# Patient Record
Sex: MALE | Race: WHITE | Employment: UNEMPLOYED | URBAN - METROPOLITAN AREA
[De-identification: names, ages, dates, MRNs, and addresses within clinical notes are randomized per-mention and may not be internally consistent; named-entity substitution may affect disease eponyms.]

---

## 2017-04-18 ENCOUNTER — HOSPITAL ENCOUNTER (OUTPATIENT)
Dept: LAB | Age: 62
Discharge: HOME | End: 2017-04-18
Payer: SELF-PAY

## 2017-04-18 ENCOUNTER — OFFICE VISIT (OUTPATIENT)
Dept: FAMILY MEDICINE CLINIC | Age: 62
End: 2017-04-18

## 2017-04-18 VITALS
TEMPERATURE: 96.7 F | RESPIRATION RATE: 16 BRPM | SYSTOLIC BLOOD PRESSURE: 163 MMHG | HEART RATE: 58 BPM | OXYGEN SATURATION: 97 % | HEIGHT: 70 IN | DIASTOLIC BLOOD PRESSURE: 90 MMHG | WEIGHT: 163.8 LBS | BODY MASS INDEX: 23.45 KG/M2

## 2017-04-18 DIAGNOSIS — M67.40 GANGLION CYST: ICD-10-CM

## 2017-04-18 DIAGNOSIS — Z00.00 ROUTINE GENERAL MEDICAL EXAMINATION AT A HEALTH CARE FACILITY: ICD-10-CM

## 2017-04-18 DIAGNOSIS — I10 ESSENTIAL HYPERTENSION, MALIGNANT: ICD-10-CM

## 2017-04-18 DIAGNOSIS — Z87.891 FORMER SMOKER: ICD-10-CM

## 2017-04-18 DIAGNOSIS — M19.90 ARTHRITIS: ICD-10-CM

## 2017-04-18 DIAGNOSIS — H90.2 CONDUCTIVE HEARING LOSS, UNSPECIFIED LATERALITY: ICD-10-CM

## 2017-04-18 DIAGNOSIS — R07.9 CHEST PAIN, UNSPECIFIED TYPE: ICD-10-CM

## 2017-04-18 DIAGNOSIS — E78.00 ELEVATED CHOLESTEROL: ICD-10-CM

## 2017-04-18 DIAGNOSIS — E07.9 THYROID MASS: ICD-10-CM

## 2017-04-18 DIAGNOSIS — Z23 ENCOUNTER FOR IMMUNIZATION: ICD-10-CM

## 2017-04-18 DIAGNOSIS — E78.00 ELEVATED CHOLESTEROL: Primary | ICD-10-CM

## 2017-04-18 LAB
ALBUMIN SERPL BCP-MCNC: 4.2 G/DL (ref 3.4–5)
ALBUMIN/GLOB SERPL: 1.6 {RATIO} (ref 0.8–1.7)
ALP SERPL-CCNC: 96 U/L (ref 45–117)
ALT SERPL-CCNC: 38 U/L (ref 16–61)
ANION GAP BLD CALC-SCNC: 10 MMOL/L (ref 3–18)
APPEARANCE UR: CLEAR
AST SERPL W P-5'-P-CCNC: 21 U/L (ref 15–37)
BASOPHILS # BLD AUTO: 0 K/UL (ref 0–0.06)
BASOPHILS # BLD: 1 % (ref 0–2)
BILIRUB SERPL-MCNC: 0.5 MG/DL (ref 0.2–1)
BILIRUB UR QL: NEGATIVE
BUN SERPL-MCNC: 16 MG/DL (ref 7–18)
BUN/CREAT SERPL: 15 (ref 12–20)
CALCIUM SERPL-MCNC: 9.2 MG/DL (ref 8.5–10.1)
CHLORIDE SERPL-SCNC: 101 MMOL/L (ref 100–108)
CHOLEST SERPL-MCNC: 273 MG/DL
CO2 SERPL-SCNC: 28 MMOL/L (ref 21–32)
COLOR UR: YELLOW
CREAT SERPL-MCNC: 1.1 MG/DL (ref 0.6–1.3)
DIFFERENTIAL METHOD BLD: ABNORMAL
EOSINOPHIL # BLD: 0.2 K/UL (ref 0–0.4)
EOSINOPHIL NFR BLD: 3 % (ref 0–5)
ERYTHROCYTE [DISTWIDTH] IN BLOOD BY AUTOMATED COUNT: 13.5 % (ref 11.6–14.5)
GLOBULIN SER CALC-MCNC: 2.6 G/DL (ref 2–4)
GLUCOSE SERPL-MCNC: 82 MG/DL (ref 74–99)
GLUCOSE UR STRIP.AUTO-MCNC: NEGATIVE MG/DL
HCT VFR BLD AUTO: 44.6 % (ref 36–48)
HDLC SERPL-MCNC: 60 MG/DL (ref 40–60)
HDLC SERPL: 4.6 {RATIO} (ref 0–5)
HGB BLD-MCNC: 14.7 G/DL (ref 13–16)
HGB UR QL STRIP: NEGATIVE
KETONES UR QL STRIP.AUTO: NEGATIVE MG/DL
LDLC SERPL CALC-MCNC: 178 MG/DL (ref 0–100)
LEUKOCYTE ESTERASE UR QL STRIP.AUTO: NEGATIVE
LIPID PROFILE,FLP: ABNORMAL
LYMPHOCYTES # BLD AUTO: 29 % (ref 21–52)
LYMPHOCYTES # BLD: 1.7 K/UL (ref 0.9–3.6)
MCH RBC QN AUTO: 32 PG (ref 24–34)
MCHC RBC AUTO-ENTMCNC: 33 G/DL (ref 31–37)
MCV RBC AUTO: 97 FL (ref 74–97)
MONOCYTES # BLD: 0.6 K/UL (ref 0.05–1.2)
MONOCYTES NFR BLD AUTO: 10 % (ref 3–10)
NEUTS SEG # BLD: 3.4 K/UL (ref 1.8–8)
NEUTS SEG NFR BLD AUTO: 57 % (ref 40–73)
NITRITE UR QL STRIP.AUTO: NEGATIVE
PH UR STRIP: 6.5 [PH] (ref 5–8)
PLATELET # BLD AUTO: 208 K/UL (ref 135–420)
PMV BLD AUTO: 10.7 FL (ref 9.2–11.8)
POTASSIUM SERPL-SCNC: 4 MMOL/L (ref 3.5–5.5)
PROT SERPL-MCNC: 6.8 G/DL (ref 6.4–8.2)
PROT UR STRIP-MCNC: NEGATIVE MG/DL
PSA SERPL-MCNC: 0.6 NG/ML (ref 0–4)
RBC # BLD AUTO: 4.6 M/UL (ref 4.7–5.5)
SODIUM SERPL-SCNC: 139 MMOL/L (ref 136–145)
SP GR UR REFRACTOMETRY: 1.01 (ref 1–1.03)
T4 FREE SERPL-MCNC: 1.1 NG/DL (ref 0.7–1.5)
TRIGL SERPL-MCNC: 175 MG/DL (ref ?–150)
TSH SERPL DL<=0.05 MIU/L-ACNC: 0.69 UIU/ML (ref 0.36–3.74)
UROBILINOGEN UR QL STRIP.AUTO: 0.2 EU/DL (ref 0.2–1)
VLDLC SERPL CALC-MCNC: 35 MG/DL
WBC # BLD AUTO: 5.9 K/UL (ref 4.6–13.2)

## 2017-04-18 PROCEDURE — 84153 ASSAY OF PSA TOTAL: CPT | Performed by: FAMILY MEDICINE

## 2017-04-18 PROCEDURE — 84443 ASSAY THYROID STIM HORMONE: CPT | Performed by: FAMILY MEDICINE

## 2017-04-18 PROCEDURE — 80061 LIPID PANEL: CPT | Performed by: FAMILY MEDICINE

## 2017-04-18 PROCEDURE — 80053 COMPREHEN METABOLIC PANEL: CPT | Performed by: FAMILY MEDICINE

## 2017-04-18 PROCEDURE — 85025 COMPLETE CBC W/AUTO DIFF WBC: CPT | Performed by: FAMILY MEDICINE

## 2017-04-18 PROCEDURE — 84439 ASSAY OF FREE THYROXINE: CPT | Performed by: FAMILY MEDICINE

## 2017-04-18 PROCEDURE — 81003 URINALYSIS AUTO W/O SCOPE: CPT | Performed by: FAMILY MEDICINE

## 2017-04-18 PROCEDURE — 36415 COLL VENOUS BLD VENIPUNCTURE: CPT | Performed by: FAMILY MEDICINE

## 2017-04-18 RX ORDER — LISINOPRIL 10 MG/1
10 TABLET ORAL DAILY
Qty: 90 TAB | Refills: 0 | Status: SHIPPED | OUTPATIENT
Start: 2017-04-18 | End: 2017-08-01 | Stop reason: SDUPTHER

## 2017-04-18 RX ORDER — ATORVASTATIN CALCIUM 10 MG/1
10 TABLET, FILM COATED ORAL DAILY
Qty: 90 TAB | Refills: 0 | Status: SHIPPED | OUTPATIENT
Start: 2017-04-18 | End: 2017-08-01 | Stop reason: SDUPTHER

## 2017-04-18 RX ORDER — ATENOLOL 50 MG/1
50 TABLET ORAL DAILY
Qty: 90 TAB | Refills: 0 | Status: SHIPPED | OUTPATIENT
Start: 2017-04-18 | End: 2017-08-01 | Stop reason: SDUPTHER

## 2017-04-18 NOTE — PROGRESS NOTES
Nadeen Sheehan is a 58 y.o.  male and presents with    Chief Complaint   Patient presents with    Thyroid Problem    Arthritis           Subjective:    Cardiovascular Review:  The patient has hypertension and hyperlipidemia. Diet and Lifestyle: not attempting to follow a low fat, low cholesterol diet, not attempting to follow a low sodium diet  Home BP Monitoring: is not measured at home. Pertinent ROS: taking medications as instructed, no medication side effects noted, no TIA's, no chest pain on exertion, no dyspnea on exertion, no swelling of ankles. Thyroid Review:  Patient is seen for followup of hypothyroidism. Thyroid ROS: denies fatigue, weight changes, heat/cold intolerance, bowel/skin changes or CVS symptoms. Additional Concerns:          Patient Active Problem List    Diagnosis Date Noted    Elevated cholesterol 04/12/2016    Ganglion cyst 04/12/2016    Thyroid mass 04/12/2016    Essential hypertension, malignant 11/13/2014    Hearing loss 11/13/2014    Arthritis 11/13/2014     Current Outpatient Prescriptions   Medication Sig Dispense Refill    lisinopril (PRINIVIL) 10 mg tablet Take 1 Tab by mouth daily. 90 Tab 0    atenolol (TENORMIN) 50 mg tablet Take 1 Tab by mouth daily. 90 Tab 0    atorvastatin (LIPITOR) 10 mg tablet Take 1 Tab by mouth daily. 90 Tab 0     No Known Allergies  Past Medical History:   Diagnosis Date    Arthritis 11/13/2014    Elevated cholesterol 4/12/2016    Essential hypertension, malignant 11/13/2014    Hearing loss 11/13/2014     Past Surgical History:   Procedure Laterality Date    HX HERNIA REPAIR       Family History   Problem Relation Age of Onset    Diabetes Father      Social History   Substance Use Topics    Smoking status: Former Smoker    Smokeless tobacco: Never Used    Alcohol use 12.5 oz/week     25 Shots of liquor per week       ROS       All other systems reviewed and are negative.       Objective:  Vitals:    04/18/17 1500 BP: 163/90   Pulse: (!) 58   Resp: 16   Temp: 96.7 °F (35.9 °C)   TempSrc: Oral   SpO2: 97%   Weight: 163 lb 12.8 oz (74.3 kg)   Height: 5' 10\" (1.778 m)   PainSc:   0 - No pain                 alert, well appearing, and in no distress and oriented to person, place, and time  Chest - clear to auscultation, no wheezes, rales or rhonchi, symmetric air entry  Heart - normal rate, regular rhythm, normal S1, S2, no murmurs, rubs, clicks or gallops  Abdomen - soft, nontender, nondistended, no masses or organomegaly        LABS     TESTS      Assessment/Plan:    Hypertension - stable  Hyperlipidemia - stable  Thyroid stable    Lab review: orders written for new lab studies as appropriate; see orders, no lab studies available for review at time of visit      I have discussed the diagnosis with the patient and the intended plan as seen in the above orders. The patient has received an after-visit summary and questions were answered concerning future plans. I have discussed medication side effects and warnings with the patient as well. I have reviewed the plan of care with the patient, accepted their input and they are in agreement with the treatment goals. Follow-up Disposition:  Return in about 2 months (around 6/18/2017) for physical, labs today, EKG next visit.

## 2017-04-18 NOTE — PROGRESS NOTES
1. Have you been to the ER, urgent care clinic since your last visit? Hospitalized since your last visit? No    2. Have you seen or consulted any other health care providers outside of the [address] [place] [address] Drive since your last visit? Include any pap smears or colon screening.  No

## 2017-04-18 NOTE — MR AVS SNAPSHOT
Visit Information Date & Time Provider Department Dept. Phone Encounter #  
 4/18/2017  3:00 PM Joseino Daniel, [address] [facility] [phone] [id number] Upcoming Health Maintenance Date Due INFLUENZA AGE 9 TO ADULT 8/1/2016 DTaP/Tdap/Td series (2 - Td) 12/4/2024 COLONOSCOPY 5/23/2026 Allergies as of 4/18/2017  Review Complete On: 4/18/2017 By: Estiven Sanchez, DO No Known Allergies Current Immunizations  Reviewed on 11/13/2014 Name Date Influenza Vaccine 12/4/2014, 11/13/2011 Influenza Vaccine (Quad) PF 3/15/2016 Pneumococcal Vaccine (Unspecified Type) 11/13/2011 Td 11/13/2001 Tdap 12/4/2014 Zoster Vaccine, Live 3/15/2016 Not reviewed this visit You Were Diagnosed With   
  
 Codes Comments Elevated cholesterol    -  Primary ICD-10-CM: E78.00 ICD-9-CM: 272.0 Thyroid mass     ICD-10-CM: E07.9 ICD-9-CM: 246.9 Essential hypertension, malignant     ICD-10-CM: I10 
ICD-9-CM: 401.0 Conductive hearing loss, unspecified laterality     ICD-10-CM: H90.2 ICD-9-CM: 389.00 Arthritis     ICD-10-CM: M19.90 ICD-9-CM: 716.90 Former smoker     ICD-10-CM: B07.057 ICD-9-CM: V15.82 Encounter for immunization     ICD-10-CM: X36 ICD-9-CM: V03.89 Routine general medical examination at a health care facility     ICD-10-CM: Z00.00 ICD-9-CM: V70.0 Ganglion cyst     ICD-10-CM: M67.40 ICD-9-CM: 727.43 Chest pain, unspecified type     ICD-10-CM: R07.9 ICD-9-CM: 786.50 Vitals BP Pulse Temp Resp Height(growth percentile) Weight(growth percentile) 163/90 (BP 1 Location: Right arm, BP Patient Position: Sitting) (!) 58 96.7 °F (35.9 °C) (Oral) 16 5' 10\" (1.778 m) 163 lb 12.8 oz (74.3 kg) SpO2 BMI Smoking Status 97% 23.5 kg/m2 Former Smoker BMI and BSA Data Body Mass Index Body Surface Area  
 23.5 kg/m 2 1.92 m 2 Preferred Pharmacy Pharmacy Name Phone [facility] [address], [address] [facility] [phone] Your Updated Medication List  
  
   
This list is accurate as of: 4/18/17  3:16 PM.  Always use your most recent med list.  
  
  
  
  
 atenolol 50 mg tablet Commonly known as:  TENORMIN Take 1 Tab by mouth daily. atorvastatin 10 mg tablet Commonly known as:  LIPITOR Take 1 Tab by mouth daily. lisinopril 10 mg tablet Commonly known as:  PRINIVIL Take 1 Tab by mouth daily. Prescriptions Sent to Pharmacy Refills  
 lisinopril (PRINIVIL) 10 mg tablet 0 Sig: Take 1 Tab by mouth daily. Class: Normal  
 Pharmacy: [id number], [address] Ph #: [phone] Route: Oral  
 atenolol (TENORMIN) 50 mg tablet 0 Sig: Take 1 Tab by mouth daily. Class: Normal  
 Pharmacy: [id number], [address] Ph #: [phone] Route: Oral  
 atorvastatin (LIPITOR) 10 mg tablet 0 Sig: Take 1 Tab by mouth daily. Class: Normal  
 Pharmacy: [id number], [address] Ph #: [phone] Route: Oral  
  
To-Do List   
 04/18/2017 Lab:  CBC WITH AUTOMATED DIFF   
  
 04/18/2017 Lab:  LIPID PANEL   
  
 04/18/2017 Lab:  METABOLIC PANEL, COMPREHENSIVE   
  
 04/18/2017 Lab:  PROSTATE SPECIFIC AG (PSA)   
  
 04/18/2017 Lab:  T4, FREE   
  
 04/18/2017 Lab:  TSH 3RD GENERATION   
  
 04/18/2017 Lab:  URINALYSIS W/ RFLX MICROSCOPIC Introducing [facility]! Dear Sheila Castro: 
Thank you for requesting a Poken account. Our records indicate that you already have an active Poken account. You can access your account anytime at [email or web address] Did you know that you can access your hospital and ER discharge instructions at any time in Poken? You can also review all of your test results from your hospital stay or ER visit. Additional Information If you have questions, please visit the Frequently Asked Questions section of the [employer] website at [email or web address] Remember, Pax8 is NOT to be used for urgent needs. For medical emergencies, dial 911. Now available from your iPhone and Android! Please provide this summary of care documentation to your next provider. Your primary care clinician is listed as [id number] [facility]. If you have any questions after today's visit, please call [phone].

## 2017-07-27 DIAGNOSIS — I10 ESSENTIAL HYPERTENSION, MALIGNANT: ICD-10-CM

## 2017-07-27 DIAGNOSIS — Z87.891 FORMER SMOKER: ICD-10-CM

## 2017-07-27 DIAGNOSIS — M19.90 ARTHRITIS: ICD-10-CM

## 2017-07-28 RX ORDER — LISINOPRIL 10 MG/1
TABLET ORAL
Qty: 90 TAB | Refills: 0 | OUTPATIENT
Start: 2017-07-28

## 2017-07-28 RX ORDER — ATENOLOL 50 MG/1
TABLET ORAL
Qty: 90 TAB | Refills: 0 | OUTPATIENT
Start: 2017-07-28

## 2017-07-31 NOTE — TELEPHONE ENCOUNTER
Pt called stating that he did not make his appt last month because he is out of work and does not have money to come in for an appt. I told him that Dr. Atiya Hopper would still see him but he still refused to make an appt. He would like to know if Dr. Atiya Hopper would at least fill his blood pressure medication because he has enough money to afford that.  Please assist.

## 2017-08-01 ENCOUNTER — TELEPHONE (OUTPATIENT)
Dept: FAMILY MEDICINE CLINIC | Age: 62
End: 2017-08-01

## 2017-08-01 DIAGNOSIS — Z23 ENCOUNTER FOR IMMUNIZATION: ICD-10-CM

## 2017-08-01 DIAGNOSIS — Z00.00 ROUTINE GENERAL MEDICAL EXAMINATION AT A HEALTH CARE FACILITY: ICD-10-CM

## 2017-08-01 DIAGNOSIS — M19.90 ARTHRITIS: ICD-10-CM

## 2017-08-01 DIAGNOSIS — Z87.891 FORMER SMOKER: ICD-10-CM

## 2017-08-01 DIAGNOSIS — M67.40 GANGLION CYST: ICD-10-CM

## 2017-08-01 DIAGNOSIS — I10 ESSENTIAL HYPERTENSION, MALIGNANT: ICD-10-CM

## 2017-08-01 DIAGNOSIS — R07.9 CHEST PAIN, UNSPECIFIED TYPE: ICD-10-CM

## 2017-08-01 RX ORDER — LISINOPRIL 10 MG/1
10 TABLET ORAL DAILY
Qty: 90 TAB | Refills: 0 | Status: SHIPPED | OUTPATIENT
Start: 2017-08-01 | End: 2017-10-30

## 2017-08-01 RX ORDER — ATORVASTATIN CALCIUM 10 MG/1
10 TABLET, FILM COATED ORAL DAILY
Qty: 90 TAB | Refills: 0 | Status: SHIPPED | OUTPATIENT
Start: 2017-08-01 | End: 2017-10-30 | Stop reason: SDUPTHER

## 2017-08-01 RX ORDER — ATENOLOL 50 MG/1
50 TABLET ORAL DAILY
Qty: 90 TAB | Refills: 0 | Status: SHIPPED | OUTPATIENT
Start: 2017-08-01 | End: 2017-08-02 | Stop reason: SDUPTHER

## 2017-08-01 NOTE — TELEPHONE ENCOUNTER
Patient is calling stating that the atenolol 50 mg is being discontinued and that another type of medication is needed.     Please assist.

## 2017-08-01 NOTE — TELEPHONE ENCOUNTER
Request sent for medication refill on patients atenolol 50mg tab and lisinopril 10mg tab. Patient unable to afford office visit but can afford his medication.  Will send to MD.

## 2017-08-02 RX ORDER — ATENOLOL 50 MG/1
50 TABLET ORAL DAILY
Qty: 90 TAB | Refills: 0 | Status: SHIPPED | OUTPATIENT
Start: 2017-08-02 | End: 2017-10-30 | Stop reason: SDUPTHER

## 2017-08-02 NOTE — TELEPHONE ENCOUNTER
Apparently [facility] is telling patients that this drug is discontinued. My sources tell me it is on temporary backorder. Available from a variety of other stores including [facility] here. I have printed rx. Suggest pt call around. Suggest [facility] here.    Dr Babita Bermudez

## 2017-08-02 NOTE — TELEPHONE ENCOUNTER
[facility]. Prescription is at the  and ready for . Called patient on the listed number. Left message stating that prescription is ready for .

## 2017-10-30 ENCOUNTER — OFFICE VISIT (OUTPATIENT)
Dept: FAMILY MEDICINE CLINIC | Age: 62
End: 2017-10-30

## 2017-10-30 VITALS
DIASTOLIC BLOOD PRESSURE: 100 MMHG | TEMPERATURE: 97.3 F | OXYGEN SATURATION: 96 % | BODY MASS INDEX: 24.54 KG/M2 | HEIGHT: 70 IN | HEART RATE: 67 BPM | RESPIRATION RATE: 17 BRPM | SYSTOLIC BLOOD PRESSURE: 190 MMHG | WEIGHT: 171.4 LBS

## 2017-10-30 DIAGNOSIS — I10 ESSENTIAL HYPERTENSION, MALIGNANT: ICD-10-CM

## 2017-10-30 DIAGNOSIS — M19.90 ARTHRITIS: ICD-10-CM

## 2017-10-30 DIAGNOSIS — Z23 ENCOUNTER FOR IMMUNIZATION: ICD-10-CM

## 2017-10-30 DIAGNOSIS — Z87.891 FORMER SMOKER: ICD-10-CM

## 2017-10-30 DIAGNOSIS — R07.9 CHEST PAIN, UNSPECIFIED TYPE: ICD-10-CM

## 2017-10-30 DIAGNOSIS — Z00.00 ROUTINE GENERAL MEDICAL EXAMINATION AT A HEALTH CARE FACILITY: ICD-10-CM

## 2017-10-30 DIAGNOSIS — M67.40 GANGLION CYST: ICD-10-CM

## 2017-10-30 RX ORDER — AMLODIPINE AND BENAZEPRIL HYDROCHLORIDE 10; 20 MG/1; MG/1
1 CAPSULE ORAL DAILY
Qty: 90 CAP | Refills: 0 | Status: SHIPPED | OUTPATIENT
Start: 2017-10-30 | End: 2017-11-28

## 2017-10-30 RX ORDER — ATENOLOL 50 MG/1
50 TABLET ORAL DAILY
Qty: 90 TAB | Refills: 0 | Status: SHIPPED | OUTPATIENT
Start: 2017-10-30 | End: 2017-11-28 | Stop reason: SDUPTHER

## 2017-10-30 RX ORDER — ATORVASTATIN CALCIUM 10 MG/1
10 TABLET, FILM COATED ORAL DAILY
Qty: 90 TAB | Refills: 0 | Status: SHIPPED | OUTPATIENT
Start: 2017-10-30 | End: 2017-11-28 | Stop reason: SDUPTHER

## 2017-10-30 NOTE — PROGRESS NOTES
Stacie Villegas is a 58 y.o.  male and presents with    Chief Complaint   Patient presents with    Hypertension    Cholesterol Problem           Subjective:    Cardiovascular Review:  The patient has hypertension and hyperlipidemia. Diet and Lifestyle: not attempting to follow a low fat, low cholesterol diet, not attempting to follow a low sodium diet  Home BP Monitoring: is not measured at home. Pertinent ROS: taking medications as instructed, no medication side effects noted, no TIA's, no chest pain on exertion, no dyspnea on exertion, no swelling of ankles. Additional Concerns:          Patient Active Problem List    Diagnosis Date Noted    Elevated cholesterol 04/12/2016    Ganglion cyst 04/12/2016    Thyroid mass 04/12/2016    Essential hypertension, malignant 11/13/2014    Hearing loss 11/13/2014    Arthritis 11/13/2014     Current Outpatient Prescriptions   Medication Sig Dispense Refill    atenolol (TENORMIN) 50 mg tablet Take 1 Tab by mouth daily. 90 Tab 0    atorvastatin (LIPITOR) 10 mg tablet Take 1 Tab by mouth daily. 90 Tab 0    amLODIPine-benazepril (LOTREL) 10-20 mg per capsule Take 1 Cap by mouth daily. 90 Cap 0     No Known Allergies  Past Medical History:   Diagnosis Date    Arthritis 11/13/2014    Elevated cholesterol 4/12/2016    Essential hypertension, malignant 11/13/2014    Hearing loss 11/13/2014     Past Surgical History:   Procedure Laterality Date    HX HERNIA REPAIR       Family History   Problem Relation Age of Onset    Diabetes Father      Social History   Substance Use Topics    Smoking status: Former Smoker    Smokeless tobacco: Never Used    Alcohol use 12.5 oz/week     25 Shots of liquor per week       ROS       All other systems reviewed and are negative.       Objective:  Vitals:    10/30/17 1500   BP: (!) 190/100   Pulse: 67   Resp: 17   Temp: 97.3 °F (36.3 °C)   TempSrc: Oral   SpO2: 96%   Weight: 171 lb 6.4 oz (77.7 kg)   Height: 5' 10\" (1.778 m)   PainSc:   0 - No pain                 alert, well appearing, and in no distress and oriented to person, place, and time  Chest - clear to auscultation, no wheezes, rales or rhonchi, symmetric air entry  Heart - normal rate, regular rhythm, normal S1, S2, no murmurs, rubs, clicks or gallops        LABS     TESTS      Assessment/Plan:    Hypertension - stable  Hyperlipidemia - stable    Lab review: labs are reviewed, up to date and normal    Diagnoses and all orders for this visit:    1. Essential hypertension, malignant  -     atenolol (TENORMIN) 50 mg tablet; Take 1 Tab by mouth daily. -     atorvastatin (LIPITOR) 10 mg tablet; Take 1 Tab by mouth daily. -     amLODIPine-benazepril (LOTREL) 10-20 mg per capsule; Take 1 Cap by mouth daily. 2. Encounter for immunization  -     atenolol (TENORMIN) 50 mg tablet; Take 1 Tab by mouth daily. -     atorvastatin (LIPITOR) 10 mg tablet; Take 1 Tab by mouth daily. -     amLODIPine-benazepril (LOTREL) 10-20 mg per capsule; Take 1 Cap by mouth daily. 3. Arthritis  -     atenolol (TENORMIN) 50 mg tablet; Take 1 Tab by mouth daily. -     atorvastatin (LIPITOR) 10 mg tablet; Take 1 Tab by mouth daily. -     amLODIPine-benazepril (LOTREL) 10-20 mg per capsule; Take 1 Cap by mouth daily. 4. Routine general medical examination at a health care facility  -     atenolol (TENORMIN) 50 mg tablet; Take 1 Tab by mouth daily. -     atorvastatin (LIPITOR) 10 mg tablet; Take 1 Tab by mouth daily. -     amLODIPine-benazepril (LOTREL) 10-20 mg per capsule; Take 1 Cap by mouth daily. 5. Ganglion cyst  -     atenolol (TENORMIN) 50 mg tablet; Take 1 Tab by mouth daily. -     atorvastatin (LIPITOR) 10 mg tablet; Take 1 Tab by mouth daily. -     amLODIPine-benazepril (LOTREL) 10-20 mg per capsule; Take 1 Cap by mouth daily. 6. Chest pain, unspecified type  -     atenolol (TENORMIN) 50 mg tablet; Take 1 Tab by mouth daily.   -     atorvastatin (LIPITOR) 10 mg tablet; Take 1 Tab by mouth daily. -     amLODIPine-benazepril (LOTREL) 10-20 mg per capsule; Take 1 Cap by mouth daily. 7. Former smoker  -     atenolol (TENORMIN) 50 mg tablet; Take 1 Tab by mouth daily. -     atorvastatin (LIPITOR) 10 mg tablet; Take 1 Tab by mouth daily. -     amLODIPine-benazepril (LOTREL) 10-20 mg per capsule; Take 1 Cap by mouth daily. I have discussed the diagnosis with the patient and the intended plan as seen in the above orders. The patient has received an after-visit summary and questions were answered concerning future plans. I have discussed medication side effects and warnings with the patient as well. I have reviewed the plan of care with the patient, accepted their input and they are in agreement with the treatment goals. Follow-up Disposition:  Return in about 4 weeks (around 11/27/2017) for physical, labs at next visit, EKG next visit.

## 2017-10-30 NOTE — PROGRESS NOTES
Leatha Gibbs. is a 58 y.o. male presented to clinic for medication refill. Pt denies any pain or concerns at this time. BP is elevated. Notified MD.    1. Have you been to the ER, urgent care clinic since your last visit? Hospitalized since your last visit? No    2. Have you seen or consulted any other health care providers outside of the [address] [place] [address] since your last visit? Include any pap smears or colon screening.  No     Learning Assessment 1/6/2015   PRIMARY LEARNER Patient   HIGHEST LEVEL OF EDUCATION - PRIMARY LEARNER  2 YEARS OF COLLEGE   BARRIERS PRIMARY LEARNER NONE   CO-LEARNER CAREGIVER No   PRIMARY LANGUAGE ENGLISH   LEARNER PREFERENCE PRIMARY READING     DEMONSTRATION   ANSWERED BY patient   RELATIONSHIP SELF

## 2017-10-30 NOTE — MR AVS SNAPSHOT
Visit Information Date & Time Provider Department Dept. Phone Encounter #  
 10/30/2017  3:00 PM Inna Brooks [phone] Follow-up Instructions Return in about 4 weeks (around 11/27/2017) for physical, labs at next visit, EKG next visit. Follow-up and Disposition History Upcoming Health Maintenance Date Due INFLUENZA AGE 9 TO ADULT 8/1/2017 DTaP/Tdap/Td series (2 - Td) 12/4/2024 COLONOSCOPY 5/23/2026 Allergies as of 10/30/2017  Review Complete On: 10/30/2017 By: Angela Sibley, DO No Known Allergies Current Immunizations  Reviewed on 11/13/2014 Name Date Influenza Vaccine 12/4/2014, 11/13/2011 Influenza Vaccine (Quad) PF 3/15/2016 Pneumococcal Vaccine (Unspecified Type) 11/13/2011 Td 11/13/2001 Tdap 12/4/2014 Zoster Vaccine, Live 3/15/2016 Not reviewed this visit You Were Diagnosed With   
  
 Codes Comments Essential hypertension, malignant     ICD-10-CM: I10 
ICD-9-CM: 401.0 Encounter for immunization     ICD-10-CM: L07 ICD-9-CM: V03.89 Arthritis     ICD-10-CM: M19.90 ICD-9-CM: 716.90 Routine general medical examination at a health care facility     ICD-10-CM: Z00.00 ICD-9-CM: V70.0 Ganglion cyst     ICD-10-CM: M67.40 ICD-9-CM: 727.43 Chest pain, unspecified type     ICD-10-CM: R07.9 ICD-9-CM: 786.50 Former smoker     ICD-10-CM: B61.349 ICD-9-CM: V15.82 Vitals BP Pulse Temp Resp Height(growth percentile) Weight(growth percentile) (!) 190/100 (BP 1 Location: Left arm, BP Patient Position: Sitting) 67 97.3 °F (36.3 °C) (Oral) 17 5' 10\" (1.778 m) 171 lb 6.4 oz (77.7 kg) SpO2 BMI Smoking Status 96% 24.59 kg/m2 Former Smoker BMI and BSA Data Body Mass Index Body Surface Area 24.59 kg/m 2 1.96 m 2 Preferred Pharmacy Pharmacy Name Phone [facility] [address], [address] [phone] Your Updated Medication List  
  
   
This list is accurate as of: 10/30/17  3:04 PM.  Always use your most recent med list. amLODIPine-benazepril 10-20 mg per capsule Commonly known as:  Summer Ends Take 1 Cap by mouth daily. atenolol 50 mg tablet Commonly known as:  TENORMIN Take 1 Tab by mouth daily. atorvastatin 10 mg tablet Commonly known as:  LIPITOR Take 1 Tab by mouth daily. Prescriptions Sent to Pharmacy Refills  
 atenolol (TENORMIN) 50 mg tablet 0 Sig: Take 1 Tab by mouth daily. Class: Normal  
 Pharmacy: [address], [address] Ph #: [phone] Route: Oral  
 atorvastatin (LIPITOR) 10 mg tablet 0 Sig: Take 1 Tab by mouth daily. Class: Normal  
 Pharmacy: [address], [address] Ph #: [phone] Route: Oral  
 amLODIPine-benazepril (LOTREL) 10-20 mg per capsule 0 Sig: Take 1 Cap by mouth daily. Class: Normal  
 Pharmacy: [address], [address] Ph #: [phone] Route: Oral  
  
Follow-up Instructions Return in about 4 weeks (around 11/27/2017) for physical, labs at next visit, EKG next visit. Introducing [facility]! Dear Des Silveira: 
Thank you for requesting a [employer] account. Our records indicate that you already have an active [employer] account. You can access your account anytime at [email or web address] Did you know that you can access your hospital and ER discharge instructions at any time in [employer]? You can also review all of your test results from your hospital stay or ER visit. Additional Information If you have questions, please visit the Frequently Asked Questions section of the [employer] website at [email or web address] Remember, [employer] is NOT to be used for urgent needs. For medical emergencies, dial 911. Now available from your iPhone and Android! Please provide this summary of care documentation to your next provider. Your primary care clinician is listed as [id number] [place] [address]. If you have any questions after today's visit, please call [phone].

## 2017-11-28 ENCOUNTER — HOSPITAL ENCOUNTER (OUTPATIENT)
Dept: LAB | Age: 62
Discharge: HOME | End: 2017-11-28
Payer: SELF-PAY

## 2017-11-28 ENCOUNTER — OFFICE VISIT (OUTPATIENT)
Dept: FAMILY MEDICINE CLINIC | Age: 62
End: 2017-11-28

## 2017-11-28 VITALS
BODY MASS INDEX: 23.99 KG/M2 | HEIGHT: 70 IN | DIASTOLIC BLOOD PRESSURE: 104 MMHG | OXYGEN SATURATION: 97 % | TEMPERATURE: 97.6 F | HEART RATE: 74 BPM | WEIGHT: 167.6 LBS | SYSTOLIC BLOOD PRESSURE: 127 MMHG | RESPIRATION RATE: 16 BRPM

## 2017-11-28 DIAGNOSIS — M19.90 ARTHRITIS: ICD-10-CM

## 2017-11-28 DIAGNOSIS — Z00.00 ROUTINE GENERAL MEDICAL EXAMINATION AT A HEALTH CARE FACILITY: ICD-10-CM

## 2017-11-28 DIAGNOSIS — E78.00 ELEVATED CHOLESTEROL: ICD-10-CM

## 2017-11-28 DIAGNOSIS — E07.9 THYROID MASS: ICD-10-CM

## 2017-11-28 DIAGNOSIS — I48.91 ATRIAL FIBRILLATION, UNSPECIFIED TYPE (HCC): ICD-10-CM

## 2017-11-28 DIAGNOSIS — I10 ESSENTIAL HYPERTENSION, MALIGNANT: ICD-10-CM

## 2017-11-28 DIAGNOSIS — Z00.00 ROUTINE GENERAL MEDICAL EXAMINATION AT A HEALTH CARE FACILITY: Primary | ICD-10-CM

## 2017-11-28 LAB
ALBUMIN SERPL-MCNC: 4.6 G/DL (ref 3.4–5)
ALBUMIN/GLOB SERPL: 1.6 {RATIO} (ref 0.8–1.7)
ALP SERPL-CCNC: 105 U/L (ref 45–117)
ALT SERPL-CCNC: 52 U/L (ref 16–61)
ANION GAP SERPL CALC-SCNC: 10 MMOL/L (ref 3–18)
APPEARANCE UR: CLEAR
AST SERPL-CCNC: 31 U/L (ref 15–37)
BASOPHILS # BLD: 0 K/UL (ref 0–0.06)
BASOPHILS NFR BLD: 1 % (ref 0–2)
BILIRUB SERPL-MCNC: 0.9 MG/DL (ref 0.2–1)
BILIRUB UR QL: NEGATIVE
BUN SERPL-MCNC: 15 MG/DL (ref 7–18)
BUN/CREAT SERPL: 14 (ref 12–20)
CALCIUM SERPL-MCNC: 9.8 MG/DL (ref 8.5–10.1)
CHLORIDE SERPL-SCNC: 102 MMOL/L (ref 100–108)
CHOLEST SERPL-MCNC: 194 MG/DL
CO2 SERPL-SCNC: 25 MMOL/L (ref 21–32)
COLOR UR: YELLOW
CREAT SERPL-MCNC: 1.09 MG/DL (ref 0.6–1.3)
DIFFERENTIAL METHOD BLD: ABNORMAL
EOSINOPHIL # BLD: 0.1 K/UL (ref 0–0.4)
EOSINOPHIL NFR BLD: 1 % (ref 0–5)
ERYTHROCYTE [DISTWIDTH] IN BLOOD BY AUTOMATED COUNT: 13.6 % (ref 11.6–14.5)
GLOBULIN SER CALC-MCNC: 2.8 G/DL (ref 2–4)
GLUCOSE SERPL-MCNC: 82 MG/DL (ref 74–99)
GLUCOSE UR STRIP.AUTO-MCNC: NEGATIVE MG/DL
HCT VFR BLD AUTO: 46 % (ref 36–48)
HDLC SERPL-MCNC: 62 MG/DL (ref 40–60)
HDLC SERPL: 3.1 {RATIO} (ref 0–5)
HGB BLD-MCNC: 15.7 G/DL (ref 13–16)
HGB UR QL STRIP: NEGATIVE
KETONES UR QL STRIP.AUTO: NEGATIVE MG/DL
LDLC SERPL CALC-MCNC: 97 MG/DL (ref 0–100)
LEUKOCYTE ESTERASE UR QL STRIP.AUTO: NEGATIVE
LIPID PROFILE,FLP: ABNORMAL
LYMPHOCYTES # BLD: 1.7 K/UL (ref 0.9–3.6)
LYMPHOCYTES NFR BLD: 24 % (ref 21–52)
MCH RBC QN AUTO: 32.1 PG (ref 24–34)
MCHC RBC AUTO-ENTMCNC: 34.1 G/DL (ref 31–37)
MCV RBC AUTO: 94.1 FL (ref 74–97)
MONOCYTES # BLD: 0.8 K/UL (ref 0.05–1.2)
MONOCYTES NFR BLD: 12 % (ref 3–10)
NEUTS SEG # BLD: 4.3 K/UL (ref 1.8–8)
NEUTS SEG NFR BLD: 62 % (ref 40–73)
NITRITE UR QL STRIP.AUTO: NEGATIVE
PH UR STRIP: 6 [PH] (ref 5–8)
PLATELET # BLD AUTO: 254 K/UL (ref 135–420)
PMV BLD AUTO: 10.7 FL (ref 9.2–11.8)
POTASSIUM SERPL-SCNC: 4.1 MMOL/L (ref 3.5–5.5)
PROT SERPL-MCNC: 7.4 G/DL (ref 6.4–8.2)
PROT UR STRIP-MCNC: NEGATIVE MG/DL
PSA SERPL-MCNC: 2.3 NG/ML (ref 0–4)
RBC # BLD AUTO: 4.89 M/UL (ref 4.7–5.5)
SODIUM SERPL-SCNC: 137 MMOL/L (ref 136–145)
SP GR UR REFRACTOMETRY: 1.01 (ref 1–1.03)
T4 FREE SERPL-MCNC: 1.6 NG/DL (ref 0.7–1.5)
TRIGL SERPL-MCNC: 175 MG/DL (ref ?–150)
TSH SERPL DL<=0.05 MIU/L-ACNC: 0.98 UIU/ML (ref 0.36–3.74)
UROBILINOGEN UR QL STRIP.AUTO: 1 EU/DL (ref 0.2–1)
VLDLC SERPL CALC-MCNC: 35 MG/DL
WBC # BLD AUTO: 7 K/UL (ref 4.6–13.2)

## 2017-11-28 PROCEDURE — 80061 LIPID PANEL: CPT | Performed by: FAMILY MEDICINE

## 2017-11-28 PROCEDURE — 84443 ASSAY THYROID STIM HORMONE: CPT | Performed by: FAMILY MEDICINE

## 2017-11-28 PROCEDURE — 85025 COMPLETE CBC W/AUTO DIFF WBC: CPT | Performed by: FAMILY MEDICINE

## 2017-11-28 PROCEDURE — 36415 COLL VENOUS BLD VENIPUNCTURE: CPT | Performed by: FAMILY MEDICINE

## 2017-11-28 PROCEDURE — 80053 COMPREHEN METABOLIC PANEL: CPT | Performed by: FAMILY MEDICINE

## 2017-11-28 PROCEDURE — 84439 ASSAY OF FREE THYROXINE: CPT | Performed by: FAMILY MEDICINE

## 2017-11-28 PROCEDURE — 81003 URINALYSIS AUTO W/O SCOPE: CPT | Performed by: FAMILY MEDICINE

## 2017-11-28 PROCEDURE — 84153 ASSAY OF PSA TOTAL: CPT | Performed by: FAMILY MEDICINE

## 2017-11-28 RX ORDER — AMLODIPINE BESYLATE 10 MG/1
10 TABLET ORAL DAILY
Qty: 90 TAB | Refills: 0 | Status: SHIPPED | OUTPATIENT
Start: 2017-11-28 | End: 2017-12-01 | Stop reason: ALTCHOICE

## 2017-11-28 RX ORDER — LISINOPRIL 20 MG/1
20 TABLET ORAL DAILY
Qty: 90 TAB | Refills: 0 | Status: SHIPPED | OUTPATIENT
Start: 2017-11-28 | End: 2017-12-01 | Stop reason: ALTCHOICE

## 2017-11-28 RX ORDER — ATENOLOL 50 MG/1
50 TABLET ORAL DAILY
Qty: 90 TAB | Refills: 0 | Status: SHIPPED | OUTPATIENT
Start: 2017-11-28 | End: 2018-01-03 | Stop reason: SDUPTHER

## 2017-11-28 RX ORDER — ATORVASTATIN CALCIUM 10 MG/1
10 TABLET, FILM COATED ORAL DAILY
Qty: 90 TAB | Refills: 0 | Status: SHIPPED | OUTPATIENT
Start: 2017-11-28 | End: 2018-01-10 | Stop reason: SDUPTHER

## 2017-11-28 RX ORDER — WARFARIN SODIUM 5 MG/1
5 TABLET ORAL DAILY
Qty: 30 TAB | Refills: 0 | Status: SHIPPED | OUTPATIENT
Start: 2017-11-28 | End: 2017-12-12

## 2017-11-28 NOTE — PROGRESS NOTES
Sarabjit Merida. is a 58 y.o. male presented to clinic for a complete physical. Pt denies any pain or concerns at this time. 1. Have you been to the ER, urgent care clinic since your last visit? Hospitalized since your last visit? No    2. Have you seen or consulted any other health care providers outside of the [facility] since your last visit? Include any pap smears or colon screening.  No     Learning Assessment 1/6/2015   PRIMARY LEARNER Patient   HIGHEST LEVEL OF EDUCATION - PRIMARY LEARNER  2 YEARS OF COLLEGE   BARRIERS PRIMARY LEARNER NONE   CO-LEARNER CAREGIVER No   PRIMARY LANGUAGE ENGLISH   LEARNER PREFERENCE PRIMARY READING     DEMONSTRATION   ANSWERED BY patient   RELATIONSHIP SELF

## 2017-11-28 NOTE — MR AVS SNAPSHOT
Visit Information Date & Time Provider Department Dept. Phone Encounter #  
 11/28/2017  2:00 PM Sheryl Hoover, [place] 6 [phone] [id number] Follow-up Instructions Return in about 1 week (around 12/5/2017) for EOV, inr next visit, labs today. Upcoming Health Maintenance Date Due Influenza Age 5 to Adult 8/1/2017 DTaP/Tdap/Td series (2 - Td) 12/4/2024 COLONOSCOPY 5/23/2026 Allergies as of 11/28/2017  Review Complete On: 11/28/2017 By: Sheryl Hoover, DO No Known Allergies Current Immunizations  Reviewed on 11/13/2014 Name Date Influenza Vaccine 12/4/2014, 11/13/2011 Influenza Vaccine (Quad) PF 3/15/2016 Pneumococcal Vaccine (Unspecified Type) 11/13/2011 Td 11/13/2001 Tdap 12/4/2014 Zoster Vaccine, Live 3/15/2016 Not reviewed this visit You Were Diagnosed With   
  
 Codes Comments Routine general medical examination at a health care facility    -  Primary ICD-10-CM: Z00.00 ICD-9-CM: V70.0 Essential hypertension, malignant     ICD-10-CM: I10 
ICD-9-CM: 401.0 Arthritis     ICD-10-CM: M19.90 ICD-9-CM: 716.90 Elevated cholesterol     ICD-10-CM: E78.00 ICD-9-CM: 272.0 Thyroid mass     ICD-10-CM: E07.9 ICD-9-CM: 246. 9 Atrial fibrillation, unspecified type ([facility]     ICD-10-CM: I48.91 
ICD-9-CM: 427.31 Vitals BP Pulse Temp Resp Height(growth percentile) Weight(growth percentile) (!) 127/104 (BP 1 Location: Right arm, BP Patient Position: Sitting) 74 97.6 °F (36.4 °C) (Oral) 16 5' 10\" (1.778 m) 167 lb 9.6 oz (76 kg) SpO2 BMI Smoking Status 97% 24.05 kg/m2 Former Smoker BMI and BSA Data Body Mass Index Body Surface Area 24.05 kg/m 2 1.94 m 2 Preferred Pharmacy Pharmacy Name Phone [facility] [address], [address] [place] [address] [phone] Your Updated Medication List  
  
   
 This list is accurate as of: 11/28/17  2:31 PM.  Always use your most recent med list. amLODIPine 10 mg tablet Commonly known as:  Elspeth Bakes Take 1 Tab by mouth daily. atenolol 50 mg tablet Commonly known as:  TENORMIN Take 1 Tab by mouth daily. atorvastatin 10 mg tablet Commonly known as:  LIPITOR Take 1 Tab by mouth daily. lisinopril 20 mg tablet Commonly known as:  Deb Primmer Take 1 Tab by mouth daily. warfarin 5 mg tablet Commonly known as:  COUMADIN Take 1 Tab by mouth daily. Prescriptions Printed Refills  
 atenolol (TENORMIN) 50 mg tablet 0 Sig: Take 1 Tab by mouth daily. Class: Print Route: Oral  
 atorvastatin (LIPITOR) 10 mg tablet 0 Sig: Take 1 Tab by mouth daily. Class: Print Route: Oral  
 amLODIPine (NORVASC) 10 mg tablet 0 Sig: Take 1 Tab by mouth daily. Class: Print Route: Oral  
 lisinopril (PRINIVIL, ZESTRIL) 20 mg tablet 0 Sig: Take 1 Tab by mouth daily. Class: Print Route: Oral  
 warfarin (COUMADIN) 5 mg tablet 0 Sig: Take 1 Tab by mouth daily. Class: Print Route: Oral  
  
We Performed the Following AMB POC EKG ROUTINE W/ 12 LEADS, INTER & REP [21373 CPT(R)] REFERRAL TO CARDIOLOGY [LQT02 Custom] Follow-up Instructions Return in about 1 week (around 12/5/2017) for EOV, inr next visit, labs today. To-Do List   
 11/28/2017 Lab:  CBC WITH AUTOMATED DIFF   
  
 11/28/2017 Lab:  LIPID PANEL   
  
 11/28/2017 Lab:  METABOLIC PANEL, COMPREHENSIVE   
  
 11/28/2017 Lab:  PSA, DIAGNOSTIC (PROSTATE SPECIFIC AG)   
  
 11/28/2017 Lab:  T4, FREE   
  
 11/28/2017 Lab:  TSH 3RD GENERATION   
  
 11/28/2017 Lab:  URINALYSIS W/ RFLX MICROSCOPIC Referral Information Referral ID Referred By Referred To  
  
 [id number] Kaiser Mantilla MD   
   [facility] [address] [facility] [address] Phone: [phone] Fax: [phone] Visits Status Start Date End Date 1 New Request 11/28/17 11/28/18 If your referral has a status of pending review or denied, additional information will be sent to support the outcome of this decision. Patient Instructions Consistent Vitamin K Diet: Care Instructions Your Care Instructions Your body needs vitamin K to clot blood and keep your bones strong. It's found in leafy green vegetables such as kale and spinach. If you take the blood thinner warfarin (Coumadin), you need to be careful about how much vitamin K you get. Vitamin K can keep your warfarin from working as it should. Most people who take warfarin can eat normally. The important thing is to get about the same amount of vitamin K each day. Don't suddenly start eating foods with a lot more or a lot less vitamin K. You can choose how much vitamin K you eat. For example, if you already eat a lot of leafy green vegetables, that's fine. Just keep it about the same amount each day. Follow-up care is a key part of your treatment and safety. Be sure to make and go to all appointments, and call your doctor if you are having problems. It's also a good idea to know your test results and keep a list of the medicines you take. How can you care for yourself at home? You don't need to stop eating food high in vitamin K. But you do need to know what foods contain vitamin K. Then you can try to eat about the same amount of vitamin K each day. · You might limit foods that are high in vitamin K to about 1 serving a day. These foods have about 250 to 500 micrograms (mcg) of vitamin K in each serving. They include: ¨ Cooked leafy green vegetables. Examples are kale, spinach, turnip greens, katelyn greens, Swiss chard, and mustard greens. One serving is ½ cup.  
· You might limit foods that are medium-high in vitamin K to about 3 servings a day. These foods have about 50 to 150 mcg of vitamin K in each serving. These include: ¨ Cooked brussels sprouts, broccoli, cabbage, and asparagus. One serving is ½ cup. ¨ Raw leafy green vegetables. Examples are spinach, green leaf lettuce, rafael lettuce, and endive. One serving is 1 cup. · Vitamin K also is found in many multivitamins. You don't need to stop taking your multivitamin if it has vitamin K. But you do need to take it every day. · Check with your doctor before you start or stop taking any supplements or herbal products. Some of these may contain vitamin K. Where can you learn more? Go to http://jg-dave.info/. Enter C567 in the search box to learn more about \"Consistent Vitamin K Diet: Care Instructions. \" Current as of: September 21, 2016 Content Version: 11.4 © 2265-1126 [employer] ([place]). Care instructions adapted under license by [employer] (which disclaims liability or warranty for this information). If you have questions about a medical condition or this instruction, always ask your healthcare professional. [place] [address] any warranty or liability for your use of this information. Ã¯Â»Â Anticoagulants: After Your Visit Your Care Instructions Your doctor prescribed an anticoagulant medicine. Anticoagulants, often called blood thinners, prevent new blood clots from forming and keep existing clots from getting larger. They do not actually thin the blood, but they make the blood take longer to clot. This lowers the risk of a blood clot moving to the lungs (pulmonary embolism) or moving to the brain and causing a stroke. Blood thinners come in two forms. Heparin is given by shot, either under your skin or through a needle in your vein, and starts working right away. Warfarin (Coumadin) comes in pill form and takes longer to work. Your doctor may have you begin taking both forms at the same time.  As soon as the pills start to work, you will stop the shots but continue to take the pills. If you have a blood clot in your leg, you may need to take warfarin for several months. People who have heart conditions such as atrial fibrillation often need to take it for the rest of their lives. The right dose of this medicine is different for each person. You will need regular blood tests to see if your dose is correct. Follow-up care is a key part of your treatment and safety. Be sure to make and go to all appointments, and call your doctor if you are having problems. Itâs also a good idea to know your test results and keep a list of the medicines you take. How do you take blood thinners? · Take your medicines exactly as prescribed. Call your doctor if you think you are having a problem with your medicine. · Call your doctor if you are not sure what to do if you missed a dose of blood thinner. ¨ Your doctor can tell you exactly what to do so you do not take too much or too little blood thinner. Then you will be as safe as possible. · Some general rules for what to do if you miss a dose: ¨ If you remember it in the same day, take the missed dose. Then go back to your regular schedule. ¨ If it is the next day, or almost time to take the next dose, do not take the missed dose. Do not double the dose to make up for the missed one. At your next regularly scheduled time, take your normal dose. ¨ If you miss your dose for 2 or more days, call your doctor. · To help you stay on schedule, use a calendar to remind you when to take your blood thinner. When you take the medicine, note it on the calendar. · If you are going to give yourself shots, your doctor will give you instructions for how to safely inject the medicine. Follow the directions carefully.  
· Do not take any vitamins, over-the-counter medicines, or herbal products without talking to your doctor first. 
 · Avoid contact sports and other activities that could lead to injury. Make your home safe and take other measures to reduce your risk of falling. Always wear a seat belt while in a car. · Do not suddenly change your intake of vitamin Kârich foods, such as broccoli, cabbage, asparagus, lettuce, and spinach. This will help blood thinners work evenly from day to day. · Limit alcohol to 2 drinks a day for men and 1 drink a day for women. Alcohol may interfere with blood thinners. It also increases your risk of falls, which can cause bruising and bleeding. · Tell your dentist, pharmacist, and other health professionals that you take blood thinners. Wear medical alert jewelry that says you take blood thinners. You can buy this at most drugsContently. When should you call for help? Call 911 anytime you think you may need emergency care. For example, call if: 
· You cough up blood. · You vomit blood or what looks like coffee grounds. · You pass maroon or very bloody stools. Call your doctor now or seek immediate medical care if: 
· You have new bruises or blood spots under your skin. · You have a nosebleed. · Your gums bleed when you brush your teeth. · You have blood in your urine. · Your stools are black and tarlike or have streaks of blood. · You have vaginal bleeding when you are not having your period, or heavy period bleeding. Watch closely for changes in your health, and be sure to contact your doctor if: 
· You have questions about your medicine. Where can you learn more? Go to [employer] Enter R539 in the search box to learn more about \"Anticoagulants: After Your Visit. \" Â© 2922-5915 [employer]wise[employer]. Care instructions adapted under license by [employer] (which disclaims liability or warranty for this information).  This care instruction is for use with your licensed healthcare professional. If you have questions about a medical condition or this instruction, always ask your healthcare professional. [address] any warranty or liability for your use of this information. Content Version: 54.09003; Last Revised: July 1, 2009 Atrial Fibrillation: Care Instructions Your Care Instructions Atrial fibrillation is an irregular and often fast heartbeat. Treating this condition is important for several reasons. It can cause blood clots, which can travel from your heart to your brain and cause a stroke. If you have a fast heartbeat, you may feel lightheaded, dizzy, and weak. An irregular heartbeat can also increase your risk for heart failure. Atrial fibrillation is often the result of another heart condition, such as high blood pressure or coronary artery disease. Making changes to improve your heart condition will help you stay healthy and active. Follow-up care is a key part of your treatment and safety. Be sure to make and go to all appointments, and call your doctor if you are having problems. It's also a good idea to know your test results and keep a list of the medicines you take. How can you care for yourself at home? Medicines ? · Take your medicines exactly as prescribed. Call your doctor if you think you are having a problem with your medicine. You will get more details on the specific medicines your doctor prescribes. ? · If your doctor has given you a blood thinner to prevent a stroke, be sure you get instructions about how to take your medicine safely. Blood thinners can cause serious bleeding problems. ? · Do not take any vitamins, over-the-counter drugs, or herbal products without talking to your doctor first. ? Lifestyle changes ? · Do not smoke. Smoking can increase your chance of a stroke and heart attack. If you need help quitting, talk to your doctor about stop-smoking programs and medicines. These can increase your chances of quitting for good. ? · Eat a heart-healthy diet. ? · Stay at a healthy weight. Lose weight if you need to.  
? · Limit alcohol to 2 drinks a day for men and 1 drink a day for women. Too much alcohol can cause health problems. ? · Avoid colds and flu. Get a pneumococcal vaccine shot. If you have had one before, ask your doctor whether you need another dose. Get a flu shot every year. If you must be around people with colds or flu, wash your hands often. Activity ? · If your doctor recommends it, get more exercise. Walking is a good choice. Bit by bit, increase the amount you walk every day. Try for at least 30 minutes on most days of the week. You also may want to swim, bike, or do other activities. Your doctor may suggest that you join a cardiac rehabilitation program so that you can have help increasing your physical activity safely. ? · Start light exercise if your doctor says it is okay. Even a small amount will help you get stronger, have more energy, and manage stress. Walking is an easy way to get exercise. Start out by walking a little more than you did in the hospital. Gradually increase the amount you walk. ? · When you exercise, watch for signs that your heart is working too hard. You are pushing too hard if you cannot talk while you are exercising. If you become short of breath or dizzy or have chest pain, sit down and rest immediately. ? · Check your pulse regularly. Place two fingers on the artery at the palm side of your wrist, in line with your thumb. If your heartbeat seems uneven or fast, talk to your doctor. When should you call for help? Call 911 anytime you think you may need emergency care. For example, call if: 
? · You have symptoms of a heart attack. These may include: ¨ Chest pain or pressure, or a strange feeling in the chest. 
¨ Sweating. ¨ Shortness of breath. ¨ Nausea or vomiting. ¨ Pain, pressure, or a strange feeling in the back, neck, jaw, or upper belly or in one or both shoulders or arms. ¨ Lightheadedness or sudden weakness. ¨ A fast or irregular heartbeat. After you call 911, the  may tell you to chew 1 adult-strength or 2 to 4 low-dose aspirin. Wait for an ambulance. Do not try to drive yourself. ? · You have symptoms of a stroke. These may include: 
¨ Sudden numbness, tingling, weakness, or loss of movement in your face, arm, or leg, especially on only one side of your body. ¨ Sudden vision changes. ¨ Sudden trouble speaking. ¨ Sudden confusion or trouble understanding simple statements. ¨ Sudden problems with walking or balance. ¨ A sudden, severe headache that is different from past headaches. ? · You passed out (lost consciousness). ?Call your doctor now or seek immediate medical care if: 
? · You have new or increased shortness of breath. ? · You feel dizzy or lightheaded, or you feel like you may faint. ? · Your heart rate becomes irregular. ? · You can feel your heart flutter in your chest or skip heartbeats. Tell your doctor if these symptoms are new or worse. ? Watch closely for changes in your health, and be sure to contact your doctor if you have any problems. Where can you learn more? Go to http://jg-dave.info/. Enter U020 in the search box to learn more about \"Atrial Fibrillation: Care Instructions. \" Current as of: September 21, 2016 Content Version: 11.4 © 7758-2963 [employer]. Care instructions adapted under license by [employer] (which disclaims liability or warranty for this information). If you have questions about a medical condition or this instruction, always ask your healthcare professional. [place] [address] any warranty or liability for your use of this information. Learning About Atrial Fibrillation What is atrial fibrillation?  
 
Atrial fibrillation (say \"AY-tree-regis yri-ztur-LDE-shun\") is the most common type of irregular heartbeat (arrhythmia). Normally, the heart beats in a strong, steady rhythm. In atrial fibrillation, a problem with the heart's electrical system causes the two upper parts of the heart (the atria) to quiver, or fibrillate. Your heart rate also may be faster than normal. 
Atrial fibrillation can be dangerous because if the heartbeat isn't strong and steady, blood can collect, or pool, in the atria. And pooled blood is more likely to form clots. Clots can travel to the brain, block blood flow, and cause a stroke. Atrial fibrillation can also lead to heart failure. Treatment for atrial fibrillation helps prevent stroke and heart failure. It also helps relieve symptoms. Atrial fibrillation is often caused by another heart problem. It may happen after heart surgery. It may also be caused by other problems, such as an overactive thyroid gland or lung disease. Many people with atrial fibrillation are able to live full and active lives. What are the symptoms? Some people feel symptoms when they have episodes of atrial fibrillation. But other people don't notice any symptoms. If you have symptoms, you may feel: · A fluttering, racing, or pounding feeling in your chest called palpitations. · Weak or tired. · Dizzy or lightheaded. · Short of breath. · Chest pain. · Confused. You may notice signs of atrial fibrillation when you check your pulse. Your pulse may seem uneven or fast. 
What can you expect when you have atrial fibrillation? At first, spells of atrial fibrillation may come on suddenly and last a short time. It may go away on its own or it goes away after treatment. This is called paroxysmal atrial fibrillation. Over time, the spells may last longer and occur more often. They often don't go away on their own. How is it treated? Treatments can help you feel better and prevent future problems, especially stroke and heart failure. The main types of treatment slow the heart rate, control the heart rhythm, and help prevent stroke. Your treatment will depend on the cause of your atrial fibrillation, your symptoms, and your risk for stroke. · Heart rate treatment. Medicine may be used to slow your heart rate. Your heartbeat may still be irregular. But these medicines keep your heart from beating too fast. They may also help relieve your symptoms. · Heart rhythm treatment. Different treatments may be used to try to stop atrial fibrillation and keep it from returning. They can also relieve symptoms. These treatments include medicine, electrical cardioversion to shock the heart back to a normal rhythm, a procedure called catheter ablation, and heart surgery. · Stroke prevention. You and your doctor can decide how to lower your risk. You may decide to take a blood-thinning medicine, such as aspirin or an anticoagulant. How can you live well with it? You can live well and help manage atrial fibrillation by having a heart-healthy lifestyle. To have a heart-healthy lifestyle: · Don't smoke. · Eat heart-healthy foods. · Be active. Talk to your doctor about what type and level of exercise is safe for you. · Stay at a healthy weight. Lose weight if you need to. · Manage stress. · Avoid alcohol if it triggers symptoms. · Manage other health problems such as high blood pressure, high cholesterol, and diabetes. · Avoid getting sick from the flu. Get a flu shot every year. Where can you learn more? Go to http://jg-dave.info/. Enter [phone] in the search box to learn more about \"Learning About Atrial Fibrillation. \" Current as of: September 21, 2016 Content Version: 11.4 © 5009-1889 [employer]. Care instructions adapted under license by [employer] (which disclaims liability or warranty for this information).  If you have questions about a medical condition or this instruction, always ask your healthcare professional. [address] any warranty or liability for your use of this information. Introducing [facility]! Dear Promise Walker: 
Thank you for requesting a [employer] account. Our records indicate that you already have an active [employer] account. You can access your account anytime at [email or web address] Did you know that you can access your hospital and ER discharge instructions at any time in [employer]? You can also review all of your test results from your hospital stay or ER visit. Additional Information If you have questions, please visit the Frequently Asked Questions section of the [employer] website at [email or web address] Remember, [employer] is NOT to be used for urgent needs. For medical emergencies, dial 911. Now available from your iPhone and Android! Please provide this summary of care documentation to your next provider. Your primary care clinician is listed as [id number] [facility]. If you have any questions after today's visit, please call [phone].

## 2017-11-28 NOTE — PATIENT INSTRUCTIONS
Consistent Vitamin K Diet: Care Instructions  Your Care Instructions    Your body needs vitamin K to clot blood and keep your bones strong. It's found in leafy green vegetables such as kale and spinach. If you take the blood thinner warfarin (Coumadin), you need to be careful about how much vitamin K you get. Vitamin K can keep your warfarin from working as it should. Most people who take warfarin can eat normally. The important thing is to get about the same amount of vitamin K each day. Don't suddenly start eating foods with a lot more or a lot less vitamin K. You can choose how much vitamin K you eat. For example, if you already eat a lot of leafy green vegetables, that's fine. Just keep it about the same amount each day. Follow-up care is a key part of your treatment and safety. Be sure to make and go to all appointments, and call your doctor if you are having problems. It's also a good idea to know your test results and keep a list of the medicines you take. How can you care for yourself at home? You don't need to stop eating food high in vitamin K. But you do need to know what foods contain vitamin K. Then you can try to eat about the same amount of vitamin K each day. · You might limit foods that are high in vitamin K to about 1 serving a day. These foods have about 250 to 500 micrograms (mcg) of vitamin K in each serving. They include:  ¨ Cooked leafy green vegetables. Examples are kale, spinach, turnip greens, katelyn greens, Swiss chard, and mustard greens. One serving is ½ cup. · You might limit foods that are medium-high in vitamin K to about 3 servings a day. These foods have about 50 to 150 mcg of vitamin K in each serving. These include:  ¨ Cooked brussels sprouts, broccoli, cabbage, and asparagus. One serving is ½ cup. ¨ Raw leafy green vegetables. Examples are spinach, green leaf lettuce, rafael lettuce, and endive. One serving is 1 cup.   · Vitamin K also is found in many multivitamins. You don't need to stop taking your multivitamin if it has vitamin K. But you do need to take it every day. · Check with your doctor before you start or stop taking any supplements or herbal products. Some of these may contain vitamin K. Where can you learn more? Go to http://jg-dave.info/. Enter S067 in the search box to learn more about \"Consistent Vitamin K Diet: Care Instructions. \"  Current as of: September 21, 2016  Content Version: 11.4  © 7844-0699 [employer]. Care instructions adapted under license by [employer] (which disclaims liability or warranty for this information). If you have questions about a medical condition or this instruction, always ask your healthcare professional. [address] any warranty or liability for your use of this information. Ã¯Â»Â Anticoagulants: After Your Visit  Your Care Instructions  Your doctor prescribed an anticoagulant medicine. Anticoagulants, often called blood thinners, prevent new blood clots from forming and keep existing clots from getting larger. They do not actually thin the blood, but they make the blood take longer to clot. This lowers the risk of a blood clot moving to the lungs (pulmonary embolism) or moving to the brain and causing a stroke. Blood thinners come in two forms. Heparin is given by shot, either under your skin or through a needle in your vein, and starts working right away. Warfarin (Coumadin) comes in pill form and takes longer to work. Your doctor may have you begin taking both forms at the same time. As soon as the pills start to work, you will stop the shots but continue to take the pills. If you have a blood clot in your leg, you may need to take warfarin for several months. People who have heart conditions such as atrial fibrillation often need to take it for the rest of their lives. The right dose of this medicine is different for each person. You will need regular blood tests to see if your dose is correct. Follow-up care is a key part of your treatment and safety. Be sure to make and go to all appointments, and call your doctor if you are having problems. Itâs also a good idea to know your test results and keep a list of the medicines you take. How do you take blood thinners? · Take your medicines exactly as prescribed. Call your doctor if you think you are having a problem with your medicine. · Call your doctor if you are not sure what to do if you missed a dose of blood thinner. ¨ Your doctor can tell you exactly what to do so you do not take too much or too little blood thinner. Then you will be as safe as possible. · Some general rules for what to do if you miss a dose:  ¨ If you remember it in the same day, take the missed dose. Then go back to your regular schedule. ¨ If it is the next day, or almost time to take the next dose, do not take the missed dose. Do not double the dose to make up for the missed one. At your next regularly scheduled time, take your normal dose. ¨ If you miss your dose for 2 or more days, call your doctor. · To help you stay on schedule, use a calendar to remind you when to take your blood thinner. When you take the medicine, note it on the calendar. · If you are going to give yourself shots, your doctor will give you instructions for how to safely inject the medicine. Follow the directions carefully. · Do not take any vitamins, over-the-counter medicines, or herbal products without talking to your doctor first.  · Avoid contact sports and other activities that could lead to injury. Make your home safe and take other measures to reduce your risk of falling. Always wear a seat belt while in a car. · Do not suddenly change your intake of vitamin Kârich foods, such as broccoli, cabbage, asparagus, lettuce, and spinach. This will help blood thinners work evenly from day to day.   · Limit alcohol to 2 drinks a day for men and 1 drink a day for women. Alcohol may interfere with blood thinners. It also increases your risk of falls, which can cause bruising and bleeding. · Tell your dentist, pharmacist, and other health professionals that you take blood thinners. Wear medical alert jewelry that says you take blood thinners. You can buy this at most drugstores. When should you call for help? Call 911 anytime you think you may need emergency care. For example, call if:  · You cough up blood. · You vomit blood or what looks like coffee grounds. · You pass maroon or very bloody stools. Call your doctor now or seek immediate medical care if:  · You have new bruises or blood spots under your skin. · You have a nosebleed. · Your gums bleed when you brush your teeth. · You have blood in your urine. · Your stools are black and tarlike or have streaks of blood. · You have vaginal bleeding when you are not having your period, or heavy period bleeding. Watch closely for changes in your health, and be sure to contact your doctor if:  · You have questions about your medicine. Where can you learn more? Go to Qwilr.be  Enter T595 in the search box to learn more about \"Anticoagulants: After Your Visit. \"   Â© 1949-1535 [employer]. Care instructions adapted under license by [employer] (which disclaims liability or warranty for this information). This care instruction is for use with your licensed healthcare professional. If you have questions about a medical condition or this instruction, always ask your healthcare professional. [place] [address] any warranty or liability for your use of this information. Content Version: 0.1.91544; Last Revised: July 1, 2009     Atrial Fibrillation: Care Instructions  Your Care Instructions    Atrial fibrillation is an irregular and often fast heartbeat. Treating this condition is important for several reasons.  It can cause blood clots, which can travel from your heart to your brain and cause a stroke. If you have a fast heartbeat, you may feel lightheaded, dizzy, and weak. An irregular heartbeat can also increase your risk for heart failure. Atrial fibrillation is often the result of another heart condition, such as high blood pressure or coronary artery disease. Making changes to improve your heart condition will help you stay healthy and active. Follow-up care is a key part of your treatment and safety. Be sure to make and go to all appointments, and call your doctor if you are having problems. It's also a good idea to know your test results and keep a list of the medicines you take. How can you care for yourself at home? Medicines  ? · Take your medicines exactly as prescribed. Call your doctor if you think you are having a problem with your medicine. You will get more details on the specific medicines your doctor prescribes. ? · If your doctor has given you a blood thinner to prevent a stroke, be sure you get instructions about how to take your medicine safely. Blood thinners can cause serious bleeding problems. ? · Do not take any vitamins, over-the-counter drugs, or herbal products without talking to your doctor first.   ? Lifestyle changes  ? · Do not smoke. Smoking can increase your chance of a stroke and heart attack. If you need help quitting, talk to your doctor about stop-smoking programs and medicines. These can increase your chances of quitting for good. ? · Eat a heart-healthy diet. ? · Stay at a healthy weight. Lose weight if you need to.   ? · Limit alcohol to 2 drinks a day for men and 1 drink a day for women. Too much alcohol can cause health problems. ? · Avoid colds and flu. Get a pneumococcal vaccine shot. If you have had one before, ask your doctor whether you need another dose. Get a flu shot every year. If you must be around people with colds or flu, wash your hands often. Activity  ?  · If your doctor recommends it, get more exercise. Walking is a good choice. Bit by bit, increase the amount you walk every day. Try for at least 30 minutes on most days of the week. You also may want to swim, bike, or do other activities. Your doctor may suggest that you join a cardiac rehabilitation program so that you can have help increasing your physical activity safely. ? · Start light exercise if your doctor says it is okay. Even a small amount will help you get stronger, have more energy, and manage stress. Walking is an easy way to get exercise. Start out by walking a little more than you did in the hospital. Gradually increase the amount you walk. ? · When you exercise, watch for signs that your heart is working too hard. You are pushing too hard if you cannot talk while you are exercising. If you become short of breath or dizzy or have chest pain, sit down and rest immediately. ? · Check your pulse regularly. Place two fingers on the artery at the palm side of your wrist, in line with your thumb. If your heartbeat seems uneven or fast, talk to your doctor. When should you call for help? Call 911 anytime you think you may need emergency care. For example, call if:  ? · You have symptoms of a heart attack. These may include:  ¨ Chest pain or pressure, or a strange feeling in the chest.  ¨ Sweating. ¨ Shortness of breath. ¨ Nausea or vomiting. ¨ Pain, pressure, or a strange feeling in the back, neck, jaw, or upper belly or in one or both shoulders or arms. ¨ Lightheadedness or sudden weakness. ¨ A fast or irregular heartbeat. After you call 911, the  may tell you to chew 1 adult-strength or 2 to 4 low-dose aspirin. Wait for an ambulance. Do not try to drive yourself. ? · You have symptoms of a stroke. These may include:  ¨ Sudden numbness, tingling, weakness, or loss of movement in your face, arm, or leg, especially on only one side of your body. ¨ Sudden vision changes.   ¨ Sudden trouble speaking. ¨ Sudden confusion or trouble understanding simple statements. ¨ Sudden problems with walking or balance. ¨ A sudden, severe headache that is different from past headaches. ? · You passed out (lost consciousness). ?Call your doctor now or seek immediate medical care if:  ? · You have new or increased shortness of breath. ? · You feel dizzy or lightheaded, or you feel like you may faint. ? · Your heart rate becomes irregular. ? · You can feel your heart flutter in your chest or skip heartbeats. Tell your doctor if these symptoms are new or worse. ? Watch closely for changes in your health, and be sure to contact your doctor if you have any problems. Where can you learn more? Go to http://jg-dave.info/. Enter U020 in the search box to learn more about \"Atrial Fibrillation: Care Instructions. \"  Current as of: September 21, 2016  Content Version: 11.4  © 5082-4868 [employer]. Care instructions adapted under license by [employer] (which disclaims liability or warranty for this information). If you have questions about a medical condition or this instruction, always ask your healthcare professional. [place] [address] any warranty or liability for your use of this information. Learning About Atrial Fibrillation  What is atrial fibrillation? Atrial fibrillation (say \"AY-tree-regis sid-soti-BQD-shun\") is the most common type of irregular heartbeat (arrhythmia). Normally, the heart beats in a strong, steady rhythm. In atrial fibrillation, a problem with the heart's electrical system causes the two upper parts of the heart (the atria) to quiver, or fibrillate. Your heart rate also may be faster than normal.  Atrial fibrillation can be dangerous because if the heartbeat isn't strong and steady, blood can collect, or pool, in the atria. And pooled blood is more likely to form clots.  Clots can travel to the brain, block blood flow, and cause a stroke. Atrial fibrillation can also lead to heart failure. Treatment for atrial fibrillation helps prevent stroke and heart failure. It also helps relieve symptoms. Atrial fibrillation is often caused by another heart problem. It may happen after heart surgery. It may also be caused by other problems, such as an overactive thyroid gland or lung disease. Many people with atrial fibrillation are able to live full and active lives. What are the symptoms? Some people feel symptoms when they have episodes of atrial fibrillation. But other people don't notice any symptoms. If you have symptoms, you may feel:  · A fluttering, racing, or pounding feeling in your chest called palpitations. · Weak or tired. · Dizzy or lightheaded. · Short of breath. · Chest pain. · Confused. You may notice signs of atrial fibrillation when you check your pulse. Your pulse may seem uneven or fast.  What can you expect when you have atrial fibrillation? At first, spells of atrial fibrillation may come on suddenly and last a short time. It may go away on its own or it goes away after treatment. This is called paroxysmal atrial fibrillation. Over time, the spells may last longer and occur more often. They often don't go away on their own. How is it treated? Treatments can help you feel better and prevent future problems, especially stroke and heart failure. The main types of treatment slow the heart rate, control the heart rhythm, and help prevent stroke. Your treatment will depend on the cause of your atrial fibrillation, your symptoms, and your risk for stroke. · Heart rate treatment. Medicine may be used to slow your heart rate. Your heartbeat may still be irregular. But these medicines keep your heart from beating too fast. They may also help relieve your symptoms. · Heart rhythm treatment. Different treatments may be used to try to stop atrial fibrillation and keep it from returning.  They can also relieve symptoms. These treatments include medicine, electrical cardioversion to shock the heart back to a normal rhythm, a procedure called catheter ablation, and heart surgery. · Stroke prevention. You and your doctor can decide how to lower your risk. You may decide to take a blood-thinning medicine, such as aspirin or an anticoagulant. How can you live well with it? You can live well and help manage atrial fibrillation by having a heart-healthy lifestyle. To have a heart-healthy lifestyle:  · Don't smoke. · Eat heart-healthy foods. · Be active. Talk to your doctor about what type and level of exercise is safe for you. · Stay at a healthy weight. Lose weight if you need to. · Manage stress. · Avoid alcohol if it triggers symptoms. · Manage other health problems such as high blood pressure, high cholesterol, and diabetes. · Avoid getting sick from the flu. Get a flu shot every year. Where can you learn more? Go to http://jg-dave.info/. Enter [phone] in the search box to learn more about \"Learning About Atrial Fibrillation. \"  Current as of: September 21, 2016  Content Version: 11.4  © 9951-8110 [employer]. Care instructions adapted under license by [employer] (which disclaims liability or warranty for this information). If you have questions about a medical condition or this instruction, always ask your healthcare professional. [address] any warranty or liability for your use of this information.

## 2017-11-28 NOTE — PROGRESS NOTES
Sloan Abrams. is a 58 y.o.  male and presents for a preventive health care visit        Subjective:  Health Maintenance History  Immunizations reviewed, none indicated. colonoscopy: utd , Eye exam: na , Chest CT: utd ,      Patient Active Problem List    Diagnosis Date Noted    Atrial fibrillation ([facility] 11/28/2017    Elevated cholesterol 04/12/2016    Thyroid mass 04/12/2016    Essential hypertension, malignant 11/13/2014    Arthritis 11/13/2014     Current Outpatient Prescriptions   Medication Sig Dispense Refill    atenolol (TENORMIN) 50 mg tablet Take 1 Tab by mouth daily. 90 Tab 0    atorvastatin (LIPITOR) 10 mg tablet Take 1 Tab by mouth daily. 90 Tab 0    amLODIPine (NORVASC) 10 mg tablet Take 1 Tab by mouth daily. 90 Tab 0    lisinopril (PRINIVIL, ZESTRIL) 20 mg tablet Take 1 Tab by mouth daily. 90 Tab 0    warfarin (COUMADIN) 5 mg tablet Take 1 Tab by mouth daily.  30 Tab 0     No Known Allergies  Past Medical History:   Diagnosis Date    Arthritis 11/13/2014    Atrial fibrillation ([facility] 11/28/2017    Elevated cholesterol 4/12/2016    Essential hypertension, malignant 11/13/2014    Hearing loss 11/13/2014     Past Surgical History:   Procedure Laterality Date    HX HERNIA REPAIR       Family History   Problem Relation Age of Onset    Diabetes Father      Social History   Substance Use Topics    Smoking status: Former Smoker    Smokeless tobacco: Never Used    Alcohol use 12.5 oz/week     25 Shots of liquor per week           ROS       General: negative for - chills, fatigue, fever, weight change  Psych: negative for - anxiety, depression, irritability or mood swings  ENT: negative for - headaches, hearing change, nasal congestion, oral lesions, sneezing or sore throat  Heme/ Lymph: negative for - bleeding problems, bruising, pallor or swollen lymph nodes  Endo: negative for - hot flashes, polydipsia/polyuria or temperature intolerance  Resp: negative for - cough, shortness of breath or wheezing  CV: negative for - chest pain, edema or palpitations  GI: negative for - abdominal pain, change in bowel habits, constipation, diarrhea or nausea/vomiting  : negative for - dysuria, hematuria, incontinence, pelvic pain or vulvar/vaginal symptoms  MSK: negative for - joint pain, joint swelling or muscle pain  Neuro: negative for - confusion, headaches, seizures or weakness  Derm: negative for - dry skin, hair changes, rash or skin lesion changes        Objective:  Vitals:    11/28/17 1404   BP: (!) 127/104   Pulse: 74   Resp: 16   Temp: 97.6 °F (36.4 °C)   TempSrc: Oral   SpO2: 97%   Weight: 167 lb 9.6 oz (76 kg)   Height: 5' 10\" (1.778 m)   PainSc:   0 - No pain     alert, well appearing, and in no distress, oriented to person, place, and time and normal appearing weight  General appearance - alert, well appearing, and in no distress, oriented to person, place, and time and normal appearing weight   Visit Vitals    BP (!) 127/104 (BP 1 Location: Right arm, BP Patient Position: Sitting)    Pulse 74    Temp 97.6 °F (36.4 °C) (Oral)    Resp 16    Ht 5' 10\" (1.778 m)    Wt 167 lb 9.6 oz (76 kg)    SpO2 97%    BMI 24.05 kg/m2       General appearance  alert, cooperative, no distress, appears stated age   Head  Normocephalic, without obvious abnormality, atraumatic   Eyes  conjunctivae/corneas clear. PERRL, EOM's intact. Fundi benign   Ears  normal TM's and external ear canals AU   Nose Nares normal. Septum midline. Mucosa normal. No drainage or sinus tenderness. Throat Lips, mucosa, and tongue normal. Teeth and gums normal   Neck supple, symmetrical, trachea midline, no adenopathy, thyroid: not enlarged, symmetric, no tenderness/mass/nodules, no carotid bruit and no JVD   Back   symmetric, no curvature.  ROM normal. No CVA tenderness   Lungs   clear to auscultation bilaterally   Chest wall  no tenderness   Heart  regular rate and rhythm, S1, S2 normal, no murmur, click, rub or gallop   Abdomen   soft, non-tender. Bowel sounds normal. No masses,  No organomegaly   Genitalia  Normal male   Rectal  Normal tone, normal prostate, no masses or tenderness  Guaiac negative stool   Extremities extremities normal, atraumatic, no cyanosis or edema   Pulses 2+ and symmetric   Skin Skin color, texture, turgor normal. No rashes or lesions   Lymph nodes Cervical, supraclavicular, and axillary nodes normal.   Neurologic Normal         LABS    TESTS    ekg  Atrial fib    Assessment/Plan:  Healthy patient except as noted below:    Health Maintenance up to date. Recommend f/u physical 1 year. Routine screening labs/tests recommended prior to next physical.      Lab review: orders written for new lab studies as appropriate; see orders        I have discussed the diagnosis with the patient and the intended plan as seen in the above orders. The patient has received an after-visit summary and questions were answered concerning future plans. I have discussed medication side effects and warnings with the patient as well. I have reviewed the plan of care with the patient, accepted their input and they are in agreement with the treatment goals. Follow-up Disposition:  Return in about 1 week (around 12/5/2017) for EOV, inr next visit, labs today.    -------------------------------------------------------------------------------------------------------------------    Problem Assessment  and also with   Chief Complaint   Patient presents with    Thyroid Problem    Cholesterol Problem    Hypertension    Arthritis         HPI ;  Cardiovascular Review:  The patient has hypertension and hyperlipidemia. Diet and Lifestyle: not attempting to follow a low fat, low cholesterol diet  Home BP Monitoring: is not measured at home. Pertinent ROS: taking medications as instructed, no medication side effects noted, no TIA's, no chest pain on exertion, no dyspnea on exertion, no swelling of ankles.    Thyroid Review:  Patient is seen for followup of hypothyroidism. Thyroid ROS: denies fatigue, weight changes, heat/cold intolerance, bowel/skin changes or CVS symptoms. Osteoarthritis and Chronic Pain:  Patient has osteoarthritis, primarily affecting the diffuse. Symptoms onset: problem is longstanding. Rheumatological ROS: no current joint or muscle symptoms, essentially pain-free. Response to treatment plan: stable. Additional Concerns:          Assessment/Plan:      Hypertension - stable  Hyperlipidemia - stable  Atrial fib  New onset -- asymptomatic at present will start coumadin and refer to cardiology  Thyroid check labs today  Arthritis ongoing     Diagnoses and all orders for this visit:    1. Routine general medical examination at a health care facility  -     AMB POC EKG ROUTINE W/ 12 LEADS, INTER & REP  -     LIPID PANEL; Future  -     CBC WITH AUTOMATED DIFF; Future  -     METABOLIC PANEL, COMPREHENSIVE; Future  -     PROSTATE SPECIFIC AG; Future  -     URINALYSIS W/ RFLX MICROSCOPIC; Future  -     TSH 3RD GENERATION; Future  -     T4, FREE; Future  -     [facility]  -     atenolol (TENORMIN) 50 mg tablet; Take 1 Tab by mouth daily. -     atorvastatin (LIPITOR) 10 mg tablet; Take 1 Tab by mouth daily. 2. Essential hypertension, malignant  -     AMB POC EKG ROUTINE W/ 12 LEADS, INTER & REP  -     LIPID PANEL; Future  -     CBC WITH AUTOMATED DIFF; Future  -     METABOLIC PANEL, COMPREHENSIVE; Future  -     PROSTATE SPECIFIC AG; Future  -     URINALYSIS W/ RFLX MICROSCOPIC; Future  -     TSH 3RD GENERATION; Future  -     T4, FREE; Future  -     [facility]  -     atenolol (TENORMIN) 50 mg tablet; Take 1 Tab by mouth daily. -     atorvastatin (LIPITOR) 10 mg tablet; Take 1 Tab by mouth daily. 3. Arthritis  -     AMB POC EKG ROUTINE W/ 12 LEADS, INTER & REP  -     LIPID PANEL; Future  -     CBC WITH AUTOMATED DIFF;  Future  -     METABOLIC PANEL, COMPREHENSIVE; Future  -     PROSTATE SPECIFIC AG; Future  -     URINALYSIS W/ RFLX MICROSCOPIC; Future  -     TSH 3RD GENERATION; Future  -     T4, FREE; Future       [facility]  -     atenolol (TENORMIN) 50 mg tablet; Take 1 Tab by mouth daily. -     atorvastatin (LIPITOR) 10 mg tablet; Take 1 Tab by mouth daily. 4. Elevated cholesterol  -     AMB POC EKG ROUTINE W/ 12 LEADS, INTER & REP  -     LIPID PANEL; Future  -     CBC WITH AUTOMATED DIFF; Future  -     METABOLIC PANEL, COMPREHENSIVE; Future  -     PROSTATE SPECIFIC AG; Future  -     URINALYSIS W/ RFLX MICROSCOPIC; Future  -     TSH 3RD GENERATION; Future  -     T4, FREE; Future  -     [facility]    5. Thyroid mass  -     AMB POC EKG ROUTINE W/ 12 LEADS, INTER & REP  -     LIPID PANEL; Future  -     CBC WITH AUTOMATED DIFF; Future  -     METABOLIC PANEL, COMPREHENSIVE; Future  -     PROSTATE SPECIFIC AG; Future  -     URINALYSIS W/ RFLX MICROSCOPIC; Future  -     TSH 3RD GENERATION; Future  -     T4, FREE; Future  -     [facility]    6. Atrial fibrillation, unspecified type (HCC)  -     AMB POC EKG ROUTINE W/ 12 LEADS, INTER & REP  -     LIPID PANEL; Future  -     CBC WITH AUTOMATED DIFF; Future  -     METABOLIC PANEL, COMPREHENSIVE; Future  -     PROSTATE SPECIFIC AG; Future  -     URINALYSIS W/ RFLX MICROSCOPIC; Future  -     TSH 3RD GENERATION; Future  -     T4, FREE; Future  -     [facility]    Other orders  -     amLODIPine (NORVASC) 10 mg tablet; Take 1 Tab by mouth daily. -     lisinopril (PRINIVIL, ZESTRIL) 20 mg tablet; Take 1 Tab by mouth daily. -     warfarin (COUMADIN) 5 mg tablet; Take 1 Tab by mouth daily.           Lab review: orders written for new lab studies as appropriate; see orders

## 2017-12-01 ENCOUNTER — OFFICE VISIT (OUTPATIENT)
Dept: CARDIOLOGY CLINIC | Age: 62
End: 2017-12-01

## 2017-12-01 VITALS
WEIGHT: 168 LBS | SYSTOLIC BLOOD PRESSURE: 139 MMHG | OXYGEN SATURATION: 98 % | DIASTOLIC BLOOD PRESSURE: 87 MMHG | BODY MASS INDEX: 24.05 KG/M2 | HEART RATE: 98 BPM | HEIGHT: 70 IN

## 2017-12-01 DIAGNOSIS — E78.00 ELEVATED CHOLESTEROL: ICD-10-CM

## 2017-12-01 DIAGNOSIS — I10 ESSENTIAL HYPERTENSION, MALIGNANT: ICD-10-CM

## 2017-12-01 DIAGNOSIS — I48.91 ATRIAL FIBRILLATION, UNSPECIFIED TYPE (HCC): Primary | ICD-10-CM

## 2017-12-01 DIAGNOSIS — E07.9 THYROID MASS: ICD-10-CM

## 2017-12-01 RX ORDER — METOPROLOL TARTRATE 25 MG/1
TABLET, FILM COATED ORAL 2 TIMES DAILY
COMMUNITY
End: 2017-12-01 | Stop reason: SDUPTHER

## 2017-12-01 RX ORDER — METOPROLOL TARTRATE 25 MG/1
25 TABLET, FILM COATED ORAL 2 TIMES DAILY
Qty: 60 TAB | Refills: 6 | Status: SHIPPED | OUTPATIENT
Start: 2017-12-01 | End: 2017-12-05

## 2017-12-01 RX ORDER — AMLODIPINE BESYLATE 10 MG/1
5 TABLET ORAL DAILY
COMMUNITY
End: 2017-12-05

## 2017-12-01 NOTE — MR AVS SNAPSHOT
Visit Information Date & Time Provider Department Dept. Phone Encounter #  
 12/1/2017  9:00 AM Sudarshan Cespedes  [facility] [phone] [id number] Your Appointments 12/5/2017  2:00 PM  
ROUTINE CARE with Bernie Bautista.DO [address]) Appt Note: Return in 1 week (12/5/17) EOV, INR next visit, labs today. (OK PER EMETERIO) [address] [facility] [address]  
  
   
 [address] [facility] [address] Upcoming Health Maintenance Date Due Influenza Age 5 to Adult 8/1/2017 DTaP/Tdap/Td series (2 - Td) 12/4/2024 COLONOSCOPY 5/23/2026 Allergies as of 12/1/2017  Review Complete On: 11/28/2017 By: Bernie Bautista.DO No Known Allergies Current Immunizations  Reviewed on 11/13/2014 Name Date Influenza Vaccine 12/4/2014, 11/13/2011 Influenza Vaccine (Quad) PF 3/15/2016 Pneumococcal Vaccine (Unspecified Type) 11/13/2011 Td 11/13/2001 Tdap 12/4/2014 Zoster Vaccine, Live 3/15/2016 Not reviewed this visit You Were Diagnosed With   
  
 Codes Comments Atrial fibrillation, unspecified type ([facility]    -  Primary ICD-10-CM: I48.91 
ICD-9-CM: 427.31 Vitals BP Pulse Height(growth percentile) Weight(growth percentile) SpO2 BMI  
 139/87 98 5' 10\" (1.778 m) 168 lb (76.2 kg) 98% 24.11 kg/m2 Smoking Status Former Smoker Vitals History BMI and BSA Data Body Mass Index Body Surface Area  
 24.11 kg/m 2 1.94 m 2 Preferred Pharmacy Pharmacy Name Phone [facility] [address], [place] [place] [phone] Your Updated Medication List  
  
   
This list is accurate as of: 12/1/17  9:29 AM.  Always use your most recent med list. amLODIPine 10 mg tablet Commonly known as:  Eliseo Feldman Take 5 mg by mouth daily. atenolol 50 mg tablet Commonly known as:  TENORMIN Take 1 Tab by mouth daily. atorvastatin 10 mg tablet Commonly known as:  LIPITOR Take 1 Tab by mouth daily. lisinopril 20 mg tablet Commonly known as:  Marquita Hernesto Take 1 Tab by mouth daily. metoprolol tartrate 25 mg tablet Commonly known as:  LOPRESSOR Take  by mouth two (2) times a day. warfarin 5 mg tablet Commonly known as:  COUMADIN Take 1 Tab by mouth daily. To-Do List   
 12/01/2017 ECHO:  2D ECHO COMPLETE ADULT (TTE) W OR WO CONTR Patient Instructions Start metoprolol 25mg twice daily Decrease amlodipine to 5mg daily 4 weeks f/u Reduce Scotch intake Introducing [facility] [place] [facility] SERVICES! Dear Des Silveira: 
Thank you for requesting a [facility] account. Our records indicate that you already have an active [facility] account. You can access your account anytime at [email or web address] Did you know that you can access your hospital and ER discharge instructions at any time in [facility]? You can also review all of your test results from your hospital stay or ER visit. Additional Information If you have questions, please visit the Frequently Asked Questions section of the [employer] [facility] website at [email or web address] Remember, [facility] is NOT to be used for urgent needs. For medical emergencies, dial 911. Now available from your iPhone and Android! Please provide this summary of care documentation to your next provider. Your primary care clinician is listed as [id number] [facility]. If you have any questions after today's visit, please call [phone].

## 2017-12-01 NOTE — PROGRESS NOTES
1. Have you been to the ER, urgent care clinic since your last visit? Hospitalized since your last visit? No    2. Have you seen or consulted any other health care providers outside of the [address] [place] [address] since your last visit? Include any pap smears or colon screening.  No

## 2017-12-01 NOTE — PATIENT INSTRUCTIONS
Start metoprolol 25mg twice daily    Decrease amlodipine to 5mg daily    4 weeks f/u    Reduce Scotch intake

## 2017-12-05 ENCOUNTER — OFFICE VISIT (OUTPATIENT)
Dept: FAMILY MEDICINE CLINIC | Age: 62
End: 2017-12-05

## 2017-12-05 VITALS
OXYGEN SATURATION: 97 % | HEIGHT: 70 IN | DIASTOLIC BLOOD PRESSURE: 86 MMHG | HEART RATE: 89 BPM | SYSTOLIC BLOOD PRESSURE: 132 MMHG | RESPIRATION RATE: 16 BRPM | TEMPERATURE: 97.3 F | WEIGHT: 169.2 LBS | BODY MASS INDEX: 24.22 KG/M2

## 2017-12-05 DIAGNOSIS — I48.91 ATRIAL FIBRILLATION, UNSPECIFIED TYPE (HCC): Primary | ICD-10-CM

## 2017-12-05 DIAGNOSIS — Z23 ENCOUNTER FOR IMMUNIZATION: ICD-10-CM

## 2017-12-05 RX ORDER — AMLODIPINE BESYLATE 10 MG/1
10 TABLET ORAL DAILY
Qty: 90 TAB | Refills: 0 | Status: SHIPPED | OUTPATIENT
Start: 2017-12-05 | End: 2018-01-10 | Stop reason: SDUPTHER

## 2017-12-05 NOTE — PATIENT INSTRUCTIONS
1. Stop metoprolol  2. Continue atenolol 50mg/d  3.  Continue amlodipine 10mg/d  F/u with Dr Eileen Ahmadi 1 wk

## 2017-12-05 NOTE — PROGRESS NOTES
Cassie Andres. is a 58 y.o.  male and presents with    Chief Complaint   Patient presents with    Irregular Heart Beat           Subjective:  Pt seen for routine PE  1 wk ago. Routine ekg found to be in a fib. Already on tenormin and norvasc. Started on coumadin and referred to cardiology. Here fo rf/u today  No notes from cardiology in chart. Apparently an echo was ordered, pt was given lRx for metoprolol and told to reduce norvasc dose. Has f/u with cardiology in 1 mo        Additional Concerns:          Patient Active Problem List    Diagnosis Date Noted    Atrial fibrillation ([facility] 11/28/2017    Elevated cholesterol 04/12/2016    Thyroid mass 04/12/2016    Essential hypertension, malignant 11/13/2014    Arthritis 11/13/2014     Current Outpatient Prescriptions   Medication Sig Dispense Refill    amLODIPine (NORVASC) 10 mg tablet Take 1 Tab by mouth daily. 90 Tab 0    atenolol (TENORMIN) 50 mg tablet Take 1 Tab by mouth daily. 90 Tab 0    atorvastatin (LIPITOR) 10 mg tablet Take 1 Tab by mouth daily. 90 Tab 0    warfarin (COUMADIN) 5 mg tablet Take 1 Tab by mouth daily. 30 Tab 0     No Known Allergies  Past Medical History:   Diagnosis Date    Arthritis 11/13/2014    Atrial fibrillation ([facility] 11/28/2017    Elevated cholesterol 4/12/2016    Essential hypertension, malignant 11/13/2014    Hearing loss 11/13/2014     Past Surgical History:   Procedure Laterality Date    HX HERNIA REPAIR       Family History   Problem Relation Age of Onset    Diabetes Father      Social History   Substance Use Topics    Smoking status: Former Smoker    Smokeless tobacco: Never Used    Alcohol use 12.5 oz/week     25 Shots of liquor per week       ROS       All other systems reviewed and are negative.       Objective:  Vitals:    12/05/17 1354   BP: 132/86   Pulse: 89   Resp: 16   Temp: 97.3 °F (36.3 °C)   TempSrc: Oral   SpO2: 97%   Weight: 169 lb 3.2 oz (76.7 kg)   Height: 5' 10\" (1.778 m) PainSc:   0 - No pain                 alert, well appearing, and in no distress, oriented to person, place, and time and normal appearing weight  Chest - clear to auscultation, no wheezes, rales or rhonchi, symmetric air entry  Heart - normal rate, regular rhythm, normal S1, S2, no murmurs, rubs, clicks or gallops  Abdomen - soft, nontender, nondistended, no masses or organomegaly        LABS   inr today 2.1  TESTS      Assessment/Plan:    New onset atrial fib  Had a long d/w dr Vito Maher  Pt doesn't need to be on metoprolol and atenolol. I am putting him back on norvasc 10/d and atenolol 50/d and d/c metoprolol. Lab review: labs are reviewed, up to date and normal    Diagnoses and all orders for this visit:    1. Atrial fibrillation, unspecified type (HCC)  -     AMB POC PT/INR    2. Encounter for immunization  -     Influenza virus vaccine (QUADRIVALENT PRES FREE SYRINGE) IM ([id number])    Other orders  -     amLODIPine (NORVASC) 10 mg tablet; Take 1 Tab by mouth daily. I have discussed the diagnosis with the patient and the intended plan as seen in the above orders. The patient has received an after-visit summary and questions were answered concerning future plans. I have discussed medication side effects and warnings with the patient as well. I have reviewed the plan of care with the patient, accepted their input and they are in agreement with the treatment goals.      Follow-up Disposition: Not on File

## 2017-12-05 NOTE — Clinical Note
Melody Pollack Yes he really is taking atenolol and you added metoprolol I have restored his meds to what I had before - namely atenolol 50/d and norvasc 10/d. Will f/u 1 wk.  [address]

## 2017-12-05 NOTE — PROGRESS NOTES
Subjective:      Tony Kraus is in the office today for cardiac evaluation. He is a 71-year-old man that was recently diagnosed with atrial fibrillation. He saw Dr. Reyes Pritchard approximately one month ago and was found to be in atrial fibrillation. He was, for the most part, asymptomatic. He had no palpitations. He had no chest pain. He had no limiting shortness of breath. 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 He has noticed no peripheral swelling. He has had no near syncope or syncope. The patient does drink three to four scotch drinks every day. Patient's cardiac risk factors are remote smoking/ tobacco exposure (none times 2 1/2 yrs), dyslipidemia, hypertension. Patient Active Problem List    Diagnosis Date Noted    Normal cardiac stress test 12/05/2017    Atrial fibrillation ([facility] 11/28/2017    Elevated cholesterol 04/12/2016    Thyroid mass 04/12/2016    Essential hypertension, malignant 11/13/2014    Arthritis 11/13/2014     Current Outpatient Prescriptions   Medication Sig Dispense Refill    amLODIPine (NORVASC) 10 mg tablet Take 1 Tab by mouth daily. 90 Tab 0    atenolol (TENORMIN) 50 mg tablet Take 1 Tab by mouth daily. 90 Tab 0    atorvastatin (LIPITOR) 10 mg tablet Take 1 Tab by mouth daily. 90 Tab 0    warfarin (COUMADIN) 5 mg tablet Take 1 Tab by mouth daily.  30 Tab 0     No Known Allergies  Past Medical History:   Diagnosis Date    Arthritis 11/13/2014    Atrial fibrillation ([address] 11/28/2017    Elevated cholesterol 4/12/2016    Essential hypertension, malignant 11/13/2014    Hearing loss 11/13/2014     Past Surgical History:   Procedure Laterality Date    HX HERNIA REPAIR       Family History   Problem Relation Age of Onset    Diabetes Father      History   Smoking Status    Former Smoker   Smokeless Tobacco    Never Used          Review of Systems, additional:  Constitutional: negative  Eyes: negative  Respiratory: negative  Cardiovascular: negative  Gastrointestinal: negative  Musculoskeletal:negative  Neurological: negative  Behvioral/Psych: negative  Endocrine: negative  ENT: negative    Objective:     Visit Vitals    /87    Pulse 98    Ht 5' 10\" (1.778 m)    Wt 168 lb (76.2 kg)    SpO2 98%    BMI 24.11 kg/m2     General:  alert, cooperative, no distress   Chest Wall: inspection normal - no chest wall deformities or tenderness, respiratory effort normal   Lung: clear to auscultation bilaterally   Heart:  irregularly irregular rhythm with rate 98   Abdomen: soft, non-tender. Bowel sounds normal. No masses,  no organomegaly   Extremities: extremities normal, atraumatic, no cyanosis or edema Skin: no rashes   Neuro: alert, oriented, normal speech, no focal findings or movement disorder noted     [id number]; Atrial fibrillation with a rapid ventricular response. Assessment/Plan:       ICD-10-CM ICD-9-CM    1. Atrial fibrillation, unspecified type ([facility] patient reported not taking BB. Will start metoprolol 25 mg BID and reduce amlodipine to 5 mg daily. Will order Echocardiogram. Reduce alcohol intake. RT 3 - 4 weeks. I48.91 427.31 2D ECHO COMPLETE ADULT (TTE) W OR WO CONTR   2. Essential hypertension, malignant I10 401.0    3. Thyroid mass E07.9 246.9    4. Elevated cholesterol E78.00 272.0    5.  Normal cardiac stress test Z13.6 V81.2

## 2017-12-05 NOTE — PROGRESS NOTES
Delia Johnson is a 58 y.o. male presents today for follow up on his A. fib. Pt is in Room # 4        1. Have you been to the ER, urgent care clinic since your last visit? Hospitalized since your last visit? No    2. Have you seen or consulted any other health care providers outside of the [address] [place] [address] since your last visit? Include any pap smears or colon screening. Yes When: last friday Where: Dr. Yesenia Dalton- cardiology Reason for visit: a. fib follow up         Delia Johnson is a 58 y.o. male who presents for routine immunizations. He denies any symptoms , reactions or allergies that would exclude them from being immunized today. Risks and adverse reactions were discussed and the VIS was given to them. All questions were addressed. He was observed for 10 min post injection. There were no reactions observed.     Taco Baird LPN

## 2017-12-05 NOTE — MR AVS SNAPSHOT
Visit Information Date & Time Provider Department Dept. Phone Encounter #  
 12/5/2017  2:00 PM Doreen Wynn., [address] [facility] [phone] [id number] Follow-up Instructions Return in about 1 week (around 12/12/2017) for EOV, inr next visit. Your Appointments 1/3/2018  9:15 AM  
Follow Up with Olivia Vargas MD  
Cardio Specialist at [facility] Appt Note: 4 weeks f/u  
 [address] [facility] [address]  
  
   
 [id number] [place] [address] [facility] Upcoming Health Maintenance Date Due Influenza Age 5 to Adult 8/1/2017 DTaP/Tdap/Td series (2 - Td) 12/4/2024 COLONOSCOPY 5/23/2026 Allergies as of 12/5/2017  Review Complete On: 12/5/2017 By: Doreen Wynn., DO No Known Allergies Current Immunizations  Reviewed on 11/13/2014 Name Date Influenza Vaccine 12/4/2014, 11/13/2011 Influenza Vaccine (Quad) PF  Incomplete, 3/15/2016 Pneumococcal Vaccine (Unspecified Type) 11/13/2011 Td 11/13/2001 Tdap 12/4/2014 Zoster Vaccine, Live 3/15/2016 Not reviewed this visit You Were Diagnosed With   
  
 Codes Comments Atrial fibrillation, unspecified type ([facility]    -  Primary ICD-10-CM: I48.91 
ICD-9-CM: 427.31 Encounter for immunization     ICD-10-CM: R26 ICD-9-CM: V03.89 Vitals BP Pulse Temp Resp Height(growth percentile) Weight(growth percentile) 132/86 (BP 1 Location: Left arm, BP Patient Position: Sitting) 89 97.3 °F (36.3 °C) (Oral) 16 5' 10\" (1.778 m) 169 lb 3.2 oz (76.7 kg) SpO2 BMI Smoking Status 97% 24.28 kg/m2 Former Smoker BMI and BSA Data Body Mass Index Body Surface Area  
 24.28 kg/m 2 1.95 m 2 Preferred Pharmacy Pharmacy Name Phone [facility] [address], [address] [place] [address] [phone] Your Updated Medication List  
  
   
 This list is accurate as of: 12/5/17  2:27 PM.  Always use your most recent med list. amLODIPine 10 mg tablet Commonly known as:  Hansa Pace Take 1 Tab by mouth daily. atenolol 50 mg tablet Commonly known as:  TENORMIN Take 1 Tab by mouth daily. atorvastatin 10 mg tablet Commonly known as:  LIPITOR Take 1 Tab by mouth daily. warfarin 5 mg tablet Commonly known as:  COUMADIN Take 1 Tab by mouth daily. Prescriptions Sent to Pharmacy Refills  
 amLODIPine (NORVASC) 10 mg tablet 0 Sig: Take 1 Tab by mouth daily. Class: Normal  
 Pharmacy: [address], [address] Ph #: [phone] Route: Oral  
  
We Performed the Following AMB POC PT/INR [35406 CPT(R)] INFLUENZA VIRUS VAC QUAD,SPLIT,PRESV FREE SYRINGE IM [id number] CPT(R)] Follow-up Instructions Return in about 1 week (around 12/12/2017) for EOV, inr next visit. To-Do List   
 12/08/2017 9:30 AM  
  Appointment with [facility] [address] [facility] CV SERV at [facility] NON-INVASIVE CARD ([phone]) Patient needs to bring a current list of all medications  No preparation is required for this study  This study requires patient to bring a written physicians order or the MD office may fax the order to Central Scheduling at [phone]. This exam is performed in the [address] [place] [address] at [facility] in the echo department. Please have the patient arrive 15 minutes prior to their schedule appointment time and report to Patient Registration at the main entrance of the hospital.     AGE LIMIT for this procedure at [facility] is 25years old. All patients under 25years of age should be referred to [facility]. Patient Instructions 1. Stop metoprolol 2. Continue atenolol 50mg/d 3. Continue amlodipine 10mg/d F/u with Dr Gilberto Segura 1 wk Introducing [facility] & HEALTH SERVICES! Dear Ramu Calero: Thank you for requesting a Tilkee account. Our records indicate that you already have an active Tilkee account. You can access your account anytime at [email or web address] Did you know that you can access your hospital and ER discharge instructions at any time in Tilkee? You can also review all of your test results from your hospital stay or ER visit. Additional Information If you have questions, please visit the Frequently Asked Questions section of the Tilkee website at [email or web address] Remember, Tilkee is NOT to be used for urgent needs. For medical emergencies, dial 911. Now available from your iPhone and Android! Please provide this summary of care documentation to your next provider. Your primary care clinician is listed as [address]. If you have any questions after today's visit, please call [phone].

## 2017-12-06 LAB
INR BLD: 2.1 (ref 1–1.5)
PT POC: ABNORMAL SECONDS (ref 11.5–15.2)
VALID INTERNAL CONTROL?: YES

## 2017-12-08 ENCOUNTER — HOSPITAL ENCOUNTER (OUTPATIENT)
Dept: NON INVASIVE DIAGNOSTICS | Age: 62
Discharge: HOME | End: 2017-12-08
Attending: INTERNAL MEDICINE
Payer: SELF-PAY

## 2017-12-08 DIAGNOSIS — I48.91 ATRIAL FIBRILLATION, UNSPECIFIED TYPE (HCC): ICD-10-CM

## 2017-12-08 PROCEDURE — 93306 TTE W/DOPPLER COMPLETE: CPT

## 2017-12-12 ENCOUNTER — OFFICE VISIT (OUTPATIENT)
Dept: FAMILY MEDICINE CLINIC | Age: 62
End: 2017-12-12

## 2017-12-12 VITALS
TEMPERATURE: 97.2 F | WEIGHT: 171.6 LBS | BODY MASS INDEX: 24.57 KG/M2 | SYSTOLIC BLOOD PRESSURE: 129 MMHG | HEART RATE: 97 BPM | DIASTOLIC BLOOD PRESSURE: 90 MMHG | RESPIRATION RATE: 16 BRPM | HEIGHT: 70 IN | OXYGEN SATURATION: 96 %

## 2017-12-12 DIAGNOSIS — I48.91 ATRIAL FIBRILLATION, UNSPECIFIED TYPE (HCC): Primary | ICD-10-CM

## 2017-12-12 LAB
INR BLD: 4.1
PT POC: 49.6 SECONDS
VALID INTERNAL CONTROL?: YES

## 2017-12-12 RX ORDER — WARFARIN 4 MG/1
4 TABLET ORAL DAILY
Qty: 30 TAB | Refills: 0 | Status: SHIPPED | OUTPATIENT
Start: 2017-12-12 | End: 2017-12-27 | Stop reason: DRUGHIGH

## 2017-12-12 NOTE — PROGRESS NOTES
Solitario Marcelo. is a 58 y.o.  male and presents with    Chief Complaint   Patient presents with    Irregular Heart Beat    Anticoagulation       Here for f/u  Asymptomatic  Atrial fib controlled  No bleeding or other problems    Subjective:    Cardiovascular Review:  The patient has hypertension and Atrial Fibrillation. Diet and Lifestyle: not attempting to follow a low fat, low cholesterol diet, not attempting to follow a low sodium diet  Home BP Monitoring: is not measured at home. Pertinent ROS: taking medications as instructed, no medication side effects noted, no TIA's, no chest pain on exertion, no dyspnea on exertion, no swelling of ankles. Anticoagulation  Patient here for followup of chronic anticoagulation. Indication: Atrial Fibrillation. Bleeding Signs/Symptoms:  None. Thromboembolic Signs/Symptoms:  None. INR's are drawn regularly and have been therapeutic. Lab Results   Component Value Date/Time    INR POC 4.1 12/12/2017 01:30 PM           Additional Concerns:          Patient Active Problem List    Diagnosis Date Noted    Normal cardiac stress test 12/05/2017    [facility] 11/28/2017    Elevated cholesterol 04/12/2016    Thyroid mass 04/12/2016    Essential hypertension, malignant 11/13/2014    Arthritis 11/13/2014     Current Outpatient Prescriptions   Medication Sig Dispense Refill    warfarin (COUMADIN) 4 mg tablet Take 1 Tab by mouth daily. 30 Tab 0    amLODIPine (NORVASC) 10 mg tablet Take 1 Tab by mouth daily. 90 Tab 0    atenolol (TENORMIN) 50 mg tablet Take 1 Tab by mouth daily. 90 Tab 0    atorvastatin (LIPITOR) 10 mg tablet Take 1 Tab by mouth daily.  90 Tab 0     No Known Allergies  Past Medical History:   Diagnosis Date    Arthritis 11/13/2014    [facility] 11/28/2017    Elevated cholesterol 4/12/2016    Essential hypertension, malignant 11/13/2014    Hearing loss 11/13/2014     Past Surgical History:   Procedure Laterality Date    HX HERNIA REPAIR       Family History   Problem Relation Age of Onset    Diabetes Father      Social History   Substance Use Topics    Smoking status: Former Smoker    Smokeless tobacco: Never Used    Alcohol use 12.5 oz/week     25 Shots of liquor per week       ROS       All other systems reviewed and are negative. Objective:Vitals:    12/12/17 1327   BP: 129/90   Pulse: 97   Resp: 16   Temp: 97.2 °F (36.2 °C)   TempSrc: Oral   SpO2: 96%   Weight: 171 lb 9.6 oz (77.8 kg)   Height: 5' 10\" (1.778 m)   PainSc:   0 - No pain                 alert, well appearing, and in no distress and oriented to person, place, and time  Chest - clear to auscultation, no wheezes, rales or rhonchi, symmetric air entry  Heart - normal rate, regular rhythm, normal S1, S2, no murmurs, rubs, clicks or gallops        LABS     TESTS  Echo reviewed    Assessment/Plan:    Hypertension - stable  Coumadin a bit high will reduce from 35mg/wk to 28 mg/wk and f/u   2 wk      Lab review: labs are reviewed, up to date and normal    Diagnoses and all orders for this visit:    1. Atrial fibrillation, unspecified type (HCC)  -     AMB POC PT/INR  -     warfarin (COUMADIN) 4 mg tablet; Take 1 Tab by mouth daily. I have discussed the diagnosis with the patient and the intended plan as seen in the above orders. The patient has received an after-visit summary and questions were answered concerning future plans. I have discussed medication side effects and warnings with the patient as well. I have reviewed the plan of care with the patient, accepted their input and they are in agreement with the treatment goals.      Follow-up Disposition: Not on File

## 2017-12-12 NOTE — PATIENT INSTRUCTIONS
1. Stop coumadin 5mg   2. Skip 1 day   3. On Thursday start coumadin 4mg/d  4.  F/u with Mona Rivers in 2 wk

## 2017-12-12 NOTE — MR AVS SNAPSHOT
Visit Information Date & Time Provider Department Dept. Phone Encounter #  
 12/12/2017  1:00 PM Prai Henry, [address] [phone] [id number] Follow-up Instructions Return in about 2 weeks (around 12/26/2017) for rov, inr next visit. Your Appointments 1/3/2018  9:15 AM  
Follow Up with Renee Hua MD  
Cardio Specialist at [facility]) Appt Note: 4 weeks f/u  
 [facility] [address] [place] [address] Upcoming Health Maintenance Date Due DTaP/Tdap/Td series (2 - Td) 12/4/2024 COLONOSCOPY 5/23/2026 Allergies as of 12/12/2017  Review Complete On: 12/5/2017 By: Renee Hua MD  
 No Known Allergies Current Immunizations  Reviewed on 11/13/2014 Name Date Influenza Vaccine 12/4/2014, 11/13/2011 Influenza Vaccine (Quad) PF 12/5/2017  2:34 PM, 3/15/2016 Pneumococcal Vaccine (Unspecified Type) 11/13/2011 Td 11/13/2001 Tdap 12/4/2014 Zoster Vaccine, Live 3/15/2016 Not reviewed this visit You Were Diagnosed With   
  
 Codes Comments Atrial fibrillation, unspecified type ([facility]    -  Primary ICD-10-CM: I48.91 
ICD-9-CM: 427.31 Vitals BP Pulse Temp Resp Height(growth percentile) Weight(growth percentile) 129/90 (BP 1 Location: Right arm, BP Patient Position: Sitting) 97 97.2 °F (36.2 °C) (Oral) 16 5' 10\" (1.778 m) 171 lb 9.6 oz (77.8 kg) SpO2 BMI Smoking Status 96% 24.62 kg/m2 Former Smoker BMI and BSA Data Body Mass Index Body Surface Area  
 24.62 kg/m 2 1.96 m 2 Preferred Pharmacy Pharmacy Name Phone [facility] [address], [address] [phone] Your Updated Medication List  
  
   
This list is accurate as of: 12/12/17  2:03 PM.  Always use your most recent med list.  
  
  
  
  
 amLODIPine 10 mg tablet Commonly known as:  Lennis Eagles Take 1 Tab by mouth daily. atenolol 50 mg tablet Commonly known as:  TENORMIN Take 1 Tab by mouth daily. atorvastatin 10 mg tablet Commonly known as:  LIPITOR Take 1 Tab by mouth daily. warfarin 4 mg tablet Commonly known as:  COUMADIN Take 1 Tab by mouth daily. Prescriptions Sent to Pharmacy Refills  
 warfarin (COUMADIN) 4 mg tablet 0 Sig: Take 1 Tab by mouth daily. Class: Normal  
 Pharmacy: [address], [address]  #: [phone] Route: Oral  
  
We Performed the Following AMB POC PT/INR [32433 CPT(R)] Follow-up Instructions Return in about 2 weeks (around 12/26/2017) for rov, inr next visit. Patient Instructions 1. Stop coumadin 5mg 2. Skip 1 day 3. On Thursday start coumadin 4mg/d 
4. F/u with Ramakrishna Specter in 2 wk Introducing [facility] & HEALTH SERVICES! Dear Isabella Castro: 
Thank you for requesting a iPosition account. Our records indicate that you already have an active iPosition account. You can access your account anytime at [email or web address] Did you know that you can access your hospital and ER discharge instructions at any time in iPosition? You can also review all of your test results from your hospital stay or ER visit. Additional Information If you have questions, please visit the Frequently Asked Questions section of the iPosition website at [email or web address] Remember, iPosition is NOT to be used for urgent needs. For medical emergencies, dial 911. Now available from your iPhone and Android! Please provide this summary of care documentation to your next provider. Your primary care clinician is listed as [address]. If you have any questions after today's visit, please call [phone].

## 2017-12-12 NOTE — PROGRESS NOTES
Cassie Andres. is a 58 y.o. male presents today for follow up on his A. fib. Pt is in Room # 5      1. Have you been to the ER, urgent care clinic since your last visit? Hospitalized since your last visit? No    2. Have you seen or consulted any other health care providers outside of the [address] [place] [address] since your last visit? Include any pap smears or colon screening.  No

## 2017-12-27 ENCOUNTER — OFFICE VISIT (OUTPATIENT)
Dept: FAMILY MEDICINE CLINIC | Age: 62
End: 2017-12-27

## 2017-12-27 VITALS
OXYGEN SATURATION: 97 % | RESPIRATION RATE: 16 BRPM | WEIGHT: 176.4 LBS | HEART RATE: 75 BPM | HEIGHT: 70 IN | BODY MASS INDEX: 25.25 KG/M2 | SYSTOLIC BLOOD PRESSURE: 136 MMHG | DIASTOLIC BLOOD PRESSURE: 112 MMHG | TEMPERATURE: 97.5 F

## 2017-12-27 DIAGNOSIS — I48.91 ATRIAL FIBRILLATION, UNSPECIFIED TYPE (HCC): Primary | ICD-10-CM

## 2017-12-27 LAB
INR BLD: 3.4
PT POC: 40.8 SECONDS
VALID INTERNAL CONTROL?: YES

## 2017-12-27 RX ORDER — WARFARIN 3 MG/1
3 TABLET ORAL DAILY
Qty: 30 TAB | Refills: 0 | Status: SHIPPED | OUTPATIENT
Start: 2017-12-27 | End: 2018-01-10 | Stop reason: SDUPTHER

## 2017-12-27 NOTE — MR AVS SNAPSHOT
Visit Information Date & Time Provider Department Dept. Phone Encounter #  
 12/27/2017  2:30 PM Inna Brooks [phone] [id number] Follow-up Instructions Return in about 2 weeks (around 1/10/2018) for rov, inr next visit. Your Appointments 1/3/2018  9:15 AM  
Follow Up with Emperatriz John MD  
Cardio Specialist at [facility] Appt Note: 4 weeks f/u  
 [facility] [address] [place] [address] Upcoming Health Maintenance Date Due DTaP/Tdap/Td series (2 - Td) 12/4/2024 COLONOSCOPY 5/23/2026 Allergies as of 12/27/2017  Review Complete On: 12/5/2017 By: Emperatriz John MD  
 No Known Allergies Current Immunizations  Reviewed on 11/13/2014 Name Date Influenza Vaccine 12/4/2014, 11/13/2011 Influenza Vaccine (Quad) PF 12/5/2017  2:34 PM, 3/15/2016 Pneumococcal Vaccine (Unspecified Type) 11/13/2011 Td 11/13/2001 Tdap 12/4/2014 Zoster Vaccine, Live 3/15/2016 Not reviewed this visit You Were Diagnosed With   
  
 Codes Comments Atrial fibrillation, unspecified type [facility]    -  Primary ICD-10-CM: I48.91 
ICD-9-CM: 427.31 Vitals BP Pulse Temp Resp Height(growth percentile) Weight(growth percentile) (!) 136/112 (BP 1 Location: Right arm, BP Patient Position: Sitting) 75 97.5 °F (36.4 °C) (Oral) 16 5' 10\" (1.778 m) 176 lb 6.4 oz (80 kg) SpO2 BMI Smoking Status 97% 25.31 kg/m2 Former Smoker BMI and BSA Data Body Mass Index Body Surface Area  
 25.31 kg/m 2 1.99 m 2 Preferred Pharmacy Pharmacy Name Phone [facility] [address], [address] [place] [address] [place] [phone] Your Updated Medication List  
  
   
This list is accurate as of: 12/27/17  2:38 PM.  Always use your most recent med list.  
  
  
  
  
 amLODIPine 10 mg tablet Commonly known as:  Elyn Coffer Take 1 Tab by mouth daily. atenolol 50 mg tablet Commonly known as:  TENORMIN Take 1 Tab by mouth daily. atorvastatin 10 mg tablet Commonly known as:  LIPITOR Take 1 Tab by mouth daily. warfarin 3 mg tablet Commonly known as:  COUMADIN Take 1 Tab by mouth daily. Prescriptions Sent to Pharmacy Refills  
 warfarin (COUMADIN) 3 mg tablet 0 Sig: Take 1 Tab by mouth daily. Class: Normal  
 Pharmacy: [address], [address]  #: [phone] Route: Oral  
  
Follow-up Instructions Return in about 2 weeks (around 1/10/2018) for rov, inr next visit. Introducing [facility] & HEALTH SERVICES! Dear Jose Miguel Castillo: 
Thank you for requesting a SMITH (formerly Ascentium) account. Our records indicate that you already have an active SMITH (formerly Ascentium) account. You can access your account anytime at [email or web address] USA Did you know that you can access your hospital and ER discharge instructions at any time in SMITH (formerly Ascentium)? You can also review all of your test results from your hospital stay or ER visit. Additional Information If you have questions, please visit the Frequently Asked Questions section of the SMITH (formerly Ascentium) website at [email or web address] Remember, SMITH (formerly Ascentium) is NOT to be used for urgent needs. For medical emergencies, dial 911. Now available from your iPhone and Android! Please provide this summary of care documentation to your next provider. Your primary care clinician is listed as [id number] [facility]. If you have any questions after today's visit, please call [phone].

## 2017-12-27 NOTE — PROGRESS NOTES
Zak Iqbal. is a 58 y.o. male presents today for follow up on his A fib and hypertension/pulse. He would also like to discuss bilateral leg redness and warmth for 3-4 days. Pt is in Room # 4        1. Have you been to the ER, urgent care clinic since your last visit? Hospitalized since your last visit? No    2. Have you seen or consulted any other health care providers outside of the [address] [place] [address] since your last visit? Include any pap smears or colon screening.  No

## 2017-12-27 NOTE — PROGRESS NOTES
Sarabjit Merida. is a 58 y.o.  male and presents with    Chief Complaint   Patient presents with    Irregular Heart Beat           Subjective:    Anticoagulation  Patient here for followup of chronic anticoagulation. Indication: Atrial Fibrillation. Bleeding Signs/Symptoms:  None. Thromboembolic Signs/Symptoms:  None. INR's are drawn regularly and have been therapeutic. Lab Results   Component Value Date/Time    INR POC 4.1 12/12/2017 01:30 PM       Additional Concerns:          Patient Active Problem List    Diagnosis Date Noted    Normal cardiac stress test 12/05/2017    [facility] 11/28/2017    Elevated cholesterol 04/12/2016    Thyroid mass 04/12/2016    Essential hypertension, malignant 11/13/2014    Arthritis 11/13/2014     Current Outpatient Prescriptions   Medication Sig Dispense Refill    warfarin (COUMADIN) 3 mg tablet Take 1 Tab by mouth daily. 30 Tab 0    amLODIPine (NORVASC) 10 mg tablet Take 1 Tab by mouth daily. 90 Tab 0    atenolol (TENORMIN) 50 mg tablet Take 1 Tab by mouth daily. 90 Tab 0    atorvastatin (LIPITOR) 10 mg tablet Take 1 Tab by mouth daily. 90 Tab 0     No Known Allergies  Past Medical History:   Diagnosis Date    Arthritis 11/13/2014    [facility]rial fibrillation ([facility] 11/28/2017    Elevated cholesterol 4/12/2016    Essential hypertension, malignant 11/13/2014    Hearing loss 11/13/2014     Past Surgical History:   Procedure Laterality Date    HX HERNIA REPAIR       Family History   Problem Relation Age of Onset    Diabetes Father      Social History   Substance Use Topics    Smoking status: Former Smoker    Smokeless tobacco: Never Used    Alcohol use 12.5 oz/week     25 Shots of liquor per week       ROS       All other systems reviewed and are negative.       Objective:  Vitals:    12/27/17 1431   BP: (!) 136/112   Pulse: 75   Resp: 16   Temp: 97.5 °F (36.4 °C)   TempSrc: Oral   SpO2: 97%   Weight: 176 lb 6.4 oz (80 kg)   Height: 5' 10\" (1.778 m)   PainSc:   0 - No pain                 alert, well appearing, and in no distress, oriented to person, place, and time and normal appearing weight  Chest - clear to auscultation, no wheezes, rales or rhonchi, symmetric air entry  Heart - normal rate, regular rhythm, normal S1, S2, no murmurs, rubs, clicks or gallops  Abdomen - soft, nontender, nondistended, no masses or organomegaly        LABS   inr  3.4  TESTS      Assessment/Plan:    atiral fib stable  Coumadin a bit highwill reduce to 3mg/d and recheck 2 wk      Lab review:     Diagnoses and all orders for this visit:    1. Atrial fibrillation, unspecified type (HCC)  -     warfarin (COUMADIN) 3 mg tablet; Take 1 Tab by mouth daily. I have discussed the diagnosis with the patient and the intended plan as seen in the above orders. The patient has received an after-visit summary and questions were answered concerning future plans. I have discussed medication side effects and warnings with the patient as well. I have reviewed the plan of care with the patient, accepted their input and they are in agreement with the treatment goals.      Follow-up Disposition: Not on File

## 2018-01-03 ENCOUNTER — OFFICE VISIT (OUTPATIENT)
Dept: CARDIOLOGY CLINIC | Age: 63
End: 2018-01-03

## 2018-01-03 VITALS
OXYGEN SATURATION: 98 % | SYSTOLIC BLOOD PRESSURE: 132 MMHG | WEIGHT: 172 LBS | DIASTOLIC BLOOD PRESSURE: 98 MMHG | HEIGHT: 70 IN | HEART RATE: 105 BPM | BODY MASS INDEX: 24.62 KG/M2

## 2018-01-03 DIAGNOSIS — I48.91 ATRIAL FIBRILLATION, UNSPECIFIED TYPE (HCC): Primary | ICD-10-CM

## 2018-01-03 DIAGNOSIS — Z00.00 ROUTINE GENERAL MEDICAL EXAMINATION AT A HEALTH CARE FACILITY: ICD-10-CM

## 2018-01-03 DIAGNOSIS — M19.90 ARTHRITIS: ICD-10-CM

## 2018-01-03 DIAGNOSIS — E78.00 ELEVATED CHOLESTEROL: ICD-10-CM

## 2018-01-03 DIAGNOSIS — I10 ESSENTIAL HYPERTENSION, MALIGNANT: ICD-10-CM

## 2018-01-03 DIAGNOSIS — E07.9 THYROID MASS: ICD-10-CM

## 2018-01-03 RX ORDER — ATENOLOL 50 MG/1
TABLET ORAL
Qty: 135 TAB | Refills: 3 | Status: SHIPPED | OUTPATIENT
Start: 2018-01-03 | End: 2018-01-10 | Stop reason: SDUPTHER

## 2018-01-03 NOTE — PROGRESS NOTES
1. Have you been to the ER, urgent care clinic since your last visit? Hospitalized since your last visit? No    2. Have you seen or consulted any other health care providers outside of the [address] [place] [address] since your last visit? Include any pap smears or colon screening.  No

## 2018-01-03 NOTE — MR AVS SNAPSHOT
Visit Information Date & Time Provider Department Dept. Phone Encounter #  
 1/3/2018  9:15 AM Teena Singh  [facility] at [facility] [phone] [id number] Follow-up Instructions Return in about 6 weeks (around 2/14/2018). Your Appointments 1/10/2018  2:00 PM  
[place] [address] [address] [facility] Appt Note: Return in about 2 weeks (around 1/10/2018) for rov, inr next visit. [address] [facility] [address]  
  
   
 [address] [place] [address] [address] Upcoming Health Maintenance Date Due DTaP/Tdap/Td series (2 - Td) 12/4/2024 COLONOSCOPY 5/23/2026 Allergies as of 1/3/2018  Review Complete On: 1/3/2018 By: Maureen De Oliveira LPN No Known Allergies Current Immunizations  Reviewed on 11/13/2014 Name Date Influenza Vaccine 12/4/2014, 11/13/2011 Influenza Vaccine (Quad) PF 12/5/2017  2:34 PM, 3/15/2016 Pneumococcal Vaccine (Unspecified Type) 11/13/2011 Td 11/13/2001 Tdap 12/4/2014 Zoster Vaccine, Live 3/15/2016 Not reviewed this visit You Were Diagnosed With   
  
 Codes Comments Essential hypertension, malignant     ICD-10-CM: I10 
ICD-9-CM: 401.0 Arthritis     ICD-10-CM: M19.90 ICD-9-CM: 716.90 Routine general medical examination at a health care facility     ICD-10-CM: Z00.00 ICD-9-CM: V70.0 Vitals BP Pulse Height(growth percentile) Weight(growth percentile) SpO2 BMI  
 (!) 132/98 (!) 105 5' 10\" (1.778 m) 172 lb (78 kg) 98% 24.68 kg/m2 Smoking Status Former Smoker BMI and BSA Data Body Mass Index Body Surface Area  
 24.68 kg/m 2 1.96 m 2 Preferred Pharmacy Pharmacy Name Phone [facility] [address], [address] [place], [place] [address] [phone] Your Updated Medication List  
  
   
 This list is accurate as of: 1/3/18  9:53 AM.  Always use your most recent med list. amLODIPine 10 mg tablet Commonly known as:  Manuel Nearing Take 1 Tab by mouth daily. atenolol 50 mg tablet Commonly known as:  TENORMIN Take 1 Tab by mouth daily. atorvastatin 10 mg tablet Commonly known as:  LIPITOR Take 1 Tab by mouth daily. warfarin 3 mg tablet Commonly known as:  COUMADIN Take 1 Tab by mouth daily. Follow-up Instructions Return in about 6 weeks (around 2/14/2018). Patient Instructions Increase Atenolol 50mg in the morning and 25mg at night Introducing [facility] & HEALTH SERVICES! Dear Evelio Awad: 
Thank you for requesting a YouAppi account. Our records indicate that you already have an active YouAppi account. You can access your account anytime at [email or web address] Did you know that you can access your hospital and ER discharge instructions at any time in YouAppi? You can also review all of your test results from your hospital stay or ER visit. Additional Information If you have questions, please visit the Frequently Asked Questions section of the YouAppi website at [email or web address] Remember, YouAppi is NOT to be used for urgent needs. For medical emergencies, dial 911. Now available from your iPhone and Android! Please provide this summary of care documentation to your next provider. Your primary care clinician is listed as [id number] [facility]. If you have any questions after today's visit, please call [phone].

## 2018-01-08 NOTE — PROGRESS NOTES
Subjective:      Vicki Schmidt is in the office today for cardiac reevaluation. He is a 17-year-old man that was recently diagnosed with atrial fibrillation. He saw Dr. Ronald Chapa approximately in late November 2017 and was found to be in atrial fibrillation. He continues to be asymptomatic. He had no palpitations. He had no chest pain. He had no limiting shortness of breath. 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 He has noticed no peripheral swelling. He has had no near syncope or syncope. An Echocardiogram was ordered and done on  12/8/2017. His EF was 50%. His atria were enlarged. There was mild to moderate MR. His pulse rate remains quite elevated. Patient's cardiac risk factors are remote smoking/ tobacco exposure (none times 2 1/2 yrs), dyslipidemia, hypertension. Patient Active Problem List    Diagnosis Date Noted    Normal cardiac stress test 12/05/2017    Atrial fibrillation ([address] 11/28/2017    Elevated cholesterol 04/12/2016    Thyroid mass 04/12/2016    Essential hypertension, malignant 11/13/2014    Arthritis 11/13/2014     Current Outpatient Prescriptions   Medication Sig Dispense Refill    atenolol (TENORMIN) 50 mg tablet 50mg in the morning and 25mg at night 135 Tab 3    warfarin (COUMADIN) 3 mg tablet Take 1 Tab by mouth daily. 30 Tab 0    amLODIPine (NORVASC) 10 mg tablet Take 1 Tab by mouth daily. 90 Tab 0    atorvastatin (LIPITOR) 10 mg tablet Take 1 Tab by mouth daily.  90 Tab 0     No Known Allergies  Past Medical History:   Diagnosis Date    Arthritis 11/13/2014    Atrial fibrillation (HCC) 11/28/2017    Elevated cholesterol 4/12/2016    Essential hypertension, malignant 11/13/2014    Hearing loss 11/13/2014     Past Surgical History:   Procedure Laterality Date    HX HERNIA REPAIR       Family History   Problem Relation Age of Onset    Diabetes Father      History   Smoking Status    Former Smoker   Smokeless Tobacco    Never Used          Review of Systems, additional:  Constitutional: negative  Eyes: negative  Respiratory: negative  Cardiovascular: negative  Gastrointestinal: negative  Musculoskeletal:negative  Neurological: negative  Behvioral/Psych: negative  Endocrine: negative  ENT: negative    Objective:     Visit Vitals    BP (!) 132/98    Pulse (!) 105    Ht 5' 10\" (1.778 m)    Wt 172 lb (78 kg)    SpO2 98%    BMI 24.68 kg/m2     General:  alert, cooperative, no distress   Chest Wall: inspection normal - no chest wall deformities or tenderness, respiratory effort normal   Lung: clear to auscultation bilaterally   Heart:  irregularly irregular rhythm with rate 105   Abdomen: soft, non-tender. Bowel sounds normal. No masses,  no organomegaly   Extremities: extremities normal, atraumatic, no cyanosis or edema Skin: no rashes   Neuro: alert, oriented, normal speech, no focal findings or movement disorder noted         Assessment/Plan:       ICD-10-CM ICD-9-CM    1. Atrial fibrillation, unspecified type ([address] will increase Atenolol to 75 mg daily. Echo demonstrated enlarged L and R atria and mild to moderate MR with an EF of 50%. RT 6 weeks I48.91 427.31 2D ECHO COMPLETE ADULT (TTE) W OR WO CONTR   2. Essential hypertension, malignant, diastolic BP still quite elevated I10 401.0    3. Thyroid mass E07.9 246.9    4. Elevated cholesterol E78.00 272.0    5.  Normal cardiac stress test Z13.6 V81.2

## 2018-01-10 ENCOUNTER — OFFICE VISIT (OUTPATIENT)
Dept: FAMILY MEDICINE CLINIC | Age: 63
End: 2018-01-10

## 2018-01-10 VITALS
HEART RATE: 87 BPM | DIASTOLIC BLOOD PRESSURE: 96 MMHG | HEIGHT: 70 IN | RESPIRATION RATE: 18 BRPM | BODY MASS INDEX: 25.03 KG/M2 | TEMPERATURE: 97.1 F | WEIGHT: 174.8 LBS | OXYGEN SATURATION: 97 % | SYSTOLIC BLOOD PRESSURE: 132 MMHG

## 2018-01-10 DIAGNOSIS — I48.91 ATRIAL FIBRILLATION, UNSPECIFIED TYPE (HCC): Primary | ICD-10-CM

## 2018-01-10 DIAGNOSIS — Z00.00 ROUTINE GENERAL MEDICAL EXAMINATION AT A HEALTH CARE FACILITY: ICD-10-CM

## 2018-01-10 DIAGNOSIS — I10 ESSENTIAL HYPERTENSION, MALIGNANT: ICD-10-CM

## 2018-01-10 DIAGNOSIS — M19.90 ARTHRITIS: ICD-10-CM

## 2018-01-10 LAB
INR BLD: 1.9
PT POC: 22.9 SECONDS
VALID INTERNAL CONTROL?: YES

## 2018-01-10 RX ORDER — WARFARIN 3 MG/1
3 TABLET ORAL DAILY
Qty: 90 TAB | Refills: 4 | Status: SHIPPED | OUTPATIENT
Start: 2018-01-10 | End: 2018-02-07

## 2018-01-10 RX ORDER — ATORVASTATIN CALCIUM 10 MG/1
10 TABLET, FILM COATED ORAL DAILY
Qty: 90 TAB | Refills: 4 | Status: SHIPPED | OUTPATIENT
Start: 2018-01-10 | End: 2018-03-07

## 2018-01-10 RX ORDER — ATENOLOL 50 MG/1
50 TABLET ORAL 2 TIMES DAILY
Qty: 180 TAB | Refills: 4 | Status: SHIPPED | OUTPATIENT
Start: 2018-01-10 | End: 2018-02-07 | Stop reason: DRUGHIGH

## 2018-01-10 RX ORDER — AMLODIPINE BESYLATE 10 MG/1
10 TABLET ORAL DAILY
Qty: 90 TAB | Refills: 4 | Status: SHIPPED | OUTPATIENT
Start: 2018-01-10 | End: 2018-03-07

## 2018-01-10 RX ORDER — ATENOLOL 50 MG/1
TABLET ORAL
Qty: 180 TAB | Refills: 4 | Status: SHIPPED | OUTPATIENT
Start: 2018-01-10 | End: 2018-01-10 | Stop reason: DRUGHIGH

## 2018-01-10 RX ORDER — ATENOLOL 50 MG/1
50 TABLET ORAL 2 TIMES DAILY
Qty: 180 TAB | Refills: 4 | Status: SHIPPED | OUTPATIENT
Start: 2018-01-10 | End: 2018-01-10

## 2018-01-10 NOTE — MR AVS SNAPSHOT
Visit Information Date & Time Provider Department Dept. Phone Encounter #  
 1/10/2018  2:00 PM Jonny Desai., [address] [facility] [phone] [id number] Follow-up Instructions Return in about 2 weeks (around 1/24/2018) for rov, inr next visit. Your Appointments 2/14/2018  9:15 AM  
Follow Up with Layo Nava MD  
Cardio Specialist at [facility]/[address]) Appt Note: 6 weeks [facility] [address] Upcoming Health Maintenance Date Due DTaP/Tdap/Td series (2 - Td) 12/4/2024 COLONOSCOPY 5/23/2026 Allergies as of 1/10/2018  Review Complete On: 1/3/2018 By: Aloha Route, LPN No Known Allergies Current Immunizations  Reviewed on 11/13/2014 Name Date Influenza Vaccine 12/4/2014, 11/13/2011 Influenza Vaccine (Quad) PF 12/5/2017  2:34 PM, 3/15/2016 Pneumococcal Vaccine (Unspecified Type) 11/13/2011 Td 11/13/2001 Tdap 12/4/2014 Zoster Vaccine, Live 3/15/2016 Not reviewed this visit You Were Diagnosed With   
  
 Codes Comments Atrial fibrillation, unspecified type ([facility]    -  Primary ICD-10-CM: I48.91 
ICD-9-CM: 427.31 Essential hypertension, malignant     ICD-10-CM: I10 
ICD-9-CM: 401.0 Arthritis     ICD-10-CM: M19.90 ICD-9-CM: 716.90 Routine general medical examination at a health care facility     ICD-10-CM: Z00.00 ICD-9-CM: V70.0 Vitals BP Pulse Temp Resp Height(growth percentile) Weight(growth percentile) (!) 132/96 (BP 1 Location: Right arm, BP Patient Position: Sitting) 87 97.1 °F (36.2 °C) (Oral) 18 5' 10\" (1.778 m) 174 lb 12.8 oz (79.3 kg) SpO2 BMI Smoking Status 97% 25.08 kg/m2 Former Smoker BMI and BSA Data Body Mass Index Body Surface Area 25.08 kg/m 2 1.98 m 2 Preferred Pharmacy Pharmacy Name Phone Katelyn Barney [address], [address] [facility] [phone] Your Updated Medication List  
  
   
This list is accurate as of: 1/10/18  2:01 PM.  Always use your most recent med list. amLODIPine 10 mg tablet Commonly known as:  Allena Marge Take 1 Tab by mouth daily. atenolol 50 mg tablet Commonly known as:  TENORMIN Take 1 Tab by mouth two (2) times a day. atorvastatin 10 mg tablet Commonly known as:  LIPITOR Take 1 Tab by mouth daily. warfarin 3 mg tablet Commonly known as:  COUMADIN Take 1 Tab by mouth daily. Prescriptions Sent to Pharmacy Refills  
 atenolol (TENORMIN) 50 mg tablet 4 Sig: Take 1 Tab by mouth two (2) times a day. Class: Normal  
 Pharmacy: [address], [address] Ph #: [phone] Route: Oral  
 atorvastatin (LIPITOR) 10 mg tablet 4 Sig: Take 1 Tab by mouth daily. Class: Normal  
 Pharmacy: [address], [address] Ph #: [phone] Route: Oral  
 amLODIPine (NORVASC) 10 mg tablet 4 Sig: Take 1 Tab by mouth daily. Class: Normal  
 Pharmacy: [address], [address] Ph #: [phone] Route: Oral  
 warfarin (COUMADIN) 3 mg tablet 4 Sig: Take 1 Tab by mouth daily. Class: Normal  
 Pharmacy: [address] [facility] [address], [address] Ph #: [phone] Route: Oral  
  
We Performed the Following AMB POC PT/INR [78693 CPT(R)] Follow-up Instructions Return in about 2 weeks (around 1/24/2018) for rov, inr next visit. Introducing [facility] & HEALTH SERVICES! Dear Blayne Melo: 
Thank you for requesting a Evri account. Our records indicate that you already have an active Evri account. You can access your account anytime at [email or web address] Did you know that you can access your hospital and ER discharge instructions at any time in [email or web address]? You can also review all of your test results from your hospital stay or ER visit. Additional Information If you have questions, please visit the Frequently Asked Questions section of the [email or web address] website at [email or web address] Remember, [email or web address] is NOT to be used for urgent needs. For medical emergencies, dial 911. Now available from your iPhone and Android! Please provide this summary of care documentation to your next provider. Your primary care clinician is listed as [id number] [facility]. If you have any questions after today's visit, please call [phone].

## 2018-01-10 NOTE — PROGRESS NOTES
Juve Olivares is a 58 y.o.  male and presents with    Chief Complaint   Patient presents with    Irregular Heart Beat    Anticoagulation           Subjective:    Cardiovascular Review:  The patient has Atrial Fibrillation. Diet and Lifestyle: not attempting to follow a low fat, low cholesterol diet, not attempting to follow a low sodium diet  Home BP Monitoring: is not measured at home. Pertinent ROS: taking medications as instructed, no medication side effects noted, no TIA's, no chest pain on exertion, no dyspnea on exertion, no swelling of ankles. Anticoagulation  Patient here for followup of chronic anticoagulation. Indication: Atrial Fibrillation. Bleeding Signs/Symptoms:  None. Thromboembolic Signs/Symptoms:  None. INR's are drawn regularly and have been therapeutic. Lab Results   Component Value Date/Time    INR POC 1.9 01/10/2018 01:52 PM       Additional Concerns:          Patient Active Problem List    Diagnosis Date Noted    Normal cardiac stress test 12/05/2017    Atrial fibrillation ([address] 11/28/2017    Elevated cholesterol 04/12/2016    Thyroid mass 04/12/2016    Essential hypertension, malignant 11/13/2014    Arthritis 11/13/2014     Current Outpatient Prescriptions   Medication Sig Dispense Refill    atenolol (TENORMIN) 50 mg tablet Take 1 Tab by mouth two (2) times a day. 180 Tab 4    atorvastatin (LIPITOR) 10 mg tablet Take 1 Tab by mouth daily. 90 Tab 4    amLODIPine (NORVASC) 10 mg tablet Take 1 Tab by mouth daily. 90 Tab 4    warfarin (COUMADIN) 3 mg tablet Take 1 Tab by mouth daily.  90 Tab 4     No Known Allergies  Past Medical History:   Diagnosis Date    Arthritis 11/13/2014    Atrial fibrillation (HCC) 11/28/2017    Elevated cholesterol 4/12/2016    Essential hypertension, malignant 11/13/2014    Hearing loss 11/13/2014     Past Surgical History:   Procedure Laterality Date    HX HERNIA REPAIR       Family History   Problem Relation Age of Onset    Diabetes Father      Social History   Substance Use Topics    Smoking status: Former Smoker    Smokeless tobacco: Never Used    Alcohol use 12.5 oz/week     25 Shots of liquor per week       ROS       All other systems reviewed and are negative. Objective:  Vitals:    01/10/18 1351   BP: (!) 132/96   Pulse: 87   Resp: 18   Temp: 97.1 °F (36.2 °C)   TempSrc: Oral   SpO2: 97%   Weight: 174 lb 12.8 oz (79.3 kg)   Height: 5' 10\" (1.778 m)   PainSc:   0 - No pain                 alert, well appearing, and in no distress and oriented to person, place, and time  Chest - clear to auscultation, no wheezes, rales or rhonchi, symmetric air entry  Heart - normal rate, regular rhythm, normal S1, S2, no murmurs, rubs, clicks or gallops        LABS   inr  1.9  TESTS      Assessment/Plan:    Atrial fib -- rate controlled   Coumadin optimal  I am going to incrase him up to 100 of atenolol per day as BP is marginal and pulse is not too slow  F/u 2 wk      Lab review: labs are reviewed, up to date and normal    Diagnoses and all orders for this visit:    1. Atrial fibrillation, unspecified type (HCC)  -     AMB POC PT/INR  -     warfarin (COUMADIN) 3 mg tablet; Take 1 Tab by mouth daily. 2. Essential hypertension, malignant  -     atenolol (TENORMIN) 50 mg tablet; Take 1 Tab by mouth two (2) times a day. -     atorvastatin (LIPITOR) 10 mg tablet; Take 1 Tab by mouth daily. 3. Arthritis  -     atorvastatin (LIPITOR) 10 mg tablet; Take 1 Tab by mouth daily. 4. Routine general medical examination at a health care facility  -     atorvastatin (LIPITOR) 10 mg tablet; Take 1 Tab by mouth daily. Other orders  -     amLODIPine (NORVASC) 10 mg tablet; Take 1 Tab by mouth daily. I have discussed the diagnosis with the patient and the intended plan as seen in the above orders. The patient has received an after-visit summary and questions were answered concerning future plans.   I have discussed medication side effects and warnings with the patient as well. I have reviewed the plan of care with the patient, accepted their input and they are in agreement with the treatment goals. Follow-up Disposition:  Return in about 2 weeks (around 1/24/2018) for rov, inr next visit.

## 2018-02-07 ENCOUNTER — OFFICE VISIT (OUTPATIENT)
Dept: FAMILY MEDICINE CLINIC | Age: 63
End: 2018-02-07

## 2018-02-07 VITALS
TEMPERATURE: 98.5 F | HEART RATE: 130 BPM | DIASTOLIC BLOOD PRESSURE: 101 MMHG | RESPIRATION RATE: 16 BRPM | SYSTOLIC BLOOD PRESSURE: 143 MMHG | OXYGEN SATURATION: 95 % | HEIGHT: 70 IN | WEIGHT: 174 LBS | BODY MASS INDEX: 24.91 KG/M2

## 2018-02-07 DIAGNOSIS — E78.00 ELEVATED CHOLESTEROL: ICD-10-CM

## 2018-02-07 DIAGNOSIS — E04.2 NONTOXIC MULTINODULAR GOITER: ICD-10-CM

## 2018-02-07 DIAGNOSIS — I10 ESSENTIAL HYPERTENSION, MALIGNANT: ICD-10-CM

## 2018-02-07 DIAGNOSIS — I48.91 ATRIAL FIBRILLATION, UNSPECIFIED TYPE (HCC): Primary | ICD-10-CM

## 2018-02-07 LAB
INR BLD: 1.3
PT POC: 16.1 SECONDS
VALID INTERNAL CONTROL?: YES

## 2018-02-07 RX ORDER — ATENOLOL 50 MG/1
TABLET ORAL
Qty: 90 TAB | Refills: 4 | Status: SHIPPED | OUTPATIENT
Start: 2018-02-07 | End: 2018-02-19 | Stop reason: SDUPTHER

## 2018-02-07 RX ORDER — WARFARIN 3 MG/1
TABLET ORAL
Qty: 30 TAB | Refills: 1 | Status: SHIPPED | OUTPATIENT
Start: 2018-02-07 | End: 2018-02-21

## 2018-02-07 RX ORDER — WARFARIN 4 MG/1
TABLET ORAL
Qty: 30 TAB | Refills: 1 | Status: SHIPPED | OUTPATIENT
Start: 2018-02-07 | End: 2018-02-21 | Stop reason: SDUPTHER

## 2018-02-07 NOTE — PROGRESS NOTES
Yuly Butler. is a 58 y.o.  male and presents with    Chief Complaint   Patient presents with    Irregular Heart Beat    Arthritis    Cholesterol Problem    Hypertension    Thyroid Problem           Subjective:    Cardiovascular Review:  The patient has hypertension, hyperlipidemia and Atrial Fibrillation. Diet and Lifestyle: not attempting to follow a low fat, low cholesterol diet, not attempting to follow a low sodium diet  Home BP Monitoring: is not measured at home. Pertinent ROS: taking medications as instructed, no medication side effects noted, no TIA's, no chest pain on exertion, no dyspnea on exertion, no swelling of ankles. Anticoagulation  Patient here for followup of chronic anticoagulation. Indication: Atrial Fibrillation. Bleeding Signs/Symptoms:  None. Thromboembolic Signs/Symptoms:  None. INR's are drawn regularly and have been therapeutic. Lab Results   Component Value Date/Time    INR POC 1.3 02/07/2018 02:50 PM     Thyroid Review:  Patient is seen for followup of goiter. Thyroid ROS: denies fatigue, weight changes, heat/cold intolerance, bowel/skin changes or CVS symptoms. Osteoarthritis and Chronic Pain:  Patient has osteoarthritis, primarily affecting the diffuse. Symptoms onset: problem is longstanding. Rheumatological ROS: no current joint or muscle symptoms, essentially pain-free. Response to treatment plan: stable. Additional Concerns:          Patient Active Problem List    Diagnosis Date Noted    Nontoxic multinodular goiter 02/07/2018    Atrial fibrillation ([facility] 11/28/2017    Elevated cholesterol 04/12/2016    Essential hypertension, malignant 11/13/2014    Arthritis 11/13/2014     Current Outpatient Prescriptions   Medication Sig Dispense Refill    warfarin (COUMADIN) 3 mg tablet 1 daily 4 days per week, tues, thurs, sat, sun.  30 Tab 1    warfarin (COUMADIN) 4 mg tablet ONE DAILY 3 DAYS PER WEEK -- mON, wed, friday 30 Tab 1    atenolol (TENORMIN) 50 mg tablet 2 tablets each morning and 1 tablet every evening. 90 Tab 4    atorvastatin (LIPITOR) 10 mg tablet Take 1 Tab by mouth daily. 90 Tab 4    amLODIPine (NORVASC) 10 mg tablet Take 1 Tab by mouth daily. 90 Tab 4     No Known Allergies  Past Medical History:   Diagnosis Date    Arthritis 11/13/2014    Atrial fibrillation (HCC) 11/28/2017    Elevated cholesterol 4/12/2016    Essential hypertension, malignant 11/13/2014    Hearing loss 11/13/2014    Nontoxic multinodular goiter 2/7/2018     Past Surgical History:   Procedure Laterality Date    HX HERNIA REPAIR       Family History   Problem Relation Age of Onset    Diabetes Father      Social History   Substance Use Topics    Smoking status: Former Smoker    Smokeless tobacco: Never Used    Alcohol use 12.5 oz/week     25 Shots of liquor per week       ROS       All other systems reviewed and are negative. Objective:  Vitals:    02/07/18 1451   BP: (!) 143/101   Pulse: (!) 130   Resp: 16   Temp: 98.5 °F (36.9 °C)   TempSrc: Oral   SpO2: 95%   Weight: 174 lb (78.9 kg)   Height: 5' 10\" (1.778 m)   PainSc:   0 - No pain                 alert, well appearing, and in no distress and oriented to person, place, and time  Chest - clear to auscultation, no wheezes, rales or rhonchi, symmetric air entry  Heart - normal rate, regular rhythm, normal S1, S2, no murmurs, rubs, clicks or gallops  Abdomen - soft, nontender, nondistended, no masses or organomegaly        LABS     TESTS      Assessment/Plan:    Hypertension - borderline controlled  Hyperlipidemia - well controlled  Atrial fib -- poorly controlled rate  Currently on atenolol 50 bid will incraese to 100 am and 50 hs  Coumadin subtherapeutic on 3. Toxic on 4. Will try alternating 3 and 4 and f/u 2 wk  Thyroid stable  arthrirtsi stable      Lab review: labs are reviewed, up to date and normal    Diagnoses and all orders for this visit:    1.  Atrial fibrillation, unspecified type ([facility]  -     AMB POC PT/INR    2. Essential hypertension, malignant    3. Elevated cholesterol    4. Nontoxic multinodular goiter    Other orders  -     warfarin (COUMADIN) 3 mg tablet; 1 daily 4 days per week, tues, thurs, sat, sun.  -     warfarin (COUMADIN) 4 mg tablet; ONE DAILY 3 DAYS PER WEEK -- mON, wed, friday  -     atenolol (TENORMIN) 50 mg tablet; 2 tablets each morning and 1 tablet every evening. I have discussed the diagnosis with the patient and the intended plan as seen in the above orders. The patient has received an after-visit summary and questions were answered concerning future plans. I have discussed medication side effects and warnings with the patient as well. I have reviewed the plan of care with the patient, accepted their input and they are in agreement with the treatment goals.      Follow-up Disposition: Not on File

## 2018-02-07 NOTE — Clinical Note
Dr Concepción Rodriges are seeing him next wk. I have increased his atenolol a bit today . Let me know what you think.   Still working on stabilizing his coumadin Thanks Robbie López

## 2018-02-07 NOTE — MR AVS SNAPSHOT
[address] [place] [address] [address]  [phone] [id number] 
[phone] Patient: Tierney Stinson. MRN: [id number] Visit Information Date & Time Provider Department Dept. Phone Encounter #  
 2/7/2018  3:00 PM Magda Cronin, [facility] [phone] [id number] Follow-up Instructions Return in about 2 weeks (around 2/21/2018) for rov. Routing History Your Appointments 2/14/2018  9:15 AM  
Follow Up with Matt Kirk MD  
Cardio Specialist at [facility]) Appt Note: 6 weeks [facility] [address] Upcoming Health Maintenance Date Due DTaP/Tdap/Td series (2 - Td) 12/4/2024 COLONOSCOPY 5/23/2026 Allergies as of 2/7/2018  Review Complete On: 2/7/2018 By: Magda Cronin, DO No Known Allergies Current Immunizations  Reviewed on 11/13/2014 Name Date Influenza Vaccine 12/4/2014, 11/13/2011 Influenza Vaccine (Quad) PF 12/5/2017  2:34 PM, 3/15/2016 Pneumococcal Vaccine (Unspecified Type) 11/13/2011 Td 11/13/2001 Tdap 12/4/2014 Zoster Vaccine, Live 3/15/2016 Not reviewed this visit You Were Diagnosed With   
  
 Codes Comments Atrial fibrillation, unspecified type ([facility]    -  Primary ICD-10-CM: I48.91 
ICD-9-CM: 427.31 Essential hypertension, malignant     ICD-10-CM: I10 
ICD-9-CM: 401.0 Elevated cholesterol     ICD-10-CM: E78.00 ICD-9-CM: 272.0 Nontoxic multinodular goiter     ICD-10-CM: E04.2 ICD-9-CM: 105. 1 Vitals BP Pulse Temp Resp Height(growth percentile) Weight(growth percentile) (!) 143/101 (BP 1 Location: Right arm, BP Patient Position: Sitting) (!) 130 98.5 °F (36.9 °C) (Oral) 16 5' 10\" (1.778 m) 174 lb (78.9 kg) SpO2 BMI Smoking Status 95% 24.97 kg/m2 Former Smoker BMI and BSA Data Body Mass Index Body Surface Area 24.97 kg/m 2 1.97 m 2 Preferred Pharmacy Pharmacy Name Phone [address], [address] [phone] Your Updated Medication List  
  
   
This list is accurate as of: 18  3:12 PM.  Always use your most recent med list. amLODIPine 10 mg tablet Commonly known as:  Juanjo Goel Take 1 Tab by mouth daily. atenolol 50 mg tablet Commonly known as:  TENORMIN  
2 tablets each morning and 1 tablet every evening. atorvastatin 10 mg tablet Commonly known as:  LIPITOR Take 1 Tab by mouth daily. * warfarin 3 mg tablet Commonly known as:  COUMADIN  
1 daily 4 days per week, tues, thurs, sat, sun. * warfarin 4 mg tablet Commonly known as:  COUMADIN  
ONE DAILY 3 DAYS PER WEEK -- mON, wed, friday * Notice: This list has 2 medication(s) that are the same as other medications prescribed for you. Read the directions carefully, and ask your doctor or other care provider to review them with you. Prescriptions Printed Refills  
 warfarin (COUMADIN) 3 mg tablet 1 Si daily 4 days per week, tues, th, sat, sun. Class: Print Prescriptions Sent to Pharmacy Refills  
 warfarin (COUMADIN) 4 mg tablet 1 Sig: ONE DAILY 3 DAYS PER WEEK -- mON, wed, friday Class: Normal  
 Pharmacy: [address] Ph #: [phone]  
 atenolol (TENORMIN) 50 mg tablet 4 Si tablets each morning and 1 tablet every evening. Class: Normal  
 Pharmacy: [address] , [address] Ph #: [phone] We Performed the Following AMB POC PT/INR [79314 CPT(R)] Follow-up Instructions Return in about 2 weeks (around 2018) for rov. Introducing [facility] & HEALTH SERVICES! Dear Louis Schirmer: Thank you for requesting a Solaicx account. Our records indicate that you already have an active Solaicx account. You can access your account anytime at [email or web address] Did you know that you can access your hospital and ER discharge instructions at any time in Solaicx? You can also review all of your test results from your hospital stay or ER visit. Additional Information If you have questions, please visit the Frequently Asked Questions section of the Solaicx website at [email or web address] Remember, Solaicx is NOT to be used for urgent needs. For medical emergencies, dial 911. Now available from your iPhone and Android! Please provide this summary of care documentation to your next provider. Your primary care clinician is [facility] as [address] If you have any questions after today's visit, please call [phone].

## 2018-02-19 ENCOUNTER — OFFICE VISIT (OUTPATIENT)
Dept: CARDIOLOGY CLINIC | Age: 63
End: 2018-02-19

## 2018-02-19 VITALS
BODY MASS INDEX: 24.62 KG/M2 | OXYGEN SATURATION: 98 % | HEIGHT: 70 IN | SYSTOLIC BLOOD PRESSURE: 141 MMHG | WEIGHT: 172 LBS | DIASTOLIC BLOOD PRESSURE: 100 MMHG | HEART RATE: 105 BPM

## 2018-02-19 DIAGNOSIS — I48.20 CHRONIC ATRIAL FIBRILLATION (HCC): Primary | ICD-10-CM

## 2018-02-19 DIAGNOSIS — E04.2 NONTOXIC MULTINODULAR GOITER: ICD-10-CM

## 2018-02-19 DIAGNOSIS — E78.00 ELEVATED CHOLESTEROL: ICD-10-CM

## 2018-02-19 DIAGNOSIS — I10 ESSENTIAL HYPERTENSION, MALIGNANT: ICD-10-CM

## 2018-02-19 RX ORDER — ATENOLOL 100 MG/1
100 TABLET ORAL 2 TIMES DAILY
Qty: 60 TAB | Refills: 6 | Status: SHIPPED | OUTPATIENT
Start: 2018-02-19 | End: 2018-03-07

## 2018-02-19 NOTE — MR AVS SNAPSHOT
[address] [facility] [address] [address] 
[phone] Patient: Ger Tobin. MRN: [id number] Visit Information Date & Time Provider Department Dept. Phone Encounter #  
 2/19/2018  9:15 AM Shayna Baires. Duke Arredondo Specialist at [facility] [phone] Follow-up Instructions Return in about 6 weeks (around 4/2/2018). Your Appointments 2/21/2018  3:00 PM  
ROUTINE CARE with Rock Kaiser,  [address] [address]) Appt Note: Return in about 2 weeks (around 2/21/2018) for rov. [address] [place]  
  
   
 [address] [place] [address] [address] 4/16/2018  9:30 AM  
Follow Up with Ankita Koroma MD  
Cardio Specialist at [address]) Appt Note: 6 weeks [facility] [address] Upcoming Health Maintenance Date Due DTaP/Tdap/Td series (2 - Td) 12/4/2024 COLONOSCOPY 5/23/2026 Allergies as of 2/19/2018  Review Complete On: 2/19/2018 By: Shayna Baires. Irwin Kebede, PAColtonC No Known Allergies Current Immunizations  Reviewed on 11/13/2014 Name Date Influenza Vaccine 12/4/2014, 11/13/2011 Influenza Vaccine (Quad) PF 12/5/2017  2:34 PM, 3/15/2016 Pneumococcal Vaccine (Unspecified Type) 11/13/2011 Td 11/13/2001 Tdap 12/4/2014 Zoster Vaccine, Live 3/15/2016 Not reviewed this visit Vitals BP Pulse Height(growth percentile) Weight(growth percentile) SpO2 BMI  
 (!) 141/100 (!) 105 5' 10\" (1.778 m) 172 lb (78 kg) 98% 24.68 kg/m2 Smoking Status Former Smoker BMI and BSA Data Body Mass Index Body Surface Area  
 24.68 kg/m 2 1.96 m 2 Preferred Pharmacy Pharmacy Name Phone [address], [address] [phone] Your Updated Medication List  
  
   
This list is accurate as of: 2/19/18  9:50 AM.  Always use your most recent med list. amLODIPine 10 mg tablet Commonly known as:  Cecilia Pereira Take 1 Tab by mouth daily. atenolol 50 mg tablet Commonly known as:  TENORMIN  
2 tablets each morning and 1 tablet every evening. atorvastatin 10 mg tablet Commonly known as:  LIPITOR Take 1 Tab by mouth daily. * warfarin 3 mg tablet Commonly known as:  COUMADIN  
1 daily 4 days per week, tues, thurs, sat, sun. * warfarin 4 mg tablet Commonly known as:  COUMADIN  
ONE DAILY 3 DAYS PER WEEK -- mON, wed, friday * Notice: This list has 2 medication(s) that are the same as other medications prescribed for you. Read the directions carefully, and ask your doctor or other care provider to review them with you. Follow-up Instructions Return in about 6 weeks (around 4/2/2018). Patient Instructions Atenolol 100mg in the morning and 100mg in the evening Introducing [facility] SERVICES! Dear Britt Reeves: 
Thank you for requesting a Lovethelook account. Our records indicate that you already have an active Lovethelook account. You can access your account anytime at [email or web address] Did you know that you can access your hospital and ER discharge instructions at any time in Lovethelook? You can also review all of your test results from your hospital stay or ER visit. Additional Information If you have questions, please visit the Frequently Asked Questions section of the Lovethelook website at [email or web address] Remember, Lovethelook is NOT to be used for urgent needs. For medical emergencies, dial 911. Now available from your iPhone and Android! Please provide this summary of care documentation to your next provider. Your primary care clinician is listed as [id number] [facility]. If you have any questions after today's visit, please call [phone].

## 2018-02-19 NOTE — PROGRESS NOTES
1. Have you been to the ER, urgent care clinic since your last visit? Hospitalized since your last visit? No    2. Have you seen or consulted any other health care providers outside of the [address] [place] [address] since your last visit? Include any pap smears or colon screening.  No

## 2018-02-19 NOTE — PROGRESS NOTES
Subjective:   Mr. Fabian Shell is here today for cardiac re-evaluation. He has a history of afib diagnosed in November 2017, hypertension, dyslipidemia and toxic multinodular goiter. He is a former smoker and quit 4 years ago. His rates in regards to his afib have continued to be elevated in the 110's-120's, he has occasional palpitations but is otherwise asymptomatic. He denies chest pain, dyspnea on exertion, lightheadedness, dizziness or syncope. He has a log of his blood pressure medications and pulse readings, in which his diastolic BP's have been in the 90's-100's, and afib rates as mentioned above. He denies any dark, tarry stool or nosebleeds but has occasional BRPR after bowel movements. Patient's cardiac risk factors are dyslipidemia, male gender, hypertension. Patient Active Problem List    Diagnosis Date Noted    Nontoxic multinodular goiter 02/07/2018    Atrial fibrillation ([address] 11/28/2017    Elevated cholesterol 04/12/2016    Essential hypertension, malignant 11/13/2014    Arthritis 11/13/2014     Current Outpatient Prescriptions   Medication Sig Dispense Refill    warfarin (COUMADIN) 3 mg tablet 1 daily 4 days per week, tues, thurs, sat, sun. 30 Tab 1    warfarin (COUMADIN) 4 mg tablet ONE DAILY 3 DAYS PER WEEK -- mON, wed, friday 30 Tab 1    atenolol (TENORMIN) 50 mg tablet 2 tablets each morning and 1 tablet every evening. 90 Tab 4    atorvastatin (LIPITOR) 10 mg tablet Take 1 Tab by mouth daily. 90 Tab 4    amLODIPine (NORVASC) 10 mg tablet Take 1 Tab by mouth daily.  90 Tab 4     No Known Allergies  Past Medical History:   Diagnosis Date    Arthritis 11/13/2014    Atrial fibrillation (HCC) 11/28/2017    Elevated cholesterol 4/12/2016    Essential hypertension, malignant 11/13/2014    Hearing loss 11/13/2014    Nontoxic multinodular goiter 2/7/2018     Past Surgical History:   Procedure Laterality Date    HX HERNIA REPAIR       Family History   Problem Relation Age of Onset    Diabetes Father      History   Smoking Status    Former Smoker   Smokeless Tobacco    Never Used          Review of Systems, additional:  Constitutional: negative  Eyes: negative  Respiratory: negative for dyspnea on exertion  Cardiovascular: per HPI  Gastrointestinal: negative for nausea and vomiting  Musculoskeletal:negative for myalgias  Neurological: negative  Behvioral/Psych: negative  Endocrine: negative  ENT: negative    Objective:     Visit Vitals    BP (!) 141/100    Pulse (!) 105    Ht 5' 10\" (1.778 m)    Wt 172 lb (78 kg)    SpO2 98%    BMI 24.68 kg/m2     General:  alert, cooperative, no distress, appears stated age   Chest Wall: inspection normal - no chest wall deformities or tenderness, respiratory effort normal   Lung: clear to auscultation bilaterally   Heart:  normal rate, regular rhythm, normal S1, S2, no murmurs, rubs, clicks or gallops   Abdomen: soft, non-tender. Bowel sounds normal. No masses,  no organomegaly   Extremities: extremities normal, atraumatic, no cyanosis or edema Skin: no rashes   Neuro: alert, oriented, normal speech, no focal findings or movement disorder noted         Assessment/Plan:         ICD-10-CM ICD-9-CM    1. Chronic atrial fibrillation ([facility]- - patient was taking Atenolol 100 mg daily in the AM and 50 mg QHS. His BP and HR are still elevated in the office today and based on home readings. Keep current dose of Atenolol in the AM and increase PM dose to 100 mg. Advised to continue monitoring BP and call if signs/symptoms of hypotension. Continue with Warfarin, advised of signs of GI bleed. INR monitoring per PCP. I48.2 427.31    2. Essential hypertension, malignant- Atenolol increased to 100 mg BID I10 401.0    3. Nontoxic multinodular goiter- per Dr. Deb Quezada. E04.2 241.1    4.  Elevated cholesterol-- continue Atorvastatin E78.00 272.0

## 2018-02-21 ENCOUNTER — OFFICE VISIT (OUTPATIENT)
Dept: FAMILY MEDICINE CLINIC | Age: 63
End: 2018-02-21

## 2018-02-21 VITALS
HEIGHT: 70 IN | HEART RATE: 104 BPM | SYSTOLIC BLOOD PRESSURE: 131 MMHG | DIASTOLIC BLOOD PRESSURE: 93 MMHG | OXYGEN SATURATION: 96 % | RESPIRATION RATE: 16 BRPM | TEMPERATURE: 97.8 F | WEIGHT: 174.8 LBS | BODY MASS INDEX: 25.03 KG/M2

## 2018-02-21 DIAGNOSIS — E04.2 NONTOXIC MULTINODULAR GOITER: ICD-10-CM

## 2018-02-21 DIAGNOSIS — I10 ESSENTIAL HYPERTENSION, MALIGNANT: ICD-10-CM

## 2018-02-21 DIAGNOSIS — E78.00 ELEVATED CHOLESTEROL: ICD-10-CM

## 2018-02-21 DIAGNOSIS — I48.91 ATRIAL FIBRILLATION, UNSPECIFIED TYPE (HCC): Primary | ICD-10-CM

## 2018-02-21 LAB
INR BLD: 1.3
PT POC: 15.7 SECONDS
VALID INTERNAL CONTROL?: YES

## 2018-02-21 RX ORDER — VITS A,C,E/LUTEIN/MINERALS 300MCG-200
200 TABLET ORAL DAILY
Qty: 100 TAB | Refills: 12 | Status: SHIPPED | OUTPATIENT
Start: 2018-02-21 | End: 2018-05-29 | Stop reason: SDUPTHER

## 2018-02-21 RX ORDER — WARFARIN 4 MG/1
TABLET ORAL
Qty: 30 TAB | Refills: 1 | Status: SHIPPED | OUTPATIENT
Start: 2018-02-21 | End: 2018-03-07

## 2018-02-21 RX ORDER — ZINC GLUCONATE 100 MG
100 TABLET ORAL DAILY
Qty: 100 TAB | Refills: 12 | Status: SHIPPED | OUTPATIENT
Start: 2018-02-21 | End: 2018-04-19

## 2018-02-21 NOTE — MR AVS SNAPSHOT
Melba Ashraf 
 
 
 [address] [facility] [address] [address] 
[phone] Patient: Ofelia Hendersno. MRN: [id number] AM Visit Information Date & Time Provider Department Dept. Phone Encounter #  
 2018  3:00 PM Zain Barnes [phone] [id number] Follow-up Instructions Return in about 2 weeks (around 3/7/2018) for EOV, inr next visit. Your Appointments 2018  9:30 AM  
Follow Up with Bette Bone MD  
Cardio Specialist at [facility]) Appt Note: 6 weeks [address] [address] [address]  
  
   
 [address] [facility] [address] Upcoming Health Maintenance Date Due DTaP/Tdap/Td series (2 - Td) 2024 COLONOSCOPY 2026 Allergies as of 2018  Review Complete On: 2018 By: Joaquin Howard. Louann Hilario PA-C No Known Allergies Current Immunizations  Reviewed on 2014 Name Date Influenza Vaccine 2014, 2011 Influenza Vaccine (Quad) PF 2017  2:34 PM, 3/15/2016 Pneumococcal Vaccine (Unspecified Type) 2011 Td 2001 Tdap 2014 Zoster Vaccine, Live 3/15/2016 Not reviewed this visit You Were Diagnosed With   
  
 Codes Comments Atrial fibrillation, unspecified type ([facility]    -  Primary ICD-10-CM: I48.91 
ICD-9-CM: 427.31 Nontoxic multinodular goiter     ICD-10-CM: E04.2 ICD-9-CM: 241.1 Elevated cholesterol     ICD-10-CM: E78.00 ICD-9-CM: 272.0 Essential hypertension, malignant     ICD-10-CM: I10 
ICD-9-CM: 401.0 Vitals BP Pulse Temp Resp Height(growth percentile) Weight(growth percentile) (!) 131/93 (BP 1 Location: Right arm, BP Patient Position: Sitting) (!) 104 97.8 °F (36.6 °C) (Oral) 16 5' 10\" (1.778 m) 174 lb 12.8 oz (79.3 kg) SpO2 BMI Smoking Status 96% 25.08 kg/m2 Former Smoker BMI and BSA Data Body Mass Index Body Surface Area 25.08 kg/m 2 1.98 m 2 Preferred Pharmacy Pharmacy Name Phone [address], [address] [phone] Your Updated Medication List  
  
   
This list is accurate as of 2/21/18  3:35 PM.  Always use your most recent med list. amLODIPine 10 mg tablet Commonly known as:  Everton Lords Take 1 Tab by mouth daily. atenolol 100 mg tablet Commonly known as:  TENORMIN Take 1 Tab by mouth two (2) times a day. atorvastatin 10 mg tablet Commonly known as:  LIPITOR Take 1 Tab by mouth daily. magnesium oxide 200 mg magnesium Tab Take 200 mg by mouth daily. multivit-min-FA-lycopen-lutein 300-600-300 mcg Tab Commonly known as:  CENTRUM SILVER MEN Take 1 Tab by mouth daily. vitamin k 100 mcg tablet Take 1 Tab by mouth daily. warfarin 4 mg tablet Commonly known as:  COUMADIN One daily Prescriptions Sent to Pharmacy Refills  
 magnesium oxide 200 mg magnesium tab 12 Sig: Take 200 mg by mouth daily. Class: Normal  
 Pharmacy: [address], [address] Ph #: [phone] Route: Oral  
 vitamin k 100 mcg tablet 12 Sig: Take 1 Tab by mouth daily. Class: Normal  
 Pharmacy: [address], [address] Ph #: [phone] Route: Oral  
 multivit-min-FA-lycopen-lutein (CENTRUM SILVER MEN) 300-600-300 mcg tab 12 Sig: Take 1 Tab by mouth daily. Class: Normal  
 Pharmacy: [address], [address] Ph #: [phone] Route: Oral  
 warfarin (COUMADIN) 4 mg tablet 1 Sig: One daily Class: Normal  
 Pharmacy: [address], [address] Ph #: [phone] We Performed the Following AMB POC PT/INR [99899 CPT(R)] Follow-up Instructions Return in about 2 weeks (around 3/7/2018) for EOV, inr next visit. Patient Instructions 1. Start taking centrium silver for men one daily 2. Start taking vit K 100 mg/ daily 3. Start taking mg ox 200mg daily 
 
 
incraese coumadin to 4mg/ d Taking Warfarin Safely: Care Instructions Your Care Instructions Warfarin is a medicine that you take to prevent blood clots. It is often called a blood thinner. Doctors give warfarin (such as Coumadin) to reduce the risk of blood clots. You may be at risk for blood clots if you have atrial fibrillation or deep vein thrombosis. Some other health problems may also put you at risk. Warfarin slows the amount of time it takes for your blood to clot. It can cause bleeding problems. Even if you've been taking warfarin for a while, it's important to know how to take it safely. Foods and other medicines can affect the way warfarin works. Some can make warfarin work too well. This can cause bleeding problems. And some can make it work poorly, so that it does not prevent blood clots very well. You will need regular blood tests to check how long it takes for your blood to form a clot. This test is called a PT or prothrombin time test. The result of the test is called an INR level. Depending on the test results, your doctor or anticoagulation clinic may adjust your dose of warfarin. Follow-up care is a key part of your treatment and safety. Be sure to make and go to all appointments, and call your doctor if you are having problems. It's also a good idea to know your test results and keep a list of the medicines you take. How can you care for yourself at home? Take warfarin safely · Take your warfarin at the same time each day. · If you miss a dose of warfarin, don't take an extra dose to make up for it. Your doctor can tell you exactly what to do so you don't take too much or too little. · Wear medical alert jewelry that lets others know that you take warfarin. You can buy this at most drugstores. · Don't take warfarin if you are pregnant or planning to get pregnant. Talk to your doctor about how you can prevent getting pregnant while you are taking it. · Don't change your dose or stop taking warfarin unless your doctor tells you to. Effects of medicines and food on warfarin · Don't start or stop taking any medicines, vitamins, or natural remedies unless you first talk to your doctor. Many medicines can affect how warfarin works. These include aspirin and other pain relievers, over-the-counter medicines, multivitamins, dietary supplements, and herbal products. · Tell all of your doctors and pharmacists that you take warfarin. Some prescription medicines can affect how warfarin works. · Keep the amount of vitamin K in your diet about the same from day to day. Do not suddenly eat a lot more or a lot less food that is rich in vitamin K than you usually do. Vitamin K affects how warfarin works and how your blood clots. Talk with your doctor before making big changes in your diet. Foods that have a lot of vitamin K include cooked green vegetables, such as: ¨ Kale, spinach, turnip greens, kaetlyn greens, Swiss chard, and mustard greens. ¨ [place] sprouts, broccoli, and asparagus. · Limit your use of alcohol. Avoid bleeding by preventing falls and injuries · Wear slippers or shoes with nonskid soles. · Remove throw rugs and clutter. · Rearrange furniture and electrical cords to keep them out of walking paths. · Keep stairways, porches, and outside walkways well lit. Use night-lights in hallways and bathrooms. · Be extra careful when you work with sharp tools or knives. When should you call for help? Call 911 anytime you think you may need emergency care. For example, call if: 
? · You have a sudden, severe headache that is different from past headaches. ?Call your doctor now or seek immediate medical care if: 
? · You have any abnormal bleeding, such as: 
¨ Nosebleeds. ¨ Vaginal bleeding that is different (heavier, more frequent, at a different time of the month) than what you are used to. ¨ Bloody or black stools, or rectal bleeding. ¨ Bloody or pink urine. ? Watch closely for changes in your health, and be sure to contact your doctor if you have any problems. Where can you learn more? Go to http://jg-dave.info/. Enter H761 in the search box to learn more about \"Taking Warfarin Safely: Care Instructions. \" Current as of: September 21, 2016 Content Version: 11.4 © 6714-3804 Reclamador. Care instructions adapted under license by [employer] (which disclaims liability or warranty for this information). If you have questions about a medical condition or this instruction, always ask your healthcare professional. [place] [address] any warranty or liability for your use of this information. Introducing [facility] & HEALTH SERVICES! Dear Bandar Hernandez: 
Thank you for requesting a Tamecco account. Our records indicate that you already have an active Tamecco account. You can access your account anytime at [email or web address] Did you know that you can access your hospital and ER discharge instructions at any time in Tamecco? You can also review all of your test results from your hospital stay or ER visit. Additional Information If you have questions, please visit the Frequently Asked Questions section of the Tamecco website at [email or web address] Remember, Tamecco is NOT to be used for urgent needs. For medical emergencies, dial 911. Now available from your iPhone and Android! Please provide this summary of care documentation to your next provider. Your primary care clinician is listed as [address].  If you have any questions after today's visit, please call [phone].

## 2018-02-21 NOTE — PATIENT INSTRUCTIONS
1. Start taking centrium silver for men one daily  2. Start taking vit K 100 mg/ daily  3. Start taking mg ox 200mg daily      incraese coumadin to 4mg/ d      Taking Warfarin Safely: Care Instructions  Your Care Instructions    Warfarin is a medicine that you take to prevent blood clots. It is often called a blood thinner. Doctors give warfarin (such as Coumadin) to reduce the risk of blood clots. You may be at risk for blood clots if you have atrial fibrillation or deep vein thrombosis. Some other health problems may also put you at risk. Warfarin slows the amount of time it takes for your blood to clot. It can cause bleeding problems. Even if you've been taking warfarin for a while, it's important to know how to take it safely. Foods and other medicines can affect the way warfarin works. Some can make warfarin work too well. This can cause bleeding problems. And some can make it work poorly, so that it does not prevent blood clots very well. You will need regular blood tests to check how long it takes for your blood to form a clot. This test is called a PT or prothrombin time test. The result of the test is called an INR level. Depending on the test results, your doctor or anticoagulation clinic may adjust your dose of warfarin. Follow-up care is a key part of your treatment and safety. Be sure to make and go to all appointments, and call your doctor if you are having problems. It's also a good idea to know your test results and keep a list of the medicines you take. How can you care for yourself at home? Take warfarin safely  · Take your warfarin at the same time each day. · If you miss a dose of warfarin, don't take an extra dose to make up for it. Your doctor can tell you exactly what to do so you don't take too much or too little. · Wear medical alert jewelry that lets others know that you take warfarin. You can buy this at most drugstores.   · Don't take warfarin if you are pregnant or planning to get pregnant. Talk to your doctor about how you can prevent getting pregnant while you are taking it. · Don't change your dose or stop taking warfarin unless your doctor tells you to. Effects of medicines and food on warfarin  · Don't start or stop taking any medicines, vitamins, or natural remedies unless you first talk to your doctor. Many medicines can affect how warfarin works. These include aspirin and other pain relievers, over-the-counter medicines, multivitamins, dietary supplements, and herbal products. · Tell all of your doctors and pharmacists that you take warfarin. Some prescription medicines can affect how warfarin works. · Keep the amount of vitamin K in your diet about the same from day to day. Do not suddenly eat a lot more or a lot less food that is rich in vitamin K than you usually do. Vitamin K affects how warfarin works and how your blood clots. Talk with your doctor before making big changes in your diet. Foods that have a lot of vitamin K include cooked green vegetables, such as:  ¨ Kale, spinach, turnip greens, katelyn greens, Swiss chard, and mustard greens. ¨ [place] sprouts, broccoli, and asparagus. · Limit your use of alcohol. Avoid bleeding by preventing falls and injuries  · Wear slippers or shoes with nonskid soles. · Remove throw rugs and clutter. · Rearrange furniture and electrical cords to keep them out of walking paths. · Keep stairways, porches, and outside walkways well lit. Use night-lights in hallways and bathrooms. · Be extra careful when you work with sharp tools or knives. When should you call for help? Call 911 anytime you think you may need emergency care. For example, call if:  ? · You have a sudden, severe headache that is different from past headaches. ?Call your doctor now or seek immediate medical care if:  ? · You have any abnormal bleeding, such as:  ¨ Nosebleeds.   ¨ Vaginal bleeding that is different (heavier, more frequent, at a different time of the month) than what you are used to. ¨ Bloody or black stools, or rectal bleeding. ¨ Bloody or pink urine. ? Watch closely for changes in your health, and be sure to contact your doctor if you have any problems. Where can you learn more? Go to http://jg-dave.info/. Enter G202 in the search box to learn more about \"Taking Warfarin Safely: Care Instructions. \"  Current as of: September 21, 2016  Content Version: 11.4  © 0760-4755 Healthwise, [employer]. Care instructions adapted under license by [employer] (which disclaims liability or warranty for this information). If you have questions about a medical condition or this instruction, always ask your healthcare professional. [address] any warranty or liability for your use of this information.

## 2018-02-21 NOTE — PROGRESS NOTES
Nelson Vazquez. is a 58 y.o. male presented to clinic for a routine f/u for afib. Pt denies any pain or concerns at this time. 1. Have you been to the ER, urgent care clinic since your last visit? Hospitalized since your last visit? No    2. Have you seen or consulted any other health care providers outside of the [facility] since your last visit? Include any pap smears or colon screening. No     Learning Assessment 1/6/2015   PRIMARY LEARNER Patient   HIGHEST LEVEL OF EDUCATION - PRIMARY LEARNER  2 YEARS OF COLLEGE   BARRIERS PRIMARY LEARNER NONE   CO-LEARNER CAREGIVER No   PRIMARY LANGUAGE ENGLISH   LEARNER PREFERENCE PRIMARY READING     DEMONSTRATION   ANSWERED BY patient   RELATIONSHIP SELF     There are no preventive care reminders to display for this patient.

## 2018-02-21 NOTE — PROGRESS NOTES
Juve Olivares is a 58 y.o.  male and presents with    Chief Complaint   Patient presents with    Hypertension    Irregular Heart Beat    Anticoagulation           Subjective:    Cardiovascular Review:  The patient has hypertension, hyperlipidemia and Atrial Fibrillation. Diet and Lifestyle: not attempting to follow a low fat, low cholesterol diet  Home BP Monitoring: is not measured at home. Pertinent ROS: taking medications as instructed, no medication side effects noted, no TIA's, no chest pain on exertion, no dyspnea on exertion, no swelling of ankles. Anticoagulation  Patient here for followup of chronic anticoagulation. Indication: Atrial Fibrillation. Bleeding Signs/Symptoms:  None. Thromboembolic Signs/Symptoms:  None. INR's are drawn regularly and have been therapeutic. Lab Results   Component Value Date/Time    INR POC 1.3 02/21/2018 03:11 PM       Additional Concerns:          Patient Active Problem List    Diagnosis Date Noted    Nontoxic multinodular goiter 02/07/2018    Atrial fibrillation ([address] 11/28/2017    Elevated cholesterol 04/12/2016    Essential hypertension, malignant 11/13/2014    Arthritis 11/13/2014     Current Outpatient Prescriptions   Medication Sig Dispense Refill    magnesium oxide 200 mg magnesium tab Take 200 mg by mouth daily. 100 Tab 12    vitamin k 100 mcg tablet Take 1 Tab by mouth daily. 100 Tab 12    multivit-min-FA-lycopen-lutein (CENTRUM SILVER MEN) 300-600-300 mcg tab Take 1 Tab by mouth daily. 100 Caplet 12    warfarin (COUMADIN) 4 mg tablet One daily 30 Tab 1    atenolol (TENORMIN) 100 mg tablet Take 1 Tab by mouth two (2) times a day. 60 Tab 6    atorvastatin (LIPITOR) 10 mg tablet Take 1 Tab by mouth daily. 90 Tab 4    amLODIPine (NORVASC) 10 mg tablet Take 1 Tab by mouth daily.  90 Tab 4     No Known Allergies  Past Medical History:   Diagnosis Date    Arthritis 11/13/2014    Atrial fibrillation (HCC) 11/28/2017    Elevated cholesterol 4/12/2016    Essential hypertension, malignant 11/13/2014    Hearing loss 11/13/2014    Nontoxic multinodular goiter 2/7/2018     Past Surgical History:   Procedure Laterality Date    HX HERNIA REPAIR       Family History   Problem Relation Age of Onset    Diabetes Father      Social History   Substance Use Topics    Smoking status: Former Smoker    Smokeless tobacco: Never Used    Alcohol use 12.5 oz/week     25 Shots of liquor per week       ROS       All other systems reviewed and are negative. Objective:  Vitals:    02/21/18 1510   BP: (!) 131/93   Pulse: (!) 104   Resp: 16   Temp: 97.8 °F (36.6 °C)   TempSrc: Oral   SpO2: 96%   Weight: 174 lb 12.8 oz (79.3 kg)   Height: 5' 10\" (1.778 m)   PainSc:   0 - No pain                 alert, well appearing, and in no distress and oriented to person, place, and time  Chest - clear to auscultation, no wheezes, rales or rhonchi, symmetric air entry  Heart - normal rate, regular rhythm, normal S1, S2, no murmurs, rubs, clicks or gallops  Abdomen - soft, nontender, nondistended, no masses or organomegaly        LABS     TESTS      Assessment/Plan:    Hypertension - stable  Atrial fib ongoing  Coumadin subatherapeutic on 24 mg/wk  Was too high on 28mg/wk  Was subtherapeutic on 21mg/wk  Will go back to 4mg/d or 28mg/wk  Work on compliance and diet and f/u 2 wk  He drinking over 6 ox scotch daily -- this is probably part of the problem  Will staart supplementing with mg, vit K, and mvi       Lab review: labs are reviewed, up to date and normal    Diagnoses and all orders for this visit:    1. Atrial fibrillation, unspecified type (HCC)  -     AMB POC PT/INR  -     magnesium oxide 200 mg magnesium tab; Take 200 mg by mouth daily. -     vitamin k 100 mcg tablet; Take 1 Tab by mouth daily. -     multivit-min-FA-lycopen-lutein (CENTRUM SILVER MEN) 300-600-300 mcg tab; Take 1 Tab by mouth daily.     2. Nontoxic multinodular goiter  -     AMB POC PT/INR  - magnesium oxide 200 mg magnesium tab; Take 200 mg by mouth daily. -     vitamin k 100 mcg tablet; Take 1 Tab by mouth daily. -     multivit-min-FA-lycopen-lutein (CENTRUM SILVER MEN) 300-600-300 mcg tab; Take 1 Tab by mouth daily. 3. Elevated cholesterol  -     AMB POC PT/INR  -     magnesium oxide 200 mg magnesium tab; Take 200 mg by mouth daily. -     vitamin k 100 mcg tablet; Take 1 Tab by mouth daily. -     multivit-min-FA-lycopen-lutein (CENTRUM SILVER MEN) 300-600-300 mcg tab; Take 1 Tab by mouth daily. 4. Essential hypertension, malignant  -     AMB POC PT/INR  -     magnesium oxide 200 mg magnesium tab; Take 200 mg by mouth daily. -     vitamin k 100 mcg tablet; Take 1 Tab by mouth daily. -     multivit-min-FA-lycopen-lutein (CENTRUM SILVER MEN) 300-600-300 mcg tab; Take 1 Tab by mouth daily. Other orders  -     warfarin (COUMADIN) 4 mg tablet; One daily          I have discussed the diagnosis with the patient and the intended plan as seen in the above orders. The patient has received an after-visit summary and questions were answered concerning future plans. I have discussed medication side effects and warnings with the patient as well. I have reviewed the plan of care with the patient, accepted their input and they are in agreement with the treatment goals. Follow-up Disposition:  Return in about 2 weeks (around 3/7/2018) for EOV, inr next visit.

## 2018-03-07 ENCOUNTER — TELEPHONE (OUTPATIENT)
Dept: CARDIOLOGY CLINIC | Age: 63
End: 2018-03-07

## 2018-03-07 ENCOUNTER — OFFICE VISIT (OUTPATIENT)
Dept: FAMILY MEDICINE CLINIC | Age: 63
End: 2018-03-07

## 2018-03-07 VITALS
RESPIRATION RATE: 16 BRPM | HEART RATE: 94 BPM | DIASTOLIC BLOOD PRESSURE: 86 MMHG | WEIGHT: 173.8 LBS | TEMPERATURE: 97.1 F | HEIGHT: 70 IN | OXYGEN SATURATION: 96 % | BODY MASS INDEX: 24.88 KG/M2 | SYSTOLIC BLOOD PRESSURE: 126 MMHG

## 2018-03-07 DIAGNOSIS — R21 RASH: ICD-10-CM

## 2018-03-07 DIAGNOSIS — I48.91 ATRIAL FIBRILLATION, UNSPECIFIED TYPE (HCC): Primary | ICD-10-CM

## 2018-03-07 LAB
INR BLD: 1.5
PT POC: 17.5 SECONDS
VALID INTERNAL CONTROL?: YES

## 2018-03-07 RX ORDER — DILTIAZEM HYDROCHLORIDE 60 MG/1
60 TABLET, FILM COATED ORAL 2 TIMES DAILY
Qty: 60 TAB | Refills: 0 | Status: SHIPPED | OUTPATIENT
Start: 2018-03-07 | End: 2018-03-14 | Stop reason: DRUGHIGH

## 2018-03-07 NOTE — PROGRESS NOTES
Kameron Alas. is a 61 y.o. male presents today for follow up on his A fib and hypertension. Patient also reports of bilateral leg swelling. Patient also reports of bilateral skin alteration to his armpits. Patient reports of the bilateral leg discoloration and swelling since increase of his blood pressure medication. Pt is in Room # 4        1. Have you been to the ER, urgent care clinic since your last visit? Hospitalized since your last visit? No    2. Have you seen or consulted any other health care providers outside of the [address] [place] [address] since your last visit? Include any pap smears or colon screening.  No

## 2018-03-07 NOTE — PROGRESS NOTES
Freya Reyes. is a 61 y.o.  male and presents with    Chief Complaint   Patient presents with    Rash    Irregular Heart Beat     In for f/u of atrial fib and coumadin  Pt has developed a rash in axilla and on legs  Legs are also swollena nd red  No itching. Subjective:    Cardiovascular Review:  The patient has hypertension and Atrial Fibrillation. Diet and Lifestyle: not attempting to follow a low fat, low cholesterol diet, not attempting to follow a low sodium diet  Home BP Monitoring: is not measured at home. Pertinent ROS: taking medications as instructed, no medication side effects noted, no TIA's, no chest pain on exertion, no dyspnea on exertion, no swelling of ankles. Anticoagulation  Patient here for followup of chronic anticoagulation. Indication: Atrial Fibrillation. Bleeding Signs/Symptoms:  None. Thromboembolic Signs/Symptoms:  None. INR's are drawn regularly and have been therapeutic. Lab Results   Component Value Date/Time    INR POC 1.5 03/07/2018 02:43 PM       Additional Concerns:    Rash on axilla and legs       Patient Active Problem List    Diagnosis Date Noted    Nontoxic multinodular goiter 02/07/2018    Atrial fibrillation ([address] 11/28/2017    Elevated cholesterol 04/12/2016    Essential hypertension, malignant 11/13/2014    Arthritis 11/13/2014     Current Outpatient Prescriptions   Medication Sig Dispense Refill    dilTIAZem (CARDIZEM) 60 mg tablet Take 1 Tab by mouth two (2) times a day. 60 Tab 0    magnesium oxide 200 mg magnesium tab Take 200 mg by mouth daily. 100 Tab 12    vitamin k 100 mcg tablet Take 1 Tab by mouth daily. 100 Tab 12    multivit-min-FA-lycopen-lutein (CENTRUM SILVER MEN) 300-600-300 mcg tab Take 1 Tab by mouth daily.  100 Caplet 12     No Known Allergies  Past Medical History:   Diagnosis Date    Arthritis 11/13/2014    Atrial fibrillation ([address] 11/28/2017    Elevated cholesterol 4/12/2016    Essential hypertension, malignant 11/13/2014    Hearing loss 11/13/2014    Nontoxic multinodular goiter 2/7/2018     Past Surgical History:   Procedure Laterality Date    HX HERNIA REPAIR       Family History   Problem Relation Age of Onset    Diabetes Father      Social History   Substance Use Topics    Smoking status: Former Smoker    Smokeless tobacco: Never Used    Alcohol use 12.5 oz/week     25 Shots of liquor per week       ROS       All other systems reviewed and are negative. Objective:  Vitals:    03/07/18 1441   BP: 126/86   Pulse: 94   Resp: 16   Temp: 97.1 °F (36.2 °C)   TempSrc: Oral   SpO2: 96%   Weight: 173 lb 12.8 oz (78.8 kg)   Height: 5' 10\" (1.778 m)   PainSc:   0 - No pain                 alert, well appearing, and in no distress and oriented to person, place, and time  Chest - clear to auscultation, no wheezes, rales or rhonchi, symmetric air entry  Heart - normal rate, regular rhythm, normal S1, S2, no murmurs, rubs, clicks or gallops  Abdomen - soft, nontender, nondistended, no masses or organomegaly  Skin with red rash in axilla and legs bilat purpuric            LABS   inr  1.5  TESTS      Assessment/Plan:    Rash  ?? etio ? Probably drug reactin    Atrial fib rate controlled on b blocker    Coumadin subtherpaeutic    Have d/w PA in cardiology office. Will d/c all meds and start cardiazem 60 bid  F/u  5 or 6 days            Lab review: labs are reviewed, up to date and normal    Diagnoses and all orders for this visit:    1. Atrial fibrillation, unspecified type (HCC)  -     AMB POC PT/INR  -     dilTIAZem (CARDIZEM) 60 mg tablet; Take 1 Tab by mouth two (2) times a day. 2. Rash  -     AMB POC PT/INR  -     dilTIAZem (CARDIZEM) 60 mg tablet; Take 1 Tab by mouth two (2) times a day. I have discussed the diagnosis with the patient and the intended plan as seen in the above orders. The patient has received an after-visit summary and questions were answered concerning future plans.   I have discussed medication side effects and warnings with the patient as well. I have reviewed the plan of care with the patient, accepted their input and they are in agreement with the treatment goals.      Follow-up Disposition: Not on File

## 2018-03-07 NOTE — PATIENT INSTRUCTIONS
1. Stop coumadin, norvasc, tenormin, lipitor  2.  Start cardiazem 60mg  Twice a day    Come back in 5 to 7 days

## 2018-03-07 NOTE — MR AVS SNAPSHOT
[address]  [address] [address] 
[phone] Patient: Ching Farmer. MRN: [id number] Visit Information Date & Time Provider Department Dept. Phone Encounter #  
 3/7/2018  3:00 PM Renny Montaguekattys., [address] [phone] [id number] Follow-up Instructions Return in about 6 days (around 3/13/2018) for EOV. Your Appointments 4/16/2018  9:30 AM  
Follow Up with Teena Singh MD  
Cardio Specialist at [address]) Appt Note: 6 weeks [facility] [address] [facility] Upcoming Health Maintenance Date Due DTaP/Tdap/Td series (2 - Td) 12/4/2024 COLONOSCOPY 5/23/2026 Allergies as of 3/7/2018  Review Complete On: 3/7/2018 By: Renny Medina, DO No Known Allergies Current Immunizations  Reviewed on 11/13/2014 Name Date Influenza Vaccine 12/4/2014, 11/13/2011 Influenza Vaccine (Quad) PF 12/5/2017  2:34 PM, 3/15/2016 Pneumococcal Vaccine (Unspecified Type) 11/13/2011 Td 11/13/2001 Tdap 12/4/2014 Zoster Vaccine, Live 3/15/2016 Not reviewed this visit You Were Diagnosed With   
  
 Codes Comments At[facility] unspecified type [facility]    -  Primary ICD-10-CM: I48.91 
ICD-9-CM: 427.31 Rash     ICD-10-CM: R21 
ICD-9-CM: 782.1 Vitals BP Pulse Temp Resp Height(growth percentile) Weight(growth percentile) 126/86 (BP 1 Location: Left arm, BP Patient Position: Sitting) 94 97.1 °F (36.2 °C) (Oral) 16 5' 10\" (1.778 m) 173 lb 12.8 oz (78.8 kg) SpO2 BMI Smoking Status 96% 24.94 kg/m2 Former Smoker BMI and BSA Data Body Mass Index Body Surface Area 24.94 kg/m 2 1.97 m 2 Preferred Pharmacy Pharmacy Name Phone [address], [address] [phone] Your Updated Medication List  
  
   
This list is accurate as of 3/7/18  3:19 PM.  Always use your most recent med list.  
  
  
  
  
 dilTIAZem 60 mg tablet Commonly known as:  CARDIZEM Take 1 Tab by mouth two (2) times a day. magnesium oxide 200 mg magnesium Tab Take 200 mg by mouth daily. multivit-min-FA-lycopen-lutein 300-600-300 mcg Tab Commonly known as:  CENTRUM SILVER MEN Take 1 Tab by mouth daily. vitamin k 100 mcg tablet Take 1 Tab by mouth daily. Prescriptions Sent to Pharmacy Refills  
 dilTIAZem (CARDIZEM) 60 mg tablet 0 Sig: Take 1 Tab by mouth two (2) times a day. Class: Normal  
 Pharmacy: [facility] DR JENNA LOVE [address], [address] Ph #: [phone] Route: Oral  
  
We Performed the Following AMB POC PT/INR [01706 CPT(R)] Follow-up Instructions Return in about 6 days (around 3/13/2018) for EOV. Patient Instructions 1. Stop coumadin, norvasc, tenormin, lipitor 2. Start cardiazem 60mg  Twice a day Come back in 5 to 7 days Introducing [facility]! Dear Britt Reeves: 
Thank you for requesting a [employer] account. Our records indicate that you already have an active [email or web address] account. You can access your account anytime at [email or web address] Did you know that you can access your hospital and ER discharge instructions at any time in [email or web address]? You can also review all of your test results from your hospital stay or ER visit. Additional Information If you have questions, please visit the Frequently Asked Questions section of the [email or web address] website at [email or web address] Remember, [email or web address] is NOT to be used for urgent needs. For medical emergencies, dial 911. Now available from your iPhone and Android! Please provide this summary of care documentation to your next provider. Your primary care clinician is listed as [id number] [facility]. If you have any questions after today's visit, please call [phone].

## 2018-03-14 ENCOUNTER — OFFICE VISIT (OUTPATIENT)
Dept: CARDIOLOGY CLINIC | Age: 63
End: 2018-03-14

## 2018-03-14 ENCOUNTER — OFFICE VISIT (OUTPATIENT)
Dept: FAMILY MEDICINE CLINIC | Age: 63
End: 2018-03-14

## 2018-03-14 VITALS
WEIGHT: 171 LBS | HEART RATE: 109 BPM | RESPIRATION RATE: 16 BRPM | SYSTOLIC BLOOD PRESSURE: 159 MMHG | BODY MASS INDEX: 24.48 KG/M2 | HEIGHT: 70 IN | OXYGEN SATURATION: 97 % | DIASTOLIC BLOOD PRESSURE: 103 MMHG | TEMPERATURE: 98.2 F

## 2018-03-14 VITALS
HEART RATE: 92 BPM | HEIGHT: 70 IN | WEIGHT: 172 LBS | OXYGEN SATURATION: 98 % | SYSTOLIC BLOOD PRESSURE: 147 MMHG | DIASTOLIC BLOOD PRESSURE: 100 MMHG | BODY MASS INDEX: 24.62 KG/M2

## 2018-03-14 DIAGNOSIS — D69.2 PURPURA (HCC): ICD-10-CM

## 2018-03-14 DIAGNOSIS — E78.00 ELEVATED CHOLESTEROL: ICD-10-CM

## 2018-03-14 DIAGNOSIS — R00.2 PALPITATIONS: Primary | ICD-10-CM

## 2018-03-14 DIAGNOSIS — I48.91 ATRIAL FIBRILLATION, UNSPECIFIED TYPE (HCC): Primary | ICD-10-CM

## 2018-03-14 DIAGNOSIS — I10 ESSENTIAL HYPERTENSION, MALIGNANT: ICD-10-CM

## 2018-03-14 DIAGNOSIS — E04.2 NONTOXIC MULTINODULAR GOITER: ICD-10-CM

## 2018-03-14 DIAGNOSIS — I48.91 ATRIAL FIBRILLATION WITH RAPID VENTRICULAR RESPONSE (HCC): ICD-10-CM

## 2018-03-14 LAB
BILIRUB UR QL: NEGATIVE
GLUCOSE UR QL: NEGATIVE MG/DL
HGB UR QL STRIP: NEGATIVE
KETONES UR QL STRIP.AUTO: NEGATIVE MG/DL
LEUKOCYTE ESTERASE: NEGATIVE
NITRITE UR QL STRIP.AUTO: NEGATIVE
PH UR STRIP: 7 PH (ref 5–8)
PROT UR QL STRIP: NEGATIVE MG/DL
SP GR UR: 1.01 (ref 1–1.03)
UROBILINOGEN UR STRIP-MCNC: <2 MG/DL

## 2018-03-14 RX ORDER — DILTIAZEM HYDROCHLORIDE 240 MG/1
240 CAPSULE, COATED, EXTENDED RELEASE ORAL DAILY
Qty: 30 CAP | Refills: 6 | Status: SHIPPED | OUTPATIENT
Start: 2018-03-14 | End: 2018-04-19

## 2018-03-14 NOTE — PROGRESS NOTES
Bryson Loera. is a 61 y.o. male presents today for follow up on his atrial fibrillation and rash. Patient continues with a rash all over, hot flashes, and lightheadedness. Patient was started on Cardiazem and stopped his coumadin, norvasc, tenormin, and lipitor. Patient saw Cardiology today. Patient has brought his weekly blood pressure readings with pulse. Pt is in Room # 4        1. Have you been to the ER, urgent care clinic since your last visit? Hospitalized since your last visit? No    2. Have you seen or consulted any other health care providers outside of the [address] [place] [address] since your last visit? Include any pap smears or colon screening.  No

## 2018-03-14 NOTE — PROGRESS NOTES
1. Have you been to the ER, urgent care clinic since your last visit? Hospitalized since your last visit? No    2. Have you seen or consulted any other health care providers outside of the [address] [place] [address] since your last visit? Include any pap smears or colon screening.  No

## 2018-03-14 NOTE — MR AVS SNAPSHOT
[address] [place] [address] [address] [address] 
[phone] Patient: Delma Harris. MRN: [id number] Visit Information Date & Time Provider Department Dept. Phone Encounter #  
 3/14/2018  1:00 PM Nick Hager PA-C Cardio Specialist at [facility] [id number] [phone] Follow-up Instructions Return in about 1 week (around 3/21/2018). Your Appointments 3/14/2018  2:00 PM  
ROUTINE CARE with Gloria Puente.,  [address] [address]) Appt Note: Return in about 6 days (around 3/13/2018) for EOV. [address] [place]  
  
   
 [address] [place] [address] [address]  
  
    
 3/20/2018  2:30 PM  
Follow Up with Nick Hager PA-C Cardio Specialist at [address]) Appt Note: BP and EKG since increasing Diltiazem [facility] [address] [place] [address] 4/16/2018  9:30 AM  
Follow Up with Rebekah Sanders MD  
Cardio Specialist at [address]) Appt Note: 6 weeks [place] [address] [address] [facility] [address] Upcoming Health Maintenance Date Due DTaP/Tdap/Td series (2 - Td) 12/4/2024 COLONOSCOPY 5/23/2026 Allergies as of 3/14/2018  Review Complete On: 3/14/2018 By: Prosper Santos RN No Known Allergies Current Immunizations  Reviewed on 11/13/2014 Name Date Influenza Vaccine 12/4/2014, 11/13/2011 Influenza Vaccine (Quad) PF 12/5/2017  2:34 PM, 3/15/2016 Pneumococcal Vaccine (Unspecified Type) 11/13/2011 Td 11/13/2001 Tdap 12/4/2014 Zoster Vaccine, Live 3/15/2016 Not reviewed this visit You Were Diagnosed With   
  
 Codes Comments Palpitations    -  Primary ICD-10-CM: R00.2 ICD-9-CM: 785.1 Vitals BP Pulse Height(growth percentile) Weight(growth percentile) SpO2 BMI  
 (!) 147/100 92 5' 10\" (1.778 m) 172 lb (78 kg) 98% 24.68 kg/m2 Smoking Status Former Smoker Vitals History BMI and BSA Data Body Mass Index Body Surface Area  
 24.68 kg/m 2 1.96 m 2 Preferred Pharmacy Pharmacy Name Phone [address], [address] [phone] Your Updated Medication List  
  
   
This list is accurate as of 3/14/18  1:49 PM.  Always use your most recent med list.  
  
  
  
  
 magnesium oxide 200 mg magnesium Tab Take 200 mg by mouth daily. multivit-min-FA-lycopen-lutein 300-600-300 mcg Tab Commonly known as:  CENTRUM SILVER MEN Take 1 Tab by mouth daily. vitamin k 100 mcg tablet Take 1 Tab by mouth daily. We Performed the Following AMB POC EKG ROUTINE W/ 12 LEADS, INTER & REP [81532 CPT(R)] Follow-up Instructions Return in about 1 week (around 3/21/2018). Patient Instructions Take extra dose of cardizem today Tomorrow start Cardizem CD 240mg Daily Introducing [facility]! Dear James Factor: 
Thank you for requesting a [employer] account. Our records indicate that you already have an active [employer] account. You can access your account anytime at [email or web address] Did you know that you can access your hospital and ER discharge instructions at any time in [employer]? You can also review all of your test results from your hospital stay or ER visit. Additional Information If you have questions, please visit the Frequently Asked Questions section of the [employer] website at [email or web address] Remember, [employer] is NOT to be used for urgent needs. For medical emergencies, dial 911. Now available from your iPhone and Android! Please provide this summary of care documentation to your next provider. Your primary care clinician is listed as [id number] [facility]. If you have any questions after today's visit, please call [phone].

## 2018-03-14 NOTE — PROGRESS NOTES
Subjective:   Mr. Sarah Leyva is here today for cardiac re-evaluation. He has a history of afib diagnosed in November 2017, hypertension, dyslipidemia and toxic multinodular goiter. He is a former smoker and quit 4 years ago. Pt was seen by his PCP, Dr. Aysha Harper, last week, at which time he was found to have a rash on his legs and axillae. There was concern for possible medication reaction, so his Atenolol and Amlodipine were discontinued and he was switched to Cardizem 60 mg BID. He was taken off Coumadin as well, and his last INR last week prior to discontinuation was 1.5. He reports that since his visit, his rash has not worsened, and he denies associated pain or itching. He has, however, noted intermittent lightheadedness over the past week when bending over or \"if I do something too fast.\"  His lightheadedness has been worse today, occurring approximately 4-5 times today, lasting for 5 minutes, and associated with shortness of breath that lasts for 15-20 seconds and resolves after taking a couple deep breaths. He denies chest pain/discomfort, palpitations, or syncope. He has a log of his blood pressure medications and pulse readings, in which his BP has been up to ~539'G systolic and 210'N diastolic, and HR up to 308 on 3/12/18. Patient's cardiac risk factors are dyslipidemia, male gender, hypertension. Patient Active Problem List    Diagnosis Date Noted    Nontoxic multinodular goiter 02/07/2018    Atrial fibrillation ([facility] 11/28/2017    Elevated cholesterol 04/12/2016    Essential hypertension, malignant 11/13/2014    Arthritis 11/13/2014     Current Outpatient Prescriptions   Medication Sig Dispense Refill    magnesium oxide 200 mg magnesium tab Take 200 mg by mouth daily. 100 Tab 12    vitamin k 100 mcg tablet Take 1 Tab by mouth daily. 100 Tab 12    multivit-min-FA-lycopen-lutein (CENTRUM SILVER MEN) 300-600-300 mcg tab Take 1 Tab by mouth daily.  100 [address] 12     No Known Allergies  Past Medical History:   Diagnosis Date    Arthritis 11/13/2014    Atrial fibrillation ([address] 11/28/2017    Elevated cholesterol 4/12/2016    Essential hypertension, malignant 11/13/2014    Hearing loss 11/13/2014    Nontoxic multinodular goiter 2/7/2018     Past Surgical History:   Procedure Laterality Date    HX HERNIA REPAIR       Family History   Problem Relation Age of Onset    Diabetes Father      History   Smoking Status    Former Smoker   Smokeless Tobacco    Never Used          Review of Systems, additional:  Constitutional: + lightheadedness  Eyes: negative  Respiratory: chronic dyspnea on exertion  Cardiovascular: per HPI  Gastrointestinal: negative for nausea and vomiting  Musculoskeletal:negative for myalgias  Neurological: negative  Behvioral/Psych: negative  Skin/Integumentary: As per HPI. Endocrine: negative  ENT: negative    Objective:     Visit Vitals    BP (!) 147/100    Pulse 92    Ht 5' 10\" (1.778 m)    Wt 172 lb (78 kg)    SpO2 98%    BMI 24.68 kg/m2     General:  alert, cooperative, no distress, appears stated age   Chest Wall: inspection normal - no chest wall deformities or tenderness, respiratory effort normal   Lung: clear to auscultation bilaterally   Heart:  normal rate, regular rhythm, normal S1, S2, no murmurs, rubs, clicks or gallops   Abdomen: soft, non-tender. Bowel sounds normal. No masses,  no organomegaly   Extremities: no cyanosis or edema Skin: Large erythematous plaque to left axilla with smaller plaque to right axilla. Bilateral shins with petechial rash and slight erythema. Neuro: alert, oriented, normal speech, no focal findings or movement disorder noted     EKG 3/14/18: Atrial fibrillation at rate 172    Assessment/Plan:         ICD-10-CM ICD-9-CM    1. Chronic atrial fibrillation (HCC)-CHADS-VASC score of 1 (+HTN) - Pt was switched from Atenolol and Amlodipine last week due to rash.   He has been monitoring BP/HR at home with mostly elevated readings, HR at home up to 172 on 3/12/18. Will have pt take extra dose of Cardizem 60 mg when he gets home from PCP's office, in addition to his regular nighttime dose. Prescribed 240 mg Cardizem CD to start tomorrow AM.  Will have pt follow-up next week for re-evaluation. Discussed care with Dr. Bernard Cranker, who will re-evaluate rash and if improved, will resume Coumadin. Will otherwise consider resuming anticoagulation on follow-up visit next week. Will obtain blood work at [facility] office as well. I48.2 427.31    2. Essential hypertension, malignant- Cardizem 60 mg BID switched to Cardizem  mg daily as noted above I10 401.0    3. Nontoxic multinodular goiter- per Dr. Bernard Cranker. E04.2 241.1    4.  Elevated cholesterol-- continue Atorvastatin E78.00 272.0

## 2018-03-14 NOTE — PROGRESS NOTES
Chelo Polk is a 61 y.o.  male and presents with    Chief Complaint   Patient presents with    Rash    Irregular Heart Beat    Thyroid Problem       Pt was in 1 wk ago with purpuric rash on axilla and legs. I stopped all meds  Started cardiazem at that time  Seen by cardiology today  They increased the dose of cardiazem to 240/d        Subjective:    Cardiovascular Review:  The patient has Atrial Fibrillation. Diet and Lifestyle: not attempting to follow a low fat, low cholesterol diet, not attempting to follow a low sodium diet  Home BP Monitoring: is not measured at home. Pertinent ROS: taking medications as instructed, no medication side effects noted, no TIA's, no chest pain on exertion, no dyspnea on exertion, no swelling of ankles. Additional Concerns:          Patient Active Problem List    Diagnosis Date Noted    Nontoxic multinodular goiter 02/07/2018    Atrial fibrillation ([facility] 11/28/2017    Elevated cholesterol 04/12/2016    Essential hypertension, malignant 11/13/2014    Arthritis 11/13/2014     Current Outpatient Prescriptions   Medication Sig Dispense Refill    dilTIAZem CD (CARDIZEM CD) 240 mg ER capsule Take 1 Cap by mouth daily. 30 Cap 6    magnesium oxide 200 mg magnesium tab Take 200 mg by mouth daily. 100 Tab 12    vitamin k 100 mcg tablet Take 1 Tab by mouth daily. 100 Tab 12    multivit-min-FA-lycopen-lutein (CENTRUM SILVER MEN) 300-600-300 mcg tab Take 1 Tab by mouth daily.  100 Caplet 12     No Known Allergies  Past Medical History:   Diagnosis Date    Arthritis 11/13/2014    Atrial fibrillation ([facility] [address] 11/28/2017    Elevated cholesterol 4/12/2016    Essential hypertension, malignant 11/13/2014    Hearing loss 11/13/2014    Nontoxic multinodular goiter 2/7/2018     Past Surgical History:   Procedure Laterality Date    HX HERNIA REPAIR       Family History   Problem Relation Age of Onset    Diabetes Father      Social History   Substance Use Topics    Smoking status: Former Smoker    Smokeless tobacco: Never Used    Alcohol use 12.5 oz/week     25 Shots of liquor per week       ROS       All other systems reviewed and are negative. Objective:  Vitals:    03/14/18 1416   BP: (!) 159/103   Pulse: (!) 109   Resp: 16   Temp: 98.2 °F (36.8 °C)   TempSrc: Oral   SpO2: 97%   Weight: 171 lb (77.6 kg)   Height: 5' 10\" (1.778 m)   PainSc:   0 - No pain                 alert, well appearing, and in no distress and oriented to person, place, and time  Chest - clear to auscultation, no wheezes, rales or rhonchi, symmetric air entry  Heart - irregularly irregular rhythm with rate 100  Skin with initial clearing of purpura -- certainly not worse      LABS     TESTS      Assessment/Plan:    Purpuric rash suspect vasculitis seconary to meds -- will leave off for now., check labs f/u   1 wk  Atrial fib on cardiazem 240/d needs anticoag asap when rash clears      Lab review: labs are reviewed, up to date and normal    Diagnoses and all orders for this visit:    1. Atrial fibrillation, unspecified type ([address]  -     LIPID PANEL; Future  -     CBC WITH AUTOMATED DIFF; Future  -     METABOLIC PANEL, COMPREHENSIVE; Future  -     URINALYSIS W/ RFLX MICROSCOPIC; Future  -     TSH 3RD GENERATION; Future  -     SED RATE (ESR); Future  -     MAGNESIUM; Future  -     GGT; Future    2. Nontoxic multinodular goiter  -     LIPID PANEL; Future  -     CBC WITH AUTOMATED DIFF; Future  -     METABOLIC PANEL, COMPREHENSIVE; Future  -     URINALYSIS W/ RFLX MICROSCOPIC; Future  -     TSH 3RD GENERATION; Future  -     SED RATE (ESR); Future  -     MAGNESIUM; Future  -     GGT; Future    3. Elevated cholesterol  -     LIPID PANEL; Future  -     CBC WITH AUTOMATED DIFF; Future  -     METABOLIC PANEL, COMPREHENSIVE; Future  -     URINALYSIS W/ RFLX MICROSCOPIC; Future  -     TSH 3RD GENERATION; Future  -     SED RATE (ESR); Future  -     MAGNESIUM; Future  -     GGT; Future    4. Essential hypertension, malignant  -     LIPID PANEL; Future  -     CBC WITH AUTOMATED DIFF; Future  -     METABOLIC PANEL, COMPREHENSIVE; Future  -     URINALYSIS W/ RFLX MICROSCOPIC; Future  -     TSH 3RD GENERATION; Future  -     SED RATE (ESR); Future  -     MAGNESIUM; Future  -     GGT; Future    5. Purpura ([address]  -     LIPID PANEL; Future  -     CBC WITH AUTOMATED DIFF; Future  -     METABOLIC PANEL, COMPREHENSIVE; Future  -     URINALYSIS W/ RFLX MICROSCOPIC; Future  -     TSH 3RD GENERATION; Future  -     SED RATE (ESR); Future  -     MAGNESIUM; Future  -     GGT; Future          I have discussed the diagnosis with the patient and the intended plan as seen in the above orders. The patient has received an after-visit summary and questions were answered concerning future plans. I have discussed medication side effects and warnings with the patient as well. I have reviewed the plan of care with the patient, accepted their input and they are in agreement with the treatment goals.      Follow-up Disposition: Not on File

## 2018-03-14 NOTE — MR AVS SNAPSHOT
[place] [id number] [facility] [address] [address] [phone] Patient: Ok Chinchilla. MRN: [id number] Visit Information Date & Time Provider Department Dept. Phone Encounter #  
 3/14/2018  2:00 PM Christiano Curtis, [address] [place] [address] [phone] [id number] Follow-up Instructions Return in about 1 week (around 3/21/2018) for EOV. Follow-up and Disposition History Your Appointments 3/20/2018  2:30 PM  
Follow Up with Ranae Landau, PA-C Cardio Specialist at [address]) Appt Note: BP and EKG since increasing Diltiazem [address] [place] [address]  
  
   
 [address] 4/16/2018  9:30 AM  
Follow Up with Poli Esquivel MD  
Cardio Specialist at [address]) Appt Note: 6 weeks [address] [place]  
  
   
 [address] Upcoming Health Maintenance Date Due DTaP/Tdap/Td series (2 - Td) 12/4/2024 COLONOSCOPY 5/23/2026 Allergies as of 3/14/2018  Review Complete On: 3/14/2018 By: Jai Thayer RN No Known Allergies Current Immunizations  Reviewed on 11/13/2014 Name Date Influenza Vaccine 12/4/2014, 11/13/2011 Influenza Vaccine (Quad) PF 12/5/2017  2:34 PM, 3/15/2016 Pneumococcal Vaccine (Unspecified Type) 11/13/2011 Td 11/13/2001 Tdap 12/4/2014 Zoster Vaccine, Live 3/15/2016 Not reviewed this visit You Were Diagnosed With   
  
 Codes Comments Atrial fibrillation, unspecified type ([address]    -  Primary ICD-10-CM: I48.91 
ICD-9-CM: 427.31 Nontoxic multinodular goiter     ICD-10-CM: E04.2 ICD-9-CM: 241.1 Elevated cholesterol     ICD-10-CM: E78.00 ICD-9-CM: 272.0 Essential hypertension, malignant     ICD-10-CM: I10 
ICD-9-CM: 401.0 Purp[place] ([address]     ICD-10-CM: Y01.1 ICD-9-CM: 287.2 Vitals BP Pulse Temp Resp Height(growth percentile) Weight(growth percentile) (!) 159/103 (BP 1 Location: Left arm, BP Patient Position: Sitting) (!) 109 98.2 °F (36.8 °C) (Oral) 16 5' 10\" (1.778 m) 171 lb (77.6 kg) SpO2 BMI Smoking Status 97% 24.54 kg/m2 Former Smoker BMI and BSA Data Body Mass Index Body Surface Area 24.54 kg/m 2 1.96 m 2 Preferred Pharmacy Pharmacy Name Phone [address], [address] [phone] Your Updated Medication List  
  
   
This list is accurate as of 3/14/18  2:30 PM.  Always use your most recent med list.  
  
  
  
  
 dilTIAZem  mg ER capsule Commonly known as:  CARDIZEM CD Take 1 Cap by mouth daily. magnesium oxide 200 mg magnesium Tab Take 200 mg by mouth daily. multivit-min-FA-lycopen-lutein 300-600-300 mcg Tab Commonly known as:  CENTRUM SILVER MEN Take 1 Tab by mouth daily. vitamin k 100 mcg tablet Take 1 Tab by mouth daily. Follow-up Instructions Return in about 1 week (around 3/21/2018) for EOV. To-Do List   
 03/14/2018 Lab:  CBC WITH AUTOMATED DIFF   
  
 03/14/2018 Lab:  GGT   
  
 03/14/2018 Lab:  LIPID PANEL   
  
 03/14/2018 Lab:  MAGNESIUM   
  
 03/14/2018 Lab:  METABOLIC PANEL, COMPREHENSIVE   
  
 03/14/2018 Lab:  SED RATE (ESR)   
  
 03/14/2018 Lab:  TSH 3RD GENERATION   
  
 03/14/2018 Lab:  URINALYSIS W/ RFLX MICROSCOPIC Introducing [facility]! Dear Rob Callejas: 
Thank you for requesting a Zuuj[employer] account. Our records indicate that you already have an active Zuujit account. You can access your account anytime at [email or web address] Did you know that you can access your hospital and ER discharge instructions at any time in Genomed? You can also review all of your test results from your hospital stay or ER visit. Additional Information If you have questions, please visit the Frequently Asked Questions section of the Gen[employer] website at [email or web address] Remember, Gen[employer] is NOT to be used for urgent needs. For medical emergencies, dial 911. Now available from your iPhone and Android! Please provide this summary of care documentation to your next provider. Your primary care clinician is listed as [id number] [facility]. If you have any questions after today's visit, please call [phone].

## 2018-03-15 LAB
A-G RATIO,AGRAT: 2.7 RATIO (ref 1.1–2.6)
ABSOLUTE LYMPHOCYTE COUNT, 10803: 2.2 K/UL (ref 1–4.8)
ALBUMIN SERPL-MCNC: 5.3 G/DL (ref 3.5–5)
ALP SERPL-CCNC: 114 U/L (ref 40–125)
ALT SERPL-CCNC: 41 U/L (ref 5–40)
ANION GAP SERPL CALC-SCNC: 21 MMOL/L
AST SERPL W P-5'-P-CCNC: 37 U/L (ref 10–37)
BASOPHILS # BLD: 0.1 K/UL (ref 0–0.2)
BASOPHILS NFR BLD: 1 % (ref 0–2)
BILIRUB SERPL-MCNC: 0.7 MG/DL (ref 0.2–1.2)
BUN SERPL-MCNC: 11 MG/DL (ref 6–22)
CALCIUM SERPL-MCNC: 9.7 MG/DL (ref 8.4–10.4)
CHLORIDE SERPL-SCNC: 96 MMOL/L (ref 98–110)
CHOLEST SERPL-MCNC: 234 MG/DL (ref 110–200)
CO2 SERPL-SCNC: 23 MMOL/L (ref 20–32)
CREAT SERPL-MCNC: 0.9 MG/DL (ref 0.8–1.6)
EOSINOPHIL # BLD: 0.1 K/UL (ref 0–0.5)
EOSINOPHIL NFR BLD: 1 % (ref 0–6)
ERYTHROCYTE [DISTWIDTH] IN BLOOD BY AUTOMATED COUNT: 13.5 % (ref 10–15.5)
GFRAA, 66117: >60
GFRNA, 66118: >60
GGT SERPL-CCNC: 51 U/L (ref 5–60)
GLOBULIN,GLOB: 2 G/DL (ref 2–4)
GLUCOSE SERPL-MCNC: 91 MG/DL (ref 70–99)
GRANULOCYTES,GRANS: 63 % (ref 40–75)
HCT VFR BLD AUTO: 48.3 % (ref 39.3–51.6)
HDLC SERPL-MCNC: 3.5 MG/DL (ref 0–5)
HDLC SERPL-MCNC: 66 MG/DL (ref 40–59)
HGB BLD-MCNC: 16.7 G/DL (ref 13.1–17.2)
LDLC SERPL CALC-MCNC: 137 MG/DL (ref 50–99)
LYMPHOCYTES, LYMLT: 25 % (ref 20–45)
MAGNESIUM SERPL-MCNC: 2 MG/DL (ref 1.6–2.5)
MCH RBC QN AUTO: 32 PG (ref 26–34)
MCHC RBC AUTO-ENTMCNC: 35 G/DL (ref 31–36)
MCV RBC AUTO: 93 FL (ref 80–95)
MONOCYTES # BLD: 0.9 K/UL (ref 0.1–1)
MONOCYTES NFR BLD: 11 % (ref 3–12)
NEUTROPHILS # BLD AUTO: 5.6 K/UL (ref 1.8–7.7)
PLATELET # BLD AUTO: 252 K/UL (ref 140–440)
PMV BLD AUTO: 11.3 FL (ref 9–13)
POTASSIUM SERPL-SCNC: 4.1 MMOL/L (ref 3.5–5.5)
PROT SERPL-MCNC: 7.3 G/DL (ref 6.2–8.1)
RBC # BLD AUTO: 5.18 M/UL (ref 3.8–5.8)
SED RATE (ESR): 12 MM/HR (ref 0–20)
SODIUM SERPL-SCNC: 140 MMOL/L (ref 133–145)
TRIGL SERPL-MCNC: 154 MG/DL (ref 40–149)
TSH SERPL DL<=0.005 MIU/L-ACNC: 0.61 MCU/ML (ref 0.27–4.2)
VLDLC SERPL CALC-MCNC: 31 MG/DL (ref 8–30)
WBC # BLD AUTO: 8.9 K/UL (ref 4–11)

## 2018-03-20 ENCOUNTER — OFFICE VISIT (OUTPATIENT)
Dept: CARDIOLOGY CLINIC | Age: 63
End: 2018-03-20

## 2018-03-20 ENCOUNTER — OFFICE VISIT (OUTPATIENT)
Dept: FAMILY MEDICINE CLINIC | Age: 63
End: 2018-03-20

## 2018-03-20 VITALS
HEART RATE: 106 BPM | BODY MASS INDEX: 24.37 KG/M2 | TEMPERATURE: 97.2 F | HEIGHT: 70 IN | WEIGHT: 170.2 LBS | DIASTOLIC BLOOD PRESSURE: 90 MMHG | SYSTOLIC BLOOD PRESSURE: 142 MMHG | RESPIRATION RATE: 18 BRPM | OXYGEN SATURATION: 97 %

## 2018-03-20 VITALS
DIASTOLIC BLOOD PRESSURE: 100 MMHG | WEIGHT: 173 LBS | BODY MASS INDEX: 24.77 KG/M2 | OXYGEN SATURATION: 98 % | HEIGHT: 70 IN | HEART RATE: 111 BPM | SYSTOLIC BLOOD PRESSURE: 149 MMHG

## 2018-03-20 DIAGNOSIS — I10 HYPERTENSION, UNSPECIFIED TYPE: ICD-10-CM

## 2018-03-20 DIAGNOSIS — R21 RASH: ICD-10-CM

## 2018-03-20 DIAGNOSIS — I48.91 ATRIAL FIBRILLATION, UNSPECIFIED TYPE (HCC): Primary | ICD-10-CM

## 2018-03-20 RX ORDER — DILTIAZEM HYDROCHLORIDE 120 MG/1
CAPSULE, COATED, EXTENDED RELEASE ORAL DAILY
COMMUNITY
End: 2018-03-20 | Stop reason: SDUPTHER

## 2018-03-20 RX ORDER — DILTIAZEM HYDROCHLORIDE 120 MG/1
120 CAPSULE, COATED, EXTENDED RELEASE ORAL DAILY
Qty: 30 CAP | Refills: 6 | Status: SHIPPED | OUTPATIENT
Start: 2018-03-20 | End: 2018-04-19

## 2018-03-20 RX ORDER — WARFARIN 4 MG/1
TABLET ORAL
Qty: 30 TAB | Refills: 1 | Status: SHIPPED | OUTPATIENT
Start: 2018-03-20 | End: 2018-03-28

## 2018-03-20 NOTE — MR AVS SNAPSHOT
[facility] 
 
 
 [address] [facility] [address]  [place] [address] [address] 
[phone] Patient: Cedrick Kelley MRN: [id number] Visit Information Date & Time Provider Department Dept. Phone Encounter #  
 3/20/2018  8:30 AM Paty Perez., [facility] 6 [phone] [id number] Follow-up Instructions Return in about 1 week (around 3/27/2018) for EOV, inr next visit. Your Appointments 3/20/2018  2:30 PM  
Follow Up with Saima Gallegos PA-C Cardio Specialist at [facility]) Appt Note: BP and EKG since increasing Diltiazem [place] [address] [facility] [address] 4/16/2018  9:30 AM  
Follow Up with Gerald Palafox MD  
Cardio Specialist at [facility]) Appt Note: 6 weeks [place] [address] [facility] [address] Upcoming Health Maintenance Date Due DTaP/Tdap/Td series (2 - Td) 12/4/2024 COLONOSCOPY 5/23/2026 Allergies as of 3/20/2018  Review Complete On: 3/20/2018 By: Paty Perez., DO No Known Allergies Current Immunizations  Reviewed on 11/13/2014 Name Date Influenza Vaccine 12/4/2014, 11/13/2011 Influenza Vaccine (Quad) PF 12/5/2017  2:34 PM, 3/15/2016 Pneumococcal Vaccine (Unspecified Type) 11/13/2011 Td 11/13/2001 Tdap 12/4/2014 Zoster Vaccine, Live 3/15/2016 Not reviewed this visit You Were Diagnosed With   
  
 Codes Comments Atrial fibrillation, unspecified type ([facility]    -  Primary ICD-10-CM: I48.91 
ICD-9-CM: 427.31 Rash     ICD-10-CM: R21 
ICD-9-CM: 782.1 Vitals BP Pulse Temp Resp Height(growth percentile) Weight(growth percentile)  142/90 (BP 1 Location: Right arm, BP Patient Position: Sitting) (!) 106 97.2 °F (36.2 °C) (Oral) 18 5' 10\" (1.778 m) 170 lb 3.2 oz (77.2 kg) SpO2 BMI Smoking Status 97% 24.42 kg/m2 Former Smoker BMI and BSA Data Body Mass Index Body Surface Area  
 24.42 kg/m 2 1.95 m 2 Preferred Pharmacy Pharmacy Name Phone [address], [address] [phone] Your Updated Medication List  
  
   
This list is accurate as of 3/20/18  9:01 AM.  Always use your most recent med list.  
  
  
  
  
 dilTIAZem  mg ER capsule Commonly known as:  CARDIZEM CD Take 1 Cap by mouth daily. magnesium oxide 200 mg magnesium Tab Take 200 mg by mouth daily. multivit-min-FA-lycopen-lutein 300-600-300 mcg Tab Commonly known as:  CENTRUM SILVER MEN Take 1 Tab by mouth daily. vitamin k 100 mcg tablet Take 1 Tab by mouth daily. warfarin 4 mg tablet Commonly known as:  COUMADIN One daily Prescriptions Sent to Pharmacy Refills  
 warfarin (COUMADIN) 4 mg tablet 1 Sig: One daily Class: Normal  
 Pharmacy: [address], [address] Ph #: [phone] Follow-up Instructions Return in about 1 week (around 3/27/2018) for EOV, inr next visit. Introducing [facility] & [facility] SERVICES! Dear James Factor: 
Thank you for requesting a [employer] account. Our records indicate that you already have an active [employer] account. You can access your account anytime at [email or web address] Did you know that you can access your hospital and ER discharge instructions at any time in Algebraix Data? You can also review all of your test results from your hospital stay or ER visit. Additional Information If you have questions, please visit the Frequently Asked Questions section of the [employer] website at [email or web address] Remember, [employer] is NOT to be used for urgent needs. For medical emergencies, dial 911. Now available from your iPhone and Android! Please provide this summary of care documentation to your next provider. Your primary care clinician is listed as [phone] [facility]. If you have any questions after today's visit, please call [phone].

## 2018-03-20 NOTE — PROGRESS NOTES
Delma Harris. is a 61 y.o.  male and presents with    Chief Complaint   Patient presents with    Rash    Irregular Heart Beat           Subjective:  Rash is resolving   Cardiovascular Review:  The patient has Atrial Fibrillation. Diet and Lifestyle: not attempting to follow a low fat, low cholesterol diet  Home BP Monitoring: is not measured at home. Pertinent ROS: taking medications as instructed, no medication side effects noted, no TIA's, no chest pain on exertion, no dyspnea on exertion, no swelling of ankles. Additional Concerns:          Patient Active Problem List    Diagnosis Date Noted    Nontoxic multinodular goiter 02/07/2018    Atrial fibrillation ([facility] [address] 11/28/2017    Elevated cholesterol 04/12/2016    Essential hypertension, malignant 11/13/2014    Arthritis 11/13/2014     Current Outpatient Prescriptions   Medication Sig Dispense Refill    warfarin (COUMADIN) 4 mg tablet One daily 30 Tab 1    dilTIAZem CD (CARDIZEM CD) 240 mg ER capsule Take 1 Cap by mouth daily. 30 Cap 6    magnesium oxide 200 mg magnesium tab Take 200 mg by mouth daily. 100 Tab 12    vitamin k 100 mcg tablet Take 1 Tab by mouth daily. 100 Tab 12    multivit-min-FA-lycopen-lutein (CENTRUM SILVER MEN) 300-600-300 mcg tab Take 1 Tab by mouth daily.  100 Cap[address] 12     No Known Allergies  Past Medical History:   Diagnosis Date    Arthritis 11/13/2014    [facility]    Elevated cholesterol 4/12/2016    Essential hypertension, malignant 11/13/2014    Hearing loss 11/13/2014    Nontoxic multinodular goiter 2/7/2018     Past Surgical History:   Procedure Laterality Date    HX HERNIA REPAIR       Family History   Problem Relation Age of Onset    Diabetes Father      Social History   Substance Use Topics    Smoking status: Former Smoker    Smokeless tobacco: Never Used    Alcohol use 12.5 oz/week     25 Shots of liquor per week       ROS       All other systems reviewed and are negative. Objective:  Vitals:    03/20/18 0841   BP: 142/90   Pulse: (!) 106   Resp: 18   Temp: 97.2 °F (36.2 °C)   TempSrc: Oral   SpO2: 97%   Weight: 170 lb 3.2 oz (77.2 kg)   Height: 5' 10\" (1.778 m)   PainSc:   0 - No pain                 alert, well appearing, and in no distress and oriented to person, place, and time  Chest - clear to auscultation, no wheezes, rales or rhonchi, symmetric air entry  Heart - normal rate, regular rhythm, normal S1, S2, no murmurs, rubs, clicks or gallops, irregularly irregular rhythm with rate 94        LABS   Component      Latest Ref Rng & Units 3/14/2018 3/14/2018 3/14/2018 3/14/2018           2:32 PM  2:32 PM  2:32 PM  2:32 PM   WBC      4.0 - 11.0 K/uL       RBC      3.80 - 5.80 M/uL       HGB      13.1 - 17.2 g/dL       HCT      39.3 - 51.6 %       MCV      80 - 95 fL       MCH      26 - 34 pg       MCHC      31 - 36 g/dL       RDW      10.0 - 15.5 %       PLATELET      542 - 087 K/uL       MPV      9.0 - 13.0 fL       NEUTROPHILS      40 - 75 %       Lymphocytes      20 - 45 %       MONOCYTES      3 - 12 %       EOSINOPHILS      0 - 6 %       BASOPHILS      0 - 2 %       ABS. NEUTROPHILS      1.8 - 7.7 K/uL       ABSOLUTE LYMPHOCYTE COUNT      1.0 - 4.8 K/uL       ABS. MONOCYTES      0.1 - 1.0 K/uL       ABS. EOSINOPHILS      0.0 - 0.5 K/uL       ABS. BASOPHILS      0.0 - 0.2 K/uL       Glucose      70 - 99 mg/dL       BUN      6 - 22 mg/dL       Creatinine      0.8 - 1.6 mg/dL       Sodium      133 - 145 mmol/L       Potassium      3.5 - 5.5 mmol/L       Chloride      98 - 110 mmol/L       CO2      20 - 32 mmol/L       AST      10 - 37 U/L       ALT (SGPT)      5 - 40 U/L       Alk.  phosphatase      40 - 125 U/L       Bilirubin, total      0.2 - 1.2 mg/dL       Calcium      8.4 - 10.4 mg/dL       Protein, total      6.2 - 8.1 g/dL       Albumin      3.5 - 5.0 g/dL       A-G Ratio      1.1 - 2.6 ratio       Globulin      2.0 - 4.0 g/dL       Anion gap      mmol/L GFRAA      >60.0       GFRNA      >60.0       Specific Gravity      1.005 - 1.03       pH (UA)      5.0 - 8.0 pH       Protein      Negative, mg/dL       Glucose      Negative mg/dL       Ketone      Negative mg/dL       Bilirubin      Negative       Blood      Negative       Nitrites      Negative       Leukocyte Esterase      Negative       Urobilinogen      <2.0 mg/dL       Triglyceride      40 - 149 mg/dL       HDL Cholesterol      40 - 59 mg/dL       Cholesterol, total      110 - 200 mg/dL       CHOLESTEROL/HDL      0.0 - 5.0       LDL, calculated      50 - 99 mg/dL       VLDL, calculated      8 - 30 mg/dL       VALID INTERNAL CONTROL POC             Prothrombin time (POC)      seconds       INR             TSH      0.27 - 4.20 mcU/mL    0.61   Sed rate (ESR)      0 - 20 mm/Hr   12    Magnesium      1.6 - 2.5 mg/dL  2.0     GGT      5 - 60 U/L 51        Component      Latest Ref Rng & Units 3/14/2018 3/14/2018 3/14/2018 3/13/2018           2:32 PM  2:32 PM  2:32 PM  2:33 PM   WBC      4.0 - 11.0 K/uL  8.9     RBC      3.80 - 5.80 M/uL  5.18     HGB      13.1 - 17.2 g/dL  16.7     HCT      39.3 - 51.6 %  48.3     MCV      80 - 95 fL  93     MCH      26 - 34 pg  32     MCHC      31 - 36 g/dL  35     RDW      10.0 - 15.5 %  13.5     PLATELET      216 - 343 K/uL  252     MPV      9.0 - 13.0 fL  11.3     NEUTROPHILS      40 - 75 %  63     Lymphocytes      20 - 45 %  25     MONOCYTES      3 - 12 %  11     EOSINOPHILS      0 - 6 %  1     BASOPHILS      0 - 2 %  1     ABS. NEUTROPHILS      1.8 - 7.7 K/uL  5.6     ABSOLUTE LYMPHOCYTE COUNT      1.0 - 4.8 K/uL  2.2     ABS. MONOCYTES      0.1 - 1.0 K/uL  0.9     ABS. EOSINOPHILS      0.0 - 0.5 K/uL  0.1     ABS.  BASOPHILS      0.0 - 0.2 K/uL  0.1     Glucose      70 - 99 mg/dL 91      BUN      6 - 22 mg/dL 11      Creatinine      0.8 - 1.6 mg/dL 0.9      Sodium      133 - 145 mmol/L 140      Potassium      3.5 - 5.5 mmol/L 4.1      Chloride      98 - 110 mmol/L 96 (L)      CO2      20 - 32 mmol/L 23      AST      10 - 37 U/L 37      ALT (SGPT)      5 - 40 U/L 41 (H)      Alk. phosphatase      40 - 125 U/L 114      Bilirubin, total      0.2 - 1.2 mg/dL 0.7      Calcium      8.4 - 10.4 mg/dL 9.7      Protein, total      6.2 - 8.1 g/dL 7.3      Albumin      3.5 - 5.0 g/dL 5.3 (H)      A-G Ratio      1.1 - 2.6 ratio 2.7 (H)      Globulin      2.0 - 4.0 g/dL 2.0      Anion gap      mmol/L 21.0      GFRAA      >60.0 >60.0      GFRNA      >60.0 >60.0      Specific Gravity      1.005 - 1.03    1.015   pH (UA)      5.0 - 8.0 pH    7.0   Protein      Negative, mg/dL    Negative   Glucose      Negative mg/dL    Negative   Ketone      Negative mg/dL    Negative   Bilirubin      Negative    Negative   Blood      Negative    Negative   Nitrites      Negative    Negative   Leukocyte Esterase      Negative    Negative   Urobilinogen      <2.0 mg/dL    <2.0   Triglyceride      40 - 149 mg/dL   154 (H)    HDL Cholesterol      40 - 59 mg/dL   66 (H)    Cholesterol, total      110 - 200 mg/dL   234 (H)    CHOLESTEROL/HDL      0.0 - 5.0   3.5    LDL, calculated      50 - 99 mg/dL   137 (H)    VLDL, calculated      8 - 30 mg/dL   31 (H)    VALID INTERNAL CONTROL POC             Prothrombin time (POC)      seconds       INR             TSH      0.27 - 4.20 mcU/mL       Sed rate (ESR)      0 - 20 mm/Hr       Magnesium      1.6 - 2.5 mg/dL       GGT      5 - 60 U/L         Component      Latest Ref Rng & Units 3/7/2018           2:43 PM   WBC      4.0 - 11.0 K/uL    RBC      3.80 - 5.80 M/uL    HGB      13.1 - 17.2 g/dL    HCT      39.3 - 51.6 %    MCV      80 - 95 fL    MCH      26 - 34 pg    MCHC      31 - 36 g/dL    RDW      10.0 - 15.5 %    PLATELET      085 - 387 K/uL    MPV      9.0 - 13.0 fL    NEUTROPHILS      40 - 75 %    Lymphocytes      20 - 45 %    MONOCYTES      3 - 12 %    EOSINOPHILS      0 - 6 %    BASOPHILS      0 - 2 %    ABS.  NEUTROPHILS      1.8 - 7.7 K/uL    ABSOLUTE LYMPHOCYTE COUNT      1.0 - 4.8 K/uL    ABS. MONOCYTES      0.1 - 1.0 K/uL    ABS. EOSINOPHILS      0.0 - 0.5 K/uL    ABS. BASOPHILS      0.0 - 0.2 K/uL    Glucose      70 - 99 mg/dL    BUN      6 - 22 mg/dL    Creatinine      0.8 - 1.6 mg/dL    Sodium      133 - 145 mmol/L    Potassium      3.5 - 5.5 mmol/L    Chloride      98 - 110 mmol/L    CO2      20 - 32 mmol/L    AST      10 - 37 U/L    ALT (SGPT)      5 - 40 U/L    Alk. phosphatase      40 - 125 U/L    Bilirubin, total      0.2 - 1.2 mg/dL    Calcium      8.4 - 10.4 mg/dL    Protein, total      6.2 - 8.1 g/dL    Albumin      3.5 - 5.0 g/dL    A-G Ratio      1.1 - 2.6 ratio    Globulin      2.0 - 4.0 g/dL    Anion gap      mmol/L    GFRAA      >60.0    GFRNA      >60.0    Specific Gravity      1.005 - 1.03    pH (UA)      5.0 - 8.0 pH    Protein      Negative, mg/dL    Glucose      Negative mg/dL    Ketone      Negative mg/dL    Bilirubin      Negative    Blood      Negative    Nitrites      Negative    Leukocyte Esterase      Negative    Urobilinogen      <2.0 mg/dL    Triglyceride      40 - 149 mg/dL    HDL Cholesterol      40 - 59 mg/dL    Cholesterol, total      110 - 200 mg/dL    CHOLESTEROL/HDL      0.0 - 5.0    LDL, calculated      50 - 99 mg/dL    VLDL, calculated      8 - 30 mg/dL    VALID INTERNAL CONTROL POC       Yes   Prothrombin time (POC)      seconds 17.5   INR       1.5   TSH      0.27 - 4.20 mcU/mL    Sed rate (ESR)      0 - 20 mm/Hr    Magnesium      1.6 - 2.5 mg/dL    GGT      5 - 60 U/L      TESTS      Assessment/Plan:    Rash 90% better suspect drug induced ? ? Atrial fib rate somewhat controlled with diltiazem  Needs anticoag -- will try restart coumadin for now. Recheck INR in 1 wk. If rash doesn't recurr then consider other meds      Lab review: labs are reviewed, up to date and normal    Diagnoses and all orders for this visit:    1. Atrial fibrillation, unspecified type (HCC)  -     warfarin (COUMADIN) 4 mg tablet; One daily    2. Rash          I have discussed the diagnosis with the patient and the intended plan as seen in the above orders. The patient has received an after-visit summary and questions were answered concerning future plans. I have discussed medication side effects and warnings with the patient as well. I have reviewed the plan of care with the patient, accepted their input and they are in agreement with the treatment goals. Follow-up Disposition:  Return in about 1 week (around 3/27/2018) for EOV, inr next visit.

## 2018-03-20 NOTE — PROGRESS NOTES
Kameron Alas. is a 61 y.o. male presented to clinic for a 1 week f/u for a rash on leg and under arm area. Pt denies any pain or concerns at this time. 1. Have you been to the ER, urgent care clinic since your last visit? Hospitalized since your last visit? No    2. Have you seen or consulted any other health care providers outside of the [address] [place] [address] since your last visit? Include any pap smears or colon screening. No     Learning Assessment 1/6/2015   PRIMARY LEARNER Patient   HIGHEST LEVEL OF EDUCATION - PRIMARY LEARNER  2 YEARS OF COLLEGE   BARRIERS PRIMARY LEARNER NONE   CO-LEARNER CAREGIVER No   PRIMARY LANGUAGE ENGLISH   LEARNER PREFERENCE PRIMARY READING     DEMONSTRATION   ANSWERED BY patient   RELATIONSHIP SELF      Patient verbally agrees to permit the Students working in [address] [place] [address] office to observe and participate in medical care during the appointment today, including, where appropriate, providing direct medical care to patient under the physicians direct supervision. Patient agrees that he/she have been given the opportunity to refuse to give such consent and may withdraw consent at any time during appointment. There are no preventive care reminders to display for this patient.

## 2018-03-20 NOTE — PROGRESS NOTES
1. Have you been to the ER, urgent care clinic since your last visit? Hospitalized since your last visit? No    2. Have you seen or consulted any other health care providers outside of the [address] [place] [address] since your last visit? Include any pap smears or colon screening.  No

## 2018-03-20 NOTE — PROGRESS NOTES
Subjective:   Mr. Sarah Leyva is here today for cardiac re-evaluation. He has a history of afib diagnosed in November 2017, hypertension, dyslipidemia and toxic multinodular goiter. He is a former smoker and quit 4 years ago. Pt was seen by his PCP, Dr. Aysha Harper, on 3/7/18, at which time he was found to have a rash on his legs and axillae. There was concern for possible medication reaction, so his Atenolol and Amlodipine were discontinued and he was switched to Cardizem 60 mg BID. He was taken off Coumadin as well, and his last INR last week prior to discontinuation was 1.5. He reports that since his visit, his rash has improved, and he denies associated pain or itching. At the time of his last visit, he noted intermittent lightheadedness over the past week when bending over or \"if I do something too fast\" that became more frequent the day of his visit, when his HR on EKG was 172. His Diltiazem was switched to 240 mg daily, and he reports that since Saturday 3/17/18, his symptoms have resolved. He states he is not having any more lightheadedness, hot flashes, or shortness of breath with exertion or bending over. He does note some occasional palpitations. He keeps BP/HR log, and he has had some HR up to 110's but last reading of 's was on Friday 3/16/18. He denies any syncope, chest pain, or any other new complaints. Patient's cardiac risk factors are dyslipidemia, male gender, hypertension. Patient Active Problem List    Diagnosis Date Noted    Nontoxic multinodular goiter 02/07/2018    Atrial fibrillation ([facility] 11/28/2017    Elevated cholesterol 04/12/2016    Essential hypertension, malignant 11/13/2014    Arthritis 11/13/2014     Current Outpatient Prescriptions   Medication Sig Dispense Refill    warfarin (COUMADIN) 4 mg tablet One daily 30 Tab 1    dilTIAZem CD (CARDIZEM CD) 240 mg ER capsule Take 1 Cap by mouth daily.  30 Cap 6    magnesium oxide 200 mg magnesium tab Take 200 mg by mouth daily. 100 Tab 12    vitamin k 100 mcg tablet Take 1 Tab by mouth daily. 100 Tab 12    multivit-min-FA-lycopen-lutein (CENTRUM SILVER MEN) 300-600-300 mcg tab Take 1 Tab by mouth daily. 100 Caplet 12     No Known Allergies  Past Medical History:   Diagnosis Date    Arthritis 11/13/2014    Atrial fibrillation ([address] 11/28/2017    Elevated cholesterol 4/12/2016    Essential hypertension, malignant 11/13/2014    Hearing loss 11/13/2014    Nontoxic multinodular goiter 2/7/2018     Past Surgical History:   Procedure Laterality Date    HX HERNIA REPAIR       Family History   Problem Relation Age of Onset    Diabetes Father      History   Smoking Status    Former Smoker   Smokeless Tobacco    Never Used          Review of Systems, additional:  Constitutional: Negative  Eyes: negative  Respiratory: negative  Cardiovascular: per HPI  Gastrointestinal: negative for nausea and vomiting  Musculoskeletal:negative for myalgias  Neurological: negative  Behvioral/Psych: negative  Skin/Integumentary: As per HPI. Endocrine: negative  ENT: negative    Objective:     Visit Vitals    BP (!) 149/100    Pulse (!) 111    Ht 5' 10\" (1.778 m)    Wt 173 lb (78.5 kg)    SpO2 98%    BMI 24.82 kg/m2     General:  alert, cooperative, no distress, appears stated age   Chest Wall: inspection normal - no chest wall deformities or tenderness, respiratory effort normal   Lung: clear to auscultation bilaterally   Heart:  Irregularly irregular rhythm, normal S1, S2, no murmurs, rubs, clicks or gallops   Abdomen: soft, non-tender. Bowel sounds normal. No masses,  no organomegaly   Extremities: no cyanosis or edema Skin: Improving erythematous plaque to left axilla with smaller plaque to right axilla. Bilateral shins with petechial rash and slight erythema.     Neuro: alert, oriented, normal speech, no focal findings or movement disorder noted     EKG 3/14/18: Atrial fibrillation at rate 172    Assessment/Plan: ICD-10-CM ICD-9-CM    1. Chronic atrial fibrillation (HCC)-CHADS-VASC score of 1 (+HTN) - Pt was prescribed 240 mg Cardizem CD at visit last week, reports that his symptoms of lightheadedness and hot flashes have subsided as of 3/17. Reports his rash is improving. Has occasional palpitations. HR was 111 on VS.  Will increase Diltiazem CD to 240 mg QAM and 120 mg QPM.  Follow-up in 1 week. Discussed care with Dr. Amandeep Mckinnon, advised of medication change. I48.2 427.31    2. Essential hypertension, malignant- changed Cardizem CD dose as noted above I10 401.0    3. Nontoxic multinodular goiter- per Dr. Amandeep Mckinnon. E04.2 241.1    4.  Elevated cholesterol-- continue Atorvastatin E78.00 272.0

## 2018-03-20 NOTE — MR AVS SNAPSHOT
[facility] 
 
 
 [address] [address] 
[phone] Patient: Shea Lopez. MRN: [id number] UAO:4/80/2896 Visit Information Date & Time Provider Department Dept. Phone Encounter #  
 3/20/2018  2:30 PM Tennille Cisse PA-C Cardio Specialist at [facility] [phone] Your Appointments 3/28/2018  1:30 PM  
[place] [address] [address]) Appt Note: Return in about 1 week (around 3/27/2018) for EOV, inr next visit. [address] [facility]  
  
   
 [address] [facility] [address]  
  
    
 3/28/2018  2:30 PM  
Follow Up with Carine Nava PA-C Cardio Specialist at [facility]) Appt Note: after med change per Eve [address] [address]  
  
   
 [address] 4/16/2018  9:30 AM  
Follow Up with Carlos A Lynch MD  
Cardio Specialist at [facility]) Appt Note: 6 weeks [address]  
  
   
 [address] Upcoming Health Maintenance Date Due DTaP/Tdap/Td series (2 - Td) 12/4/2024 COLONOSCOPY 5/23/2026 Allergies as of 3/20/2018  Review Complete On: 3/20/2018 By: Perez Chan RN No Known Allergies Current Immunizations  Reviewed on 11/13/2014 Name Date Influenza Vaccine 12/4/2014, 11/13/2011 Influenza Vaccine (Quad) PF 12/5/2017  2:34 PM, 3/15/2016 Pneumococcal Vaccine (Unspecified Type) 11/13/2011 Td 11/13/2001 Tdap 12/4/2014 Zoster Vaccine, Live 3/15/2016 Not reviewed this visit Vitals BP Pulse Height(growth percentile) Weight(growth percentile) SpO2 BMI Jus Mcneal ) 149/100 (!) 111 5' 10\" (1.778 m) 173 lb (78.5 kg) 98% 24.82 kg/m2 Smoking Status Former Smoker Vitals History BMI and BSA Data Body Mass Index Body Surface Area  
 24.82 kg/m 2 1.97 m 2 Preferred Pharmacy Pharmacy Name Phone [address], [address] [phone] Your Updated Medication List  
  
   
This list is accurate as of 3/20/18  2:52 PM.  Always use your most recent med list.  
  
  
  
  
 * CARDIZEM  mg ER capsule Generic drug:  dilTIAZem CD Take  by mouth daily. * dilTIAZem  mg ER capsule Commonly known as:  CARDIZEM CD Take 1 Cap by mouth daily. magnesium oxide 200 mg magnesium Tab Take 200 mg by mouth daily. multivit-min-FA-lycopen-lutein 300-600-300 mcg Tab Commonly known as:  CENTRUM SILVER MEN Take 1 Tab by mouth daily. vitamin k 100 mcg tablet Take 1 Tab by mouth daily. warfarin 4 mg tablet Commonly known as:  COUMADIN One daily * Notice: This list has 2 medication(s) that are the same as other medications prescribed for you. Read the directions carefully, and ask your doctor or other care provider to review them with you. Introducing [facility] & HEALTH SERVICES! Dear Anjel Chilel: 
Thank you for requesting a NPT[employer] account. Our records indicate that you already have an active NPTV account. You can access your account anytime at [email or web address] Did you know that you can access your hospital and ER discharge instructions at any time in NPTV? You can also review all of your test results from your hospital stay or ER visit. Additional Information If you have questions, please visit the Frequently Asked Questions section of the NPTV website at [email or web address] Remember, NPTV is NOT to be used for urgent needs. For medical emergencies, dial 911. Now available from your iPhone and Android! Please provide this summary of care documentation to your next provider. Your primary care clinician is listed as [id number] [facility]. If you have any questions after today's visit, please call [phone].

## 2018-03-28 ENCOUNTER — OFFICE VISIT (OUTPATIENT)
Dept: FAMILY MEDICINE CLINIC | Age: 63
End: 2018-03-28

## 2018-03-28 ENCOUNTER — OFFICE VISIT (OUTPATIENT)
Dept: CARDIOLOGY CLINIC | Age: 63
End: 2018-03-28

## 2018-03-28 VITALS
WEIGHT: 170 LBS | TEMPERATURE: 97.9 F | DIASTOLIC BLOOD PRESSURE: 96 MMHG | BODY MASS INDEX: 24.34 KG/M2 | SYSTOLIC BLOOD PRESSURE: 150 MMHG | HEART RATE: 67 BPM | HEIGHT: 70 IN | OXYGEN SATURATION: 97 % | RESPIRATION RATE: 14 BRPM

## 2018-03-28 VITALS
BODY MASS INDEX: 24.2 KG/M2 | HEIGHT: 70 IN | SYSTOLIC BLOOD PRESSURE: 156 MMHG | WEIGHT: 169 LBS | DIASTOLIC BLOOD PRESSURE: 95 MMHG | HEART RATE: 93 BPM | OXYGEN SATURATION: 98 %

## 2018-03-28 DIAGNOSIS — I48.20 CHRONIC ATRIAL FIBRILLATION (HCC): Primary | ICD-10-CM

## 2018-03-28 DIAGNOSIS — I48.91 ATRIAL FIBRILLATION, UNSPECIFIED TYPE (HCC): Primary | ICD-10-CM

## 2018-03-28 LAB
INR BLD: 1.2
PT POC: 14.5 SECONDS
VALID INTERNAL CONTROL?: YES

## 2018-03-28 RX ORDER — WARFARIN SODIUM 5 MG/1
5 TABLET ORAL DAILY
Qty: 30 TAB | Refills: 1 | Status: SHIPPED | OUTPATIENT
Start: 2018-03-28 | End: 2018-04-19

## 2018-03-28 NOTE — PROGRESS NOTES
Nelson Vazquez. is a 61 y.o. male presents today for follow up on his atrial fibrillation and hypertension. Pt is in Room # 5      1. Have you been to the ER, urgent care clinic since your last visit? Hospitalized since your last visit? No    2. Have you seen or consulted any other health care providers outside of the [address] [place] [address] since your last visit? Include any pap smears or colon screening. No     Health Maintenance reviewed - current.

## 2018-03-28 NOTE — PROGRESS NOTES
Subjective:   Mr. Roz Gaytan is here today for cardiac re-evaluation. He has a history of afib diagnosed in November 2017, hypertension, dyslipidemia and toxic multinodular goiter. He is a former smoker and quit 4 years ago. Pt was seen by his PCP, Dr. Perri Goodman, on 3/7/18, at which time he was found to have a rash on his legs and axillae. There was concern for possible medication reaction, so his Atenolol and Amlodipine were discontinued and he was switched to Cardizem 60 mg BID. He was taken off Coumadin as well. At his last cardiology visit, his regimen was changed to Cardizem 240 mg in the qAM and Cardizem 120 mg qPM. He brings a log of BP and HR readings, and after about a day of taking this increased Cardizem dose, his BP and HR have shown improvement. SBP's for the most part were around 120-130, and most HR readings were <100. He also has restarted his Coumadin per Dr. Carlee Aj. His INR was checked today and it was 1.2. His axillary rash is improving, his ABD rash is resolved, he still has a rash on bilateral calves. Denies any symptoms of chest pain, orthopnea, PND, LE edema or palpitations. Patient's cardiac risk factors are dyslipidemia, male gender, hypertension. Patient Active Problem List    Diagnosis Date Noted    Nontoxic multinodular goiter 02/07/2018    Atrial fibrillation ([facility] [address] 11/28/2017    Elevated cholesterol 04/12/2016    Essential hypertension, malignant 11/13/2014    Arthritis 11/13/2014     Current Outpatient Prescriptions   Medication Sig Dispense Refill    warfarin (COUMADIN) 5 mg tablet Take 1 Tab by mouth daily. 30 Tab 1    dilTIAZem CD (CARDIZEM CD) 120 mg ER capsule Take 1 Cap by mouth daily. 30 Cap 6    dilTIAZem CD (CARDIZEM CD) 240 mg ER capsule Take 1 Cap by mouth daily. 30 Cap 6    magnesium oxide 200 mg magnesium tab Take 200 mg by mouth daily. 100 Tab 12    vitamin k 100 mcg tablet Take 1 Tab by mouth daily.  100 Tab 12    multivit-min-FA-lycopen-lutein (CENTRUM SILVER MEN) 300-600-300 mcg tab Take 1 Tab by mouth daily. 100 Caplet 12     No Known Allergies  Past Medical History:   Diagnosis Date    Arthritis 11/13/2014    Atrial fibrillation ([address] 11/28/2017    Elevated cholesterol 4/12/2016    Essential hypertension, malignant 11/13/2014    Hearing loss 11/13/2014    Nontoxic multinodular goiter 2/7/2018     Past Surgical History:   Procedure Laterality Date    HX HERNIA REPAIR       Family History   Problem Relation Age of Onset    Diabetes Father      History   Smoking Status    Former Smoker   Smokeless Tobacco    Never Used          Review of Systems, additional:  Constitutional: Negative  Eyes: negative  Respiratory: negative  Cardiovascular: per HPI  Gastrointestinal: negative for nausea and vomiting  Musculoskeletal:negative for myalgias  Neurological: negative  Behvioral/Psych: negative  Skin/Integumentary: As per HPI. Endocrine: negative  ENT: negative    Objective:     Visit Vitals    BP (!) 156/95    Pulse 93    Ht 5' 10\" (1.778 m)    Wt 169 lb (76.7 kg)    SpO2 98%    BMI 24.25 kg/m2     General:  alert, cooperative, no distress, appears stated age   Chest Wall: inspection normal - no chest wall deformities or tenderness, respiratory effort normal   Lung: clear to auscultation bilaterally   Heart:  Irregularly irregular rhythm, normal S1, S2, no murmurs, rubs, clicks or gallops   Abdomen: soft, non-tender. Bowel sounds normal. No masses,  no organomegaly   Extremities: no cyanosis or edema Skin: small erythematous plaques of bilateral axillae, left > right Bilateral calves with a fine, sandpaper-like papular rash   Neuro: alert, oriented, normal speech, no focal findings or movement disorder noted     EKG 3/14/18: Atrial fibrillation at rate 172    Assessment/Plan:         ICD-10-CM ICD-9-CM    1.  Chronic atrial fibrillation (HCC)-CHADS-VASC score of 1 (+HTN) - Pt is on a regimen of Cardizem 240 mg in the AM and Cardizem 240 mg in the PM. This was after rash possibly caused by Atenolol and/or Amlodipine, which were both discontinued. HR and BP controlled on this regimen. Warfarin resumed per PCP. He still has rash on bilateral calves, will defer this to PCP. I48.2 427.31    2. Essential hypertension, malignant- Cardizem CD dose as noted above. BP elevated today, though most readings on home diary are well-controlled. I10 401.0    3. Nontoxic multinodular goiter- per Dr. Amandeep Mckinnon. E04.2 241.1    4.  Elevated cholesterol-- continue Atorvastatin E78.00 272.0

## 2018-03-28 NOTE — MR AVS SNAPSHOT
[facility] 
 
 
 [address] [facility] [address]  [address] [address] 
[phone] Patient: Armando Chatterjee. MRN: [id number] PPT:6/60/9956 Visit Information Date & Time Provider Department Dept. Phone Encounter #  
 3/28/2018  1:30 PM Inna Brooks [phone] [id number] Follow-up Instructions Return in about 3 weeks (around 4/18/2018) for rov, inr next visit. Your Appointments 3/28/2018  2:30 PM  
Follow Up with María Elena Siddiqi. Juan David Hernandez PA-C Cardio Specialist at [facility] [place] [address]) Appt Note: after med change per Francine Velásquez [facility] [address] 4/16/2018  9:30 AM  
Follow Up with Idania Turcios MD  
Cardio Specialist at [facility] [place] [address]) Appt Note: 6 weeks [facility] [address] Upcoming Health Maintenance Date Due DTaP/Tdap/Td series (2 - Td) 12/4/2024 COLONOSCOPY 5/23/2026 Allergies as of 3/28/2018  Review Complete On: 3/28/2018 By: Max Bailey., DO No Known Allergies Current Immunizations  Reviewed on 11/13/2014 Name Date Influenza Vaccine 12/4/2014, 11/13/2011 Influenza Vaccine (Quad) PF 12/5/2017  2:34 PM, 3/15/2016 Pneumococcal Vaccine (Unspecified Type) 11/13/2011 Td 11/13/2001 Tdap 12/4/2014 Zoster Vaccine, Live 3/15/2016 Not reviewed this visit You Were Diagnosed With   
  
 Codes Comments At[facility], unspecified type [facility]    -  Primary ICD-10-CM: I48.91 
ICD-9-CM: 427.31 Vitals BP Pulse Temp Resp Height(growth percentile) Weight(growth percentile)  (!) 150/96 (BP 1 Location: Right arm, BP Patient Position: Sitting) 67 97.9 °F (36.6 °C) (Oral) 14 5' 10\" (1.778 m) 170 lb (77.1 kg) SpO2 BMI Smoking Status 97% 24.39 kg/m2 Former Smoker BMI and BSA Data Body Mass Index Body Surface Area  
 24.39 kg/m 2 1.95 m 2 Preferred Pharmacy Pharmacy Name Phone [address], [address] [phone] Your Updated Medication List  
  
   
This list is accurate as of 3/28/18  1:54 PM.  Always use your most recent med list.  
  
  
  
  
 * dilTIAZem  mg ER capsule Commonly known as:  CARDIZEM CD Take 1 Cap by mouth daily. * dilTIAZem  mg ER capsule Commonly known as:  CARDIZEM CD Take 1 Cap by mouth daily. magnesium oxide 200 mg magnesium Tab Take 200 mg by mouth daily. multivit-min-FA-lycopen-lutein 300-600-300 mcg Tab Commonly known as:  CENTRUM SILVER MEN Take 1 Tab by mouth daily. vitamin k 100 mcg tablet Take 1 Tab by mouth daily. warfarin 5 mg tablet Commonly known as:  COUMADIN Take 1 Tab by mouth daily. * Notice: This list has 2 medication(s) that are the same as other medications prescribed for you. Read the directions carefully, and ask your doctor or other care provider to review them with you. Prescriptions Sent to Pharmacy Refills  
 warfarin (COUMADIN) 5 mg tablet 1 Sig: Take 1 Tab by mouth daily. Class: Normal  
 Pharmacy: [address], [address] Ph #: [phone] Route: Oral  
  
We Performed the Following AMB POC PT/INR [36300 CPT(R)] Follow-up Instructions Return in about 3 weeks (around 4/18/2018) for rov, inr next visit. Introducing [facility] & HEALTH SERVICES! Dear Hope Artis: 
Thank you for requesting a [employer] account. Our records indicate that you already have an active [employer] account. You can access your account anytime at [email or web address] Did you know that you can access your hospital and ER discharge instructions at any time in [employer] ([employer])? You can also review all of your test results from your hospital stay or ER visit. Additional Information If you have questions, please visit the Frequently Asked Questions section of the [employer]) website at [email or web address] Remember, [employer] ([employer]) is NOT to be used for urgent needs. For medical emergencies, dial 911. Now available from your iPhone and Android! Please provide this summary of care documentation to your next provider. Your primary care clinician is listed as [id number] [facility]. If you have any questions after today's visit, please call [phone].

## 2018-03-28 NOTE — PROGRESS NOTES
Ching Farmer. is a 61 y.o.  male and presents with    Chief Complaint   Patient presents with    Rash    Irregular Heart Beat    Anticoagulation           Subjective:  1. Broke out with rash last month. I was concerned with drug toxicty and stoped everything. Switched b blocker to diltiazem     Rash cleared most of the way stillo has a little in axilla and ankles      Cardiovascular Review:  The patient has Atrial Fibrillation. Diet and Lifestyle: not attempting to follow a low fat, low cholesterol diet  Home BP Monitoring: is not measured at home. Pertinent ROS: taking medications as instructed, no medication side effects noted, no TIA's, no chest pain on exertion, no dyspnea on exertion, no swelling of ankles. Anticoagulation  Patient here for followup of chronic anticoagulation. Indication: Atrial Fibrillation. Bleeding Signs/Symptoms:  None. Thromboembolic Signs/Symptoms:  None. INR's are drawn regularly and have been therapeutic. Lab Results   Component Value Date/Time    INR POC 1.2 03/28/2018 01:35 PM       Additional Concerns:          Patient Active Problem List    Diagnosis Date Noted    Nontoxic multinodular goiter 02/07/2018    Atrial fibrillation ([facility] [facility] 11/28/2017    Elevated cholesterol 04/12/2016    Essential hypertension, malignant 11/13/2014    Arthritis 11/13/2014     Current Outpatient Prescriptions   Medication Sig Dispense Refill    warfarin (COUMADIN) 5 mg tablet Take 1 Tab by mouth daily. 30 Tab 1    dilTIAZem CD (CARDIZEM CD) 120 mg ER capsule Take 1 Cap by mouth daily. 30 Cap 6    dilTIAZem CD (CARDIZEM CD) 240 mg ER capsule Take 1 Cap by mouth daily. 30 Cap 6    magnesium oxide 200 mg magnesium tab Take 200 mg by mouth daily. 100 Tab 12    vitamin k 100 mcg tablet Take 1 Tab by mouth daily. 100 Tab 12    multivit-min-FA-lycopen-lutein (CENTRUM SILVER MEN) 300-600-300 mcg tab Take 1 Tab by mouth daily.  100 Caplet 12     No Known Allergies  Past Medical History:   Diagnosis Date    Arthritis 11/13/2014    Atrial fibrillation ([facility] 11/28/2017    Elevated cholesterol 4/12/2016    Essential hypertension, malignant 11/13/2014    Hearing loss 11/13/2014    Nontoxic multinodular goiter 2/7/2018     Past Surgical History:   Procedure Laterality Date    HX HERNIA REPAIR       Family History   Problem Relation Age of Onset    Diabetes Father      Social History   Substance Use Topics    Smoking status: Former Smoker    Smokeless tobacco: Never Used    Alcohol use 12.5 oz/week     25 Shots of liquor per week       ROS       All other systems reviewed and are negative. Objective:  Vitals:    03/28/18 1336   BP: (!) 150/96   Pulse: 67   Resp: 14   Temp: 97.9 °F (36.6 °C)   TempSrc: Oral   SpO2: 97%   Weight: 170 lb (77.1 kg)   Height: 5' 10\" (1.778 m)   PainSc:   0 - No pain                 alert, well appearing, and in no distress and oriented to person, place, and time  Chest - clear to auscultation, no wheezes, rales or rhonchi, symmetric air entry  Heart - normal rate, regular rhythm, normal S1, S2, no murmurs, rubs, clicks or gallops  Abdomen - soft, nontender, nondistended, no masses or organomegaly        LABS     TESTS      Assessment/Plan:    Atrial fib improved  Coumadin subtherapeutic will incraese to 5/d and recheck 2 or 3 wk  Rash improved will observe      Lab review: labs are reviewed, up to date and normal    Diagnoses and all orders for this visit:    1. Atrial fibrillation, unspecified type (HCC)  -     AMB POC PT/INR  -     warfarin (COUMADIN) 5 mg tablet; Take 1 Tab by mouth daily. I have discussed the diagnosis with the patient and the intended plan as seen in the above orders. The patient has received an after-visit summary and questions were answered concerning future plans. I have discussed medication side effects and warnings with the patient as well.  I have reviewed the plan of care with the patient, accepted their input and they are in agreement with the treatment goals. Follow-up Disposition:  Return in about 3 weeks (around 4/18/2018) for rov, inr next visit.

## 2018-03-28 NOTE — MR AVS SNAPSHOT
[address] [facility] [address] [id number] 
[phone] Patient: Reuben Barakat. MRN: [id number] Visit Information Date & Time Provider Department Dept. Phone Encounter #  
 3/28/2018  2:30 PM Duke Liu Specialist at General acute hospital [phone] [id number] Your Appointments 4/16/2018  9:30 AM  
Follow Up with Luke Heath MD  
Cardio Specialist at [address]) Appt Note: 6 weeks [facility] [address] [address] [address] [place] [address] 4/18/2018  2:00 PM  
ROUTINE CARE with Angel Staton.,  [address] [address]) Appt Note: Return in about 3 weeks (around 4/18/2018) for rov, inr next visit. [address] [facility] [address] [facility] [address] 222 [facility]  
  
   
 [address] [facility] [address] [facility] Upcoming Health Maintenance Date Due DTaP/Tdap/Td series (2 - Td) 12/4/2024 COLONOSCOPY 5/23/2026 Allergies as of 3/28/2018  Review Complete On: 3/28/2018 By: Brenna Arias RN No Known Allergies Current Immunizations  Reviewed on 11/13/2014 Name Date Influenza Vaccine 12/4/2014, 11/13/2011 Influenza Vaccine (Quad) PF 12/5/2017  2:34 PM, 3/15/2016 Pneumococcal Vaccine (Unspecified Type) 11/13/2011 Td 11/13/2001 Tdap 12/4/2014 Zoster Vaccine, Live 3/15/2016 Not reviewed this visit Vitals BP Pulse Height(growth percentile) Weight(growth percentile) SpO2 BMI  
 (!) 156/95 93 5' 10\" (1.778 m) 169 lb (76.7 kg) 98% 24.25 kg/m2 Smoking Status Former Smoker Vitals History BMI and BSA Data Body Mass Index Body Surface Area  
 24.25 kg/m 2 1.95 m 2 Preferred Pharmacy Pharmacy Name Phone [address], [address] [phone] Your Updated Medication List  
  
   
This list is accurate as of 3/28/18  2:44 PM.  Always use your most recent med list.  
  
  
  
  
 * dilTIAZem  mg ER capsule Commonly known as:  CARDIZEM CD Take 1 Cap by mouth daily. * dilTIAZem  mg ER capsule Commonly known as:  CARDIZEM CD Take 1 Cap by mouth daily. magnesium oxide 200 mg magnesium Tab Take 200 mg by mouth daily. multivit-min-FA-lycopen-lutein 300-600-300 mcg Tab Commonly known as:  CENTRUM SILVER MEN Take 1 Tab by mouth daily. vitamin k 100 mcg tablet Take 1 Tab by mouth daily. warfarin 5 mg tablet Commonly known as:  COUMADIN Take 1 Tab by mouth daily. * Notice: This list has 2 medication(s) that are the same as other medications prescribed for you. Read the directions carefully, and ask your doctor or other care provider to review them with you. Introducing [facility]! Dear Carissa Guevara: 
Thank you for requesting a [employer] account. Our records indicate that you already have an active [employer] account. You can access your account anytime at [email or web address] Did you know that you can access your hospital and ER discharge instructions at any time in [employer]? You can also review all of your test results from your hospital stay or ER visit. Additional Information If you have questions, please visit the Frequently Asked Questions section of the [employer] website at [email or web address] Remember, [employer] is NOT to be used for urgent needs. For medical emergencies, dial 911. Now available from your iPhone and Android! Please provide this summary of care documentation to your next provider. Your primary care clinician is listed as [address]. If you have any questions after today's visit, please call [phone].

## 2018-04-16 ENCOUNTER — APPOINTMENT (OUTPATIENT)
Dept: GENERAL RADIOLOGY | Age: 63
DRG: 310 | End: 2018-04-16
Attending: PHYSICIAN ASSISTANT
Payer: COMMERCIAL

## 2018-04-16 ENCOUNTER — HOSPITAL ENCOUNTER (INPATIENT)
Age: 63
LOS: 3 days | Discharge: HOME | DRG: 310 | End: 2018-04-19
Attending: EMERGENCY MEDICINE | Admitting: HOSPITALIST
Payer: COMMERCIAL

## 2018-04-16 DIAGNOSIS — I48.91 ATRIAL FIBRILLATION WITH RAPID VENTRICULAR RESPONSE (HCC): Primary | ICD-10-CM

## 2018-04-16 LAB
ALBUMIN SERPL-MCNC: 4.6 G/DL (ref 3.4–5)
ALBUMIN/GLOB SERPL: 1.2 {RATIO} (ref 0.8–1.7)
ALP SERPL-CCNC: 119 U/L (ref 45–117)
ALT SERPL-CCNC: 51 U/L (ref 16–61)
ANION GAP SERPL CALC-SCNC: 12 MMOL/L (ref 3–18)
AST SERPL-CCNC: 33 U/L (ref 15–37)
BASOPHILS # BLD: 0.1 K/UL (ref 0–0.06)
BASOPHILS NFR BLD: 1 % (ref 0–2)
BILIRUB SERPL-MCNC: 0.5 MG/DL (ref 0.2–1)
BUN SERPL-MCNC: 12 MG/DL (ref 7–18)
BUN/CREAT SERPL: 10 (ref 12–20)
CALCIUM SERPL-MCNC: 9.1 MG/DL (ref 8.5–10.1)
CHLORIDE SERPL-SCNC: 100 MMOL/L (ref 100–108)
CK MB CFR SERPL CALC: 1.1 % (ref 0–4)
CK MB SERPL-MCNC: 1.9 NG/ML (ref 5–25)
CK SERPL-CCNC: 170 U/L (ref 39–308)
CO2 SERPL-SCNC: 26 MMOL/L (ref 21–32)
CREAT SERPL-MCNC: 1.19 MG/DL (ref 0.6–1.3)
DIFFERENTIAL METHOD BLD: ABNORMAL
EOSINOPHIL # BLD: 0.1 K/UL (ref 0–0.4)
EOSINOPHIL NFR BLD: 1 % (ref 0–5)
ERYTHROCYTE [DISTWIDTH] IN BLOOD BY AUTOMATED COUNT: 13.8 % (ref 11.6–14.5)
GLOBULIN SER CALC-MCNC: 3.7 G/DL (ref 2–4)
GLUCOSE SERPL-MCNC: 157 MG/DL (ref 74–99)
HCT VFR BLD AUTO: 48.9 % (ref 36–48)
HGB BLD-MCNC: 17.3 G/DL (ref 13–16)
INR PPP: 1.9 (ref 0.8–1.2)
LYMPHOCYTES # BLD: 2 K/UL (ref 0.9–3.6)
LYMPHOCYTES NFR BLD: 28 % (ref 21–52)
MCH RBC QN AUTO: 32.8 PG (ref 24–34)
MCHC RBC AUTO-ENTMCNC: 35.4 G/DL (ref 31–37)
MCV RBC AUTO: 92.8 FL (ref 74–97)
MONOCYTES # BLD: 0.7 K/UL (ref 0.05–1.2)
MONOCYTES NFR BLD: 9 % (ref 3–10)
NEUTS SEG # BLD: 4.4 K/UL (ref 1.8–8)
NEUTS SEG NFR BLD: 61 % (ref 40–73)
PLATELET # BLD AUTO: 227 K/UL (ref 135–420)
PMV BLD AUTO: 10.5 FL (ref 9.2–11.8)
POTASSIUM SERPL-SCNC: 3.7 MMOL/L (ref 3.5–5.5)
PROT SERPL-MCNC: 8.3 G/DL (ref 6.4–8.2)
PROTHROMBIN TIME: 20.9 SEC (ref 11.5–15.2)
RBC # BLD AUTO: 5.27 M/UL (ref 4.7–5.5)
SODIUM SERPL-SCNC: 138 MMOL/L (ref 136–145)
TROPONIN I SERPL-MCNC: <0.02 NG/ML (ref 0–0.04)
TROPONIN I SERPL-MCNC: <0.02 NG/ML (ref 0–0.04)
WBC # BLD AUTO: 7.2 K/UL (ref 4.6–13.2)

## 2018-04-16 PROCEDURE — 36415 COLL VENOUS BLD VENIPUNCTURE: CPT | Performed by: NURSE PRACTITIONER

## 2018-04-16 PROCEDURE — 65660000000 HC RM CCU STEPDOWN

## 2018-04-16 PROCEDURE — 85610 PROTHROMBIN TIME: CPT | Performed by: PHYSICIAN ASSISTANT

## 2018-04-16 PROCEDURE — 96375 TX/PRO/DX INJ NEW DRUG ADDON: CPT

## 2018-04-16 PROCEDURE — 96374 THER/PROPH/DIAG INJ IV PUSH: CPT

## 2018-04-16 PROCEDURE — 74011000250 HC RX REV CODE- 250: Performed by: EMERGENCY MEDICINE

## 2018-04-16 PROCEDURE — 93308 TTE F-UP OR LMTD: CPT

## 2018-04-16 PROCEDURE — 82550 ASSAY OF CK (CPK): CPT | Performed by: PHYSICIAN ASSISTANT

## 2018-04-16 PROCEDURE — 71045 X-RAY EXAM CHEST 1 VIEW: CPT

## 2018-04-16 PROCEDURE — 93005 ELECTROCARDIOGRAM TRACING: CPT

## 2018-04-16 PROCEDURE — 80053 COMPREHEN METABOLIC PANEL: CPT | Performed by: PHYSICIAN ASSISTANT

## 2018-04-16 PROCEDURE — 74011000258 HC RX REV CODE- 258: Performed by: EMERGENCY MEDICINE

## 2018-04-16 PROCEDURE — 85025 COMPLETE CBC W/AUTO DIFF WBC: CPT | Performed by: PHYSICIAN ASSISTANT

## 2018-04-16 PROCEDURE — 74011250636 HC RX REV CODE- 250/636: Performed by: EMERGENCY MEDICINE

## 2018-04-16 PROCEDURE — 99285 EMERGENCY DEPT VISIT HI MDM: CPT

## 2018-04-16 PROCEDURE — 96376 TX/PRO/DX INJ SAME DRUG ADON: CPT

## 2018-04-16 PROCEDURE — 74011250637 HC RX REV CODE- 250/637: Performed by: EMERGENCY MEDICINE

## 2018-04-16 RX ORDER — DILTIAZEM HYDROCHLORIDE 5 MG/ML
10 INJECTION INTRAVENOUS
Status: COMPLETED | OUTPATIENT
Start: 2018-04-16 | End: 2018-04-16

## 2018-04-16 RX ORDER — WARFARIN SODIUM 5 MG/1
5 TABLET ORAL DAILY
Status: DISCONTINUED | OUTPATIENT
Start: 2018-04-17 | End: 2018-04-18

## 2018-04-16 RX ORDER — ACETAMINOPHEN 325 MG/1
650 TABLET ORAL
Status: DISCONTINUED | OUTPATIENT
Start: 2018-04-16 | End: 2018-04-19 | Stop reason: HOSPADM

## 2018-04-16 RX ORDER — MORPHINE SULFATE 10 MG/ML
4 INJECTION, SOLUTION INTRAMUSCULAR; INTRAVENOUS
Status: COMPLETED | OUTPATIENT
Start: 2018-04-16 | End: 2018-04-16

## 2018-04-16 RX ORDER — NITROGLYCERIN 0.4 MG/1
0.4 TABLET SUBLINGUAL AS NEEDED
Status: DISCONTINUED | OUTPATIENT
Start: 2018-04-16 | End: 2018-04-19 | Stop reason: HOSPADM

## 2018-04-16 RX ORDER — ONDANSETRON 2 MG/ML
4 INJECTION INTRAMUSCULAR; INTRAVENOUS
Status: DISCONTINUED | OUTPATIENT
Start: 2018-04-16 | End: 2018-04-19 | Stop reason: HOSPADM

## 2018-04-16 RX ORDER — ASPIRIN 325 MG
325 TABLET ORAL
Status: COMPLETED | OUTPATIENT
Start: 2018-04-16 | End: 2018-04-16

## 2018-04-16 RX ADMIN — DILTIAZEM HYDROCHLORIDE 10 MG: 5 INJECTION INTRAVENOUS at 13:30

## 2018-04-16 RX ADMIN — MORPHINE SULFATE 4 MG: 10 INJECTION INTRAMUSCULAR; INTRAVENOUS; SUBCUTANEOUS at 14:19

## 2018-04-16 RX ADMIN — NITROGLYCERIN 0.4 MG: 0.4 TABLET SUBLINGUAL at 13:39

## 2018-04-16 RX ADMIN — DILTIAZEM HYDROCHLORIDE 10 MG: 5 INJECTION INTRAVENOUS at 13:57

## 2018-04-16 RX ADMIN — SODIUM CHLORIDE 10 MG/HR: 900 INJECTION, SOLUTION INTRAVENOUS at 22:14

## 2018-04-16 RX ADMIN — SODIUM CHLORIDE 10 MG/HR: 900 INJECTION, SOLUTION INTRAVENOUS at 14:19

## 2018-04-16 RX ADMIN — ASPIRIN 325 MG ORAL TABLET 325 MG: 325 PILL ORAL at 13:39

## 2018-04-16 NOTE — H&P
GENERAL GENERIC H&P/CONSULT    Yudy Hansen. is a 61 y.o. male with a past medical history of arthritis, elevated cholesterol, atrial fibrillation on coumadin dx Nov 2017 during routine physical, and nontoxic multinodular goiter who presents with complaints of left shoulder and left arm pain about 2 hrs ago PTA. Patient also notes some radiation to left side of neck and left arm numbness noting severe 4/10. Patient notes now some bilateral upper ext numbness but deneis any weakenss. He states pain occurred while watching television and notes no relief after nitro. This is the first time this has happened. Upon arrival patient was found to be in a-fib with RVR with 's. Patient was refractory to cardizem bolus thus was placed on Cardizem gtt. Patient denies any cp, sob, cough, fever, chills, or any other acute complaints at this time. Patient has been compliant with his meds. Cardiology has been consulted. Past Medical History:   Diagnosis Date    Arthritis 11/13/2014    Atrial fibrillation ([address] 11/28/2017    Elevated cholesterol 4/12/2016    Essential hypertension, malignant 11/13/2014    Hearing loss 11/13/2014    Nontoxic multinodular goiter 2/7/2018      Past Surgical History:   Procedure Laterality Date    HX HERNIA REPAIR        Prior to Admission medications    Medication Sig Start Date End Date Taking? Authorizing Provider   warfarin (COUMADIN) 5 mg tablet Take 1 Tab by mouth daily. 3/28/18   Camilla Lundborg., DO   dilTIAZem CD (CARDIZEM CD) 120 mg ER capsule Take 1 Cap by mouth daily. 3/20/18   Lei Solano PA-C   dilTIAZem CD (CARDIZEM CD) 240 mg ER capsule Take 1 Cap by mouth daily. 3/14/18   Lei Solano PA-C   magnesium oxide 200 mg magnesium tab Take 200 mg by mouth daily. 2/21/18   Camilla Lundborg., DO   vitamin k 100 mcg tablet Take 1 Tab by mouth daily.  2/21/18   Camilla Lundborg., DO   multivit-min-FA-lycopen-lutein (CENTRUM SILVER MEN) 300-600-300 mcg tab Take 1 Tab by mouth daily. 2/21/18   Epi Louie., DO     No Known Allergies   Social History   Substance Use Topics    Smoking status: Former Smoker    Smokeless tobacco: Never Used    Alcohol use 12.5 oz/week     25 Shots of liquor per week      Family History   Problem Relation Age of Onset    Diabetes Father       Review of Systems   Constitutional: Negative. HENT: Negative. Eyes: Negative. Respiratory: Positive for shortness of breath. Negative for apnea, cough, choking and chest tightness. Cardiovascular: Positive for palpitations. Gastrointestinal: Negative. Genitourinary: Negative. Musculoskeletal:        Left arm/left shoulder pain   Neurological: Positive for numbness (to bilateral upper ext). Negative for tremors, facial asymmetry and weakness. Objective:          Patient Vitals for the past 8 hrs:   BP Temp Pulse Resp SpO2 Height Weight   04/16/18 1430 146/88 - (!) 121 26 94 % - -   04/16/18 1415 (!) 172/91 - (!) 113 19 96 % - -   04/16/18 1400 (!) 126/100 - (!) 123 17 95 % - -   04/16/18 1345 166/90 - (!) 128 19 95 % - -   04/16/18 1340 - - - - - 5' 10\" (1.778 m) 77.1 kg (170 lb)   04/16/18 1339 (!) 179/118 - - - - - -   04/16/18 1329 - - (!) 138 20 - - -   04/16/18 1313 (!) 191/122 97.9 °F (36.6 °C) 80 16 100 % - -     Physical Exam   HENT:   Head: Normocephalic. Eyes: Pupils are equal, round, and reactive to light. Neck: Normal range of motion. Cardiovascular: An irregularly irregular rhythm present. Tachycardia present. Pulmonary/Chest: No respiratory distress. He has no wheezes. He has no rales. He exhibits no tenderness. Abdominal: Soft. Bowel sounds are normal. He exhibits no distension. There is no tenderness. There is no guarding. Musculoskeletal: Normal range of motion. He exhibits no tenderness. Neurological: He is alert. Skin: Skin is warm and dry. Rash (to bilateral lower ext. Improving) noted. He is not diaphoretic.         Labs:    Recent Results (from the past 24 hour(s))   EKG, 12 LEAD, INITIAL    Collection Time: 04/16/18  1:16 PM   Result Value Ref Range    Ventricular Rate 141 BPM    Atrial Rate 394 BPM    QRS Duration 88 ms    Q-T Interval 292 ms    QTC Calculation (Bezet) 447 ms    Calculated R Axis 22 degrees    Calculated T Axis 37 degrees    Diagnosis       Atrial fibrillation with rapid ventricular response  Abnormal ECG  When compared with ECG of 28-MAR-2016 08:51,  Atrial fibrillation has replaced Sinus rhythm  Vent. rate has increased BY  89 BPM  Nonspecific T wave abnormality now evident in Inferior leads     CARDIAC PANEL,(CK, CKMB & TROPONIN)    Collection Time: 04/16/18  1:27 PM   Result Value Ref Range     39 - 308 U/L    CK - MB 1.9 <3.6 ng/ml    CK-MB Index 1.1 0.0 - 4.0 %    Troponin-I, Qt. <0.02 0.0 - 0.045 NG/ML   CBC WITH AUTOMATED DIFF    Collection Time: 04/16/18  1:27 PM   Result Value Ref Range    WBC 7.2 4.6 - 13.2 K/uL    RBC 5.27 4.70 - 5.50 M/uL    HGB 17.3 (H) 13.0 - 16.0 g/dL    HCT 48.9 (H) 36.0 - 48.0 %    MCV 92.8 74.0 - 97.0 FL    MCH 32.8 24.0 - 34.0 PG    MCHC 35.4 31.0 - 37.0 g/dL    RDW 13.8 11.6 - 14.5 %    PLATELET 759 361 - 630 K/uL    MPV 10.5 9.2 - 11.8 FL    NEUTROPHILS 61 40 - 73 %    LYMPHOCYTES 28 21 - 52 %    MONOCYTES 9 3 - 10 %    EOSINOPHILS 1 0 - 5 %    BASOPHILS 1 0 - 2 %    ABS. NEUTROPHILS 4.4 1.8 - 8.0 K/UL    ABS. LYMPHOCYTES 2.0 0.9 - 3.6 K/UL    ABS. MONOCYTES 0.7 0.05 - 1.2 K/UL    ABS. EOSINOPHILS 0.1 0.0 - 0.4 K/UL    ABS.  BASOPHILS 0.1 (H) 0.0 - 0.06 K/UL    DF AUTOMATED     METABOLIC PANEL, COMPREHENSIVE    Collection Time: 04/16/18  1:27 PM   Result Value Ref Range    Sodium 138 136 - 145 mmol/L    Potassium 3.7 3.5 - 5.5 mmol/L    Chloride 100 100 - 108 mmol/L    CO2 26 21 - 32 mmol/L    Anion gap 12 3.0 - 18 mmol/L    Glucose 157 (H) 74 - 99 mg/dL    BUN 12 7.0 - 18 MG/DL    Creatinine 1.19 0.6 - 1.3 MG/DL    BUN/Creatinine ratio 10 (L) 12 - 20      GFR est AA >60 >60 ml/min/1.73m2 GFR est non-AA >60 >60 ml/min/1.73m2    Calcium 9.1 8.5 - 10.1 MG/DL    Bilirubin, total 0.5 0.2 - 1.0 MG/DL    ALT (SGPT) 51 16 - 61 U/L    AST (SGOT) 33 15 - 37 U/L    Alk. phosphatase 119 (H) 45 - 117 U/L    Protein, total 8.3 (H) 6.4 - 8.2 g/dL    Albumin 4.6 3.4 - 5.0 g/dL    Globulin 3.7 2.0 - 4.0 g/dL    A-G Ratio 1.2 0.8 - 1.7     PROTHROMBIN TIME + INR    Collection Time: 04/16/18  1:27 PM   Result Value Ref Range    Prothrombin time 20.9 (H) 11.5 - 15.2 sec    INR 1.9 (H) 0.8 - 1.2           Assessment:  Principal Problem:    Atrial fibrillation with RVR (HCC) (4/16/2018)    Active Problems:    Essential hypertension, malignant (11/13/2014)      Elevated cholesterol (4/12/2016)      Nontoxic multinodular goiter (2/7/2018)        Plan:    A-fib with RVR  -cardizem gtt  -consider amio but will defer to Cards  -r/o ACS  -Cards onboard  -CHADSVasc score 1  -coumadin for now INR 1.9  -BP stable  -EKG shows no STEMI  -ECHo as per Cards  -NPO aftermidnight incase impending test is warranted.   -trend serial enzymes  -resume home cardizem when ok with Cards  -not on BB 2/2 thought of rash/sideeffects?     Left shoulder pain/numbness  -bilateral numbness  -will likely get CT vs MRI of cervical once Cardiac is cleared    Essential HTN with urgency  -on cardizem gtt    HLD  -statin    Nontoxi munodular goiter  -stable    Rash  -to bilateral lower ext and left arm pit improved after off BB and norvasc    DVT prophyalxis  -scd/teds  -warfarin    Signed:  Rosa Flood NP 4/16/2018

## 2018-04-16 NOTE — ED TRIAGE NOTES
Pt reports left shoulder pain with radiation to neck and down left arm since waking up this am. No trauma or injury. States he feels tingling in left upper arm which started approx 2 hours PTA. Denies cp, sob. No slurred speech, drift, altered gait. Strength intact and pt has normal sensation. +HTN, afib hx    I performed a brief evaluation, including history and physical, of the patient here in triage and I have determined that pt will need further treatment and evaluation from the main side ER physician. I have placed initial orders to help in expediting patients care.      April 16, 2018 at 1:16 PM - Robbie Howell PA-C        Visit Vitals    BP (!) 191/122    Pulse 80    Temp 97.9 °F (36.6 °C)    Resp 16    SpO2 100%

## 2018-04-16 NOTE — ED PROVIDER NOTES
EMERGENCY DEPARTMENT HISTORY AND PHYSICAL EXAM    1:43 PM      Date: 4/16/2018  Patient Name: Akash Gardiner. History of Presenting Illness     Chief Complaint   Patient presents with    Shoulder Pain         History Provided By: Patient    Chief Complaint: Shoulder pain  Duration: 2 Hours  Timing:  Constant  Location: Left shoulder  Severity: 1 out of 10  Associated Symptoms: Left arm numbness. Patient denies CP, cough, SOB,  and any other associated symptoms or complaints. Additional History (Context): Akash Dodd is a 61 y.o. male with a past medical history of arthritis, elevated cholesterol, atrial fibrillation, and nontoxic multinodular goiter who presents with c/o left shoulder pain onset 2 hours ago. Associated symptoms include radiation of pain to left side of neck and upper left arm numbness. Patient describes his pain as constant and rated 1/10 in severity. He states that this pain occurred while he was sitting on the couch watching T.V. . He states that he has never felt today's symptoms in the past. Patient denies past heart attack. He notes that he was diagnosed with A-fib 6 months ago and that he takes Coumadin and Cadiezam. He notes that he has not missed a dosage of Cardiezam. Patient states that his last INR was checked 1 month ago. Patient denies CP, cough, SOB,  and any other associated symptoms or complaints. PCP: Mone Austin., DO    Current Facility-Administered Medications   Medication Dose Route Frequency Provider Last Rate Last Dose    nitroglycerin (NITROSTAT) tablet 0.4 mg  0.4 mg SubLINGual PRN Erika Hamper, DO   0.4 mg at 04/16/18 1339    dilTIAZem (CARDIZEM) 100 mg in 0.9% sodium chloride (MBP/ADV) 100 mL infusion  10 mg/hr IntraVENous CONTINUOUS Erika Hamper, DO 10 mL/hr at 04/16/18 1419 10 mg/hr at 04/16/18 1419     Current Outpatient Prescriptions   Medication Sig Dispense Refill    warfarin (COUMADIN) 5 mg tablet Take 1 Tab by mouth daily. 30 Tab 1    dilTIAZem CD (CARDIZEM CD) 120 mg ER capsule Take 1 Cap by mouth daily. 30 Cap 6    dilTIAZem CD (CARDIZEM CD) 240 mg ER capsule Take 1 Cap by mouth daily. 30 Cap 6    magnesium oxide 200 mg magnesium tab Take 200 mg by mouth daily. 100 Tab 12    vitamin k 100 mcg tablet Take 1 Tab by mouth daily. 100 Tab 12    multivit-min-FA-lycopen-lutein (CENTRUM SILVER MEN) 300-600-300 mcg tab Take 1 Tab by mouth daily. 100 Caplet 12       Past History     Past Medical History:  Past Medical History:   Diagnosis Date    Arthritis 11/13/2014    Atrial fibrillation ([address] 11/28/2017    Elevated cholesterol 4/12/2016    Essential hypertension, malignant 11/13/2014    Hearing loss 11/13/2014    Nontoxic multinodular goiter 2/7/2018       Past Surgical History:  Past Surgical History:   Procedure Laterality Date    HX HERNIA REPAIR         Family History:  Family History   Problem Relation Age of Onset    Diabetes Father        Social History:  Social History   Substance Use Topics    Smoking status: Former Smoker    Smokeless tobacco: Never Used    Alcohol use 12.5 oz/week     25 Shots of liquor per week       Allergies:  No Known Allergies      Review of Systems       Review of Systems   Constitutional: Negative for diaphoresis and fever. Cardiovascular: Negative for chest pain. Gastrointestinal: Negative for nausea. Musculoskeletal: Positive for arthralgias. Right shoulder pain. Right arm pain. All other systems reviewed and are negative. Physical Exam     Visit Vitals    /83    Pulse (!) 112    Temp 97.9 °F (36.6 °C)    Resp 18    Ht 5' 10\" (1.778 m)    Wt 77.1 kg (170 lb)    SpO2 95%    BMI 24.39 kg/m2         Physical Exam   Constitutional: He is oriented to person, place, and time. He appears well-developed and well-nourished. HENT:   Head: Normocephalic and atraumatic. Neck: Neck supple. No JVD present. Cardiovascular: An irregular rhythm present. Tachycardia present. Pulmonary/Chest: Effort normal and breath sounds normal. No respiratory distress. Abdominal: Soft. He exhibits no distension. There is no tenderness. There is no rebound and no guarding. Musculoskeletal: He exhibits no edema. Neurological: He is alert and oriented to person, place, and time. Skin: Skin is warm and dry. No erythema. Psychiatric: Judgment normal.         Diagnostic Study Results     Labs -  Recent Results (from the past 12 hour(s))   EKG, 12 LEAD, INITIAL    Collection Time: 04/16/18  1:16 PM   Result Value Ref Range    Ventricular Rate 141 BPM    Atrial Rate 394 BPM    QRS Duration 88 ms    Q-T Interval 292 ms    QTC Calculation (Bezet) 447 ms    Calculated R Axis 22 degrees    Calculated T Axis 37 degrees    Diagnosis       Atrial fibrillation with rapid ventricular response  Abnormal ECG  When compared with ECG of 28-MAR-2016 08:51,  Atrial fibrillation has replaced Sinus rhythm  Vent. rate has increased BY  89 BPM  Nonspecific T wave abnormality now evident in Inferior leads     CARDIAC PANEL,(CK, CKMB & TROPONIN)    Collection Time: 04/16/18  1:27 PM   Result Value Ref Range     39 - 308 U/L    CK - MB 1.9 <3.6 ng/ml    CK-MB Index 1.1 0.0 - 4.0 %    Troponin-I, Qt. <0.02 0.0 - 0.045 NG/ML   CBC WITH AUTOMATED DIFF    Collection Time: 04/16/18  1:27 PM   Result Value Ref Range    WBC 7.2 4.6 - 13.2 K/uL    RBC 5.27 4.70 - 5.50 M/uL    HGB 17.3 (H) 13.0 - 16.0 g/dL    HCT 48.9 (H) 36.0 - 48.0 %    MCV 92.8 74.0 - 97.0 FL    MCH 32.8 24.0 - 34.0 PG    MCHC 35.4 31.0 - 37.0 g/dL    RDW 13.8 11.6 - 14.5 %    PLATELET 563 601 - 402 K/uL    MPV 10.5 9.2 - 11.8 FL    NEUTROPHILS 61 40 - 73 %    LYMPHOCYTES 28 21 - 52 %    MONOCYTES 9 3 - 10 %    EOSINOPHILS 1 0 - 5 %    BASOPHILS 1 0 - 2 %    ABS. NEUTROPHILS 4.4 1.8 - 8.0 K/UL    ABS. LYMPHOCYTES 2.0 0.9 - 3.6 K/UL    ABS. MONOCYTES 0.7 0.05 - 1.2 K/UL    ABS. EOSINOPHILS 0.1 0.0 - 0.4 K/UL    ABS.  BASOPHILS 0.1 (H) 0.0 - 0.06 K/UL    DF AUTOMATED     METABOLIC PANEL, COMPREHENSIVE    Collection Time: 04/16/18  1:27 PM   Result Value Ref Range    Sodium 138 136 - 145 mmol/L    Potassium 3.7 3.5 - 5.5 mmol/L    Chloride 100 100 - 108 mmol/L    CO2 26 21 - 32 mmol/L    Anion gap 12 3.0 - 18 mmol/L    Glucose 157 (H) 74 - 99 mg/dL    BUN 12 7.0 - 18 MG/DL    Creatinine 1.19 0.6 - 1.3 MG/DL    BUN/Creatinine ratio 10 (L) 12 - 20      GFR est AA >60 >60 ml/min/1.73m2    GFR est non-AA >60 >60 ml/min/1.73m2    Calcium 9.1 8.5 - 10.1 MG/DL    Bilirubin, total 0.5 0.2 - 1.0 MG/DL    ALT (SGPT) 51 16 - 61 U/L    AST (SGOT) 33 15 - 37 U/L    Alk. phosphatase 119 (H) 45 - 117 U/L    Protein, total 8.3 (H) 6.4 - 8.2 g/dL    Albumin 4.6 3.4 - 5.0 g/dL    Globulin 3.7 2.0 - 4.0 g/dL    A-G Ratio 1.2 0.8 - 1.7     PROTHROMBIN TIME + INR    Collection Time: 04/16/18  1:27 PM   Result Value Ref Range    Prothrombin time 20.9 (H) 11.5 - 15.2 sec    INR 1.9 (H) 0.8 - 1.2         Radiologic Studies -   XR CHEST PORT   Final Result      Xr Chest Port    Result Date: 4/16/2018  Portable Chest  History: Left shoulder and arm pain Comparison: PA and lateral chest 3/28/2016 Portable view of the chest demonstrates patient is slightly rotated to the left mildly limiting evaluation. Cardiomediastinal silhouette shows no definite interval change given differences in rotation. New linear opacities are present in the left lower lung zone. Left hemidiaphragm is slightly elevated. Cardiac monitoring leads are present. No pleural effusion or pneumothorax is seen. Degenerative changes are in the spine. IMPRESSION: 1. Linear opacities in left lower lung zone, nonspecific but suspect subsegmental atelectasis given slightly elevated left hemidiaphragm. Scarring could have a similar appearance. Medical Decision Making   I am the first provider for this patient.     I reviewed the vital signs, available nursing notes, past medical history, past surgical history, family history and social history. Vital Signs-Reviewed the patient's vital signs. Pulse Oximetry Analysis -  100% on room air (normal)    EKG: Interpreted by the EP. Time Interpreted: 1319   Rate: 141   Rhythm: Atrial Fibrillation    Interpretation: normal axis, afib with rvr, no ST changes   Comparison: prior 3/19/16 sinus mali    Records Reviewed: Nursing Notes (Time of Review: 1:43 PM)    ED Course: Progress Notes, Reevaluation, and Consults:  2:19 PM  I discussed the results with the patient. He states that the nitro did not change his shoulder pain. He is still tachycardic after 2 boluses of Dilt, so will start him on a drip and admit. 2:25 PM  Consult: DIscussed with Dr. Dianna Yousif, hospitalist, Accepts pt for admission. 2:50 PM Consult: I discussed care with RONAN Toussaint Che (cardiology). It was a standard discussion including patient history, chief complaint, available diagnostic results, and predicted treatment course. Provider Notes (Medical Decision Making): 60 y/o male presents with chest pain. Check ekg, trop, noted tachycardia, in afib with rvr. Will give dilt. Nitro for pain. Doubt dissection, PE unlikely as pt on coumadin. For Hospitalized Patients:    1. Hospitalization Decision Time:  The decision to hospitalize the patient was made by Dr. Migdalia Davis at 2:19 on 4/16/2018    2. Aspirin: Aspirin was not given because the patient did not present with a stroke at the time of their Emergency Department evaluation      Diagnosis     Clinical Impression:   1.  Atrial fibrillation with rapid ventricular response ([address]        Disposition: admit    Attestations:  Sujit [id number] [employer] acting as a scribe for and in the presence of Doc Barragan, DO      April 16, 2018 at 1:43 PM       Provider Attestation:      I personally performed the services described in the documentation, reviewed the documentation, as recorded by the scrkokie in my presence, and it accurately and completely records my words and actions.  April 16, 2018 at 1:43 PM - Erika Lange DO    _______________________________

## 2018-04-16 NOTE — ED TRIAGE NOTES
\"I have pain in my shoulder blade and numbness down my arm. It started two hours. The numbness stops at my elbow. \"

## 2018-04-16 NOTE — IP AVS SNAPSHOT
[address] [address] 
[phone] Patient: Gely Small. MRN: [id number] About your hospitalization You were admitted on:  April 16, 2018 You last received care in the:  [address] [place] [address] You were discharged on:  April 19, 2018 Why you were hospitalized Your primary diagnosis was:  Atrial Fibrillation With Rvr (Hcc) Your diagnoses also included:  Essential Hypertension, Malignant, Nontoxic Multinodular Goiter, Elevated Cholesterol, Persistent Atrial Fibrillation (Hcc) Follow-up Information Follow up With Details Comments [place]   [address] [facility] [address] [facility] [place] [address] [phone] Discharge Orders None A check lilia indicates which time of day the medication should be taken. My Medications START taking these medications Instructions Each Dose to Equal  
 Morning Noon Evening Bedtime  
 digoxin 0.125 mg tablet Commonly known as:  LANOXIN Start taking on:  4/21/2018 Your last dose was: Your next dose is: Take 1 Tab by mouth every fourty-eight (48) hours. 0.125 mg  
    
   
   
   
  
 nitroglycerin 0.4 mg SL tablet Commonly known as:  NITROSTAT Your last dose was: Your next dose is:    
   
   
 1 Tab by SubLINGual route as needed for Chest Pain. 0.4 mg  
    
   
   
   
  
  
CHANGE how you take these medications Instructions Each Dose to Equal  
 Morning Noon Evening Bedtime * dilTIAZem  mg ER capsule Commonly known as:  CARDIZEM CD What changed:   
- medication strength 
- how much to take - when to take this Your last dose was: Your next dose is: Take 1 Cap by mouth nightly. 180 mg  
    
   
   
   
  
 * dilTIAZem  mg ER capsule Commonly known as:  CARDIZEM CD  
 Start taking on:  4/20/2018 What changed:   
- medication strength 
- how much to take Your last dose was: Your next dose is: Take 1 Cap by mouth daily. 240 mg  
    
   
   
   
  
 warfarin 6 mg tablet Commonly known as:  COUMADIN Start taking on:  4/20/2018 What changed:   
- medication strength 
- how much to take Your last dose was: Your next dose is: Take 1 Tab by mouth daily. 6 mg * Notice: This list has 2 medication(s) that are the same as other medications prescribed for you. Read the directions carefully, and ask your doctor or other care provider to review them with you. CONTINUE taking these medications Instructions Each Dose to Equal  
 Morning Noon Evening Bedtime  
 magnesium oxide 200 mg magnesium Tab Your last dose was: Your next dose is: Take 200 mg by mouth daily. 200 mg  
    
   
   
   
  
 multivit-min-FA-lycopen-lutein 300-600-300 mcg Tab Commonly known as:  CENTRUM SILVER MEN Your last dose was: Your next dose is: Take 1 Tab by mouth daily. 1 Tab STOP taking these medications   
 vitamin k 100 mcg tablet Where to Get Your Medications These medications were sent to [address] [place], [address]  [address] [facility] [address] Phone:  [phone]  
  digoxin 0.125 mg tablet  
 dilTIAZem  mg ER capsule  
 dilTIAZem  mg ER capsule  
 nitroglycerin 0.4 mg SL tablet  
 warfarin 6 mg tablet Discharge Instructions Atrial Fibrillation: Care Instructions Your Care Instructions Atrial fibrillation is an irregular and often fast heartbeat. Treating this condition is important for several reasons. It can cause blood clots, which can travel from your heart to your brain and cause a stroke.  If you have a fast heartbeat, you may feel lightheaded, dizzy, and weak. An irregular heartbeat can also increase your risk for heart failure. Atrial fibrillation is often the result of another heart condition, such as high blood pressure or coronary artery disease. Making changes to improve your heart condition will help you stay healthy and active. Follow-up care is a key part of your treatment and safety. Be sure to make and go to all appointments, and call your doctor if you are having problems. It's also a good idea to know your test results and keep a list of the medicines you take. How can you care for yourself at home? Medicines ? · Take your medicines exactly as prescribed. Call your doctor if you think you are having a problem with your medicine. You will get more details on the specific medicines your doctor prescribes. ? · If your doctor has given you a blood thinner to prevent a stroke, be sure you get instructions about how to take your medicine safely. Blood thinners can cause serious bleeding problems. ? · Do not take any vitamins, over-the-counter drugs, or herbal products without talking to your doctor first. ? Lifestyle changes ? · Do not smoke. Smoking can increase your chance of a stroke and heart attack. If you need help quitting, talk to your doctor about stop-smoking programs and medicines. These can increase your chances of quitting for good. ? · Eat a heart-healthy diet. ? · Stay at a healthy weight. Lose weight if you need to.  
? · Limit alcohol to 2 drinks a day for men and 1 drink a day for women. Too much alcohol can cause health problems. ? · Avoid colds and flu. Get a pneumococcal vaccine shot. If you have had one before, ask your doctor whether you need another dose. Get a flu shot every year. If you must be around people with colds or flu, wash your hands often. Activity ? · If your doctor recommends it, get more exercise.  Walking is a good choice. Bit by bit, increase the amount you walk every day. Try for at least 30 minutes on most days of the week. You also may want to swim, bike, or do other activities. Your doctor may suggest that you join a cardiac rehabilitation program so that you can have help increasing your physical activity safely. ? · Start light exercise if your doctor says it is okay. Even a small amount will help you get stronger, have more energy, and manage stress. Walking is an easy way to get exercise. Start out by walking a little more than you did in the hospital. Gradually increase the amount you walk. ? · When you exercise, watch for signs that your heart is working too hard. You are pushing too hard if you cannot talk while you are exercising. If you become short of breath or dizzy or have chest pain, sit down and rest immediately. ? · Check your pulse regularly. Place two fingers on the artery at the palm side of your wrist, in line with your thumb. If your heartbeat seems uneven or fast, talk to your doctor. When should you call for help? Call 911 anytime you think you may need emergency care. For example, call if: 
? · You have symptoms of a heart attack. These may include: ¨ Chest pain or pressure, or a strange feeling in the chest. 
¨ Sweating. ¨ Shortness of breath. ¨ Nausea or vomiting. ¨ Pain, pressure, or a strange feeling in the back, neck, jaw, or upper belly or in one or both shoulders or arms. ¨ Lightheadedness or sudden weakness. ¨ A fast or irregular heartbeat. After you call 911, the  may tell you to chew 1 adult-strength or 2 to 4 low-dose aspirin. Wait for an ambulance. Do not try to drive yourself. ? · You have symptoms of a stroke. These may include: 
¨ Sudden numbness, tingling, weakness, or loss of movement in your face, arm, or leg, especially on only one side of your body. ¨ Sudden vision changes. ¨ Sudden trouble speaking. ¨ Sudden confusion or trouble understanding simple statements. ¨ Sudden problems with walking or balance. ¨ A sudden, severe headache that is different from past headaches. ? · You passed out (lost consciousness). ?Call your doctor now or seek immediate medical care if: 
? · You have new or increased shortness of breath. ? · You feel dizzy or lightheaded, or you feel like you may faint. ? · Your heart rate becomes irregular. ? · You can feel your heart flutter in your chest or skip heartbeats. Tell your doctor if these symptoms are new or worse. ? Watch closely for changes in your health, and be sure to contact your doctor if you have any problems. Where can you learn more? Go to http://jg-dave.info/. Enter U020 in the search box to learn more about \"Atrial Fibrillation: Care Instructions. \" Current as of: September 21, 2016 Content Version: 11.4 © 4677-8468 [employer]OS. Care instructions adapted under license by [employer] (which disclaims liability or warranty for this information). If you have questions about a medical condition or this instruction, always ask your healthcare professional. [place] [address] any warranty or liability for your use of this information. Learning About Atrial Fibrillation What is atrial fibrillation? Atrial fibrillation (say \"AY-tree-regis aqv-anoh-MYO-shun\") is the most common type of irregular heartbeat (arrhythmia). Normally, the heart beats in a strong, steady rhythm. In atrial fibrillation, a problem with the heart's electrical system causes the two upper parts of the heart (the atria) to quiver, or fibrillate. Your heart rate also may be faster than normal. 
Atrial fibrillation can be dangerous because if the heartbeat isn't strong and steady, blood can collect, or pool, in the atria. And pooled blood is more likely to form clots.  Clots can travel to the brain, block blood flow, and cause a stroke. Atrial fibrillation can also lead to heart failure. Treatment for atrial fibrillation helps prevent stroke and heart failure. It also helps relieve symptoms. Atrial fibrillation is often caused by another heart problem. It may happen after heart surgery. It may also be caused by other problems, such as an overactive thyroid gland or lung disease. Many people with atrial fibrillation are able to live full and active lives. What are the symptoms? Some people feel symptoms when they have episodes of atrial fibrillation. But other people don't notice any symptoms. If you have symptoms, you may feel: · A fluttering, racing, or pounding feeling in your chest called palpitations. · Weak or tired. · Dizzy or lightheaded. · Short of breath. · Chest pain. · Confused. You may notice signs of atrial fibrillation when you check your pulse. Your pulse may seem uneven or fast. 
What can you expect when you have atrial fibrillation? At first, spells of atrial fibrillation may come on suddenly and last a short time. It may go away on its own or it goes away after treatment. This is called paroxysmal atrial fibrillation. Over time, the spells may last longer and occur more often. They often don't go away on their own. How is it treated? Treatments can help you feel better and prevent future problems, especially stroke and heart failure. The main types of treatment slow the heart rate, control the heart rhythm, and help prevent stroke. Your treatment will depend on the cause of your atrial fibrillation, your symptoms, and your risk for stroke. · Heart rate treatment. Medicine may be used to slow your heart rate. Your heartbeat may still be irregular. But these medicines keep your heart from beating too fast. They may also help relieve your symptoms. · Heart rhythm treatment.  Different treatments may be used to try to stop atrial fibrillation and keep it from returning. They can also relieve symptoms. These treatments include medicine, electrical cardioversion to shock the heart back to a normal rhythm, a procedure called catheter ablation, and heart surgery. · Stroke prevention. You and your doctor can decide how to lower your risk. You may decide to take a blood-thinning medicine, such as aspirin or an anticoagulant. How can you live well with it? You can live well and help manage atrial fibrillation by having a heart-healthy lifestyle. To have a heart-healthy lifestyle: · Don't smoke. · Eat heart-healthy foods. · Be active. Talk to your doctor about what type and level of exercise is safe for you. · Stay at a healthy weight. Lose weight if you need to. · Manage stress. · Avoid alcohol if it triggers symptoms. · Manage other health problems such as high blood pressure, high cholesterol, and diabetes. · Avoid getting sick from the flu. Get a flu shot every year. Where can you learn more? Go to http://jgLupatechdave.info/. Enter [phone] in the search box to learn more about \"Learning About Atrial Fibrillation. \" Current as of: September 21, 2016 Content Version: 11.4 © 2412-7676 [employer]. Care instructions adapted under license by [employer] (which disclaims liability or warranty for this information). If you have questions about a medical condition or this instruction, always ask your healthcare professional. [place] [address] any warranty or liability for your use of this information. DISCHARGE SUMMARY from Nurse PATIENT INSTRUCTIONS: 
 
After general anesthesia or intravenous sedation, for 24 hours or while taking prescription Narcotics: · Limit your activities · Do not drive and operate hazardous machinery · Do not make important personal or business decisions · Do  not drink alcoholic beverages · If you have not urinated within 8 hours after discharge, please contact your surgeon on call. Report the following to your surgeon: 
· Excessive pain, swelling, redness or odor of or around the surgical area · Temperature over 100.5 · Nausea and vomiting lasting longer than 4 hours or if unable to take medications · Any signs of decreased circulation or nerve impairment to extremity: change in color, persistent  numbness, tingling, coldness or increase pain · Any questions What to do at Home: 
Recommended activity: Activity as tolerated If you experience any of the following symptoms: Can have no symptoms, but people may experience: 
Pain areas: in the chest 
Whole body: dizziness, fatigue, or inability to exercise Heart: fast heart rate or palpitations Also common: shortness of breath or weak. Call 911, as these may be worsening symptoms of A-fib. *  Please give a list of your current medications to your Primary Care Provider. *  Please update this list whenever your medications are discontinued, doses are 
    changed, or new medications (including over-the-counter products) are added. *  Please carry medication information at all times in case of emergency situations. These are general instructions for a healthy lifestyle: No smoking/ No tobacco products/ Avoid exposure to second hand smoke Surgeon General's Warning:  Quitting smoking now greatly reduces serious risk to your health. Obesity, smoking, and sedentary lifestyle greatly increases your risk for illness A healthy diet, regular physical exercise & weight monitoring are important for maintaining a healthy lifestyle You may be retaining fluid if you have a history of heart failure or if you experience any of the following symptoms:  Weight gain of 3 pounds or more overnight or 5 pounds in a week, increased swelling in our hands or feet or shortness of breath while lying flat in bed.   Please call your doctor as soon as you notice any of these symptoms; do not wait until your next office visit. Recognize signs and symptoms of STROKE: 
 
F-face looks uneven A-arms unable to move or move unevenly S-speech slurred or non-existent T-time-call 911 as soon as signs and symptoms begin-DO NOT go Back to bed or wait to see if you get better-TIME IS BRAIN. Warning Signs of HEART ATTACK Call 911 if you have these symptoms: 
? Chest discomfort. Most heart attacks involve discomfort in the center of the chest that lasts more than a few minutes, or that goes away and comes back. It can feel like uncomfortable pressure, squeezing, fullness, or pain. ? Discomfort in other areas of the upper body. Symptoms can include pain or discomfort in one or both arms, the back, neck, jaw, or stomach. ? Shortness of breath with or without chest discomfort. ? Other signs may include breaking out in a cold sweat, nausea, or lightheadedness. Don't wait more than five minutes to call [address]! Fast action can save your life. Calling 911 is almost always the fastest way to get lifesaving treatment. Emergency Medical Services staff can begin treatment when they arrive  up to an hour sooner than if someone gets to the hospital by car. The discharge information has been reviewed with the patient. The patient verbalized understanding. Discharge medications reviewed with the patient and appropriate educational materials and side effects teaching were provided. ___________________________________________________________________________________________________________________________________ Patient armband removed and shredded If you experience chest pain call 911. Do not drive yourself. Buck Mason Activation Thank you for requesting access to [employer]. Please follow the instructions below to securely access and download your online medical record.  Buck Mason allows you to send messages to your doctor, view your test results, renew your prescriptions, schedule appointments, and more. How Do I Sign Up? 1. In your internet browser, go to [email or web address] 
2. Click on the First Time User? Click Here link in the Sign In box. You will be redirect to the New Member Sign Up page. 3. Enter your Shuropody Access Code exactly as it appears below. You will not need to use this code after youve completed the sign-up process. If you do not sign up before the expiration date, you must request a new code. Shuropody Access Code: Activation code not generated Current Shuropody Status: Active (This is the date your Shuropody access code will ) 4. Enter the last four digits of your Social Security Number (xxxx) and Date of Birth (mm/dd/yyyy) as indicated and click Submit. You will be taken to the next sign-up page. 5. Create a Shuropody ID. This will be your Shuropody login ID and cannot be changed, so think of one that is secure and easy to remember. 6. Create a Shuropody password. You can change your password at any time. 7. Enter your Password Reset Question and Answer. This can be used at a later time if you forget your password. 8. Enter your e-mail address. You will receive e-mail notification when new information is available in [address] 9. Click Sign Up. You can now view and download portions of your medical record. 10. Click the Download Summary menu link to download a portable copy of your medical information. Additional Information If you have questions, please visit the Frequently Asked Questions section of the Shuropody website at [email or web address] Remember, Shuropody is NOT to be used for urgent needs. For medical emergencies, dial 911. Shuropody Announcement We are excited to announce that we are making your provider's discharge notes available to you in Shuropody.   You will see these notes when they are completed and signed by the physician that discharged you from your recent hospital stay. If you have any questions or concerns about any information you see in [employer], please call the Health Information Department where you were seen or reach out to your Primary Care Provider for more information about your plan of care. Introducing [facility]! Deapattie Good: 
Thank you for requesting a [employer] account. Our records indicate that you already have an active [employer] account. You can access your account anytime at [email or web address] Did you know that you can access your hospital and ER discharge instructions at any time in [employer]? You can also review all of your test results from your hospital stay or ER visit. Additional Information If you have questions, please visit the Frequently Asked Questions section of the [employer] website at [email or web address] Remember, [employer] is NOT to be used for urgent needs. For medical emergencies, dial 911. Now available from your iPhone and Android! Introducing Jamel Carr As a [facility] patient, I wanted to make you aware of our electronic visit tool called Jamel Bruno. [facility] 24/7 allows you to connect within minutes with a medical provider 24 hours a day, seven days a week via a mobile device or tablet or logging into a secure website from your computer. You can access Jamel Carr from anywhere in the [place]. A virtual visit might be right for you when you have a simple condition and feel like you just dont want to get out of bed, or cant get away from work for an appointment, when your regular [facility] provider is not available (evenings, weekends or holidays), or when youre out of town and need minor care.   Electronic visits cost only $49 and if the Jamel Carr provider determines a prescription is needed to treat your condition, one can be electronically transmitted to a nearby pharmacy*. Please take a moment to enroll today if you have not already done so. The enrollment process is free and takes just a few minutes. To enroll, please download the [employer] 24/7 beverly to your tablet or phone, or visit [email or web address] to enroll on your computer. And, as an [address] [place] [address] patient with a [employer] account, the results of your visits will be scanned into your electronic medical record and your primary care provider will be able to view the scanned results. We urge you to continue to see your regular [employer] provider for your ongoing medical care. And while your primary care provider may not be the one available when you seek a [email or web address] virtual visit, the peace of mind you get from getting a real diagnosis real time can be priceless. For more information on [email or web address], view our Frequently Asked Questions (FAQs) at [email or web address]. Sincerely, 
 
Brenna Márquez MD 
Chief Medical Officer [address] [place] [address] *:  certain medications cannot be prescribed via [email or web address] Providers Seen During Your Hospitalization Provider Specialty Primary office phone Claribel Hart DO Emergency Medicine [phone] Kate Calderon MD Internal Medicine [phone] Maldonado Sierra DO Internal Medicine [phone] Your Primary Care Physician (PCP) Primary Care Physician Office Phone Office Fax Courtney Villarreal [phone] You are allergic to the following No active allergies Recent Documentation Height Weight BMI Smoking Status 1.778 m 77.1 kg 24.39 kg/m2 Former Smoker Emergency Contacts Name Discharge Info Relation Home Work Mobile Allie Galdamez DISCHARGE CAREGIVER [3] Sister [23]   [phone] Patient Belongings The following personal items are in your possession at time of discharge: 
  Dental Appliances: None  Visual Aid: At bedside, Glasses, With patient      Home Medications: None   Jewelry: Watch  Clothing: At bedside, Pants, Footwear, Shirt, Undergarments, Belt    Other Valuables: [employer], Money (comment), Money clip ($960) Please provide this summary of care documentation to your next provider. Signatures-by signing, you are acknowledging that this After Visit Summary has been reviewed with you and you have received a copy. Patient Signature:  ____________________________________________________________ Date:  ____________________________________________________________  
  
Kali [employer] Provider Signature:  ____________________________________________________________ Date:  ____________________________________________________________

## 2018-04-16 NOTE — PROGRESS NOTES
Patient admitted from the ED; Placed on tele #4; IV infusing cardizem Q 10 verified with Pascale Hinds from ED. Required documentation completed. Patient has money in in pants pocket in leather money clip counted with patient and this RN total $574; patient verbalized he did not want it locked in a safe. Bed in lowest/locked position, call bell within reach. Will continue to monitor.

## 2018-04-16 NOTE — CONSULTS
[facility] - Consult Note    Consultation request by Dr. Desiree Larose for advice/opinion related to evaluating shoulder pain    Date of  Admission: 4/16/2018  1:15 PM   Primary Care Physician:  Chaparro Mckeon., DO      I saw, evaluated, interviewed and examined the patient personally. I agree with the findings and plan of care as documented below with PAVEL note  Left shoulder pain, likely atypical for angina. ?? musculoskeletal in nature. Helped brother Friday unload truck and doing some yard work Saturday. A.fib with RVR  Now on IV CCB drip  Restart coumadin  Further plan per hospital course. On coumadin and INR 1.9 today. Nolan Desai MD           Assessment:     -Presented due to shoulder pain  -Atrial fibrillation with rapid ventricular response, on Diltiazem for rate control and Coumadin for [address] with INR 1.9 on presentation  -HTN  -Hyperlipidemia  -Hx nontoxic multinodular goiter     Plan:     Pt presented to the hospital due to left shoulder/neck pain onset while sitting on the couch. He experienced numbness down left arm and to left neck after moving his left arm, which has since resolved. He denies any associated chest pain, shortness of breath or palpitations. Initial troponin negative, continue to monitor. Pt will be made NPO after MN for possible ischemic workup. History of Present Illness: This is a 61 y.o. male admitted for Atrial fibrillation with RVR ([facility] [address] Patient complains of:  Shoulder pain    Pt is a 62 yo M who presented to the hospital due to worsening dull achy left shoulder pain onset at 11 AM today while sitting on the couch. He states that he subsequently noticed numbness radiating down the left arm and to the left neck after moving his left arm, which lasted for 1 hour. He denies any aggravating or alleviating factors for his pain currently. He denies any chest pain, shortness of breath or palptiations.   He states that his heart rate has been overall well-controlled, this morning was 78 BPM.      Cardiac risk factors: dyslipidemia, male gender, hypertension      Review of Symptoms:  Except as stated above include:  Constitutional:  negative  Respiratory:  No shortness of breath  Cardiovascular:  Pt in rapid atrial fibrillation, denies chest pain or palpitations  Gastrointestinal: negative  Genitourinary:  negative  Musculoskeletal:  + left shoulder pain  Neurological:  Negative  Dermatological:  Negative  Endocrinological: Negative  Psychological:  Negative       Past Medical History:     Past Medical History:   Diagnosis Date    Arthritis 11/13/2014    Atrial fibrillation ([address] 11/28/2017    Elevated cholesterol 4/12/2016    Essential hypertension, malignant 11/13/2014    Hearing loss 11/13/2014    Nontoxic multinodular goiter 2/7/2018         Social History:     Social History     Social History    Marital status: SINGLE     Spouse name: N/A    Number of children: N/A    Years of education: N/A     Social History Main Topics    Smoking status: Former Smoker    Smokeless tobacco: Never Used    Alcohol use 12.5 oz/week     25 Shots of liquor per week    Drug use: No    Sexual activity: Yes     Partners: Female     Other Topics Concern    Not on file     Social History Narrative        Family History:     Family History   Problem Relation Age of Onset    Diabetes Father         Medications:   No Known Allergies     Current Facility-Administered Medications   Medication Dose Route Frequency    nitroglycerin (NITROSTAT) tablet 0.4 mg  0.4 mg SubLINGual PRN    dilTIAZem (CARDIZEM) 100 mg in 0.9% sodium chloride (MBP/ADV) 100 mL infusion  10 mg/hr IntraVENous CONTINUOUS     Current Outpatient Prescriptions   Medication Sig    warfarin (COUMADIN) 5 mg tablet Take 1 Tab by mouth daily.  dilTIAZem CD (CARDIZEM CD) 120 mg ER capsule Take 1 Cap by mouth daily.  dilTIAZem CD (CARDIZEM CD) 240 mg ER capsule Take 1 Cap by mouth daily.     magnesium oxide 200 mg magnesium tab Take 200 mg by mouth daily.  vitamin k 100 mcg tablet Take 1 Tab by mouth daily.  multivit-min-FA-lycopen-lutein (CENTRUM SILVER MEN) 300-600-300 mcg tab Take 1 Tab by mouth daily. Physical Exam:     Visit Vitals    /83    Pulse (!) 112    Temp 97.9 °F (36.6 °C)    Resp 18    Ht 5' 10\" (1.778 m)    Wt 170 lb (77.1 kg)    SpO2 95%    BMI 24.39 kg/m2     BP Readings from Last 3 Encounters:   04/16/18 132/83   03/28/18 (!) 156/95   03/28/18 (!) 150/96     Pulse Readings from Last 3 Encounters:   04/16/18 (!) 112   03/28/18 93   03/28/18 67     Wt Readings from Last 3 Encounters:   04/16/18 170 lb (77.1 kg)   03/28/18 169 lb (76.7 kg)   03/28/18 170 lb (77.1 kg)       General:  alert, cooperative, no distress, appears stated age  Neck:  No JVD  Lungs:  clear to auscultation bilaterally  Heart:  Tachycardic irregularly irregular rhythm  Abdomen:  abdomen is soft without significant tenderness, masses, organomegaly or guarding  Extremities:  no edema  Skin: Warm and dry. Petechiae noted to bilateral shins.   Neuro: alert, oriented x3, affect appropriate, no focal neurological deficits, moves all extremities well, no involuntary movements  Psych: non focal     Data Review:     Recent Labs      04/16/18   1327   WBC  7.2   HGB  17.3*   HCT  48.9*   PLT  227     Recent Labs      04/16/18   1327   NA  138   K  3.7   CL  100   CO2  26   GLU  157*   BUN  12   CREA  1.19   CA  9.1   ALB  4.6   SGOT  33   ALT  51   INR  1.9*       Results for orders placed or performed during the hospital encounter of 04/16/18   EKG, 12 LEAD, INITIAL   Result Value Ref Range    Ventricular Rate 141 BPM    Atrial Rate 394 BPM    QRS Duration 88 ms    Q-T Interval 292 ms    QTC Calculation (Bezet) 447 ms    Calculated R Axis 22 degrees    Calculated T Axis 37 degrees    Diagnosis       Atrial fibrillation with rapid ventricular response  Abnormal ECG  When compared with ECG of 28-MAR-2016 08:51,  Atrial fibrillation has replaced Sinus rhythm  Vent.  rate has increased BY  89 BPM  Nonspecific T wave abnormality now evident in Inferior leads         All Cardiac Markers in the last 24 hours:    Lab Results   Component Value Date/Time     04/16/2018 01:27 PM    CKMB 1.9 04/16/2018 01:27 PM    CKND1 1.1 04/16/2018 01:27 PM    TROIQ <0.02 04/16/2018 01:27 PM       Last Lipid:    Lab Results   Component Value Date/Time    Cholesterol, total 234 (H) 03/14/2018 02:32 PM    HDL Cholesterol 66 (H) 03/14/2018 02:32 PM    LDL, calculated 137 (H) 03/14/2018 02:32 PM    Triglyceride 154 (H) 03/14/2018 02:32 PM    CHOL/HDL Ratio 3.1 11/28/2017 02:42 PM       Signed By: Chastity Chin PA-C     April 16, 2018

## 2018-04-17 ENCOUNTER — APPOINTMENT (OUTPATIENT)
Dept: NUCLEAR MEDICINE | Age: 63
DRG: 310 | End: 2018-04-17
Attending: PHYSICIAN ASSISTANT
Payer: COMMERCIAL

## 2018-04-17 PROBLEM — I48.19 PERSISTENT ATRIAL FIBRILLATION (HCC): Status: ACTIVE | Noted: 2018-04-17

## 2018-04-17 LAB
ALBUMIN SERPL-MCNC: 4.1 G/DL (ref 3.4–5)
ALBUMIN/GLOB SERPL: 1.4 {RATIO} (ref 0.8–1.7)
ALP SERPL-CCNC: 105 U/L (ref 45–117)
ALT SERPL-CCNC: 39 U/L (ref 16–61)
ANION GAP SERPL CALC-SCNC: 10 MMOL/L (ref 3–18)
AST SERPL-CCNC: 27 U/L (ref 15–37)
ATRIAL RATE: 394 BPM
BILIRUB SERPL-MCNC: 1 MG/DL (ref 0.2–1)
BUN SERPL-MCNC: 12 MG/DL (ref 7–18)
BUN/CREAT SERPL: 12 (ref 12–20)
CALCIUM SERPL-MCNC: 8.6 MG/DL (ref 8.5–10.1)
CALCULATED R AXIS, ECG10: 22 DEGREES
CALCULATED T AXIS, ECG11: 37 DEGREES
CHLORIDE SERPL-SCNC: 103 MMOL/L (ref 100–108)
CO2 SERPL-SCNC: 26 MMOL/L (ref 21–32)
CREAT SERPL-MCNC: 1.01 MG/DL (ref 0.6–1.3)
DIAGNOSIS, 93000: NORMAL
ERYTHROCYTE [DISTWIDTH] IN BLOOD BY AUTOMATED COUNT: 13.8 % (ref 11.6–14.5)
GLOBULIN SER CALC-MCNC: 2.9 G/DL (ref 2–4)
GLUCOSE SERPL-MCNC: 100 MG/DL (ref 74–99)
HCT VFR BLD AUTO: 46 % (ref 36–48)
HGB BLD-MCNC: 16.2 G/DL (ref 13–16)
INR PPP: 1.8 (ref 0.8–1.2)
MCH RBC QN AUTO: 32.7 PG (ref 24–34)
MCHC RBC AUTO-ENTMCNC: 35.2 G/DL (ref 31–37)
MCV RBC AUTO: 92.7 FL (ref 74–97)
PLATELET # BLD AUTO: 214 K/UL (ref 135–420)
PMV BLD AUTO: 10.3 FL (ref 9.2–11.8)
POTASSIUM SERPL-SCNC: 3.8 MMOL/L (ref 3.5–5.5)
PROT SERPL-MCNC: 7 G/DL (ref 6.4–8.2)
PROTHROMBIN TIME: 20.5 SEC (ref 11.5–15.2)
Q-T INTERVAL, ECG07: 292 MS
QRS DURATION, ECG06: 88 MS
QTC CALCULATION (BEZET), ECG08: 447 MS
RBC # BLD AUTO: 4.96 M/UL (ref 4.7–5.5)
SODIUM SERPL-SCNC: 139 MMOL/L (ref 136–145)
TROPONIN I SERPL-MCNC: <0.02 NG/ML (ref 0–0.04)
VENTRICULAR RATE, ECG03: 141 BPM
WBC # BLD AUTO: 8.8 K/UL (ref 4.6–13.2)

## 2018-04-17 PROCEDURE — 74011250636 HC RX REV CODE- 250/636: Performed by: HOSPITALIST

## 2018-04-17 PROCEDURE — 85610 PROTHROMBIN TIME: CPT | Performed by: NURSE PRACTITIONER

## 2018-04-17 PROCEDURE — 65660000000 HC RM CCU STEPDOWN

## 2018-04-17 PROCEDURE — 74011250637 HC RX REV CODE- 250/637: Performed by: PHYSICIAN ASSISTANT

## 2018-04-17 PROCEDURE — 74011000258 HC RX REV CODE- 258: Performed by: EMERGENCY MEDICINE

## 2018-04-17 PROCEDURE — 36415 COLL VENOUS BLD VENIPUNCTURE: CPT | Performed by: NURSE PRACTITIONER

## 2018-04-17 PROCEDURE — 84484 ASSAY OF TROPONIN QUANT: CPT | Performed by: NURSE PRACTITIONER

## 2018-04-17 PROCEDURE — 74011000250 HC RX REV CODE- 250: Performed by: PHYSICIAN ASSISTANT

## 2018-04-17 PROCEDURE — 80053 COMPREHEN METABOLIC PANEL: CPT | Performed by: NURSE PRACTITIONER

## 2018-04-17 PROCEDURE — A9500 TC99M SESTAMIBI: HCPCS

## 2018-04-17 PROCEDURE — 74011000258 HC RX REV CODE- 258: Performed by: PHYSICIAN ASSISTANT

## 2018-04-17 PROCEDURE — 74011000250 HC RX REV CODE- 250: Performed by: EMERGENCY MEDICINE

## 2018-04-17 PROCEDURE — 74011250637 HC RX REV CODE- 250/637: Performed by: EMERGENCY MEDICINE

## 2018-04-17 PROCEDURE — 85027 COMPLETE CBC AUTOMATED: CPT | Performed by: NURSE PRACTITIONER

## 2018-04-17 PROCEDURE — 74011250637 HC RX REV CODE- 250/637: Performed by: NURSE PRACTITIONER

## 2018-04-17 PROCEDURE — 74011000250 HC RX REV CODE- 250

## 2018-04-17 PROCEDURE — 93017 CV STRESS TEST TRACING ONLY: CPT | Performed by: PHYSICIAN ASSISTANT

## 2018-04-17 RX ORDER — METOPROLOL TARTRATE 5 MG/5ML
5 INJECTION INTRAVENOUS
Status: COMPLETED | OUTPATIENT
Start: 2018-04-17 | End: 2018-04-17

## 2018-04-17 RX ORDER — DILTIAZEM HYDROCHLORIDE 120 MG/1
120 CAPSULE, COATED, EXTENDED RELEASE ORAL
Status: DISCONTINUED | OUTPATIENT
Start: 2018-04-17 | End: 2018-04-17

## 2018-04-17 RX ORDER — DILTIAZEM HYDROCHLORIDE 180 MG/1
180 CAPSULE, COATED, EXTENDED RELEASE ORAL
Status: DISCONTINUED | OUTPATIENT
Start: 2018-04-17 | End: 2018-04-19 | Stop reason: HOSPADM

## 2018-04-17 RX ORDER — METOPROLOL TARTRATE 5 MG/5ML
INJECTION INTRAVENOUS
Status: COMPLETED
Start: 2018-04-17 | End: 2018-04-17

## 2018-04-17 RX ORDER — DILTIAZEM HYDROCHLORIDE 240 MG/1
240 CAPSULE, COATED, EXTENDED RELEASE ORAL DAILY
Status: DISCONTINUED | OUTPATIENT
Start: 2018-04-17 | End: 2018-04-19 | Stop reason: HOSPADM

## 2018-04-17 RX ADMIN — SODIUM CHLORIDE 10 MG/HR: 900 INJECTION, SOLUTION INTRAVENOUS at 09:24

## 2018-04-17 RX ADMIN — DILTIAZEM HYDROCHLORIDE 180 MG: 180 CAPSULE, COATED, EXTENDED RELEASE ORAL at 18:35

## 2018-04-17 RX ADMIN — NITROGLYCERIN 0.4 MG: 0.4 TABLET SUBLINGUAL at 00:07

## 2018-04-17 RX ADMIN — DILTIAZEM HYDROCHLORIDE 240 MG: 240 CAPSULE, COATED, EXTENDED RELEASE ORAL at 13:11

## 2018-04-17 RX ADMIN — SODIUM CHLORIDE 10 MG/HR: 900 INJECTION, SOLUTION INTRAVENOUS at 22:43

## 2018-04-17 RX ADMIN — REGADENOSON 0.4 MG: 0.08 INJECTION, SOLUTION INTRAVENOUS at 12:24

## 2018-04-17 RX ADMIN — METOPROLOL TARTRATE 5 MG: 5 INJECTION INTRAVENOUS at 16:06

## 2018-04-17 RX ADMIN — WARFARIN SODIUM 5 MG: 5 TABLET ORAL at 09:23

## 2018-04-17 RX ADMIN — METOROPROLOL TARTRATE 5 MG: 5 INJECTION, SOLUTION INTRAVENOUS at 16:06

## 2018-04-17 NOTE — PROGRESS NOTES
04/17/18 1505   Vitals   Pulse (Heart Rate) (!) 137   Heart Rate Source Monitor   BP (!) 162/102   MAP (Calculated) 122   BP 1 Location Left arm   BP 1 Method Manual   BP Patient Position At rest     Patinet 's-150's (non sustained 150's) Cole FERRARO (Cardiology) made aware. Will continue to monitor and adjust P.M.  Dose of cardizem see new orders     3112 Dimitris Gonzales MD stated to give patient now dose of 5mg lopressor for HR

## 2018-04-17 NOTE — PROGRESS NOTES
2 [facility]  Hospitalist Division        Inpatient Daily Progress Note    Daily progress Note    Patient: Bobby Nunes. MRN: [id number]  [facility]: [id number]    YOB: 1955  Age: 61 y.o. Sex: male    DOA: 4/16/2018 LOS:  LOS: 1 day                    Chief Complaint:  Afib w/ RVR    Interval History: 60 y/o  male with hx of afib on Coumadin 5 mg daily and Cardizem BID (diagnosed in 11/2017), nontoxic multinodular goiter, HLD- presented to the ED on 4/26/2018 with c/o left shoulder, neck and arm pain that began 2 hours PTA. Also reports left arm numbness. Symptoms occurred while watching TV- he took sublingual Nitro without improvement. Per pt, he is compliant with medications, was on beta blocker and norvasc but developed rash which improved after medication stopped. Work up in the ED revealed afib w/ RVR- HR 140s. He was given Cardizem bolus then started on Cardizem gtt. Cardiology consulted. Echo with EF 45%, mild diffuse hypokinesis,  Nuclear stress test 4/17/2018 : EF 38%, no area of prior scarring or ongoing ischemia, global wall motion abnormalities, low to intermediate risk.     Assessment/Plan:     Patient Active Problem List   Diagnosis Code    Essential hypertension, malignant I10    Arthritis M19.90    Elevated cholesterol E78.00    Atrial fibrillation (HCC) I48.91    Nontoxic multinodular goiter E04.2    Atrial fibrillation with RVR ([facility]) I48.91       A/P:  Afib w/ RVR  - Cardiology following, appreciate input  - troponin flat x 3  - Cardizem gtt stopped today prior to stress test  - HR 130s-150s  - Cardizem 240 mg daily and Cardizem 180 mg nightly  - s/p stress test 4/17/2018 : EF 38%, no area of prior scarring or ongoing ischemia, global wall motion abnormalities, low to intermediate risk  - continue Coumadin 5 mg - monitor INR daily    Hypertension  - on Cardizem  - continue to monitor    DVT Prophylaxis  - pt on Coumadin      BRISEIDA CarvajalP-C  [address]  Pager:  [phone]  Office:  [phone]        Subjective:      No further c/o left shoulder/arm pain or numbness. Had stress test today. HR 130s-150s off Cardizem gtt and on oral Cardizem. Denies chest pain or SOB. Objective:      Visit Vitals    BP (!) 162/102 (BP 1 Location: Left arm, BP Patient Position: At rest)    Pulse (!) 137    Temp 97.6 °F (36.4 °C)    Resp 18    Ht 5' 10\" (1.778 m)    Wt 77.1 kg (170 lb)    SpO2 95%    BMI 24.39 kg/m2           Physical Exam:  General appearance: alert, cooperative, no distress, appears stated age  Head: Normocephalic, without obvious abnormality, atraumatic  Lungs: clear to auscultation bilaterally  Heart: irregularly irregular  Abdomen: soft, non tender, non distended. Normoactive bowel sounds.    Extremities: extremities normal, atraumatic, no cyanosis or edema  Skin: Skin color, texture, turgor normal. No rashes or lesions  Neurologic: Grossly normal  PSY: Mood and affect normal, appropriately behaved        Intake and Output:  Current Shift:  04/17 0701 - 04/17 1900  In: 61.3 [I.V.:61.3]  Out: -   Last three shifts:  04/15 1901 - 04/17 0700  In: 614.7 [P.O.:480; I.V.:134.7]  Out: 560 [Urine:560]    Recent Results (from the past 24 hour(s))   TROPONIN I    Collection Time: 04/16/18  7:43 PM   Result Value Ref Range    Troponin-I, Qt. <0.02 0.0 - 0.045 NG/ML   TROPONIN I    Collection Time: 04/17/18 12:45 AM   Result Value Ref Range    Troponin-I, Qt. <0.02 0.0 - 0.045 NG/ML   CBC W/O DIFF    Collection Time: 04/17/18 12:45 AM   Result Value Ref Range    WBC 8.8 4.6 - 13.2 K/uL    RBC 4.96 4.70 - 5.50 M/uL    HGB 16.2 (H) 13.0 - 16.0 g/dL    HCT 46.0 36.0 - 48.0 %    MCV 92.7 74.0 - 97.0 FL    MCH 32.7 24.0 - 34.0 PG    MCHC 35.2 31.0 - 37.0 g/dL    RDW 13.8 11.6 - 14.5 %    PLATELET 988 733 - 693 K/uL    MPV 10.3 9.2 - 29.4 FL   METABOLIC PANEL, COMPREHENSIVE Collection Time: 04/17/18  4:45 AM   Result Value Ref Range    Sodium 139 136 - 145 mmol/L    Potassium 3.8 3.5 - 5.5 mmol/L    Chloride 103 100 - 108 mmol/L    CO2 26 21 - 32 mmol/L    Anion gap 10 3.0 - 18 mmol/L    Glucose 100 (H) 74 - 99 mg/dL    BUN 12 7.0 - 18 MG/DL    Creatinine 1.01 0.6 - 1.3 MG/DL    BUN/Creatinine ratio 12 12 - 20      GFR est AA >60 >60 ml/min/1.73m2    GFR est non-AA >60 >60 ml/min/1.73m2    Calcium 8.6 8.5 - 10.1 MG/DL    Bilirubin, total 1.0 0.2 - 1.0 MG/DL    ALT (SGPT) 39 16 - 61 U/L    AST (SGOT) 27 15 - 37 U/L    Alk. phosphatase 105 45 - 117 U/L    Protein, total 7.0 6.4 - 8.2 g/dL    Albumin 4.1 3.4 - 5.0 g/dL    Globulin 2.9 2.0 - 4.0 g/dL    A-G Ratio 1.4 0.8 - 1.7     PROTHROMBIN TIME + INR    Collection Time: 04/17/18  4:45 AM   Result Value Ref Range    Prothrombin time 20.5 (H) 11.5 - 15.2 sec    INR 1.8 (H) 0.8 - 1.2             Lab Results   Component Value Date/Time    Glucose 100 (H) 04/17/2018 04:45 AM    Glucose 157 (H) 04/16/2018 01:27 PM    Glucose 91 03/14/2018 02:32 PM    Glucose 82 11/28/2017 02:42 PM    Glucose 82 04/18/2017 03:30 PM        Imaging:  Nm Cardiac Spect W Strs/rest Mult    Result Date: 4/17/2018  Clinical History: 61year old with no known coronary artery disease Cardiac risk factors include: Hypertension Previous cardiac procedures include: None noted Current symptoms include: Chest pain/shoulder pain Procedure: After obtaining appropriate informed consent, pharmacologic stress testing was performed using Lexiscan 0.4 mg intravenously. The reason for termination was end of protocol reached. The resting electrocardiogram atrial fibrillation. Poor R-wave progression. The stress electrocardiogram demonstrated no significant ST wave shifts. Myocardial perfusion imaging was performed at rest 30 minutes following the injection of 11.48 mCi of sestamibi. At peak pharmacologic effect, the patient was injected with 42.0  mCi of sestamibi.   Gated post-stress tomographic imaging was performed 45 minutes after stress. Findings: The overall quality of the study was good Splanchnic activity was mild Soft tissue attenuation was mild Left ventricular cavity was noted to be normal in size in the rest and stress studies. The right ventricle was unremarkable. Stress SPECT images demonstrated uniform uptake of cardiac tracer throughout the myocardium. The rest images were similar to the stress images. Gated SPECT imaging demonstrated globally reduced systolic function. The left ventricle ejection fraction was calculated to 38 %. Impression: Myocardial perfusion imaging is normal There is no area of prior scarring or ongoing ischemia. Overall left ventricle systolic function was markedly reduced with global wall motion abnormalities (as noted above). Risk/extent of ischemia: Low to intermediate risk. Follow up plan / recommendation: Patient intermediate risk based on ejection fraction. No evidence of ongoing ischemia in the present study.        Medication List Reviewed:  Current Facility-Administered Medications   Medication Dose Route Frequency    dilTIAZem CD (CARDIZEM CD) capsule 240 mg  240 mg Oral DAILY    dilTIAZem CD (CARDIZEM CD) capsule 180 mg  180 mg Oral QHS    nitroglycerin (NITROSTAT) tablet 0.4 mg  0.4 mg SubLINGual PRN    warfarin (COUMADIN) tablet 5 mg  5 mg Oral DAILY    acetaminophen (TYLENOL) tablet 650 mg  650 mg Oral Q4H PRN    ondansetron (ZOFRAN) injection 4 mg  4 mg IntraVENous Q4H PRN

## 2018-04-17 NOTE — PROGRESS NOTES
Reason for Admission:    Afib with RVR                RRAT Score:      3             Plan for utilizing home health:  none                       Likelihood of Readmission:   low                      Transition of Care Plan:      Home with out-pt follow up    Chart reviewed. Met with pt., verified all demographics. States has [place] ins. States Dr. Diana Hicks is his PCP, last seen approx 1 moth ago, has appt for 4/18/18. NOK: Carine Dennis, sister. Rents a room from friends. Uses no DME. Independent with ADL's prior to admit. Transition plan: home. Will cont to follow for needs. [address] [facility], ext [phone]. Patient has designated __his sister______________________ to participate in his/her discharge plan and to receive any needed information. Name:  Carine Dennis  Address:  Phone number:  [phone]    Care Management Interventions  PCP Verified by CM: Yes (last seen 1 month ago, has appt 4/18/18)  Mode of Transport at Discharge: Other (see comment) (family)  Transition of Care Consult (CM Consult): Discharge Planning  Discharge Durable Medical Equipment: No  Physical Therapy Consult: No  Occupational Therapy Consult: No  Speech Therapy Consult: No  Current Support Network:  Other (rents room from friends. )  Confirm Follow Up Transport: Self  Plan discussed with Pt/Family/Caregiver: Yes  Discharge Location  Discharge Placement: Home

## 2018-04-17 NOTE — PROGRESS NOTES
[facility]  -  Progress Note      Patient: Tessa Pappas. MRN: [id number]  SSN: [id number]    YOB: 1955  Age: 61 y.o. Sex: male      Admit Date: 4/16/2018  Patient seen and examined independently. Nuclear stress test negative for ischemia with an EF of 38%. Will watch rhythm rate overnight and make any necessary changes in AV blocking drugs. Should be ready for DC in AM. Ketty Coe MD  Assessment:     -Presented due to shoulder pain  -Atrial fibrillation with rapid ventricular response, on Diltiazem for rate control and Coumadin for [address] with INR 1.9 on presentation  -HTN  -Hyperlipidemia  -Hx nontoxic multinodular goiter    Plan:     -Will obtain nuclear stress test for further evaluation.  -Cardizem gtts stopped this AM prior to stress test.  Will resume home Cardizem dose this afternoon. Regular AM dose of Cardizem CD (240 mg) to be given once back from stress test, start normal PM dose of 120 mg Cardizem CD tonight. Subjective:     No new complaints. Objective:      No data found.         Patient Vitals for the past 96 hrs:   Weight   04/16/18 1340 170 lb (77.1 kg)         Intake/Output Summary (Last 24 hours) at 04/17/18 1235  Last data filed at 04/17/18 0931   Gross per 24 hour   Intake              676 ml   Output              560 ml   Net              116 ml       Physical Exam:  General:  alert, cooperative, no distress, appears stated age  Neck:  No JVD  Lungs:  clear to auscultation bilaterally  Heart:  Tachycardic irregularly irregular rhythm  Abdomen:  abdomen is soft without significant tenderness, masses, organomegaly or guarding  Extremities:  no edema     Data Review:     Labs: Results:       Chemistry Recent Labs      04/17/18   0445  04/16/18   1327   GLU  100*  157*   NA  139  138   K  3.8  3.7   CL  103  100   CO2  26  26   BUN  12  12   CREA  1.01  1.19   CA  8.6  9.1   AGAP  10  12   BUCR  12  10*   AP  105  119*   TP  7.0  8.3*   ALB 4.1  4.6   GLOB  2.9  3.7   AGRAT  1.4  1.2      CBC w/Diff Recent Labs      04/17/18   0045  04/16/18   1327   WBC  8.8  7.2   RBC  4.96  5.27   HGB  16.2*  17.3*   HCT  46.0  48.9*   PLT  214  227   GRANS   --   61   LYMPH   --   28   EOS   --   1      Cardiac Enzymes Lab Results   Component Value Date/Time     04/16/2018 01:27 PM    CKMB 1.9 04/16/2018 01:27 PM    CKND1 1.1 04/16/2018 01:27 PM    TROIQ <0.02 04/17/2018 12:45 AM    TROIQ <0.02 04/16/2018 07:43 PM    TROIQ <0.02 04/16/2018 01:27 PM      Coagulation Recent Labs      04/17/18   0445  04/16/18   1327   PTP  20.5*  20.9*   INR  1.8*  1.9*       Lipid Panel Lab Results   Component Value Date/Time    Cholesterol, total 234 (H) 03/14/2018 02:32 PM    HDL Cholesterol 66 (H) 03/14/2018 02:32 PM    LDL, calculated 137 (H) 03/14/2018 02:32 PM    VLDL, calculated 31 (H) 03/14/2018 02:32 PM    Triglyceride 154 (H) 03/14/2018 02:32 PM    CHOL/HDL Ratio 3.1 11/28/2017 02:42 PM      Liver Enzymes Recent Labs      04/17/18   0445   TP  7.0   ALB  4.1   AP  105   SGOT  27      Thyroid Studies Lab Results   Component Value Date/Time    TSH 0.61 03/14/2018 02:32 PM

## 2018-04-17 NOTE — ROUTINE PROCESS
1710 Pt arrived to floor from ED. Pt is stable. With no complaints of pain. Will continue to monitor. 1810 Pt in bed eating without difficulty. Will continue to monitor. Bedside and Verbal shift change report given to Arleen Posadas (oncoming nurse) by Vanda Lorenzo (offgoing nurse). Report included the following information SBAR, Kardex, ED Summary, Intake/Output, MAR, Recent Results and Cardiac Rhythm Uncontrolled AFIBB.

## 2018-04-17 NOTE — PROGRESS NOTES
conducted an initial consultation and Spiritual Assessment for Dena Osullivan, who is a 61 y.o.,male. Patients Primary Language is: Yue Nath. According to the patients EMR Oriental orthodox Affiliation is: [facility].     The reason the Patient came to the hospital is:   Patient Active Problem List    Diagnosis Date Noted    Atrial fibrillation with RVR ([facility] [address] 04/16/2018    Nontoxic multinodular goiter 02/07/2018    Atrial fibrillation ([facility] [address] 11/28/2017    Elevated cholesterol 04/12/2016    Essential hypertension, malignant 11/13/2014    Arthritis 11/13/2014        The  provided the following Interventions:  Initiated a relationship of care and support with patient in room [phone] today. Listened empathically as he talked about being here and how he was about to go down to have a stress test done. Patient is in good spirits. Provided information about Spiritual Care Services. Offered prayer and assurance of continued prayers on patients behalf. The following outcomes were achieved:  Patient shared limited information about his medical narrative . Patient processed feeling about current hospitalization. Patient expressed gratitude for pastoral care visit. Assessment:  Patient does not have any Jew/cultural needs that will affect patients preferences in health care. There are no further spiritual or Jew issues which require Spiritual Care Services interventions at this time. Plan:  Chaplains will continue to follow and will provide pastoral care on an as needed/requested basis    . Meagan Tamez   Spiritual Care   [phone]

## 2018-04-17 NOTE — PROGRESS NOTES
Problem: Falls - Risk of  Goal: *Absence of Falls  Document Leonila Fall Risk and appropriate interventions in the flowsheet.    Outcome: Progressing Towards Goal  Fall Risk Interventions:            Medication Interventions: Patient to call before getting OOB

## 2018-04-17 NOTE — PROGRESS NOTES
1940 -- Bedside, Verbal and Written shift change report given to [facility] (oncoming nurse) by  (offgoing nurse). Report included the following information SBAR, Kardex, Intake/Output, MAR and Recent Results. Cardizem drip verified. Pt educated on NPO after midnight for Stress test, sign placed at door.       2214 -- PM medications administered, pt tolerated with ease, will continue to monitor.      0000 -- Shift reassessment, pt condition unchanged, will continue to monitor. 0007 -- Notified by CNA elevated blood pressure,152/110, PRN Nitro SL administered, pt tolerated with ease, will continue to monitor. 0012 -- Blood pressure reassessed 137/78, will continue to monitor.       0400 --  Shift reassessment, pt condition unchanged, will continue to monitor.         0720 -- Bedside, Verbal and Written shift change report given to [address]) by JEFFERY (offgoing nurse). Report included the following information SBAR, Kardex, Intake/Output, MAR and Recent Results. Skin assessment completed.

## 2018-04-17 NOTE — PROGRESS NOTES
8599 per Dr Kaycee Murray hold [facility] gtt for stress test then resume after    [phone] Patient back on floor from stress test

## 2018-04-17 NOTE — PROGRESS NOTES
Received call from nuclear pharmacy @ [phone]. Nuclear stress test doses will be delayed due to mix up in paperwork.  ETA 1114

## 2018-04-17 NOTE — CDMP QUERY
Thank you for documenting the diagnosis of A-fib for this patient. Please clarify if this diagnosis could be further specified as:     =>Paroxysmal  =>Persistent  =>Permanent  =>Other explanations of clinical findings  =>Unable to Determine    Please document your response indicating your clinical opinion in your progress notes and discharge summary. -------------------------------------------------------------------------------------------------  Paroxysmal:  begins suddenly and stops on its own. Symptoms can be mild or severe. They stop within about a week, but usually in less than 24 hours. Persistent: continues for more than a week. It may stop on its own, or it can be stopped with treatment. Permanent: cannot be restored with treatment.     Thank you,  [address] [facility],[address], [address] [place] [address]

## 2018-04-18 LAB
ALBUMIN SERPL-MCNC: 4.2 G/DL (ref 3.4–5)
ALBUMIN/GLOB SERPL: 1.4 {RATIO} (ref 0.8–1.7)
ALP SERPL-CCNC: 114 U/L (ref 45–117)
ALT SERPL-CCNC: 41 U/L (ref 16–61)
ANION GAP SERPL CALC-SCNC: 9 MMOL/L (ref 3–18)
AST SERPL-CCNC: 25 U/L (ref 15–37)
BILIRUB SERPL-MCNC: 1 MG/DL (ref 0.2–1)
BUN SERPL-MCNC: 13 MG/DL (ref 7–18)
BUN/CREAT SERPL: 12 (ref 12–20)
CALCIUM SERPL-MCNC: 8.9 MG/DL (ref 8.5–10.1)
CHLORIDE SERPL-SCNC: 104 MMOL/L (ref 100–108)
CO2 SERPL-SCNC: 28 MMOL/L (ref 21–32)
CREAT SERPL-MCNC: 1.07 MG/DL (ref 0.6–1.3)
GLOBULIN SER CALC-MCNC: 3.1 G/DL (ref 2–4)
GLUCOSE SERPL-MCNC: 106 MG/DL (ref 74–99)
INR PPP: 1.5 (ref 0.8–1.2)
POTASSIUM SERPL-SCNC: 4.4 MMOL/L (ref 3.5–5.5)
PROT SERPL-MCNC: 7.3 G/DL (ref 6.4–8.2)
PROTHROMBIN TIME: 17.2 SEC (ref 11.5–15.2)
SODIUM SERPL-SCNC: 141 MMOL/L (ref 136–145)

## 2018-04-18 PROCEDURE — 74011250637 HC RX REV CODE- 250/637: Performed by: PHYSICIAN ASSISTANT

## 2018-04-18 PROCEDURE — 74011250637 HC RX REV CODE- 250/637: Performed by: NURSE PRACTITIONER

## 2018-04-18 PROCEDURE — 36415 COLL VENOUS BLD VENIPUNCTURE: CPT | Performed by: NURSE PRACTITIONER

## 2018-04-18 PROCEDURE — 85610 PROTHROMBIN TIME: CPT | Performed by: NURSE PRACTITIONER

## 2018-04-18 PROCEDURE — 80053 COMPREHEN METABOLIC PANEL: CPT | Performed by: NURSE PRACTITIONER

## 2018-04-18 PROCEDURE — 65660000000 HC RM CCU STEPDOWN

## 2018-04-18 PROCEDURE — 74011000250 HC RX REV CODE- 250: Performed by: PHYSICIAN ASSISTANT

## 2018-04-18 PROCEDURE — 74011000258 HC RX REV CODE- 258: Performed by: PHYSICIAN ASSISTANT

## 2018-04-18 PROCEDURE — 74011250636 HC RX REV CODE- 250/636: Performed by: INTERNAL MEDICINE

## 2018-04-18 RX ORDER — DIGOXIN 125 MCG
0.12 TABLET ORAL
Status: DISCONTINUED | OUTPATIENT
Start: 2018-04-19 | End: 2018-04-19 | Stop reason: HOSPADM

## 2018-04-18 RX ORDER — DIGOXIN 0.25 MG/ML
500 INJECTION INTRAMUSCULAR; INTRAVENOUS
Status: COMPLETED | OUTPATIENT
Start: 2018-04-18 | End: 2018-04-18

## 2018-04-18 RX ADMIN — WARFARIN SODIUM 5 MG: 5 TABLET ORAL at 10:10

## 2018-04-18 RX ADMIN — DILTIAZEM HYDROCHLORIDE 240 MG: 240 CAPSULE, COATED, EXTENDED RELEASE ORAL at 10:10

## 2018-04-18 RX ADMIN — SODIUM CHLORIDE 10 MG/HR: 900 INJECTION, SOLUTION INTRAVENOUS at 10:09

## 2018-04-18 RX ADMIN — DIGOXIN 500 MCG: 250 INJECTION, SOLUTION INTRAMUSCULAR; INTRAVENOUS; PARENTERAL at 10:14

## 2018-04-18 RX ADMIN — DILTIAZEM HYDROCHLORIDE 180 MG: 180 CAPSULE, COATED, EXTENDED RELEASE ORAL at 20:58

## 2018-04-18 NOTE — PROGRESS NOTES
2 [facility]  Hospitalist Division        Inpatient Daily Progress Note    Daily progress Note    Patient: Racquel Bird. MRN: [id number]  CSN: [id number]    YOB: 1955  Age: 61 y.o. Sex: male    DOA: 4/16/2018 LOS:  LOS: 2 days                    Chief Complaint:  Afib w/ RVR    Interval History: 62 y/o  male with hx of afib on Coumadin 5 mg daily and Cardizem BID (diagnosed in 11/2017), nontoxic multinodular goiter, HLD- presented to the ED on 4/26/2018 with c/o left shoulder, neck and arm pain that began 2 hours PTA. Also reports left arm numbness. Symptoms occurred while watching TV- he took sublingual Nitro without improvement. Per pt, he is compliant with medications, was on beta blocker and norvasc but developed rash which improved after medication stopped. Work up in the ED revealed afib w/ RVR- HR 140s. He was given Cardizem bolus then started on Cardizem gtt. Cardiology consulted. Echo with EF 45%, mild diffuse hypokinesis,  Cardizem gtt stopped prior to Nuclear stress test 4/17/2018, which revealed EF 38%, no area of prior scarring or ongoing ischemia, global wall motion abnormalities, low to intermediate risk. Cardizem gtt resumed later that evening on 4/17/2018 @10mg/hour for HR 150s. Cardiology adjusted medications- on 4/18/2018 Cardizem gtt stopped ~10am, received Digoxin 0.5 mg IV x 1 and started on Digoxin 0.125 mg Q48H.     Assessment/Plan:     Patient Active Problem List   Diagnosis Code    Essential hypertension, malignant I10    Arthritis M19.90    Elevated cholesterol E78.00    Atrial fibrillation (HCC) I48.91    Nontoxic multinodular goiter E04.2    Atrial fibrillation with RVR (HCC) I48.91    Persistent atrial fibrillation (HCC) I48.1       A/P:  Afib w/ RVR  - Cardiology following, appreciate input  - troponin flat x 3  - s/p stress test 4/17/2018 : EF 38%, no area of prior scarring or ongoing ischemia, global wall motion abnormalities, low to intermediate risk  - HR 130s-150s despite adjusting oral Cardizem dose, Cardizem gtt resumed last night @10mg/hour  - Cardizem gtt stopped around 10a, received Digoxin 0.5 mg IV X 1 and started on Digoxin 0.125 mg Q48H  - increase Coumadin to 6 mg (to start 4/19/2018) - INR 1.9 - -> 1.8 - -> 1.5    Hypertension  - continue to monitor    DVT Prophylaxis  - pt on Coumadin, monitor INR      JAYLENE Chase-Luda 83  Pager:  [phone]  Office:  [phone]        Subjective:     Cardizem gtt resumed last night for HR 140s-150s. Received Digoxin 0.5mg IV x 1 and started on Digoxin 0.125 mg Q48H. Cardizem gtt stopped ~10am.  No complaints of chest pain or SOB. Objective:      Visit Vitals    BP (!) 145/92 (BP 1 Location: Left arm, BP Patient Position: At rest)    Pulse 77    Temp 98.2 °F (36.8 °C)    Resp 18    Ht 5' 10\" (1.778 m)    Wt 77.1 kg (170 lb)    SpO2 95%    BMI 24.39 kg/m2           Physical Exam:  General appearance: alert, cooperative, no distress, appears stated age  Head: Normocephalic, without obvious abnormality, atraumatic  Lungs: clear to auscultation bilaterally  Heart: irregularly irregular  Abdomen: soft, non tender, non distended. Normoactive bowel sounds.    Extremities: extremities normal, atraumatic, no cyanosis or edema  Skin: Skin color, texture, turgor normal. No rashes or lesions  Neurologic: Grossly normal  PSY: Mood and affect normal, appropriately behaved        Intake and Output:  Current Shift:     Last three shifts:  04/16 1901 - 04/18 0700  In: 1160.7 [P.O.:960; I.V.:200.7]  Out: 1560 [Urine:1560]    Recent Results (from the past 24 hour(s))   METABOLIC PANEL, COMPREHENSIVE    Collection Time: 04/18/18  4:55 AM   Result Value Ref Range    Sodium 141 136 - 145 mmol/L    Potassium 4.4 3.5 - 5.5 mmol/L    Chloride 104 100 - 108 mmol/L    CO2 28 21 - 32 mmol/L    Anion gap 9 3.0 - 18 mmol/L Glucose 106 (H) 74 - 99 mg/dL    BUN 13 7.0 - 18 MG/DL    Creatinine 1.07 0.6 - 1.3 MG/DL    BUN/Creatinine ratio 12 12 - 20      GFR est AA >60 >60 ml/min/1.73m2    GFR est non-AA >60 >60 ml/min/1.73m2    Calcium 8.9 8.5 - 10.1 MG/DL    Bilirubin, total 1.0 0.2 - 1.0 MG/DL    ALT (SGPT) 41 16 - 61 U/L    AST (SGOT) 25 15 - 37 U/L    Alk. phosphatase 114 45 - 117 U/L    Protein, total 7.3 6.4 - 8.2 g/dL    Albumin 4.2 3.4 - 5.0 g/dL    Globulin 3.1 2.0 - 4.0 g/dL    A-G Ratio 1.4 0.8 - 1.7     PROTHROMBIN TIME + INR    Collection Time: 04/18/18  4:55 AM   Result Value Ref Range    Prothrombin time 17.2 (H) 11.5 - 15.2 sec    INR 1.5 (H) 0.8 - 1.2             Lab Results   Component Value Date/Time    Glucose 106 (H) 04/18/2018 04:55 AM    Glucose 100 (H) 04/17/2018 04:45 AM    Glucose 157 (H) 04/16/2018 01:27 PM    Glucose 91 03/14/2018 02:32 PM    Glucose 82 11/28/2017 02:42 PM        Imaging:  Nm Cardiac Spect W Strs/rest Mult    Result Date: 4/17/2018  Clinical History: 61year old with no known coronary artery disease Cardiac risk factors include: Hypertension Previous cardiac procedures include: None noted Current symptoms include: Chest pain/shoulder pain Procedure: After obtaining appropriate informed consent, pharmacologic stress testing was performed using Lexiscan 0.4 mg intravenously. The reason for termination was end of protocol reached. The resting electrocardiogram atrial fibrillation. Poor R-wave progression. The stress electrocardiogram demonstrated no significant ST wave shifts. Myocardial perfusion imaging was performed at rest 30 minutes following the injection of 11.48 mCi of sestamibi. At peak pharmacologic effect, the patient was injected with 42.0  mCi of sestamibi. Gated post-stress tomographic imaging was performed 45 minutes after stress. Findings:  The overall quality of the study was good Splanchnic activity was mild Soft tissue attenuation was mild Left ventricular cavity was noted to be normal in size in the rest and stress studies. The right ventricle was unremarkable. Stress SPECT images demonstrated uniform uptake of cardiac tracer throughout the myocardium. The rest images were similar to the stress images. Gated SPECT imaging demonstrated globally reduced systolic function. The left ventricle ejection fraction was calculated to 38 %. Impression: Myocardial perfusion imaging is normal There is no area of prior scarring or ongoing ischemia. Overall left ventricle systolic function was markedly reduced with global wall motion abnormalities (as noted above). Risk/extent of ischemia: Low to intermediate risk. Follow up plan / recommendation: Patient intermediate risk based on ejection fraction. No evidence of ongoing ischemia in the present study.        Medication List Reviewed:  Current Facility-Administered Medications   Medication Dose Route Frequency    dilTIAZem CD (CARDIZEM CD) capsule 240 mg  240 mg Oral DAILY    dilTIAZem CD (CARDIZEM CD) capsule 180 mg  180 mg Oral QHS    dilTIAZem (CARDIZEM) 100 mg in 0.9% sodium chloride (MBP/ADV) 100 mL infusion  10 mg/hr IntraVENous CONTINUOUS    nitroglycerin (NITROSTAT) tablet 0.4 mg  0.4 mg SubLINGual PRN    warfarin (COUMADIN) tablet 5 mg  5 mg Oral DAILY    acetaminophen (TYLENOL) tablet 650 mg  650 mg Oral Q4H PRN    ondansetron (ZOFRAN) injection 4 mg  4 mg IntraVENous Q4H PRN

## 2018-04-18 NOTE — PROGRESS NOTES
1900 -- Bedside, Verbal and Written shift change report given to 2309 Efren Schuster (oncoming nurse) by [address] [place] [address]). Report included the following information SBAR, Kardex, Intake/Output, MAR and Recent Results. Cardizem drip has been discontinued, oral tablets given. 2000 -- Notified by CNA, elevated Blood pressure 151/108.    2114 -- Contacted by [employer] pt A-FIB uncontrolled 140-150. [employer] FRAZIER.    2130 -- MD returned call, advised page Cardiology. 2141 -- MD Zenobia Us returned call, advised administer Cardizem drip 10mg, no blood pressure medication to be ordered.      2243 -- PM medications administered, pt tolerated with ease, will continue to monitor.     0000 -- Shift reassessment, pt condition unchanged, will continue to monitor.      0415 --  Shift reassessment, pt condition unchanged, will continue to monitor.        0700  -- Bedside, Verbal and Written shift change report given to PAOLO (oncoming nurse) by JEFFERY (offgoing nurse). Report included the following information SBAR, Kardex, Intake/Output, MAR and Recent Results. Skin assessment completed.

## 2018-04-18 NOTE — PROGRESS NOTES
Problem: Falls - Risk of  Goal: *Absence of Falls  Document Leonila Fall Risk and appropriate interventions in the flowsheet.    Outcome: Progressing Towards Goal  Fall Risk Interventions:            Medication Interventions: Bed/chair exit alarm

## 2018-04-18 NOTE — PROGRESS NOTES
[facility] - Progress Note  Admit Date: 4/16/2018      I saw, evaluated, interviewed and examined the patient personally. I agree with the findings and plan of care as documented below with PAVEL note  HR increased overnight  Dose of CCB increased yesterday and now at max daily dose  Will give digoxin 0.5 mg IV once now for HR controlled  Start digoxin every other day for HR control    Nolan Desai MD          Assessment:     -Presented due to shoulder pain  -Atrial fibrillation with rapid ventricular response, on Diltiazem for rate control and [place] [facility] [place]  - Echocardiogram 04/16/18 with an EF of 45%, mild diffuse hypokinesis  - Nuclear stress test 04/17/18 with no evidence of ongoing ischemia, EF calculated at 38%  -HTN  -Hyperlipidemia  -Hx nontoxic multinodular goiter    Plan:     - IV Digoxin was given this AM and PO dose every 48 hours starting tomorrow   - Continue with Cardizem PO and Warfarin    Subjective:     No new complaints.      Objective:      Patient Vitals for the past 8 hrs:   Temp Pulse Resp BP SpO2   04/18/18 0800 98.2 °F (36.8 °C) 77 18 (!) 145/92 95 %   04/18/18 0554 98 °F (36.7 °C) 84 18 (!) 130/94 95 %         Patient Vitals for the past 96 hrs:   Weight   04/18/18 0554 170 lb (77.1 kg)   04/16/18 1340 170 lb (77.1 kg)                    Intake/Output Summary (Last 24 hours) at 04/18/18 1311  Last data filed at 04/18/18 1026   Gross per 24 hour   Intake          1015.33 ml   Output             1000 ml   Net            15.33 ml       Physical Exam:  General:  alert, cooperative, no distress  Neck:  nontender, no JVD  Lungs:  clear to auscultation bilaterally  Heart:  irregularly irregular rhythm  Abdomen:  abdomen is soft without significant tenderness, masses, organomegaly or guarding  Extremities:  extremities normal, atraumatic, no cyanosis or edema    Data Review:     Labs: Results:       Chemistry Recent Labs      04/18/18   0012 04/17/18   0445  04/16/18   1327   GLU  106*  100*  157*   NA  141  139  138   K  4.4  3.8  3.7   CL  104  103  100   CO2  28  26  26   BUN  13  12  12   CREA  1.07  1.01  1.19   CA  8.9  8.6  9.1   AGAP  9  10  12   BUCR  12  12  10*   AP  114  105  119*   TP  7.3  7.0  8.3*   ALB  4.2  4.1  4.6   GLOB  3.1  2.9  3.7   AGRAT  1.4  1.4  1.2      CBC w/Diff Recent Labs      04/17/18   0045  04/16/18   1327   WBC  8.8  7.2   RBC  4.96  5.27   HGB  16.2*  17.3*   HCT  46.0  48.9*   PLT  214  227   GRANS   --   61   LYMPH   --   28   EOS   --   1      Cardiac Enzymes No results found for: CPK, CK, CKMMB, CKMB, RCK3, CKMBT, CKNDX, CKND1, MICKI, TROPT, TROIQ, UYEN, TROPT, TNIPOC, BNP, BNPP   Coagulation Recent Labs      04/18/18   0455  04/17/18   0445   PTP  17.2*  20.5*   INR  1.5*  1.8*       Lipid Panel Lab Results   Component Value Date/Time    Cholesterol, total 234 (H) 03/14/2018 02:32 PM    HDL Cholesterol 66 (H) 03/14/2018 02:32 PM    LDL, calculated 137 (H) 03/14/2018 02:32 PM    VLDL, calculated 31 (H) 03/14/2018 02:32 PM    Triglyceride 154 (H) 03/14/2018 02:32 PM    CHOL/HDL Ratio 3.1 11/28/2017 02:42 PM      BNP No results found for: BNP, BNPP, XBNPT   Liver Enzymes Recent Labs      04/18/18   0455   TP  7.3   ALB  4.2   AP  114   SGOT  25      Digoxin    Thyroid Studies Lab Results   Component Value Date/Time    TSH 0.61 03/14/2018 02:32 PM          Signed By: Carlos Campoverde.  Sayda All     April 18, 2018

## 2018-04-19 VITALS
HEIGHT: 70 IN | DIASTOLIC BLOOD PRESSURE: 90 MMHG | BODY MASS INDEX: 24.34 KG/M2 | HEART RATE: 105 BPM | SYSTOLIC BLOOD PRESSURE: 159 MMHG | RESPIRATION RATE: 18 BRPM | WEIGHT: 170 LBS | TEMPERATURE: 97.5 F | OXYGEN SATURATION: 99 %

## 2018-04-19 LAB
ALBUMIN SERPL-MCNC: 4 G/DL (ref 3.4–5)
ALBUMIN/GLOB SERPL: 1.4 {RATIO} (ref 0.8–1.7)
ALP SERPL-CCNC: 116 U/L (ref 45–117)
ALT SERPL-CCNC: 35 U/L (ref 16–61)
ANION GAP SERPL CALC-SCNC: 13 MMOL/L (ref 3–18)
AST SERPL-CCNC: 20 U/L (ref 15–37)
BILIRUB SERPL-MCNC: 1 MG/DL (ref 0.2–1)
BUN SERPL-MCNC: 13 MG/DL (ref 7–18)
BUN/CREAT SERPL: 13 (ref 12–20)
CALCIUM SERPL-MCNC: 8.7 MG/DL (ref 8.5–10.1)
CHLORIDE SERPL-SCNC: 103 MMOL/L (ref 100–108)
CO2 SERPL-SCNC: 25 MMOL/L (ref 21–32)
CREAT SERPL-MCNC: 1.03 MG/DL (ref 0.6–1.3)
ERYTHROCYTE [DISTWIDTH] IN BLOOD BY AUTOMATED COUNT: 13.4 % (ref 11.6–14.5)
GLOBULIN SER CALC-MCNC: 2.9 G/DL (ref 2–4)
GLUCOSE SERPL-MCNC: 96 MG/DL (ref 74–99)
HCT VFR BLD AUTO: 52.4 % (ref 36–48)
HGB BLD-MCNC: 18.3 G/DL (ref 13–16)
INR PPP: 1.4 (ref 0.8–1.2)
MCH RBC QN AUTO: 32.6 PG (ref 24–34)
MCHC RBC AUTO-ENTMCNC: 34.9 G/DL (ref 31–37)
MCV RBC AUTO: 93.4 FL (ref 74–97)
PLATELET # BLD AUTO: 233 K/UL (ref 135–420)
PMV BLD AUTO: 10.9 FL (ref 9.2–11.8)
POTASSIUM SERPL-SCNC: 4.1 MMOL/L (ref 3.5–5.5)
PROT SERPL-MCNC: 6.9 G/DL (ref 6.4–8.2)
PROTHROMBIN TIME: 16.8 SEC (ref 11.5–15.2)
RBC # BLD AUTO: 5.61 M/UL (ref 4.7–5.5)
SODIUM SERPL-SCNC: 141 MMOL/L (ref 136–145)
WBC # BLD AUTO: 9.8 K/UL (ref 4.6–13.2)

## 2018-04-19 PROCEDURE — 74011250637 HC RX REV CODE- 250/637: Performed by: NURSE PRACTITIONER

## 2018-04-19 PROCEDURE — 36415 COLL VENOUS BLD VENIPUNCTURE: CPT | Performed by: NURSE PRACTITIONER

## 2018-04-19 PROCEDURE — 85610 PROTHROMBIN TIME: CPT | Performed by: NURSE PRACTITIONER

## 2018-04-19 PROCEDURE — 74011250636 HC RX REV CODE- 250/636: Performed by: INTERNAL MEDICINE

## 2018-04-19 PROCEDURE — 85027 COMPLETE CBC AUTOMATED: CPT | Performed by: NURSE PRACTITIONER

## 2018-04-19 PROCEDURE — 74011250637 HC RX REV CODE- 250/637: Performed by: PHYSICIAN ASSISTANT

## 2018-04-19 PROCEDURE — 80053 COMPREHEN METABOLIC PANEL: CPT | Performed by: NURSE PRACTITIONER

## 2018-04-19 PROCEDURE — 74011250637 HC RX REV CODE- 250/637: Performed by: INTERNAL MEDICINE

## 2018-04-19 RX ORDER — NITROGLYCERIN 0.4 MG/1
0.4 TABLET SUBLINGUAL AS NEEDED
Qty: 1 BOTTLE | Refills: 0 | Status: SHIPPED | OUTPATIENT
Start: 2018-04-19 | End: 2018-11-26 | Stop reason: SDUPTHER

## 2018-04-19 RX ORDER — DIGOXIN 125 MCG
0.12 TABLET ORAL
Qty: 15 TAB | Refills: 0 | Status: SHIPPED | OUTPATIENT
Start: 2018-04-21 | End: 2018-05-09 | Stop reason: SDUPTHER

## 2018-04-19 RX ORDER — WARFARIN 6 MG/1
6 TABLET ORAL DAILY
Qty: 30 TAB | Refills: 0 | Status: SHIPPED | OUTPATIENT
Start: 2018-04-20 | End: 2018-05-09

## 2018-04-19 RX ORDER — DILTIAZEM HYDROCHLORIDE 240 MG/1
240 CAPSULE, COATED, EXTENDED RELEASE ORAL DAILY
Qty: 30 CAP | Refills: 0 | Status: SHIPPED | OUTPATIENT
Start: 2018-04-20 | End: 2018-05-29 | Stop reason: SDUPTHER

## 2018-04-19 RX ORDER — DILTIAZEM HYDROCHLORIDE 180 MG/1
180 CAPSULE, COATED, EXTENDED RELEASE ORAL
Qty: 30 CAP | Refills: 0 | Status: SHIPPED | OUTPATIENT
Start: 2018-04-19 | End: 2018-05-29 | Stop reason: SDUPTHER

## 2018-04-19 RX ORDER — DIGOXIN 0.25 MG/ML
500 INJECTION INTRAMUSCULAR; INTRAVENOUS
Status: COMPLETED | OUTPATIENT
Start: 2018-04-19 | End: 2018-04-19

## 2018-04-19 RX ADMIN — DIGOXIN 0.12 MG: 125 TABLET ORAL at 00:11

## 2018-04-19 RX ADMIN — WARFARIN SODIUM 6 MG: 5 TABLET ORAL at 08:07

## 2018-04-19 RX ADMIN — DILTIAZEM HYDROCHLORIDE 240 MG: 240 CAPSULE, COATED, EXTENDED RELEASE ORAL at 08:07

## 2018-04-19 RX ADMIN — DIGOXIN 500 MCG: 250 INJECTION, SOLUTION INTRAMUSCULAR; INTRAVENOUS; PARENTERAL at 10:13

## 2018-04-19 NOTE — DISCHARGE INSTRUCTIONS
Atrial Fibrillation: Care Instructions  Your Care Instructions    Atrial fibrillation is an irregular and often fast heartbeat. Treating this condition is important for several reasons. It can cause blood clots, which can travel from your heart to your brain and cause a stroke. If you have a fast heartbeat, you may feel lightheaded, dizzy, and weak. An irregular heartbeat can also increase your risk for heart failure. Atrial fibrillation is often the result of another heart condition, such as high blood pressure or coronary artery disease. Making changes to improve your heart condition will help you stay healthy and active. Follow-up care is a key part of your treatment and safety. Be sure to make and go to all appointments, and call your doctor if you are having problems. It's also a good idea to know your test results and keep a list of the medicines you take. How can you care for yourself at home? Medicines  ? · Take your medicines exactly as prescribed. Call your doctor if you think you are having a problem with your medicine. You will get more details on the specific medicines your doctor prescribes. ? · If your doctor has given you a blood thinner to prevent a stroke, be sure you get instructions about how to take your medicine safely. Blood thinners can cause serious bleeding problems. ? · Do not take any vitamins, over-the-counter drugs, or herbal products without talking to your doctor first.   ? Lifestyle changes  ? · Do not smoke. Smoking can increase your chance of a stroke and heart attack. If you need help quitting, talk to your doctor about stop-smoking programs and medicines. These can increase your chances of quitting for good. ? · Eat a heart-healthy diet. ? · Stay at a healthy weight. Lose weight if you need to.   ? · Limit alcohol to 2 drinks a day for men and 1 drink a day for women. Too much alcohol can cause health problems. ? · Avoid colds and flu.  Get a pneumococcal vaccine shot. If you have had one before, ask your doctor whether you need another dose. Get a flu shot every year. If you must be around people with colds or flu, wash your hands often. Activity  ? · If your doctor recommends it, get more exercise. Walking is a good choice. Bit by bit, increase the amount you walk every day. Try for at least 30 minutes on most days of the week. You also may want to swim, bike, or do other activities. Your doctor may suggest that you join a cardiac rehabilitation program so that you can have help increasing your physical activity safely. ? · Start light exercise if your doctor says it is okay. Even a small amount will help you get stronger, have more energy, and manage stress. Walking is an easy way to get exercise. Start out by walking a little more than you did in the hospital. Gradually increase the amount you walk. ? · When you exercise, watch for signs that your heart is working too hard. You are pushing too hard if you cannot talk while you are exercising. If you become short of breath or dizzy or have chest pain, sit down and rest immediately. ? · Check your pulse regularly. Place two fingers on the artery at the palm side of your wrist, in line with your thumb. If your heartbeat seems uneven or fast, talk to your doctor. When should you call for help? Call 911 anytime you think you may need emergency care. For example, call if:  ? · You have symptoms of a heart attack. These may include:  ¨ Chest pain or pressure, or a strange feeling in the chest.  ¨ Sweating. ¨ Shortness of breath. ¨ Nausea or vomiting. ¨ Pain, pressure, or a strange feeling in the back, neck, jaw, or upper belly or in one or both shoulders or arms. ¨ Lightheadedness or sudden weakness. ¨ A fast or irregular heartbeat. After you call 911, the  may tell you to chew 1 adult-strength or 2 to 4 low-dose aspirin. Wait for an ambulance. Do not try to drive yourself.    ? · You have symptoms of a stroke. These may include:  ¨ Sudden numbness, tingling, weakness, or loss of movement in your face, arm, or leg, especially on only one side of your body. ¨ Sudden vision changes. ¨ Sudden trouble speaking. ¨ Sudden confusion or trouble understanding simple statements. ¨ Sudden problems with walking or balance. ¨ A sudden, severe headache that is different from past headaches. ? · You passed out (lost consciousness). ?Call your doctor now or seek immediate medical care if:  ? · You have new or increased shortness of breath. ? · You feel dizzy or lightheaded, or you feel like you may faint. ? · Your heart rate becomes irregular. ? · You can feel your heart flutter in your chest or skip heartbeats. Tell your doctor if these symptoms are new or worse. ? Watch closely for changes in your health, and be sure to contact your doctor if you have any problems. Where can you learn more? Go to http://jg-dave.info/. Enter U020 in the search box to learn more about \"Atrial Fibrillation: Care Instructions. \"  Current as of: September 21, 2016  Content Version: 11.4  © 0695-7756 [employer]. Care instructions adapted under license by [employer] (which disclaims liability or warranty for this information). If you have questions about a medical condition or this instruction, always ask your healthcare professional. [address] any warranty or liability for your use of this information. Learning About Atrial Fibrillation  What is atrial fibrillation? Atrial fibrillation (say \"AY-tree-regis [id number]-XFQ-shun\") is the most common type of irregular heartbeat (arrhythmia). Normally, the heart beats in a strong, steady rhythm. In atrial fibrillation, a problem with the heart's electrical system causes the two upper parts of the heart (the atria) to quiver, or fibrillate.  Your heart rate also may be faster than normal.  Atrial fibrillation can be dangerous because if the heartbeat isn't strong and steady, blood can collect, or pool, in the atria. And pooled blood is more likely to form clots. Clots can travel to the brain, block blood flow, and cause a stroke. Atrial fibrillation can also lead to heart failure. Treatment for atrial fibrillation helps prevent stroke and heart failure. It also helps relieve symptoms. Atrial fibrillation is often caused by another heart problem. It may happen after heart surgery. It may also be caused by other problems, such as an overactive thyroid gland or lung disease. Many people with atrial fibrillation are able to live full and active lives. What are the symptoms? Some people feel symptoms when they have episodes of atrial fibrillation. But other people don't notice any symptoms. If you have symptoms, you may feel:  · A fluttering, racing, or pounding feeling in your chest called palpitations. · Weak or tired. · Dizzy or lightheaded. · Short of breath. · Chest pain. · Confused. You may notice signs of atrial fibrillation when you check your pulse. Your pulse may seem uneven or fast.  What can you expect when you have atrial fibrillation? At first, spells of atrial fibrillation may come on suddenly and last a short time. It may go away on its own or it goes away after treatment. This is called paroxysmal atrial fibrillation. Over time, the spells may last longer and occur more often. They often don't go away on their own. How is it treated? Treatments can help you feel better and prevent future problems, especially stroke and heart failure. The main types of treatment slow the heart rate, control the heart rhythm, and help prevent stroke. Your treatment will depend on the cause of your atrial fibrillation, your symptoms, and your risk for stroke. · Heart rate treatment. Medicine may be used to slow your heart rate. Your heartbeat may still be irregular.  But these medicines keep your heart from beating too fast. They may also help relieve your symptoms. · Heart rhythm treatment. Different treatments may be used to try to stop atrial fibrillation and keep it from returning. They can also relieve symptoms. These treatments include medicine, electrical cardioversion to shock the heart back to a normal rhythm, a procedure called catheter ablation, and heart surgery. · Stroke prevention. You and your doctor can decide how to lower your risk. You may decide to take a blood-thinning medicine, such as aspirin or an anticoagulant. How can you live well with it? You can live well and help manage atrial fibrillation by having a heart-healthy lifestyle. To have a heart-healthy lifestyle:  · Don't smoke. · Eat heart-healthy foods. · Be active. Talk to your doctor about what type and level of exercise is safe for you. · Stay at a healthy weight. Lose weight if you need to. · Manage stress. · Avoid alcohol if it triggers symptoms. · Manage other health problems such as high blood pressure, high cholesterol, and diabetes. · Avoid getting sick from the flu. Get a flu shot every year. Where can you learn more? Go to http://jgHalldave.info/. Enter [phone] in the search box to learn more about \"Learning About Atrial Fibrillation. \"  Current as of: September 21, 2016  Content Version: 11.4  © 6575-8796 [employer]. Care instructions adapted under license by [employer] (which disclaims liability or warranty for this information). If you have questions about a medical condition or this instruction, always ask your healthcare professional. [place] [address] any warranty or liability for your use of this information.     DISCHARGE SUMMARY from Nurse    PATIENT INSTRUCTIONS:    After general anesthesia or intravenous sedation, for 24 hours or while taking prescription Narcotics:  · Limit your activities  · Do not drive and operate hazardous machinery  · Do not make important personal or business decisions  · Do  not drink alcoholic beverages  · If you have not urinated within 8 hours after discharge, please contact your surgeon on call. Report the following to your surgeon:  · Excessive pain, swelling, redness or odor of or around the surgical area  · Temperature over 100.5  · Nausea and vomiting lasting longer than 4 hours or if unable to take medications  · Any signs of decreased circulation or nerve impairment to extremity: change in color, persistent  numbness, tingling, coldness or increase pain  · Any questions    What to do at Home:  Recommended activity: Activity as tolerated    If you experience any of the following symptoms: Can have no symptoms, but people may experience:  Pain areas: in the chest  Whole body: dizziness, fatigue, or inability to exercise  Heart: fast heart rate or palpitations  Also common: shortness of breath or weak. Call 911, as these may be worsening symptoms of A-fib. *  Please give a list of your current medications to your Primary Care Provider. *  Please update this list whenever your medications are discontinued, doses are      changed, or new medications (including over-the-counter products) are added. *  Please carry medication information at all times in case of emergency situations. These are general instructions for a healthy lifestyle:    No smoking/ No tobacco products/ Avoid exposure to second hand smoke  Surgeon General's Warning:  Quitting smoking now greatly reduces serious risk to your health.     Obesity, smoking, and sedentary lifestyle greatly increases your risk for illness    A healthy diet, regular physical exercise & weight monitoring are important for maintaining a healthy lifestyle    You may be retaining fluid if you have a history of heart failure or if you experience any of the following symptoms:  Weight gain of 3 pounds or more overnight or 5 pounds in a week, increased swelling in our hands or feet or shortness of breath while lying flat in bed. Please call your doctor as soon as you notice any of these symptoms; do not wait until your next office visit. Recognize signs and symptoms of STROKE:    F-face looks uneven    A-arms unable to move or move unevenly    S-speech slurred or non-existent    T-time-call 911 as soon as signs and symptoms begin-DO NOT go       Back to bed or wait to see if you get better-TIME IS BRAIN. Warning Signs of HEART ATTACK     Call 911 if you have these symptoms:   Chest discomfort. Most heart attacks involve discomfort in the center of the chest that lasts more than a few minutes, or that goes away and comes back. It can feel like uncomfortable pressure, squeezing, fullness, or pain.  Discomfort in other areas of the upper body. Symptoms can include pain or discomfort in one or both arms, the back, neck, jaw, or stomach.  Shortness of breath with or without chest discomfort.  Other signs may include breaking out in a cold sweat, nausea, or lightheadedness. Don't wait more than five minutes to call 911 - MINUTES MATTER! Fast action can save your life. Calling 911 is almost always the fastest way to get lifesaving treatment. Emergency Medical Services staff can begin treatment when they arrive -- up to an hour sooner than if someone gets to the hospital by car. The discharge information has been reviewed with the patient. The patient verbalized understanding. Discharge medications reviewed with the patient and appropriate educational materials and side effects teaching were provided. ___________________________________________________________________________________________________________________________________    Patient armband removed and shredded    If you experience chest pain call 911. Do not drive yourself. MyChart Activation    Thank you for requesting access to [facility].  Please follow the instructions below to securely access and download your online medical record. Im[employer]ry allows you to send messages to your doctor, view your test results, renew your prescriptions, schedule appointments, and more. How Do I Sign Up? 1. In your internet browser, go to [email or web address]  2. Click on the First Time User? Click Here link in the Sign In box. You will be redirect to the New Member Sign Up page. 3. Enter your Imagry Access Code exactly as it appears below. You will not need to use this code after youve completed the sign-up process. If you do not sign up before the expiration date, you must request a new code. Imagry Access Code: Activation code not generated  Current Imagry Status: Active (This is the date your Imagry access code will )    4. Enter the last four digits of your Social Security Number (xxxx) and Date of Birth (mm/dd/yyyy) as indicated and click Submit. You will be taken to the next sign-up page. 5. Create a Imagry ID. This will be your Imagry login ID and cannot be changed, so think of one that is secure and easy to remember. 6. Create a Imagry password. You can change your password at any time. 7. Enter your Password Reset Question and Answer. This can be used at a later time if you forget your password. 8. Enter your e-mail address. You will receive e-mail notification when new information is available in [address] 9. Click Sign Up. You can now view and download portions of your medical record. 10. Click the Download Summary menu link to download a portable copy of your medical information. Additional Information    If you have questions, please visit the Frequently Asked Questions section of the Imagry website at [email or web address] Remember, Im[employer]ry is NOT to be used for urgent needs. For medical emergencies, dial 911.

## 2018-04-19 NOTE — ROUTINE PROCESS
Bedside, Verbal and Written shift change report given to Ricka Schwab, RN (oncoming nurse) by Dejon Denton RN (offgoing nurse). Report included the following information SBAR, Kardex, Intake/Output, MAR, Recent Results, Med Rec Status, Procedure Summary and Cardiac Rhythm A-Fib.     0715 - Shift assessment completed. Pt alert and oriented x4. No respiratory distress noted. No c/o pain reported. Call bell within reach, bed in low position. Will continue to monitor.      1005 - Notified by Dr Gregg Arce to administer one time Now dose of IV Digoxin and discontinue IV Cardizem Drip one hour after IV Digoxin administration if pt's HR 80-90's. 1014 - IV Digoxin administered per Dr Fatuma Posada orders for HR control. 1200 - Pt's HR 80-90's and IV Cardizem Drip discontinued per Dr Fatuma Posada orders. 1215 - Shift re-assessment completed, no change in pt condition.      1615 - Shift re-assessment completed, no change in pt condition.      Bedside, Verbal and Written shift change report given to Dejon Denton RN (oncoming nurse) by Ricka Schwab, RN (offgoing nurse). Report included the following information SBAR, Kardex, Intake/Output, MAR, Recent Results, Med Rec Status, Procedure Summary and Cardiac Rhythm A-Fib.

## 2018-04-19 NOTE — PROGRESS NOTES
Bedside and Verbal shift change report given to ROSELINE (oncoming nurse) by Alvin (offgoing nurse). Report included the following information SBAR, Kardex and MAR. Pt is AOx4 with no complaints at this time. Possible DC home today.

## 2018-04-19 NOTE — PROGRESS NOTES
[facility] - Progress Note  Admit Date: 4/16/2018      Assessment:     -Presented due to shoulder pain  -Atrial fibrillation with rapid ventricular response, on Diltiazem for rate control and Coumadin for [address] with INR 1.9 on presentation  - Echocardiogram 04/16/18 with an EF of 45%, mild diffuse hypokinesis  - Nuclear stress test 04/17/18 with no evidence of ongoing ischemia, EF calculated at 38%  -HTN  -Hyperlipidemia  -Hx nontoxic multinodular goiter    Plan:     HR increased overnight and now 110-120's  Will give digoxin 0.5 mg IV once now for HR controlled  DW IV amiodarone but for now he would like to avoid amiodarone as it could affect liver and as he is drinking on daily basis. Advise to avoid ETOH and counseled. Hannah Eric MD      Subjective:     No new complaints.    No CP or SOB    Objective:      Patient Vitals for the past 8 hrs:   Temp Pulse Resp BP SpO2   04/19/18 0904 - 70 - 130/89 -   04/19/18 0839 97.7 °F (36.5 °C) 78 19 (!) 136/114 99 %         Patient Vitals for the past 96 hrs:   Weight   04/19/18 0330 170 lb (77.1 kg)   04/18/18 0554 170 lb (77.1 kg)   04/16/18 1340 170 lb (77.1 kg)                    Intake/Output Summary (Last 24 hours) at 04/19/18 1142  Last data filed at 04/19/18 0904   Gross per 24 hour   Intake           910.17 ml   Output             1700 ml   Net          -789.83 ml       Physical Exam:  General:  alert, cooperative, no distress  Neck:  nontender, no JVD  Lungs:  clear to auscultation bilaterally  Heart:  irregularly irregular rhythm  Abdomen:  abdomen is soft without significant tenderness, masses, organomegaly or guarding  Extremities:  extremities normal, atraumatic, no cyanosis or edema    Data Review:     Labs: Results:       Chemistry Recent Labs      04/19/18   0424  04/18/18   0455  04/17/18   0445   GLU  96  106*  100*   NA  141  141  139   K  4.1  4.4  3.8   CL  103  104  103   CO2  25  28  26   BUN  13  13  12   CREA  1.03  1.07  1.01 CA  8.7  8.9  8.6   AGAP  13  9  10   BUCR  13  12  12   AP  116  114  105   TP  6.9  7.3  7.0   ALB  4.0  4.2  4.1   GLOB  2.9  3.1  2.9   AGRAT  1.4  1.4  1.4      CBC w/Diff Recent Labs      04/19/18   0424  04/17/18   0045  04/16/18   1327   WBC  9.8  8.8  7.2   RBC  5.61*  4.96  5.27   HGB  18.3*  16.2*  17.3*   HCT  52.4*  46.0  48.9*   PLT  233  214  227   GRANS   --    --   61   LYMPH   --    --   28   EOS   --    --   1      Cardiac Enzymes No results found for: CPK, CK, CKMMB, CKMB, RCK3, CKMBT, CKNDX, CKND1, MICKI, TROPT, TROIQ, UYEN, TROPT, TNIPOC, BNP, BNPP   Coagulation Recent Labs      04/19/18 0424 04/18/18   0455   PTP  16.8*  17.2*   INR  1.4*  1.5*       Lipid Panel Lab Results   Component Value Date/Time    Cholesterol, total 234 (H) 03/14/2018 02:32 PM    HDL Cholesterol 66 (H) 03/14/2018 02:32 PM    LDL, calculated 137 (H) 03/14/2018 02:32 PM    VLDL, calculated 31 (H) 03/14/2018 02:32 PM    Triglyceride 154 (H) 03/14/2018 02:32 PM    CHOL/HDL Ratio 3.1 11/28/2017 02:42 PM      BNP No results found for: BNP, BNPP, XBNPT   Liver Enzymes Recent Labs      04/19/18   0424   TP  6.9   ALB  4.0   AP  116   SGOT  20      Digoxin    Thyroid Studies Lab Results   Component Value Date/Time    TSH 0.61 03/14/2018 02:32 PM          Signed By: Dixon Irizarry MD     April 19, 2018

## 2018-04-19 NOTE — DISCHARGE SUMMARY
[facility]    Discharge Summary    Patient: Francisca Martínez MRN: [id number]  CSN: [id number]    YOB: 1955  Age: 61 y.o. Sex: male    DOA: 4/16/2018 LOS:  LOS: 3 days   Discharge Date:      Admission Diagnoses: Atrial fibrillation with RVR [facility])    Discharge Diagnoses:    Problem List as of 4/19/2018  Date Reviewed: 4/16/2018          Codes Class Noted - Resolved    Persistent atrial fibrillation ([facility] ICD-10-CM: I48.1  ICD-9-CM: 427.31  4/17/2018 - Present        * (Principal)Atrial fibrillation with RVR ([facility] [address] ICD-10-CM: I48.91  ICD-9-CM: 427.31  4/16/2018 - Present        Nontoxic multinodular goiter ICD-10-CM: E04.2  ICD-9-CM: 241.1  2/7/2018 - Present        Atrial fibrillation ([facility] ICD-10-CM: I48.91  ICD-9-CM: 427.31  11/28/2017 - Present        Elevated cholesterol ICD-10-CM: E78.00  ICD-9-CM: 272.0  4/12/2016 - Present        Essential hypertension, malignant ICD-10-CM: I10  ICD-9-CM: 401.0  11/13/2014 - Present        Arthritis ICD-10-CM: M19.90  ICD-9-CM: 716.90  11/13/2014 - Present        RESOLVED: Normal cardiac stress test ICD-10-CM: Z13.6  ICD-9-CM: V81.2  12/5/2017 - 2/7/2018    Overview Signed 12/5/2017  3:42 PM by Evelyn Chappell MD     Normal Marilyn Frizzle 3/28/2016, EF 57%             RESOLVED: Ganglion cyst ICD-10-CM: M67.40  ICD-9-CM: 727.43  4/12/2016 - 11/28/2017        RESOLVED: Thyroid mass ICD-10-CM: E07.9  ICD-9-CM: 246.9  4/12/2016 - 2/7/2018    Overview Signed 12/5/2017  3:45 PM by Evelyn Chappell MD     TSH on 11/28/2017 was 0.98             RESOLVED: Hearing loss ICD-10-CM: H91.90  ICD-9-CM: 389.9  11/13/2014 - 11/28/2017              Discharge Condition: Stable    Discharge To:  Home    Consults: Cardiology and [facility] Course: 62 y/o  male with hx of afib on Coumadin 5 mg daily and Cardizem BID (diagnosed in 11/2017), nontoxic multinodular goiter, HLD- presented to the ED on 4/26/2018 with c/o left shoulder, neck and arm pain that began 2 hours PTA. Also reports left arm numbness. Symptoms occurred while watching TV- he took sublingual Nitro without improvement. Per pt, he is compliant with medications, was on beta blocker and norvasc but developed rash which improved after medication stopped. Work up in the ED revealed afib w/ RVR- HR 140s. He was given Cardizem bolus then started on Cardizem gtt. Cardiology consulted. Echo with EF 45%, mild diffuse hypokinesis,  Cardizem gtt stopped prior to Nuclear stress test 4/17/2018, which revealed EF 38%, no area of prior scarring or ongoing ischemia, global wall motion abnormalities, low to intermediate risk. Cardizem gtt resumed later that evening on 4/17/2018 @10mg/hour for HR 150s. Cardiology adjusted medications- on 4/18/2018 Cardizem gtt stopped ~10am, received Digoxin 0.5 mg IV x 1 and started on Digoxin 0.125 mg Q48H. On 4/19/2018 early am, pt's heart rate 150s- he received an additional dose of Digoxin 500 mcg IV x 1 dose with control of heart rate 90s-100s. He is stable for discharge home, to follow up with PCP within 3-5 days, have INR repeated, and to follow up with cardiology within one week.       Physical Exam:  General appearance: alert, cooperative, no distress, appears stated age  Head: Normocephalic, without obvious abnormality, atraumatic  Lungs: clear to auscultation bilaterally  Heart: irregularly irregular  Abdomen: soft, non tender, non distended. Normoactive bowel sounds.    Extremities: extremities normal, atraumatic, no cyanosis or edema  Skin: Skin color, texture, turgor normal. No rashes or lesions  Neurologic: Grossly normal  PSY: Mood and affect normal, appropriately behaved    Significant Diagnostic Studies: labs:   Recent Results (from the past 24 hour(s))   METABOLIC PANEL, COMPREHENSIVE    Collection Time: 04/19/18  4:24 AM   Result Value Ref Range    Sodium 141 136 - 145 mmol/L    Potassium 4.1 3.5 - 5.5 mmol/L    Chloride 103 100 - 108 mmol/L    CO2 25 21 - 32 mmol/L    Anion gap 13 3.0 - 18 mmol/L    Glucose 96 74 - 99 mg/dL    BUN 13 7.0 - 18 MG/DL    Creatinine 1.03 0.6 - 1.3 MG/DL    BUN/Creatinine ratio 13 12 - 20      GFR est AA >60 >60 ml/min/1.73m2    GFR est non-AA >60 >60 ml/min/1.73m2    Calcium 8.7 8.5 - 10.1 MG/DL    Bilirubin, total 1.0 0.2 - 1.0 MG/DL    ALT (SGPT) 35 16 - 61 U/L    AST (SGOT) 20 15 - 37 U/L    Alk. phosphatase 116 45 - 117 U/L    Protein, total 6.9 6.4 - 8.2 g/dL    Albumin 4.0 3.4 - 5.0 g/dL    Globulin 2.9 2.0 - 4.0 g/dL    A-G Ratio 1.4 0.8 - 1.7     PROTHROMBIN TIME + INR    Collection Time: 04/19/18  4:24 AM   Result Value Ref Range    Prothrombin time 16.8 (H) 11.5 - 15.2 sec    INR 1.4 (H) 0.8 - 1.2     CBC W/O DIFF    Collection Time: 04/19/18  4:24 AM   Result Value Ref Range    WBC 9.8 4.6 - 13.2 K/uL    RBC 5.61 (H) 4.70 - 5.50 M/uL    HGB 18.3 (H) 13.0 - 16.0 g/dL    HCT 52.4 (H) 36.0 - 48.0 %    MCV 93.4 74.0 - 97.0 FL    MCH 32.6 24.0 - 34.0 PG    MCHC 34.9 31.0 - 37.0 g/dL    RDW 13.4 11.6 - 14.5 %    PLATELET 526 051 - 074 K/uL    MPV 10.9 9.2 - 11.8 FL         Discharge Medications:     Current Discharge Medication List      START taking these medications    Details   digoxin (LANOXIN) 0.125 mg tablet Take 1 Tab by mouth every fourty-eight (48) hours. Qty: 15 Tab, Refills: 0      nitroglycerin (NITROSTAT) 0.4 mg SL tablet 1 Tab by SubLINGual route as needed for Chest Pain. Qty: 1 Bottle, Refills: 0         CONTINUE these medications which have CHANGED    Details   !! dilTIAZem CD (CARDIZEM CD) 180 mg ER capsule Take 1 Cap by mouth nightly. Qty: 30 Cap, Refills: 0      !! dilTIAZem CD (CARDIZEM CD) 240 mg ER capsule Take 1 Cap by mouth daily. Qty: 30 Cap, Refills: 0      warfarin (COUMADIN) 6 mg tablet Take 1 Tab by mouth daily. Qty: 30 Tab, Refills: 0       !! - Potential duplicate medications found. Please discuss with provider.       CONTINUE these medications which have NOT CHANGED    Details   magnesium oxide 200 mg magnesium tab Take 200 mg by mouth daily. Qty: 100 Tab, Refills: 12    Associated Diagnoses: Atrial fibrillation, unspecified type ([address] Nontoxic multinodular goiter; Elevated cholesterol; Essential hypertension, malignant      multivit-min-FA-lycopen-lutein (CENTRUM SILVER MEN) 300-600-300 mcg tab Take 1 Tab by mouth daily. Qty: 100 Caplet, Refills: 12    Associated Diagnoses: Atrial fibrillation, unspecified type ([address] Nontoxic multinodular goiter; Elevated cholesterol; Essential hypertension, malignant         STOP taking these medications       vitamin k 100 mcg tablet Comments:   Reason for Stopping:               Activity: Activity as tolerated    Diet: Cardiac Diet    Wound Care: None needed    Follow-up: Follow up with PCP within 3-5 days, have INR repeated. Follow up with cardiology within one week.     Discharge time: > 35 mins  Ralph Muse NP  4/19/2018, 11:57 AM

## 2018-04-19 NOTE — PROGRESS NOTES
1900 --  Bedside, Verbal and Written shift change report given to [address] (oncoming nurse) by Radha nurse). Report included the following information SBAR, Kardex, Intake/Output, MAR and Recent Results.        2100 -- PM medications administered, pt tolerated with ease, will continue to monitor.      0000 -- Shift reassessment, pt condition unchanged, will continue to monitor.       --  Shift reassessment, pt condition unchanged, will continue to monitor.          -- Bedside, Verbal and Written shift change report given to   (oncoming nurse) by JEFFERY (offgoing nurse). Report included the following information SBAR, Kardex, Intake/Output, MAR and Recent Results. Skin assessment completed.

## 2018-04-19 NOTE — PROGRESS NOTES
Problem: Falls - Risk of  Goal: *Absence of Falls  Document Leonila Fall Risk and appropriate interventions in the flowsheet.    Outcome: Progressing Towards Goal  Fall Risk Interventions:            Medication Interventions: Teach patient to arise slowly

## 2018-04-19 NOTE — PROGRESS NOTES
Problem: Falls - Risk of  Goal: *Absence of Falls  Document Leonila Fall Risk and appropriate interventions in the flowsheet. Outcome: Progressing Towards Goal  Fall Risk Interventions:   Safety: Patients room will remain free of obstacles that could cause a fall.           Medication Interventions: Teach patient to arise slowly

## 2018-04-26 ENCOUNTER — OFFICE VISIT (OUTPATIENT)
Dept: FAMILY MEDICINE CLINIC | Age: 63
End: 2018-04-26

## 2018-04-26 VITALS
TEMPERATURE: 97.7 F | DIASTOLIC BLOOD PRESSURE: 88 MMHG | OXYGEN SATURATION: 96 % | SYSTOLIC BLOOD PRESSURE: 138 MMHG | RESPIRATION RATE: 19 BRPM | HEIGHT: 70 IN | HEART RATE: 66 BPM | WEIGHT: 169.2 LBS | BODY MASS INDEX: 24.22 KG/M2

## 2018-04-26 DIAGNOSIS — I48.20 CHRONIC ATRIAL FIBRILLATION (HCC): Primary | ICD-10-CM

## 2018-04-26 PROBLEM — I48.91 ATRIAL FIBRILLATION WITH RVR (HCC): Status: RESOLVED | Noted: 2018-04-16 | Resolved: 2018-04-26

## 2018-04-26 PROBLEM — I48.19 PERSISTENT ATRIAL FIBRILLATION (HCC): Status: RESOLVED | Noted: 2018-04-17 | Resolved: 2018-04-26

## 2018-04-26 LAB
INR BLD: 2.5
PT POC: 29.7 SECONDS
VALID INTERNAL CONTROL?: YES

## 2018-04-26 NOTE — PROGRESS NOTES
Room #      SUBJECTIVE:    Tessa Pemberton is a 61 y.o. male who presents today for hospital follow up    1. Have you been to the ER, urgent care clinic since your last visit? Hospitalized since your last visit? YES    2. Have you seen or consulted any other health care providers outside of the [address] [place] [address] since your last visit? Include any pap smears or colon screening. YES  When :  Reason:    Health Maintenance reviewed  Up to date    There are no preventive care reminders to display for this patient.

## 2018-04-26 NOTE — MR AVS SNAPSHOT
Vanesa Rodriguez 
 
 
 [id number] [facility] [address] [facility] [address] [address] [phone] Patient: Amanda Burroughs. MRN: [id number]:[id number] Visit Information Date & Time Provider Department Dept. Phone Encounter #  
 4/26/2018  9:30 AM Inna Brooks [phone] [id number] Follow-up Instructions Return in about 2 weeks (around 5/10/2018) for rov, inr next visit. Follow-up and Disposition History Your Appointments 5/14/2018 10:00 AM  
POST HOSPITAL with Alfred Mendes. Patricia Gil PA-C Cardio Specialist at [facility]) Appt Note: ph f/u, Efren pt -c [address] [facility] [address] [facility] [address] [place] [address]  
  
   
 [address] [facility] [address] [facility] Upcoming Health Maintenance Date Due DTaP/Tdap/Td series (2 - Td) 12/4/2024 COLONOSCOPY 5/23/2026 Allergies as of 4/26/2018  Review Complete On: 4/26/2018 By: Ivan Bolanos., DO No Known Allergies Current Immunizations  Reviewed on 11/13/2014 Name Date Influenza Vaccine 11/1/2017, 12/4/2014, 11/13/2011 Influenza Vaccine (Quad) PF 12/5/2017  2:34 PM, 3/15/2016 Pneumococcal Vaccine (Unspecified Type) 11/13/2011 Td 11/13/2001 Tdap 12/4/2014 Zoster Vaccine, Live 3/15/2016 Not reviewed this visit You Were Diagnosed With   
  
 Codes Comments Chronic atrial fibrillation (HCC)    -  Primary ICD-10-CM: H23.0 ICD-9-CM: 427.31 Vitals BP Pulse Temp Resp Height(growth percentile) Weight(growth percentile) 138/88 (BP 1 Location: Right arm, BP Patient Position: Sitting) 66 97.7 °F (36.5 °C) (Oral) 19 5' 10\" (1.778 m) 169 lb 3.2 oz (76.7 kg) SpO2 BMI Smoking Status 96% 24.28 kg/m2 Former Smoker BMI and BSA Data Body Mass Index Body Surface Area  
 24.28 kg/m 2 1.95 m 2 Preferred Pharmacy Pharmacy Name Phone [address], [address] [phone] Your Updated Medication List  
  
   
This list is accurate as of 4/26/18  9:41 AM.  Always use your most recent med list.  
  
  
  
  
 digoxin 0.125 mg tablet Commonly known as:  LANOXIN Take 1 Tab by mouth every fourty-eight (48) hours. * dilTIAZem  mg ER capsule Commonly known as:  CARDIZEM CD Take 1 Cap by mouth nightly. * dilTIAZem  mg ER capsule Commonly known as:  CARDIZEM CD Take 1 Cap by mouth daily. magnesium oxide 200 mg magnesium Tab Take 200 mg by mouth daily. multivit-min-FA-lycopen-lutein 300-600-300 mcg Tab Commonly known as:  CENTRUM SILVER MEN Take 1 Tab by mouth daily. nitroglycerin 0.4 mg SL tablet Commonly known as:  NITROSTAT  
1 Tab by SubLINGual route as needed for Chest Pain.  
  
 warfarin 6 mg tablet Commonly known as:  COUMADIN Take 1 Tab by mouth daily. * Notice: This list has 2 medication(s) that are the same as other medications prescribed for you. Read the directions carefully, and ask your doctor or other care provider to review them with you. We Performed the Following AMB POC PT/INR [36623 CPT(R)] Follow-up Instructions Return in about 2 weeks (around 5/10/2018) for rov, inr next visit. Introducing [facility]! Dear Rosanna Stapleton: 
Thank you for requesting a [employer] account. Our records indicate that you already have an active [employer] account. You can access your account anytime at [email or web address] Did you know that you can access your hospital and ER discharge instructions at any time in [employer] You can also review all of your test results from your hospital stay or ER visit. Additional Information If you have questions, please visit the Frequently Asked Questions section of the [employer] website at [email or web address] Remember, "[employer]hart is NOT to be used for urgent needs. For medical emergencies, dial 911. Now available from your iPhone and Android! Please provide this summary of care documentation to your next provider. Your primary care clinician is listed as [id number] [facility]. If you have any questions after today's visit, please call [phone].

## 2018-04-26 NOTE — PROGRESS NOTES
Prisca Hurst. is a 61 y.o.  male and presents with    Chief Complaint   Patient presents with    Irregular Heart Beat    Arthritis    Cholesterol Problem    Hypertension    Thyroid Problem           Subjective: In hosp last wk with atrial fib and rvr  Given dig and diltiazem incrased  Pt also states he has decraese etoh and also vit K stoped in hosp    Thyroid Review:  Patient is seen for followup of hypothyroidism. Thyroid ROS: denies fatigue, weight changes, heat/cold intolerance, bowel/skin changes or CVS symptoms. Osteoarthritis and Chronic Pain:  Patient has osteoarthritis, primarily affecting the diffuse. Symptoms onset: problem is longstanding. Rheumatological ROS: no current joint or muscle symptoms, essentially pain-free. Response to treatment plan: stable. Additional Concerns:          Patient Active Problem List    Diagnosis Date Noted    Nontoxic multinodular goiter 02/07/2018    Atrial fibrillation ([address] 11/28/2017    Elevated cholesterol 04/12/2016    Essential hypertension, malignant 11/13/2014    Arthritis 11/13/2014     Current Outpatient Prescriptions   Medication Sig Dispense Refill    digoxin (LANOXIN) 0.125 mg tablet Take 1 Tab by mouth every fourty-eight (48) hours. 15 Tab 0    dilTIAZem CD (CARDIZEM CD) 180 mg ER capsule Take 1 Cap by mouth nightly. 30 Cap 0    dilTIAZem CD (CARDIZEM CD) 240 mg ER capsule Take 1 Cap by mouth daily. 30 Cap 0    nitroglycerin (NITROSTAT) 0.4 mg SL tablet 1 Tab by SubLINGual route as needed for Chest Pain. 1 Bottle 0    warfarin (COUMADIN) 6 mg tablet Take 1 Tab by mouth daily. 30 Tab 0    magnesium oxide 200 mg magnesium tab Take 200 mg by mouth daily. 100 Tab 12    multivit-min-FA-lycopen-lutein (CENTRUM SILVER MEN) 300-600-300 mcg tab Take 1 Tab by mouth daily.  100 Caplet 12     No Known Allergies  Past Medical History:   Diagnosis Date    Arthritis     Atrial fibrillation ([facility] 11/28/2017    Hearing loss     HLD (hyperlipidemia)     HTN (hypertension)     Nontoxic multinodular goiter 2/7/2018     Past Surgical History:   Procedure Laterality Date    HX HERNIA REPAIR       Family History   Problem Relation Age of Onset    Diabetes Father      Social History   Substance Use Topics    Smoking status: Former Smoker    Smokeless tobacco: Never Used    Alcohol use 12.5 oz/week     25 Shots of liquor per week       ROS       All other systems reviewed and are negative. Objective:  Vitals:    04/26/18 0924   BP: 138/88   Pulse: 66   Resp: 19   Temp: 97.7 °F (36.5 °C)   TempSrc: Oral   SpO2: 96%   Weight: 169 lb 3.2 oz (76.7 kg)   Height: 5' 10\" (1.778 m)   PainSc:   0 - No pain                 alert, well appearing, and in no distress and oriented to person, place, and time  Chest - clear to auscultation, no wheezes, rales or rhonchi, symmetric air entry  Heart - normal rate, regular rhythm, normal S1, S2, no murmurs, rubs, clicks or gallops  Abdomen - soft, nontender, nondistended, no masses or organomegaly        LABS   inr  2.5  TESTS      Assessment/Plan:    Atrial fib controlled  Coumadin optimal  arthritsi stable  Thyroid stable      Lab review: labs are reviewed, up to date and normal    Diagnoses and all orders for this visit:    1. Chronic atrial fibrillation (HCC)  -     AMB POC PT/INR          I have discussed the diagnosis with the patient and the intended plan as seen in the above orders. The patient has received an after-visit summary and questions were answered concerning future plans. I have discussed medication side effects and warnings with the patient as well. I have reviewed the plan of care with the patient, accepted their input and they are in agreement with the treatment goals. Follow-up Disposition:  Return in about 2 weeks (around 5/10/2018) for rov, inr next visit.

## 2018-05-09 ENCOUNTER — OFFICE VISIT (OUTPATIENT)
Dept: FAMILY MEDICINE CLINIC | Age: 63
End: 2018-05-09

## 2018-05-09 VITALS
HEART RATE: 73 BPM | HEIGHT: 70 IN | BODY MASS INDEX: 24.39 KG/M2 | DIASTOLIC BLOOD PRESSURE: 115 MMHG | TEMPERATURE: 97.6 F | SYSTOLIC BLOOD PRESSURE: 171 MMHG | OXYGEN SATURATION: 96 % | WEIGHT: 170.4 LBS | RESPIRATION RATE: 16 BRPM

## 2018-05-09 DIAGNOSIS — T45.514A POISONING BY WARFARIN SODIUM, UNDETERMINED INTENT, INITIAL ENCOUNTER: ICD-10-CM

## 2018-05-09 DIAGNOSIS — I48.20 CHRONIC ATRIAL FIBRILLATION (HCC): Primary | ICD-10-CM

## 2018-05-09 LAB
INR BLD: 6.3
PT POC: 76 SECONDS
VALID INTERNAL CONTROL?: YES

## 2018-05-09 RX ORDER — PHYTONADIONE 1 MG/.5ML
10 INJECTION, EMULSION INTRAMUSCULAR; INTRAVENOUS; SUBCUTANEOUS ONCE
Qty: 1 VIAL | Refills: 0
Start: 2018-05-09 | End: 2018-05-09

## 2018-05-09 RX ORDER — DIGOXIN 125 MCG
0.12 TABLET ORAL
Qty: 15 TAB | Refills: 0 | Status: SHIPPED | OUTPATIENT
Start: 2018-05-09 | End: 2018-05-29 | Stop reason: SDUPTHER

## 2018-05-09 RX ORDER — ZINC GLUCONATE 100 MG
100 TABLET ORAL DAILY
Qty: 100 TAB | Refills: 12 | Status: SHIPPED | OUTPATIENT
Start: 2018-05-09 | End: 2018-05-29 | Stop reason: SDUPTHER

## 2018-05-09 NOTE — PROGRESS NOTES
Monique Mcadams. is a 61 y.o. male presents today for follow up on his atrial fibrilation. Pt is in Room # 4        1. Have you been to the ER, urgent care clinic since your last visit? Hospitalized since your last visit? No    2. Have you seen or consulted any other health care providers outside of the [facility] since your last visit? Include any pap smears or colon screening. No     Health Maintenance reviewed - .

## 2018-05-09 NOTE — PROGRESS NOTES
Kulwant Santos. is a 61 y.o.  male and presents with    Chief Complaint   Patient presents with    Anticoagulation    Irregular Heart Beat           Subjective:  Pt recently in hosp with atrial fib  At that time his vit K was dc and his coumadin was doubled  Here for f/u to day  Also running out of dig    Anticoagulation  Patient here for followup of chronic anticoagulation. Indication: Atrial Fibrillation. Bleeding Signs/Symptoms:  None. Thromboembolic Signs/Symptoms:  None. INR's are drawn regularly and have been therapeutic. Lab Results   Component Value Date/Time    INR 1.4 (H) 04/19/2018 04:24 AM    INR POC 2.5 04/26/2018 09:00 AM       Additional Concerns: ILNR  6.3 today  PT  76.0           Patient Active Problem List    Diagnosis Date Noted    Nontoxic multinodular goiter 02/07/2018    Atrial fibrillation (HCC) 11/28/2017    Elevated cholesterol 04/12/2016    Essential hypertension, malignant 11/13/2014    Arthritis 11/13/2014     Current Outpatient Prescriptions   Medication Sig Dispense Refill    phytonadione, vitamin K1, (AQUA-MEPHYTON) 1 mg/0.5 mL injection 5 mL by IntraMUSCular route once for 1 dose. 1 Vial 0    vitamin k 100 mcg tablet Take 1 Tab by mouth daily. 100 Tab 12    digoxin (LANOXIN) 0.125 mg tablet Take 1 Tab by mouth every fourty-eight (48) hours. 15 Tab 0    dilTIAZem CD (CARDIZEM CD) 180 mg ER capsule Take 1 Cap by mouth nightly. 30 Cap 0    dilTIAZem CD (CARDIZEM CD) 240 mg ER capsule Take 1 Cap by mouth daily. 30 Cap 0    nitroglycerin (NITROSTAT) 0.4 mg SL tablet 1 Tab by SubLINGual route as needed for Chest Pain. 1 Bottle 0    magnesium oxide 200 mg magnesium tab Take 200 mg by mouth daily. 100 Tab 12    multivit-min-FA-lycopen-lutein (CENTRUM SILVER MEN) 300-600-300 mcg tab Take 1 Tab by mouth daily.  100 Caplet 12     No Known Allergies  Past Medical History:   Diagnosis Date    Arthritis     Atrial fibrillation ([address] 11/28/2017    Hearing loss  HLD (hyperlipidemia)     HTN (hypertension)     Nontoxic multinodular goiter 2/7/2018     Past Surgical History:   Procedure Laterality Date    HX HERNIA REPAIR       Family History   Problem Relation Age of Onset    Diabetes Father      Social History   Substance Use Topics    Smoking status: Former Smoker    Smokeless tobacco: Never Used    Alcohol use 12.5 oz/week     25 Shots of liquor per week       ROS       All other systems reviewed and are negative. Objective:  Vitals:    05/09/18 1401   BP: (!) 171/115   Pulse: 73   Resp: 16   Temp: 97.6 °F (36.4 °C)   TempSrc: Oral   SpO2: 96%   Weight: 170 lb 6.4 oz (77.3 kg)   Height: 5' 10\" (1.778 m)   PainSc:   0 - No pain                 alert, well appearing, and in no distress and oriented to person, place, and time  Chest - clear to auscultation, no wheezes, rales or rhonchi, symmetric air entry  Heart - normal rate, regular rhythm, normal S1, S2, no murmurs, rubs, clicks or gallops        LABS     TESTS      Assessment/Plan:    Atrial fib improved  Coumadin toxic  Gave 10mg vit K IM today  Start back on oral vit K  D/c coumadin for now      Lab review: labs are reviewed, up to date and normal    Diagnoses and all orders for this visit:    1. Chronic atrial fibrillation (HCC)  -     AMB POC PT/INR  -     VITAMIN K PHYTONADIONE INJ  -     RI THER/PROPH/DIAG INJECTION, SUBCUT/IM  -     phytonadione, vitamin K1, (AQUA-MEPHYTON) 1 mg/0.5 mL injection; 5 mL by IntraMUSCular route once for 1 dose.  -     vitamin k 100 mcg tablet; Take 1 Tab by mouth daily. 2. Poisoning by warfarin sodium, undetermined intent, initial encounter  -     AMB POC PT/INR  -     VITAMIN K PHYTONADIONE INJ  -     RI THER/PROPH/DIAG INJECTION, SUBCUT/IM  -     phytonadione, vitamin K1, (AQUA-MEPHYTON) 1 mg/0.5 mL injection; 5 mL by IntraMUSCular route once for 1 dose.  -     vitamin k 100 mcg tablet; Take 1 Tab by mouth daily.     Other orders  -     digoxin (LANOXIN) 0.125 mg tablet; Take 1 Tab by mouth every fourty-eight (48) hours. I have discussed the diagnosis with the patient and the intended plan as seen in the above orders. The patient has received an after-visit summary and questions were answered concerning future plans. I have discussed medication side effects and warnings with the patient as well. I have reviewed the plan of care with the patient, accepted their input and they are in agreement with the treatment goals. Follow-up Disposition:  Return in about 5 days (around 5/14/2018) for EOV, inr next visit.

## 2018-05-09 NOTE — MR AVS SNAPSHOT
[address] [facility] 83 [address] 
[phone] Patient: Reyes Silva. MRN: [id number] BOH:2/31/7472 Visit Information Date & Time Provider Department Dept. Phone Encounter #  
 5/9/2018  2:00 PM Sanjeev Duran., [address] [place] [address] [phone] [id number] Follow-up Instructions Return in about 5 days (around 5/14/2018) for EOV, inr next visit. Your Appointments 5/14/2018 10:00 AM  
POST HOSPITAL with Sharon Sen PA-C Cardio Specialist at [facility] [address] [facility]) Appt Note: ph f/u, Efren pt -[address] 83 [address] [address] [place] [address] Upcoming Health Maintenance Date Due Influenza Age 5 to Adult 8/1/2018 DTaP/Tdap/Td series (2 - Td) 12/4/2024 COLONOSCOPY 5/23/2026 Allergies as of 5/9/2018  Review Complete On: 4/26/2018 By: Sanjeev Duran., DO No Known Allergies Current Immunizations  Reviewed on 11/13/2014 Name Date Influenza Vaccine 11/1/2017, 12/4/2014, 11/13/2011 Influenza Vaccine (Quad) PF 12/5/2017  2:34 PM, 3/15/2016 Pneumococcal Vaccine (Unspecified Type) 11/13/2011 Td 11/13/2001 Tdap 12/4/2014 Zoster Vaccine, Live 3/15/2016 Not reviewed this visit You Were Diagnosed With   
  
 Codes Comments Chronic atrial fibrillation (HCC)    -  Primary ICD-10-CM: A36.2 ICD-9-CM: 427.31 Poisoning by warfarin sodium, undetermined intent, initial encounter     ICD-10-CM: T45.514A ICD-9-CM: 964.2, E980.4 Vitals BP Pulse Temp Resp Height(growth percentile) Weight(growth percentile) (!) 171/115 (BP 1 Location: Left arm, BP Patient Position: Sitting) 73 97.6 °F (36.4 °C) (Oral) 16 5' 10\" (1.778 m) 170 lb 6.4 oz (77.3 kg) SpO2 BMI Smoking Status 96% 24.45 kg/m2 Former Smoker BMI and BSA Data Body Mass Index Body Surface Area  
 24.45 kg/m 2 1.95 m 2 Preferred Pharmacy Pharmacy Name Phone [address], [address] [phone] Your Updated Medication List  
  
   
This list is accurate as of 5/9/18  2:20 PM.  Always use your most recent med list.  
  
  
  
  
 digoxin 0.125 mg tablet Commonly known as:  LANOXIN Take 1 Tab by mouth every fourty-eight (48) hours. * dilTIAZem  mg ER capsule Commonly known as:  CARDIZEM CD Take 1 Cap by mouth nightly. * dilTIAZem  mg ER capsule Commonly known as:  CARDIZEM CD Take 1 Cap by mouth daily. magnesium oxide 200 mg magnesium Tab Take 200 mg by mouth daily. multivit-min-FA-lycopen-lutein 300-600-300 mcg Tab Commonly known as:  CENTRUM SILVER MEN Take 1 Tab by mouth daily. nitroglycerin 0.4 mg SL tablet Commonly known as:  NITROSTAT  
1 Tab by SubLINGual route as needed for Chest Pain. * phytonadione (vitamin K1) 1 mg/0.5 mL injection Commonly known as:  AQUA-MEPHYTON  
5 mL by IntraMUSCular route once for 1 dose. * vitamin k 100 mcg tablet Take 1 Tab by mouth daily. * Notice: This list has 4 medication(s) that are the same as other medications prescribed for you. Read the directions carefully, and ask your doctor or other care provider to review them with you. Prescriptions Sent to Pharmacy Refills  
 vitamin k 100 mcg tablet 12 Sig: Take 1 Tab by mouth daily. Class: Normal  
 Pharmacy: [address], [address] Ph #: [phone] Route: Oral  
 digoxin (LANOXIN) 0.125 mg tablet 0 Sig: Take 1 Tab by mouth every fourty-eight (48) hours. Class: Normal  
 Pharmacy: [address], [address] Ph #: [phone] Route: Oral  
  
We Performed the Following AMB POC PT/INR [60375 CPT(R)] TX THER/PROPH/DIAG INJECTION, SUBCUT/IM [id number] CPT(R)] VITAMIN K PHYTONADIONE INJ [ [facility]] Follow-up Instructions Return in about 5 days (around 5/14/2018) for EOV, inr next visit. Patient Instructions Stop taking coumadin Start taking vit K 100mcg daily F/u with dr Ac Morin in 5 days Introducing [facility] & [facility] SERVICES! Dear Anais Santiago: 
Thank you for requesting a Stabilitech account. Our records indicate that you already have an active Stabilitech account. You can access your account anytime at [email or web address] Did you know that you can access your hospital and ER discharge instructions at any time in Stabilitech? You can also review all of your test results from your hospital stay or ER visit. Additional Information If you have questions, please visit the Frequently Asked Questions section of the Stabilitech website at [email or web address] Remember, Stabilitech is NOT to be used for urgent needs. For medical emergencies, dial 911. Now available from your iPhone and Android! Please provide this summary of care documentation to your next provider. Your primary care clinician is listed as [id number] [facility]. If you have any questions after today's visit, please call [phone].

## 2018-05-14 ENCOUNTER — OFFICE VISIT (OUTPATIENT)
Dept: FAMILY MEDICINE CLINIC | Age: 63
End: 2018-05-14

## 2018-05-14 ENCOUNTER — OFFICE VISIT (OUTPATIENT)
Dept: CARDIOLOGY CLINIC | Age: 63
End: 2018-05-14

## 2018-05-14 VITALS
TEMPERATURE: 97.3 F | SYSTOLIC BLOOD PRESSURE: 134 MMHG | HEIGHT: 70 IN | RESPIRATION RATE: 16 BRPM | BODY MASS INDEX: 23.65 KG/M2 | OXYGEN SATURATION: 96 % | WEIGHT: 165.2 LBS | DIASTOLIC BLOOD PRESSURE: 94 MMHG | HEART RATE: 64 BPM

## 2018-05-14 VITALS
HEART RATE: 84 BPM | SYSTOLIC BLOOD PRESSURE: 148 MMHG | BODY MASS INDEX: 23.62 KG/M2 | OXYGEN SATURATION: 97 % | WEIGHT: 165 LBS | DIASTOLIC BLOOD PRESSURE: 98 MMHG | HEIGHT: 70 IN

## 2018-05-14 DIAGNOSIS — E04.2 NONTOXIC MULTINODULAR GOITER: ICD-10-CM

## 2018-05-14 DIAGNOSIS — M19.90 ARTHRITIS: ICD-10-CM

## 2018-05-14 DIAGNOSIS — I48.20 CHRONIC ATRIAL FIBRILLATION (HCC): Primary | ICD-10-CM

## 2018-05-14 DIAGNOSIS — E78.00 ELEVATED CHOLESTEROL: ICD-10-CM

## 2018-05-14 DIAGNOSIS — I48.91 ATRIAL FIBRILLATION, UNSPECIFIED TYPE (HCC): Primary | ICD-10-CM

## 2018-05-14 DIAGNOSIS — I10 ESSENTIAL HYPERTENSION, MALIGNANT: ICD-10-CM

## 2018-05-14 LAB
INR BLD: 1.1
PT POC: 13.4 SECONDS
VALID INTERNAL CONTROL?: YES

## 2018-05-14 RX ORDER — WARFARIN 4 MG/1
4 TABLET ORAL DAILY
Qty: 30 TAB | Refills: 12 | Status: SHIPPED | OUTPATIENT
Start: 2018-05-14 | End: 2018-05-29 | Stop reason: SDUPTHER

## 2018-05-14 NOTE — PROGRESS NOTES
Jes Belle. is a 61 y.o.  male and presents with    Chief Complaint   Patient presents with    Anticoagulation    Irregular Heart Beat           Subjective:    Cardiovascular Review:  The patient has Atrial Fibrillation. Diet and Lifestyle: not attempting to follow a low fat, low cholesterol diet  Home BP Monitoring: is not measured at home. Pertinent ROS: taking medications as instructed, no medication side effects noted, no TIA's, no chest pain on exertion, no dyspnea on exertion, no swelling of ankles. Anticoagulation  Patient here for followup of chronic anticoagulation. Indication: Atrial Fibrillation. Bleeding Signs/Symptoms:  None. Thromboembolic Signs/Symptoms:  None. INR's are drawn regularly and have been therapeutic. Lab Results   Component Value Date/Time    INR 1.4 (H) 04/19/2018 04:24 AM    INR POC 1.1 05/14/2018 08:46 AM       Additional Concerns:          Patient Active Problem List    Diagnosis Date Noted    Nontoxic multinodular goiter 02/07/2018    Atrial fibrillation (HCC) 11/28/2017    Elevated cholesterol 04/12/2016    Essential hypertension, malignant 11/13/2014    Arthritis 11/13/2014     Current Outpatient Prescriptions   Medication Sig Dispense Refill    vitamin k 100 mcg tablet Take 1 Tab by mouth daily. 100 Tab 12    digoxin (LANOXIN) 0.125 mg tablet Take 1 Tab by mouth every fourty-eight (48) hours. 15 Tab 0    dilTIAZem CD (CARDIZEM CD) 180 mg ER capsule Take 1 Cap by mouth nightly. 30 Cap 0    dilTIAZem CD (CARDIZEM CD) 240 mg ER capsule Take 1 Cap by mouth daily. 30 Cap 0    nitroglycerin (NITROSTAT) 0.4 mg SL tablet 1 Tab by SubLINGual route as needed for Chest Pain. 1 Bottle 0    magnesium oxide 200 mg magnesium tab Take 200 mg by mouth daily. 100 Tab 12    multivit-min-FA-lycopen-lutein (CENTRUM SILVER MEN) 300-600-300 mcg tab Take 1 Tab by mouth daily.  100 Caplet 12     No Known Allergies  Past Medical History:   Diagnosis Date    Arthritis     Atrial fibrillation ([facility] [address] 11/28/2017    Hearing loss     HLD (hyperlipidemia)     HTN (hypertension)     Nontoxic multinodular goiter 2/7/2018     Past Surgical History:   Procedure Laterality Date    HX HERNIA REPAIR       Family History   Problem Relation Age of Onset    Diabetes Father      Social History   Substance Use Topics    Smoking status: Former Smoker    Smokeless tobacco: Never Used    Alcohol use 12.5 oz/week     25 Shots of liquor per week       ROS       All other systems reviewed and are negative.       Objective:  Vitals:    05/14/18 0847   BP: (!) 134/94   Pulse: 64   Resp: 16   Temp: 97.3 °F (36.3 °C)   TempSrc: Oral   SpO2: 96%   Weight: 165 lb 3.2 oz (74.9 kg)   Height: 5' 10\" (1.778 m)   PainSc:   0 - No pain                 alert, well appearing, and in no distress, oriented to person, place, and time and normal appearing weight  Mental status - alert, oriented to person, place, and time  Heart - irregularly irregular rhythm with rate 60        LABS   Component      Latest Ref Rng & Units 5/14/2018 5/9/2018 4/26/2018 4/19/2018           8:46 AM  2:04 PM  9:00 AM  4:24 AM   VALID INTERNAL CONTROL POC       Yes Yes Yes    Prothrombin time (POC)      seconds 13.4 76.0 29.7    INR       1.1 6.3 2.5 1.4 (H)   Prothrombin time      11.5 - 15.2 sec    16.8 (H)     Component      Latest Ref Rng & Units 4/18/2018 4/17/2018 4/16/2018 3/28/2018           4:55 AM  4:45 AM  1:27 PM  1:35 PM   VALID INTERNAL CONTROL POC          Yes   Prothrombin time (POC)      seconds    14.5   INR       1.5 (H) 1.8 (H) 1.9 (H) 1.2   Prothrombin time      11.5 - 15.2 sec 17.2 (H) 20.5 (H) 20.9 (H)      Component      Latest Ref Rng & Units 3/7/2018 2/21/2018 2/7/2018           2:43 PM  3:11 PM  2:50 PM   VALID INTERNAL CONTROL POC       Yes Yes Yes   Prothrombin time (POC)      seconds 17.5 15.7 16.1   INR       1.5 1.3 1.3   Prothrombin time      11.5 - 15.2 sec        TESTS      Assessment/Plan: Atrial fib rate controlled. bp is fine  Coumadin was toxic a few days ago. Now down to zero  Note on 4mg/d prev was elevated howevere 3 mg/d was low. Will try Vit K 100mcg/d + couimadin 4mg/d and f/u 2 wk      Lab review: labs are reviewed, up to date and normal    Diagnoses and all orders for this visit:    1. Atrial fibrillation, unspecified type (HCC)  -     AMB POC PT/INR          I have discussed the diagnosis with the patient and the intended plan as seen in the above orders. The patient has received an after-visit summary and questions were answered concerning future plans. I have discussed medication side effects and warnings with the patient as well. I have reviewed the plan of care with the patient, accepted their input and they are in agreement with the treatment goals. Follow-up Disposition:  Return in about 2 weeks (around 5/28/2018) for rov, inr next visit.

## 2018-05-14 NOTE — PROGRESS NOTES
Subjective:   Mr. Donato Gardner is here today for cardiac re-evaluation. He has a history of afib diagnosed in November 2017, hypertension, dyslipidemia and toxic multinodular goiter. He is a former smoker and quit 4 years ago. Pt was admitted to [facility] in April 2018 for afib RVR. He had some left upper shoulder and neck discomfort which prompted him to seek evaluation. His medications were adjusted, he also had a nuclear stress test and echocardiogram completed at that time. Today in the office, he brings a log of his vitals. Heart rate ranges between 90's-110's. He has not had any further neck or shoulder pain reminiscent of what he felt prior to hospitalization. He has not had any chest pain, shortness of breath, orthopnea, PND, syncope, near syncope, focal weakness or GI distress. He drinks about 4 scotch drinks per day. Patient's cardiac risk factors are dyslipidemia, male gender, hypertension. Patient Active Problem List    Diagnosis Date Noted    Nontoxic multinodular goiter 02/07/2018    Atrial fibrillation ([address] 11/28/2017    Elevated cholesterol 04/12/2016    Essential hypertension, malignant 11/13/2014    Arthritis 11/13/2014     Current Outpatient Prescriptions   Medication Sig Dispense Refill    warfarin (COUMADIN) 4 mg tablet Take 1 Tab by mouth daily. 30 Tab 12    vitamin k 100 mcg tablet Take 1 Tab by mouth daily. 100 Tab 12    digoxin (LANOXIN) 0.125 mg tablet Take 1 Tab by mouth every fourty-eight (48) hours. 15 Tab 0    dilTIAZem CD (CARDIZEM CD) 180 mg ER capsule Take 1 Cap by mouth nightly. 30 Cap 0    dilTIAZem CD (CARDIZEM CD) 240 mg ER capsule Take 1 Cap by mouth daily. 30 Cap 0    nitroglycerin (NITROSTAT) 0.4 mg SL tablet 1 Tab by SubLINGual route as needed for Chest Pain. 1 Bottle 0    magnesium oxide 200 mg magnesium tab Take 200 mg by mouth daily. 100 Tab 12    multivit-min-FA-lycopen-lutein (CENTRUM SILVER MEN) 300-600-300 mcg tab Take 1 Tab by mouth daily.  100 Caplet 12     No Known Allergies  Past Medical History:   Diagnosis Date    Arthritis     Atrial fibrillation ([address] 11/28/2017    Hearing loss     HLD (hyperlipidemia)     HTN (hypertension)     Nontoxic multinodular goiter 2/7/2018     Past Surgical History:   Procedure Laterality Date    HX HERNIA REPAIR       Family History   Problem Relation Age of Onset    Diabetes Father      History   Smoking Status    Former Smoker   Smokeless Tobacco    Never Used          Review of Systems, additional:  Constitutional: Negative  Eyes: negative  Respiratory: negative  Cardiovascular: per HPI  Gastrointestinal: negative for nausea and vomiting  Musculoskeletal:negative for myalgias  Neurological: negative  Behvioral/Psych: negative  Skin/Integumentary: As per HPI. Endocrine: negative  ENT: negative    Objective:     Visit Vitals    BP (!) 148/98    Pulse 84    Ht 5' 10\" (1.778 m)    Wt 165 lb (74.8 kg)    SpO2 97%    BMI 23.68 kg/m2     General:  alert, cooperative, no distress, appears stated age   Chest Wall: inspection normal - no chest wall deformities or tenderness, respiratory effort normal   Lung: clear to auscultation bilaterally   Heart:  Irregularly irregular rhythm, normal S1, S2, no murmurs, rubs, clicks or gallops   Abdomen: soft, non-tender. Bowel sounds normal. No masses,  no organomegaly   Extremities: no cyanosis or edema Skin: small erythematous plaques of bilateral axillae, left > right Bilateral calves with a fine, sandpaper-like papular rash   Neuro: alert, oriented, normal speech, no focal findings or movement disorder noted     EKG 3/14/18: Atrial fibrillation rate 84    Assessment/Plan:         ICD-10-CM ICD-9-CM    1. Chronic atrial fibrillation (HCC)-CHADS-VASC score of 1 (+HTN) - Patient was hospitalized for afib RVR, Diltiazem CD dose was adjusted to 240 mg every AM and 180 mg every PM. Digoxin every other day was added after IV loading dose.  His rate appears well controlled on his home readings. No symptoms suggestive of Digoxin toxicity. He is on Warfarin and Vitamin K, dosing per PCP. I48.2 427.31    2. Essential hypertension, malignant- Continue with Cardizem CD. Has been on Atenolol, Lisinopril and Amlodipine in the past.  I10 401.0    3. Nontoxic multinodular goiter- per Dr. Asher Contreras. E04.2 241.1    4. Elevated cholesterol-- he is not taking statin anymore due to drug allergy concern. Labs checked by PCP, defer management to PCP. LDL <70 recommended from cardiac standpoint. E78.00 272.0    5. ETOH use: has cut back, counseled to continue to reduce intake  6. Cardiomyopathy: EF 45% on echocardiogram. Does not appear volume overloaded on exam or by history. Lisinopril was discontinued in the past, patient is uncertain if this was due to allergy or intolerance. Can consider ARB at next visit.

## 2018-05-14 NOTE — MR AVS SNAPSHOT
303 [facility] 
 
 
 [address] [facility] [address] [facility] 83 [address] 
[phone] Patient: Prisca Hurst. MRN: [id number] KBE:2/35/3396 Visit Information Date & Time Provider Department Dept. Phone Encounter #  
 5/14/2018  9:00 AM Inna Brooks [phone] [id number] Follow-up Instructions Return in about 2 weeks (around 5/28/2018) for rov, inr next visit. Follow-up and Disposition History Your Appointments 5/14/2018  9:00 AM  
[place] [address] [facility] 16 [facility]) Appt Note: Return on Monday for INR 8/14/2018). ; Confirmed 5/11/18/ms. [address] [facility] [address] [facility] 222 [facility]  
  
   
 [address] [facility] [address] 5/14/2018 10:00 AM  
POST HOSPITAL with Sherie Edgar. Kerry Rosas PA-C Cardio Specialist at [facility]) Appt Note: ph f/u, Efren pt -[facility] [address] [facility] Upcoming Health Maintenance Date Due Influenza Age 5 to Adult 8/1/2018 DTaP/Tdap/Td series (2 - Td) 12/4/2024 COLONOSCOPY 5/23/2026 Allergies as of 5/14/2018  Review Complete On: 4/26/2018 By: Epi Kaiser DO No Known Allergies Current Immunizations  Reviewed on 11/13/2014 Name Date Influenza Vaccine 11/1/2017, 12/4/2014, 11/13/2011 Influenza Vaccine (Quad) PF 12/5/2017  2:34 PM, 3/15/2016 Pneumococcal Vaccine (Unspecified Type) 11/13/2011 Td 11/13/2001 Tdap 12/4/2014 Zoster Vaccine, Live 3/15/2016 Not reviewed this visit You Were Diagnosed With   
  
 Codes Comments Atrial fibrillation, unspecified type ([facility] [address]    -  Primary ICD-10-CM: I48.91 
ICD-9-CM: 427.31 Essential hypertension, malignant     ICD-10-CM: I10 
ICD-9-CM: 401.0 Nontoxic multinodular goiter     ICD-10-CM: E04.2 ICD-9-CM: 241.1 Elevated cholesterol     ICD-10-CM: E78.00 ICD-9-CM: 272.0 Arthritis     ICD-10-CM: M19.90 ICD-9-CM: 716.90 Vitals BP Pulse Temp Resp Height(growth percentile) Weight(growth percentile) (!) 134/94 (BP 1 Location: Right arm, BP Patient Position: Sitting) 64 97.3 °F (36.3 °C) (Oral) 16 5' 10\" (1.778 m) 165 lb 3.2 oz (74.9 kg) SpO2 BMI Smoking Status 96% 23.7 kg/m2 Former Smoker BMI and BSA Data Body Mass Index Body Surface Area  
 23.7 kg/m 2 1.92 m 2 Preferred Pharmacy Pharmacy Name Phone Cyndie Rosario [address], [address] [place], [place] [address] [phone] Your Updated Medication List  
  
   
This list is accurate as of 5/14/18  8:53 AM.  Always use your most recent med list.  
  
  
  
  
 digoxin 0.125 mg tablet Commonly known as:  LANOXIN Take 1 Tab by mouth every fourty-eight (48) hours. * dilTIAZem  mg ER capsule Commonly known as:  CARDIZEM CD Take 1 Cap by mouth nightly. * dilTIAZem  mg ER capsule Commonly known as:  CARDIZEM CD Take 1 Cap by mouth daily. magnesium oxide 200 mg magnesium Tab Take 200 mg by mouth daily. multivit-min-FA-lycopen-lutein 300-600-300 mcg Tab Commonly known as:  CENTRUM SILVER MEN Take 1 Tab by mouth daily. nitroglycerin 0.4 mg SL tablet Commonly known as:  NITROSTAT  
1 Tab by SubLINGual route as needed for Chest Pain.  
  
 vitamin k 100 mcg tablet Take 1 Tab by mouth daily. warfarin 4 mg tablet Commonly known as:  COUMADIN Take 1 Tab by mouth daily. * Notice: This list has 2 medication(s) that are the same as other medications prescribed for you. Read the directions carefully, and ask your doctor or other care provider to review them with you. Prescriptions Printed  Refills  
 warfarin (COUMADIN) 4 mg tablet 12  
 Sig: Take 1 Tab by mouth daily. Class: Print Route: Oral  
  
We Performed the Following AMB POC PT/INR [08222 CPT(R)] Follow-up Instructions Return in about 2 weeks (around 5/28/2018) for rov, inr next visit. Patient Instructions Start back on coumadin 4mg/d Take vit K 100mcg/d F/u 2 wk with dr Jude Galan Introducing [facility] & HE[facility] SERVICES! Dear Lupillo De Leon: 
Thank you for requesting a Vantos account. Our records indicate that you already have an active Vantos account. You can access your account anytime at [email or web address] Did you know that you can access your hospital and ER discharge instructions at any time in Vantos? You can also review all of your test results from your hospital stay or ER visit. Additional Information If you have questions, please visit the Frequently Asked Questions section of the Vantos website at [email or web address] Remember, Vantos is NOT to be used for urgent needs. For medical emergencies, dial 911. Now available from your iPhone and Android! Please provide this summary of care documentation to your next provider. Your primary care clinician is listed as [address]. If you have any questions after today's visit, please call [phone].

## 2018-05-14 NOTE — PROGRESS NOTES
Gaylia Olszewski. is a 61 y.o. male presented to clinic for a f/u for AFIB due to abnormal lab results. Pt denies any pain or concerns at this time. 1. Have you been to the ER, urgent care clinic since your last visit? Hospitalized since your last visit? No    2. Have you seen or consulted any other health care providers outside of the [facility] since your last visit? Include any pap smears or colon screening. No    Learning Assessment 1/6/2015   PRIMARY LEARNER Patient   HIGHEST LEVEL OF EDUCATION - PRIMARY LEARNER  2 YEARS OF COLLEGE   BARRIERS PRIMARY LEARNER NONE   CO-LEARNER CAREGIVER No   PRIMARY LANGUAGE ENGLISH   LEARNER PREFERENCE PRIMARY READING     DEMONSTRATION   ANSWERED BY patient   RELATIONSHIP SELF     There are no preventive care reminders to display for this patient.

## 2018-05-14 NOTE — MR AVS SNAPSHOT
[facility] 
 
 
 [address] [facility] [address] [address] 
[phone] Patient: Randy Agarwal. MRN: [id number] ZGR:1/08/2169 Visit Information Date & Time Provider Department Dept. Phone Encounter #  
 5/14/2018 10:00 AM Murriel Osler. Duke Mcintyre Specialist at [facility] [phone] [id number] Your Appointments 5/29/2018  7:30 AM  
[place] [address] [address]) Appt Note: Return in about 2 weeks (around 5/28/2018) for rov, inr next visit. [address] [facility] [address]  
  
   
 [address] [facility] [address] 6/13/2018  9:00 AM  
Follow Up with Kim Alfaro MD  
Cardio Specialist at [address]) Appt Note: one month [address] [facility] [address]  
  
   
 [address] [facility] [address] Upcoming Health Maintenance Date Due Influenza Age 5 to Adult 8/1/2018 DTaP/Tdap/Td series (2 - Td) 12/4/2024 COLONOSCOPY 5/23/2026 Allergies as of 5/14/2018  Review Complete On: 5/14/2018 By: Murriel Osler. Zachery Elder PA-C No Known Allergies Current Immunizations  Reviewed on 11/13/2014 Name Date Influenza Vaccine 11/1/2017, 12/4/2014, 11/13/2011 Influenza Vaccine (Quad) PF 12/5/2017  2:34 PM, 3/15/2016 Pneumococcal Vaccine (Unspecified Type) 11/13/2011 Td 11/13/2001 Tdap 12/4/2014 Zoster Vaccine, Live 3/15/2016 Not reviewed this visit Vitals BP Pulse Height(growth percentile) Weight(growth percentile) SpO2 BMI  
 (!) 148/98 84 5' 10\" (1.778 m) 165 lb (74.8 kg) 97% 23.68 kg/m2 Smoking Status Former Smoker Vitals History BMI and BSA Data Body Mass Index Body Surface Area  
 23.68 kg/m 2 1.92 m 2 Preferred Pharmacy Pharmacy Name Phone [address], [address] [phone] Your Updated Medication List  
  
   
This list is accurate as of 5/14/18 10:12 AM.  Always use your most recent med list.  
  
  
  
  
 digoxin 0.125 mg tablet Commonly known as:  LANOXIN Take 1 Tab by mouth every fourty-eight (48) hours. * dilTIAZem  mg ER capsule Commonly known as:  CARDIZEM CD Take 1 Cap by mouth nightly. * dilTIAZem  mg ER capsule Commonly known as:  CARDIZEM CD Take 1 Cap by mouth daily. magnesium oxide 200 mg magnesium Tab Take 200 mg by mouth daily. multivit-min-FA-lycopen-lutein 300-600-300 mcg Tab Commonly known as:  CENTRUM SILVER MEN Take 1 Tab by mouth daily. nitroglycerin 0.4 mg SL tablet Commonly known as:  NITROSTAT  
1 Tab by SubLINGual route as needed for Chest Pain.  
  
 vitamin k 100 mcg tablet Take 1 Tab by mouth daily. warfarin 4 mg tablet Commonly known as:  COUMADIN Take 1 Tab by mouth daily. * Notice: This list has 2 medication(s) that are the same as other medications prescribed for you. Read the directions carefully, and ask your doctor or other care provider to review them with you. Introducing [facility] & HEALTH SERVICES! Dear Mai Mahan: 
Thank you for requesting a Stalkthis account. Our records indicate that you already have an active Stalkthis account. You can access your account anytime at https://Desktop [employer]. [employer]/Desktop Genetics Did you know that you can access your hospital and ER discharge instructions at any time in Stalkthis? You can also review all of your test results from your hospital stay or ER visit. Additional Information If you have questions, please visit the Frequently Asked Questions section of the Stalkthis website at [email or web address] [employer]/Desktop Genetics/. Remember, Stalkthis is NOT to be used for urgent needs. For medical emergencies, dial 911. Now available from your iPhone and Android! Please provide this summary of care documentation to your next provider. Your primary care clinician is listed as [phone] [facility]. If you have any questions after today's visit, please call [phone].

## 2018-05-29 ENCOUNTER — OFFICE VISIT (OUTPATIENT)
Dept: FAMILY MEDICINE CLINIC | Age: 63
End: 2018-05-29

## 2018-05-29 VITALS
TEMPERATURE: 96.9 F | SYSTOLIC BLOOD PRESSURE: 137 MMHG | HEIGHT: 70 IN | RESPIRATION RATE: 16 BRPM | OXYGEN SATURATION: 96 % | HEART RATE: 72 BPM | DIASTOLIC BLOOD PRESSURE: 100 MMHG | BODY MASS INDEX: 23.65 KG/M2 | WEIGHT: 165.2 LBS

## 2018-05-29 DIAGNOSIS — E78.00 ELEVATED CHOLESTEROL: ICD-10-CM

## 2018-05-29 DIAGNOSIS — M19.90 ARTHRITIS: ICD-10-CM

## 2018-05-29 DIAGNOSIS — I10 ESSENTIAL HYPERTENSION, MALIGNANT: ICD-10-CM

## 2018-05-29 DIAGNOSIS — E04.2 NONTOXIC MULTINODULAR GOITER: ICD-10-CM

## 2018-05-29 DIAGNOSIS — I48.20 CHRONIC ATRIAL FIBRILLATION (HCC): Primary | ICD-10-CM

## 2018-05-29 LAB
INR BLD: 1.7
PT POC: 20.4 SECONDS
VALID INTERNAL CONTROL?: YES

## 2018-05-29 RX ORDER — DILTIAZEM HYDROCHLORIDE 240 MG/1
240 CAPSULE, COATED, EXTENDED RELEASE ORAL DAILY
Qty: 90 CAP | Refills: 4 | Status: SHIPPED | OUTPATIENT
Start: 2018-05-29 | End: 2018-11-26 | Stop reason: SDUPTHER

## 2018-05-29 RX ORDER — WARFARIN 4 MG/1
4 TABLET ORAL DAILY
Qty: 90 TAB | Refills: 12 | Status: SHIPPED | OUTPATIENT
Start: 2018-05-29 | End: 2018-11-26 | Stop reason: SDUPTHER

## 2018-05-29 RX ORDER — VITS A,C,E/LUTEIN/MINERALS 300MCG-200
200 TABLET ORAL DAILY
Qty: 100 TAB | Refills: 12 | Status: SHIPPED | OUTPATIENT
Start: 2018-05-29 | End: 2018-11-26 | Stop reason: SDUPTHER

## 2018-05-29 RX ORDER — ZINC GLUCONATE 100 MG
100 TABLET ORAL DAILY
Qty: 100 TAB | Refills: 12 | Status: SHIPPED | OUTPATIENT
Start: 2018-05-29 | End: 2018-11-05

## 2018-05-29 RX ORDER — DILTIAZEM HYDROCHLORIDE 180 MG/1
180 CAPSULE, COATED, EXTENDED RELEASE ORAL
Qty: 90 CAP | Refills: 4 | Status: SHIPPED | OUTPATIENT
Start: 2018-05-29 | End: 2018-11-26 | Stop reason: SDUPTHER

## 2018-05-29 RX ORDER — DIGOXIN 125 MCG
0.12 TABLET ORAL
Qty: 45 TAB | Refills: 4 | Status: SHIPPED | OUTPATIENT
Start: 2018-05-29 | End: 2018-11-26 | Stop reason: SDUPTHER

## 2018-05-29 NOTE — MR AVS SNAPSHOT
[address] [place] [address] [address]  [address] [address] 
[phone] Patient: Karri Hughes MRN: [id number] USV:4/25/3171 Visit Information Date & Time Provider Department Dept. Phone Encounter #  
 5/29/2018  7:30 AM Perfecto Chi, [address] [place] [address] [id number] Follow-up Instructions Return in about 4 weeks (around 6/26/2018) for EOV, inr next visit. Follow-up and Disposition History Your Appointments 6/13/2018  9:00 AM  
Follow Up with Madison Locke MD  
Cardio Specialist at [facility]) Appt Note: one month [facility] [address] [facility] Upcoming Health Maintenance Date Due Influenza Age 5 to Adult 8/1/2018 DTaP/Tdap/Td series (2 - Td) 12/4/2024 COLONOSCOPY 5/23/2026 Allergies as of 5/29/2018  Review Complete On: 5/14/2018 By: Johanna Jones. Warner Bueno PA-C No Known Allergies Current Immunizations  Reviewed on 11/13/2014 Name Date Influenza Vaccine 11/1/2017, 12/4/2014, 11/13/2011 Influenza Vaccine (Quad) PF 12/5/2017  2:34 PM, 3/15/2016 Pneumococcal Vaccine (Unspecified Type) 11/13/2011 Td 11/13/2001 Tdap 12/4/2014 Zoster Vaccine, Live 3/15/2016 Not reviewed this visit You Were Diagnosed With   
  
 Codes Comments Chronic atrial fibrillation (HCC)    -  Primary ICD-10-CM: U40.1 ICD-9-CM: 427.31 Nontoxic multinodular goiter     ICD-10-CM: E04.2 ICD-9-CM: 241.1 Elevated cholesterol     ICD-10-CM: E78.00 ICD-9-CM: 272.0 Essential hypertension, malignant     ICD-10-CM: I10 
ICD-9-CM: 401.0 Arthritis     ICD-10-CM: M19.90 ICD-9-CM: 716.90 Vitals BP Pulse Temp Resp Height(growth percentile) Weight(growth percentile)  (!) 137/100 (BP 1 Location: Right arm, BP Patient Position: Sitting) 72 96.9 °F (36.1 °C) (Oral) 16 5' 10\" (1.778 m) 165 lb 3.2 oz (74.9 kg) SpO2 BMI Smoking Status 96% 23.7 kg/m2 Former Smoker BMI and BSA Data Body Mass Index Body Surface Area  
 23.7 kg/m 2 1.92 m 2 Preferred Pharmacy Pharmacy Name Phone [address], [address] [phone] Your Updated Medication List  
  
   
This list is accurate as of 5/29/18  8:14 AM.  Always use your most recent med list.  
  
  
  
  
 digoxin 0.125 mg tablet Commonly known as:  LANOXIN Take 1 Tab by mouth every fourty-eight (48) hours. * dilTIAZem  mg ER capsule Commonly known as:  CARDIZEM CD Take 1 Cap by mouth nightly. * dilTIAZem  mg ER capsule Commonly known as:  CARDIZEM CD Take 1 Cap by mouth daily. magnesium oxide 200 mg magnesium Tab Take 200 mg by mouth daily. multivit-min-FA-lycopen-lutein 300-600-300 mcg Tab Commonly known as:  CENTRUM SILVER MEN Take 1 Tab by mouth daily. nitroglycerin 0.4 mg SL tablet Commonly known as:  NITROSTAT  
1 Tab by SubLINGual route as needed for Chest Pain.  
  
 vitamin k 100 mcg tablet Take 1 Tab by mouth daily. warfarin 4 mg tablet Commonly known as:  COUMADIN Take 1 Tab by mouth daily. * Notice: This list has 2 medication(s) that are the same as other medications prescribed for you. Read the directions carefully, and ask your doctor or other care provider to review them with you. Prescriptions Sent to Pharmacy Refills  
 warfarin (COUMADIN) 4 mg tablet 12 Sig: Take 1 Tab by mouth daily. Class: Normal  
 Pharmacy: [facility] [address]NA LOVE [address], [address] Ph #: [phone] Route: Oral  
 vitamin k 100 mcg tablet 12 Sig: Take 1 Tab by mouth daily. Class: Normal  
 Pharmacy: [facility] [address] JENNA LOVE [address], [address] Ph #: [phone]  Route: Oral  
 digoxin (LANOXIN) 0.125 mg tablet 4 Sig: Take 1 Tab by mouth every fourty-eight (48) hours. Class: Normal  
 Pharmacy: [facility] [address] JENNA CHOASHLEYCHELSIE [address], [address] Ph #: [phone] Route: Oral  
 dilTIAZem CD (CARDIZEM CD) 180 mg ER capsule 4 Sig: Take 1 Cap by mouth nightly. Class: Normal  
 Pharmacy: [facility] [address] JENNA ALEXANDER ISIAAHCHELSIE [address], [address] Ph #: [phone] Route: Oral  
 dilTIAZem CD (CARDIZEM CD) 240 mg ER capsule 4 Sig: Take 1 Cap by mouth daily. Class: Normal  
 Pharmacy: [facility] [address] JENNA ALEXANDER ISAIA[address]HELSIE [address], [address] Ph #: [phone] Route: Oral  
 magnesium oxide 200 mg magnesium tab 12 Sig: Take 200 mg by mouth daily. Class: Normal  
 Pharmacy: [facility] [address] J[address]NA ALEXANDER KATE [address], [address] Ph #: [phone] Route: Oral  
  
We Performed the Following AMB POC PT/INR [20878 CPT(R)] Follow-up Instructions Return in about 4 weeks (around 6/26/2018) for EOV, inr next visit. Introducing [facility] & HEALTH SERVICES! Dear Noam Ranks: 
Thank you for requesting a Yumit account. Our records indicate that you already have an active Yumit account. You can access your account anytime at [email or web address] [employer]. S[employer] We[employer]/M[employer] Did you know that you can access your hospital and ER discharge instructions at any time in Yumit? You can also review all of your test results from your hospital stay or ER visit. Additional Information If you have questions, please visit the Frequently Asked Questions section of the Yumit website at [email or web address] [email or web address] S[employer] We[employer]/M[employer]/. Remember, Yumit is NOT to be used for urgent needs. For medical emergencies, dial 911. Now available from your iPhone and Android! Please provide this summary of care documentation to your next provider. Your primary care clinician is listed as [phone] [facility].  If you have any questions after today's visit, please call [phone].

## 2018-05-29 NOTE — PROGRESS NOTES
Alba Ruffin. is a 61 y.o. male presents today for follow up on his atrial fibrillation. Pt is in Room # 5        1. Have you been to the ER, urgent care clinic since your last visit? Hospitalized since your last visit? No    2. Have you seen or consulted any other health care providers outside of the [address] [place] [address] since your last visit? Include any pap smears or colon screening. No     Health Maintenance reviewed - .

## 2018-05-29 NOTE — PROGRESS NOTES
Chidi Saldana is a 61 y.o.  male and presents with    Chief Complaint   Patient presents with    Irregular Heart Beat    Arthritis    Anticoagulation    Thyroid Problem           Subjective:    Cardiovascular Review:  The patient has hypertension and Atrial Fibrillation. Diet and Lifestyle: not attempting to follow a low fat, low cholesterol diet  Home BP Monitoring: is not measured at home. Pertinent ROS: taking medications as instructed, no medication side effects noted, no TIA's, no chest pain on exertion, no dyspnea on exertion, no swelling of ankles. Anticoagulation  Patient here for followup of chronic anticoagulation. Indication: Atrial Fibrillation. Bleeding Signs/Symptoms:  None. Thromboembolic Signs/Symptoms:  None. INR's are drawn regularly and have been therapeutic. Lab Results   Component Value Date/Time    INR 1.4 (H) 04/19/2018 04:24 AM    INR POC 1.7 05/29/2018 07:55 AM     Thyroid Review:  Patient is seen for followup of goiter. Thyroid ROS: denies fatigue, weight changes, heat/cold intolerance, bowel/skin changes or CVS symptoms. Osteoarthritis and Chronic Pain:  Patient has osteoarthritis, primarily affecting the diffuse. Symptoms onset: problem is longstanding. Rheumatological ROS: no current joint or muscle symptoms, essentially pain-free. Response to treatment plan: stable. Additional Concerns:          Patient Active Problem List    Diagnosis Date Noted    Nontoxic multinodular goiter 02/07/2018    Atrial fibrillation ([address] 11/28/2017    Elevated cholesterol 04/12/2016    Essential hypertension, malignant 11/13/2014    Arthritis 11/13/2014     Current Outpatient Prescriptions   Medication Sig Dispense Refill    warfarin (COUMADIN) 4 mg tablet Take 1 Tab by mouth daily. 90 Tab 12    vitamin k 100 mcg tablet Take 1 Tab by mouth daily. 100 Tab 12    digoxin (LANOXIN) 0.125 mg tablet Take 1 Tab by mouth every fourty-eight (48) hours.  45 Tab 4    dilTIAZem CD (CARDIZEM CD) 180 mg ER capsule Take 1 Cap by mouth nightly. 90 Cap 4    dilTIAZem CD (CARDIZEM CD) 240 mg ER capsule Take 1 Cap by mouth daily. 90 Cap 4    magnesium oxide 200 mg magnesium tab Take 200 mg by mouth daily. 100 Tab 12    nitroglycerin (NITROSTAT) 0.4 mg SL tablet 1 Tab by SubLINGual route as needed for Chest Pain. 1 Bottle 0    multivit-min-FA-lycopen-lutein (CENTRUM SILVER MEN) 300-600-300 mcg tab Take 1 Tab by mouth daily. 100 Caplet 12     No Known Allergies  Past Medical History:   Diagnosis Date    Arthritis     Atrial fibrillation ([address] 11/28/2017    Hearing loss     HLD (hyperlipidemia)     HTN (hypertension)     Nontoxic multinodular goiter 2/7/2018     Past Surgical History:   Procedure Laterality Date    HX HERNIA REPAIR       Family History   Problem Relation Age of Onset    Diabetes Father      Social History   Substance Use Topics    Smoking status: Former Smoker    Smokeless tobacco: Never Used    Alcohol use 12.5 oz/week     25 Shots of liquor per week       ROS       All other systems reviewed and are negative. Objective:  Vitals:    05/29/18 0754   BP: (!) 137/100   Pulse: 72   Resp: 16   Temp: 96.9 °F (36.1 °C)   TempSrc: Oral   SpO2: 96%   Weight: 165 lb 3.2 oz (74.9 kg)   Height: 5' 10\" (1.778 m)   PainSc:   0 - No pain                 alert, well appearing, and in no distress, oriented to person, place, and time and normal appearing weight  Chest - clear to auscultation, no wheezes, rales or rhonchi, symmetric air entry  Heart - irregularly irregular rhythm with rate 80  Abdomen - soft, nontender, nondistended, no masses or organomegaly        LABS     TESTS      Assessment/Plan:    Hypertension - stable  Atrial fib controlled  Coumadin improved going to leave him on current dose and recheck in 1 mo.  Toxicity resolved  arthirtsi ongoing  Thyroid stable    Lab review: labs are reviewed, up to date and normal    Diagnoses and all orders for this visit: 1. Chronic atrial fibrillation (HCC)  -     AMB POC PT/INR  -     warfarin (COUMADIN) 4 mg tablet; Take 1 Tab by mouth daily. -     vitamin k 100 mcg tablet; Take 1 Tab by mouth daily. -     digoxin (LANOXIN) 0.125 mg tablet; Take 1 Tab by mouth every fourty-eight (48) hours. -     dilTIAZem CD (CARDIZEM CD) 180 mg ER capsule; Take 1 Cap by mouth nightly. -     dilTIAZem CD (CARDIZEM CD) 240 mg ER capsule; Take 1 Cap by mouth daily.  -     magnesium oxide 200 mg magnesium tab; Take 200 mg by mouth daily. 2. Nontoxic multinodular goiter  -     AMB POC PT/INR  -     warfarin (COUMADIN) 4 mg tablet; Take 1 Tab by mouth daily. -     vitamin k 100 mcg tablet; Take 1 Tab by mouth daily. -     digoxin (LANOXIN) 0.125 mg tablet; Take 1 Tab by mouth every fourty-eight (48) hours. -     dilTIAZem CD (CARDIZEM CD) 180 mg ER capsule; Take 1 Cap by mouth nightly. -     dilTIAZem CD (CARDIZEM CD) 240 mg ER capsule; Take 1 Cap by mouth daily.  -     magnesium oxide 200 mg magnesium tab; Take 200 mg by mouth daily. 3. Elevated cholesterol  -     AMB POC PT/INR  -     warfarin (COUMADIN) 4 mg tablet; Take 1 Tab by mouth daily. -     vitamin k 100 mcg tablet; Take 1 Tab by mouth daily. -     digoxin (LANOXIN) 0.125 mg tablet; Take 1 Tab by mouth every fourty-eight (48) hours. -     dilTIAZem CD (CARDIZEM CD) 180 mg ER capsule; Take 1 Cap by mouth nightly. -     dilTIAZem CD (CARDIZEM CD) 240 mg ER capsule; Take 1 Cap by mouth daily.  -     magnesium oxide 200 mg magnesium tab; Take 200 mg by mouth daily. 4. Essential hypertension, malignant  -     AMB POC PT/INR  -     warfarin (COUMADIN) 4 mg tablet; Take 1 Tab by mouth daily. -     vitamin k 100 mcg tablet; Take 1 Tab by mouth daily. -     digoxin (LANOXIN) 0.125 mg tablet; Take 1 Tab by mouth every fourty-eight (48) hours. -     dilTIAZem CD (CARDIZEM CD) 180 mg ER capsule; Take 1 Cap by mouth nightly.   -     dilTIAZem CD (CARDIZEM CD) 240 mg ER capsule; Take 1 Cap by mouth daily.  -     magnesium oxide 200 mg magnesium tab; Take 200 mg by mouth daily. 5. Arthritis  -     AMB POC PT/INR  -     warfarin (COUMADIN) 4 mg tablet; Take 1 Tab by mouth daily. -     vitamin k 100 mcg tablet; Take 1 Tab by mouth daily. -     digoxin (LANOXIN) 0.125 mg tablet; Take 1 Tab by mouth every fourty-eight (48) hours. -     dilTIAZem CD (CARDIZEM CD) 180 mg ER capsule; Take 1 Cap by mouth nightly. -     dilTIAZem CD (CARDIZEM CD) 240 mg ER capsule; Take 1 Cap by mouth daily.  -     magnesium oxide 200 mg magnesium tab; Take 200 mg by mouth daily. I have discussed the diagnosis with the patient and the intended plan as seen in the above orders. The patient has received an after-visit summary and questions were answered concerning future plans. I have discussed medication side effects and warnings with the patient as well. I have reviewed the plan of care with the patient, accepted their input and they are in agreement with the treatment goals. Follow-up Disposition:  Return in about 4 weeks (around 6/26/2018) for EOV, inr next visit.

## 2018-06-13 ENCOUNTER — OFFICE VISIT (OUTPATIENT)
Dept: CARDIOLOGY CLINIC | Age: 63
End: 2018-06-13

## 2018-06-13 VITALS
WEIGHT: 165 LBS | DIASTOLIC BLOOD PRESSURE: 94 MMHG | HEART RATE: 81 BPM | SYSTOLIC BLOOD PRESSURE: 150 MMHG | OXYGEN SATURATION: 97 % | HEIGHT: 70 IN | BODY MASS INDEX: 23.62 KG/M2

## 2018-06-13 DIAGNOSIS — I10 ESSENTIAL HYPERTENSION, MALIGNANT: Primary | ICD-10-CM

## 2018-06-13 DIAGNOSIS — I48.20 CHRONIC ATRIAL FIBRILLATION (HCC): ICD-10-CM

## 2018-06-13 DIAGNOSIS — E78.00 ELEVATED CHOLESTEROL: ICD-10-CM

## 2018-06-13 NOTE — MR AVS SNAPSHOT
[address] [facility] [address] [address] 
[phone] Patient: Jes Belle. MRN: [id number] [facility]:2/87/7481 Visit Information Date & Time Provider Department Dept. Phone Encounter #  
 6/13/2018  9:00 AM Michelle Mckeon MD [address] [place] [address] Specialist at [facility] [place] [facility] [phone] [id number] Follow-up Instructions Return in about 6 months (around 12/13/2018). Your Appointments 6/13/2018  9:00 AM  
Follow Up with Michelle Mckeon MD  
Cardio Specialist at [facility] [address] [place] [address]) Appt Note: one month [place] [address] [facility] [address]  
  
    
 6/26/2018  9:00 AM  
R[place] [address] [address]) Appt Note: Return in about 4 weeks (around 6/26/2018) for EOV, inr next visit. [address] [facility] [address] [facility] 222 [facility]  
  
   
 [address] [facility] 1334  
  
    
 12/19/2018  8:45 AM  
Follow Up with Michelle Mckeon MD  
Cardio Specialist at [facility] [place] [address] [place] [address]) Appt Note: 6 months [place] [address] [address] [address]  
[phone] Upcoming Health Maintenance Date Due Influenza Age 5 to Adult 8/1/2018 DTaP/Tdap/Td series (2 - Td) 12/4/2024 COLONOSCOPY 5/23/2026 Allergies as of 6/13/2018  Review Complete On: 6/13/2018 By: Linda Agarwal LPN No Known Allergies Current Immunizations  Reviewed on 11/13/2014 Name Date Influenza Vaccine 11/1/2017, 12/4/2014, 11/13/2011 Influenza Vaccine (Quad) PF 12/5/2017  2:34 PM, 3/15/2016 Pneumococcal Vaccine (Unspecified Type) 11/13/2011 Td 11/13/2001 Tdap 12/4/2014 Zoster Vaccine, Live 3/15/2016 Not reviewed this visit Vitals BP Pulse Height(growth percentile) Weight(growth percentile) SpO2 BMI  
 (!) 150/94 (BP 1 Location: Right arm, BP Patient Position: Sitting) 81 5' 10\" (1.778 m) 165 lb (74.8 kg) 97% 23.68 kg/m2 Smoking Status Former Smoker Vitals History BMI and BSA Data Body Mass Index Body Surface Area  
 23.68 kg/m 2 1.92 m 2 Preferred Pharmacy Pharmacy Name Phone [address], [address] [facility] [phone] Your Updated Medication List  
  
   
This list is accurate as of 6/13/18  8:57 AM.  Always use your most recent med list.  
  
  
  
  
 digoxin 0.125 mg tablet Commonly known as:  LANOXIN Take 1 Tab by mouth every fourty-eight (48) hours. * dilTIAZem  mg ER capsule Commonly known as:  CARDIZEM CD Take 1 Cap by mouth nightly. * dilTIAZem  mg ER capsule Commonly known as:  CARDIZEM CD Take 1 Cap by mouth daily. magnesium oxide 200 mg magnesium Tab Take 200 mg by mouth daily. multivit-min-FA-lycopen-lutein 300-600-300 mcg Tab Commonly known as:  CENTRUM SILVER MEN Take 1 Tab by mouth daily. nitroglycerin 0.4 mg SL tablet Commonly known as:  NITROSTAT  
1 Tab by SubLINGual route as needed for Chest Pain.  
  
 vitamin k 100 mcg tablet Take 1 Tab by mouth daily. warfarin 4 mg tablet Commonly known as:  COUMADIN Take 1 Tab by mouth daily. * Notice: This list has 2 medication(s) that are the same as other medications prescribed for you. Read the directions carefully, and ask your doctor or other care provider to review them with you. Follow-up Instructions Return in about 6 months (around 12/13/2018). Introducing [facility] & HEALTH SERVICES! Dear Anshul Senior: 
Thank you for requesting a WordSentry account. Our records indicate that you already have an active WordSentry account. You can access your account anytime at [email or web address] Guomai/[employer] Did you know that you can access your hospital and ER discharge instructions at any time in Bandhappy? You can also review all of your test results from your hospital stay or ER visit. Additional Information If you have questions, please visit the Frequently Asked Questions section of the Bandhappy website at [email or web address] Remember, Bandhappy is NOT to be used for urgent needs. For medical emergencies, dial 911. Now available from your iPhone and Android! Please provide this summary of care documentation to your next provider. Your primary care clinician is listed as [phone] [facility]. If you have any questions after today's visit, please call [phone].

## 2018-06-13 NOTE — PROGRESS NOTES
1. Have you been to the ER, urgent care clinic since your last visit? Hospitalized since your last visit? No     2. Have you seen or consulted any other health care providers outside of the [address] [place] [address] since your last visit? Include any pap smears or colon screening.   No

## 2018-06-24 NOTE — PROGRESS NOTES
Subjective:      Florina Baker is in the office today for cardiac reevaluation. He is a 71-year-old man that has a diagnosis of chronic atrial fibrillation. He saw Dr. North Wu approximately in late November 2017 and was found to be in atrial fibrillation. He continues to be asymptomatic. He had no palpitations. He had no limiting shortness of breath. The patient continues to deny any limiting symptoms. He has noticed no peripheral swelling. He has had no near syncope or syncope. He was admitted to [facility] in 04/2018 for evaluation of shoulder and neck pain. A nuclear stress test was done and did not demonstrate any evidence for ischemia. An Echocardiogram was also ordered and done in 04/2018. His EF was 45%. His atria were enlarged. There was mild to moderate MR. In the office today, he says he is \"feeling good\". He believes his heart rate is slower after digoxin was added and it is 81 bpm in the office today. Patient's cardiac risk factors are remote smoking/ tobacco exposure (none times 2 1/2 yrs), dyslipidemia, hypertension. Patient Active Problem List    Diagnosis Date Noted    Nontoxic multinodular goiter 02/07/2018    Atrial fibrillation ([facility] 11/28/2017    Elevated cholesterol 04/12/2016    Essential hypertension, malignant 11/13/2014    Arthritis 11/13/2014     Current Outpatient Prescriptions   Medication Sig Dispense Refill    warfarin (COUMADIN) 4 mg tablet Take 1 Tab by mouth daily. 90 Tab 12    vitamin k 100 mcg tablet Take 1 Tab by mouth daily. 100 Tab 12    digoxin (LANOXIN) 0.125 mg tablet Take 1 Tab by mouth every fourty-eight (48) hours. 45 Tab 4    dilTIAZem CD (CARDIZEM CD) 180 mg ER capsule Take 1 Cap by mouth nightly. 90 Cap 4    dilTIAZem CD (CARDIZEM CD) 240 mg ER capsule Take 1 Cap by mouth daily. 90 Cap 4    magnesium oxide 200 mg magnesium tab Take 200 mg by mouth daily.  100 Tab 12    nitroglycerin (NITROSTAT) 0.4 mg SL tablet 1 Tab by SubLINGual route as needed for Chest Pain. 1 Bottle 0    multivit-min-FA-lycopen-lutein (CENTRUM SILVER MEN) 300-600-300 mcg tab Take 1 Tab by mouth daily. 100 Caplet 12     No Known Allergies  Past Medical History:   Diagnosis Date    Arthritis     Atrial fibrillation ([address] 11/28/2017    Hearing loss     HLD (hyperlipidemia)     HTN (hypertension)     Nontoxic multinodular goiter 2/7/2018     Past Surgical History:   Procedure Laterality Date    HX HERNIA REPAIR       Family History   Problem Relation Age of Onset    Diabetes Father      History   Smoking Status    Former Smoker   Smokeless Tobacco    Never Used          Review of Systems, additional:  Constitutional: negative  Eyes: negative  Respiratory: negative  Cardiovascular: negative  Gastrointestinal: negative  Musculoskeletal:negative  Neurological: negative  Behvioral/Psych: negative  Endocrine: negative  ENT: negative    Objective:     Visit Vitals    BP (!) 150/94 (BP 1 Location: Right arm, BP Patient Position: Sitting)    Pulse 81    Ht 5' 10\" (1.778 m)    Wt 165 lb (74.8 kg)    SpO2 97%    BMI 23.68 kg/m2     General:  alert, cooperative, no distress   Chest Wall: inspection normal - no chest wall deformities or tenderness, respiratory effort normal   Lung: clear to auscultation bilaterally   Heart:  irregularly irregular rhythm with rate 81   Abdomen: soft, non-tender. Bowel sounds normal. No masses,  no organomegaly   Extremities: extremities normal, atraumatic, no cyanosis or edema Skin: no rashes   Neuro: alert, oriented, normal speech, no focal findings or movement disorder noted         Assessment/Plan:       ICD-10-CM ICD-9-CM    1. Atrial fibrillation, unspecified type ([address] continue coumadin, diltiazem, and digoxin . Echo done 04/18 demonstrated enlarged L and R atria and mild to moderate MR with an EF of 50%. RT 6 mos I48.91 427.31 2D ECHO COMPLETE ADULT (TTE) W OR WO CONTR   2.  Essential hypertension, malignant, diastolic BP still elevated but improved I10 401.0    3. Thyroid mass E07.9 246.9    4. Elevated cholesterol E78.00 272.0    5.  Normal cardiac stress test Z13.6 V81.2

## 2018-07-02 ENCOUNTER — OFFICE VISIT (OUTPATIENT)
Dept: FAMILY MEDICINE CLINIC | Age: 63
End: 2018-07-02

## 2018-07-02 VITALS
HEART RATE: 61 BPM | TEMPERATURE: 98.1 F | OXYGEN SATURATION: 98 % | RESPIRATION RATE: 17 BRPM | SYSTOLIC BLOOD PRESSURE: 148 MMHG | BODY MASS INDEX: 22.76 KG/M2 | WEIGHT: 159 LBS | DIASTOLIC BLOOD PRESSURE: 111 MMHG | HEIGHT: 70 IN

## 2018-07-02 DIAGNOSIS — I48.20 CHRONIC ATRIAL FIBRILLATION (HCC): Primary | ICD-10-CM

## 2018-07-02 DIAGNOSIS — E04.2 NONTOXIC MULTINODULAR GOITER: ICD-10-CM

## 2018-07-02 DIAGNOSIS — I10 ESSENTIAL HYPERTENSION, MALIGNANT: ICD-10-CM

## 2018-07-02 DIAGNOSIS — M19.90 ARTHRITIS: ICD-10-CM

## 2018-07-02 DIAGNOSIS — E78.00 ELEVATED CHOLESTEROL: ICD-10-CM

## 2018-07-02 LAB
INR BLD: 2.6
PT POC: 31.4 SECONDS
VALID INTERNAL CONTROL?: YES

## 2018-07-02 NOTE — PROGRESS NOTES
Pt came in the office for pt/inr check. Date of last check:5/29/18    Frequency of checking q 2 weeks      Last level: PT -20.4 INR-1.7    Patient taking dose correctly: yes    Current dose: 4 MG Everyday    Current level:  PT -31.4 INR-2.6       1. Have you been to the ER, urgent care clinic since your last visit? Hospitalized since your last visit?no    2. Have you seen or consulted any other health care providers outside of the [facility] since your last visit? Include any pap smears or colon screening.  no

## 2018-07-02 NOTE — MR AVS SNAPSHOT
[facility] 
 
 
 [address] [facility] [address] [facility] [id number] 
[phone] Patient: Basia Mcguire MRN: [id number] MIJ:[birth year] Visit Information Date & Time Provider Department Dept. Phone Encounter #  
 7/2/2018  9:30 AM Inna Brooks [phone] [id number] Follow-up Instructions Return in about 4 weeks (around 7/30/2018) for rov, inr next visit. Your Appointments 12/19/2018  8:45 AM  
Follow Up with Suresh Win MD  
Cardio Specialist at [address]) Appt Note: 6 months [facility] [address] Located [facility] [address] [address] [address] Upcoming Health Maintenance Date Due Influenza Age 5 to Adult 8/1/2018 DTaP/Tdap/Td series (2 - Td) 12/4/2024 COLONOSCOPY 5/23/2026 Allergies as of 7/2/2018  Review Complete On: 7/2/2018 By: Lola Ma, DO No Known Allergies Current Immunizations  Reviewed on 11/13/2014 Name Date Influenza Vaccine 11/1/2017, 12/4/2014, 11/13/2011 Influenza Vaccine (Quad) PF 12/5/2017  2:34 PM, 3/15/2016 Pneumococcal Vaccine (Unspecified Type) 11/13/2011 Td 11/13/2001 Tdap 12/4/2014 Zoster Vaccine, Live 3/15/2016 Not reviewed this visit You Were Diagnosed With   
  
 Codes Comments Chronic atrial fibrillation (HCC)    -  Primary ICD-10-CM: G37.4 ICD-9-CM: 427.31 Essential hypertension, malignant     ICD-10-CM: I10 
ICD-9-CM: 401.0 Arthritis     ICD-10-CM: M19.90 ICD-9-CM: 716.90 Elevated cholesterol     ICD-10-CM: E78.00 ICD-9-CM: 272.0 Nontoxic multinodular goiter     ICD-10-CM: E04.2 ICD-9-CM: 778. 1 Vitals BP Pulse Temp Resp Height(growth percentile) Weight(growth percentile) (!) 148/111 61 98.1 °F (36.7 °C) (Oral) 17 5' 10\" (1.778 m) 159 lb (72.1 kg) SpO2 BMI Smoking Status 98% 22.81 kg/m2 Former Smoker BMI and BSA Data Body Mass Index Body Surface Area  
 22.81 kg/m 2 1.89 m 2 Preferred Pharmacy Pharmacy Name Phone [address], [address] [phone] Your Updated Medication List  
  
   
This list is accurate as of 7/2/18 10:06 AM.  Always use your most recent med list.  
  
  
  
  
 digoxin 0.125 mg tablet Commonly known as:  LANOXIN Take 1 Tab by mouth every fourty-eight (48) hours. * dilTIAZem  mg ER capsule Commonly known as:  CARDIZEM CD Take 1 Cap by mouth nightly. * dilTIAZem  mg ER capsule Commonly known as:  CARDIZEM CD Take 1 Cap by mouth daily. magnesium oxide 200 mg magnesium Tab Take 200 mg by mouth daily. multivit-min-FA-lycopen-lutein 300-600-300 mcg Tab Commonly known as:  CENTRUM SILVER MEN Take 1 Tab by mouth daily. nitroglycerin 0.4 mg SL tablet Commonly known as:  NITROSTAT  
1 Tab by SubLINGual route as needed for Chest Pain.  
  
 vitamin k 100 mcg tablet Take 1 Tab by mouth daily. warfarin 4 mg tablet Commonly known as:  COUMADIN Take 1 Tab by mouth daily. * Notice: This list has 2 medication(s) that are the same as other medications prescribed for you. Read the directions carefully, and ask your doctor or other care provider to review them with you. We Performed the Following AMB POC PT/INR [46516 CPT(R)] Follow-up Instructions Return in about 4 weeks (around 7/30/2018) for rov, inr next visit. Introducing [facility] & HEALTH SERVICES! Dear Trey Gallegos: 
Thank you for requesting a [employer] account. Our records indicate that you already have an active [employer] account. You can access your account anytime at [email or web address] Did you know that you can access your hospital and ER discharge instructions at any time in [employer]?   You can also review all of your test results from your hospital stay or ER visit. Additional Information If you have questions, please visit the Frequently Asked Questions section of the [email or web address] website at [email or web address] Remember, my[employer]Dreams is NOT to be used for urgent needs. For medical emergencies, dial 911. Now available from your iPhone and Android! Please provide this summary of care documentation to your next provider. Your primary care clinician is listed as [id number] [facility]. If you have any questions after today's visit, please call [phone].

## 2018-07-02 NOTE — PROGRESS NOTES
Solitario Marcelo. is a 61 y.o.  male and presents with    Chief Complaint   Patient presents with    Irregular Heart Beat    Hypertension    Anticoagulation           Subjective:    Cardiovascular Review:  The patient has hypertension. Diet and Lifestyle: not attempting to follow a low fat, low cholesterol diet, not attempting to follow a low sodium diet  Home BP Monitoring: is not measured at home. Pertinent ROS: taking medications as instructed, no medication side effects noted, no TIA's, no chest pain on exertion, no dyspnea on exertion, no swelling of ankles. Anticoagulation  Patient here for followup of chronic anticoagulation. Indication: Atrial Fibrillation. Bleeding Signs/Symptoms:  None. Thromboembolic Signs/Symptoms:  None. INR's are drawn regularly and have been therapeutic. Lab Results   Component Value Date/Time    INR 1.4 (H) 04/19/2018 04:24 AM    INR POC 1.7 05/29/2018 07:55 AM       Additional Concerns:          Patient Active Problem List    Diagnosis Date Noted    Nontoxic multinodular goiter 02/07/2018    Atrial fibrillation ([facility] [address] 11/28/2017    Elevated cholesterol 04/12/2016    Essential hypertension, malignant 11/13/2014    Arthritis 11/13/2014     Current Outpatient Prescriptions   Medication Sig Dispense Refill    warfarin (COUMADIN) 4 mg tablet Take 1 Tab by mouth daily. 90 Tab 12    vitamin k 100 mcg tablet Take 1 Tab by mouth daily. 100 Tab 12    digoxin (LANOXIN) 0.125 mg tablet Take 1 Tab by mouth every fourty-eight (48) hours. 45 Tab 4    dilTIAZem CD (CARDIZEM CD) 180 mg ER capsule Take 1 Cap by mouth nightly. 90 Cap 4    dilTIAZem CD (CARDIZEM CD) 240 mg ER capsule Take 1 Cap by mouth daily. 90 Cap 4    magnesium oxide 200 mg magnesium tab Take 200 mg by mouth daily. 100 Tab 12    nitroglycerin (NITROSTAT) 0.4 mg SL tablet 1 Tab by SubLINGual route as needed for Chest Pain.  1 Bottle 0    multivit-min-FA-lycopen-lutein (CENTRUM SILVER MEN) 300-600-300 mcg tab Take 1 Tab by mouth daily. 100 Caplet 12     No Known Allergies  Past Medical History:   Diagnosis Date    Arthritis     Atrial fibrillation ([address] 11/28/2017    Hearing loss     HLD (hyperlipidemia)     HTN (hypertension)     Nontoxic multinodular goiter 2/7/2018     Past Surgical History:   Procedure Laterality Date    HX HERNIA REPAIR       Family History   Problem Relation Age of Onset    Diabetes Father      Social History   Substance Use Topics    Smoking status: Former Smoker    Smokeless tobacco: Never Used    Alcohol use 12.5 oz/week     25 Shots of liquor per week       ROS       All other systems reviewed and are negative. Objective:  Vitals:    07/02/18 0932   BP: (!) 148/111   Pulse: 61   Resp: 17   Temp: 98.1 °F (36.7 °C)   TempSrc: Oral   SpO2: 98%   Weight: 159 lb (72.1 kg)   Height: 5' 10\" (1.778 m)   PainSc:   0 - No pain                 alert, well appearing, and in no distress and oriented to person, place, and time  Chest - clear to auscultation, no wheezes, rales or rhonchi, symmetric air entry  Heart - normal rate, regular rhythm, normal S1, S2, no murmurs, rubs, clicks or gallops  Abdomen - soft, nontender, nondistended, no masses or organomegaly        LABS   inr today 2.6    TESTS      Assessment/Plan:    Hypertension - well controlled, stable  Atrial fib stable  Coumadin optimal    Lab review: labs are reviewed, up to date and normal    Diagnoses and all orders for this visit:    1. Chronic atrial fibrillation (HCC)  -     AMB POC PT/INR    2. Essential hypertension, malignant    3. Arthritis    4. Elevated cholesterol    5. Nontoxic multinodular goiter          I have discussed the diagnosis with the patient and the intended plan as seen in the above orders. The patient has received an after-visit summary and questions were answered concerning future plans. I have discussed medication side effects and warnings with the patient as well.  I have reviewed the plan of care with the patient, accepted their input and they are in agreement with the treatment goals. Follow-up Disposition:  Return in about 4 weeks (around 7/30/2018) for rov, inr next visit.

## 2018-07-30 ENCOUNTER — OFFICE VISIT (OUTPATIENT)
Dept: FAMILY MEDICINE CLINIC | Age: 63
End: 2018-07-30

## 2018-07-30 VITALS
SYSTOLIC BLOOD PRESSURE: 129 MMHG | TEMPERATURE: 98.2 F | OXYGEN SATURATION: 97 % | DIASTOLIC BLOOD PRESSURE: 90 MMHG | HEART RATE: 85 BPM | BODY MASS INDEX: 22.82 KG/M2 | WEIGHT: 159.4 LBS | RESPIRATION RATE: 20 BRPM | HEIGHT: 70 IN

## 2018-07-30 DIAGNOSIS — E04.2 NONTOXIC MULTINODULAR GOITER: ICD-10-CM

## 2018-07-30 DIAGNOSIS — I10 ESSENTIAL HYPERTENSION, MALIGNANT: ICD-10-CM

## 2018-07-30 DIAGNOSIS — R21 RASH: ICD-10-CM

## 2018-07-30 DIAGNOSIS — I48.20 CHRONIC ATRIAL FIBRILLATION (HCC): Primary | ICD-10-CM

## 2018-07-30 LAB
INR BLD: 2.7
PT POC: 31.8 SECONDS
VALID INTERNAL CONTROL?: YES

## 2018-07-30 NOTE — PROGRESS NOTES
Room # SUBJECTIVE: Ranulfo Stewart is a 61 y.o. male who presents today for follow up for A fib and hypertension 1. Have you been to the ER, urgent care clinic since your last visit? Hospitalized since your last visit? NO 
 
2. Have you seen or consulted any other health care providers outside of the [address] [place] [address] since your last visit? Include any pap smears or colon screening. NO When : 
Reason: 
 
Health Maintenance reviewed Up to date There are no preventive care reminders to display for this patient.

## 2018-07-30 NOTE — PROGRESS NOTES
Stacie Villegas is a 61 y.o.  male and presents with Chief Complaint Patient presents with  Irregular Heart Beat  Anticoagulation Subjective: 
 
Cardiovascular Review: 
The patient has hypertension and Atrial Fibrillation. Diet and Lifestyle: not attempting to follow a low fat, low cholesterol diet Home BP Monitoring: is not measured at home. Pertinent ROS: taking medications as instructed, no medication side effects noted, no TIA's, no chest pain on exertion, no dyspnea on exertion, no swelling of ankles. Anticoagulation Patient here for followup of chronic anticoagulation. Indication: Atrial Fibrillation. Bleeding Signs/Symptoms:  None. Thromboembolic Signs/Symptoms:  None. INR's are drawn regularly and have been therapeutic. Lab Results Component Value Date/Time INR 1.4 (H) 04/19/2018 04:24 AM  
 INR POC 2.7 07/30/2018 09:51 AM  
 
Rash on forehead and nose Additional Concerns:   
 
 
 
Patient Active Problem List  
 Diagnosis Date Noted  Nontoxic multinodular goiter 02/07/2018  Atrial fibrillation ([facility] [address] 11/28/2017  Elevated cholesterol 04/12/2016  Essential hypertension, malignant 11/13/2014  Arthritis 11/13/2014 Current Outpatient Prescriptions Medication Sig Dispense Refill  warfarin (COUMADIN) 4 mg tablet Take 1 Tab by mouth daily. 90 Tab 12  
 vitamin k 100 mcg tablet Take 1 Tab by mouth daily. 100 Tab 12  
 digoxin (LANOXIN) 0.125 mg tablet Take 1 Tab by mouth every fourty-eight (48) hours. 45 Tab 4  
 dilTIAZem CD (CARDIZEM CD) 180 mg ER capsule Take 1 Cap by mouth nightly. 90 Cap 4  
 dilTIAZem CD (CARDIZEM CD) 240 mg ER capsule Take 1 Cap by mouth daily. 90 Cap 4  
 magnesium oxide 200 mg magnesium tab Take 200 mg by mouth daily. 100 Tab 12  
 nitroglycerin (NITROSTAT) 0.4 mg SL tablet 1 Tab by SubLINGual route as needed for Chest Pain.  1 Bottle 0  
 multivit-min-FA-lycopen-lutein (CENTRUM SILVER MEN) 300-600-300 mcg tab Take 1 Tab by mouth daily. 100 Caplet 12 No Known Allergies Past Medical History:  
Diagnosis Date  Arthritis  Atrial fibrillation ([address] 11/28/2017  Hearing loss  HLD (hyperlipidemia)  HTN (hypertension)  Nontoxic multinodular goiter 2/7/2018 Past Surgical History:  
Procedure Laterality Date  HX HERNIA REPAIR Family History Problem Relation Age of Onset  Diabetes Father Social History Substance Use Topics  Smoking status: Former Smoker  Smokeless tobacco: Never Used  Alcohol use 12.5 oz/week 25 Shots of liquor per week ROS All other systems reviewed and are negative. Objective: 
Vitals:  
 07/30/18 0954 BP: 129/90 Pulse: 85 Resp: 20 Temp: 98.2 °F (36.8 °C) TempSrc: Oral  
SpO2: 97% Weight: 159 lb 6.4 oz (72.3 kg) Height: 5' 10\" (1.778 m) PainSc:   0 - No pain  
 
 
 
 
 
 
 
alert, well appearing, and in no distress, oriented to person, place, and time and normal appearing weight Chest - clear to auscultation, no wheezes, rales or rhonchi, symmetric air entry Heart - normal rate, regular rhythm, normal S1, S2, no murmurs, rubs, clicks or gallops Abdomen - soft, nontender, nondistended, no masses or organomegaly Pustular eruption on forehead and nose LABS  
inr  2.7 TESTS Assessment/Plan: Hypertension - stable Atrial fib stable Coumadin optimal 
Rash on forehead and nose  Etio??? Will ask derm to eval 
 
Lab review: labs are reviewed, up to date and normal 
 
Diagnoses and all orders for this visit: 
 
1. Chronic atrial fibrillation (HCC) -     AMB POC PT/INR 
-     REFERRAL TO DERMATOLOGY 2. Rash -     AMB POC PT/INR 
-     REFERRAL TO DERMATOLOGY 3. Essential hypertension, malignant 4. Nontoxic multinodular goiter I have discussed the diagnosis with the patient and the intended plan as seen in the above orders.   The patient has received an after-visit summary and questions were answered concerning future plans. I have discussed medication side effects and warnings with the patient as well. I have reviewed the plan of care with the patient, accepted their input and they are in agreement with the treatment goals. Follow-up Disposition: 
Return in about 4 weeks (around 8/27/2018) for EOV, inr next visit.

## 2018-07-30 NOTE — MR AVS SNAPSHOT
[facility] 
 
 
 [address] [facility] [address]  [address] [address] [phone] Patient: Juan Casey. MRN: [id number] QJF:[birth year] Visit Information Date & Time Provider Department Dept. Phone Encounter #  
 7/30/2018 10:00 [place] [address] [address] [phone] [id number] Follow-up Instructions Return in about 4 weeks (around 8/27/2018) for EOV, inr next visit. Follow-up and Disposition History Your Appointments 12/19/2018  8:45 AM  
Follow Up with Dia Yoder MD  
Cardio Specialist at [facility]) Appt Note: 6 months [facility] [address] [address] [address] Upcoming Health Maintenance Date Due Influenza Age 5 to Adult 8/1/2018 DTaP/Tdap/Td series (2 - Td) 12/4/2024 COLONOSCOPY 5/23/2026 Allergies as of 7/30/2018  Review Complete On: 7/30/2018 By: Chrissy Gonzalez DO No Known Allergies Current Immunizations  Reviewed on 11/13/2014 Name Date Influenza Vaccine 11/1/2017, 12/4/2014, 11/13/2011 Influenza Vaccine (Quad) PF 12/5/2017  2:34 PM, 3/15/2016 Pneumococcal Vaccine (Unspecified Type) 11/13/2011 Td 11/13/2001 Tdap 12/4/2014 Zoster Vaccine, Live 3/15/2016 Not reviewed this visit You Were Diagnosed With   
  
 Codes Comments Chronic atrial fibrillation (HCC)    -  Primary ICD-10-CM: O32.7 ICD-9-CM: 427.31 Rash     ICD-10-CM: R21 
ICD-9-CM: 782.1 Essential hypertension, malignant     ICD-10-CM: I10 
ICD-9-CM: 401.0 Nontoxic multinodular goiter     ICD-10-CM: E04.2 ICD-9-CM: 818. 1 Vitals BP Pulse Temp Resp Height(growth percentile) Weight(growth percentile) 129/90 (BP 1 Location: Right arm, BP Patient Position: Sitting) 85 98.2 °F (36.8 °C) (Oral) 20 5' 10\" (1.778 m) 159 lb 6.4 oz (72.3 kg) SpO2 BMI Smoking Status 97% 22.87 kg/m2 Former Smoker BMI and BSA Data Body Mass Index Body Surface Area  
 22.87 kg/m 2 1.89 m 2 Preferred Pharmacy Pharmacy Name Phone [address], [address] [phone] Your Updated Medication List  
  
   
This list is accurate as of 7/30/18 10:06 AM.  Always use your most recent med list.  
  
  
  
  
 digoxin 0.125 mg tablet Commonly known as:  LANOXIN Take 1 Tab by mouth every fourty-eight (48) hours. * dilTIAZem  mg ER capsule Commonly known as:  CARDIZEM CD Take 1 Cap by mouth nightly. * dilTIAZem  mg ER capsule Commonly known as:  CARDIZEM CD Take 1 Cap by mouth daily. magnesium oxide 200 mg magnesium Tab Take 200 mg by mouth daily. multivit-min-FA-lycopen-lutein 300-600-300 mcg Tab Commonly known as:  CENTRUM SILVER MEN Take 1 Tab by mouth daily. nitroglycerin 0.4 mg SL tablet Commonly known as:  NITROSTAT  
1 Tab by SubLINGual route as needed for Chest Pain.  
  
 vitamin k 100 mcg tablet Take 1 Tab by mouth daily. warfarin 4 mg tablet Commonly known as:  COUMADIN Take 1 Tab by mouth daily. * Notice: This list has 2 medication(s) that are the same as other medications prescribed for you. Read the directions carefully, and ask your doctor or other care provider to review them with you. We Performed the Following AMB POC PT/INR [57398 CPT(R)] REFERRAL TO DERMATOLOGY [REF19 Custom] Comments:  
 Please evaluate patient for rash on forehead and nose. 1st available is fine. Follow-up Instructions Return in about 4 weeks (around 8/27/2018) for EOV, inr next visit. Referral Information Referral ID Referred By Referred To  
  
 [id number] [facility] [address] Phone: [phone] Fax: [phone] Visits Status Start Date End Date 1 New Request 7/30/18 7/30/19 If your referral has a status of pending review or denied, additional information will be sent to support the outcome of this decision. Introducing [facility] & HEALTH SERVICES! Dear Blanche Maloney: 
Thank you for requesting a [employer] account. Our records indicate that you already have an active [employer] account. You can access your account anytime at [email or web address] Did you know that you can access your hospital and ER discharge instructions at any time in [employer]? You can also review all of your test results from your hospital stay or ER visit. Additional Information If you have questions, please visit the Frequently Asked Questions section of the [employer] website at [email or web address] Remember, [employer] is NOT to be used for urgent needs. For medical emergencies, dial 911. Now available from your iPhone and Android! Please provide this summary of care documentation to your next provider. Your primary care clinician is listed as [id number] [facility]. If you have any questions after today's visit, please call [phone].

## 2018-08-30 ENCOUNTER — OFFICE VISIT (OUTPATIENT)
Dept: FAMILY MEDICINE CLINIC | Age: 63
End: 2018-08-30

## 2018-08-30 VITALS
WEIGHT: 160.2 LBS | HEART RATE: 68 BPM | HEIGHT: 70 IN | RESPIRATION RATE: 16 BRPM | DIASTOLIC BLOOD PRESSURE: 82 MMHG | TEMPERATURE: 96.3 F | OXYGEN SATURATION: 96 % | BODY MASS INDEX: 22.94 KG/M2 | SYSTOLIC BLOOD PRESSURE: 125 MMHG

## 2018-08-30 DIAGNOSIS — E04.2 NONTOXIC MULTINODULAR GOITER: ICD-10-CM

## 2018-08-30 DIAGNOSIS — E78.00 ELEVATED CHOLESTEROL: ICD-10-CM

## 2018-08-30 DIAGNOSIS — M19.90 ARTHRITIS: ICD-10-CM

## 2018-08-30 DIAGNOSIS — I48.20 CHRONIC ATRIAL FIBRILLATION (HCC): Primary | ICD-10-CM

## 2018-08-30 DIAGNOSIS — I10 ESSENTIAL HYPERTENSION, MALIGNANT: ICD-10-CM

## 2018-08-30 LAB
INR BLD: 1.6
PT POC: 19.5 SECONDS
VALID INTERNAL CONTROL?: YES

## 2018-08-30 NOTE — PROGRESS NOTES
Dilshad Yee. is a 61 y.o. male presents today for follow up on his Atrial fibrilation. Patient reports of no pain. Pt is in Room # 6      Abuse screening: Completed    1. Have you been to the ER, urgent care clinic since your last visit? Hospitalized since your last visit? No    2. Have you seen or consulted any other health care providers outside of the [address] [place] [address] since your last visit? Include any pap smears or colon screening. No     Health Maintenance reviewed - .

## 2018-08-30 NOTE — PROGRESS NOTES
Sarabjit Merida. is a 61 y.o.  male and presents with    Chief Complaint   Patient presents with    Irregular Heart Beat    Hypertension    Arthritis    Cholesterol Problem    Thyroid Problem           Subjective:    Cardiovascular Review:  The patient has hypertension, hyperlipidemia and Atrial Fibrillation. Diet and Lifestyle: not attempting to follow a low fat, low cholesterol diet, not attempting to follow a low sodium diet  Home BP Monitoring: is not measured at home. Pertinent ROS: taking medications as instructed, no medication side effects noted, no TIA's, no chest pain on exertion, no dyspnea on exertion, no swelling of ankles. Anticoagulation  Patient here for followup of chronic anticoagulation. Indication: Atrial Fibrillation. Bleeding Signs/Symptoms:  None. Thromboembolic Signs/Symptoms:  None. INR's are drawn regularly and have been therapeutic. Lab Results   Component Value Date/Time    INR 1.4 (H) 04/19/2018 04:24 AM    INR POC 1.6 08/30/2018 08:17 AM     Thyroid Review:  Patient is seen for followup of goiter. Thyroid ROS: denies fatigue, weight changes, heat/cold intolerance, bowel/skin changes or CVS symptoms. Osteoarthritis and Chronic Pain:  Patient has osteoarthritis, primarily affecting the diffuse. Symptoms onset: problem is longstanding. Rheumatological ROS: no current joint or muscle symptoms, essentially pain-free. Response to treatment plan: stable. Additional Concerns:          Patient Active Problem List    Diagnosis Date Noted    Nontoxic multinodular goiter 02/07/2018    Atrial fibrillation ([address] 11/28/2017    Elevated cholesterol 04/12/2016    Essential hypertension, malignant 11/13/2014    Arthritis 11/13/2014     Current Outpatient Prescriptions   Medication Sig Dispense Refill    warfarin (COUMADIN) 4 mg tablet Take 1 Tab by mouth daily. 90 Tab 12    vitamin k 100 mcg tablet Take 1 Tab by mouth daily.  100 Tab 12    digoxin (LANOXIN) 0.125 mg tablet Take 1 Tab by mouth every fourty-eight (48) hours. 45 Tab 4    dilTIAZem CD (CARDIZEM CD) 180 mg ER capsule Take 1 Cap by mouth nightly. 90 Cap 4    dilTIAZem CD (CARDIZEM CD) 240 mg ER capsule Take 1 Cap by mouth daily. 90 Cap 4    magnesium oxide 200 mg magnesium tab Take 200 mg by mouth daily. 100 Tab 12    nitroglycerin (NITROSTAT) 0.4 mg SL tablet 1 Tab by SubLINGual route as needed for Chest Pain. 1 Bottle 0    multivit-min-FA-lycopen-lutein (CENTRUM SILVER MEN) 300-600-300 mcg tab Take 1 Tab by mouth daily. 100 Caplet 12     No Known Allergies  Past Medical History:   Diagnosis Date    Arthritis     Atrial fibrillation ([address] 11/28/2017    Hearing loss     HLD (hyperlipidemia)     HTN (hypertension)     Nontoxic multinodular goiter 2/7/2018     Past Surgical History:   Procedure Laterality Date    HX HERNIA REPAIR       Family History   Problem Relation Age of Onset    Diabetes Father      Social History   Substance Use Topics    Smoking status: Former Smoker    Smokeless tobacco: Never Used    Alcohol use 12.5 oz/week     25 Shots of liquor per week       ROS       All other systems reviewed and are negative.       Objective:  Vitals:    08/30/18 0816   BP: 125/82   Pulse: 68   Resp: 16   Temp: 96.3 °F (35.7 °C)   TempSrc: Oral   SpO2: 96%   Weight: 160 lb 3.2 oz (72.7 kg)   Height: 5' 10\" (1.778 m)   PainSc:   0 - No pain                 alert, well appearing, and in no distress, oriented to person, place, and time and normal appearing weight  Neck - neck mass noted goiter  Chest - clear to auscultation, no wheezes, rales or rhonchi, symmetric air entry  Heart - normal rate, regular rhythm, normal S1, S2, no murmurs, rubs, clicks or gallops  Abdomen - soft, nontender, nondistended, no masses or organomegaly        LABS   Component      Latest Ref Rng & Units 8/30/2018 7/30/2018 7/2/2018 5/29/2018           8:17 AM  9:51 AM  9:48 AM  7:55 AM   VALID INTERNAL CONTROL POC Yes Yes Yes Yes   Prothrombin time (POC)      seconds 19.5 31.8 31.4 20.4   INR       1.6 2.7 2.6 1.7     Component      Latest Ref Rng & Units 5/14/2018 5/9/2018 4/26/2018           8:46 AM  2:04 PM  9:00 AM   VALID INTERNAL CONTROL POC       Yes Yes Yes   Prothrombin time (POC)      seconds 13.4 76.0 29.7   INR       1.1 6.3 2.5     TESTS      Assessment/Plan:    Hypertension - stable  Atrial fib controlled  Coumadin subtherapeutic will recheck in1 mo and adjust if needed  Goiter stable      Lab review: labs are reviewed, up to date and normal    Diagnoses and all orders for this visit:    1. Chronic atrial fibrillation (HCC)  -     AMB POC PT/INR    2. Essential hypertension, malignant    3. Arthritis    4. Elevated cholesterol    5. Nontoxic multinodular goiter          I have discussed the diagnosis with the patient and the intended plan as seen in the above orders. The patient has received an after-visit summary and questions were answered concerning future plans. I have discussed medication side effects and warnings with the patient as well. I have reviewed the plan of care with the patient, accepted their input and they are in agreement with the treatment goals. Follow-up Disposition:  Return in about 4 weeks (around 9/27/2018) for rov, inr next visit.

## 2018-08-30 NOTE — MR AVS SNAPSHOT
[facility] 
 
 
 [address] [facility] [address] [facility] 83 [address] 
[phone] Patient: Dilshad Yee. MRN: [id number] AIE:4/10/7817 Visit Information Date & Time Provider Department Dept. Phone Encounter #  
 8/30/2018  8:00 AM Stephanie Smith., [place] 9 [phone] Follow-up Instructions Return in about 4 weeks (around 9/27/2018) for rov, inr next visit. Follow-up and Disposition History Your Appointments 12/19/2018  8:45 AM  
Follow Up with Brady Wilson MD  
Cardio Specialist at [facility]/[address]) Appt Note: 6 months [facility] [address] [facility] 83 [address] [facility] Upcoming Health Maintenance Date Due Influenza Age 5 to Adult 8/1/2018 DTaP/Tdap/Td series (2 - Td) 12/4/2024 COLONOSCOPY 5/23/2026 Allergies as of 8/30/2018  Review Complete On: 8/30/2018 By: Stephanie Smith., DO No Known Allergies Current Immunizations  Reviewed on 11/13/2014 Name Date Influenza Vaccine 11/1/2017, 12/4/2014, 11/13/2011 Influenza Vaccine (Quad) PF 12/5/2017  2:34 PM, 3/15/2016 Pneumococcal Vaccine (Unspecified Type) 11/13/2011 Td 11/13/2001 Tdap 12/4/2014 Zoster Vaccine, Live 3/15/2016 Not reviewed this visit You Were Diagnosed With   
  
 Codes Comments Chronic atrial fibrillation (HCC)    -  Primary ICD-10-CM: S65.2 ICD-9-CM: 427.31 Essential hypertension, malignant     ICD-10-CM: I10 
ICD-9-CM: 401.0 Arthritis     ICD-10-CM: M19.90 ICD-9-CM: 716.90 Elevated cholesterol     ICD-10-CM: E78.00 ICD-9-CM: 272.0 Nontoxic multinodular goiter     ICD-10-CM: E04.2 ICD-9-CM: 823. 1 Vitals BP Pulse Temp Resp Height(growth percentile) Weight(growth percentile)  125/82 (BP 1 Location: Right arm, BP Patient Position: Sitting) 68 96.3 °F (35.7 °C) (Oral) 16 5' 10\" (1.778 m) 160 lb 3.2 oz (72.7 kg) SpO2 BMI Smoking Status 96% 22.99 kg/m2 Former Smoker BMI and BSA Data Body Mass Index Body Surface Area  
 22.99 kg/m 2 1.89 m 2 Preferred Pharmacy Pharmacy Name Phone [address], [address] [phone] Your Updated Medication List  
  
   
This list is accurate as of 8/30/18  8:42 AM.  Always use your most recent med list.  
  
  
  
  
 digoxin 0.125 mg tablet Commonly known as:  LANOXIN Take 1 Tab by mouth every fourty-eight (48) hours. * dilTIAZem  mg ER capsule Commonly known as:  CARDIZEM CD Take 1 Cap by mouth nightly. * dilTIAZem  mg ER capsule Commonly known as:  CARDIZEM CD Take 1 Cap by mouth daily. magnesium oxide 200 mg magnesium Tab Take 200 mg by mouth daily. multivit-min-FA-lycopen-lutein 300-600-300 mcg Tab Commonly known as:  CENTRUM SILVER MEN Take 1 Tab by mouth daily. nitroglycerin 0.4 mg SL tablet Commonly known as:  NITROSTAT  
1 Tab by SubLINGual route as needed for Chest Pain.  
  
 vitamin k 100 mcg tablet Take 1 Tab by mouth daily. warfarin 4 mg tablet Commonly known as:  COUMADIN Take 1 Tab by mouth daily. * Notice: This list has 2 medication(s) that are the same as other medications prescribed for you. Read the directions carefully, and ask your doctor or other care provider to review them with you. We Performed the Following AMB POC PT/INR [18349 CPT(R)] Follow-up Instructions Return in about 4 weeks (around 9/27/2018) for rov, inr next visit. Introducing \[facility]\"" & HEALTH SERVICES! Dear Wesley Drafts: 
Thank you for requesting a Mippin account. Our records indicate that you already have an active Mippin account. You can access your account anytime at [email or web address] Did you know that you can access your hospital and ER discharge instructions at any time in SGN (Social Gaming Network)? You can also review all of your test results from your hospital stay or ER visit. Additional Information If you have questions, please visit the Frequently Asked Questions section of the SGN (Social Gaming Network) website at [email or web address] Remember, SGN (Social Gaming Network) is NOT to be used for urgent needs. For medical emergencies, dial 911. Now available from your iPhone and Android! Please provide this summary of care documentation to your next provider. Your primary care clinician is listed as [id number] [facility]. If you have any questions after today's visit, please call [phone].

## 2018-09-27 ENCOUNTER — OFFICE VISIT (OUTPATIENT)
Dept: FAMILY MEDICINE CLINIC | Age: 63
End: 2018-09-27

## 2018-09-27 VITALS
BODY MASS INDEX: 23.45 KG/M2 | OXYGEN SATURATION: 95 % | HEART RATE: 76 BPM | SYSTOLIC BLOOD PRESSURE: 150 MMHG | TEMPERATURE: 96.4 F | WEIGHT: 163.8 LBS | HEIGHT: 70 IN | DIASTOLIC BLOOD PRESSURE: 89 MMHG | RESPIRATION RATE: 20 BRPM

## 2018-09-27 DIAGNOSIS — I48.20 CHRONIC ATRIAL FIBRILLATION (HCC): Primary | ICD-10-CM

## 2018-09-27 DIAGNOSIS — I10 ESSENTIAL HYPERTENSION, MALIGNANT: ICD-10-CM

## 2018-09-27 LAB
INR BLD: 2.4
PT POC: 29 SECONDS
VALID INTERNAL CONTROL?: YES

## 2018-09-27 NOTE — PROGRESS NOTES
Manfred Fernandez. is a 61 y.o.  male and presents with    Chief Complaint   Patient presents with    Irregular Heart Beat           Subjective:    Anticoagulation  Patient here for followup of chronic anticoagulation. Indication: Atrial Fibrillation. Bleeding Signs/Symptoms:  None. Thromboembolic Signs/Symptoms:  None. INR's are drawn regularly and have been therapeutic. Lab Results   Component Value Date/Time    INR 1.4 (H) 04/19/2018 04:24 AM    INR POC 2.4 09/27/2018 07:20 AM       Additional Concerns:          Patient Active Problem List    Diagnosis Date Noted    Nontoxic multinodular goiter 02/07/2018    Atrial fibrillation (HCC) 11/28/2017    Elevated cholesterol 04/12/2016    Essential hypertension, malignant 11/13/2014    Arthritis 11/13/2014     Current Outpatient Prescriptions   Medication Sig Dispense Refill    warfarin (COUMADIN) 4 mg tablet Take 1 Tab by mouth daily. 90 Tab 12    vitamin k 100 mcg tablet Take 1 Tab by mouth daily. 100 Tab 12    digoxin (LANOXIN) 0.125 mg tablet Take 1 Tab by mouth every fourty-eight (48) hours. 45 Tab 4    dilTIAZem CD (CARDIZEM CD) 180 mg ER capsule Take 1 Cap by mouth nightly. 90 Cap 4    dilTIAZem CD (CARDIZEM CD) 240 mg ER capsule Take 1 Cap by mouth daily. 90 Cap 4    magnesium oxide 200 mg magnesium tab Take 200 mg by mouth daily. 100 Tab 12    nitroglycerin (NITROSTAT) 0.4 mg SL tablet 1 Tab by SubLINGual route as needed for Chest Pain. 1 Bottle 0    multivit-min-FA-lycopen-lutein (CENTRUM SILVER MEN) 300-600-300 mcg tab Take 1 Tab by mouth daily.  100 Caplet 12     No Known Allergies  Past Medical History:   Diagnosis Date    Arthritis     Atrial fibrillation ([address] 11/28/2017    Hearing loss     HLD (hyperlipidemia)     HTN (hypertension)     Nontoxic multinodular goiter 2/7/2018     Past Surgical History:   Procedure Laterality Date    HX HERNIA REPAIR       Family History   Problem Relation Age of Onset    Diabetes Father      Social History   Substance Use Topics    Smoking status: Former Smoker    Smokeless tobacco: Never Used    Alcohol use 12.5 oz/week     25 Shots of liquor per week       ROS       All other systems reviewed and are negative. Objective:  Vitals:    09/27/18 0723   BP: 150/89   Pulse: 76   Resp: 20   Temp: 96.4 °F (35.8 °C)   TempSrc: Oral   SpO2: 95%   Weight: 163 lb 12.8 oz (74.3 kg)   Height: 5' 10\" (1.778 m)   PainSc:   0 - No pain                 alert, well appearing, and in no distress, oriented to person, place, and time and normal appearing weight  Chest - clear to auscultation, no wheezes, rales or rhonchi, symmetric air entry  Heart - normal rate, regular rhythm, normal S1, S2, no murmurs, rubs, clicks or gallops  Abdomen - soft, nontender, nondistended, no masses or organomegaly        LABS   inr  2.4    TESTS      Assessment/Plan:    Hypertension - stable  Atrial fib controlled  coumadi optimal    Lab review: labs are reviewed, up to date and normal    Diagnoses and all orders for this visit:    1. Chronic atrial fibrillation (HCC)  -     AMB POC PT/INR    2. Essential hypertension, malignant          I have discussed the diagnosis with the patient and the intended plan as seen in the above orders. The patient has received an after-visit summary and questions were answered concerning future plans. I have discussed medication side effects and warnings with the patient as well. I have reviewed the plan of care with the patient, accepted their input and they are in agreement with the treatment goals. Follow-up Disposition:  Return in about 4 weeks (around 10/25/2018) for rov, inr next visit.

## 2018-09-27 NOTE — MR AVS SNAPSHOT
[address] [facility] 83 [address] 
[phone] Patient: Priscilla Stinson MRN: [id number] ARL:5/74/8239 Visit Information Date & Time Provider Department Dept. Phone Encounter #  
 9/27/2018  7:30 AM Pari Henry, [address] [place] [address] [phone] [id number] Follow-up Instructions Return in about 4 weeks (around 10/25/2018) for rov, inr next visit. Follow-up and Disposition History Your Appointments 12/19/2018  8:45 AM  
Follow Up with Renee Hua MD  
Cardio Specialist at [place] [id number] [facility]) Appt Note: 6 months [facility] 400 [facility] 83 [address] [place] Upcoming Health Maintenance Date Due Shingrix Vaccine Age 50> (1 of 2) 2/22/2005 Influenza Age 5 to Adult 8/1/2018 DTaP/Tdap/Td series (2 - Td) 12/4/2024 COLONOSCOPY 5/23/2026 Allergies as of 9/27/2018  Review Complete On: 9/27/2018 By: Pari Henry, DO No Known Allergies Current Immunizations  Reviewed on 11/13/2014 Name Date Influenza Vaccine 11/1/2017, 12/4/2014, 11/13/2011 Influenza Vaccine (Quad) PF 12/5/2017  2:34 PM, 3/15/2016 Pneumococcal Vaccine (Unspecified Type) 11/13/2011 Td 11/13/2001 Tdap 12/4/2014 Zoster Vaccine, Live 3/15/2016 Not reviewed this visit You Were Diagnosed With   
  
 Codes Comments Chronic atrial fibrillation (HCC)    -  Primary ICD-10-CM: Z42.5 ICD-9-CM: 427.31 Essential hypertension, malignant     ICD-10-CM: I10 
ICD-9-CM: 401.0 Vitals BP Pulse Temp Resp Height(growth percentile) Weight(growth percentile) 150/89 (BP 1 Location: Left arm, BP Patient Position: Sitting) 76 96.4 °F (35.8 °C) (Oral) 20 5' 10\" (1.778 m) 163 lb 12.8 oz (74.3 kg) SpO2 BMI Smoking Status 95% 23.5 kg/m2 Former Smoker Vitals History BMI and BSA Data Body Mass Index Body Surface Area  
 23.5 kg/m 2 1.92 m 2 Preferred Pharmacy Pharmacy Name Phone [address], [address] [phone] Your Updated Medication List  
  
   
This list is accurate as of 9/27/18  7:31 AM.  Always use your most recent med list.  
  
  
  
  
 digoxin 0.125 mg tablet Commonly known as:  LANOXIN Take 1 Tab by mouth every fourty-eight (48) hours. * dilTIAZem  mg ER capsule Commonly known as:  CARDIZEM CD Take 1 Cap by mouth nightly. * dilTIAZem  mg ER capsule Commonly known as:  CARDIZEM CD Take 1 Cap by mouth daily. magnesium oxide 200 mg magnesium Tab Take 200 mg by mouth daily. multivit-min-FA-lycopen-lutein 300-600-300 mcg Tab Commonly known as:  CENTRUM SILVER MEN Take 1 Tab by mouth daily. nitroglycerin 0.4 mg SL tablet Commonly known as:  NITROSTAT  
1 Tab by SubLINGual route as needed for Chest Pain.  
  
 vitamin k 100 mcg tablet Take 1 Tab by mouth daily. warfarin 4 mg tablet Commonly known as:  COUMADIN Take 1 Tab by mouth daily. * Notice: This list has 2 medication(s) that are the same as other medications prescribed for you. Read the directions carefully, and ask your doctor or other care provider to review them with you. We Performed the Following AMB POC PT/INR [31473 CPT(R)] Follow-up Instructions Return in about 4 weeks (around 10/25/2018) for rov, inr next visit. Introducing [facility] & HEALTH SERVICES! Dear Julia Ndiaye: 
Thank you for requesting a DeliveryCheetah account. Our records indicate that you already have an active DeliveryCheetah account. You can access your account anytime at [email or web address] Did you know that you can access your hospital and ER discharge instructions at any time in DeliveryCheetah?   You can also review all of your test results from your hospital stay or ER visit. Additional Information If you have questions, please visit the Frequently Asked Questions section of the Driver Hire website at [email or web address] Remember, Driver Hire is NOT to be used for urgent needs. For medical emergencies, dial 911. Now available from your iPhone and Android! Please provide this summary of care documentation to your next provider. Your primary care clinician is listed as Emelia Vogel. If you have any questions after today's visit, please call [phone].

## 2018-09-27 NOTE — PROGRESS NOTES
Room #      SUBJECTIVE:    Caprice Welch is a 61 y.o. male who presents today for folllow up for A-fib    1. Have you been to the ER, urgent care clinic since your last visit? Hospitalized since your last visit? NO    2. Have you seen or consulted any other health care providers outside of the [address] [place] [address] since your last visit? Include any pap smears or colon screening. NO  When :  Reason:    Health Maintenance reviewed Yes    Health Maintenance Due   Topic Date Due    Shingrix Vaccine Age 49> (1 of 2) 02/22/2005    Influenza Age 5 to Adult  08/01/2018

## 2018-10-25 ENCOUNTER — OFFICE VISIT (OUTPATIENT)
Dept: FAMILY MEDICINE CLINIC | Age: 63
End: 2018-10-25

## 2018-10-25 VITALS
BODY MASS INDEX: 23.94 KG/M2 | RESPIRATION RATE: 16 BRPM | SYSTOLIC BLOOD PRESSURE: 134 MMHG | WEIGHT: 167.2 LBS | HEART RATE: 72 BPM | HEIGHT: 70 IN | OXYGEN SATURATION: 96 % | TEMPERATURE: 96 F | DIASTOLIC BLOOD PRESSURE: 80 MMHG

## 2018-10-25 DIAGNOSIS — M19.90 ARTHRITIS: ICD-10-CM

## 2018-10-25 DIAGNOSIS — E04.2 NONTOXIC MULTINODULAR GOITER: ICD-10-CM

## 2018-10-25 DIAGNOSIS — E78.00 ELEVATED CHOLESTEROL: ICD-10-CM

## 2018-10-25 DIAGNOSIS — I48.20 CHRONIC ATRIAL FIBRILLATION (HCC): ICD-10-CM

## 2018-10-25 DIAGNOSIS — I10 ESSENTIAL HYPERTENSION, MALIGNANT: ICD-10-CM

## 2018-10-25 DIAGNOSIS — Z23 ENCOUNTER FOR IMMUNIZATION: Primary | ICD-10-CM

## 2018-10-25 LAB
INR BLD: 2.1
PT POC: 25.3 SECONDS
VALID INTERNAL CONTROL?: YES

## 2018-10-25 NOTE — PROGRESS NOTES
Alexa Tamayo is a 61 y.o. male presents today for follow up on his atrial fibrilation. Pt is in Room # tx      1. Have you been to the ER, urgent care clinic since your last visit? Hospitalized since your last visit? No    2. Have you seen or consulted any other health care providers outside of the [facility] since your last visit? Include any pap smears or colon screening. No     Health Maintenance reviewed - . Alexa Tamayo is a 61 y.o. male who presents for routine immunizations. He denies any symptoms , reactions or allergies that would exclude them from being immunized today. Risks and adverse reactions were discussed and the VIS was given to them. All questions were addressed. He was observed for 10 min post injection. There were no reactions observed.     Elda Cormier LPN

## 2018-10-25 NOTE — PROGRESS NOTES
Flower Donato is a 61 y.o.  male and presents with    Chief Complaint   Patient presents with    Irregular Heart Beat    Anticoagulation           Subjective:    Cardiovascular Review:  The patient has Atrial Fibrillation. Diet and Lifestyle: generally follows a low fat low cholesterol diet  Home BP Monitoring: is not measured at home. Pertinent ROS: taking medications as instructed, no medication side effects noted, no TIA's, no chest pain on exertion, no dyspnea on exertion, no swelling of ankles. Anticoagulation  Patient here for followup of chronic anticoagulation. Indication: Atrial Fibrillation. Bleeding Signs/Symptoms:  None. Thromboembolic Signs/Symptoms:  None. INR's are drawn regularly and have been therapeutic. Lab Results   Component Value Date/Time    INR 1.4 (H) 04/19/2018 04:24 AM    INR POC 2.1 10/25/2018 08:13 AM       Additional Concerns:          Patient Active Problem List    Diagnosis Date Noted    Nontoxic multinodular goiter 02/07/2018    Atrial fibrillation (HCC) 11/28/2017    Elevated cholesterol 04/12/2016    Essential hypertension, malignant 11/13/2014    Arthritis 11/13/2014     Current Outpatient Medications   Medication Sig Dispense Refill    warfarin (COUMADIN) 4 mg tablet Take 1 Tab by mouth daily. 90 Tab 12    vitamin k 100 mcg tablet Take 1 Tab by mouth daily. 100 Tab 12    digoxin (LANOXIN) 0.125 mg tablet Take 1 Tab by mouth every fourty-eight (48) hours. 45 Tab 4    dilTIAZem CD (CARDIZEM CD) 180 mg ER capsule Take 1 Cap by mouth nightly. 90 Cap 4    dilTIAZem CD (CARDIZEM CD) 240 mg ER capsule Take 1 Cap by mouth daily. 90 Cap 4    magnesium oxide 200 mg magnesium tab Take 200 mg by mouth daily. 100 Tab 12    nitroglycerin (NITROSTAT) 0.4 mg SL tablet 1 Tab by SubLINGual route as needed for Chest Pain. 1 Bottle 0    multivit-min-FA-lycopen-lutein (CENTRUM SILVER MEN) 300-600-300 mcg tab Take 1 Tab by mouth daily.  100 Caplet 12     No Known Allergies  Past Medical History:   Diagnosis Date    Arthritis     Atrial fibrillation ([address] 11/28/2017    Hearing loss     HLD (hyperlipidemia)     HTN (hypertension)     Nontoxic multinodular goiter 2/7/2018     Past Surgical History:   Procedure Laterality Date    HX HERNIA REPAIR       Family History   Problem Relation Age of Onset    Diabetes Father      Social History     Tobacco Use    Smoking status: Former Smoker    Smokeless tobacco: Never Used   Substance Use Topics    Alcohol use: Yes     Alcohol/week: 12.5 oz     Types: 25 Shots of liquor per week       ROS       All other systems reviewed and are negative. Objective:  Vitals:    10/25/18 0810   BP: 134/80   Pulse: 72   Resp: 16   Temp: 96 °F (35.6 °C)   TempSrc: Oral   SpO2: 96%   Weight: 167 lb 3.2 oz (75.8 kg)   Height: 5' 10\" (1.778 m)   PainSc:   0 - No pain                 alert, well appearing, and in no distress, oriented to person, place, and time and normal appearing weight  Chest - clear to auscultation, no wheezes, rales or rhonchi, symmetric air entry  Heart - normal rate, regular rhythm, normal S1, S2, no murmurs, rubs, clicks or gallops  Abdomen - soft, nontender, nondistended, no masses or organomegaly        LABS     TESTS      Assessment/Plan:    Hypertension - stable  Atrial fib controlled  Coumadin optimal      Lab review: labs are reviewed, up to date and normal    Diagnoses and all orders for this visit:    1. Encounter for immunization  -     INFLUENZA VIRUS VAC QUAD,SPLIT,PRESV FREE SYRINGE IM    2. Chronic atrial fibrillation (HCC)  -     AMB POC PT/INR          I have discussed the diagnosis with the patient and the intended plan as seen in the above orders. The patient has received an after-visit summary and questions were answered concerning future plans. I have discussed medication side effects and warnings with the patient as well.  I have reviewed the plan of care with the patient, accepted their input and they are in agreement with the treatment goals.      Follow-up Disposition: Not on File

## 2018-10-25 NOTE — LETTER
10/29/2018 7:03 AM 
 
Mr. Delbert Mosquera [facility] [address] [id number] Dear Hawa Faulkner.: 
 
Please find your most recent results below. Resulted Orders AMB POC PT/INR Result Value Ref Range VALID INTERNAL CONTROL POC Yes Prothrombin time (POC) 25.3 seconds INR POC 2.1 T4, FREE Result Value Ref Range T4, Free 1.3 0.9 - 1.8 ng/dL Narrative Unless additionally indicated, test performed at: [address], [address] [place] [address]. PH: [phone]. URIC ACID Result Value Ref Range Uric acid 7.9 3.9 - 9.0 mg/dL Narrative Unless additionally indicated, test performed at: [address], [address] [place] [address]. PH: [phone]. MAGNESIUM Result Value Ref Range Magnesium 2.0 1.6 - 2.5 mg/dL Narrative Unless additionally indicated, test performed at: [address], [address] [place] [address]. PH: [phone]. TSH 3RD GENERATION Result Value Ref Range TSH 0.46 0.27 - 4.20 mcU/mL Narrative Unless additionally indicated, test performed at: [address], [address] [place] [address]. PH: [phone]. URINALYSIS W/ RFLX MICROSCOPIC Result Value Ref Range Specific Gravity 1.018 1.005 - 1.03  
 pH (UA) 6.0 5.0 - 8.0 pH Protein Negative Negative, mg/dL Glucose Negative Negative mg/dL Ketone Negative Negative mg/dL Bilirubin Negative Negative Blood Negative Negative Nitrites Negative Negative Leukocyte Esterase Negative Negative Urobilinogen 2.0 <2.0 mg/dL WBC 0-2 0 - 2 /hpf  
 RBC 0-2 Negative, /hpf Narrative Unless additionally indicated, test performed at: [address], [address] [place] [address]. PH: [phone]. PSA, DIAGNOSTIC (PROSTATE SPECIFIC AG) Result Value Ref Range Prostate Specific Ag 0.965 <=4.100 ng/mL Narrative Unless additionally indicated, test performed at: [facility] [address] [facility], [address] [place] [address]. PH: [phone]. METABOLIC PANEL, COMPREHENSIVE Result Value Ref Range Glucose 110 (H) 70 - 99 mg/dL BUN 11 6 - 22 mg/dL Creatinine 1.0 0.8 - 1.6 mg/dL Sodium 145 133 - 145 mmol/L Potassium 4.2 3.5 - 5.5 mmol/L Chloride 100 98 - 110 mmol/L  
 CO2 18 (L) 20 - 32 mmol/L  
 AST (SGOT) 41 (H) 10 - 37 U/L  
 ALT (SGPT) 35 5 - 40 U/L Alk. phosphatase 123 40 - 125 U/L Bilirubin, total 0.6 0.2 - 1.2 mg/dL Calcium 10.0 8.4 - 10.4 mg/dL Protein, total 7.1 6.2 - 8.1 g/dL Albumin 4.9 3.5 - 5.0 g/dL A-G Ratio 2.2 1.1 - 2.6 ratio Globulin 2.2 2.0 - 4.0 g/dL Anion gap 27.0 mmol/L Comment:  
   Test includes Albumin, Alkaline Phosphatase, ALT, AST, BUN, Calcium, CO2, Chloride, Creatinine, Glucose, Potassium, Sodium, Total Bilirubin and Total 
Protein. Estimated GFR results are reported in mL/min/1.73 sq.m. by the MDRD equation. This eGFR is validated for stable chronic renal failure patients. This  
equation 
is unreliable in acute illness or patients with normal renal function. GFRAA >60.0 >60.0 GFRNA >60.0 >60.0 Narrative Unless additionally indicated, test performed at: [facility] [address] [facility], [address] [place] [address]. PH: [phone]. CBC WITH AUTOMATED DIFF Result Value Ref Range WBC 5.2 4.0 - 11.0 K/uL  
 RBC 5.08 3.80 - 5.80 M/uL  
 HGB 16.6 13.1 - 17.2 g/dL HCT 49.7 39.3 - 51.6 % MCV 98 (H) 80 - 95 fL  
 MCH 33 26 - 34 pg MCHC 33 31 - 36 g/dL  
 RDW 14.1 10.0 - 15.5 % PLATELET 994 278 - 739 K/uL MPV 11.6 9.0 - 13.0 fL  
 NEUTROPHILS 55 40 - 75 % Lymphocytes 29 20 - 45 % MONOCYTES 12 3 - 12 % EOSINOPHILS 2 0 - 6 % BASOPHILS 1 0 - 2 %  
 ABS. NEUTROPHILS 2.9 1.8 - 7.7 K/uL ABSOLUTE LYMPHOCYTE COUNT 1.5 1.0 - 4.8 K/uL  
 ABS. MONOCYTES 0.6 0.1 - 1.0 K/uL ABS. EOSINOPHILS 0.1 0.0 - 0.5 K/uL  
 ABS. BASOPHILS 0.1 0.0 - 0.2 K/uL Narrative Unless additionally indicated, test performed at: [facility], [address] [place] [address]. PH: [phone]. LIPID PANEL Result Value Ref Range Triglyceride 152 (H) 40 - 149 mg/dL HDL Cholesterol 63 (H) 40 - 59 mg/dL Cholesterol, total 245 (H) 110 - 200 mg/dL CHOLESTEROL/HDL 3.9 0.0 - 5.0 LDL, calculated 151 (H) 50 - 99 mg/dL VLDL, calculated 30 8 - 30 mg/dL Comment:  
   Test includes cholesterol, HDL cholesterol, triglycerides and LDL. Cholesterol Recommended NCEP guidelines in mg/dL: 
Less than 200            Desirable 200 - 239                Borderline High Greater than or  = 240   High Please Note: Total Chol/HDL Ratio Men     Women 1/2 Avg. Risk    3.4     3.3 Avg. Risk    5.0     4.4 
2X  Avg. Risk    9.6     7.1 3X  Avg. Risk   23.4    11.0 Narrative Unless additionally indicated, test performed at: [facility], [address] [place] [address]. PH: [phone]. RECOMMENDATIONS: 
None. Keep up the good work! Please call me if you have any questions: [phone] Sincerely, 
 
 
Marisol Ruiz., DO

## 2018-10-26 LAB
A-G RATIO,AGRAT: 2.2 RATIO (ref 1.1–2.6)
ABSOLUTE LYMPHOCYTE COUNT, 10803: 1.5 K/UL (ref 1–4.8)
ALBUMIN SERPL-MCNC: 4.9 G/DL (ref 3.5–5)
ALP SERPL-CCNC: 123 U/L (ref 40–125)
ALT SERPL-CCNC: 35 U/L (ref 5–40)
ANION GAP SERPL CALC-SCNC: 27 MMOL/L
AST SERPL W P-5'-P-CCNC: 41 U/L (ref 10–37)
BASOPHILS # BLD: 0.1 K/UL (ref 0–0.2)
BASOPHILS NFR BLD: 1 % (ref 0–2)
BILIRUB SERPL-MCNC: 0.6 MG/DL (ref 0.2–1.2)
BILIRUB UR QL: NEGATIVE
BUN SERPL-MCNC: 11 MG/DL (ref 6–22)
CALCIUM SERPL-MCNC: 10 MG/DL (ref 8.4–10.4)
CHLORIDE SERPL-SCNC: 100 MMOL/L (ref 98–110)
CHOLEST SERPL-MCNC: 245 MG/DL (ref 110–200)
CO2 SERPL-SCNC: 18 MMOL/L (ref 20–32)
CREAT SERPL-MCNC: 1 MG/DL (ref 0.8–1.6)
EOSINOPHIL # BLD: 0.1 K/UL (ref 0–0.5)
EOSINOPHIL NFR BLD: 2 % (ref 0–6)
ERYTHROCYTE [DISTWIDTH] IN BLOOD BY AUTOMATED COUNT: 14.1 % (ref 10–15.5)
GFRAA, 66117: >60
GFRNA, 66118: >60
GLOBULIN,GLOB: 2.2 G/DL (ref 2–4)
GLUCOSE SERPL-MCNC: 110 MG/DL (ref 70–99)
GLUCOSE UR QL: NEGATIVE MG/DL
GRANULOCYTES,GRANS: 55 % (ref 40–75)
HCT VFR BLD AUTO: 49.7 % (ref 39.3–51.6)
HDLC SERPL-MCNC: 3.9 MG/DL (ref 0–5)
HDLC SERPL-MCNC: 63 MG/DL (ref 40–59)
HGB BLD-MCNC: 16.6 G/DL (ref 13.1–17.2)
HGB UR QL STRIP: NEGATIVE
KETONES UR QL STRIP.AUTO: NEGATIVE MG/DL
LDLC SERPL CALC-MCNC: 151 MG/DL (ref 50–99)
LEUKOCYTE ESTERASE: NEGATIVE
LYMPHOCYTES, LYMLT: 29 % (ref 20–45)
MAGNESIUM SERPL-MCNC: 2 MG/DL (ref 1.6–2.5)
MCH RBC QN AUTO: 33 PG (ref 26–34)
MCHC RBC AUTO-ENTMCNC: 33 G/DL (ref 31–36)
MCV RBC AUTO: 98 FL (ref 80–95)
MONOCYTES # BLD: 0.6 K/UL (ref 0.1–1)
MONOCYTES NFR BLD: 12 % (ref 3–12)
NEUTROPHILS # BLD AUTO: 2.9 K/UL (ref 1.8–7.7)
NITRITE UR QL STRIP.AUTO: NEGATIVE
PH UR STRIP: 6 PH (ref 5–8)
PLATELET # BLD AUTO: 234 K/UL (ref 140–440)
PMV BLD AUTO: 11.6 FL (ref 9–13)
POTASSIUM SERPL-SCNC: 4.2 MMOL/L (ref 3.5–5.5)
PROT SERPL-MCNC: 7.1 G/DL (ref 6.2–8.1)
PROT UR QL STRIP: NEGATIVE MG/DL
PSA SERPL-MCNC: 0.96 NG/ML
RBC # BLD AUTO: 5.08 M/UL (ref 3.8–5.8)
RBC #/AREA URNS HPF: NORMAL /HPF
SODIUM SERPL-SCNC: 145 MMOL/L (ref 133–145)
SP GR UR: 1.02 (ref 1–1.03)
T4 FREE SERPL-MCNC: 1.3 NG/DL (ref 0.9–1.8)
TRIGL SERPL-MCNC: 152 MG/DL (ref 40–149)
TSH SERPL DL<=0.005 MIU/L-ACNC: 0.46 MCU/ML (ref 0.27–4.2)
URATE SERPL-MCNC: 7.9 MG/DL (ref 3.9–9)
UROBILINOGEN UR STRIP-MCNC: 2 MG/DL
VLDLC SERPL CALC-MCNC: 30 MG/DL (ref 8–30)
WBC # BLD AUTO: 5.2 K/UL (ref 4–11)
WBC URNS QL MICRO: NORMAL /HPF (ref 0–2)

## 2018-11-03 ENCOUNTER — HOSPITAL ENCOUNTER (OUTPATIENT)
Age: 63
Setting detail: OBSERVATION
Discharge: HOME | DRG: 310 | End: 2018-11-05
Attending: EMERGENCY MEDICINE | Admitting: HOSPITALIST
Payer: COMMERCIAL

## 2018-11-03 DIAGNOSIS — I48.91 ATRIAL FIBRILLATION WITH RAPID VENTRICULAR RESPONSE (HCC): Primary | ICD-10-CM

## 2018-11-03 LAB
ALBUMIN SERPL-MCNC: 5.1 G/DL (ref 3.4–5)
ALBUMIN/GLOB SERPL: 1.3 {RATIO} (ref 0.8–1.7)
ALP SERPL-CCNC: 166 U/L (ref 45–117)
ALT SERPL-CCNC: 68 U/L (ref 16–61)
ANION GAP SERPL CALC-SCNC: 13 MMOL/L (ref 3–18)
AST SERPL-CCNC: 51 U/L (ref 15–37)
BASOPHILS # BLD: 0.1 K/UL (ref 0–0.1)
BASOPHILS NFR BLD: 1 % (ref 0–2)
BILIRUB SERPL-MCNC: 1.8 MG/DL (ref 0.2–1)
BUN SERPL-MCNC: 10 MG/DL (ref 7–18)
BUN/CREAT SERPL: 9 (ref 12–20)
CALCIUM SERPL-MCNC: 9.5 MG/DL (ref 8.5–10.1)
CHLORIDE SERPL-SCNC: 100 MMOL/L (ref 100–108)
CO2 SERPL-SCNC: 24 MMOL/L (ref 21–32)
CREAT SERPL-MCNC: 1.13 MG/DL (ref 0.6–1.3)
DIFFERENTIAL METHOD BLD: ABNORMAL
DIGOXIN SERPL-MCNC: 0.3 NG/ML (ref 0.9–2)
EOSINOPHIL # BLD: 0.1 K/UL (ref 0–0.4)
EOSINOPHIL NFR BLD: 1 % (ref 0–5)
ERYTHROCYTE [DISTWIDTH] IN BLOOD BY AUTOMATED COUNT: 13 % (ref 11.6–14.5)
GLOBULIN SER CALC-MCNC: 3.8 G/DL (ref 2–4)
GLUCOSE SERPL-MCNC: 115 MG/DL (ref 74–99)
HCT VFR BLD AUTO: 55 % (ref 36–48)
HGB BLD-MCNC: 19.4 G/DL (ref 13–16)
INR PPP: 1.6 (ref 0.8–1.2)
LYMPHOCYTES # BLD: 2.4 K/UL (ref 0.9–3.6)
LYMPHOCYTES NFR BLD: 20 % (ref 21–52)
MCH RBC QN AUTO: 33 PG (ref 24–34)
MCHC RBC AUTO-ENTMCNC: 35.1 G/DL (ref 31–37)
MCV RBC AUTO: 93.9 FL (ref 74–97)
MONOCYTES # BLD: 1.1 K/UL (ref 0.05–1.2)
MONOCYTES NFR BLD: 9 % (ref 3–10)
NEUTS SEG # BLD: 8.5 K/UL (ref 1.8–8)
NEUTS SEG NFR BLD: 69 % (ref 40–73)
PLATELET # BLD AUTO: 239 K/UL (ref 135–420)
PMV BLD AUTO: 10.7 FL (ref 9.2–11.8)
POTASSIUM SERPL-SCNC: 3.9 MMOL/L (ref 3.5–5.5)
PROT SERPL-MCNC: 8.9 G/DL (ref 6.4–8.2)
PROTHROMBIN TIME: 19.3 SEC (ref 11.5–15.2)
RBC # BLD AUTO: 5.88 M/UL (ref 4.7–5.5)
SODIUM SERPL-SCNC: 137 MMOL/L (ref 136–145)
TROPONIN I SERPL-MCNC: 0.05 NG/ML (ref 0–0.04)
WBC # BLD AUTO: 12.1 K/UL (ref 4.6–13.2)

## 2018-11-03 PROCEDURE — 80053 COMPREHEN METABOLIC PANEL: CPT | Performed by: EMERGENCY MEDICINE

## 2018-11-03 PROCEDURE — 99218 HC RM OBSERVATION: CPT

## 2018-11-03 PROCEDURE — 74011250636 HC RX REV CODE- 250/636: Performed by: EMERGENCY MEDICINE

## 2018-11-03 PROCEDURE — 74011000250 HC RX REV CODE- 250

## 2018-11-03 PROCEDURE — 84484 ASSAY OF TROPONIN QUANT: CPT | Performed by: EMERGENCY MEDICINE

## 2018-11-03 PROCEDURE — 93005 ELECTROCARDIOGRAM TRACING: CPT

## 2018-11-03 PROCEDURE — 74011000258 HC RX REV CODE- 258: Performed by: EMERGENCY MEDICINE

## 2018-11-03 PROCEDURE — 80162 ASSAY OF DIGOXIN TOTAL: CPT | Performed by: EMERGENCY MEDICINE

## 2018-11-03 PROCEDURE — 65660000000 HC RM CCU STEPDOWN

## 2018-11-03 PROCEDURE — 85610 PROTHROMBIN TIME: CPT | Performed by: EMERGENCY MEDICINE

## 2018-11-03 PROCEDURE — 96374 THER/PROPH/DIAG INJ IV PUSH: CPT

## 2018-11-03 PROCEDURE — 74011000250 HC RX REV CODE- 250: Performed by: EMERGENCY MEDICINE

## 2018-11-03 PROCEDURE — 96376 TX/PRO/DX INJ SAME DRUG ADON: CPT

## 2018-11-03 PROCEDURE — 99285 EMERGENCY DEPT VISIT HI MDM: CPT

## 2018-11-03 PROCEDURE — 85025 COMPLETE CBC W/AUTO DIFF WBC: CPT | Performed by: EMERGENCY MEDICINE

## 2018-11-03 RX ORDER — DILTIAZEM HYDROCHLORIDE 5 MG/ML
INJECTION INTRAVENOUS
Status: COMPLETED
Start: 2018-11-03 | End: 2018-11-03

## 2018-11-03 RX ORDER — DILTIAZEM HYDROCHLORIDE 5 MG/ML
10 INJECTION INTRAVENOUS
Status: COMPLETED | OUTPATIENT
Start: 2018-11-03 | End: 2018-11-03

## 2018-11-03 RX ORDER — DILTIAZEM HYDROCHLORIDE 5 MG/ML
20 INJECTION INTRAVENOUS
Status: COMPLETED | OUTPATIENT
Start: 2018-11-03 | End: 2018-11-03

## 2018-11-03 RX ORDER — DILTIAZEM HYDROCHLORIDE 100 MG/1
INJECTION, POWDER, LYOPHILIZED, FOR SOLUTION INTRAVENOUS
Status: DISPENSED
Start: 2018-11-03 | End: 2018-11-04

## 2018-11-03 RX ORDER — SODIUM CHLORIDE 900 MG/100ML
INJECTION INTRAVENOUS
Status: DISPENSED
Start: 2018-11-03 | End: 2018-11-04

## 2018-11-03 RX ADMIN — SODIUM CHLORIDE 20 MG/HR: 900 INJECTION, SOLUTION INTRAVENOUS at 20:32

## 2018-11-03 RX ADMIN — DILTIAZEM HYDROCHLORIDE 10 MG: 5 INJECTION INTRAVENOUS at 20:47

## 2018-11-03 RX ADMIN — DILTIAZEM HYDROCHLORIDE 20 MG: 5 INJECTION INTRAVENOUS at 20:14

## 2018-11-03 RX ADMIN — SODIUM CHLORIDE 15 MG/HR: 900 INJECTION, SOLUTION INTRAVENOUS at 20:37

## 2018-11-04 PROBLEM — R19.5 LOOSE STOOLS: Status: ACTIVE | Noted: 2018-11-04

## 2018-11-04 PROBLEM — F10.20 ALCOHOLISM (HCC): Status: ACTIVE | Noted: 2018-11-04

## 2018-11-04 PROBLEM — I10 HTN (HYPERTENSION): Status: ACTIVE | Noted: 2018-11-04

## 2018-11-04 PROBLEM — Z92.89 HISTORY OF NUCLEAR STRESS TEST: Status: ACTIVE | Noted: 2018-11-04

## 2018-11-04 PROBLEM — K92.2 LOWER GASTROINTESTINAL BLEEDING: Status: ACTIVE | Noted: 2018-11-04

## 2018-11-04 PROBLEM — Z79.01 CHRONIC ANTICOAGULATION: Status: ACTIVE | Noted: 2018-11-04

## 2018-11-04 LAB
ANION GAP SERPL CALC-SCNC: 10 MMOL/L (ref 3–18)
ATRIAL RATE: 116 BPM
BASOPHILS # BLD: 0 K/UL (ref 0–0.1)
BASOPHILS NFR BLD: 1 % (ref 0–2)
BUN SERPL-MCNC: 9 MG/DL (ref 7–18)
BUN/CREAT SERPL: 10 (ref 12–20)
CALCIUM SERPL-MCNC: 8.6 MG/DL (ref 8.5–10.1)
CALCULATED R AXIS, ECG10: 0 DEGREES
CALCULATED T AXIS, ECG11: 72 DEGREES
CHLORIDE SERPL-SCNC: 103 MMOL/L (ref 100–108)
CK MB CFR SERPL CALC: 1.8 % (ref 0–4)
CK MB CFR SERPL CALC: 2.1 % (ref 0–4)
CK MB CFR SERPL CALC: 2.2 % (ref 0–4)
CK MB SERPL-MCNC: 1.9 NG/ML (ref 5–25)
CK MB SERPL-MCNC: 2.1 NG/ML (ref 5–25)
CK MB SERPL-MCNC: 2.5 NG/ML (ref 5–25)
CK SERPL-CCNC: 105 U/L (ref 39–308)
CK SERPL-CCNC: 121 U/L (ref 39–308)
CK SERPL-CCNC: 96 U/L (ref 39–308)
CO2 SERPL-SCNC: 24 MMOL/L (ref 21–32)
CREAT SERPL-MCNC: 0.87 MG/DL (ref 0.6–1.3)
DIAGNOSIS, 93000: NORMAL
DIFFERENTIAL METHOD BLD: ABNORMAL
EOSINOPHIL # BLD: 0.1 K/UL (ref 0–0.4)
EOSINOPHIL NFR BLD: 1 % (ref 0–5)
ERYTHROCYTE [DISTWIDTH] IN BLOOD BY AUTOMATED COUNT: 13 % (ref 11.6–14.5)
GLUCOSE SERPL-MCNC: 103 MG/DL (ref 74–99)
HCT VFR BLD AUTO: 46.1 % (ref 36–48)
HGB BLD-MCNC: 16.1 G/DL (ref 13–16)
LYMPHOCYTES # BLD: 1.2 K/UL (ref 0.9–3.6)
LYMPHOCYTES NFR BLD: 15 % (ref 21–52)
MCH RBC QN AUTO: 32.6 PG (ref 24–34)
MCHC RBC AUTO-ENTMCNC: 34.9 G/DL (ref 31–37)
MCV RBC AUTO: 93.3 FL (ref 74–97)
MONOCYTES # BLD: 1 K/UL (ref 0.05–1.2)
MONOCYTES NFR BLD: 12 % (ref 3–10)
NEUTS SEG # BLD: 5.7 K/UL (ref 1.8–8)
NEUTS SEG NFR BLD: 71 % (ref 40–73)
PLATELET # BLD AUTO: 202 K/UL (ref 135–420)
PMV BLD AUTO: 10.5 FL (ref 9.2–11.8)
POTASSIUM SERPL-SCNC: 3.6 MMOL/L (ref 3.5–5.5)
Q-T INTERVAL, ECG07: 250 MS
QRS DURATION, ECG06: 84 MS
QTC CALCULATION (BEZET), ECG08: 440 MS
RBC # BLD AUTO: 4.94 M/UL (ref 4.7–5.5)
SODIUM SERPL-SCNC: 137 MMOL/L (ref 136–145)
TROPONIN I SERPL-MCNC: 0.04 NG/ML (ref 0–0.04)
TROPONIN I SERPL-MCNC: 0.05 NG/ML (ref 0–0.04)
TROPONIN I SERPL-MCNC: 0.06 NG/ML (ref 0–0.04)
VENTRICULAR RATE, ECG03: 186 BPM
WBC # BLD AUTO: 8.1 K/UL (ref 4.6–13.2)

## 2018-11-04 PROCEDURE — 77030022643 HC PAD DEFIB AD HRTSTRT PHL -B

## 2018-11-04 PROCEDURE — 96376 TX/PRO/DX INJ SAME DRUG ADON: CPT

## 2018-11-04 PROCEDURE — 65660000000 HC RM CCU STEPDOWN

## 2018-11-04 PROCEDURE — 77030021352 HC CBL LD SYS DISP COVD -B

## 2018-11-04 PROCEDURE — 82550 ASSAY OF CK (CPK): CPT | Performed by: HOSPITALIST

## 2018-11-04 PROCEDURE — 77010033678 HC OXYGEN DAILY

## 2018-11-04 PROCEDURE — 74011000250 HC RX REV CODE- 250: Performed by: HOSPITALIST

## 2018-11-04 PROCEDURE — 96365 THER/PROPH/DIAG IV INF INIT: CPT

## 2018-11-04 PROCEDURE — 80048 BASIC METABOLIC PNL TOTAL CA: CPT | Performed by: HOSPITALIST

## 2018-11-04 PROCEDURE — 74011250637 HC RX REV CODE- 250/637: Performed by: HOSPITALIST

## 2018-11-04 PROCEDURE — 36415 COLL VENOUS BLD VENIPUNCTURE: CPT | Performed by: HOSPITALIST

## 2018-11-04 PROCEDURE — 99218 HC RM OBSERVATION: CPT

## 2018-11-04 PROCEDURE — 85025 COMPLETE CBC W/AUTO DIFF WBC: CPT | Performed by: HOSPITALIST

## 2018-11-04 PROCEDURE — 74011000258 HC RX REV CODE- 258: Performed by: HOSPITALIST

## 2018-11-04 PROCEDURE — 74011250636 HC RX REV CODE- 250/636: Performed by: HOSPITALIST

## 2018-11-04 RX ORDER — DILTIAZEM HYDROCHLORIDE 120 MG/1
240 CAPSULE, COATED, EXTENDED RELEASE ORAL DAILY
Status: DISCONTINUED | OUTPATIENT
Start: 2018-11-04 | End: 2018-11-05 | Stop reason: HOSPADM

## 2018-11-04 RX ORDER — LANOLIN ALCOHOL/MO/W.PET/CERES
200 CREAM (GRAM) TOPICAL DAILY
Status: DISCONTINUED | OUTPATIENT
Start: 2018-11-04 | End: 2018-11-05 | Stop reason: HOSPADM

## 2018-11-04 RX ORDER — WARFARIN 2 MG/1
4 TABLET ORAL EVERY EVENING
Status: DISCONTINUED | OUTPATIENT
Start: 2018-11-04 | End: 2018-11-05 | Stop reason: HOSPADM

## 2018-11-04 RX ORDER — LORAZEPAM 1 MG/1
1 TABLET ORAL
Status: DISCONTINUED | OUTPATIENT
Start: 2018-11-04 | End: 2018-11-05 | Stop reason: HOSPADM

## 2018-11-04 RX ORDER — LORAZEPAM 2 MG/ML
2 INJECTION INTRAMUSCULAR
Status: DISCONTINUED | OUTPATIENT
Start: 2018-11-04 | End: 2018-11-05 | Stop reason: HOSPADM

## 2018-11-04 RX ORDER — FOLIC ACID 1 MG/1
1 TABLET ORAL DAILY
Status: DISCONTINUED | OUTPATIENT
Start: 2018-11-04 | End: 2018-11-05 | Stop reason: HOSPADM

## 2018-11-04 RX ORDER — THERA TABS 400 MCG
1 TAB ORAL DAILY
Status: DISCONTINUED | OUTPATIENT
Start: 2018-11-04 | End: 2018-11-05 | Stop reason: HOSPADM

## 2018-11-04 RX ORDER — SODIUM CHLORIDE 0.9 % (FLUSH) 0.9 %
5-10 SYRINGE (ML) INJECTION EVERY 8 HOURS
Status: DISCONTINUED | OUTPATIENT
Start: 2018-11-04 | End: 2018-11-05 | Stop reason: HOSPADM

## 2018-11-04 RX ORDER — ASPIRIN 81 MG/1
81 TABLET ORAL DAILY
Status: ON HOLD | COMMUNITY
End: 2018-11-04

## 2018-11-04 RX ORDER — SODIUM CHLORIDE 0.9 % (FLUSH) 0.9 %
5-10 SYRINGE (ML) INJECTION AS NEEDED
Status: DISCONTINUED | OUTPATIENT
Start: 2018-11-04 | End: 2018-11-05 | Stop reason: HOSPADM

## 2018-11-04 RX ORDER — NITROGLYCERIN 0.4 MG/1
0.4 TABLET SUBLINGUAL AS NEEDED
Status: DISCONTINUED | OUTPATIENT
Start: 2018-11-04 | End: 2018-11-05 | Stop reason: HOSPADM

## 2018-11-04 RX ORDER — LORAZEPAM 2 MG/ML
1 INJECTION INTRAMUSCULAR
Status: DISCONTINUED | OUTPATIENT
Start: 2018-11-04 | End: 2018-11-05 | Stop reason: HOSPADM

## 2018-11-04 RX ORDER — DIGOXIN 125 MCG
0.12 TABLET ORAL
Status: DISCONTINUED | OUTPATIENT
Start: 2018-11-04 | End: 2018-11-05

## 2018-11-04 RX ORDER — DILTIAZEM HYDROCHLORIDE 180 MG/1
180 CAPSULE, COATED, EXTENDED RELEASE ORAL
Status: DISCONTINUED | OUTPATIENT
Start: 2018-11-04 | End: 2018-11-05 | Stop reason: HOSPADM

## 2018-11-04 RX ORDER — ASPIRIN 81 MG/1
81 TABLET ORAL DAILY
Status: DISCONTINUED | OUTPATIENT
Start: 2018-11-04 | End: 2018-11-04

## 2018-11-04 RX ORDER — ASPIRIN 325 MG/1
100 TABLET, FILM COATED ORAL DAILY
Status: DISCONTINUED | OUTPATIENT
Start: 2018-11-04 | End: 2018-11-05 | Stop reason: HOSPADM

## 2018-11-04 RX ORDER — LORAZEPAM 2 MG/ML
3 INJECTION INTRAMUSCULAR
Status: DISCONTINUED | OUTPATIENT
Start: 2018-11-04 | End: 2018-11-05 | Stop reason: HOSPADM

## 2018-11-04 RX ORDER — LORAZEPAM 1 MG/1
2 TABLET ORAL
Status: DISCONTINUED | OUTPATIENT
Start: 2018-11-04 | End: 2018-11-05 | Stop reason: HOSPADM

## 2018-11-04 RX ADMIN — Medication 200 MG: at 09:13

## 2018-11-04 RX ADMIN — THERA TABS 1 TABLET: TAB at 09:12

## 2018-11-04 RX ADMIN — Medication 10 ML: at 13:47

## 2018-11-04 RX ADMIN — Medication 100 MG: at 09:12

## 2018-11-04 RX ADMIN — Medication 10 ML: at 22:00

## 2018-11-04 RX ADMIN — DILTIAZEM HYDROCHLORIDE 15 MG/HR: 5 INJECTION INTRAVENOUS at 09:00

## 2018-11-04 RX ADMIN — SODIUM CHLORIDE 15 MG/HR: 900 INJECTION, SOLUTION INTRAVENOUS at 01:28

## 2018-11-04 RX ADMIN — Medication 10 ML: at 01:38

## 2018-11-04 RX ADMIN — DILTIAZEM HYDROCHLORIDE 240 MG: 120 CAPSULE, COATED, EXTENDED RELEASE ORAL at 12:27

## 2018-11-04 RX ADMIN — Medication 10 ML: at 06:42

## 2018-11-04 RX ADMIN — WARFARIN SODIUM 4 MG: 2 TABLET ORAL at 17:19

## 2018-11-04 RX ADMIN — DILTIAZEM HYDROCHLORIDE 180 MG: 180 CAPSULE, COATED, EXTENDED RELEASE ORAL at 17:19

## 2018-11-04 RX ADMIN — FOLIC ACID 1 MG: 1 TABLET ORAL at 09:12

## 2018-11-04 NOTE — PROGRESS NOTES
7 [facility] Hospitalist Division Inpatient Daily Progress Note Daily progress Note Patient: Anna Gudino MRN: [id number]  CSN: [id number] YOB: 1955  Age: 61 y.o. Sex: male DOA: 11/3/2018 LOS:  LOS: 1 day Chief Complaint:  Afib w/ RVR Interval History: 60 y/o male with hx of HTN and A-fib on Coumadin and Digoxin, presented to the ED on 11/3 with acute palpitations and hot flashes that began earlier that morning. Pt reports HR of 170 bpm when he checked his HR at home PTA. Reports compliance with medications. EKG in ED with A-fib RVR - ventricular rate of 186. He was started on Cardizem gtt and admitted for further management of care. Cardiology consulted. Troponin 0.05 - -> 0.06 - -> 0.05, digoxin level subtherapeutic, INR of 1.6. His last echo was in April 2018 with EF 45%, mild diffuse hypokinesis. Assessment/Plan:  
 
Patient Active Problem List  
Diagnosis Code  Essential hypertension, malignant I10  
 Arthritis M19.90  Elevated cholesterol E78.00  Atrial fibrillation (HCC) I48.91  
 Nontoxic multinodular goiter E04.2  Atrial fibrillation with RVR (HCC) I48.91  
 HTN (hypertension) I10  
 Alcoholism ([facility] [address] F10.20 A/P: 
Afib w/ RVR 
- presented with palpitations and hot flashes 
- EKG afib w/ RVR - ventricular rate of 186 
- Cardizem gtt 
- cardiology consulted - Digoxin 0.125 mg Q48H, Warfarin 4 mg daily 
- monitor INR daily (subtherapeutic on admission- pt states he is taking Vitamin K because he \"drinks\") - pt taking Cardizem 240 mg daily and 180 mg at night per med rec Elevated troponin  
- troponin 0.05 - -> 0.06 - -> 0.05 
- suspect due to afib RVR 
- no c/o chest pain 
- no EKG changes Elevated LFT's 
- hx of alcoholism 
- monitor CMP Hypertension 
- stable, continue to monitor Hx of alcoholism 
- [facility] protocol - Folic acid, Thiamine, Theragran daily - pt reports drinking 3-4 beers daily DVT Prophylaxis - pt on Coumadin JAYLENE Drew [facility] Pager:  [phone] Office:  [phone] Subjective:  
  
Feels much better since presentation. Reports compliance with meds. Helped his friend move the day prior, was up in the attic otherwise denies any contributing factors. Objective:  
  
Visit Vitals /84 Pulse 82 Temp 98.6 °F (37 °C) Resp 18 SpO2 91% Physical Exam: 
General appearance: alert, cooperative, no distress, appears stated age Head: Normocephalic, without obvious abnormality, atraumatic Lungs: clear to auscultation bilaterally Heart: irregularly irregular Abdomen: soft, non tender, non distended. Normoactive bowel sounds. No masses, no organomegaly Extremities: extremities normal, atraumatic, no cyanosis or edema Skin: Skin color, texture, turgor normal. No rashes or lesions Neurologic: Grossly normal 
PSY: Mood and affect normal, appropriately behaved Intake and Output: 
Current Shift:  No intake/output data recorded. Last three shifts:  11/02 1901 - 11/04 0700 In: 240 [P.O.:240] Out: - Recent Results (from the past 24 hour(s)) CBC WITH AUTOMATED DIFF Collection Time: 11/03/18  8:07 PM  
Result Value Ref Range WBC 12.1 4.6 - 13.2 K/uL  
 RBC 5.88 (H) 4.70 - 5.50 M/uL  
 HGB 19.4 (H) 13.0 - 16.0 g/dL HCT 55.0 (H) 36.0 - 48.0 % MCV 93.9 74.0 - 97.0 FL  
 MCH 33.0 24.0 - 34.0 PG  
 MCHC 35.1 31.0 - 37.0 g/dL  
 RDW 13.0 11.6 - 14.5 % PLATELET 049 343 - 570 K/uL MPV 10.7 9.2 - 11.8 FL  
 NEUTROPHILS 69 40 - 73 % LYMPHOCYTES 20 (L) 21 - 52 % MONOCYTES 9 3 - 10 % EOSINOPHILS 1 0 - 5 % BASOPHILS 1 0 - 2 %  
 ABS. NEUTROPHILS 8.5 (H) 1.8 - 8.0 K/UL  
 ABS. LYMPHOCYTES 2.4 0.9 - 3.6 K/UL  
 ABS. MONOCYTES 1.1 0.05 - 1.2 K/UL  
 ABS. EOSINOPHILS 0.1 0.0 - 0.4 K/UL  
 ABS. BASOPHILS 0.1 0.0 - 0.1 K/UL DF AUTOMATED METABOLIC PANEL, COMPREHENSIVE Collection Time: 11/03/18  8:07 PM  
Result Value Ref Range Sodium 137 136 - 145 mmol/L Potassium 3.9 3.5 - 5.5 mmol/L Chloride 100 100 - 108 mmol/L  
 CO2 24 21 - 32 mmol/L Anion gap 13 3.0 - 18 mmol/L Glucose 115 (H) 74 - 99 mg/dL BUN 10 7.0 - 18 MG/DL Creatinine 1.13 0.6 - 1.3 MG/DL  
 BUN/Creatinine ratio 9 (L) 12 - 20 GFR est AA >60 >60 ml/min/1.73m2 GFR est non-AA >60 >60 ml/min/1.73m2 Calcium 9.5 8.5 - 10.1 MG/DL Bilirubin, total 1.8 (H) 0.2 - 1.0 MG/DL  
 ALT (SGPT) 68 (H) 16 - 61 U/L  
 AST (SGOT) 51 (H) 15 - 37 U/L Alk. phosphatase 166 (H) 45 - 117 U/L Protein, total 8.9 (H) 6.4 - 8.2 g/dL Albumin 5.1 (H) 3.4 - 5.0 g/dL Globulin 3.8 2.0 - 4.0 g/dL A-G Ratio 1.3 0.8 - 1.7    
TROPONIN I Collection Time: 11/03/18  8:07 PM  
Result Value Ref Range Troponin-I, Qt. 0.05 (H) 0.0 - 0.045 NG/ML  
DIGOXIN Collection Time: 11/03/18  8:07 PM  
Result Value Ref Range Digoxin level 0.3 (L) 0.9 - 2.0 NG/ML  
PROTHROMBIN TIME + INR Collection Time: 11/03/18  8:07 PM  
Result Value Ref Range Prothrombin time 19.3 (H) 11.5 - 15.2 sec INR 1.6 (H) 0.8 - 1.2 EKG, 12 LEAD, INITIAL Collection Time: 11/03/18  8:07 PM  
Result Value Ref Range Ventricular Rate 186 BPM  
 Atrial Rate 116 BPM  
 QRS Duration 84 ms Q-T Interval 250 ms QTC Calculation (Bezet) 440 ms Calculated R Axis 0 degrees Calculated T Axis 72 degrees Diagnosis Atrial fibrillation with rapid ventricular response Nonspecific ST and T wave abnormality Abnormal ECG When compared with ECG of 17-APR-2018 12:10, 
Vent. rate has increased BY  78 BPM 
 
Confirmed by Triston Huddleston ([id number]) on 11/4/2018 5:55:53 AM 
  
CARDIAC PANEL,(CK, CKMB & TROPONIN) Collection Time: 11/04/18  1:00 AM  
Result Value Ref Range  39 - 308 U/L  
 CK - MB 2.5 <3.6 ng/ml  CK-MB Index 2.1 0.0 - 4.0 %  
 Troponin-I, Qt. 0.06 (H) 0.0 - 0.045 NG/ML  
CBC WITH AUTOMATED DIFF Collection Time: 11/04/18  6:00 AM  
Result Value Ref Range WBC 8.1 4.6 - 13.2 K/uL  
 RBC 4.94 4.70 - 5.50 M/uL  
 HGB 16.1 (H) 13.0 - 16.0 g/dL HCT 46.1 36.0 - 48.0 % MCV 93.3 74.0 - 97.0 FL  
 MCH 32.6 24.0 - 34.0 PG  
 MCHC 34.9 31.0 - 37.0 g/dL  
 RDW 13.0 11.6 - 14.5 % PLATELET 341 809 - 532 K/uL MPV 10.5 9.2 - 11.8 FL  
 NEUTROPHILS 71 40 - 73 % LYMPHOCYTES 15 (L) 21 - 52 % MONOCYTES 12 (H) 3 - 10 % EOSINOPHILS 1 0 - 5 % BASOPHILS 1 0 - 2 %  
 ABS. NEUTROPHILS 5.7 1.8 - 8.0 K/UL  
 ABS. LYMPHOCYTES 1.2 0.9 - 3.6 K/UL  
 ABS. MONOCYTES 1.0 0.05 - 1.2 K/UL  
 ABS. EOSINOPHILS 0.1 0.0 - 0.4 K/UL  
 ABS. BASOPHILS 0.0 0.0 - 0.1 K/UL  
 DF AUTOMATED METABOLIC PANEL, BASIC Collection Time: 11/04/18  6:00 AM  
Result Value Ref Range Sodium 137 136 - 145 mmol/L Potassium 3.6 3.5 - 5.5 mmol/L Chloride 103 100 - 108 mmol/L  
 CO2 24 21 - 32 mmol/L Anion gap 10 3.0 - 18 mmol/L Glucose 103 (H) 74 - 99 mg/dL BUN 9 7.0 - 18 MG/DL Creatinine 0.87 0.6 - 1.3 MG/DL  
 BUN/Creatinine ratio 10 (L) 12 - 20 GFR est AA >60 >60 ml/min/1.73m2 GFR est non-AA >60 >60 ml/min/1.73m2 Calcium 8.6 8.5 - 10.1 MG/DL  
CARDIAC PANEL,(CK, CKMB & TROPONIN) Collection Time: 11/04/18  6:00 AM  
Result Value Ref Range CK 96 39 - 308 U/L  
 CK - MB 2.1 <3.6 ng/ml CK-MB Index 2.2 0.0 - 4.0 % Troponin-I, Qt. 0.05 (H) 0.0 - 0.045 NG/ML Lab Results Component Value Date/Time Glucose 103 (H) 11/04/2018 06:00 AM  
 Glucose 115 (H) 11/03/2018 08:07 PM  
 Glucose 110 (H) 10/25/2018 08:38 AM  
 Glucose 96 04/19/2018 04:24 AM  
 Glucose 106 (H) 04/18/2018 04:55 AM  
  
 
Imaging: No results found. Medication List Reviewed: 
Current Facility-Administered Medications Medication Dose Route Frequency  digoxin (LANOXIN) tablet 0.125 mg  0.125 mg Oral Q48H  . PHARMACY TO SUBSTITUTE PER PROTOCOL (Reordered from: magnesium oxide 200 mg magnesium tab)    Per Protocol  nitroglycerin (NITROSTAT) tablet 0.4 mg  0.4 mg SubLINGual PRN  
 warfarin (COUMADIN) tablet 4 mg  4 mg Oral QPM  
 sodium chloride (NS) flush 5-10 mL  5-10 mL IntraVENous Q8H  
 sodium chloride (NS) flush 5-10 mL  5-10 mL IntraVENous PRN  
 LORazepam (ATIVAN) tablet 1 mg  1 mg Oral Q1H PRN Or  
 LORazepam (ATIVAN) injection 1 mg  1 mg IntraVENous Q1H PRN  
 LORazepam (ATIVAN) tablet 2 mg  2 mg Oral Q1H PRN Or  
 LORazepam (ATIVAN) injection 2 mg  2 mg IntraVENous Q1H PRN  
 LORazepam (ATIVAN) injection 3 mg  3 mg IntraVENous N49PIA PRN  
 folic acid (FOLVITE) tablet 1 mg  1 mg Oral DAILY  Thiamine Mononitrate (B-1) tablet 100 mg  100 mg Oral DAILY  therapeutic multivitamin (THERAGRAN) tablet 1 Tab  1 Tab Oral DAILY  dilTIAZem (CARDIZEM) 100 mg in 0.9% sodium chloride (MBP/ADV) 100 mL infusion  15 mg/hr IntraVENous CONTINUOUS  
 WARFARIN INFORMATION NOTE (COUMADIN)   Other Q24H  
 dilTIAZem (CARDIZEM) 100 mg      
 0.9% sodium chloride (MBP/ADV) infusion

## 2018-11-04 NOTE — ED NOTES
TRANSFER - ED to INPATIENT REPORT: 
 
SBAR report made available to receiving floor on this patient being transferred to [address] [place] [address] ([facility])  for routine progression of care Admitting diagnosis Atrial fibrillation with RVR ([facility] [address] Information from the following report(s) ED Summary was made available to receiving floor. Lines:  
Peripheral IV 11/03/18 Left Antecubital (Active) Site Assessment Clean, dry, & intact 11/3/2018  8:07 PM  
Phlebitis Assessment 0 11/3/2018  8:07 PM  
Infiltration Assessment 0 11/3/2018  8:07 PM  
Dressing Status Clean, dry, & intact 11/3/2018  8:07 PM  
Dressing Type Transparent 11/3/2018  8:07 PM  
Hub Color/Line Status Patent; Flushed 11/3/2018  8:07 PM  
Action Taken Blood drawn 11/3/2018  8:07 PM  
  
 
Medication list confirmed with patient Opportunity for questions and clarification was provided. Patient is oriented to time, place, person and situation High alert med Patient is  continent and ambulatory with assist 
  
Valuables transported with patient Patient transported with: 
 Monitor Registered Nurse Tech

## 2018-11-04 NOTE — PROGRESS NOTES
Received pt from Ed via stretcher, he is alert and oriented, denies CP or SOB, posterior and anterior defib pads attached on pt. Cardizem drip infusing, see MAR. 
 
0415: Cardizem drip infusing at 15ml, VSS. Bedside shift change report given to Nikunj Garza RN (oncoming nurse) by Angelo Naidu RN (offgoing nurse). Report included the following information SBAR, ED Summary, Intake/Output, MAR and Recent Results. Cardizem rate verify with oncoming nurse.

## 2018-11-04 NOTE — ED PROVIDER NOTES
EMERGENCY DEPARTMENT HISTORY AND PHYSICAL EXAM 
 
8:22 PM 
 
 
Date: 11/3/2018 Patient Name: Seferino Burgess. History of Presenting Illness Chief Complaint Patient presents with  Rapid Heart Rate History Provided By: Patient Chief Complaint: Palpitations Duration:  Several hours Timing:  acute Location: heart Quality: 170 bpm at home Severity: elevated from normal  
Modifying Factors: Pt has not tried anything since Sx began Associated Symptoms: Hot flashes Additional History (Context): Seferino Funes is a 61 y.o. male with a PMHx of HTN and AFIB who presents to the ED with acute palpitations which began this morning, 11/3/2018 that is associated with hot flashes. Pt reports heart rate of 170 bpm when checking his HR at home PTA. He denies thyroid problems. He has not tried anything for his palpitation other than his usual medication prescribed for his AFIB. He denies SOB, CP, and cough. No other concerns or symptoms at this time. PCP: Wallace Moore.,  
 
Current Facility-Administered Medications Medication Dose Route Frequency Provider Last Rate Last Dose  dilTIAZem (CARDIZEM) 100 mg           
 0.9% sodium chloride (MBP/ADV) infusion  dilTIAZem (CARDIZEM) 100 mg in 0.9% sodium chloride (MBP/ADV) 100 mL infusion  15 mg/hr IntraVENous TITRATE Mery Vásquez MD      
 
Current Outpatient Medications Medication Sig Dispense Refill  warfarin (COUMADIN) 4 mg tablet Take 1 Tab by mouth daily. 90 Tab 12  
 vitamin k 100 mcg tablet Take 1 Tab by mouth daily. 100 Tab 12  
 digoxin (LANOXIN) 0.125 mg tablet Take 1 Tab by mouth every fourty-eight (48) hours. 45 Tab 4  
 dilTIAZem CD (CARDIZEM CD) 180 mg ER capsule Take 1 Cap by mouth nightly. 90 Cap 4  
 dilTIAZem CD (CARDIZEM CD) 240 mg ER capsule Take 1 Cap by mouth daily. 90 Cap 4  
 magnesium oxide 200 mg magnesium tab Take 200 mg by mouth daily. 100 Tab 12  nitroglycerin (NITROSTAT) 0.4 mg SL tablet 1 Tab by SubLINGual route as needed for Chest Pain. 1 Bottle 0  
 multivit-min-FA-lycopen-lutein (CENTRUM SILVER MEN) 300-600-300 mcg tab Take 1 Tab by mouth daily. 100 Caplet 12 Past History Past Medical History: 
Past Medical History:  
Diagnosis Date  Arthritis  Atrial fibrillation ([address] 11/28/2017  Hearing loss  HLD (hyperlipidemia)  HTN (hypertension)  Nontoxic multinodular goiter 2/7/2018 Past Surgical History: 
Past Surgical History:  
Procedure Laterality Date  HX HERNIA REPAIR Family History: 
Family History Problem Relation Age of Onset  Diabetes Father Social History: 
Social History Tobacco Use  Smoking status: Former Smoker  Smokeless tobacco: Never Used Substance Use Topics  Alcohol use: Yes Alcohol/week: 12.5 oz Types: 25 Shots of liquor per week  Drug use: No  
 
 
Allergies: 
No Known Allergies Review of Systems Review of Systems Constitutional: Negative for diaphoresis and fever.  
     (+) hot flashes HENT: Negative for congestion and sore throat. Eyes: Negative for pain and itching. Respiratory: Negative for cough and shortness of breath. Cardiovascular: Positive for palpitations (tachycardia). Negative for chest pain. Gastrointestinal: Negative for abdominal pain and diarrhea. Endocrine: Negative for polydipsia and polyuria. Genitourinary: Negative for dysuria and hematuria. Musculoskeletal: Negative for arthralgias and myalgias. Skin: Negative for rash and wound. Neurological: Negative for seizures and syncope. Hematological: Does not bruise/bleed easily. Psychiatric/Behavioral: Negative for agitation and hallucinations. All other systems reviewed and are negative. Physical Exam  
 
Patient Vitals for the past 12 hrs: 
 Temp Pulse Resp BP SpO2  
11/03/18 2015  (!) 143 20 (!) 160/110 96 % 11/03/18 2007  (!) 188 23  96 % 11/03/18 2005 97.7 °F (36.5 °C) (!) 190 18 (!) 154/124 95 % Physical Exam  
Constitutional: He appears well-developed and well-nourished. HENT:  
Head: Normocephalic and atraumatic. Eyes: Conjunctivae are normal. No scleral icterus. Neck: Normal range of motion. Neck supple. No JVD present. Cardiovascular: Regular rhythm and normal heart sounds. Tachycardia present. 4 intact extremity pulses Pulmonary/Chest: Effort normal and breath sounds normal.  
Abdominal: Soft. He exhibits no mass. There is no tenderness. Musculoskeletal: Normal range of motion. Lymphadenopathy:  
  He has no cervical adenopathy. Neurological: He is alert. Skin: Skin is warm and dry. Nursing note and vitals reviewed. Diagnostic Study Results Labs - Recent Results (from the past 12 hour(s)) CBC WITH AUTOMATED DIFF Collection Time: 11/03/18  8:07 PM  
Result Value Ref Range WBC 12.1 4.6 - 13.2 K/uL  
 RBC 5.88 (H) 4.70 - 5.50 M/uL  
 HGB 19.4 (H) 13.0 - 16.0 g/dL HCT 55.0 (H) 36.0 - 48.0 % MCV 93.9 74.0 - 97.0 FL  
 MCH 33.0 24.0 - 34.0 PG  
 MCHC 35.1 31.0 - 37.0 g/dL  
 RDW 13.0 11.6 - 14.5 % PLATELET 656 672 - 479 K/uL MPV 10.7 9.2 - 11.8 FL  
 NEUTROPHILS 69 40 - 73 % LYMPHOCYTES 20 (L) 21 - 52 % MONOCYTES 9 3 - 10 % EOSINOPHILS 1 0 - 5 % BASOPHILS 1 0 - 2 %  
 ABS. NEUTROPHILS 8.5 (H) 1.8 - 8.0 K/UL  
 ABS. LYMPHOCYTES 2.4 0.9 - 3.6 K/UL  
 ABS. MONOCYTES 1.1 0.05 - 1.2 K/UL  
 ABS. EOSINOPHILS 0.1 0.0 - 0.4 K/UL  
 ABS. BASOPHILS 0.1 0.0 - 0.1 K/UL  
 DF AUTOMATED METABOLIC PANEL, COMPREHENSIVE Collection Time: 11/03/18  8:07 PM  
Result Value Ref Range Sodium 137 136 - 145 mmol/L Potassium 3.9 3.5 - 5.5 mmol/L Chloride 100 100 - 108 mmol/L  
 CO2 24 21 - 32 mmol/L Anion gap 13 3.0 - 18 mmol/L Glucose 115 (H) 74 - 99 mg/dL BUN 10 7.0 - 18 MG/DL  Creatinine 1.13 0.6 - 1.3 MG/DL  
 BUN/Creatinine ratio 9 (L) 12 - 20 GFR est AA >60 >60 ml/min/1.73m2 GFR est non-AA >60 >60 ml/min/1.73m2 Calcium 9.5 8.5 - 10.1 MG/DL Bilirubin, total 1.8 (H) 0.2 - 1.0 MG/DL  
 ALT (SGPT) 68 (H) 16 - 61 U/L  
 AST (SGOT) 51 (H) 15 - 37 U/L Alk. phosphatase 166 (H) 45 - 117 U/L Protein, total 8.9 (H) 6.4 - 8.2 g/dL Albumin 5.1 (H) 3.4 - 5.0 g/dL Globulin 3.8 2.0 - 4.0 g/dL A-G Ratio 1.3 0.8 - 1.7    
TROPONIN I Collection Time: 11/03/18  8:07 PM  
Result Value Ref Range Troponin-I, Qt. 0.05 (H) 0.0 - 0.045 NG/ML  
EKG, 12 LEAD, INITIAL Collection Time: 11/03/18  8:07 PM  
Result Value Ref Range Ventricular Rate 186 BPM  
 Atrial Rate 116 BPM  
 QRS Duration 84 ms Q-T Interval 250 ms QTC Calculation (Bezet) 440 ms Calculated R Axis 0 degrees Calculated T Axis 72 degrees Diagnosis Atrial fibrillation with rapid ventricular response Nonspecific ST and T wave abnormality , probably digitalis effect Abnormal ECG When compared with ECG of 17-APR-2018 12:10, 
Vent. rate has increased BY  78 BPM 
Questionable change in QRS axis Radiologic Studies - No orders to display No results found. Medications ordered:  
Medications  
dilTIAZem (CARDIZEM) 100 mg (not administered) 0.9% sodium chloride (MBP/ADV) infusion (not administered) dilTIAZem (CARDIZEM) 100 mg in 0.9% sodium chloride (MBP/ADV) 100 mL infusion (not administered) dilTIAZem (CARDIZEM) injection 20 mg (20 mg IntraVENous Given 11/3/18 2014) dilTIAZem (CARDIZEM) 100 mg in 0.9% sodium chloride (MBP/ADV) 100 mL infusion (0 mg/hr IntraVENous Stopped 11/3/18 2036) Medical Decision Making Initial Medical Decision Making and DDx: 
Consistent with AFIB with rapid ventricular response. Less likely thyroid derangement and electrolyte abnormality. ED Course: Progress Notes, Reevaluation, and Consults: 
8:45 PM HR is between 140 and 150 BPM, starting a drip on him. 9:50 PM Discussed results with Pt, HR is now 112 bpm, he reports feeling 50% better. We will admit him. I am the first provider for this patient. I reviewed the vital signs, available nursing notes, past medical history, past surgical history, family history and social history. Vital Signs-Reviewed the patient's vital signs. Pulse Oximetry Analysis - 96% on RA Cardiac Monitor: 
Rate/Rhythm:  190 bpm tachycardic EKG: Interpreted by the EP. Time Interpreted: 8:10 PM 
 Rate: 207 bpm 
 Rhythm: tachycardic Interpretation: rapid ventricular response at 186 Records Reviewed: Nursing Notes (Time of Review: 8:22 PM) Critical Care Time: The services I provided to this patient were to treat and/or prevent clinically significant deterioration that could result in the failure of one or more body systems and/or organ systems due to a-fib. Services included the following: 
-reviewing nursing notes and old charts 
-vital sign assessments 
-direct patient care 
-medication orders and management 
-interpreting and reviewing diagnostic studies/labs 
-re-evaluations 
-documentation time Aggregate critical care time was 35 minutes, which includes only time during which I was engaged in work directly related to the patient's care as described above, whether I was at bedside or elsewhere in the Emergency Department. It did not include time spent performing other reported procedures or the services of residents, students, nurses, or advance practice providers. Javi Azar MD 
 
9:16 PM 
 
 
For Hospitalized Patients: 
 
2. Aspirin: Aspirin was not given because the patient did not present with a stroke at the time of their Emergency Department evaluation Diagnosis Clinical Impression: 1. Atrial fibrillation with rapid ventricular response ([address] Disposition: admit Follow-up Information None Medication List  
  
ASK your doctor about these medications digoxin 0.125 mg tablet Commonly known as:  LANOXIN Take 1 Tab by mouth every fourty-eight (48) hours. * dilTIAZem  mg ER capsule Commonly known as:  CARDIZEM CD Take 1 Cap by mouth nightly. * dilTIAZem  mg ER capsule Commonly known as:  CARDIZEM CD Take 1 Cap by mouth daily. magnesium oxide 200 mg magnesium Tab Take 200 mg by mouth daily. multivit-min-FA-lycopen-lutein 300-600-300 mcg Tab Commonly known as:  CENTRUM SILVER MEN Take 1 Tab by mouth daily. nitroglycerin 0.4 mg SL tablet Commonly known as:  NITROSTAT 
1 Tab by SubLINGual route as needed for Chest Pain. 
  
vitamin k 100 mcg tablet Take 1 Tab by mouth daily. warfarin 4 mg tablet Commonly known as:  COUMADIN Take 1 Tab by mouth daily. * This list has 2 medication(s) that are the same as other medications prescribed for you. Read the directions carefully, and ask your doctor or other care provider to review them with you.  
  
  
  
 
_______________________________ Attestations: 
Scribe Attestation [facility] acting as a scribe for and in the presence of Umang Sanchez MD     
November 03, 2018 at 8:36 PM 
    
Provider Attestation:     
I personally performed the services described in the documentation, reviewed the documentation, as recorded by the scribe in my presence, and it accurately and completely records my words and actions. November 03, 2018 at 8:36 PM - Umang Sanchez MD   
_______________________________

## 2018-11-04 NOTE — CONSULTS
CARDIOLOGY CONSULT    Patient: Lady Salamanca MR #: [id number]      Reason for consult:     Assessment/Plan:     Hospital Problems  Date Reviewed: 11/3/2018          Codes Class Noted POA    HTN (hypertension) ICD-10-CM: I10  ICD-9-CM: 401.9  11/4/2018 Yes        Alcoholism ([facility] ICD-10-CM: F10.20  ICD-9-CM: 303.90  11/4/2018 Yes        History of nuclear stress test ICD-10-CM: Z92.89  ICD-9-CM: V15.89  11/4/2018 Unknown    Overview Signed 11/4/2018 10:02 AM by Martha Oneil MD     4/17/2018; no evidence of significant ongoing ischemia. Chronic anticoagulation ICD-10-CM: Z79.01  ICD-9-CM: V58.61  11/4/2018 Unknown    Overview Signed 11/4/2018 10:05 AM by Martha Oneil MD     Regulated by PCP             Loose stools ICD-10-CM: R19.5  ICD-9-CM: 787.7  11/4/2018 Unknown        Lower gastrointestinal bleeding, attributed to hemorrhoidal bleeding per patient. Will arrange for further evaluation. ICD-10-CM: K92.2  ICD-9-CM: 578.9  11/4/2018 Unknown        * (Principal) Atrial fibrillation with RVR ([facility] possibly related in part to several day history of loose stools. Laboratory evaluation would be consistent with it. At present, his ventricular response rate is much better controlled. Would continue telemetry observation overnight. ICD-10-CM: I48.91  ICD-9-CM: 427.31  11/3/2018 Yes        Atrial fibrillation ([facility] ICD-10-CM: I48.91  ICD-9-CM: 427.31  11/28/2017 Yes    Overview Signed 11/4/2018 10:04 AM by Martha Oneil MD     Chronic, [id number] score 1 to 2             Elevated cholesterol ICD-10-CM: E78.00  ICD-9-CM: 272.0  4/12/2016 Yes        Arthritis ICD-10-CM: M19.90  ICD-9-CM: 716.90  11/13/2014 Yes              History of Present Illness  Lady [place]. is a 61 y.o. man that was admitted 11/3 for further evaluation of atrial fibrillation with a rapid ventricular response. This patient has been followed in our office for atrial fibrillation since December 2017. He is a chads score of 1-2. He has been on chronic Coumadin therapy which has been regulated by Dr. Colby Fritz. He has done well recently with no particular problems with his atrial fibrillation. He reports today that he has had loose stools intermittently for the last 7-10 days. He is also noted bright red blood in the toilet during that time which he has attributed to hemorrhoidal bleeding. He is never had his hemorrhoids evaluated in the past.  He uses Preparation H on an intermittent basis. When he presented to the emergency room, his ventricular response was approximately 170 bpm.  He had no near syncope or syncope. He has had no chest pain. Prior to his presentation, he had checked his heart rate and blood pressure. His blood pressure was very elevated and his pulse rate was 170 bpm.      Past Medical History  Past Medical History:   Diagnosis Date    Arthritis     Atrial fibrillation ([facility] 11/28/2017    Hearing loss     HLD (hyperlipidemia)     HTN (hypertension)     Nontoxic multinodular goiter 2/7/2018       Past Surgical History  Social History     Socioeconomic History    Marital status: SINGLE     Spouse name: Not on file    Number of children: Not on file    Years of education: Not on file    Highest education level: Not on file   Social Needs    Financial resource strain: Not on file    Food insecurity - worry: Not on file    Food insecurity - inability: Not on file   [employer] needs - medical: Not on file   [employer] needs - non-medical: Not on file   Occupational History    Not on file   Tobacco Use    Smoking status: Former Smoker    Smokeless tobacco: Never Used   Substance and Sexual Activity    Alcohol use:  Yes     Alcohol/week: 12.5 oz     Types: 25 Shots of liquor per week    Drug use: No    Sexual activity: Yes     Partners: Female   Other Topics Concern    Not on file   Social History Narrative    Not on file         Meds  Current Facility-Administered Medications   Medication Dose Route Frequency    digoxin (LANOXIN) tablet 0.125 mg  0.125 mg Oral Q48H    magnesium oxide (MAG-OX) tablet 200 mg  200 mg Oral DAILY    nitroglycerin (NITROSTAT) tablet 0.4 mg  0.4 mg SubLINGual PRN    warfarin (COUMADIN) tablet 4 mg  4 mg Oral QPM    sodium chloride (NS) flush 5-10 mL  5-10 mL IntraVENous Q8H    sodium chloride (NS) flush 5-10 mL  5-10 mL IntraVENous PRN    LORazepam (ATIVAN) tablet 1 mg  1 mg Oral Q1H PRN    Or    LORazepam (ATIVAN) injection 1 mg  1 mg IntraVENous Q1H PRN    LORazepam (ATIVAN) tablet 2 mg  2 mg Oral Q1H PRN    Or    LORazepam (ATIVAN) injection 2 mg  2 mg IntraVENous Q1H PRN    LORazepam (ATIVAN) injection 3 mg  3 mg IntraVENous [id number] PRN    folic acid (FOLVITE) tablet 1 mg  1 mg Oral DAILY    Thiamine Mononitrate (B-1) tablet 100 mg  100 mg Oral DAILY    therapeutic multivitamin (THERAGRAN) tablet 1 Tab  1 Tab Oral DAILY    WARFARIN INFORMATION NOTE (COUMADIN)   Other Q24H    dilTIAZem (CARDIZEM) 125 mg in 0.9% sodium chloride 125 mL infusion  15 mg/hr IntraVENous CONTINUOUS         Allergies  No Known Allergies    Social History  Social History     Socioeconomic History    Marital status: SINGLE     Spouse name: Not on file    Number of children: Not on file    Years of education: Not on file    Highest education level: Not on file   Social Needs    Financial resource strain: Not on file    Food insecurity - worry: Not on file    Food insecurity - inability: Not on file   [employer] needs - medical: Not on file   [employer] needs - non-medical: Not on file   Occupational History    Not on file   Tobacco Use    Smoking status: Former Smoker    Smokeless tobacco: Never Used   Substance and Sexual Activity    Alcohol use:  Yes     Alcohol/week: 12.5 oz     Types: 25 Shots of liquor per week    Drug use: No    Sexual activity: Yes     Partners: Female   Other Topics Concern    Not on file Social History Narrative    Not on file       Family History     Family History   Problem Relation Age of Onset    Diabetes Father          Review of systems    General: No fever, chills. HEENT: Denies frequent headaches, cataracts, sinus issues. Pulmonary: Denies cough, wheezing. Cardiac: See HPI  GI: At least one week history of loose stools and bright red blood per rectum. : Denies dysuria, hematuria, nocturia. Neuro: No seizures, tremor or notable weakness. Musculoskeletal: No arthralgias or myalgias. Heme: Denies easy bruising or bleeding. Psych: Positive for history of alcohol dependence      Physical Exam  Visit Vitals  /60   Pulse 73   Temp 97.6 °F (36.4 °C)   Resp 18   SpO2 93%       Laurell [place]. is who is alert, oriented and in no acute distress. Head is normocephalic and atraumatic. Trachea appears midline with no noted thyromegaly. Carotids are full without definite bruits. There is no JVD. Mouth shows adequate dentition. Chest is clear to auscultation bilaterally. Cardiac auscultation reveals irregularly irregular rate and rhythm without significant murmur. Abdomen is soft and nontender. Extremities are without significant edema. Dorsalis pedis and posterior tibial pulses are  palpable. . Skin is warm and dry. Diagnostic Tests EKG: Atrial fibrillation with rapid ventricular response.      Recent Labs     11/04/18  0600 11/03/18 2007   WBC 8.1 12.1   HGB 16.1* 19.4*   HCT 46.1 55.0*    239     Recent Labs     11/04/18  0600 11/03/18 2007    137   K 3.6 3.9    100   CO2 24 24   * 115*   BUN 9 10   CREA 0.87 1.13   CA 8.6 9.5   ALB  --  5.1*   SGOT  --  51*   ALT  --  68*   INR  --  1.6*       Recent Labs     11/04/18  0600 11/04/18  0100 11/03/18 2007   TROIQ 0.05* 0.06* 0.05*   CPK 96 121  --    CKMB 2.1 2.5  --      Last Lipid:    Lab Results   Component Value Date/Time    Cholesterol, total 245 (H) 10/25/2018 08:38 AM    HDL Cholesterol 63 (H) 10/25/2018 08:38 AM    LDL, calculated 151 (H) 10/25/2018 08:38 AM    Triglyceride 152 (H) 10/25/2018 08:38 AM    CHOL/HDL Ratio 3.1 11/28/2017 02:42 PM           We appreciate the opportunity to see Zak Iqbal. with you. We will follow along.     Anayeli Cid MD  11/4/2018

## 2018-11-04 NOTE — PROGRESS NOTES
Chart reviewed and pt verified demographics. He lives with room mate and is independent with ADL. Plan is home, sister Felipe Barrera [phone] or her  will drive and participate in dc process. Reason for Admission:   Atrial fibrillation RRAT Score:     10 Plan for utilizing home health:    Not at this time Likelihood of Readmission: Green/Low, has low RRAT score, has PCP Dr Aubrey Lucas ( seen in [address] [place] [address]) , Cardiaologist seen this morning and family in area, [place] Transition of Care Plan:   home

## 2018-11-04 NOTE — H&P
History and Physical 
 
Patient: Antonio Escalona Sex: male          DOA: 11/3/2018 YOB: 1955      Age:  61 y.o.        LOS:  LOS: 1 day HPI:  
 
Antonio Escalona is a 61 y.o. male who presented to the ER with feeling of \"hot flashes. \"  He felt his heart was racing but he did not have chest pain. He checked his BP at home and it was very elevated. He has a known history of A fib and sees Dr Vielka Hooker as an outpatient. In the ER he had severe tachycardia at first, but has responded to Cardizem drip. He takes digoxin every other day at home and his Digoxin level is noted to be low. He will be admitted for ongoing management. Past Medical History:  
Diagnosis Date  Arthritis  Atrial fibrillation ([address] 11/28/2017  Hearing loss  HLD (hyperlipidemia)  HTN (hypertension)  Nontoxic multinodular goiter 2/7/2018 Social History: 
 Tobacco use:  Patient quit smoking 4 years ago, he smoked for 40+ years Alcohol use:  Patient uses alcohol daily. Roughly 4-5 drinks per night, Scotch and water Occupation:  Patient works as a warren Family History: 
 Father had diabetes Review of Systems Constitutional:  No fever or weight loss HEENT:  No headache or visual changes Cardiovascular:  Heart racing as above,no chest pain or diaphoresis Respiratory:  No coughing, wheezing, or shortness of breath. GI:  No nausea or vomitting. No diarrhea :  No hematuria or dysuria Skin:  No rashes or moles Neuro:  No seizures or syncope Hematological:  No bruising or bleeding Endocrine:  No diabetes or thyroid disease Physical Exam:  
  
Visit Vitals /76 (BP 1 Location: Right arm, BP Patient Position: Supine) Pulse 80 Temp 98.6 °F (37 °C) Resp 18 SpO2 91% Physical Exam: 
 
Gen:  No distress, alert HEENT:  Normal cephalic atraumatic, extra-occular movements are intact. Neck:  Supple, No JVD Lungs:  Clear bilaterally, no wheeze, no rales, normal effort Heart:  Regular Rate and Rhythm, normal S1 and S2, no edema Abdomen:  Soft, non tender, normal bowel sounds, no guarding. Extremities:  Well perfused, no cyanosis or edema Neurological:  Awake and alert, CN's are intact, normal strength throughout extremities Skin:  No rashes or moles Mental Status:  Normal thought process, does not appear anxious Laboratory Studies: 
 
BMP:  
Lab Results Component Value Date/Time  11/03/2018 08:07 PM  
 K 3.9 11/03/2018 08:07 PM  
  11/03/2018 08:07 PM  
 CO2 24 11/03/2018 08:07 PM  
 AGAP 13 11/03/2018 08:07 PM  
  (H) 11/03/2018 08:07 PM  
 BUN 10 11/03/2018 08:07 PM  
 CREA 1.13 11/03/2018 08:07 PM  
 GFRAA >60 11/03/2018 08:07 PM  
 GFRNA >60 11/03/2018 08:07 PM  
 
CBC:  
Lab Results Component Value Date/Time WBC 8.1 11/04/2018 06:00 AM  
 HGB 16.1 (H) 11/04/2018 06:00 AM  
 HCT 46.1 11/04/2018 06:00 AM  
  11/04/2018 06:00 AM  
 
All Cardiac Markers in the last 24 hours:  
Lab Results Component Value Date/Time  11/04/2018 01:00 AM  
 CKMB 2.5 11/04/2018 01:00 AM  
 CKND1 2.1 11/04/2018 01:00 AM  
 TROIQ 0.06 (H) 11/04/2018 01:00 AM  
 TROIQ 0.05 (H) 11/03/2018 08:07 PM  
 
 
Assessment/Plan Principal Problem: 
  Atrial fibrillation with RVR ([facility] (11/3/2018) Active Problems: 
  HTN (hypertension) (11/4/2018) Alcoholism ([address] (11/4/2018) Elevated cholesterol (4/12/2016) Arthritis (11/13/2014) PLAN: 
 
Continue with IV cardizem drip. Patient takes BID cardizem orally at home as well as Digoxin every other day Digoxin level is low, will consult with Cardiology BP control Will monitor for alcohol withdrawal, start alcohol withdrawal protocol DVT prophylaxis.

## 2018-11-04 NOTE — PROGRESS NOTES
met with Patient completed the initial Spiritual Assessment of the patient, and offered Pastoral Care, see flow sheets for interventions. Patient does not have any Church/cultural needs that will affect patients preferences in health care. Charted reviewed. Chaplains will continue to follow and will provide pastoral care on an as needed/requested basis. [id number] [facility] Kamila Magana, MPH  
[facility] [phone]

## 2018-11-05 VITALS
SYSTOLIC BLOOD PRESSURE: 124 MMHG | RESPIRATION RATE: 18 BRPM | OXYGEN SATURATION: 96 % | TEMPERATURE: 98.4 F | DIASTOLIC BLOOD PRESSURE: 76 MMHG | HEART RATE: 69 BPM

## 2018-11-05 LAB
ALBUMIN SERPL-MCNC: 3.7 G/DL (ref 3.4–5)
ALBUMIN/GLOB SERPL: 1.5 {RATIO} (ref 0.8–1.7)
ALP SERPL-CCNC: 113 U/L (ref 45–117)
ALT SERPL-CCNC: 40 U/L (ref 16–61)
ANION GAP SERPL CALC-SCNC: 8 MMOL/L (ref 3–18)
AST SERPL-CCNC: 23 U/L (ref 15–37)
BILIRUB SERPL-MCNC: 1.5 MG/DL (ref 0.2–1)
BUN SERPL-MCNC: 10 MG/DL (ref 7–18)
BUN/CREAT SERPL: 11 (ref 12–20)
CALCIUM SERPL-MCNC: 8.4 MG/DL (ref 8.5–10.1)
CHLORIDE SERPL-SCNC: 105 MMOL/L (ref 100–108)
CO2 SERPL-SCNC: 26 MMOL/L (ref 21–32)
CREAT SERPL-MCNC: 0.9 MG/DL (ref 0.6–1.3)
GLOBULIN SER CALC-MCNC: 2.4 G/DL (ref 2–4)
GLUCOSE SERPL-MCNC: 92 MG/DL (ref 74–99)
INR PPP: 1.4 (ref 0.8–1.2)
POTASSIUM SERPL-SCNC: 3.6 MMOL/L (ref 3.5–5.5)
PROT SERPL-MCNC: 6.1 G/DL (ref 6.4–8.2)
PROTHROMBIN TIME: 17.2 SEC (ref 11.5–15.2)
SODIUM SERPL-SCNC: 139 MMOL/L (ref 136–145)

## 2018-11-05 PROCEDURE — 74011250637 HC RX REV CODE- 250/637: Performed by: HOSPITALIST

## 2018-11-05 PROCEDURE — 85610 PROTHROMBIN TIME: CPT | Performed by: NURSE PRACTITIONER

## 2018-11-05 PROCEDURE — 99218 HC RM OBSERVATION: CPT

## 2018-11-05 PROCEDURE — 80053 COMPREHEN METABOLIC PANEL: CPT | Performed by: NURSE PRACTITIONER

## 2018-11-05 PROCEDURE — 36415 COLL VENOUS BLD VENIPUNCTURE: CPT | Performed by: NURSE PRACTITIONER

## 2018-11-05 PROCEDURE — 74011250637 HC RX REV CODE- 250/637: Performed by: INTERNAL MEDICINE

## 2018-11-05 RX ORDER — DIGOXIN 125 MCG
0.12 TABLET ORAL
Status: DISCONTINUED | OUTPATIENT
Start: 2018-11-05 | End: 2018-11-05 | Stop reason: HOSPADM

## 2018-11-05 RX ADMIN — Medication 100 MG: at 08:18

## 2018-11-05 RX ADMIN — DILTIAZEM HYDROCHLORIDE 240 MG: 120 CAPSULE, COATED, EXTENDED RELEASE ORAL at 08:18

## 2018-11-05 RX ADMIN — Medication 200 MG: at 08:18

## 2018-11-05 RX ADMIN — FOLIC ACID 1 MG: 1 TABLET ORAL at 08:19

## 2018-11-05 RX ADMIN — THERA TABS 1 TABLET: TAB at 08:18

## 2018-11-05 RX ADMIN — Medication 10 ML: at 05:49

## 2018-11-05 RX ADMIN — DIGOXIN 0.12 MG: 125 TABLET ORAL at 08:18

## 2018-11-05 NOTE — PROGRESS NOTES
Bedside shift report received from [address] [place] [address] West, RN 
 
1920: AOX4, on 2l 02 via NC, resting in bed, no c/o chest pain or other discomforts; Afib 90's on tele; shift assessment done; needs within reach; CIWA intact 2200: resting comfortably; no complaints voiced 0015: sleeping; no apparent distress noted 0330: sleeping; Afib controlled on tele; CIWA intact 0630: awake, no complaints voiced; said he slept well throughout the night 
 
0705: Bedside and Verbal shift change report given to [address] [place] [address] West RN (oncoming nurse) by Kirill Melissa RN (offgoing nurse). Report included the following information SBAR, Kardex, Intake/Output, Recent Results and Cardiac Rhythm Afib.

## 2018-11-05 NOTE — PROGRESS NOTES
[facility]  -  Progress Note Patient: Pamela Wright. MRN: [id number]  SSN: [id number] YOB: 1955  Age: 61 y.o. Sex: male Admit Date: 11/3/2018 I saw, evaluated, interviewed and examined the patient personally. I agree with the findings and plan of care as documented below with PA-C note HR now in . Feeling 100% better Continue cardizem, digoxin and coumadin If DC, will need to set up appointment with Jordan Tejeda MD 
 
 
 
 
Assessment:  
 
-Atrial fibrillation with rapid ventricular response, on Diltiazem for rate control and Coumadin for [facility] with INR 1.9 on presentation 
- Echocardiogram 04/16/18 with an EF of 45%, mild diffuse hypokinesis 
- Nuclear stress test 04/17/18 with no evidence of ongoing ischemia, EF calculated at 38% -HTN 
-Hyperlipidemia 
-Hx nontoxic multinodular goiter Primary cardiologist is Dr. Zuri Petersen Plan:  
 
-Rates currently maintaining ~100, would continue current PO Diltiazem dosing at 240 mg QAM and 180 mg QPM. 
-Continue Digoxin. 
-Continue Coumadin for anticoagulation. Subjective: No new complaints. Feeling much better. Objective:  
  
Patient Vitals for the past 8 hrs: 
 Temp Pulse Resp BP SpO2  
11/05/18 0932 98.4 °F (36.9 °C) 69 18 124/76 96 % 11/05/18 0542 98.6 °F (37 °C) 75 18 117/73 95 % No data found. Intake/Output Summary (Last 24 hours) at 11/5/2018 1026 Last data filed at 11/5/2018 7161 Gross per 24 hour Intake 480 ml Output  Net 480 ml Physical Exam: 
General:  alert, cooperative, no distress, appears stated age Neck:  No JVD Lungs:  clear to auscultation bilaterally Heart:  Irregularly irregular rhythm Abdomen:  abdomen is soft without significant tenderness, masses, organomegaly or guarding Extremities:  Atraumatic, no edema Data Review:  
 
Labs: Results:  
   
Chemistry Recent Labs 11/05/18 
0440 11/04/18 
0600 11/03/18 2007 GLU 92 103* 115*  137 137  
K 3.6 3.6 3.9  103 100 CO2 26 24 24 BUN 10 9 10 CREA 0.90 0.87 1.13  
CA 8.4* 8.6 9.5 AGAP 8 10 13 BUCR 11* 10* 9*   --  166* TP 6.1*  --  8.9* ALB 3.7  --  5.1*  
GLOB 2.4  --  3.8 AGRAT 1.5  --  1.3  
  
CBC w/Diff Recent Labs 11/04/18 
0600 11/03/18 2007 WBC 8.1 12.1 RBC 4.94 5.88* HGB 16.1* 19.4* HCT 46.1 55.0*  
 239 GRANS 71 69 LYMPH 15* 20* EOS 1 1 Cardiac Enzymes Lab Results Component Value Date/Time  11/04/2018 01:05 PM  
 CKMB 1.9 11/04/2018 01:05 PM  
 CKND1 1.8 11/04/2018 01:05 PM  
 TROIQ 0.04 11/04/2018 01:05 PM  
  
Coagulation Recent Labs 11/05/18 
0440 11/03/18 2007 PTP 17.2* 19.3* INR 1.4* 1.6* Lipid Panel Lab Results Component Value Date/Time Cholesterol, total 245 (H) 10/25/2018 08:38 AM  
 HDL Cholesterol 63 (H) 10/25/2018 08:38 AM  
 LDL, calculated 151 (H) 10/25/2018 08:38 AM  
 VLDL, calculated 30 10/25/2018 08:38 AM  
 Triglyceride 152 (H) 10/25/2018 08:38 AM  
 CHOL/HDL Ratio 3.1 11/28/2017 02:42 PM  
  
Liver Enzymes Recent Labs 11/05/18 
0440 TP 6.1* ALB 3.7  SGOT 23 Thyroid Studies Lab Results Component Value Date/Time  TSH 0.46 10/25/2018 08:38 AM

## 2018-11-05 NOTE — CDMP QUERY
Please clarify if this patient is (was) being treated/managed for: 
 
=> persistent  afib in setting  of  afib  diagnosed in Dec  2017  being treated with cardizem  
=> Other Explanation of clinical findings 
=> Clinically Undetermined (no explanation for clinical findings) The medical record reflects the following clinical findings, treatment, and risk factors: 
 
Risk Factors:    afib,  HTN;   
Clinical Indicators:    followed in our office for atrial fibrillation since December 2017. He is a chads score of 1-2. He has been on chronic Coumadin therapy Treatment:   cardizem  po;  
 
Please clarify and document your clinical opinion in the progress notes and discharge summary including the definitive and/or presumptive diagnosis, (suspected or probable), related to the above clinical findings. Please include clinical findings supporting your diagnosis. 
 
------------------------------------------------------------------------------------------------- 
Paroxysmal:  begins suddenly and stops on its own. Symptoms can be mild or severe. They stop within about a week, but usually in less than 24 hours. Persistent: continues for more than a week. It may stop on its own, or it can be stopped with treatment. Thank you,   Lia Aguilar RN   CCDS    x [phone]

## 2018-11-05 NOTE — DISCHARGE SUMMARY
[facility] [place]    Patient: Stacie Villegas MRN: [id number]  CSN: [id number]    YOB: 1955  Age: 61 y.o. Sex: male    DOA: 11/3/2018 LOS:  LOS: 2 days   Discharge Date:11/5/2018      Admission Diagnoses: Atrial fibrillation with RVR [facility])    Discharge Diagnoses:    Problem List as of 11/5/2018 Date Reviewed: 11/3/2018          Codes Class Noted - Resolved    HTN (hypertension) ICD-10-CM: I10  ICD-9-CM: 401.9  11/4/2018 - Present        Alcoholism ([facility] ICD-10-CM: F10.20  ICD-9-CM: 303.90  11/4/2018 - Present        History of nuclear stress test ICD-10-CM: Z92.89  ICD-9-CM: V15.89  11/4/2018 - Present    Overview Signed 11/4/2018 10:02 AM by Aston Goldstein MD     4/17/2018; no evidence of significant ongoing ischemia.              Chronic anticoagulation ICD-10-CM: Z79.01  ICD-9-CM: V58.61  11/4/2018 - Present    Overview Signed 11/4/2018 10:05 AM by Aston Goldstein MD     Regulated by PCP             Loose stools ICD-10-CM: R19.5  ICD-9-CM: 787.7  11/4/2018 - Present        Lower gastrointestinal bleeding ICD-10-CM: K92.2  ICD-9-CM: 578.9  11/4/2018 - Present        * (Principal) Atrial fibrillation with RVR ([facility] ICD-10-CM: I48.91  ICD-9-CM: 427.31  11/3/2018 - Present        Nontoxic multinodular goiter ICD-10-CM: E04.2  ICD-9-CM: 241.1  2/7/2018 - Present        Atrial fibrillation ([facility] ICD-10-CM: I48.91  ICD-9-CM: 427.31  11/28/2017 - Present    Overview Signed 11/4/2018 10:04 AM by Aston Goldstein MD     Chronic, [id number] score 1 to 2             Elevated cholesterol ICD-10-CM: E78.00  ICD-9-CM: 272.0  4/12/2016 - Present        Essential hypertension, malignant ICD-10-CM: I10  ICD-9-CM: 401.0  11/13/2014 - Present        Arthritis ICD-10-CM: M19.90  ICD-9-CM: 716.90  11/13/2014 - Present        RESOLVED: Persistent atrial fibrillation ([facility] ICD-10-CM: I48.1  ICD-9-CM: 427.31  4/17/2018 - 4/26/2018        RESOLVED: Atrial fibrillation with RVR ([facility] ICD-10-CM: I48.91  ICD-9-CM: 427.31  4/16/2018 - 4/26/2018        RESOLVED: Normal cardiac stress test ICD-10-CM: FAS1181  ICD-9-CM: Sarah Roach  12/5/2017 - 2/7/2018    Overview Signed 12/5/2017  3:42 PM by Gabby Ibarra MD     Normal April Mccormack 3/28/2016, EF 57%             RESOLVED: Ganglion cyst ICD-10-CM: M67.40  ICD-9-CM: 727.43  4/12/2016 - 11/28/2017        RESOLVED: Thyroid mass ICD-10-CM: E07.9  ICD-9-CM: 246.9  4/12/2016 - 2/7/2018    Overview Signed 12/5/2017  3:45 PM by Gabby Ibarra MD     TSH on 11/28/2017 was 0.98             RESOLVED: Hearing loss ICD-10-CM: H91.90  ICD-9-CM: 389.9  11/13/2014 - 11/28/2017              Discharge Condition: Stable    Discharge To: Home    Consults: Cardiology and [facility] Course: 60 y/o male with hx of HTN and A-fib on Coumadin and Digoxin, presented to the ED on 11/3 with acute palpitations and hot flashes that began earlier that morning. Pt reports HR of 170 bpm when he checked his HR at home PTA. Reports compliance with medications. EKG in ED with A-fib RVR - ventricular rate of 186. He was started on Cardizem gtt and admitted for further management of care. Cardiology consulted. Troponin 0.05 - -> 0.06 - -> 0.05, digoxin level subtherapeutic, INR of 1.6. Of note, pt states he takes Vitamin K daily because he drinks. Pt also reports rectal bleeding day of admission while having a BM, has known hx of hemorrhoids treated with OTC Preparation H cream- no work up has been done in past. His last echo was in April 2018 with EF 45%, mild diffuse hypokinesis. Cardizem gtt stopped, resumed home med dose. Rate controlled, labs and vital signs stable. He is appropriate for d/c home, to follow up with PCP Within one week and with cardiology within 1-2 weeks. Recommend STOP taking Vitamin K as INR has been sub-therapeutic. Also f/u on hemorrhoids with PCP or GI- high fiber diet.       Physical Exam:  General appearance: alert, cooperative, no distress, appears stated age  Head: Normocephalic, without obvious abnormality, atraumatic  Lungs: clear to auscultation bilaterally  Heart: irregularly irregular  Abdomen: soft, non tender, non distended. Normoactive bowel sounds. No masses, no organomegaly  Extremities: extremities normal, atraumatic, no cyanosis or edema  Skin: Skin color, texture, turgor normal. No rashes or lesions  Neurologic: Grossly normal  PSY: Mood and affect normal, appropriately behaved       Significant Diagnostic Studies: labs:   Recent Results (from the past 24 hour(s))   CARDIAC PANEL,(CK, CKMB & TROPONIN)    Collection Time: 11/04/18  1:05 PM   Result Value Ref Range     39 - 308 U/L    CK - MB 1.9 <3.6 ng/ml    CK-MB Index 1.8 0.0 - 4.0 %    Troponin-I, Qt. 0.04 0.0 - 0.045 NG/ML   PROTHROMBIN TIME + INR    Collection Time: 11/05/18  4:40 AM   Result Value Ref Range    Prothrombin time 17.2 (H) 11.5 - 15.2 sec    INR 1.4 (H) 0.8 - 1.2     METABOLIC PANEL, COMPREHENSIVE    Collection Time: 11/05/18  4:40 AM   Result Value Ref Range    Sodium 139 136 - 145 mmol/L    Potassium 3.6 3.5 - 5.5 mmol/L    Chloride 105 100 - 108 mmol/L    CO2 26 21 - 32 mmol/L    Anion gap 8 3.0 - 18 mmol/L    Glucose 92 74 - 99 mg/dL    BUN 10 7.0 - 18 MG/DL    Creatinine 0.90 0.6 - 1.3 MG/DL    BUN/Creatinine ratio 11 (L) 12 - 20      GFR est AA >60 >60 ml/min/1.73m2    GFR est non-AA >60 >60 ml/min/1.73m2    Calcium 8.4 (L) 8.5 - 10.1 MG/DL    Bilirubin, total 1.5 (H) 0.2 - 1.0 MG/DL    ALT (SGPT) 40 16 - 61 U/L    AST (SGOT) 23 15 - 37 U/L    Alk. phosphatase 113 45 - 117 U/L    Protein, total 6.1 (L) 6.4 - 8.2 g/dL    Albumin 3.7 3.4 - 5.0 g/dL    Globulin 2.4 2.0 - 4.0 g/dL    A-G Ratio 1.5 0.8 - 1.7           Discharge Medications:     Current Discharge Medication List      CONTINUE these medications which have NOT CHANGED    Details   warfarin (COUMADIN) 4 mg tablet Take 1 Tab by mouth daily.   Qty: 90 Tab, Refills: 12    Associated Diagnoses: Chronic atrial fibrillation ([facility] Nontoxic multinodular goiter; Elevated cholesterol; Essential hypertension, malignant; Arthritis      digoxin (LANOXIN) 0.125 mg tablet Take 1 Tab by mouth every fourty-eight (48) hours. Qty: 45 Tab, Refills: 4    Associated Diagnoses: Chronic atrial fibrillation ([facility] Nontoxic multinodular goiter; Elevated cholesterol; Essential hypertension, malignant; Arthritis      !! dilTIAZem CD (CARDIZEM CD) 180 mg ER capsule Take 1 Cap by mouth nightly. Qty: 90 Cap, Refills: 4    Associated Diagnoses: Chronic atrial fibrillation ([facility] Nontoxic multinodular goiter; Elevated cholesterol; Essential hypertension, malignant; Arthritis      !! dilTIAZem CD (CARDIZEM CD) 240 mg ER capsule Take 1 Cap by mouth daily. Qty: 90 Cap, Refills: 4    Associated Diagnoses: Chronic atrial fibrillation ([facility] Nontoxic multinodular goiter; Elevated cholesterol; Essential hypertension, malignant; Arthritis      magnesium oxide 200 mg magnesium tab Take 200 mg by mouth daily. Qty: 100 Tab, Refills: 12    Associated Diagnoses: Nontoxic multinodular goiter; Elevated cholesterol; Essential hypertension, malignant; Chronic atrial fibrillation ([facility] Arthritis      nitroglycerin (NITROSTAT) 0.4 mg SL tablet 1 Tab by SubLINGual route as needed for Chest Pain. Qty: 1 Bottle, Refills: 0      multivit-min-FA-lycopen-lutein (CENTRUM SILVER MEN) 300-600-300 mcg tab Take 1 Tab by mouth daily. Qty: 100 Caplet, Refills: 12    Associated Diagnoses: Atrial fibrillation, unspecified type ([facility] Nontoxic multinodular goiter; Elevated cholesterol; Essential hypertension, malignant       !! - Potential duplicate medications found. Please discuss with provider.       STOP taking these medications       vitamin k 100 mcg tablet Comments:   Reason for Stopping:               Activity: Activity as tolerated    Diet: Cardiac Diet    Wound Care: None needed    Follow-up: Follow up with PCP within one week- also discuss hemorrhoids and rectal bleeding. Follow up with cardiology within 1-2 weeks.     Discharge time: > 35 mins  Cony Armstrong NP  11/5/2018, 9:18 AM

## 2018-11-05 NOTE — PROGRESS NOTES
Problem: Falls - Risk of 
Goal: *Absence of Falls Document Luis Miguel Dennis Fall Risk and appropriate interventions in the flowsheet. Outcome: Progressing Towards Goal 
Fall Risk Interventions: 
  
 
  
 
Medication Interventions: Evaluate medications/consider consulting pharmacy

## 2018-11-05 NOTE — DISCHARGE INSTRUCTIONS
Patient armband removed and shredded  MyChart Activation    Thank you for requesting access to [employer]. Please follow the instructions below to securely access and download your online medical record. [employer] allows you to send messages to your doctor, view your test results, renew your prescriptions, schedule appointments, and more. How Do I Sign Up? 1. In your internet browser, go to [email or web address]  2. Click on the First Time User? Click Here link in the Sign In box. You will be redirect to the New Member Sign Up page. 3. Enter your [employer] Access Code exactly as it appears below. You will not need to use this code after youve completed the sign-up process. If you do not sign up before the expiration date, you must request a new code. [employer] Access Code: Activation code not generated  Current [employer] Status: Active (This is the date your [employer] access code will )    4. Enter the last four digits of your Social Security Number (xxxx) and Date of Birth (mm/dd/yyyy) as indicated and click Submit. You will be taken to the next sign-up page. 5. Create a [employer] ID. This will be your [employer] login ID and cannot be changed, so think of one that is secure and easy to remember. 6. Create a [employer] password. You can change your password at any time. 7. Enter your Password Reset Question and Answer. This can be used at a later time if you forget your password. 8. Enter your e-mail address. You will receive e-mail notification when new information is available in [address] 9. Click Sign Up. You can now view and download portions of your medical record. 10. Click the Download Summary menu link to download a portable copy of your medical information. Additional Information    If you have questions, please visit the Frequently Asked Questions section of the [employer] website at [email or web address] Remember, [employer] is NOT to be used for urgent needs.  For medical emergencies, dial 911. Learning About Alcohol Misuse  What is alcohol misuse? Alcohol misuse means drinking so much that it causes problems for you or others. Early problems with alcohol can start at home. You may argue with loved ones about how much you're drinking. Your job may be affected because of drinking. You may drink when it's dangerous or illegal, such as when you drive. Drinking too much for a long time can lead to health conditions like high blood pressure and liver problems. What are the symptoms? Symptoms of alcohol misuse may include:  · Drinking much more than you planned. · Drinking even though it's causing problems for you or others. · Putting yourself in situations where you might get hurt. · Wanting to cut down or stop drinking, but not being able to. · Feeling guilty about how much you're drinking. How is alcohol misuse treated? Getting help for problems with alcohol is up to you. But you don't have to do it alone. There are many people and kinds of treatments to help with alcohol problems. Talking to your doctor is the first step. When you get a doctor's help, treatment for alcohol problems can be safer and quicker. Treatment options can include:  · Treatment programs. Examples are group therapy, one or more types of counseling, and alcohol education. · Medicines. A doctor or counselor can help you know what kinds of medicines might help with cravings. · Free social support groups. These groups include AA (Alcoholics Anonymous) and SMART (Self-Management and Recovery Training). Your doctor can help you decide which type of program is best for you. Follow-up care is a key part of your treatment and safety. Be sure to make and go to all appointments, and call your doctor if you are having problems. It's also a good idea to know your test results and keep a list of the medicines you take. Where can you learn more?   Go to http://vincent.info/. Enter [id number] in the search box to learn more about \"Learning About Alcohol Misuse. \"  Current as of: May 8, 2018  Content Version: 11.8  © 8449-9640 [employer]. Care instructions adapted under license by [employer] (which disclaims liability or warranty for this information). If you have questions about a medical condition or this instruction, always ask your healthcare professional. [address] any warranty or liability for your use of this information. DISCHARGE SUMMARY from Nurse    PATIENT INSTRUCTIONS:    After general anesthesia or intravenous sedation, for 24 hours or while taking prescription Narcotics:  · Limit your activities  · Do not drive and operate hazardous machinery  · Do not make important personal or business decisions  · Do  not drink alcoholic beverages  · If you have not urinated within 8 hours after discharge, please contact your surgeon on call. Report the following to your surgeon:  · Excessive pain, swelling, redness or odor of or around the surgical area  · Temperature over 100.5  · Nausea and vomiting lasting longer than 4 hours or if unable to take medications  · Any signs of decreased circulation or nerve impairment to extremity: change in color, persistent  numbness, tingling, coldness or increase pain  · Any questions    What to do at Home:  Recommended activity: Activity as tolerated,     If you experience any of the following symptoms irregular rapid heart beat, excessive shortness of breath, please follow up with your primary care physician, cardiologist or nearest emergency department. STOP TAKING VITAMIN K    *  Please give a list of your current medications to your Primary Care Provider. *  Please update this list whenever your medications are discontinued, doses are      changed, or new medications (including over-the-counter products) are added.     *  Please carry medication information at all times in case of emergency situations. These are general instructions for a healthy lifestyle:    No smoking/ No tobacco products/ Avoid exposure to second hand smoke  Surgeon General's Warning:  Quitting smoking now greatly reduces serious risk to your health. Obesity, smoking, and sedentary lifestyle greatly increases your risk for illness    A healthy diet, regular physical exercise & weight monitoring are important for maintaining a healthy lifestyle    You may be retaining fluid if you have a history of heart failure or if you experience any of the following symptoms:  Weight gain of 3 pounds or more overnight or 5 pounds in a week, increased swelling in our hands or feet or shortness of breath while lying flat in bed. Please call your doctor as soon as you notice any of these symptoms; do not wait until your next office visit. Recognize signs and symptoms of STROKE:    F-face looks uneven    A-arms unable to move or move unevenly    S-speech slurred or non-existent    T-time-call 911 as soon as signs and symptoms begin-DO NOT go       Back to bed or wait to see if you get better-TIME IS BRAIN. Warning Signs of HEART ATTACK     Call 911 if you have these symptoms:   Chest discomfort. Most heart attacks involve discomfort in the center of the chest that lasts more than a few minutes, or that goes away and comes back. It can feel like uncomfortable pressure, squeezing, fullness, or pain.  Discomfort in other areas of the upper body. Symptoms can include pain or discomfort in one or both arms, the back, neck, jaw, or stomach.  Shortness of breath with or without chest discomfort.  Other signs may include breaking out in a cold sweat, nausea, or lightheadedness. Don't wait more than five minutes to call 911 - MINUTES MATTER! Fast action can save your life. Calling 911 is almost always the fastest way to get lifesaving treatment.  Emergency Medical Services staff can begin treatment when they arrive -- up to an hour sooner than if someone gets to the hospital by car. The discharge information has been reviewed with the patient. The patient verbalized understanding. Discharge medications reviewed with the patient and appropriate educational materials and side effects teaching were provided.   ___________________________________________________________________________________________________________________________________

## 2018-11-07 ENCOUNTER — PATIENT OUTREACH (OUTPATIENT)
Dept: FAMILY MEDICINE CLINIC | Age: 63
End: 2018-11-07

## 2018-11-07 NOTE — PROGRESS NOTES
[facility] Follow Up Note 
- Admitted at [facility] for Afib with RVR 
-18 Discharged to home  
-18 NN contact  
  
 
 
 
 
-Received referral from PCP for hospital f/u and to schedule f/u appt this week RRAT Score: 10 Reached patient on phone and verified identity using name and . Introduced self/role and purpose of call. Pt stated: \"I'm fine. \" Pt denied any CP, SOB, palpitations, NVD, HA/dizziness, numbness/tingling, swelling, pain, cough. Pt reports has bleeding from hemorroids, denies any dark/bloody stools. Pt has no other concerns/complaints at this time. Med Reconciliation:  
Reviewed home medication and patient verbalized understanding self management of medications. Newly prescribed meds: n/a Discontinued meds: vit.k Medication dosage changes: n/a Reviewed red flags for:   
CP, SOB, bleeding, palpitations, HA/dizziness, numbness/tingling and patient understands when to seek medical attention from PCP/ED. Patients next follow up visit:  
scheduled for 18 at 7:30am with PCP Dr. Brooke Brower. Appt confirmed with the patient. Patient verbalized understanding of discharge plan and special follow up with Cardio Dr. Pavithra Corey on 18. Pt is aware of the appt. NN contact [address] [place] [address] office ([phone]). Pt's identity verified x2. Per  that is the earliest available appt for Dr. Pavithra Corey. I verbalized understanding. Reviewed plan of care. Patient has provided input to plan and agrees with goals. Answered any questions patient had. Provided contact info for any additional questions. FYI note sent to provider

## 2018-11-08 ENCOUNTER — OFFICE VISIT (OUTPATIENT)
Dept: FAMILY MEDICINE CLINIC | Age: 63
End: 2018-11-08

## 2018-11-08 VITALS
BODY MASS INDEX: 22.85 KG/M2 | DIASTOLIC BLOOD PRESSURE: 78 MMHG | TEMPERATURE: 98.2 F | OXYGEN SATURATION: 99 % | HEART RATE: 68 BPM | SYSTOLIC BLOOD PRESSURE: 119 MMHG | HEIGHT: 70 IN | RESPIRATION RATE: 19 BRPM | WEIGHT: 159.6 LBS

## 2018-11-08 DIAGNOSIS — E78.00 ELEVATED CHOLESTEROL: ICD-10-CM

## 2018-11-08 DIAGNOSIS — I10 ESSENTIAL HYPERTENSION, MALIGNANT: ICD-10-CM

## 2018-11-08 DIAGNOSIS — I48.20 CHRONIC ATRIAL FIBRILLATION (HCC): Primary | ICD-10-CM

## 2018-11-08 DIAGNOSIS — K64.9 HEMORRHOIDS, UNSPECIFIED HEMORRHOID TYPE: ICD-10-CM

## 2018-11-08 DIAGNOSIS — M19.90 ARTHRITIS: ICD-10-CM

## 2018-11-08 DIAGNOSIS — E04.2 NONTOXIC MULTINODULAR GOITER: ICD-10-CM

## 2018-11-08 PROBLEM — R19.5 LOOSE STOOLS: Status: RESOLVED | Noted: 2018-11-04 | Resolved: 2018-11-08

## 2018-11-08 PROBLEM — I48.91 ATRIAL FIBRILLATION WITH RVR (HCC): Status: RESOLVED | Noted: 2018-11-03 | Resolved: 2018-11-08

## 2018-11-08 PROBLEM — Z92.89 HISTORY OF NUCLEAR STRESS TEST: Status: RESOLVED | Noted: 2018-11-04 | Resolved: 2018-11-08

## 2018-11-08 PROBLEM — Z79.01 CHRONIC ANTICOAGULATION: Status: RESOLVED | Noted: 2018-11-04 | Resolved: 2018-11-08

## 2018-11-08 PROBLEM — K92.2 LOWER GASTROINTESTINAL BLEEDING: Status: RESOLVED | Noted: 2018-11-04 | Resolved: 2018-11-08

## 2018-11-08 LAB
INR BLD: 1.3
PT POC: 15 SECONDS
VALID INTERNAL CONTROL?: YES

## 2018-11-08 RX ORDER — ZINC GLUCONATE 100 MG
100 TABLET ORAL DAILY
Qty: 100 TAB | Refills: 12 | Status: SHIPPED | OUTPATIENT
Start: 2018-11-08 | End: 2018-11-26 | Stop reason: SDUPTHER

## 2018-11-08 NOTE — PROGRESS NOTES
Room #  4     SUBJECTIVE:    Sofi Mendez is a 61 y.o. male who presents today for hospital follow up for A-Fib. Patient reports dizziness and shortness of breath he stated that he often has to take a deep breath sometimes. He request a referral to Dr. Nisha Vaughan for Hemorrhoids. PT/INR15.0/ 1.3    1. Have you been to the ER, urgent care clinic since your last visit? Hospitalized since your last visit? YES    2. Have you seen or consulted any other health care providers outside of the [address] [place] [address] since your last visit? Include any pap smears or colon screening. YES  When :  Reason:    Health Maintenance reviewed Yes    Health Maintenance Due   Topic Date Due    Shingrix Vaccine Age 49> (1 of 2) 02/22/2005

## 2018-11-26 ENCOUNTER — OFFICE VISIT (OUTPATIENT)
Dept: FAMILY MEDICINE CLINIC | Age: 63
End: 2018-11-26

## 2018-11-26 VITALS
DIASTOLIC BLOOD PRESSURE: 68 MMHG | HEART RATE: 77 BPM | TEMPERATURE: 97.8 F | RESPIRATION RATE: 20 BRPM | WEIGHT: 158.45 LBS | OXYGEN SATURATION: 98 % | BODY MASS INDEX: 22.68 KG/M2 | HEIGHT: 70 IN | SYSTOLIC BLOOD PRESSURE: 95 MMHG

## 2018-11-26 DIAGNOSIS — E78.00 ELEVATED CHOLESTEROL: ICD-10-CM

## 2018-11-26 DIAGNOSIS — I48.20 CHRONIC ATRIAL FIBRILLATION (HCC): ICD-10-CM

## 2018-11-26 DIAGNOSIS — I10 ESSENTIAL HYPERTENSION, MALIGNANT: ICD-10-CM

## 2018-11-26 DIAGNOSIS — I10 HYPERTENSION, UNSPECIFIED TYPE: ICD-10-CM

## 2018-11-26 DIAGNOSIS — E04.2 NONTOXIC MULTINODULAR GOITER: ICD-10-CM

## 2018-11-26 DIAGNOSIS — M19.90 ARTHRITIS: ICD-10-CM

## 2018-11-26 DIAGNOSIS — Z00.00 ROUTINE GENERAL MEDICAL EXAMINATION AT A HEALTH CARE FACILITY: ICD-10-CM

## 2018-11-26 LAB
INR BLD: 2.7
PT POC: 32.3 SECONDS
VALID INTERNAL CONTROL?: YES

## 2018-11-26 RX ORDER — WARFARIN 4 MG/1
4 TABLET ORAL DAILY
Qty: 90 TAB | Refills: 12 | Status: SHIPPED | OUTPATIENT
Start: 2018-11-26 | End: 2018-12-06

## 2018-11-26 RX ORDER — DILTIAZEM HYDROCHLORIDE 180 MG/1
180 CAPSULE, COATED, EXTENDED RELEASE ORAL
Qty: 90 CAP | Refills: 4 | Status: ON HOLD | OUTPATIENT
Start: 2018-11-26 | End: 2018-11-28 | Stop reason: SDUPTHER

## 2018-11-26 RX ORDER — DILTIAZEM HYDROCHLORIDE 240 MG/1
240 CAPSULE, COATED, EXTENDED RELEASE ORAL DAILY
Qty: 90 CAP | Refills: 4 | Status: SHIPPED | OUTPATIENT
Start: 2018-11-26 | End: 2018-11-28

## 2018-11-26 RX ORDER — VITS A,C,E/LUTEIN/MINERALS 300MCG-200
200 TABLET ORAL DAILY
Qty: 100 TAB | Refills: 12 | Status: SHIPPED | OUTPATIENT
Start: 2018-11-26 | End: 2018-12-06

## 2018-11-26 RX ORDER — DIGOXIN 125 MCG
0.12 TABLET ORAL
Qty: 45 TAB | Refills: 4 | Status: SHIPPED | OUTPATIENT
Start: 2018-11-26 | End: 2018-12-04

## 2018-11-26 RX ORDER — NITROGLYCERIN 0.4 MG/1
0.4 TABLET SUBLINGUAL AS NEEDED
Qty: 1 BOTTLE | Refills: 0 | Status: SHIPPED | OUTPATIENT
Start: 2018-11-26 | End: 2019-04-08 | Stop reason: SDUPTHER

## 2018-11-26 RX ORDER — ZINC GLUCONATE 100 MG
100 TABLET ORAL DAILY
Qty: 100 TAB | Refills: 12 | Status: SHIPPED | OUTPATIENT
Start: 2018-11-26 | End: 2018-12-06

## 2018-11-26 NOTE — PROGRESS NOTES
Room # SUBJECTIVE: Severino Nazario is a 61 y.o. male who presents today for complete physical 
 
1. Have you been to the ER, urgent care clinic since your last visit? Hospitalized since your last visit? NO 
 
2. Have you seen or consulted any other health care providers outside of the [address] [place] [address] since your last visit? Include any pap smears or colon screening. NO When : 
Reason: 
 
Health Maintenance reviewed Yes Health Maintenance Due Topic Date Due  Shingrix Vaccine Age 50> (1 of 2) 02/22/2005

## 2018-11-26 NOTE — PROGRESS NOTES
Ashley Majano is a 61 y.o.  male and presents for a preventive health care visit Subjective: 
Health Maintenance History Immunizations reviewed, zoster indicated. colonoscopy:utd , Eye exam: utd , Chest CT: na , 
 
 
Patient Active Problem List  
 Diagnosis Date Noted  HTN (hypertension) 11/04/2018  Alcoholism ([facility] 11/04/2018  Nontoxic multinodular goiter 02/07/2018  Atrial fibrillation ([facility] 11/28/2017  Elevated cholesterol 04/12/2016  Arthritis 11/13/2014 Current Outpatient Medications Medication Sig Dispense Refill  vitamin k 100 mcg tablet Take 1 Tab by mouth daily. 100 Tab 12  
 warfarin (COUMADIN) 4 mg tablet Take 1 Tab by mouth daily. 90 Tab 12  
 digoxin (LANOXIN) 0.125 mg tablet Take 1 Tab by mouth every fourty-eight (48) hours. 45 Tab 4  
 dilTIAZem CD (CARDIZEM CD) 180 mg ER capsule Take 1 Cap by mouth nightly. 90 Cap 4  
 dilTIAZem CD (CARDIZEM CD) 240 mg ER capsule Take 1 Cap by mouth daily. 90 Cap 4  
 magnesium oxide 200 mg magnesium tab Take 200 mg by mouth daily. 100 Tab 12  
 nitroglycerin (NITROSTAT) 0.4 mg SL tablet 1 Tab by SubLINGual route as needed for Chest Pain. 1 Bottle 0  
 multivit-min-FA-lycopen-lutein (CENTRUM SILVER MEN) 300-600-300 mcg tab Take 1 Tab by mouth daily. 100 Caplet 12 No Known Allergies Past Medical History:  
Diagnosis Date  Arthritis  Atrial fibrillation ([facility] 11/28/2017  Hearing loss  HLD (hyperlipidemia)  HTN (hypertension)  Nontoxic multinodular goiter 2/7/2018 Past Surgical History:  
Procedure Laterality Date  HX HERNIA REPAIR Family History Problem Relation Age of Onset  Diabetes Father Social History Tobacco Use  Smoking status: Former Smoker  Smokeless tobacco: Never Used Substance Use Topics  Alcohol use: Yes Alcohol/week: 12.5 oz Types: 25 Shots of liquor per week ROS General: negative for - chills, fatigue, fever, weight change Psych: negative for - anxiety, depression, irritability or mood swings ENT: negative for - headaches, hearing change, nasal congestion, oral lesions, sneezing or sore throat Heme/ Lymph: negative for - bleeding problems, bruising, pallor or swollen lymph nodes Endo: negative for - hot flashes, polydipsia/polyuria or temperature intolerance Resp: negative for - cough, shortness of breath or wheezing CV: negative for - chest pain, edema or palpitations GI: negative for - abdominal pain, change in bowel habits, constipation, diarrhea or nausea/vomiting : negative for - dysuria, hematuria, incontinence, pelvic pain or vulvar/vaginal symptoms MSK: negative for - joint pain, joint swelling or muscle pain Neuro: negative for - confusion, headaches, seizures or weakness Derm: negative for - dry skin, hair changes, rash or skin lesion changes Objective: 
Vitals:  
 11/26/18 0735 BP: 95/68 Pulse: 77 Resp: 20 Temp: 97.8 °F (36.6 °C) TempSrc: Oral  
SpO2: 98% Weight: 158 lb 7.2 oz (71.9 kg) Height: 5' 10\" (1.778 m) PainSc:   0 - No pain  
alert, well appearing, and in no distress General appearance - alert, well appearing, and in no distress, oriented to person, place, and time and normal appearing weight Visit Vitals BP 95/68 (BP 1 Location: Left arm, BP Patient Position: Sitting) Pulse 77 Temp 97.8 °F (36.6 °C) (Oral) Resp 20 Ht 5' 10\" (1.778 m) Wt 158 lb 7.2 oz (71.9 kg) SpO2 98% BMI 22.74 kg/m² General appearance  alert, cooperative, no distress, appears stated age Head  Normocephalic, without obvious abnormality, atraumatic Eyes  conjunctivae/corneas clear. PERRL, EOM's intact. Fundi benign Ears  normal TM's and external ear canals AU Nose Nares normal. Septum midline. Mucosa normal. No drainage or sinus tenderness.   
Throat Lips, mucosa, and tongue normal. Teeth and gums normal  
 Neck supple, symmetrical, trachea midline, no adenopathy, thyroid: not enlarged, symmetric, no tenderness/mass/nodules, no carotid bruit and no JVD Back   symmetric, no curvature. ROM normal. No CVA tenderness Lungs   clear to auscultation bilaterally Chest wall  no tenderness Heart  regular rate and rhythm, S1, S2 normal, no murmur, click, rub or gallop Abdomen   soft, non-tender. Bowel sounds normal. No masses,  No organomegaly Genitalia  Normal male Rectal  Normal tone, normal prostate, no masses or tenderness Guaiac negative stool Extremities extremities normal, atraumatic, no cyanosis or edema Pulses 2+ and symmetric Skin Skin color, texture, turgor normal. No rashes or lesions Lymph nodes Cervical, supraclavicular, and axillary nodes normal.  
Neurologic Normal  
 
 
LABS Component Latest Ref Rng & Units 11/8/2018 11/5/2018 11/4/2018  
 
      7:33 AM  4:40 AM  1:05 PM  
WBC 
    4.6 - 13.2 K/uL     
RBC 
    4.70 - 5.50 M/uL     
HGB 13.0 - 16.0 g/dL HCT 
    36.0 - 48.0 % MCV 
    74.0 - 97.0 FL     
MCH 
    24.0 - 34.0 PG     
MCHC 31.0 - 37.0 g/dL     
RDW 
    11.6 - 14.5 % PLATELET 
    968 - 868 K/uL MPV 
    9.2 - 11.8 FL     
NEUTROPHILS 
    40 - 73 % LYMPHOCYTES 
    21 - 52 % MONOCYTES 
    3 - 10 % EOSINOPHILS 
    0 - 5 % BASOPHILS 
    0 - 2 %     
ABS. NEUTROPHILS 
    1.8 - 8.0 K/UL     
ABS. LYMPHOCYTES 
    0.9 - 3.6 K/UL     
ABS. MONOCYTES 
    0.05 - 1.2 K/UL     
ABS. EOSINOPHILS 
    0.0 - 0.4 K/UL     
ABS. BASOPHILS 
    0.0 - 0.1 K/UL     
DF Lymphocytes 20 - 45 % ABSOLUTE LYMPHOCYTE COUNT 
    1.0 - 4.8 K/uL Sodium 136 - 145 mmol/L  139 Potassium 3.5 - 5.5 mmol/L  3.6 Chloride 100 - 108 mmol/L  105 CO2 
    21 - 32 mmol/L  26 Anion gap 3.0 - 18 mmol/L  8 Glucose 74 - 99 mg/dL  92 BUN 
    7.0 - 18 MG/DL  10 Creatinine 0.6 - 1.3 MG/DL  0.90   
BUN/Creatinine ratio 12 - 20    11 (L)   
GFR est AA 
    >60 ml/min/1.73m2  >60   
GFR est non-AA 
    >60 ml/min/1.73m2  >60 Calcium 8.5 - 10.1 MG/DL  8.4 (L) Bilirubin, total 
    0.2 - 1.0 MG/DL  1.5 (H) ALT (SGPT) 16 - 61 U/L  40 AST 
    15 - 37 U/L  23 Alk. phosphatase 45 - 117 U/L  113 Protein, total 
    6.4 - 8.2 g/dL  6.1 (L) Albumin 3.4 - 5.0 g/dL  3.7 Globulin 2.0 - 4.0 g/dL  2.4 A-G Ratio 
    0.8 - 1.7    1.5 GFRAA 
    >60.0 GFRNA 
    >60.0 Specific Gravity 1.005 - 1.03     
pH (UA) 
    5.0 - 8.0 pH Protein Negative, mg/dL Glucose Negative mg/dL Ketone Negative mg/dL Bilirubin Negative Blood Negative Nitrites Negative Leukocyte Esterase Negative Urobilinogen <2.0 mg/dL WBC 
    0 - 2 /hpf     
RBC Negative, /hpf Triglyceride 40 - 149 mg/dL HDL Cholesterol 40 - 59 mg/dL Cholesterol, total 
    110 - 200 mg/dL CHOLESTEROL/HDL 
    0.0 - 5.0 LDL, calculated 50 - 99 mg/dL VLDL, calculated 8 - 30 mg/dL CK 
    39 - 308 U/L   105 CK - MB 
    <3.6 ng/ml   1.9 CK-MB Index 0.0 - 4.0 %   1.8 Troponin-I, Qt. 
    0.0 - 0.045 NG/ML   0.04 VALID INTERNAL CONTROL POC Yes Prothrombin time (POC) seconds 15.0 INR 
     1.3 Uric acid 3.9 - 9.0 mg/dL Magnesium 1.6 - 2.5 mg/dL TSH 
    0.27 - 4.20 mcU/mL Prostate Specific Ag 
    <=4.100 ng/mL Digoxin level 0.9 - 2.0 NG/ML Component Latest Ref Rng & Units 11/4/2018 11/3/2018 11/3/2018  
 
      6:00 AM  8:07 PM  8:07 PM  
WBC 
    4.6 - 13.2 K/uL 8.1  12.1 RBC 
    4.70 - 5.50 M/uL 4.94  5.88 (H) HGB 13.0 - 16.0 g/dL 16.1 (H)  19.4 (H) HCT 
    36.0 - 48.0 % 46.1  55.0 (H) MCV 
    74.0 - 97.0 FL 93.3  93.9 29 [facility] 
 24.0 - 34.0 PG 32.6  33.0 MCHC 31.0 - 37.0 g/dL 34.9  35.1 RDW 
    11.6 - 14.5 % 13.0  13.0 PLATELET 
    287 - 542 K/uL 202  239 MPV 
    9.2 - 11.8 FL 10.5  10.7 NEUTROPHILS 
    40 - 73 % 71  69 LYMPHOCYTES 
    21 - 52 % 15 (L)  20 (L) MONOCYTES 
    3 - 10 % 12 (H)  9  
EOSINOPHILS 
    0 - 5 % 1  1  
BASOPHILS 
    0 - 2 % 1  1  
ABS. NEUTROPHILS 
    1.8 - 8.0 K/UL 5.7  8.5 (H)  
ABS. LYMPHOCYTES 
    0.9 - 3.6 K/UL 1.2  2.4  
ABS. MONOCYTES 
    0.05 - 1.2 K/UL 1.0  1.1  
ABS. EOSINOPHILS 
    0.0 - 0.4 K/UL 0.1  0.1 ABS. BASOPHILS 
    0.0 - 0.1 K/UL 0.0  0.1 DF 
     AUTOMATED  AUTOMATED Lymphocytes 20 - 45 % ABSOLUTE LYMPHOCYTE COUNT 
    1.0 - 4.8 K/uL Sodium 136 - 145 mmol/L Potassium 3.5 - 5.5 mmol/L Chloride 100 - 108 mmol/L     
CO2 
    21 - 32 mmol/L Anion gap 3.0 - 18 mmol/L Glucose 74 - 99 mg/dL BUN 
    7.0 - 18 MG/DL Creatinine 
    0.6 - 1.3 MG/DL     
BUN/Creatinine ratio 12 - 20 GFR est AA 
    >60 ml/min/1.73m2 GFR est non-AA 
    >60 ml/min/1.73m2 Calcium 8.5 - 10.1 MG/DL Bilirubin, total 
    0.2 - 1.0 MG/DL     
ALT (SGPT) 16 - 61 U/L     
AST 
    15 - 37 U/L Alk. phosphatase 45 - 117 U/L Protein, total 
    6.4 - 8.2 g/dL Albumin 3.4 - 5.0 g/dL Globulin 2.0 - 4.0 g/dL A-G Ratio 
    0.8 - 1.7 GFRAA 
    >60.0 GFRNA 
    >60.0 Specific Gravity 1.005 - 1.03     
pH (UA) 
    5.0 - 8.0 pH Protein Negative, mg/dL Glucose Negative mg/dL Ketone Negative mg/dL Bilirubin Negative Blood Negative Nitrites Negative Leukocyte Esterase Negative Urobilinogen <2.0 mg/dL WBC 
    0 - 2 /hpf     
RBC Negative, /hpf Triglyceride 40 - 149 mg/dL HDL Cholesterol 40 - 59 mg/dL Cholesterol, total 
 110 - 200 mg/dL CHOLESTEROL/HDL 
    0.0 - 5.0 LDL, calculated 50 - 99 mg/dL VLDL, calculated 8 - 30 mg/dL CK 
    39 - 308 U/L     
CK - MB 
    <3.6 ng/ml CK-MB Index 0.0 - 4.0 % Troponin-I, Qt. 
    0.0 - 0.045 NG/ML     
VALID INTERNAL CONTROL POC Prothrombin time (POC) seconds INR Uric acid 3.9 - 9.0 mg/dL Magnesium 1.6 - 2.5 mg/dL TSH 
    0.27 - 4.20 mcU/mL Prostate Specific Ag 
    <=4.100 ng/mL Digoxin level 0.9 - 2.0 NG/ML  0.3 (L) Component Latest Ref Rng & Units 10/25/2018 10/25/2018 10/25/2018  
 
      8:38 AM  8:38 AM  8:38 AM  
WBC 
    4.6 - 13.2 K/uL  5.2   
RBC 
    4.70 - 5.50 M/uL  5.08   
HGB 13.0 - 16.0 g/dL  16.6 HCT 
    36.0 - 48.0 %  49.7 MCV 
    74.0 - 97.0 FL  98 (H) MCH 
    24.0 - 34.0 PG  33 MCHC 31.0 - 37.0 g/dL  33 RDW 
    11.6 - 14.5 %  14.1 PLATELET 
    775 - 062 K/uL  234 MPV 
    9.2 - 11.8 FL  11.6 NEUTROPHILS 
    40 - 73 %  55 LYMPHOCYTES 
    21 - 52 % MONOCYTES 
    3 - 10 %  12 EOSINOPHILS 
    0 - 5 %  2   
BASOPHILS 
    0 - 2 %  1   
ABS. NEUTROPHILS 
    1.8 - 8.0 K/UL  2.9   
ABS. LYMPHOCYTES 
    0.9 - 3.6 K/UL     
ABS. MONOCYTES 
    0.05 - 1.2 K/UL  0.6   
ABS. EOSINOPHILS 
    0.0 - 0.4 K/UL  0.1 ABS. BASOPHILS 
    0.0 - 0.1 K/UL  0.1 DF Lymphocytes 20 - 45 %  29 ABSOLUTE LYMPHOCYTE COUNT 
    1.0 - 4.8 K/uL  1.5 Sodium 136 - 145 mmol/L   145 Potassium 3.5 - 5.5 mmol/L   4.2 Chloride 100 - 108 mmol/L   100 CO2 
    21 - 32 mmol/L   18 (L) Anion gap 3.0 - 18 mmol/L   27.0 Glucose 74 - 99 mg/dL   110 (H) BUN 
    7.0 - 18 MG/DL   11 Creatinine 
    0.6 - 1.3 MG/DL   1.0  
BUN/Creatinine ratio 12 - 20 GFR est AA 
    >60 ml/min/1.73m2 GFR est non-AA 
    >60 ml/min/1.73m2 Calcium 8.5 - 10.1 MG/DL   10.0 Bilirubin, total 
    0.2 - 1.0 MG/DL   0.6 ALT (SGPT) 16 - 61 U/L   35 AST 
    15 - 37 U/L   41 (H) Alk. phosphatase 45 - 117 U/L   123 Protein, total 
    6.4 - 8.2 g/dL   7.1 Albumin 3.4 - 5.0 g/dL   4.9 Globulin 2.0 - 4.0 g/dL   2.2 A-G Ratio 
    0.8 - 1.7     2.2 GFRAA 
    >60.0   >60.0 GFRNA 
    >60.0   >60.0 Specific Gravity 1.005 - 1.03     
pH (UA) 
    5.0 - 8.0 pH Protein Negative, mg/dL Glucose Negative mg/dL Ketone Negative mg/dL Bilirubin Negative Blood Negative Nitrites Negative Leukocyte Esterase Negative Urobilinogen <2.0 mg/dL WBC 
    0 - 2 /hpf     
RBC Negative, /hpf Triglyceride 40 - 149 mg/dL 152 (H) HDL Cholesterol 40 - 59 mg/dL 63 (H) Cholesterol, total 
    110 - 200 mg/dL 245 (H) CHOLESTEROL/HDL 
    0.0 - 5.0 3.9 LDL, calculated 50 - 99 mg/dL 151 (H) VLDL, calculated 8 - 30 mg/dL 30 CK 
    39 - 308 U/L     
CK - MB 
    <3.6 ng/ml CK-MB Index 0.0 - 4.0 % Troponin-I, Qt. 
    0.0 - 0.045 NG/ML     
VALID INTERNAL CONTROL POC Prothrombin time (POC) seconds INR Uric acid 3.9 - 9.0 mg/dL Magnesium 1.6 - 2.5 mg/dL TSH 
    0.27 - 4.20 mcU/mL Prostate Specific Ag 
    <=4.100 ng/mL Digoxin level 0.9 - 2.0 NG/ML Component Latest Ref Rng & Units 10/25/2018 10/25/2018 10/25/2018  
 
      8:38 AM  8:38 AM  8:38 AM  
WBC 
    4.6 - 13.2 K/uL     
RBC 
    4.70 - 5.50 M/uL     
HGB 13.0 - 16.0 g/dL HCT 
    36.0 - 48.0 % MCV 
    74.0 - 97.0 FL     
MCH 
    24.0 - 34.0 PG     
MCHC 31.0 - 37.0 g/dL     
RDW 
    11.6 - 14.5 % PLATELET 
    890 - 168 K/uL MPV 
    9.2 - 11.8 FL     
NEUTROPHILS 
    40 - 73 % LYMPHOCYTES 
    21 - 52 % MONOCYTES 
 3 - 10 % EOSINOPHILS 
    0 - 5 % BASOPHILS 
    0 - 2 %     
ABS. NEUTROPHILS 
    1.8 - 8.0 K/UL     
ABS. LYMPHOCYTES 
    0.9 - 3.6 K/UL     
ABS. MONOCYTES 
    0.05 - 1.2 K/UL     
ABS. EOSINOPHILS 
    0.0 - 0.4 K/UL     
ABS. BASOPHILS 
    0.0 - 0.1 K/UL     
DF Lymphocytes 20 - 45 % ABSOLUTE LYMPHOCYTE COUNT 
    1.0 - 4.8 K/uL Sodium 136 - 145 mmol/L Potassium 3.5 - 5.5 mmol/L Chloride 100 - 108 mmol/L     
CO2 
    21 - 32 mmol/L Anion gap 3.0 - 18 mmol/L Glucose 74 - 99 mg/dL BUN 
    7.0 - 18 MG/DL Creatinine 
    0.6 - 1.3 MG/DL     
BUN/Creatinine ratio 12 - 20 GFR est AA 
    >60 ml/min/1.73m2 GFR est non-AA 
    >60 ml/min/1.73m2 Calcium 8.5 - 10.1 MG/DL Bilirubin, total 
    0.2 - 1.0 MG/DL     
ALT (SGPT) 16 - 61 U/L     
AST 
    15 - 37 U/L Alk. phosphatase 45 - 117 U/L Protein, total 
    6.4 - 8.2 g/dL Albumin 3.4 - 5.0 g/dL Globulin 2.0 - 4.0 g/dL A-G Ratio 
    0.8 - 1.7 GFRAA 
    >60.0 GFRNA 
    >60.0 Specific Gravity 1.005 - 1.03     
pH (UA) 
    5.0 - 8.0 pH Protein Negative, mg/dL Glucose Negative mg/dL Ketone Negative mg/dL Bilirubin Negative Blood Negative Nitrites Negative Leukocyte Esterase Negative Urobilinogen <2.0 mg/dL WBC 
    0 - 2 /hpf     
RBC Negative, /hpf Triglyceride 40 - 149 mg/dL HDL Cholesterol 40 - 59 mg/dL Cholesterol, total 
    110 - 200 mg/dL CHOLESTEROL/HDL 
    0.0 - 5.0 LDL, calculated 50 - 99 mg/dL VLDL, calculated 8 - 30 mg/dL CK 
    39 - 308 U/L     
CK - MB 
    <3.6 ng/ml CK-MB Index 0.0 - 4.0 % Troponin-I, Qt. 
    0.0 - 0.045 NG/ML     
VALID INTERNAL CONTROL POC Prothrombin time (POC) seconds INR Uric acid 3.9 - 9.0 mg/dL   7.9 Magnesium 1.6 - 2.5 mg/dL  2.0 TSH 
    0.27 - 4.20 mcU/mL Prostate Specific Ag 
    <=4.100 ng/mL 0.965 Digoxin level 0.9 - 2.0 NG/ML Component Latest Ref Rng & Units 10/25/2018 10/25/2018  
 
      8:38 AM  8:38 AM  
WBC 
    4.6 - 13.2 K/uL    
RBC 
    4.70 - 5.50 M/uL    
HGB 13.0 - 16.0 g/dL HCT 
    36.0 - 48.0 % MCV 
    74.0 - 97.0 FL    
MCH 
    24.0 - 34.0 PG    
MCHC 31.0 - 37.0 g/dL    
RDW 
    11.6 - 14.5 % PLATELET 
    976 - 689 K/uL MPV 
    9.2 - 11.8 FL    
NEUTROPHILS 
    40 - 73 % LYMPHOCYTES 
    21 - 52 % MONOCYTES 
    3 - 10 % EOSINOPHILS 
    0 - 5 % BASOPHILS 
    0 - 2 %    
ABS. NEUTROPHILS 
    1.8 - 8.0 K/UL    
ABS. LYMPHOCYTES 
    0.9 - 3.6 K/UL    
ABS. MONOCYTES 
    0.05 - 1.2 K/UL    
ABS. EOSINOPHILS 
    0.0 - 0.4 K/UL    
ABS. BASOPHILS 
    0.0 - 0.1 K/UL    
DF Lymphocytes 20 - 45 % ABSOLUTE LYMPHOCYTE COUNT 
    1.0 - 4.8 K/uL Sodium 136 - 145 mmol/L Potassium 3.5 - 5.5 mmol/L Chloride 100 - 108 mmol/L    
CO2 
    21 - 32 mmol/L Anion gap 3.0 - 18 mmol/L Glucose 74 - 99 mg/dL BUN 
    7.0 - 18 MG/DL Creatinine 
    0.6 - 1.3 MG/DL    
BUN/Creatinine ratio 12 - 20 GFR est AA 
    >60 ml/min/1.73m2 GFR est non-AA 
    >60 ml/min/1.73m2 Calcium 8.5 - 10.1 MG/DL Bilirubin, total 
    0.2 - 1.0 MG/DL    
ALT (SGPT) 16 - 61 U/L    
AST 
    15 - 37 U/L Alk. phosphatase 45 - 117 U/L Protein, total 
    6.4 - 8.2 g/dL Albumin 3.4 - 5.0 g/dL Globulin 2.0 - 4.0 g/dL A-G Ratio 
    0.8 - 1.7 GFRAA 
    >60.0 GFRNA 
    >60.0 Specific Gravity 1.005 - 1.03 1.018   
pH (UA) 
    5.0 - 8.0 pH 6.0 Protein Negative, mg/dL Negative Glucose Negative mg/dL Negative Ketone Negative mg/dL Negative Bilirubin Negative Negative Blood Negative Negative Nitrites Negative Negative Leukocyte Esterase Negative Negative Urobilinogen <2.0 mg/dL 2.0 WBC 
    0 - 2 /hpf 0-2 RBC Negative, /hpf 0-2 Triglyceride 40 - 149 mg/dL HDL Cholesterol 40 - 59 mg/dL Cholesterol, total 
    110 - 200 mg/dL CHOLESTEROL/HDL 
    0.0 - 5.0 LDL, calculated 50 - 99 mg/dL VLDL, calculated 8 - 30 mg/dL CK 
    39 - 308 U/L    
CK - MB 
    <3.6 ng/ml CK-MB Index 0.0 - 4.0 % Troponin-I, Qt. 
    0.0 - 0.045 NG/ML    
VALID INTERNAL CONTROL POC Prothrombin time (POC) seconds INR Uric acid 3.9 - 9.0 mg/dL Magnesium 1.6 - 2.5 mg/dL TSH 
    0.27 - 4.20 mcU/mL  0.46 Prostate Specific Ag 
    <=4.100 ng/mL Digoxin level 0.9 - 2.0 NG/ML    
 
TESTS 
ekg  nsr Assessment/Plan:  Healthy patient except as noted below: 
 
Health Maintenance up to date. Recommend f/u physical 1 year. Routine screening labs/tests recommended prior to next physical. 
 
 
Lab review: labs are reviewed, up to date and normal 
 
 
 
I have discussed the diagnosis with the patient and the intended plan as seen in the above orders. The patient has received an after-visit summary and questions were answered concerning future plans. I have discussed medication side effects and warnings with the patient as well. I have reviewed the plan of care with the patient, accepted their input and they are in agreement with the treatment goals. Follow-up Disposition: 
Return in about 4 weeks (around 12/24/2018) for EOV, inr next visit. 
 
------------------------------------------------------------------------------------------------------------------- 
 
Problem Assessment 
and also with Chief Complaint Patient presents with  Irregular Heart Beat  Arthritis  Cholesterol Problem  Thyroid Problem  Hypertension HPI ; 
Cardiovascular Review: 
The patient has hypertension, hyperlipidemia and Atrial Fibrillation. Diet and Lifestyle: not attempting to follow a low fat, low cholesterol diet, not attempting to follow a low sodium diet Home BP Monitoring: is not measured at home. Pertinent ROS: taking medications as instructed, no medication side effects noted, no TIA's, no chest pain on exertion, no dyspnea on exertion, no swelling of ankles. Thyroid Review: 
Patient is seen for followup of goiter. Thyroid ROS: denies fatigue, weight changes, heat/cold intolerance, bowel/skin changes or CVS symptoms. Osteoarthritis and Chronic Pain: 
Patient has osteoarthritis, primarily affecting the diffuse. Symptoms onset: problem is longstanding. Rheumatological ROS: no current joint or muscle symptoms, essentially pain-free. Response to treatment plan: stable. Alcoholism ongoing Additional Concerns:   
 
 
 
Assessment/Plan: Hypertension - stable Hyperlipidemia - stable Atrial fib controlled 
arthirris ongiong Goiter stable Alcoholism onigiong Diagnoses and all orders for this visit: 
 
1. Hypertension, unspecified type -     AMB POC EKG ROUTINE W/ 12 LEADS, INTER & REP 2. Chronic atrial fibrillation (HCC) -     AMB POC PT/INR 
-     vitamin k 100 mcg tablet; Take 1 Tab by mouth daily. -     warfarin (COUMADIN) 4 mg tablet; Take 1 Tab by mouth daily. -     digoxin (LANOXIN) 0.125 mg tablet; Take 1 Tab by mouth every fourty-eight (48) hours. -     dilTIAZem CD (CARDIZEM CD) 180 mg ER capsule; Take 1 Cap by mouth nightly. -     dilTIAZem CD (CARDIZEM CD) 240 mg ER capsule; Take 1 Cap by mouth daily. 
-     magnesium oxide 200 mg magnesium tab; Take 200 mg by mouth daily. 3. Nontoxic multinodular goiter 
-     vitamin k 100 mcg tablet; Take 1 Tab by mouth daily. -     warfarin (COUMADIN) 4 mg tablet; Take 1 Tab by mouth daily. -     digoxin (LANOXIN) 0.125 mg tablet; Take 1 Tab by mouth every fourty-eight (48) hours. -     dilTIAZem CD (CARDIZEM CD) 180 mg ER capsule; Take 1 Cap by mouth nightly. -     dilTIAZem CD (CARDIZEM CD) 240 mg ER capsule; Take 1 Cap by mouth daily. 
-     magnesium oxide 200 mg magnesium tab; Take 200 mg by mouth daily. 4. Elevated cholesterol 
-     vitamin k 100 mcg tablet; Take 1 Tab by mouth daily. -     warfarin (COUMADIN) 4 mg tablet; Take 1 Tab by mouth daily. -     digoxin (LANOXIN) 0.125 mg tablet; Take 1 Tab by mouth every fourty-eight (48) hours. -     dilTIAZem CD (CARDIZEM CD) 180 mg ER capsule; Take 1 Cap by mouth nightly. -     dilTIAZem CD (CARDIZEM CD) 240 mg ER capsule; Take 1 Cap by mouth daily. 
-     magnesium oxide 200 mg magnesium tab; Take 200 mg by mouth daily. 5. Essential hypertension, malignant 
-     vitamin k 100 mcg tablet; Take 1 Tab by mouth daily. -     warfarin (COUMADIN) 4 mg tablet; Take 1 Tab by mouth daily. -     digoxin (LANOXIN) 0.125 mg tablet; Take 1 Tab by mouth every fourty-eight (48) hours. -     dilTIAZem CD (CARDIZEM CD) 180 mg ER capsule; Take 1 Cap by mouth nightly. -     dilTIAZem CD (CARDIZEM CD) 240 mg ER capsule; Take 1 Cap by mouth daily. 
-     magnesium oxide 200 mg magnesium tab; Take 200 mg by mouth daily. 6. Arthritis 
-     vitamin k 100 mcg tablet; Take 1 Tab by mouth daily. -     warfarin (COUMADIN) 4 mg tablet; Take 1 Tab by mouth daily. -     digoxin (LANOXIN) 0.125 mg tablet; Take 1 Tab by mouth every fourty-eight (48) hours. -     dilTIAZem CD (CARDIZEM CD) 180 mg ER capsule; Take 1 Cap by mouth nightly. -     dilTIAZem CD (CARDIZEM CD) 240 mg ER capsule; Take 1 Cap by mouth daily. 
-     magnesium oxide 200 mg magnesium tab; Take 200 mg by mouth daily. 7. Routine general medical examination at a health care facility Other orders 
-     nitroglycerin (NITROSTAT) 0.4 mg SL tablet; 1 Tab by SubLINGual route as needed for Chest Pain.  
 
 
 
 
Lab review: labs are reviewed, up to date and normal

## 2018-11-27 ENCOUNTER — APPOINTMENT (OUTPATIENT)
Dept: GENERAL RADIOLOGY | Age: 63
DRG: 292 | End: 2018-11-27
Attending: EMERGENCY MEDICINE
Payer: COMMERCIAL

## 2018-11-27 ENCOUNTER — HOSPITAL ENCOUNTER (INPATIENT)
Age: 63
LOS: 1 days | Discharge: HOME | DRG: 292 | End: 2018-11-28
Attending: EMERGENCY MEDICINE | Admitting: HOSPITALIST
Payer: COMMERCIAL

## 2018-11-27 ENCOUNTER — APPOINTMENT (OUTPATIENT)
Dept: NON INVASIVE DIAGNOSTICS | Age: 63
DRG: 292 | End: 2018-11-27
Attending: HOSPITALIST
Payer: COMMERCIAL

## 2018-11-27 DIAGNOSIS — I50.9 ACUTE CONGESTIVE HEART FAILURE, UNSPECIFIED HEART FAILURE TYPE (HCC): Primary | ICD-10-CM

## 2018-11-27 DIAGNOSIS — E78.00 ELEVATED CHOLESTEROL: ICD-10-CM

## 2018-11-27 DIAGNOSIS — I10 ESSENTIAL HYPERTENSION, MALIGNANT: ICD-10-CM

## 2018-11-27 DIAGNOSIS — M19.90 ARTHRITIS: ICD-10-CM

## 2018-11-27 DIAGNOSIS — I48.20 CHRONIC ATRIAL FIBRILLATION (HCC): ICD-10-CM

## 2018-11-27 DIAGNOSIS — E04.2 NONTOXIC MULTINODULAR GOITER: ICD-10-CM

## 2018-11-27 DIAGNOSIS — I48.91 ATRIAL FIBRILLATION WITH RAPID VENTRICULAR RESPONSE (HCC): ICD-10-CM

## 2018-11-27 LAB
ALBUMIN SERPL-MCNC: 3.7 G/DL (ref 3.4–5)
ALBUMIN/GLOB SERPL: 1.4 {RATIO} (ref 0.8–1.7)
ALP SERPL-CCNC: 114 U/L (ref 45–117)
ALT SERPL-CCNC: 31 U/L (ref 16–61)
ANION GAP SERPL CALC-SCNC: 8 MMOL/L (ref 3–18)
AST SERPL-CCNC: 24 U/L (ref 15–37)
BASOPHILS # BLD: 0 K/UL (ref 0–0.1)
BASOPHILS NFR BLD: 0 % (ref 0–2)
BILIRUB SERPL-MCNC: 1.1 MG/DL (ref 0.2–1)
BNP SERPL-MCNC: 2261 PG/ML (ref 0–900)
BUN SERPL-MCNC: 10 MG/DL (ref 7–18)
BUN/CREAT SERPL: 12 (ref 12–20)
CALCIUM SERPL-MCNC: 8.5 MG/DL (ref 8.5–10.1)
CHLORIDE SERPL-SCNC: 105 MMOL/L (ref 100–108)
CK MB CFR SERPL CALC: 1.7 % (ref 0–4)
CK MB SERPL-MCNC: 2.2 NG/ML (ref 5–25)
CK SERPL-CCNC: 126 U/L (ref 39–308)
CO2 SERPL-SCNC: 26 MMOL/L (ref 21–32)
CREAT SERPL-MCNC: 0.81 MG/DL (ref 0.6–1.3)
DIFFERENTIAL METHOD BLD: ABNORMAL
DIGOXIN SERPL-MCNC: 0.4 NG/ML (ref 0.9–2)
ECHO AO ROOT DIAM: 3.11 CM
ECHO AV PEAK GRADIENT: 0 MMHG
ECHO AV PEAK VELOCITY: 0 CM/S
ECHO EST RA PRESSURE: 10 MMHG
ECHO LA VOL 2C: 72.13 ML (ref 18–58)
ECHO LA VOL 4C: 95.35 ML (ref 18–58)
ECHO LA VOLUME INDEX A2C: 38.2 ML/M2 (ref 16–28)
ECHO LA VOLUME INDEX A4C: 50.49 ML/M2 (ref 16–28)
ECHO LV EDV A2C: 155.3 ML
ECHO LV EDV A4C: 134.9 ML
ECHO LV EDV BP: 145.6 ML (ref 67–155)
ECHO LV EDV INDEX A4C: 71.4 ML/M2
ECHO LV EDV INDEX BP: 77.1 ML/M2
ECHO LV EDV NDEX A2C: 82.2 ML/M2
ECHO LV EJECTION FRACTION A2C: 46 %
ECHO LV EJECTION FRACTION A4C: 18 %
ECHO LV EJECTION FRACTION BIPLANE: 33.8 % (ref 55–100)
ECHO LV ESV A2C: 83.6 ML
ECHO LV ESV A4C: 110.3 ML
ECHO LV ESV BP: 96.4 ML (ref 22–58)
ECHO LV ESV INDEX A2C: 44.3 ML/M2
ECHO LV ESV INDEX A4C: 58.4 ML/M2
ECHO LV ESV INDEX BP: 51.1 ML/M2
ECHO LV INTERNAL DIMENSION DIASTOLIC: 5.06 CM (ref 4.2–5.9)
ECHO LV INTERNAL DIMENSION SYSTOLIC: 4.74 CM
ECHO LV IVSD: 0.9 CM (ref 0.6–1)
ECHO LV MASS 2D: 157.8 G (ref 88–224)
ECHO LV MASS INDEX 2D: 83.6 G/M2 (ref 49–115)
ECHO LV POSTERIOR WALL DIASTOLIC: 0.7 CM (ref 0.6–1)
ECHO LVOT DIAM: 2.39 CM
ECHO LVOT PEAK GRADIENT: 3.8 MMHG
ECHO LVOT PEAK VELOCITY: 97.28 CM/S
ECHO PULMONARY ARTERY SYSTOLIC PRESSURE (PASP): 24 MMHG
ECHO RIGHT VENTRICULAR SYSTOLIC PRESSURE (RVSP): 10 MMHG
ECHO TV REGURGITANT MAX VELOCITY: 0 CM/S
ECHO TV REGURGITANT PEAK GRADIENT: 0 MMHG
EOSINOPHIL # BLD: 0 K/UL (ref 0–0.4)
EOSINOPHIL NFR BLD: 0 % (ref 0–5)
ERYTHROCYTE [DISTWIDTH] IN BLOOD BY AUTOMATED COUNT: 12.9 % (ref 11.6–14.5)
ETHANOL SERPL-MCNC: <3 MG/DL (ref 0–3)
GLOBULIN SER CALC-MCNC: 2.7 G/DL (ref 2–4)
GLUCOSE BLD STRIP.AUTO-MCNC: 109 MG/DL (ref 70–110)
GLUCOSE SERPL-MCNC: 105 MG/DL (ref 74–99)
HCT VFR BLD AUTO: 46 % (ref 36–48)
HGB BLD-MCNC: 16 G/DL (ref 13–16)
INR PPP: 2 (ref 0.8–1.2)
LYMPHOCYTES # BLD: 1.1 K/UL (ref 0.9–3.6)
LYMPHOCYTES NFR BLD: 12 % (ref 21–52)
MAGNESIUM SERPL-MCNC: 1.7 MG/DL (ref 1.6–2.6)
MCH RBC QN AUTO: 32.3 PG (ref 24–34)
MCHC RBC AUTO-ENTMCNC: 34.8 G/DL (ref 31–37)
MCV RBC AUTO: 92.9 FL (ref 74–97)
MONOCYTES # BLD: 0.8 K/UL (ref 0.05–1.2)
MONOCYTES NFR BLD: 8 % (ref 3–10)
NEUTS SEG # BLD: 7.5 K/UL (ref 1.8–8)
NEUTS SEG NFR BLD: 80 % (ref 40–73)
PLATELET # BLD AUTO: 240 K/UL (ref 135–420)
PMV BLD AUTO: 10.9 FL (ref 9.2–11.8)
POTASSIUM SERPL-SCNC: 3.4 MMOL/L (ref 3.5–5.5)
PROT SERPL-MCNC: 6.4 G/DL (ref 6.4–8.2)
PROTHROMBIN TIME: 23 SEC (ref 11.5–15.2)
RBC # BLD AUTO: 4.95 M/UL (ref 4.7–5.5)
SODIUM SERPL-SCNC: 139 MMOL/L (ref 136–145)
TROPONIN I SERPL-MCNC: 0.06 NG/ML (ref 0–0.04)
TROPONIN I SERPL-MCNC: 0.06 NG/ML (ref 0–0.04)
WBC # BLD AUTO: 9.5 K/UL (ref 4.6–13.2)

## 2018-11-27 PROCEDURE — 82962 GLUCOSE BLOOD TEST: CPT

## 2018-11-27 PROCEDURE — 74011250636 HC RX REV CODE- 250/636: Performed by: PHYSICIAN ASSISTANT

## 2018-11-27 PROCEDURE — 65660000000 HC RM CCU STEPDOWN

## 2018-11-27 PROCEDURE — 96375 TX/PRO/DX INJ NEW DRUG ADDON: CPT

## 2018-11-27 PROCEDURE — 83735 ASSAY OF MAGNESIUM: CPT

## 2018-11-27 PROCEDURE — 99285 EMERGENCY DEPT VISIT HI MDM: CPT

## 2018-11-27 PROCEDURE — 71045 X-RAY EXAM CHEST 1 VIEW: CPT

## 2018-11-27 PROCEDURE — 77030021352 HC CBL LD SYS DISP COVD -B

## 2018-11-27 PROCEDURE — 77010033678 HC OXYGEN DAILY

## 2018-11-27 PROCEDURE — 83880 ASSAY OF NATRIURETIC PEPTIDE: CPT

## 2018-11-27 PROCEDURE — 74011000250 HC RX REV CODE- 250: Performed by: EMERGENCY MEDICINE

## 2018-11-27 PROCEDURE — 74011250637 HC RX REV CODE- 250/637: Performed by: PHYSICIAN ASSISTANT

## 2018-11-27 PROCEDURE — 82553 CREATINE MB FRACTION: CPT

## 2018-11-27 PROCEDURE — 84484 ASSAY OF TROPONIN QUANT: CPT

## 2018-11-27 PROCEDURE — 80053 COMPREHEN METABOLIC PANEL: CPT

## 2018-11-27 PROCEDURE — 74011000258 HC RX REV CODE- 258: Performed by: EMERGENCY MEDICINE

## 2018-11-27 PROCEDURE — 96365 THER/PROPH/DIAG IV INF INIT: CPT

## 2018-11-27 PROCEDURE — 80162 ASSAY OF DIGOXIN TOTAL: CPT

## 2018-11-27 PROCEDURE — 74011250636 HC RX REV CODE- 250/636: Performed by: EMERGENCY MEDICINE

## 2018-11-27 PROCEDURE — 93306 TTE W/DOPPLER COMPLETE: CPT

## 2018-11-27 PROCEDURE — 93005 ELECTROCARDIOGRAM TRACING: CPT

## 2018-11-27 PROCEDURE — 85025 COMPLETE CBC W/AUTO DIFF WBC: CPT

## 2018-11-27 PROCEDURE — 74011250637 HC RX REV CODE- 250/637: Performed by: HOSPITALIST

## 2018-11-27 PROCEDURE — 74011250637 HC RX REV CODE- 250/637: Performed by: EMERGENCY MEDICINE

## 2018-11-27 PROCEDURE — 80307 DRUG TEST PRSMV CHEM ANLYZR: CPT

## 2018-11-27 PROCEDURE — 85610 PROTHROMBIN TIME: CPT

## 2018-11-27 RX ORDER — DILTIAZEM HYDROCHLORIDE 180 MG/1
180 CAPSULE, COATED, EXTENDED RELEASE ORAL
Status: DISCONTINUED | OUTPATIENT
Start: 2018-11-27 | End: 2018-11-28

## 2018-11-27 RX ORDER — SODIUM CHLORIDE 0.9 % (FLUSH) 0.9 %
5-10 SYRINGE (ML) INJECTION AS NEEDED
Status: DISCONTINUED | OUTPATIENT
Start: 2018-11-27 | End: 2018-11-28 | Stop reason: HOSPADM

## 2018-11-27 RX ORDER — WARFARIN 2 MG/1
4 TABLET ORAL DAILY
Status: DISCONTINUED | OUTPATIENT
Start: 2018-11-27 | End: 2018-11-28 | Stop reason: HOSPADM

## 2018-11-27 RX ORDER — SODIUM CHLORIDE 900 MG/100ML
INJECTION INTRAVENOUS
Status: DISCONTINUED
Start: 2018-11-27 | End: 2018-11-27

## 2018-11-27 RX ORDER — DILTIAZEM HYDROCHLORIDE 5 MG/ML
15 INJECTION INTRAVENOUS
Status: COMPLETED | OUTPATIENT
Start: 2018-11-27 | End: 2018-11-27

## 2018-11-27 RX ORDER — DIGOXIN 0.25 MG/ML
250 INJECTION INTRAMUSCULAR; INTRAVENOUS
Status: COMPLETED | OUTPATIENT
Start: 2018-11-27 | End: 2018-11-27

## 2018-11-27 RX ORDER — LORAZEPAM 2 MG/ML
1 INJECTION INTRAMUSCULAR
Status: DISCONTINUED | OUTPATIENT
Start: 2018-11-27 | End: 2018-11-28 | Stop reason: HOSPADM

## 2018-11-27 RX ORDER — CARVEDILOL 3.12 MG/1
3.12 TABLET ORAL 2 TIMES DAILY WITH MEALS
Status: DISCONTINUED | OUTPATIENT
Start: 2018-11-28 | End: 2018-11-28

## 2018-11-27 RX ORDER — LORAZEPAM 2 MG/ML
3 INJECTION INTRAMUSCULAR
Status: DISCONTINUED | OUTPATIENT
Start: 2018-11-27 | End: 2018-11-28 | Stop reason: HOSPADM

## 2018-11-27 RX ORDER — DILTIAZEM HYDROCHLORIDE 100 MG/1
INJECTION, POWDER, LYOPHILIZED, FOR SOLUTION INTRAVENOUS
Status: DISCONTINUED
Start: 2018-11-27 | End: 2018-11-27

## 2018-11-27 RX ORDER — MAGNESIUM SULFATE HEPTAHYDRATE 40 MG/ML
2 INJECTION, SOLUTION INTRAVENOUS ONCE
Status: COMPLETED | OUTPATIENT
Start: 2018-11-27 | End: 2018-11-27

## 2018-11-27 RX ORDER — DILTIAZEM HYDROCHLORIDE 5 MG/ML
INJECTION INTRAVENOUS
Status: DISCONTINUED
Start: 2018-11-27 | End: 2018-11-27

## 2018-11-27 RX ORDER — SODIUM CHLORIDE 0.9 % (FLUSH) 0.9 %
5-10 SYRINGE (ML) INJECTION EVERY 8 HOURS
Status: DISCONTINUED | OUTPATIENT
Start: 2018-11-27 | End: 2018-11-28 | Stop reason: HOSPADM

## 2018-11-27 RX ORDER — LORAZEPAM 1 MG/1
1 TABLET ORAL
Status: DISCONTINUED | OUTPATIENT
Start: 2018-11-27 | End: 2018-11-28 | Stop reason: HOSPADM

## 2018-11-27 RX ORDER — POTASSIUM CHLORIDE 20 MEQ/1
40 TABLET, EXTENDED RELEASE ORAL
Status: COMPLETED | OUTPATIENT
Start: 2018-11-27 | End: 2018-11-27

## 2018-11-27 RX ORDER — FUROSEMIDE 10 MG/ML
20 INJECTION INTRAMUSCULAR; INTRAVENOUS
Status: COMPLETED | OUTPATIENT
Start: 2018-11-27 | End: 2018-11-27

## 2018-11-27 RX ORDER — DILTIAZEM HYDROCHLORIDE 120 MG/1
120 CAPSULE, COATED, EXTENDED RELEASE ORAL
Status: COMPLETED | OUTPATIENT
Start: 2018-11-27 | End: 2018-11-27

## 2018-11-27 RX ORDER — DILTIAZEM HYDROCHLORIDE 240 MG/1
240 CAPSULE, COATED, EXTENDED RELEASE ORAL DAILY
Status: DISCONTINUED | OUTPATIENT
Start: 2018-11-27 | End: 2018-11-27

## 2018-11-27 RX ORDER — FOLIC ACID 1 MG/1
1 TABLET ORAL DAILY
Status: DISCONTINUED | OUTPATIENT
Start: 2018-11-27 | End: 2018-11-28 | Stop reason: HOSPADM

## 2018-11-27 RX ORDER — FUROSEMIDE 10 MG/ML
40 INJECTION INTRAMUSCULAR; INTRAVENOUS DAILY
Status: DISCONTINUED | OUTPATIENT
Start: 2018-11-28 | End: 2018-11-28

## 2018-11-27 RX ORDER — ASPIRIN 325 MG/1
100 TABLET, FILM COATED ORAL DAILY
Status: DISCONTINUED | OUTPATIENT
Start: 2018-11-27 | End: 2018-11-28 | Stop reason: HOSPADM

## 2018-11-27 RX ORDER — THERA TABS 400 MCG
1 TAB ORAL DAILY
Status: DISCONTINUED | OUTPATIENT
Start: 2018-11-27 | End: 2018-11-28 | Stop reason: HOSPADM

## 2018-11-27 RX ORDER — FUROSEMIDE 40 MG/5ML
20 SOLUTION ORAL ONCE
Status: COMPLETED | OUTPATIENT
Start: 2018-11-27 | End: 2018-11-27

## 2018-11-27 RX ORDER — DIGOXIN 125 MCG
0.12 TABLET ORAL
Status: DISCONTINUED | OUTPATIENT
Start: 2018-11-27 | End: 2018-11-27

## 2018-11-27 RX ORDER — LORAZEPAM 1 MG/1
2 TABLET ORAL
Status: DISCONTINUED | OUTPATIENT
Start: 2018-11-27 | End: 2018-11-28 | Stop reason: HOSPADM

## 2018-11-27 RX ORDER — LORAZEPAM 2 MG/ML
2 INJECTION INTRAMUSCULAR
Status: DISCONTINUED | OUTPATIENT
Start: 2018-11-27 | End: 2018-11-28 | Stop reason: HOSPADM

## 2018-11-27 RX ORDER — LANOLIN ALCOHOL/MO/W.PET/CERES
200 CREAM (GRAM) TOPICAL DAILY
Status: DISCONTINUED | OUTPATIENT
Start: 2018-11-27 | End: 2018-11-28 | Stop reason: HOSPADM

## 2018-11-27 RX ORDER — DILTIAZEM HYDROCHLORIDE 180 MG/1
180 CAPSULE, COATED, EXTENDED RELEASE ORAL DAILY
Status: DISCONTINUED | OUTPATIENT
Start: 2018-11-28 | End: 2018-11-28

## 2018-11-27 RX ORDER — DIGOXIN 125 MCG
0.12 TABLET ORAL
Status: DISCONTINUED | OUTPATIENT
Start: 2018-11-29 | End: 2018-11-28 | Stop reason: HOSPADM

## 2018-11-27 RX ADMIN — FUROSEMIDE 20 MG: 10 INJECTION, SOLUTION INTRAMUSCULAR; INTRAVENOUS at 04:09

## 2018-11-27 RX ADMIN — SODIUM CHLORIDE 5 MG/HR: 900 INJECTION, SOLUTION INTRAVENOUS at 01:42

## 2018-11-27 RX ADMIN — POTASSIUM CHLORIDE 40 MEQ: 20 TABLET, EXTENDED RELEASE ORAL at 11:11

## 2018-11-27 RX ADMIN — Medication 10 ML: at 18:46

## 2018-11-27 RX ADMIN — SODIUM CHLORIDE 500 ML: 900 INJECTION, SOLUTION INTRAVENOUS at 01:30

## 2018-11-27 RX ADMIN — DILTIAZEM HYDROCHLORIDE 15 MG: 5 INJECTION INTRAVENOUS at 01:29

## 2018-11-27 RX ADMIN — DILTIAZEM HYDROCHLORIDE 180 MG: 180 CAPSULE, EXTENDED RELEASE ORAL at 20:45

## 2018-11-27 RX ADMIN — Medication 100 MG: at 08:48

## 2018-11-27 RX ADMIN — WARFARIN SODIUM 4 MG: 2 TABLET ORAL at 08:50

## 2018-11-27 RX ADMIN — POTASSIUM CHLORIDE 40 MEQ: 20 TABLET, EXTENDED RELEASE ORAL at 04:08

## 2018-11-27 RX ADMIN — Medication 10 ML: at 20:46

## 2018-11-27 RX ADMIN — DIGOXIN 250 MCG: 250 INJECTION, SOLUTION INTRAMUSCULAR; INTRAVENOUS; PARENTERAL at 03:54

## 2018-11-27 RX ADMIN — Medication 200 MG: at 08:49

## 2018-11-27 RX ADMIN — MAGNESIUM SULFATE HEPTAHYDRATE 2 G: 40 INJECTION, SOLUTION INTRAVENOUS at 19:04

## 2018-11-27 RX ADMIN — THERA TABS 1 TABLET: TAB at 08:49

## 2018-11-27 RX ADMIN — DILTIAZEM HYDROCHLORIDE 120 MG: 120 CAPSULE, COATED, EXTENDED RELEASE ORAL at 02:50

## 2018-11-27 RX ADMIN — FUROSEMIDE 20 MG: 40 SOLUTION ORAL at 11:10

## 2018-11-27 RX ADMIN — FOLIC ACID 1 MG: 1 TABLET ORAL at 08:48

## 2018-11-27 RX ADMIN — DILTIAZEM HYDROCHLORIDE 240 MG: 240 CAPSULE, COATED, EXTENDED RELEASE ORAL at 08:49

## 2018-11-27 NOTE — ED NOTES
Assumed care of patient. Pt sitting up in bed, getting ready to use urinal. Pt on cardiac monitor in a.fib. Will continue to monitor patient.

## 2018-11-27 NOTE — ED NOTES
TRANSFER - ED to INPATIENT REPORT: 
 
SBAR report made available to receiving floor on this patient being transferred to [address] [place] [address] ([facility])  for routine progression of care Admitting diagnosis CHF (congestive heart failure) ([facility] Atrial fibrillation ([facility] HTN (hypertension) CHF (congestive heart failure) ([facility] Atrial fibrillation ([facility] HTN (hypertension) Information from the following report(s) SBAR was made available to receiving floor. Lines:  
Peripheral IV 11/27/18 Left Forearm (Active) Site Assessment Clean, dry, & intact 11/27/2018  2:09 AM  
Phlebitis Assessment 0 11/27/2018  2:09 AM  
Infiltration Assessment 0 11/27/2018  2:09 AM  
Dressing Status Clean, dry, & intact 11/27/2018  2:09 AM  
Dressing Type Transparent 11/27/2018  2:09 AM  
Hub Color/Line Status Patent; Flushed;Green 11/27/2018  2:09 AM  
Action Taken Blood drawn 11/27/2018  2:09 AM  
   
Peripheral IV 11/27/18 Right Forearm (Active) Site Assessment Clean, dry, & intact 11/27/2018  2:10 AM  
Phlebitis Assessment 0 11/27/2018  2:10 AM  
Infiltration Assessment 0 11/27/2018  2:10 AM  
Dressing Status Clean, dry, & intact 11/27/2018  2:10 AM  
Dressing Type Transparent 11/27/2018  2:10 AM  
Hub Color/Line Status Patent;Pink 11/27/2018  2:10 AM  
  
 
Medication list confirmed with patient Opportunity for questions and clarification was provided. Patient is oriented to time, place, person and situation Patient is  continent and ambulatory with assist 
  
Valuables transported with patient Patient transported with: 
 O2 @ 2 liters ME Vitals:  
 11/27/18 0845 11/27/18 0848 11/27/18 0941 11/27/18 1110 BP: (!) 147/126 (!) 152/110 (!) 140/101 153/86 Pulse: (!) 112 (!) 122  (!) 115 Resp: 20 Temp:      
SpO2: 99% Weight:   71.7 kg (158 lb) Height:   5' 10\" (1.778 m)

## 2018-11-27 NOTE — H&P
History and Physical 
 
Patient: Flower Donato Sex: male          DOA: 11/27/2018 YOB: 1955      Age:  61 y.o.        LOS:  LOS: 0 days HPI:  
 
Flower Donato is a 61 y.o. male who presented to the ER with SOB. He had a physical with his PCP yesterday and said that his vital signs and INR were normal.  He began having SOB last night. His SOB worsened this AM.  He did not have chest pain. He had mild diaphoresis. He checked his HR on his home monitor and it was 150 bpm.  He called 911 and came to the ER. In the ER he was begun on a Cardizem drip which helped his BP. He will be admitted for ongoing management. Past Medical History:  
Diagnosis Date  Arthritis  Atrial fibrillation ([address] 11/28/2017  Hearing loss  HLD (hyperlipidemia)  HTN (hypertension)  Nontoxic multinodular goiter 2/7/2018 Social History: 
 Tobacco use:  Patient does not smoke, he quit 4 and a half years ago Alcohol use:  Patient drinks 3 drinks per night, scotch and water Occupation:  Patient works in [employer] Family History: 
 Father had DM Review of Systems Constitutional:  No fever or weight loss HEENT:  No headache or visual changes Cardiovascular:  No chest pain or diaphoresis Respiratory:  Shortness of breath without cough as above GI:  No nausea or vomitting. No diarrhea :  No hematuria or dysuria Skin:  No rashes or moles Neuro:  No seizures or syncope Hematological:  No bruising or bleeding Endocrine:  No diabetes or thyroid disease Physical Exam:  
  
Visit Vitals BP (!) 134/102 Pulse (!) 112 Temp 98.6 °F (37 °C) Resp 19 Ht 5' 10\" (1.778 m) Wt 71.7 kg (158 lb) SpO2 98% BMI 22.67 kg/m² Physical Exam: 
 
Gen:  No distress, alert HEENT:  Normal cephalic atraumatic, extra-occular movements are intact. Neck:  Supple, No JVD Lungs:  Clear bilaterally, no wheeze, no rales, normal effort Heart:  Irregular heart rhythm with tachycardia Abdomen:  Soft, non tender, normal bowel sounds, no guarding. Extremities:  Well perfused, no cyanosis or edema Neurological:  Awake and alert, CN's are intact, normal strength throughout extremities Skin:  No rashes or moles Mental Status:  Normal thought process, does not appear anxious Laboratory Studies: 
 
BMP:  
Lab Results Component Value Date/Time  11/27/2018 02:01 AM  
 K 3.4 (L) 11/27/2018 02:01 AM  
  11/27/2018 02:01 AM  
 CO2 26 11/27/2018 02:01 AM  
 AGAP 8 11/27/2018 02:01 AM  
  (H) 11/27/2018 02:01 AM  
 BUN 10 11/27/2018 02:01 AM  
 CREA 0.81 11/27/2018 02:01 AM  
 GFRAA >60 11/27/2018 02:01 AM  
 GFRNA >60 11/27/2018 02:01 AM  
 
CBC:  
Lab Results Component Value Date/Time WBC 9.5 11/27/2018 02:01 AM  
 HGB 16.0 11/27/2018 02:01 AM  
 HCT 46.0 11/27/2018 02:01 AM  
  11/27/2018 02:01 AM  
 
All Cardiac Markers in the last 24 hours:  
Lab Results Component Value Date/Time TROIQ 0.06 (H) 11/27/2018 02:01 AM  
 
COAGS:  
Lab Results Component Value Date/Time PTP 23.0 (H) 11/27/2018 02:01 AM  
 INR 2.0 (H) 11/27/2018 02:01 AM  
 
 
Assessment/Plan Principal Problem: 
  CHF [facility]congest[facility]) ([facility] (11/27/2018) Active Problems: 
  Atrial fibrillation with RVR ([facility] (11/3/2018) HTN (hypertension) (11/4/2018) Alcoholism ([facility] (11/4/2018) PLAN: 
 
Diuresis Serial cardiac enzymes Cardiology consult, he sees Dr Hilario Ervin as an outpatient BP control [facility] protocol DVT prophylaxis.

## 2018-11-27 NOTE — ROUTINE PROCESS
1130 Pt arrived to floor. Pt oriented to room, tele monitoring attached and pt resting in in bed. Will continue to monitor. 1400 Pt resting in bed. Pt's heart rate currently 92. Pt ambulating in fuller withour significant increase in heart rate. Will continue to monitor. 1700 Reminded pt to utilize urinal so that output can be measured. Will continue to monitor. Bedside and Verbal shift change report given to Najma ROBERTSON (oncoming nurse) by Alondra Dupree (offgoing nurse). Report included the following information SBAR, Kardex, Intake/Output, MAR, Recent Results and Cardiac Rhythm NSR.

## 2018-11-27 NOTE — CONSULTS
[facility] - Consult Note    Date of  Admission: 11/27/2018 12:56 AM   Primary Care Physician:  Nadeen Flores., DO  Patient seen and examined independently. Patient well known to me from office follow up. EF markedly reduced. Will add Entresto and continue diuresis. Agree with assessment and plan as noted below. Alysia Dandy MD   Assessment:     - Acute systolic CHF Exacerbation   - Atrial fibrillation with RVR, CHADS2-Vasc score 2 (CHF, HTN)  - Echocardiogram 04/16/18 with an EF of 45%, mild diffuse hypokinesis  - Nuclear stress test 04/17/18 with no evidence of ongoing ischemia, EF calculated at 38%  - HTN  - Hyperlipidemia  - Hypokalemia  - Hx nontoxic multinodular goiter     Plan:     - PO Medications for afib have been ordered and given this AM: Digoxin, PO Cardizem  - Full echo report to follow with preliminary findings of further reduction in EF. If this is confirmed, will possibly start Entresto for CHF. Reviewed previous cardiology notes and he had been on Lisinopril in the past, and the patient previously mentioned that he was unsure whether or not he had an intolerance to ace-I. This will need further discussion this hospitalization.   - Adding daily IV Lasix for diuresis, follow renal function and strict I&O's  - Ordering PO Potassium  - Warfarin dosing per primary team      History of Present Illness: This is a 61 y.o. male admitted for CHF (congestive heart failure) ([facility]  Atrial fibrillation (HCC)  HTN (hypertension)  CHF (congestive heart failure) (HCC)  Atrial fibrillation (HCC)  HTN (hypertension). Patient complains of:  Shortness of breath    This is a 61year old male with a history of atrial fibrillation, hypertension, dyslipidemia and non-ischemic cardiomyopathy that cardiology is seeing in consult due to afib and CHF.  He explains that he began feeling very short of breath last night, and it worsened this AM. He checked his vitals and his heart rate was between 150-190 which prompted him to seek evaluation in the ED. He states that prior to that, his HR and BP were ok and he actually had a visit with his PCP yesterday with BP 95/68 and HR 77. Denies chest pain. No syncope or orthopnea. Cardiac risk factors: smoking/ tobacco exposure, dyslipidemia, male gender, hypertension      Review of Symptoms:  Except as stated above include:  Constitutional:  negative  Respiratory:  +dyspnea  Cardiovascular:  Per HPI  Gastrointestinal: negative  Genitourinary:  negative  Musculoskeletal:  Negative  Neurological:  Negative  Dermatological:  Negative  Endocrinological: Negative  Psychological:  Negative    A comprehensive review of systems was negative except for that written in the HPI. Past Medical History:     Past Medical History:   Diagnosis Date    Arthritis     Atrial fibrillation ([facility] 11/28/2017    Hearing loss     HLD (hyperlipidemia)     HTN (hypertension)     Nontoxic multinodular goiter 2/7/2018         Social History:     Social History     Socioeconomic History    Marital status: SINGLE     Spouse name: Not on file    Number of children: Not on file    Years of education: Not on file    Highest education level: Not on file   Tobacco Use    Smoking status: Former Smoker    Smokeless tobacco: Never Used   Substance and Sexual Activity    Alcohol use:  Yes     Alcohol/week: 12.5 oz     Types: 25 Shots of liquor per week    Drug use: No    Sexual activity: Yes     Partners: Female        Family History:     Family History   Problem Relation Age of Onset    Diabetes Father         Medications:   No Known Allergies     Current Facility-Administered Medications   Medication Dose Route Frequency    dilTIAZem CD (CARDIZEM CD) capsule 180 mg  180 mg Oral QHS    dilTIAZem CD (CARDIZEM CD) capsule 240 mg  240 mg Oral DAILY    magnesium oxide (MAG-OX) tablet 200 mg  200 mg Oral DAILY    warfarin (COUMADIN) tablet 4 mg  4 mg Oral DAILY    sodium chloride (NS) flush 5-10 mL  5-10 mL IntraVENous Q8H    sodium chloride (NS) flush 5-10 mL  5-10 mL IntraVENous PRN    LORazepam (ATIVAN) tablet 1 mg  1 mg Oral Q1H PRN    Or    LORazepam (ATIVAN) injection 1 mg  1 mg IntraVENous Q1H PRN    LORazepam (ATIVAN) tablet 2 mg  2 mg Oral Q1H PRN    Or    LORazepam (ATIVAN) injection 2 mg  2 mg IntraVENous Q1H PRN    LORazepam (ATIVAN) injection 3 mg  3 mg IntraVENous [id number] PRN    folic acid (FOLVITE) tablet 1 mg  1 mg Oral DAILY    Thiamine Mononitrate (B-1) tablet 100 mg  100 mg Oral DAILY    therapeutic multivitamin (THERAGRAN) tablet 1 Tab  1 Tab Oral DAILY    [START ON 11/29/2018] digoxin (LANOXIN) tablet 0.125 mg  0.125 mg Oral Q48H     Current Outpatient Medications   Medication Sig    vitamin k 100 mcg tablet Take 1 Tab by mouth daily.  warfarin (COUMADIN) 4 mg tablet Take 1 Tab by mouth daily.  digoxin (LANOXIN) 0.125 mg tablet Take 1 Tab by mouth every fourty-eight (48) hours.  dilTIAZem CD (CARDIZEM CD) 180 mg ER capsule Take 1 Cap by mouth nightly.  dilTIAZem CD (CARDIZEM CD) 240 mg ER capsule Take 1 Cap by mouth daily.  magnesium oxide 200 mg magnesium tab Take 200 mg by mouth daily.  nitroglycerin (NITROSTAT) 0.4 mg SL tablet 1 Tab by SubLINGual route as needed for Chest Pain.  multivit-min-FA-lycopen-lutein (CENTRUM SILVER MEN) 300-600-300 mcg tab Take 1 Tab by mouth daily.          Physical Exam:     Visit Vitals  BP (!) 140/101   Pulse (!) 122   Temp 98.6 °F (37 °C)   Resp 20   Ht 5' 10\" (1.778 m)   Wt 158 lb (71.7 kg)   SpO2 99%   BMI 22.67 kg/m²     BP Readings from Last 3 Encounters:   11/27/18 (!) 140/101   11/26/18 95/68   11/08/18 119/78     Pulse Readings from Last 3 Encounters:   11/27/18 (!) 122   11/26/18 77   11/08/18 68     Wt Readings from Last 3 Encounters:   11/27/18 158 lb (71.7 kg)   11/26/18 158 lb 7.2 oz (71.9 kg)   11/08/18 159 lb 9.6 oz (72.4 kg)       General:  alert, cooperative, no distress  Neck:  nontender, no JVD  Lungs:  clear to auscultation bilaterally  Heart:  irregularly irregular rhythm  Abdomen:  abdomen is soft without significant tenderness, masses, organomegaly or guarding  Extremities:  extremities normal, atraumatic, no cyanosis or edema  Skin: Warm and dry. no hyperpigmentation, vitiligo, or suspicious lesions  Neuro: alert, oriented x3, affect appropriate  Psych: non focal     Data Review:     Recent Labs     11/27/18  0201   WBC 9.5   HGB 16.0   HCT 46.0        Recent Labs     11/27/18  0201 11/26/18  0750     --    K 3.4*  --      --    CO2 26  --    *  --    BUN 10  --    CREA 0.81  --    CA 8.5  --    MG 1.7  --    ALB 3.7  --    SGOT 24  --    ALT 31  --    INR 2.0* 2.7       Results for orders placed or performed during the hospital encounter of 11/27/18   EKG, 12 LEAD, INITIAL   Result Value Ref Range    Ventricular Rate 122 BPM    Atrial Rate 78 BPM    QRS Duration 84 ms    Q-T Interval 320 ms    QTC Calculation (Bezet) 456 ms    Calculated R Axis 12 degrees    Calculated T Axis 66 degrees    Diagnosis       Atrial fibrillation with rapid ventricular response  Abnormal ECG  When compared with ECG of 03-NOV-2018 20:07,  Vent. rate has decreased BY  64 BPM  Nonspecific T wave abnormality now evident in Anterior leads         All Cardiac Markers in the last 24 hours:    Lab Results   Component Value Date/Time     11/27/2018 06:25 AM    CKMB 2.2 11/27/2018 06:25 AM    CKND1 1.7 11/27/2018 06:25 AM    TROIQ 0.06 (H) 11/27/2018 06:25 AM    TROIQ 0.06 (H) 11/27/2018 02:01 AM       Last Lipid:    Lab Results   Component Value Date/Time    Cholesterol, total 245 (H) 10/25/2018 08:38 AM    HDL Cholesterol 63 (H) 10/25/2018 08:38 AM    LDL, calculated 151 (H) 10/25/2018 08:38 AM    Triglyceride 152 (H) 10/25/2018 08:38 AM    CHOL/HDL Ratio 3.1 11/28/2017 02:42 PM       Signed By: Jacquelyn Perez     November 27, 2018

## 2018-11-27 NOTE — ED PROVIDER NOTES
[place] is a 61 y.o. Male with h/o recent new onset afib with c/o onset of rapid heart rate tonight, sob, lightheaded. No cp, syncope, nvd. States has been compliant with meds. Just saw pcp yesterday with therapeutic inr. Took evening dose of cardizem tonight as well. Sx are constant severe. Unknown trigger The history is provided by the patient and medical records. Past Medical History:  
Diagnosis Date  Arthritis  Atrial fibrillation ([address] 11/28/2017  Hearing loss  HLD (hyperlipidemia)  HTN (hypertension)  Nontoxic multinodular goiter 2/7/2018 Past Surgical History:  
Procedure Laterality Date  HX HERNIA REPAIR Family History:  
Problem Relation Age of Onset  Diabetes Father Social History Socioeconomic History  Marital status: SINGLE Spouse name: Not on file  Number of children: Not on file  Years of education: Not on file  Highest education level: Not on file Social Needs  Financial resource strain: Not on file  Food insecurity - worry: Not on file  Food insecurity - inability: Not on file  Transportation needs - medical: Not on file  Transportation needs - non-medical: Not on file Occupational History  Not on file Tobacco Use  Smoking status: Former Smoker  Smokeless tobacco: Never Used Substance and Sexual Activity  Alcohol use: Yes Alcohol/week: 12.5 oz Types: 25 Shots of liquor per week  Drug use: No  
 Sexual activity: Yes  
  Partners: Female Other Topics Concern  Not on file Social History Narrative  Not on file ALLERGIES: Patient has no known allergies. Review of Systems Constitutional: Negative for fever. HENT: Negative for sore throat. Eyes: Negative for visual disturbance. Respiratory: Positive for shortness of breath. Cardiovascular: Positive for palpitations. Negative for chest pain and leg swelling. Gastrointestinal: Negative for abdominal pain. Endocrine: Negative for polyuria. Genitourinary: Negative for difficulty urinating. Musculoskeletal: Negative for gait problem. Skin: Negative for rash. Allergic/Immunologic: Negative for immunocompromised state. Neurological: Negative for syncope. Psychiatric/Behavioral: Positive for sleep disturbance. Vitals:  
 11/27/18 0130 11/27/18 0134 11/27/18 0145 11/27/18 0250 BP: (!) 113/93 (!) 143/111 (!) 136/98 (!) 136/98 Pulse: (!) 128 (!) 132 (!) 118 (!) 105 Resp: 23 20 20 Temp:  98.6 °F (37 °C) SpO2: 97% 97% 97% Weight:  71.7 kg (158 lb) Height:  5' 10\" (1.778 m) Physical Exam  
Constitutional: He is oriented to person, place, and time. Non-toxic appearance. He does not appear ill. He appears distressed. HENT:  
Head: Normocephalic and atraumatic. Right Ear: External ear normal.  
Left Ear: External ear normal.  
Nose: Nose normal.  
Mouth/Throat: Oropharynx is clear and moist. No oropharyngeal exudate. Eyes: Conjunctivae are normal.  
Neck: Normal range of motion. Cardiovascular: Normal heart sounds and intact distal pulses. An irregularly irregular rhythm present. Tachycardia present. Pulmonary/Chest: Effort normal and breath sounds normal. No respiratory distress. Abdominal: Soft. There is no tenderness. Musculoskeletal: Normal range of motion. He exhibits no edema. Neurological: He is alert and oriented to person, place, and time. Skin: Skin is warm and dry. He is not diaphoretic. Psychiatric: His behavior is normal.  
Nursing note and vitals reviewed. MDM Procedures Vitals: 
Patient Vitals for the past 12 hrs: 
 Temp Pulse Resp BP SpO2  
11/27/18 0250  (!) 105  (!) 136/98   
11/27/18 0145  (!) 118 20 (!) 136/98 97 % 11/27/18 0134 98.6 °F (37 °C) (!) 132 20 (!) 143/111 97 % 11/27/18 0130  (!) 128 23 (!) 113/93 97 % 11/27/18 0129  (!) 169  (!) 143/111  11/27/18 0115  (!) 165 19 (!) 143/111 97 % 11/27/18 0100    (!) 137/109 94 % Medications ordered:  
Medications  
dilTIAZem (CARDIZEM) 100 mg (not administered) 0.9% sodium chloride (MBP/ADV) infusion (not administered) dilTIAZem (CARDIZEM) 5 mg/mL injection (not administered) digoxin (LANOXIN) injection 250 mcg (not administered)  
furosemide (LASIX) injection 20 mg (not administered)  
sodium chloride 0.9 % bolus infusion 500 mL (500 mL IntraVENous New Bag 11/27/18 0130) dilTIAZem (CARDIZEM) injection 15 mg (15 mg IntraVENous Given 11/27/18 0129)  
dilTIAZem CD (CARDIZEM CD) capsule 120 mg (120 mg Oral Given 11/27/18 0250) Lab findings: 
Recent Results (from the past 12 hour(s)) EKG, 12 LEAD, INITIAL Collection Time: 11/27/18  1:53 AM  
Result Value Ref Range Ventricular Rate 122 BPM  
 Atrial Rate 78 BPM  
 QRS Duration 84 ms Q-T Interval 320 ms QTC Calculation (Bezet) 456 ms Calculated R Axis 12 degrees Calculated T Axis 66 degrees Diagnosis Atrial fibrillation with rapid ventricular response Abnormal ECG When compared with ECG of 03-NOV-2018 20:07, 
Vent. rate has decreased BY  64 BPM 
Nonspecific T wave abnormality now evident in Anterior leads CBC WITH AUTOMATED DIFF Collection Time: 11/27/18  2:01 AM  
Result Value Ref Range WBC 9.5 4.6 - 13.2 K/uL  
 RBC 4.95 4.70 - 5.50 M/uL  
 HGB 16.0 13.0 - 16.0 g/dL HCT 46.0 36.0 - 48.0 % MCV 92.9 74.0 - 97.0 FL  
 MCH 32.3 24.0 - 34.0 PG  
 MCHC 34.8 31.0 - 37.0 g/dL  
 RDW 12.9 11.6 - 14.5 % PLATELET 446 775 - 351 K/uL MPV 10.9 9.2 - 11.8 FL  
 NEUTROPHILS 80 (H) 40 - 73 % LYMPHOCYTES 12 (L) 21 - 52 % MONOCYTES 8 3 - 10 % EOSINOPHILS 0 0 - 5 % BASOPHILS 0 0 - 2 %  
 ABS. NEUTROPHILS 7.5 1.8 - 8.0 K/UL  
 ABS. LYMPHOCYTES 1.1 0.9 - 3.6 K/UL  
 ABS. MONOCYTES 0.8 0.05 - 1.2 K/UL  
 ABS. EOSINOPHILS 0.0 0.0 - 0.4 K/UL  
 ABS. BASOPHILS 0.0 0.0 - 0.1 K/UL  
 DF AUTOMATED PROTHROMBIN TIME + INR Collection Time: 11/27/18  2:01 AM  
Result Value Ref Range Prothrombin time 23.0 (H) 11.5 - 15.2 sec INR 2.0 (H) 0.8 - 1.2 METABOLIC PANEL, COMPREHENSIVE Collection Time: 11/27/18  2:01 AM  
Result Value Ref Range Sodium 139 136 - 145 mmol/L Potassium 3.4 (L) 3.5 - 5.5 mmol/L Chloride 105 100 - 108 mmol/L  
 CO2 26 21 - 32 mmol/L Anion gap 8 3.0 - 18 mmol/L Glucose 105 (H) 74 - 99 mg/dL BUN 10 7.0 - 18 MG/DL Creatinine 0.81 0.6 - 1.3 MG/DL  
 BUN/Creatinine ratio 12 12 - 20 GFR est AA >60 >60 ml/min/1.73m2 GFR est non-AA >60 >60 ml/min/1.73m2 Calcium 8.5 8.5 - 10.1 MG/DL Bilirubin, total 1.1 (H) 0.2 - 1.0 MG/DL  
 ALT (SGPT) 31 16 - 61 U/L  
 AST (SGOT) 24 15 - 37 U/L Alk. phosphatase 114 45 - 117 U/L Protein, total 6.4 6.4 - 8.2 g/dL Albumin 3.7 3.4 - 5.0 g/dL Globulin 2.7 2.0 - 4.0 g/dL A-G Ratio 1.4 0.8 - 1.7 ETHYL ALCOHOL Collection Time: 11/27/18  2:01 AM  
Result Value Ref Range ALCOHOL(ETHYL),SERUM <3 0 - 3 MG/DL MAGNESIUM Collection Time: 11/27/18  2:01 AM  
Result Value Ref Range Magnesium 1.7 1.6 - 2.6 mg/dL TROPONIN I Collection Time: 11/27/18  2:01 AM  
Result Value Ref Range Troponin-I, Qt. 0.06 (H) 0.0 - 0.045 NG/ML  
NT-PRO BNP Collection Time: 11/27/18  2:01 AM  
Result Value Ref Range NT pro-BNP 2,261 (H) 0 - 900 PG/ML  
DIGOXIN Collection Time: 11/27/18  2:01 AM  
Result Value Ref Range Digoxin level 0.4 (L) 0.9 - 2.0 NG/ML  
 
 
EKG interpretation by ED Physician: 
afib with rvr; ns tw changes Rate 122, qrs 84, qtc 456 Ns tw now present Cardiac monitor: tachy with irreg, irreg rhythm no ectopy Pulse ox: 97% ra X-Ray, CT or other radiology findings or impressions: 
XR CHEST PORT    (Results Pending)  
cardiomegaly with new edema c/w previous Progress notes, Consult notes or additional Procedure notes: New onset chf with recurrent afib. Rate improved. Will give lasix, iv dig, admit which was d/w pt 
D/w dr Abel Reza who will admit ED Critical Care Note System at risk for life threatening failure: cardiac, pulm, renal 
Associated problems: afib, chf, hypokalemia Critical Care services provided:rate, chf management, documentation, consult, reassessment Excluded procedures (time not included in critical care): ecg interp Total Critical Care Time (in minutes) 48 Reevaluation of patient:  
stable Disposition: 
Diagnosis: 1. Acute congestive heart failure, unspecified heart failure type ([facility] 2. [facility] fibrillation with rapid ventricular response [facility] Disposition: admit Follow-up Information None Medication List  
  
ASK your doctor about these medications   
digoxin 0.125 mg tablet Commonly known as:  LANOXIN Take 1 Tab by mouth every fourty-eight (48) hours. * dilTIAZem  mg ER capsule Commonly known as:  CARDIZEM CD Take 1 Cap by mouth nightly. * dilTIAZem  mg ER capsule Commonly known as:  CARDIZEM CD Take 1 Cap by mouth daily. magnesium oxide 200 mg magnesium Tab Take 200 mg by mouth daily. multivit-min-FA-lycopen-lutein 300-600-300 mcg Tab Commonly known as:  CENTRUM SILVER MEN Take 1 Tab by mouth daily. nitroglycerin 0.4 mg SL tablet Commonly known as:  NITROSTAT 
1 Tab by SubLINGual route as needed for Chest Pain. 
  
vitamin k 100 mcg tablet Take 1 Tab by mouth daily. warfarin 4 mg tablet Commonly known as:  COUMADIN Take 1 Tab by mouth daily. * This list has 2 medication(s) that are the same as other medications prescribed for you. Read the directions carefully, and ask your doctor or other care provider to review them with you.

## 2018-11-27 NOTE — PROGRESS NOTES
completed the initial Spiritual Assessment of the patient and offered Pastoral Care support. . Patient does not have any Zoroastrianism/cultural needs that will affect patients preferences in health care. Chaplains will continue to follow and will provide pastoral care on an as needed/requested basis. Segre Mccain Board Certified [employer] Spiritual Care Department [phone]

## 2018-11-28 ENCOUNTER — HOME HEALTH ADMISSION (OUTPATIENT)
Dept: HOME HEALTH SERVICES | Facility: HOME HEALTH | Age: 63
End: 2018-11-28

## 2018-11-28 VITALS
WEIGHT: 149.4 LBS | BODY MASS INDEX: 21.39 KG/M2 | RESPIRATION RATE: 18 BRPM | HEIGHT: 70 IN | TEMPERATURE: 97.7 F | OXYGEN SATURATION: 98 % | HEART RATE: 86 BPM | SYSTOLIC BLOOD PRESSURE: 109 MMHG | DIASTOLIC BLOOD PRESSURE: 71 MMHG

## 2018-11-28 LAB
ANION GAP SERPL CALC-SCNC: 11 MMOL/L (ref 3–18)
ATRIAL RATE: 78 BPM
BASOPHILS # BLD: 0 K/UL (ref 0–0.1)
BASOPHILS NFR BLD: 0 % (ref 0–2)
BUN SERPL-MCNC: 10 MG/DL (ref 7–18)
BUN/CREAT SERPL: 10 (ref 12–20)
CALCIUM SERPL-MCNC: 9.1 MG/DL (ref 8.5–10.1)
CALCULATED R AXIS, ECG10: 12 DEGREES
CALCULATED T AXIS, ECG11: 66 DEGREES
CHLORIDE SERPL-SCNC: 105 MMOL/L (ref 100–108)
CO2 SERPL-SCNC: 25 MMOL/L (ref 21–32)
CREAT SERPL-MCNC: 1.01 MG/DL (ref 0.6–1.3)
DIAGNOSIS, 93000: NORMAL
DIFFERENTIAL METHOD BLD: ABNORMAL
EOSINOPHIL # BLD: 0.1 K/UL (ref 0–0.4)
EOSINOPHIL NFR BLD: 1 % (ref 0–5)
ERYTHROCYTE [DISTWIDTH] IN BLOOD BY AUTOMATED COUNT: 13 % (ref 11.6–14.5)
GLUCOSE SERPL-MCNC: 110 MG/DL (ref 74–99)
HCT VFR BLD AUTO: 50.3 % (ref 36–48)
HGB BLD-MCNC: 17.4 G/DL (ref 13–16)
LYMPHOCYTES # BLD: 1.5 K/UL (ref 0.9–3.6)
LYMPHOCYTES NFR BLD: 19 % (ref 21–52)
MCH RBC QN AUTO: 32.5 PG (ref 24–34)
MCHC RBC AUTO-ENTMCNC: 34.6 G/DL (ref 31–37)
MCV RBC AUTO: 94 FL (ref 74–97)
MONOCYTES # BLD: 0.9 K/UL (ref 0.05–1.2)
MONOCYTES NFR BLD: 11 % (ref 3–10)
NEUTS SEG # BLD: 5.7 K/UL (ref 1.8–8)
NEUTS SEG NFR BLD: 69 % (ref 40–73)
PLATELET # BLD AUTO: 235 K/UL (ref 135–420)
PMV BLD AUTO: 11.3 FL (ref 9.2–11.8)
POTASSIUM SERPL-SCNC: 4.3 MMOL/L (ref 3.5–5.5)
Q-T INTERVAL, ECG07: 320 MS
QRS DURATION, ECG06: 84 MS
QTC CALCULATION (BEZET), ECG08: 456 MS
RBC # BLD AUTO: 5.35 M/UL (ref 4.7–5.5)
SODIUM SERPL-SCNC: 141 MMOL/L (ref 136–145)
VENTRICULAR RATE, ECG03: 122 BPM
WBC # BLD AUTO: 8.2 K/UL (ref 4.6–13.2)

## 2018-11-28 PROCEDURE — 80048 BASIC METABOLIC PNL TOTAL CA: CPT

## 2018-11-28 PROCEDURE — 74011250637 HC RX REV CODE- 250/637: Performed by: PHYSICIAN ASSISTANT

## 2018-11-28 PROCEDURE — 85025 COMPLETE CBC W/AUTO DIFF WBC: CPT

## 2018-11-28 PROCEDURE — 74011250637 HC RX REV CODE- 250/637: Performed by: INTERNAL MEDICINE

## 2018-11-28 PROCEDURE — 77010033678 HC OXYGEN DAILY

## 2018-11-28 PROCEDURE — 36415 COLL VENOUS BLD VENIPUNCTURE: CPT

## 2018-11-28 PROCEDURE — 74011250637 HC RX REV CODE- 250/637: Performed by: HOSPITALIST

## 2018-11-28 RX ORDER — DILTIAZEM HYDROCHLORIDE 180 MG/1
180 CAPSULE, COATED, EXTENDED RELEASE ORAL 2 TIMES DAILY
Qty: 90 CAP | Refills: 4 | Status: SHIPPED | OUTPATIENT
Start: 2018-11-28 | End: 2018-11-28

## 2018-11-28 RX ORDER — CARVEDILOL 6.25 MG/1
6.25 TABLET ORAL 2 TIMES DAILY WITH MEALS
Qty: 30 TAB | Refills: 0 | Status: SHIPPED | OUTPATIENT
Start: 2018-11-28 | End: 2018-12-04

## 2018-11-28 RX ORDER — CARVEDILOL 6.25 MG/1
6.25 TABLET ORAL 2 TIMES DAILY WITH MEALS
Status: DISCONTINUED | OUTPATIENT
Start: 2018-11-28 | End: 2018-11-28 | Stop reason: HOSPADM

## 2018-11-28 RX ORDER — DILTIAZEM HYDROCHLORIDE 90 MG/1
90 TABLET, FILM COATED ORAL EVERY 8 HOURS
Qty: 30 TAB | Refills: 0 | Status: SHIPPED | OUTPATIENT
Start: 2018-11-28 | End: 2018-12-06

## 2018-11-28 RX ADMIN — Medication 100 MG: at 09:25

## 2018-11-28 RX ADMIN — SACUBITRIL AND VALSARTAN 1 TABLET: 24; 26 TABLET, FILM COATED ORAL at 09:24

## 2018-11-28 RX ADMIN — WARFARIN SODIUM 4 MG: 2 TABLET ORAL at 09:24

## 2018-11-28 RX ADMIN — Medication 10 ML: at 05:34

## 2018-11-28 RX ADMIN — CARVEDILOL 3.12 MG: 3.12 TABLET, FILM COATED ORAL at 09:24

## 2018-11-28 RX ADMIN — THERA TABS 1 TABLET: TAB at 09:24

## 2018-11-28 RX ADMIN — Medication 200 MG: at 09:25

## 2018-11-28 RX ADMIN — FOLIC ACID 1 MG: 1 TABLET ORAL at 09:24

## 2018-11-28 RX ADMIN — DILTIAZEM HYDROCHLORIDE 90 MG: 60 TABLET, FILM COATED ORAL at 14:13

## 2018-11-28 RX ADMIN — DILTIAZEM HYDROCHLORIDE 90 MG: 60 TABLET, FILM COATED ORAL at 09:32

## 2018-11-28 NOTE — PROGRESS NOTES
Chart reviewed and pt verified demographics. He has a roommate. He has [employer] and saw his PCP yesterday. He felt much worse later in the day and came here. Katelin Combs He is on O2 at present, he is not on it at home. He has been compliant with his meds and MD appts. His Cardiologist Dr Sara White was going to add another medication to his existing meds. He uses a pill organizer that has all the days , a.m. And p.m.spots to sort them. Reason for Readmission: Patient with CHF- worse than last admission, A fib RRAT Score and Risk Level: 10 Level of Readmission:  Level one, first readmit Care Conference scheduled: no   
    
Resources/supports as identified by patient/family: Patient has sister Alayna Yarbrough [phone] and brother in law, one  Of them will drive patient home. Top Challenges facing patient (as identified by patient/family and CM): Finances/Medication cost?   He is on social security - started at age 58. He had previously applied for medicaid, and has received letter that he is now qualified for regular medicaid, not just the [employer]. He's not received his card yet though. He is going to discontinue his [employer] since with medicaid , he would not qualify for free insurance thru them. I will email Luis Armando Henrycornelius in [place] to see if she can find out how soon he will be covered with medicaid , so that he will not have a lapse of insurance. Transportation   Sister or brother in law to drive. Support system or lack thereof? Living arrangements? Self-care/ADLs/Cognition? Current Advanced Directive/Advance Care Plan:   
        
Plan for utilizing home health:  Skilled Nurse for Disease Management and Medication Education- patient is agreeable Likelihood of additional readmission:   He says the Cardiologist Dr Sara White says his heart is much weaker than a couple weeks ago Transition of Care Plan:    Based on readmission, the patient's previous Plan of Care 
 has been evaluated and/or modified. The current Transition of Care Plan is:    Home with Home Health.

## 2018-11-28 NOTE — PROGRESS NOTES
Problem: Falls - Risk of 
Goal: *Absence of Falls Document Zachery Kins Fall Risk and appropriate interventions in the flowsheet. Outcome: Progressing Towards Goal 
Fall Risk Interventions: 
  
 
  
 
Medication Interventions: Teach patient to arise slowly, Patient to call before getting OOB, Evaluate medications/consider consulting pharmacy

## 2018-11-28 NOTE — ROUTINE PROCESS
2000: Assumed care. Awake. HOB elevated. No sob on oxygen at 2 LPM. Denies any pain or discomfort at this time. Call light within reach. 2046: Due med given. 0038: No change from previous assessment. 0200:Sleeping. 0444: No change from previous assessment. 0534: Slept good thru night. Needs attended. 0745:Bedside and Verbal shift change report given to [facility] (oncoming nurse) by me (offgoing nurse). Report included the following information SBAR, Kardex, Intake/Output, MAR and Recent Results.

## 2018-11-28 NOTE — PROGRESS NOTES
Bedside and Verbal shift change report given to [address] (oncoming nurse) by Dearl Andrzej (offgoing nurse). Report included the following information SBAR, Kardex and MAR. Pt is AOx4 on 1L/min NC for mild SOB on exertion. He is on remote telemetry with a HR of poorly controlled A-fib around 100 at rest and 120's during ambulation. Pt is up at gwendolyn and scores a zero on his first CIWA this am. Pt reports that he does not drink excessively. He has no needs at this time.

## 2018-11-28 NOTE — PROGRESS NOTES
Internal Medicine Progress Note Patient's Name: Krystle Monzon. Admit Date: 11/27/2018 Length of Stay: 1 Assessment/Plan Active Hospital Problems Diagnosis Date Noted  CHF (congestive heart failure) ([facility] 11/27/2018  
 HTN (hypertension) 11/04/2018  Alcoholism ([facility] 11/04/2018  Atrial fibrillation with RVR ([facility] 11/03/2018  
 
 
- Cont acceptable home medications for chronic conditions  
- DVT protocol I have personally reviewed all pertinent labs and films that have officially resulted over the last 24 hours. I have personally checked for all pending labs that are awaiting final results. Interval History Pt is a 60 yo male who presented to the ED on 11/27 with c/o SOB. No chest pain, mild diaphoresis. PCP appt the day prior, vital signs and INR were normal. HR at home was 150 so he called EMS. He was started on a cardizem gtt in the ED. Trend cardiac enzymes. Cardiology consulted. IV lasix given. Subjective Pt s/e @ bedside. No major events overnight. Pt offers no complaints this AM.Denies CP or SOB. Denies abd pain Objective Visit Vitals /87 Pulse (!) 55 Temp 98.6 °F (37 °C) Resp 18 Ht 5' 10\" (1.778 m) Wt 71.7 kg (158 lb) SpO2 97% BMI 22.67 kg/m² Physical Exam: 
General Appearance: NAD, conversant HENT: normocephalic/atraumatic, moist mucus membranes Neck: No JVD, supple Lungs: CTA with normal respiratory effort CV: RRR, no m/r/g Abdomen: soft, non-tender, normal bowel sounds Extremities: no cyanosis, no peripheral edema Neuro: No focal deficits, motor/sensory intact Skin: Normal color, intact Lymphatics: No cervical or supraclavicular lymphadenopathy Psych: appropriate affect, alert and oriented to person, place and time Intake and Output: 
Current Shift:  No intake/output data recorded. Last three shifts:  11/26 1901 - 11/28 0700 In: 897.5 [P.O.:390; I.V.:507.5] Out: 2080 [Urine:2645] Lab/Data Reviewed: {LABS REVIEWED:[id number]} Imaging Reviewed: 
No results found. Medications Reviewed: 
Current Facility-Administered Medications Medication Dose Route Frequency  sacubitril-valsartan (ENTRESTO) 24-26 mg tablet 1 Tab  1 Tab Oral Q12H  
 dilTIAZem CD (CARDIZEM CD) capsule 180 mg  180 mg Oral QHS  magnesium oxide (MAG-OX) tablet 200 mg  200 mg Oral DAILY  warfarin (COUMADIN) tablet 4 mg  4 mg Oral DAILY  sodium chloride (NS) flush 5-10 mL  5-10 mL IntraVENous Q8H  
 sodium chloride (NS) flush 5-10 mL  5-10 mL IntraVENous PRN  
 LORazepam (ATIVAN) tablet 1 mg  1 mg Oral Q1H PRN Or  
 LORazepam (ATIVAN) injection 1 mg  1 mg IntraVENous Q1H PRN  
 LORazepam (ATIVAN) tablet 2 mg  2 mg Oral Q1H PRN Or  
 LORazepam (ATIVAN) injection 2 mg  2 mg IntraVENous Q1H PRN  
 LORazepam (ATIVAN) injection 3 mg  3 mg IntraVENous [id number] PRN  
 folic acid (FOLVITE) tablet 1 mg  1 mg Oral DAILY  Thiamine Mononitrate (B-1) tablet 100 mg  100 mg Oral DAILY  therapeutic multivitamin (THERAGRAN) tablet 1 Tab  1 Tab Oral DAILY  [START ON 11/29/2018] digoxin (LANOXIN) tablet 0.125 mg  0.125 mg Oral Q48H  
 dilTIAZem CD (CARDIZEM CD) capsule 180 mg  180 mg Oral DAILY  carvedilol (COREG) tablet 3.125 mg  3.125 mg Oral BID WITH MEALS [place] PA-C [address] [place], [place] [address] Hospital[facility] Division Office:  [phone] Pager: [phone]

## 2018-11-28 NOTE — DISCHARGE INSTRUCTIONS
Atrial Fibrillation: Care Instructions  Your Care Instructions    Atrial fibrillation is an irregular and often fast heartbeat. Treating this condition is important for several reasons. It can cause blood clots, which can travel from your heart to your brain and cause a stroke. If you have a fast heartbeat, you may feel lightheaded, dizzy, and weak. An irregular heartbeat can also increase your risk for heart failure. Atrial fibrillation is often the result of another heart condition, such as high blood pressure or coronary artery disease. Making changes to improve your heart condition will help you stay healthy and active. Follow-up care is a key part of your treatment and safety. Be sure to make and go to all appointments, and call your doctor if you are having problems. It's also a good idea to know your test results and keep a list of the medicines you take. How can you care for yourself at home? Medicines    · Take your medicines exactly as prescribed. Call your doctor if you think you are having a problem with your medicine. You will get more details on the specific medicines your doctor prescribes.     · If your doctor has given you a blood thinner to prevent a stroke, be sure you get instructions about how to take your medicine safely. Blood thinners can cause serious bleeding problems.     · Do not take any vitamins, over-the-counter drugs, or herbal products without talking to your doctor first.    Lifestyle changes    · Do not smoke. Smoking can increase your chance of a stroke and heart attack. If you need help quitting, talk to your doctor about stop-smoking programs and medicines. These can increase your chances of quitting for good.     · Eat a heart-healthy diet.     · Stay at a healthy weight. Lose weight if you need to.     · Limit alcohol to 2 drinks a day for men and 1 drink a day for women. Too much alcohol can cause health problems.     · Avoid colds and flu.  Get a pneumococcal vaccine shot. If you have had one before, ask your doctor whether you need another dose. Get a flu shot every year. If you must be around people with colds or flu, wash your hands often. Activity    · If your doctor recommends it, get more exercise. Walking is a good choice. Bit by bit, increase the amount you walk every day. Try for at least 30 minutes on most days of the week. You also may want to swim, bike, or do other activities. Your doctor may suggest that you join a cardiac rehabilitation program so that you can have help increasing your physical activity safely.     · Start light exercise if your doctor says it is okay. Even a small amount will help you get stronger, have more energy, and manage stress. Walking is an easy way to get exercise. Start out by walking a little more than you did in the hospital. Gradually increase the amount you walk.     · When you exercise, watch for signs that your heart is working too hard. You are pushing too hard if you cannot talk while you are exercising. If you become short of breath or dizzy or have chest pain, sit down and rest immediately.     · Check your pulse regularly. Place two fingers on the artery at the palm side of your wrist, in line with your thumb. If your heartbeat seems uneven or fast, talk to your doctor. When should you call for help? Call 911 anytime you think you may need emergency care. For example, call if:    · You have symptoms of a heart attack. These may include:  ? Chest pain or pressure, or a strange feeling in the chest.  ? Sweating. ? Shortness of breath. ? Nausea or vomiting. ? Pain, pressure, or a strange feeling in the back, neck, jaw, or upper belly or in one or both shoulders or arms. ? Lightheadedness or sudden weakness. ? A fast or irregular heartbeat. After you call 911, the  may tell you to chew 1 adult-strength or 2 to 4 low-dose aspirin. Wait for an ambulance.  Do not try to drive yourself.     · You have symptoms of a stroke. These may include:  ? Sudden numbness, tingling, weakness, or loss of movement in your face, arm, or leg, especially on only one side of your body. ? Sudden vision changes. ? Sudden trouble speaking. ? Sudden confusion or trouble understanding simple statements. ? Sudden problems with walking or balance. ? A sudden, severe headache that is different from past headaches.     · You passed out (lost consciousness).    Call your doctor now or seek immediate medical care if:    · You have new or increased shortness of breath.     · You feel dizzy or lightheaded, or you feel like you may faint.     · Your heart rate becomes irregular.     · You can feel your heart flutter in your chest or skip heartbeats. Tell your doctor if these symptoms are new or worse.    Watch closely for changes in your health, and be sure to contact your doctor if you have any problems. Where can you learn more? Go to http://jg-dave.info/. Enter U020 in the search box to learn more about \"Atrial Fibrillation: Care Instructions. \"  Current as of: December 6, 2017  Content Version: 11.8  © 6621-9879 [employer]. Care instructions adapted under license by [employer] (which disclaims liability or warranty for this information). If you have questions about a medical condition or this instruction, always ask your healthcare professional. [place] [address] any warranty or liability for your use of this information. Learning About Atrial Fibrillation  What is atrial fibrillation? Atrial fibrillation (say \"AY-tree-regis enp-eroa-WME-shun\") is the most common type of irregular heartbeat (arrhythmia). Normally, the heart beats in a strong, steady rhythm. In atrial fibrillation, a problem with the heart's electrical system causes the two upper parts of the heart (the atria) to quiver, or fibrillate.  Your heart rate also may be faster than normal.  Atrial fibrillation can be dangerous because if the heartbeat isn't strong and steady, blood can collect, or pool, in the atria. And pooled blood is more likely to form clots. Clots can travel to the brain, block blood flow, and cause a stroke. Atrial fibrillation can also lead to heart failure. Treatment for atrial fibrillation helps prevent stroke and heart failure. It also helps relieve symptoms. Atrial fibrillation is often caused by another heart problem. It may happen after heart surgery. It may also be caused by other problems, such as an overactive thyroid gland or lung disease. Many people with atrial fibrillation are able to live full and active lives. What are the symptoms? Some people feel symptoms when they have episodes of atrial fibrillation. But other people don't notice any symptoms. If you have symptoms, you may feel:  · A fluttering, racing, or pounding feeling in your chest called palpitations. · Weak or tired. · Dizzy or lightheaded. · Short of breath. · Chest pain. · Confused. You may notice signs of atrial fibrillation when you check your pulse. Your pulse may seem uneven or fast.  What can you expect when you have atrial fibrillation? At first, spells of atrial fibrillation may come on suddenly and last a short time. It may go away on its own or it goes away after treatment. This is called paroxysmal atrial fibrillation. Over time, the spells may last longer and occur more often. They often don't go away on their own. How is it treated? Treatments can help you feel better and prevent future problems, especially stroke and heart failure. The main types of treatment slow the heart rate, control the heart rhythm, and help prevent stroke. Your treatment will depend on the cause of your atrial fibrillation, your symptoms, and your risk for stroke. · Heart rate treatment. Medicine may be used to slow your heart rate. Your heartbeat may still be irregular.  But these medicines keep your heart from beating too fast. They may also help relieve your symptoms. · Heart rhythm treatment. Different treatments may be used to try to stop atrial fibrillation and keep it from returning. They can also relieve symptoms. These treatments include medicine, electrical cardioversion to shock the heart back to a normal rhythm, a procedure called catheter ablation, and heart surgery. · Stroke prevention. You and your doctor can decide how to lower your risk. You may decide to take a blood-thinning medicine, such as aspirin or an anticoagulant. How can you live well with it? You can live well and help manage atrial fibrillation by having a heart-healthy lifestyle. To have a heart-healthy lifestyle:  · Don't smoke. · Eat heart-healthy foods. · Be active. Talk to your doctor about what type and level of exercise is safe for you. · Stay at a healthy weight. Lose weight if you need to. · Manage stress. · Avoid alcohol if it triggers symptoms. · Manage other health problems such as high blood pressure, high cholesterol, and diabetes. · Avoid getting sick from the flu. Get a flu shot every year. Where can you learn more? Go to http://jg-dave.info/. Enter [phone] in the search box to learn more about \"Learning About Atrial Fibrillation. \"  Current as of: December 6, 2017  Content Version: 11.8  © 0129-3730 [employer]. Care instructions adapted under license by [employer] (which disclaims liability or warranty for this information). If you have questions about a medical condition or this instruction, always ask your healthcare professional. [place] [address] any warranty or liability for your use of this information.       DISCHARGE SUMMARY from Nurse    PATIENT INSTRUCTIONS:    After general anesthesia or intravenous sedation, for 24 hours or while taking prescription Narcotics:  · Limit your activities  · Do not drive and operate hazardous machinery  · Do not make important personal or business decisions  · Do  not drink alcoholic beverages  · If you have not urinated within 8 hours after discharge, please contact your surgeon on call. Report the following to your surgeon:  · Excessive pain, swelling, redness or odor of or around the surgical area  · Temperature over 100.5  · Nausea and vomiting lasting longer than 4 hours or if unable to take medications  · Any signs of decreased circulation or nerve impairment to extremity: change in color, persistent  numbness, tingling, coldness or increase pain  · Any questions    What to do at Home:  Recommended activity: Activity as tolerated    *  Please give a list of your current medications to your Primary Care Provider. *  Please update this list whenever your medications are discontinued, doses are      changed, or new medications (including over-the-counter products) are added. *  Please carry medication information at all times in case of emergency situations. These are general instructions for a healthy lifestyle:    No smoking/ No tobacco products/ Avoid exposure to second hand smoke  Surgeon General's Warning:  Quitting smoking now greatly reduces serious risk to your health. Obesity, smoking, and sedentary lifestyle greatly increases your risk for illness    A healthy diet, regular physical exercise & weight monitoring are important for maintaining a healthy lifestyle    You may be retaining fluid if you have a history of heart failure or if you experience any of the following symptoms:  Weight gain of 3 pounds or more overnight or 5 pounds in a week, increased swelling in our hands or feet or shortness of breath while lying flat in bed. Please call your doctor as soon as you notice any of these symptoms; do not wait until your next office visit.     Recognize signs and symptoms of STROKE:    F-face looks uneven    A-arms unable to move or move unevenly    S-speech slurred or non-existent    T-time-call 911 as soon as signs and symptoms begin-DO NOT go       Back to bed or wait to see if you get better-TIME IS BRAIN. Warning Signs of HEART ATTACK     Call 911 if you have these symptoms:   Chest discomfort. Most heart attacks involve discomfort in the center of the chest that lasts more than a few minutes, or that goes away and comes back. It can feel like uncomfortable pressure, squeezing, fullness, or pain.  Discomfort in other areas of the upper body. Symptoms can include pain or discomfort in one or both arms, the back, neck, jaw, or stomach.  Shortness of breath with or without chest discomfort.  Other signs may include breaking out in a cold sweat, nausea, or lightheadedness. Don't wait more than five minutes to call 911 - MINUTES MATTER! Fast action can save your life. Calling 911 is almost always the fastest way to get lifesaving treatment. Emergency Medical Services staff can begin treatment when they arrive -- up to an hour sooner than if someone gets to the hospital by car. The discharge information has been reviewed with the patient. The patient verbalized understanding. Discharge medications reviewed with the patient and appropriate educational materials and side effects teaching were provided. ___________________________________________________________________________________________________________________________________    Patient armband removed and shredded    MyChart Activation    Thank you for requesting access to [facility]. Please follow the instructions below to securely access and download your online medical record. [facility] allows you to send messages to your doctor, view your test results, renew your prescriptions, schedule appointments, and more. How Do I Sign Up? 1. In your internet browser, go to [email or web address]  2. Click on the First Time User? Click Here link in the Sign In box. You will be redirect to the New Member Sign Up page.   3. Enter your MyChart Access Code exactly as it appears below. You will not need to use this code after youve completed the sign-up process. If you do not sign up before the expiration date, you must request a new code. [facility] Access Code: Activation code not generated  Current [facility] Status: Active (This is the date your [facility] access code will )    4. Enter the last four digits of your Social Security Number (xxxx) and Date of Birth (mm/dd/yyyy) as indicated and click Submit. You will be taken to the next sign-up page. 5. Create a oneDrumt ID. This will be your [facility] login ID and cannot be changed, so think of one that is secure and easy to remember. 6. Create a [facility] password. You can change your password at any time. 7. Enter your Password Reset Question and Answer. This can be used at a later time if you forget your password. 8. Enter your e-mail address. You will receive e-mail notification when new information is available in [address] 9. Click Sign Up. You can now view and download portions of your medical record. 10. Click the Download Summary menu link to download a portable copy of your medical information. Additional Information    If you have questions, please visit the Frequently Asked Questions section of the [facility] website at [email or web address] Remember, [facility] is NOT to be used for urgent needs. For medical emergencies, dial 911.

## 2018-11-28 NOTE — PROGRESS NOTES
Offered the pt FOC for home care, he chose [facility]. FOC form placed in the unit chart; copy given to the pt. Referral sent to intake via [facility] and called to intake nurse, Sidney Doty. Included in the CC note and intake report the correct dc address: [address] [place], [id number] Care Management Interventions PCP Verified by CM: Yes(Saw Dr Alden Person) Palliative Care Criteria Met (RRAT>21 & CHF Dx)?: No 
Mode of Transport at Discharge: Other (see comment)(Sister or Brother in law) Transition of Care Consult (CM Consult): Home Health MyChart Signup: No 
Discharge Durable Medical Equipment: No 
Physical Therapy Consult: No 
Occupational Therapy Consult: No 
Speech Therapy Consult: No 
Current Support Network: Other(lives with room mate) Confirm Follow Up Transport: Family Plan discussed with Pt/Family/Caregiver: Yes Freedom of Choice Offered: Yes [employer] Information Provided?: No 
Discharge Location Discharge Placement: Home with home health

## 2018-11-28 NOTE — PROGRESS NOTES
[facility] - Progress Note Admit Date: 11/27/2018 I saw, evaluated, interviewed and examined the patient personally. I agree with the findings and plan of care as documented below with PA-C note Acute CHF exacerbation with A.fib with RVR Continue anticoagulation. Started meds for CHF , medication adjustment as noted below. No fluid overload on exam today. Huy Santos MD 
 
 
 
 
Assessment: - Acute systolic CHF Exacerbation - Atrial fibrillation with RVR, CHADS2-Vasc score 2 (CHF, HTN) - Echo 11/27/18 with EF <15%, global hypokinesis 
- Echocardiogram 04/16/18 with an EF of 45%, mild diffuse hypokinesis 
- Nuclear stress test 04/17/18 with no evidence of ongoing ischemia, EF calculated at 38% 
- HTN 
- Hyperlipidemia - Hypokalemia 
- Hx nontoxic multinodular goiter Plan: - Medications adjusted: Coreg increased to 6.25 mg BID, Cardizem 90 mg (short acting) every eight hours, and Entresto started. Continue with current every other day digoxin dose. Follow up with PCP for INR check, f/u with Dr. Emerald Hinds scheduled on Monday 12/3. 
- Discussed with patient echo findings and the importance of ETOH cessation. Subjective: No new complaints. Objective:  
  
Patient Vitals for the past 8 hrs: 
 Temp Pulse Resp BP SpO2  
11/28/18 1400  86  109/71   
11/28/18 1127 97.7 °F (36.5 °C) 100 18 109/72 98 % 11/28/18 0842 98.6 °F (37 °C) (!) 55 18 123/87 97 % 11/28/18 0722 97.8 °F (36.6 °C) 80 18 135/84 96 % Patient Vitals for the past 96 hrs: 
 Weight 11/28/18 1127 149 lb 6.4 oz (67.8 kg)  
11/27/18 0941 158 lb (71.7 kg) 11/27/18 0134 158 lb (71.7 kg) Intake/Output Summary (Last 24 hours) at 11/28/2018 1504 Last data filed at 11/28/2018 9975 Gross per 24 hour Intake 240 ml Output 500 ml Net -260 ml Physical Exam: 
General:  alert, cooperative, no distress Neck:  nontender, no JVD Lungs:  clear to auscultation bilaterally Heart:  irregularly irregular rhythm Abdomen:  abdomen is soft without significant tenderness, masses, organomegaly or guarding Extremities:  extremities normal, atraumatic, no cyanosis or edema Data Review:  
 
Labs: Results:  
   
Chemistry Recent Labs  
  11/28/18 
0640 11/27/18 
0201 * 105*  139  
K 4.3 3.4*  
 105 CO2 25 26 BUN 10 10 CREA 1.01 0.81 CA 9.1 8.5 MG  --  1.7 AGAP 11 8 BUCR 10* 12  
AP  --  114 TP  --  6.4 ALB  --  3.7 GLOB  --  2.7 AGRAT  --  1.4  
  
CBC w/Diff Recent Labs  
  11/28/18 
0640 11/27/18 
0201 WBC 8.2 9.5  
RBC 5.35 4.95  
HGB 17.4* 16.0 HCT 50.3* 46.0  240 GRANS 69 80* LYMPH 19* 12* EOS 1 0 Cardiac Enzymes No results found for: CPK, CK, CKMMB, CKMB, RCK3, CKMBT, CKNDX, CKND1, MICKI, TROPT, TROIQ, UYEN, TROPT, TNIPOC, BNP, BNPP Coagulation Recent Labs  
  11/27/18 
0201 11/26/18 
0750 PTP 23.0*  --   
INR 2.0* 2.7 Lipid Panel Lab Results Component Value Date/Time Cholesterol, total 245 (H) 10/25/2018 08:38 AM  
 HDL Cholesterol 63 (H) 10/25/2018 08:38 AM  
 LDL, calculated 151 (H) 10/25/2018 08:38 AM  
 VLDL, calculated 30 10/25/2018 08:38 AM  
 Triglyceride 152 (H) 10/25/2018 08:38 AM  
 CHOL/HDL Ratio 3.1 11/28/2017 02:42 PM  
  
BNP No results found for: BNP, BNPP, XBNPT Liver Enzymes Recent Labs  
  11/27/18 0201 TP 6.4 ALB 3.7  SGOT 24  
  
Digoxin Thyroid Studies Lab Results Component Value Date/Time TSH 0.46 10/25/2018 08:38 AM  
    
 
Signed By: Adeel Elliott. Olivia Cadet PA-C November 28, 2018

## 2018-11-28 NOTE — ANCILLARY DISCHARGE INSTRUCTIONS
Patient called stating that he needs authorization for his medication, patient will call unit first thing in morning to seek assistance. Case management made aware.

## 2018-11-28 NOTE — DISCHARGE SUMMARY
2 [facility]  Hospitalist Division    Discharge Summary    Patient: Nayan Valladares MRN: [id number]  [facility]: [id number]    YOB: 1955  Age: 61 y.o. Sex: male    DOA: 11/27/2018 LOS:  LOS: 1 day   Discharge Date:      Admission Diagnoses: CHF (congestive heart failure) ([address]  Atrial fibrillation ([facility]  HTN (hypertension)  CHF (congestive heart failure) ([facility]  Atrial fibrillation ([facility]  HTN (hypertension)    Discharge Diagnoses:    Problem List as of 11/28/2018 Date Reviewed: 11/27/2018          Codes Class Noted - Resolved    * (Principal) CHF (congestive heart failure) ([facility] ICD-10-CM: I50.9  ICD-9-CM: 428.0  11/27/2018 - Present        HTN (hypertension) ICD-10-CM: I10  ICD-9-CM: 401.9  11/4/2018 - Present        Alcoholism ([facility] ICD-10-CM: F10.20  ICD-9-CM: 303.90  11/4/2018 - Present        Atrial fibrillation with RVR ([facility] ICD-10-CM: I48.91  ICD-9-CM: 427.31  11/3/2018 - Present        Nontoxic multinodular goiter ICD-10-CM: E04.2  ICD-9-CM: 241.1  2/7/2018 - Present        Atrial fibrillation ([facility] ICD-10-CM: I48.91  ICD-9-CM: 427.31  11/28/2017 - Present    Overview Signed 11/4/2018 10:04 AM by Meeta Naranjo MD     Chronic, [id number] score 1 to 2             Elevated cholesterol ICD-10-CM: E78.00  ICD-9-CM: 272.0  4/12/2016 - Present        Arthritis ICD-10-CM: M19.90  ICD-9-CM: 716.90  11/13/2014 - Present        RESOLVED: History of nuclear stress test ICD-10-CM: Z92.89  ICD-9-CM: V15.89  11/4/2018 - 11/8/2018    Overview Signed 11/4/2018 10:02 AM by Meeta Naranjo MD     4/17/2018; no evidence of significant ongoing ischemia.              RESOLVED: Chronic anticoagulation ICD-10-CM: Z79.01  ICD-9-CM: V58.61  11/4/2018 - 11/8/2018    Overview Signed 11/4/2018 10:05 AM by Meeta Naranjo MD     Regulated by PCP             RESOLVED: Loose stools ICD-10-CM: R19.5  ICD-9-CM: 787.7  11/4/2018 - 11/8/2018        RESOLVED: Lower gastrointestinal bleeding ICD-10-CM: K92.2  ICD-9-CM: 578.9  11/4/2018 - 11/8/2018        RESOLVED: Persistent atrial fibrillation ([address] ICD-10-CM: I48.1  ICD-9-CM: 427.31  4/17/2018 - 4/26/2018        RESOLVED: Atrial fibrillation with RVR ([address] ICD-10-CM: I48.91  ICD-9-CM: 427.31  4/16/2018 - 4/26/2018        RESOLVED: Normal cardiac stress test ICD-10-CM: UKM3309  ICD-9-CM: Bing Doug  12/5/2017 - 2/7/2018    Overview Signed 12/5/2017  3:42 PM by Junior Yu MD     Normal Lexiscan [address] 3/28/2016, EF 57%             RESOLVED: Ganglion cyst ICD-10-CM: M67.40  ICD-9-CM: 727.43  4/12/2016 - 11/28/2017        RESOLVED: Thyroid mass ICD-10-CM: E07.9  ICD-9-CM: 246.9  4/12/2016 - 2/7/2018    Overview Signed 12/5/2017  3:45 PM by Junior Yu MD     TSH on 11/28/2017 was 0.98             RESOLVED: Essential hypertension, malignant ICD-10-CM: I10  ICD-9-CM: 401.0  11/13/2014 - 11/8/2018        RESOLVED: Hearing loss ICD-10-CM: H91.90  ICD-9-CM: 389.9  11/13/2014 - 11/28/2017              Discharge Condition: Stable    Discharge To: Home    Consults: Cardiology    Hospital Course: Pt is a 61 y.o. male who presented to the ER with SOB. He had a physical with his PCP yesterday and said that his vital signs and INR were normal. He began having SOB last night. His SOB worsened this AM. No chest pain. Mild diaphoresis. He checked his HR on his home monitor and it was 150 bpm.  He called 911 and came to the ER. In the ER he was started on a cardizem gtt. Trop trended and remains at baseline. Cardiology was consulted. IV lasix given. Echo results below. [place] was started this morning. VSS. All labs and imaging reviewed. Physical Exam:  General appearance: alert, cooperative, no distress, appears stated age  Head: Normocephalic, without obvious abnormality, atraumatic  Lungs: clear to auscultation bilaterally  Heart: regular rate and rhythm, S1, S2 normal, no murmur, click, rub or gallop  Abdomen: soft, non-tender.  Bowel sounds normal. No masses,  no organomegaly  Extremities: extremities normal, atraumatic, no cyanosis or edema  Skin: Skin color, texture, turgor normal. No rashes or lesions  Neurologic: Grossly normal      Significant Diagnostic Studies:    BMP:   Lab Results   Component Value Date/Time     11/28/2018 06:40 AM    K 4.3 11/28/2018 06:40 AM     11/28/2018 06:40 AM    CO2 25 11/28/2018 06:40 AM    AGAP 11 11/28/2018 06:40 AM     (H) 11/28/2018 06:40 AM    BUN 10 11/28/2018 06:40 AM    CREA 1.01 11/28/2018 06:40 AM    GFRAA >60 11/28/2018 06:40 AM    GFRNA >60 11/28/2018 06:40 AM     CBC:   Lab Results   Component Value Date/Time    WBC 8.2 11/28/2018 06:40 AM    HGB 17.4 (H) 11/28/2018 06:40 AM    HCT 50.3 (H) 11/28/2018 06:40 AM     11/28/2018 06:40 AM       Echo 11/27/18:  Impression  · Left ventricular severely decreased systolic function. Estimated left ventricular ejection fraction is <15%. Visually measured ejection fraction. Left ventricular cavity size is mildly dilated. Left ventricular global hypokinesis. · Left atrial cavity size is moderately dilated. · Right ventricle not well visualized. Pacing wire present in the right ventricle. · Right atrial cavity size is mildly dilated. · Mitral valve non-specific thickening. Mild to moderate mitral valve regurgitation. · Mild tricuspid valve regurgitation is present. There is no evidence of pulmonary hypertension. · Moderately elevated central venous pressure (10-15 mmHg); IVC diameter is larger than 21 mm and collapses more than 50% with respiration. Discharge Medications:       Current Discharge Medication List      START taking these medications    Details   carvedilol (COREG) 6.25 mg tablet Take 1 Tab by mouth two (2) times daily (with meals). Qty: 30 Tab, Refills: 0      sacubitril-valsartan (ENTRESTO) 24 mg/26 mg tablet Take 1 Tab by mouth every twelve (12) hours.   Qty: 30 Tab, Refills: 0      dilTIAZem (CARDIZEM) 90 mg tablet Take 1 Tab by mouth every eight (8) hours. Qty: 30 Tab, Refills: 0         CONTINUE these medications which have NOT CHANGED    Details   vitamin k 100 mcg tablet Take 1 Tab by mouth daily. Qty: 100 Tab, Refills: 12    Associated Diagnoses: Chronic atrial fibrillation ([facility] Nontoxic multinodular goiter; Elevated cholesterol; Essential hypertension, malignant; Arthritis      warfarin (COUMADIN) 4 mg tablet Take 1 Tab by mouth daily. Qty: 90 Tab, Refills: 12    Associated Diagnoses: Chronic atrial fibrillation ([facility] Nontoxic multinodular goiter; Elevated cholesterol; Essential hypertension, malignant; Arthritis      digoxin (LANOXIN) 0.125 mg tablet Take 1 Tab by mouth every fourty-eight (48) hours. Qty: 45 Tab, Refills: 4    Associated Diagnoses: Chronic atrial fibrillation ([facility] [facility] Nontoxic multinodular goiter; Elevated cholesterol; Essential hypertension, malignant; Arthritis      magnesium oxide 200 mg magnesium tab Take 200 mg by mouth daily. Qty: 100 Tab, Refills: 12    Associated Diagnoses: Nontoxic multinodular goiter; Elevated cholesterol; Essential hypertension, malignant; Chronic atrial fibrillation ([facility] Arthritis      nitroglycerin (NITROSTAT) 0.4 mg SL tablet 1 Tab by SubLINGual route as needed for Chest Pain. Qty: 1 Bottle, Refills: 0      multivit-min-FA-lycopen-lutein (CENTRUM SILVER MEN) 300-600-300 mcg tab Take 1 Tab by mouth daily. Qty: 100 Caplet, Refills: 12    Associated Diagnoses: Atrial fibrillation, unspecified type ([facility]  Nontoxic multinodular goiter; Elevated cholesterol; Essential hypertension, malignant         STOP taking these medications       dilTIAZem CD (CARDIZEM CD) 240 mg ER capsule Comments:   Reason for Stopping:         dilTIAZem CD (CARDIZEM CD) 180 mg ER capsule Comments:   Reason for Stopping:                 Activity: Activity as tolerated    Diet: Cardiac Diet    Wound Care: None needed    Follow-up: with cardiology and PCP in 1 week    Discharge time: >35 minutes    Vitaly Weinstein PA-C  [facility]  Office:  [phone]  Pager: [phone]      11/28/2018, 12:29 PM

## 2018-11-29 ENCOUNTER — APPOINTMENT (OUTPATIENT)
Dept: GENERAL RADIOLOGY | Age: 63
DRG: 917 | End: 2018-11-29
Attending: EMERGENCY MEDICINE
Payer: COMMERCIAL

## 2018-11-29 ENCOUNTER — HOSPITAL ENCOUNTER (INPATIENT)
Age: 63
LOS: 5 days | Discharge: HOME | DRG: 917 | End: 2018-12-04
Attending: EMERGENCY MEDICINE | Admitting: HOSPITALIST
Payer: COMMERCIAL

## 2018-11-29 DIAGNOSIS — A41.9 SEPSIS, DUE TO UNSPECIFIED ORGANISM: Primary | ICD-10-CM

## 2018-11-29 DIAGNOSIS — T46.1X5A: ICD-10-CM

## 2018-11-29 DIAGNOSIS — I50.9 ACUTE CONGESTIVE HEART FAILURE, UNSPECIFIED HEART FAILURE TYPE (HCC): ICD-10-CM

## 2018-11-29 DIAGNOSIS — I48.91 A-FIB (HCC): ICD-10-CM

## 2018-11-29 PROBLEM — R57.0 CARDIOGENIC SHOCK (HCC): Status: ACTIVE | Noted: 2018-11-29

## 2018-11-29 PROBLEM — R65.10 SIRS (SYSTEMIC INFLAMMATORY RESPONSE SYNDROME) (HCC): Status: ACTIVE | Noted: 2018-11-29

## 2018-11-29 PROBLEM — E78.5 HLD (HYPERLIPIDEMIA): Status: ACTIVE | Noted: 2018-11-29

## 2018-11-29 PROBLEM — T50.901A OD (OVERDOSE OF DRUG), ACCIDENTAL OR UNINTENTIONAL, INITIAL ENCOUNTER: Status: ACTIVE | Noted: 2018-11-29

## 2018-11-29 PROBLEM — I50.22 CHRONIC SYSTOLIC CONGESTIVE HEART FAILURE (HCC): Status: ACTIVE | Noted: 2018-11-27

## 2018-11-29 PROBLEM — R57.9 SHOCK (HCC): Status: ACTIVE | Noted: 2018-11-29

## 2018-11-29 LAB
ALBUMIN SERPL-MCNC: 3.8 G/DL (ref 3.4–5)
ALBUMIN/GLOB SERPL: 1 {RATIO} (ref 0.8–1.7)
ALP SERPL-CCNC: 133 U/L (ref 45–117)
ALT SERPL-CCNC: 34 U/L (ref 16–61)
AMORPH CRY URNS QL MICRO: ABNORMAL
ANION GAP SERPL CALC-SCNC: 12 MMOL/L (ref 3–18)
ANION GAP SERPL CALC-SCNC: 8 MMOL/L (ref 3–18)
APPEARANCE UR: ABNORMAL
AST SERPL-CCNC: 20 U/L (ref 15–37)
ATRIAL RATE: 241 BPM
BACTERIA URNS QL MICRO: ABNORMAL /HPF
BASOPHILS # BLD: 0 K/UL (ref 0–0.1)
BASOPHILS NFR BLD: 0 % (ref 0–2)
BILIRUB SERPL-MCNC: 0.8 MG/DL (ref 0.2–1)
BILIRUB UR QL: ABNORMAL
BNP SERPL-MCNC: 880 PG/ML (ref 0–900)
BUN SERPL-MCNC: 18 MG/DL (ref 7–18)
BUN SERPL-MCNC: 19 MG/DL (ref 7–18)
BUN/CREAT SERPL: 14 (ref 12–20)
BUN/CREAT SERPL: 9 (ref 12–20)
CA-I SERPL-SCNC: 1.13 MMOL/L (ref 1.12–1.32)
CALCIUM SERPL-MCNC: 8.3 MG/DL (ref 8.5–10.1)
CALCIUM SERPL-MCNC: 8.4 MG/DL (ref 8.5–10.1)
CALCULATED R AXIS, ECG10: 41 DEGREES
CALCULATED T AXIS, ECG11: 88 DEGREES
CAOX CRY URNS QL MICRO: ABNORMAL
CHLORIDE SERPL-SCNC: 102 MMOL/L (ref 100–108)
CHLORIDE SERPL-SCNC: 108 MMOL/L (ref 100–108)
CO2 SERPL-SCNC: 21 MMOL/L (ref 21–32)
CO2 SERPL-SCNC: 22 MMOL/L (ref 21–32)
COLOR UR: ABNORMAL
CREAT SERPL-MCNC: 1.36 MG/DL (ref 0.6–1.3)
CREAT SERPL-MCNC: 2 MG/DL (ref 0.6–1.3)
DIAGNOSIS, 93000: NORMAL
DIFFERENTIAL METHOD BLD: ABNORMAL
DIGOXIN SERPL-MCNC: 0.6 NG/ML (ref 0.9–2)
EOSINOPHIL # BLD: 0.1 K/UL (ref 0–0.4)
EOSINOPHIL NFR BLD: 1 % (ref 0–5)
EPITH CASTS URNS QL MICRO: ABNORMAL /LPF (ref 0–5)
ERYTHROCYTE [DISTWIDTH] IN BLOOD BY AUTOMATED COUNT: 12.8 % (ref 11.6–14.5)
GLOBULIN SER CALC-MCNC: 3.7 G/DL (ref 2–4)
GLUCOSE SERPL-MCNC: 151 MG/DL (ref 74–99)
GLUCOSE SERPL-MCNC: 74 MG/DL (ref 74–99)
GLUCOSE UR STRIP.AUTO-MCNC: 100 MG/DL
HCT VFR BLD AUTO: 48 % (ref 36–48)
HGB BLD-MCNC: 16.7 G/DL (ref 13–16)
HGB UR QL STRIP: NEGATIVE
HYALINE CASTS URNS QL MICRO: ABNORMAL /LPF (ref 0–2)
INR PPP: 1.7 (ref 0.8–1.2)
KETONES UR QL STRIP.AUTO: ABNORMAL MG/DL
LACTATE BLD-SCNC: 3.49 MMOL/L (ref 0.4–2)
LACTATE SERPL-SCNC: 1.1 MMOL/L (ref 0.4–2)
LACTATE SERPL-SCNC: 2.2 MMOL/L (ref 0.4–2)
LEUKOCYTE ESTERASE UR QL STRIP.AUTO: ABNORMAL
LYMPHOCYTES # BLD: 1.6 K/UL (ref 0.9–3.6)
LYMPHOCYTES NFR BLD: 13 % (ref 21–52)
MAGNESIUM SERPL-MCNC: 2.3 MG/DL (ref 1.6–2.6)
MCH RBC QN AUTO: 32.7 PG (ref 24–34)
MCHC RBC AUTO-ENTMCNC: 34.8 G/DL (ref 31–37)
MCV RBC AUTO: 94.1 FL (ref 74–97)
MONOCYTES # BLD: 1 K/UL (ref 0.05–1.2)
MONOCYTES NFR BLD: 8 % (ref 3–10)
MUCOUS THREADS URNS QL MICRO: ABNORMAL /LPF
NEUTS SEG # BLD: 9.8 K/UL (ref 1.8–8)
NEUTS SEG NFR BLD: 78 % (ref 40–73)
NITRITE UR QL STRIP.AUTO: POSITIVE
PH UR STRIP: 5 [PH] (ref 5–8)
PHOSPHATE SERPL-MCNC: 3.5 MG/DL (ref 2.5–4.9)
PLATELET # BLD AUTO: 264 K/UL (ref 135–420)
PMV BLD AUTO: 11.1 FL (ref 9.2–11.8)
POTASSIUM SERPL-SCNC: 3.9 MMOL/L (ref 3.5–5.5)
POTASSIUM SERPL-SCNC: 4.2 MMOL/L (ref 3.5–5.5)
POTASSIUM SERPL-SCNC: 4.5 MMOL/L (ref 3.5–5.5)
PROT SERPL-MCNC: 7.5 G/DL (ref 6.4–8.2)
PROT UR STRIP-MCNC: 100 MG/DL
PROTHROMBIN TIME: 19.7 SEC (ref 11.5–15.2)
Q-T INTERVAL, ECG07: 424 MS
QRS DURATION, ECG06: 84 MS
QTC CALCULATION (BEZET), ECG08: 416 MS
RBC # BLD AUTO: 5.1 M/UL (ref 4.7–5.5)
RBC #/AREA URNS HPF: ABNORMAL /HPF (ref 0–5)
SODIUM SERPL-SCNC: 136 MMOL/L (ref 136–145)
SODIUM SERPL-SCNC: 137 MMOL/L (ref 136–145)
SP GR UR REFRACTOMETRY: 1.03 (ref 1–1.03)
TROPONIN I SERPL-MCNC: 0.04 NG/ML (ref 0–0.04)
UROBILINOGEN UR QL STRIP.AUTO: 1 EU/DL (ref 0.2–1)
VENTRICULAR RATE, ECG03: 58 BPM
WBC # BLD AUTO: 12.4 K/UL (ref 4.6–13.2)
WBC URNS QL MICRO: ABNORMAL /HPF (ref 0–4)

## 2018-11-29 PROCEDURE — 96361 HYDRATE IV INFUSION ADD-ON: CPT

## 2018-11-29 PROCEDURE — 74011250637 HC RX REV CODE- 250/637: Performed by: HOSPITALIST

## 2018-11-29 PROCEDURE — 74011250637 HC RX REV CODE- 250/637: Performed by: NURSE PRACTITIONER

## 2018-11-29 PROCEDURE — 85025 COMPLETE CBC W/AUTO DIFF WBC: CPT

## 2018-11-29 PROCEDURE — 80047 BASIC METABLC PNL IONIZED CA: CPT

## 2018-11-29 PROCEDURE — 84100 ASSAY OF PHOSPHORUS: CPT

## 2018-11-29 PROCEDURE — 96365 THER/PROPH/DIAG IV INF INIT: CPT

## 2018-11-29 PROCEDURE — 80162 ASSAY OF DIGOXIN TOTAL: CPT

## 2018-11-29 PROCEDURE — 96376 TX/PRO/DX INJ SAME DRUG ADON: CPT

## 2018-11-29 PROCEDURE — 74011250636 HC RX REV CODE- 250/636: Performed by: EMERGENCY MEDICINE

## 2018-11-29 PROCEDURE — 83605 ASSAY OF LACTIC ACID: CPT

## 2018-11-29 PROCEDURE — 71045 X-RAY EXAM CHEST 1 VIEW: CPT

## 2018-11-29 PROCEDURE — 80053 COMPREHEN METABOLIC PANEL: CPT

## 2018-11-29 PROCEDURE — 82962 GLUCOSE BLOOD TEST: CPT

## 2018-11-29 PROCEDURE — 96367 TX/PROPH/DG ADDL SEQ IV INF: CPT

## 2018-11-29 PROCEDURE — 83880 ASSAY OF NATRIURETIC PEPTIDE: CPT

## 2018-11-29 PROCEDURE — 94640 AIRWAY INHALATION TREATMENT: CPT

## 2018-11-29 PROCEDURE — 74011250637 HC RX REV CODE- 250/637: Performed by: PHYSICIAN ASSISTANT

## 2018-11-29 PROCEDURE — 74011250636 HC RX REV CODE- 250/636

## 2018-11-29 PROCEDURE — 74011250636 HC RX REV CODE- 250/636: Performed by: HOSPITALIST

## 2018-11-29 PROCEDURE — 85610 PROTHROMBIN TIME: CPT

## 2018-11-29 PROCEDURE — 83735 ASSAY OF MAGNESIUM: CPT

## 2018-11-29 PROCEDURE — 65610000006 HC RM INTENSIVE CARE

## 2018-11-29 PROCEDURE — 74011000250 HC RX REV CODE- 250: Performed by: EMERGENCY MEDICINE

## 2018-11-29 PROCEDURE — 87086 URINE CULTURE/COLONY COUNT: CPT

## 2018-11-29 PROCEDURE — 87040 BLOOD CULTURE FOR BACTERIA: CPT

## 2018-11-29 PROCEDURE — 96374 THER/PROPH/DIAG INJ IV PUSH: CPT

## 2018-11-29 PROCEDURE — 77030029684 HC NEB SM VOL KT MONA -A

## 2018-11-29 PROCEDURE — 93005 ELECTROCARDIOGRAM TRACING: CPT

## 2018-11-29 PROCEDURE — 99285 EMERGENCY DEPT VISIT HI MDM: CPT

## 2018-11-29 PROCEDURE — 36415 COLL VENOUS BLD VENIPUNCTURE: CPT

## 2018-11-29 PROCEDURE — 81001 URINALYSIS AUTO W/SCOPE: CPT

## 2018-11-29 PROCEDURE — 74011000250 HC RX REV CODE- 250: Performed by: PHYSICIAN ASSISTANT

## 2018-11-29 PROCEDURE — 82330 ASSAY OF CALCIUM: CPT

## 2018-11-29 PROCEDURE — 84484 ASSAY OF TROPONIN QUANT: CPT

## 2018-11-29 PROCEDURE — 74011000258 HC RX REV CODE- 258: Performed by: EMERGENCY MEDICINE

## 2018-11-29 PROCEDURE — 96375 TX/PRO/DX INJ NEW DRUG ADDON: CPT

## 2018-11-29 PROCEDURE — 84132 ASSAY OF SERUM POTASSIUM: CPT

## 2018-11-29 RX ORDER — LEVOFLOXACIN 5 MG/ML
750 INJECTION, SOLUTION INTRAVENOUS
Status: DISCONTINUED | OUTPATIENT
Start: 2018-11-29 | End: 2018-11-29

## 2018-11-29 RX ORDER — DILTIAZEM HYDROCHLORIDE 60 MG/1
60 TABLET, FILM COATED ORAL 4 TIMES DAILY
Status: DISCONTINUED | OUTPATIENT
Start: 2018-11-29 | End: 2018-11-30

## 2018-11-29 RX ORDER — POTASSIUM CHLORIDE 750 MG/1
10 TABLET, FILM COATED, EXTENDED RELEASE ORAL ONCE
Status: COMPLETED | OUTPATIENT
Start: 2018-11-29 | End: 2018-11-29

## 2018-11-29 RX ORDER — NOREPINEPHRINE BIT/0.9 % NACL 8 MG/250ML
2-16 INFUSION BOTTLE (ML) INTRAVENOUS
Status: DISCONTINUED | OUTPATIENT
Start: 2018-11-29 | End: 2018-11-30

## 2018-11-29 RX ORDER — CALCIUM GLUCONATE 94 MG/ML
INJECTION, SOLUTION INTRAVENOUS
Status: DISPENSED
Start: 2018-11-29 | End: 2018-11-29

## 2018-11-29 RX ORDER — ONDANSETRON 2 MG/ML
INJECTION INTRAMUSCULAR; INTRAVENOUS
Status: DISPENSED
Start: 2018-11-29 | End: 2018-11-29

## 2018-11-29 RX ORDER — NOREPINEPHRINE BIT/0.9 % NACL 8 MG/250ML
INFUSION BOTTLE (ML) INTRAVENOUS
Status: DISPENSED
Start: 2018-11-29 | End: 2018-11-29

## 2018-11-29 RX ORDER — DIPHENHYDRAMINE HYDROCHLORIDE 50 MG/ML
25 INJECTION, SOLUTION INTRAMUSCULAR; INTRAVENOUS
Status: COMPLETED | OUTPATIENT
Start: 2018-11-29 | End: 2018-11-29

## 2018-11-29 RX ORDER — HEPARIN SODIUM 5000 [USP'U]/ML
5000 INJECTION, SOLUTION INTRAVENOUS; SUBCUTANEOUS EVERY 8 HOURS
Status: DISCONTINUED | OUTPATIENT
Start: 2018-11-29 | End: 2018-11-30

## 2018-11-29 RX ORDER — ONDANSETRON 2 MG/ML
INJECTION INTRAMUSCULAR; INTRAVENOUS
Status: COMPLETED
Start: 2018-11-29 | End: 2018-11-29

## 2018-11-29 RX ORDER — SODIUM CHLORIDE 0.9 % (FLUSH) 0.9 %
5-10 SYRINGE (ML) INJECTION AS NEEDED
Status: DISCONTINUED | OUTPATIENT
Start: 2018-11-29 | End: 2018-12-04 | Stop reason: HOSPADM

## 2018-11-29 RX ORDER — DIGOXIN 0.25 MG/ML
INJECTION INTRAMUSCULAR; INTRAVENOUS
Status: COMPLETED
Start: 2018-11-29 | End: 2018-11-29

## 2018-11-29 RX ORDER — DIPHENHYDRAMINE HYDROCHLORIDE 50 MG/ML
INJECTION, SOLUTION INTRAMUSCULAR; INTRAVENOUS
Status: COMPLETED
Start: 2018-11-29 | End: 2018-11-29

## 2018-11-29 RX ORDER — VANCOMYCIN/0.9 % SOD CHLORIDE 1 G/100 ML
1000 PLASTIC BAG, INJECTION (ML) INTRAVENOUS ONCE
Status: DISCONTINUED | OUTPATIENT
Start: 2018-11-29 | End: 2018-11-29 | Stop reason: DRUGHIGH

## 2018-11-29 RX ORDER — METOCLOPRAMIDE HYDROCHLORIDE 5 MG/ML
10 INJECTION INTRAMUSCULAR; INTRAVENOUS
Status: COMPLETED | OUTPATIENT
Start: 2018-11-29 | End: 2018-11-29

## 2018-11-29 RX ORDER — DIGOXIN 0.25 MG/ML
250 INJECTION INTRAMUSCULAR; INTRAVENOUS
Status: COMPLETED | OUTPATIENT
Start: 2018-11-29 | End: 2018-11-29

## 2018-11-29 RX ORDER — LEVOFLOXACIN 5 MG/ML
750 INJECTION, SOLUTION INTRAVENOUS EVERY 24 HOURS
Status: DISCONTINUED | OUTPATIENT
Start: 2018-11-29 | End: 2018-11-29 | Stop reason: DRUGHIGH

## 2018-11-29 RX ORDER — METOPROLOL TARTRATE 5 MG/5ML
1.25 INJECTION INTRAVENOUS ONCE
Status: COMPLETED | OUTPATIENT
Start: 2018-11-29 | End: 2018-11-29

## 2018-11-29 RX ORDER — ONDANSETRON 2 MG/ML
4 INJECTION INTRAMUSCULAR; INTRAVENOUS
Status: COMPLETED | OUTPATIENT
Start: 2018-11-29 | End: 2018-11-29

## 2018-11-29 RX ORDER — SODIUM CHLORIDE, SODIUM LACTATE, POTASSIUM CHLORIDE, CALCIUM CHLORIDE 600; 310; 30; 20 MG/100ML; MG/100ML; MG/100ML; MG/100ML
200 INJECTION, SOLUTION INTRAVENOUS CONTINUOUS
Status: DISCONTINUED | OUTPATIENT
Start: 2018-11-29 | End: 2018-11-29

## 2018-11-29 RX ORDER — VANCOMYCIN/0.9 % SOD CHLORIDE 1.5G/250ML
1500 PLASTIC BAG, INJECTION (ML) INTRAVENOUS
Status: DISCONTINUED | OUTPATIENT
Start: 2018-11-29 | End: 2018-11-29

## 2018-11-29 RX ORDER — FUROSEMIDE 10 MG/ML
20 INJECTION INTRAMUSCULAR; INTRAVENOUS
Status: COMPLETED | OUTPATIENT
Start: 2018-11-29 | End: 2018-11-29

## 2018-11-29 RX ORDER — SODIUM CHLORIDE, SODIUM LACTATE, POTASSIUM CHLORIDE, CALCIUM CHLORIDE 600; 310; 30; 20 MG/100ML; MG/100ML; MG/100ML; MG/100ML
1000 INJECTION, SOLUTION INTRAVENOUS CONTINUOUS
Status: DISCONTINUED | OUTPATIENT
Start: 2018-11-29 | End: 2018-11-30

## 2018-11-29 RX ORDER — IPRATROPIUM BROMIDE AND ALBUTEROL SULFATE 2.5; .5 MG/3ML; MG/3ML
3 SOLUTION RESPIRATORY (INHALATION)
Status: COMPLETED | OUTPATIENT
Start: 2018-11-29 | End: 2018-11-29

## 2018-11-29 RX ORDER — NOREPINEPHRINE BITARTRATE/D5W 8 MG/250ML
2-16 PLASTIC BAG, INJECTION (ML) INTRAVENOUS
Status: DISCONTINUED | OUTPATIENT
Start: 2018-11-29 | End: 2018-11-29 | Stop reason: SDUPTHER

## 2018-11-29 RX ORDER — ACETAMINOPHEN 325 MG/1
650 TABLET ORAL
Status: DISCONTINUED | OUTPATIENT
Start: 2018-11-29 | End: 2018-12-04 | Stop reason: HOSPADM

## 2018-11-29 RX ORDER — ONDANSETRON 2 MG/ML
4 INJECTION INTRAMUSCULAR; INTRAVENOUS
Status: DISCONTINUED | OUTPATIENT
Start: 2018-11-29 | End: 2018-12-04 | Stop reason: HOSPADM

## 2018-11-29 RX ORDER — WARFARIN 2 MG/1
4 TABLET ORAL
Status: DISCONTINUED | OUTPATIENT
Start: 2018-11-29 | End: 2018-12-02

## 2018-11-29 RX ORDER — METOCLOPRAMIDE HYDROCHLORIDE 5 MG/ML
INJECTION INTRAMUSCULAR; INTRAVENOUS
Status: COMPLETED
Start: 2018-11-29 | End: 2018-11-29

## 2018-11-29 RX ADMIN — ONDANSETRON 4 MG: 2 INJECTION INTRAMUSCULAR; INTRAVENOUS at 02:29

## 2018-11-29 RX ADMIN — METOPROLOL TARTRATE 1.25 MG: 5 INJECTION, SOLUTION INTRAVENOUS at 11:57

## 2018-11-29 RX ADMIN — METOCLOPRAMIDE HYDROCHLORIDE 10 MG: 5 INJECTION INTRAMUSCULAR; INTRAVENOUS at 02:49

## 2018-11-29 RX ADMIN — HEPARIN SODIUM 5000 UNITS: 5000 INJECTION INTRAVENOUS; SUBCUTANEOUS at 22:58

## 2018-11-29 RX ADMIN — DIGOXIN 250 MCG: 250 INJECTION, SOLUTION INTRAMUSCULAR; INTRAVENOUS; PARENTERAL at 13:51

## 2018-11-29 RX ADMIN — Medication 2 MCG/MIN: at 04:46

## 2018-11-29 RX ADMIN — WARFARIN SODIUM 4 MG: 2 TABLET ORAL at 21:28

## 2018-11-29 RX ADMIN — FUROSEMIDE 20 MG: 10 INJECTION, SOLUTION INTRAMUSCULAR; INTRAVENOUS at 05:16

## 2018-11-29 RX ADMIN — PIPERACILLIN SODIUM,TAZOBACTAM SODIUM 4.5 G: 4; .5 INJECTION, POWDER, FOR SOLUTION INTRAVENOUS at 04:33

## 2018-11-29 RX ADMIN — CALCIUM GLUCONATE 1000 MG: 94 INJECTION, SOLUTION INTRAVENOUS at 02:27

## 2018-11-29 RX ADMIN — GLUCAGON HYDROCHLORIDE 2 MG/HR: 1 INJECTION, POWDER, FOR SOLUTION INTRAMUSCULAR; INTRAVENOUS; SUBCUTANEOUS at 04:04

## 2018-11-29 RX ADMIN — VANCOMYCIN HYDROCHLORIDE 1500 MG: 10 INJECTION, POWDER, LYOPHILIZED, FOR SOLUTION INTRAVENOUS at 05:43

## 2018-11-29 RX ADMIN — DIPHENHYDRAMINE HYDROCHLORIDE 25 MG: 50 INJECTION, SOLUTION INTRAMUSCULAR; INTRAVENOUS at 02:48

## 2018-11-29 RX ADMIN — CALCIUM GLUCONATE 1000 MG: 94 INJECTION, SOLUTION INTRAVENOUS at 04:12

## 2018-11-29 RX ADMIN — LEVOFLOXACIN 750 MG: 5 INJECTION, SOLUTION INTRAVENOUS at 05:19

## 2018-11-29 RX ADMIN — ONDANSETRON 4 MG: 2 INJECTION INTRAMUSCULAR; INTRAVENOUS at 02:49

## 2018-11-29 RX ADMIN — DILTIAZEM HYDROCHLORIDE 60 MG: 60 TABLET, FILM COATED ORAL at 15:58

## 2018-11-29 RX ADMIN — DILTIAZEM HYDROCHLORIDE 60 MG: 60 TABLET, FILM COATED ORAL at 21:27

## 2018-11-29 RX ADMIN — SODIUM CHLORIDE 1000 ML: 9 INJECTION, SOLUTION INTRAVENOUS at 02:34

## 2018-11-29 RX ADMIN — ONDANSETRON 4 MG: 2 INJECTION INTRAMUSCULAR; INTRAVENOUS at 06:14

## 2018-11-29 RX ADMIN — IPRATROPIUM BROMIDE AND ALBUTEROL SULFATE 3 ML: .5; 3 SOLUTION RESPIRATORY (INHALATION) at 05:15

## 2018-11-29 RX ADMIN — IPRATROPIUM BROMIDE AND ALBUTEROL SULFATE 3 ML: .5; 3 SOLUTION RESPIRATORY (INHALATION) at 05:42

## 2018-11-29 RX ADMIN — GLUCAGON HYDROCHLORIDE 1 MG: KIT at 02:29

## 2018-11-29 RX ADMIN — GLUCAGON HYDROCHLORIDE 2 MG/HR: 1 INJECTION, POWDER, FOR SOLUTION INTRAMUSCULAR; INTRAVENOUS; SUBCUTANEOUS at 10:05

## 2018-11-29 RX ADMIN — POTASSIUM CHLORIDE 10 MEQ: 750 TABLET, FILM COATED, EXTENDED RELEASE ORAL at 08:50

## 2018-11-29 RX ADMIN — SODIUM CHLORIDE, SODIUM LACTATE, POTASSIUM CHLORIDE, AND CALCIUM CHLORIDE 1000 ML: 600; 310; 30; 20 INJECTION, SOLUTION INTRAVENOUS at 03:54

## 2018-11-29 RX ADMIN — METOCLOPRAMIDE 10 MG: 5 INJECTION, SOLUTION INTRAMUSCULAR; INTRAVENOUS at 02:49

## 2018-11-29 RX ADMIN — DILTIAZEM HYDROCHLORIDE 60 MG: 60 TABLET, FILM COATED ORAL at 17:21

## 2018-11-29 RX ADMIN — SODIUM CHLORIDE 1000 ML: 9 INJECTION, SOLUTION INTRAVENOUS at 02:35

## 2018-11-29 RX ADMIN — HEPARIN SODIUM 5000 UNITS: 5000 INJECTION INTRAVENOUS; SUBCUTANEOUS at 08:50

## 2018-11-29 RX ADMIN — HEPARIN SODIUM 5000 UNITS: 5000 INJECTION INTRAVENOUS; SUBCUTANEOUS at 16:41

## 2018-11-29 RX ADMIN — DIGOXIN 250 MCG: 0.25 INJECTION INTRAMUSCULAR; INTRAVENOUS at 13:51

## 2018-11-29 NOTE — PROGRESS NOTES
Case reviewed. At this time, no evidence of infectious process. Appears to be more likely related to medication side effects given the rapidity of onset. Will hold off on further IVAB unless indication presents itself as CXR w/o PNA, UA not consistent with UTI, no lines. Elevated lactate can easily be explained by hypotension/shock resulting from BP lowering meds. Possible that entresto could have role in hypotension. Cardiology consulted January Hopper DO Internal Medicine, Hospitalist 
Pager: [phone] [facility]

## 2018-11-29 NOTE — PROGRESS NOTES
conducted an initial consultation and Spiritual Assessment for Kaylee Herman, who is a 61 y.o.,male. Patients Primary Language is: [place]. According to the patients EMR Yazidi Affiliation is: No Episcopalian. The reason the Patient came to the hospital is:  
Patient Active Problem List  
 Diagnosis Date Noted  HLD (hyperlipidemia) 11/29/2018  OD (overdose of drug), accidental or unintentional, initial encounter 11/29/2018  Cardiogenic shock ([facility] 11/29/2018  SIRS (systemic inflammatory response syndrome) (HCC) 11/29/2018  Chronic systolic congestive heart failure ([address] 11/27/2018  
 HTN (hypertension) 11/04/2018  Alcoholism ([facility] 11/04/2018  Atrial fibrillation with RVR ([facility] 11/03/2018  Nontoxic multinodular goiter 02/07/2018  Atrial fibrillation ([facility] 11/28/2017  Elevated cholesterol 04/12/2016  Arthritis 11/13/2014 The  provided the following Interventions: 
Initiated a relationship of care and support. Explored issues of lidia, spirituality and/or Latter-day needs while hospitalized. Listened empathically. Provided chaplaincy education. Provided information about Spiritual Care Services. Offered prayer and assurance of continued prayers on patient's behalf. Chart reviewed. The following outcomes were achieved: 
Patient shared some information about their medical narrative and spiritual journey/beliefs. Patient processed feeling about current hospitalization. Patient expressed gratitude for the 's visit. Assessment: 
Patient did not indicate any spiritual or Latter-day issues which require Spiritual Care Services interventions at this time. Patient does not have any Latter-day/cultural needs that will affect patients preferences in health care. Plan: 
Chaplains will continue to follow and will provide pastoral care on an as needed or requested basis.  recommends bedside caregivers page  on duty if patient shows signs of acute spiritual or emotional distress. [facility] Staff  Spiritual Care  
[phone]

## 2018-11-29 NOTE — PROGRESS NOTES
0610> Patient arrived from ED per stretcher. Awake, alert and oriented x 4. Denies any pain or discomfort. Bath given. Mepilex applied to sacrum for prevention. Will continue to monitor. 0700> Bedside and Verbal shift change report given to Hawa Mcpherson RN  (oncoming nurse) by Irlanda Dewey RN (offgoing nurse). Report included the following information SBAR, Kardex, Intake/Output and MAR.

## 2018-11-29 NOTE — PROGRESS NOTES
Chart reviewed and pt verified demographics. He has a roommate. He has Navend[facility] and saw his PCP two days ago. He was discharged yesterday and  felt much worse this morning and came here. Pb Figueroa He has been compliant with his meds and MD appts. Last admission his Cardiologist Dr Buffy Marley  added another medication to his existing meds due to his worsening cardiac function. He uses a pill organizer to organize his meds,  has all the days of the week , a.m. And p.m.spots to sort them. 
  
Reason for Readmission: Patient with CHF, dizzy, hypotensive  and feeling sick. RRAT Score and Risk Level: 10 Level of Readmission:  Level two, ,second readmit Care Conference scheduled: no   
    
Resources/supports as identified by patient/family: Patient has sister Minerva Bharat [phone] and brother in law, one  Of them will drive patient home. Top Challenges facing patient (as identified by patient/family and CM):      
  
Finances/Medication cost?   He is on social security - started at age 58. He had previously applied for medicaid, and has received letter that he is now qualified for regular medicaid, not just the [employer]. He's not received his card yet though. He is going to discontinue his [place] since with medicaid , he would not qualify for free insurance thru them. He called yesterday and talked with  Fabrizio Martins, one of his meds needs authorization thru his insurance. I will not persue that at this time, since I am not sure how they will rearrange his meds this hospitalization. Transportation   Sister or brother in law to drive. Support system or lack thereof?    family , insurance, PCP. Living arrangements? roomate Self-care/ADLs/Cognition? He is aware to call EMS when he is feeling bad. I asked him if he could stay with his sister at [place] for just a couple days, to see if he will be ok with the medications.  He did not say yes or no to me about this at this time. Current Advanced Directive/Advance Care Plan:   
        
Plan for utilizing home health:  Skilled Nurse for Disease Management and Medication Education- patient has obtained Home Health thru [facility], but he was only home for a day before coming back. He will  need his Home Health reordered if he says here for a couple of days. Likelihood of additional readmission:   He says the Cardiologist Dr Governor Santacruz says his heart is much weaker than a couple weeks ago. It  may be that medication interactions contributed to his feeling ill and being readmitted this time. Dorina Atwood Transition of Care Plan:    Based on readmission, the patient's previous Plan of Care 
 has been evaluated and/or modified.  The current Transition of Care Plan is:    Home with Home Health.

## 2018-11-29 NOTE — PROGRESS NOTES
Reason for Readmission:  sepsis, hypotension RRAT Score and Risk Level: 10 Level of Readmission:  Level two, [facility] Care Conference scheduled: no   
    
Resources/supports as identified by patient/family: Patient has sister Gume Steven [phone] and brother in law, one  Of them will drive patient home. Top Challenges facing patient (as identified by patient/family and CM):      
  
Finances/Medication cost?   [place] Transportation   Sister or brother in law to drive. Support system or lack thereof?   sister Living arrangements?  lives alone Self-care/ADLs/Cognition? AAO x3 Current Advanced Directive/Advance Care Plan: not on file Plan for utilizing home health:  Skilled Nurse for Disease Management and Medication Education- patient is agreeable Likelihood of additional readmission: mod Transition of Care Plan:    Based on readmission, the patient's previous Plan of Care 
 has been evaluated and/or modified. The current Transition of Care Plan is:    Home with Home Health. Interviewed patient, he was recently discharged from [place] [id number] 11.28. 2018. He agrees to share his discharge information with his sister, Gume Steven . He stated after returning home following his discharge he became lightheaded and dizzy after beginning a blood pressure medication. His PCP is DR Lillian Brennan. His discharge plan is to return home and is requesting HH. I explained to the patient Home health will not come out unless he is home bound. He stated he plans to take some time off from work and will be home bound. He states his sister will transport him home. Care Management Interventions PCP Verified by CM: Yes 
Palliative Care Criteria Met (RRAT>21 & CHF Dx)?: No 
Mode of Transport at Discharge: Other (see comment)(family) Transition of Care Consult (CM Consult): Other(live with roomate) E[place]hart Signup: No 
 Discharge Durable Medical Equipment: No 
Health Maintenance Reviewed: Yes Physical Therapy Consult: No 
Occupational Therapy Consult: No 
Speech Therapy Consult: No 
Current Support Network: Other(lives with roomate) Confirm Follow Up Transport: Family Plan discussed with Pt/Family/Caregiver: Yes [employer] Information Provided?: No 
Discharge Location Discharge Placement: Other:(home vs )

## 2018-11-29 NOTE — ED NOTES
TRANSFER - ED to INPATIENT REPORT: 
 
SBAR report made available to receiving floor on this patient being transferred to [facility] ICU [phone])  for routine progression of care Admitting diagnosis Sepsis ([address] Information from the following report(s) SBAR, ED Summary, Intake/Output, MAR, Recent Results and Cardiac Rhythm Strips was made available to receiving floor. Lines:  
Peripheral IV 11/29/18 Right Arm (Active) Site Assessment Clean, dry, & intact 11/29/2018  3:06 AM  
Phlebitis Assessment 0 11/29/2018  3:06 AM  
Infiltration Assessment 0 11/29/2018  3:06 AM  
Dressing Status Clean, dry, & intact 11/29/2018  3:06 AM  
Dressing Type Transparent 11/29/2018  3:06 AM  
Hub Color/Line Status Green 11/29/2018  3:06 AM  
   
Peripheral IV 11/29/18 Left Antecubital (Active) Site Assessment Clean, dry, & intact 11/29/2018  2:20 AM  
Phlebitis Assessment 0 11/29/2018  2:20 AM  
Infiltration Assessment 0 11/29/2018  2:20 AM  
Dressing Status Clean, dry, & intact 11/29/2018  2:20 AM  
Dressing Type Transparent 11/29/2018  2:20 AM  
   
Peripheral IV 11/29/18 Right Antecubital (Active) Site Assessment Clean, dry, & intact 11/29/2018  2:20 AM  
Phlebitis Assessment 0 11/29/2018  2:20 AM  
Infiltration Assessment 0 11/29/2018  2:20 AM  
Dressing Status Clean, dry, & intact 11/29/2018  2:20 AM  
Dressing Type Transparent 11/29/2018  2:20 AM  
   
Peripheral IV 11/29/18 Left Hand (Active) Site Assessment Clean, dry, & intact 11/29/2018  2:20 AM  
Phlebitis Assessment 0 11/29/2018  2:20 AM  
Infiltration Assessment 0 11/29/2018  2:20 AM  
Dressing Status Clean, dry, & intact 11/29/2018  2:20 AM  
Dressing Type Transparent 11/29/2018  2:20 AM  
  
 
Medication list updated today Opportunity for questions and clarification was provided. Patient is oriented to time, place, person and situation. Patient is  continent and ambulatory with assist 
  
Valuables transported with patient Patient transported with: 
 Monitor O2 @ 3 liters Registered Nurse

## 2018-11-29 NOTE — CONSULTS
[facility] - Consult Note    Date of  Admission: 11/29/2018  2:23 AM   Primary Care Physician:  Maryanne Vidales., DO  Patient seen and examined independently. Hypotension related to meds likely etiology of events last evening. Will gradually reintroduce meds. Levophed weaning. Agree with assessment and plan as noted below. Adam Herndon MD   Assessment:     - Hypotension  - Recent hospital admission for CHF, afib and adjustment of antihypertensive regimen with new severe cardiomyopathy  - Atrial fibrillation with RVR, CHADS2-Vasc score 2 (CHF, HTN)  - Echo 11/27/18 with EF <15%, global hypokinesis  - Echocardiogram 04/16/18 with an EF of 45%, mild diffuse hypokinesis  - Nuclear stress test 04/17/18 with no evidence of ongoing ischemia, EF calculated at 38%  - HTN  - Hyperlipidemia  - Hypokalemia  - Hx nontoxic multinodular goiter      Plan:     - Continue to wean from Levophed gtt  - Will discuss further with attending, likely will be able to stop Glucagon as this hypotensive event likely was due to Coreg, patient was taking significant dose of long acting Cardizem and this was actually reduced prior to his discharge yesterday. Had not taken Entresto last night. Took Cardizem (short acting) 90 mg and Coreg 6.25 mg prior to bed. - Will slowly reintroduce cardiac meds when he is weaned from pressors     History of Present Illness: This is a 61 y.o. male admitted for Sepsis ([facility] [address] Patient complains of:  Almost passed out     This is a 61year old male known to our service that cardiology is seeing in consult due to hypotension, recent admission for afib/CHF. He went home yesterday afternoon with adjustment of medications due to severe reduction in EF Noted on his admission. His cardiac regimen at discharge was Coreg 6.25 mg BID, Cardizem 90 mg IR every eight hours, and Entresto 24/26 mg.  He took Coreg and Cardizem last night around 9:45, did not take Entresto as this required a preauthorization. At around midnight he woke up to go to the bathroom, felt dizzy, lightheaded, short of breath, and had palpitations. Denies overt syncope or chest pain. He called EMS, was given fluids in the ER with improvement in BP. Was started on Levophed, also calcium gluconate due to suspected CCB toxicity. Currently he denies CP or shortness of breath. Cardiac risk factors: male gender, hypertension      Review of Symptoms:  Except as stated above include:  Constitutional:  negative  Respiratory:  +dyspnea  Cardiovascular:  Per HPI  Gastrointestinal: negative  Genitourinary:  negative  Musculoskeletal:  Negative  Neurological:  Negative  Dermatological:  Negative  Endocrinological: Negative  Psychological:  Negative    A comprehensive review of systems was negative except for that written in the HPI. Past Medical History:     Past Medical History:   Diagnosis Date    Arthritis     Atrial fibrillation ([facility] [address] 11/28/2017    Hearing loss     HLD (hyperlipidemia)     HTN (hypertension)     Nontoxic multinodular goiter 2/7/2018         Social History:     Social History     Socioeconomic History    Marital status: SINGLE     Spouse name: Not on file    Number of children: Not on file    Years of education: Not on file    Highest education level: Not on file   Tobacco Use    Smoking status: Former Smoker    Smokeless tobacco: Never Used   Substance and Sexual Activity    Alcohol use:  Yes     Alcohol/week: 12.5 oz     Types: 25 Shots of liquor per week    Drug use: No    Sexual activity: Yes     Partners: Female        Family History:     Family History   Problem Relation Age of Onset    Diabetes Father         Medications:   No Known Allergies     Current Facility-Administered Medications   Medication Dose Route Frequency    lactated Ringers infusion 1,000 mL  1,000 mL IntraVENous CONTINUOUS    sodium chloride (NS) flush 5-10 mL  5-10 mL IntraVENous PRN    glucagon (GLUCAGEN) 0.04 mg/mL in dextrose 5% 250 mL infusion  2 mg/hr IntraVENous CONTINUOUS    calcium gluconate 100 mg/mL (10%) injection        glucagon (GLUCAGEN) 1 mg injection        acetaminophen (TYLENOL) tablet 650 mg  650 mg Oral Q6H PRN    ondansetron (ZOFRAN) injection 4 mg  4 mg IntraVENous Q6H PRN    heparin (porcine) injection 5,000 Units  5,000 Units SubCUTAneous Q8H    ondansetron (ZOFRAN) 4 mg/2 mL injection        NOREPINephrine (LEVOPHED) 8 mg in 0.9% NS 250ml infusion  2-16 mcg/min IntraVENous TITRATE         Physical Exam:     Visit Vitals  /75   Pulse (!) 111   Temp 97.6 °F (36.4 °C)   Resp 25   Ht 5' 10\" (1.778 m)   Wt 162 lb 8 oz (73.7 kg)   SpO2 99%   BMI 23.32 kg/m²     BP Readings from Last 3 Encounters:   11/29/18 107/75   11/28/18 109/71   11/26/18 95/68     Pulse Readings from Last 3 Encounters:   11/29/18 (!) 111   11/28/18 86   11/26/18 77     Wt Readings from Last 3 Encounters:   11/29/18 162 lb 8 oz (73.7 kg)   11/28/18 149 lb 6.4 oz (67.8 kg)   11/26/18 158 lb 7.2 oz (71.9 kg)       General:  alert, cooperative, no distress  Neck:  nontender, no JVD  Lungs:  clear to auscultation bilaterally  Heart:  irregularly irregular rhythm  Abdomen:  abdomen is soft without significant tenderness, masses, organomegaly or guarding  Extremities:  extremities normal, atraumatic, no cyanosis or edema  Skin: Warm and dry.  no hyperpigmentation, vitiligo, or suspicious lesions  Neuro: alert, oriented x3, affect appropriate  Psych: non focal     Data Review:     Recent Labs     11/29/18 0226 11/28/18  0640 11/27/18  0201   WBC 12.4 8.2 9.5   HGB 16.7* 17.4* 16.0   HCT 48.0 50.3* 46.0    235 240     Recent Labs     11/29/18 0226 11/28/18  0640 11/27/18  0201    141 139   K 3.9 4.3 3.4*    105 105   CO2 22 25 26   * 110* 105*   BUN 18 10 10   CREA 2.00* 1.01 0.81   CA 8.4* 9.1 8.5   MG 2.3  --  1.7   ALB 3.8  --  3.7   SGOT 20  --  24   ALT 34  --  31   INR 1.7*  --  2.0*       Results for orders placed or performed during the hospital encounter of 11/27/18   EKG, 12 LEAD, INITIAL   Result Value Ref Range    Ventricular Rate 122 BPM    Atrial Rate 78 BPM    QRS Duration 84 ms    Q-T Interval 320 ms    QTC Calculation (Bezet) 456 ms    Calculated R Axis 12 degrees    Calculated T Axis 66 degrees    Diagnosis       Atrial fibrillation with rapid ventricular response  Abnormal ECG  When compared with ECG of 03-NOV-2018 20:07,  Vent. rate has decreased BY  64 BPM    Confirmed by Miky Cárdenas ([id number]) on 11/28/2018 8:47:25 AM         All Cardiac Markers in the last 24 hours:    Lab Results   Component Value Date/Time    TROIQ 0.04 11/29/2018 02:26 AM       Last Lipid:    Lab Results   Component Value Date/Time    Cholesterol, total 245 (H) 10/25/2018 08:38 AM    HDL Cholesterol 63 (H) 10/25/2018 08:38 AM    LDL, calculated 151 (H) 10/25/2018 08:38 AM    Triglyceride 152 (H) 10/25/2018 08:38 AM    CHOL/HDL Ratio 3.1 11/28/2017 02:42 PM       Signed By: Buzz Cornell     November 29, 2018

## 2018-11-29 NOTE — ED PROVIDER NOTES
Joseph Brooks is a 61 y.o. Male who was just released from here yesterday for afib with rvr and had cardizem readjusted to 3 times a day taking last dose last night and woke feeling sob, lightheaded. No cp, cough, fever, nvd, abd pain. Ems arrived and noted pt was hypotensive, given fluids with good response. Pt states did not take any extra dose of meds. No other recent illness The history is provided by the patient. Past Medical History:  
Diagnosis Date  Arthritis  Atrial fibrillation ([address] 11/28/2017  Hearing loss  HLD (hyperlipidemia)  HTN (hypertension)  Nontoxic multinodular goiter 2/7/2018 Past Surgical History:  
Procedure Laterality Date  HX HERNIA REPAIR Family History:  
Problem Relation Age of Onset  Diabetes Father Social History Socioeconomic History  Marital status: SINGLE Spouse name: Not on file  Number of children: Not on file  Years of education: Not on file  Highest education level: Not on file Social Needs  Financial resource strain: Not on file  Food insecurity - worry: Not on file  Food insecurity - inability: Not on file  Transportation needs - medical: Not on file  Transportation needs - non-medical: Not on file Occupational History  Not on file Tobacco Use  Smoking status: Former Smoker  Smokeless tobacco: Never Used Substance and Sexual Activity  Alcohol use: Yes Alcohol/week: 12.5 oz Types: 25 Shots of liquor per week  Drug use: No  
 Sexual activity: Yes  
  Partners: Female Other Topics Concern  Not on file Social History Narrative  Not on file ALLERGIES: Patient has no known allergies. Review of Systems Constitutional: Negative for fever. HENT: Negative for sore throat and trouble swallowing. Eyes: Negative for visual disturbance. Respiratory: Positive for shortness of breath. Negative for cough. Cardiovascular: Negative for chest pain and leg swelling. Gastrointestinal: Negative for abdominal pain. Endocrine: Negative for polyuria. Genitourinary: Negative for difficulty urinating. Musculoskeletal: Positive for gait problem (due to feeling lightheaded). Skin: Negative for rash. Allergic/Immunologic: Negative for immunocompromised state. Neurological: Positive for light-headedness. Negative for syncope. Psychiatric/Behavioral: Positive for sleep disturbance. Vitals:  
 11/29/18 1966 11/29/18 9377 11/29/18 9860 11/29/18 3519 BP: 94/77 103/63 106/89 Pulse: 84 88 86 Resp: 20 24 19 Temp:    97.6 °F (36.4 °C) SpO2: 100% 99% 97% Weight:      
Height:      
      
 
Physical Exam  
Constitutional: He is oriented to person, place, and time. Non-toxic appearance. He has a sickly appearance. He appears ill. He appears distressed. HENT:  
Head: Normocephalic and atraumatic. Right Ear: External ear normal.  
Left Ear: External ear normal.  
Nose: Nose normal.  
Mouth/Throat: Oropharynx is clear and moist. No oropharyngeal exudate. Eyes: Conjunctivae are normal.  
Neck: Normal range of motion. Cardiovascular: Normal heart sounds and intact distal pulses. An irregularly irregular rhythm present. Bradycardia present. Pulmonary/Chest: Effort normal and breath sounds normal. No stridor. No respiratory distress. He has no rales. Abdominal: Soft. There is no tenderness. Musculoskeletal: Normal range of motion. He exhibits no edema. Neurological: He is alert and oriented to person, place, and time. Skin: Skin is warm and dry. Capillary refill takes less than 2 seconds. He is not diaphoretic. Psychiatric: His behavior is normal.  
Nursing note and vitals reviewed. [facility] Procedures Vitals: 
Patient Vitals for the past 12 hrs: 
 Temp Pulse Resp BP SpO2  
11/29/18 0537 97.6 °F (36.4 °C)      
11/29/18 0525  86 19 106/89 97 % 11/29/18 0520  88 24 103/63 99 % 11/29/18 0515  84 20 94/77 100 % 11/29/18 0512  84 18 93/66   
11/29/18 0505    (!) 86/68   
11/29/18 0503    (!) 84/65   
11/29/18 0455  75 14 (!) 82/66 98 % 11/29/18 0452    (!) 84/66   
11/29/18 0440  76 13 (!) 84/64 100 % 11/29/18 0439  73 16 (!) 88/70 100 % 11/29/18 0437  80 18 (!) 79/56 100 % 11/29/18 0434  73 13 (!) 85/62 100 % 11/29/18 0430  78 16 (!) 87/61 100 % 11/29/18 0420  85 18 92/59 91 % 11/29/18 0417  83 19 96/77   
11/29/18 0350  75 18 94/76 (!) 88 % 11/29/18 0347  78 21 (!) 78/64 (!) 82 % 11/29/18 0343  67 15 (!) 87/59   
11/29/18 0332  76 19 101/73 91 % 11/29/18 0321     100 % 11/29/18 0230  69 26 (!) 120/97 (!) 80 % 11/29/18 0225 96.3 °F (35.7 °C) 66 19 113/84 94 % 11/29/18 0224  65 22 97/60 100 % Medications ordered:  
Medications  
lactated Ringers infusion 1,000 mL (1,000 mL IntraVENous New Bag 11/29/18 0354)  
sodium chloride (NS) flush 5-10 mL (not administered)  
piperacillin-tazobactam (ZOSYN) 4.5 g in 0.9% sodium chloride (MBP/ADV) 100 mL MBP (0 g IntraVENous IV Completed 11/29/18 0530)  
glucagon (GLUCAGEN) 0.04 mg/mL in dextrose 5% 250 mL infusion (2 mg/hr IntraVENous New Bag 11/29/18 0404)  
calcium gluconate 100 mg/mL (10%) injection (not administered)  
vancomycin (VANCOCIN) 1500 mg in  ml infusion (1,500 mg IntraVENous New Bag 11/29/18 0543) VANCOMYCIN INFORMATION NOTE (not administered) levoFLOXacin (LEVAQUIN) 750 mg in D5W IVPB (750 mg IntraVENous New Bag 11/29/18 2627)  
glucagon (GLUCAGEN) 1 mg injection (not administered) NOREPINephrine (LEVOPHED) 8 mg in 5% dextrose 250mL infusion (10 mcg/min IntraVENous Rate Change 11/29/18 0940) Norepinephrine Bitartrate (LEVOPHED) 8 mg/250 mL (32 mcg/mL) in NS 0.9% 250 ml infusion (not administered)  
calcium gluconate 1 gram in 0.9% sodium chloride 50 ml infusion (0 g IntraVENous IV Completed 11/29/18 0467) calcium gluconate 1 gram in 0.9% sodium chloride 50 ml infusion (0 g IntraVENous IV Completed 11/29/18 0327)  
glucagon (GLUCAGEN) injection 1 mg (1 mg IntraVENous Given 11/29/18 0229) ondansetron [facility]) injection 4 mg (4 mg IntraVENous Given 11/29/18 0229)  
sodium chloride 0.9 % bolus infusion 1,000 mL (0 mL IntraVENous IV Completed 11/29/18 0251) metoclopramide HCl (REGLAN) injection 10 mg (10 mg IntraVENous Given 11/29/18 0249) diphenhydrAMINE (BENADRYL) injection 25 mg (25 mg IntraVENous Given 11/29/18 0248) ondansetron (ZOFRAN) injection 4 mg (4 mg IntraVENous Given 11/29/18 0249) calcium gluconate 1 gram in 0.9% sodium chloride 50 ml infusion (1,000 mg IntraVENous New Bag 11/29/18 0412) calcium gluconate 1 gram in 0.9% sodium chloride 50 ml infusion (1,000 mg IntraVENous New Bag 11/29/18 0412) furosemide (LASIX) injection 20 mg (20 mg IntraVENous Given 11/29/18 0516)  
albuterol-ipratropium (DUO-NEB) 2.5 MG-0.5 MG/3 ML (3 mL Nebulization Given 11/29/18 0515)  
albuterol-ipratropium (DUO-NEB) 2.5 MG-0.5 MG/3 ML (3 mL Nebulization Given 11/29/18 0542) Lab findings: 
Recent Results (from the past 12 hour(s)) CBC WITH AUTOMATED DIFF Collection Time: 11/29/18  2:26 AM  
Result Value Ref Range WBC 12.4 4.6 - 13.2 K/uL  
 RBC 5.10 4.70 - 5.50 M/uL  
 HGB 16.7 (H) 13.0 - 16.0 g/dL HCT 48.0 36.0 - 48.0 % MCV 94.1 74.0 - 97.0 FL  
 MCH 32.7 24.0 - 34.0 PG  
 MCHC 34.8 31.0 - 37.0 g/dL  
 RDW 12.8 11.6 - 14.5 % PLATELET 359 065 - 335 K/uL MPV 11.1 9.2 - 11.8 FL  
 NEUTROPHILS 78 (H) 40 - 73 % LYMPHOCYTES 13 (L) 21 - 52 % MONOCYTES 8 3 - 10 % EOSINOPHILS 1 0 - 5 % BASOPHILS 0 0 - 2 %  
 ABS. NEUTROPHILS 9.8 (H) 1.8 - 8.0 K/UL  
 ABS. LYMPHOCYTES 1.6 0.9 - 3.6 K/UL  
 ABS. MONOCYTES 1.0 0.05 - 1.2 K/UL  
 ABS. EOSINOPHILS 0.1 0.0 - 0.4 K/UL  
 ABS. BASOPHILS 0.0 0.0 - 0.1 K/UL  
 DF AUTOMATED PROTHROMBIN TIME + INR  Collection Time: 11/29/18  2:26 AM  
 Result Value Ref Range Prothrombin time 19.7 (H) 11.5 - 15.2 sec INR 1.7 (H) 0.8 - 1.2 METABOLIC PANEL, COMPREHENSIVE Collection Time: 11/29/18  2:26 AM  
Result Value Ref Range Sodium 136 136 - 145 mmol/L Potassium 3.9 3.5 - 5.5 mmol/L Chloride 102 100 - 108 mmol/L  
 CO2 22 21 - 32 mmol/L Anion gap 12 3.0 - 18 mmol/L Glucose 151 (H) 74 - 99 mg/dL BUN 18 7.0 - 18 MG/DL Creatinine 2.00 (H) 0.6 - 1.3 MG/DL  
 BUN/Creatinine ratio 9 (L) 12 - 20 GFR est AA 41 (L) >60 ml/min/1.73m2 GFR est non-AA 34 (L) >60 ml/min/1.73m2 Calcium 8.4 (L) 8.5 - 10.1 MG/DL Bilirubin, total 0.8 0.2 - 1.0 MG/DL  
 ALT (SGPT) 34 16 - 61 U/L  
 AST (SGOT) 20 15 - 37 U/L Alk. phosphatase 133 (H) 45 - 117 U/L Protein, total 7.5 6.4 - 8.2 g/dL Albumin 3.8 3.4 - 5.0 g/dL Globulin 3.7 2.0 - 4.0 g/dL A-G Ratio 1.0 0.8 - 1.7 MAGNESIUM Collection Time: 11/29/18  2:26 AM  
Result Value Ref Range Magnesium 2.3 1.6 - 2.6 mg/dL TROPONIN I Collection Time: 11/29/18  2:26 AM  
Result Value Ref Range Troponin-I, Qt. 0.04 0.0 - 0.045 NG/ML  
DIGOXIN Collection Time: 11/29/18  2:26 AM  
Result Value Ref Range Digoxin level 0.6 (L) 0.9 - 2.0 NG/ML  
NT-PRO BNP Collection Time: 11/29/18  4:11 AM  
Result Value Ref Range NT pro- 0 - 900 PG/ML  
POC LACTIC ACID Collection Time: 11/29/18  4:14 AM  
Result Value Ref Range Lactic Acid (POC) 3.49 (HH) 0.40 - 2.00 mmol/L  
URINALYSIS W/ RFLX MICROSCOPIC Collection Time: 11/29/18  4:15 AM  
Result Value Ref Range Color DARK YELLOW Appearance TURBID Specific gravity 1.030 1.005 - 1.030    
 pH (UA) 5.0 5.0 - 8.0 Protein 100 (A) NEG mg/dL Glucose 100 (A) NEG mg/dL Ketone TRACE (A) NEG mg/dL Bilirubin MODERATE (A) NEG Blood NEGATIVE  NEG Urobilinogen 1.0 0.2 - 1.0 EU/dL Nitrites POSITIVE (A) NEG Leukocyte Esterase TRACE (A) NEG URINE MICROSCOPIC ONLY Collection Time: 11/29/18  4:15 AM  
Result Value Ref Range WBC 4 to 10 0 - 4 /hpf  
 RBC 0 to 3 0 - 5 /hpf Epithelial cells FEW 0 - 5 /lpf Bacteria 2+ (A) NEG /hpf Mucus FEW (A) NEG /lpf Amorphous Crystals FEW (A) NEG    
 CA Oxalate crystals 2+ (A) NEG Hyaline cast TOO NUMEROUS TO COUNT 0 - 2 /lpf EKG interpretation by ED Physician: 
afib with ns st tw changes Rate 58, qrs 84, qtc 416 C/w previous afib with slower rate Cardiac monitor: mali to nl rate with irreg irreg rhythm, no ectopy Pulse ox: 98% ra X-Ray, CT or other radiology findings or impressions: 
XR CHEST PORT    (Results Pending) Progress notes, Consult notes or additional Procedure notes:  
Given sx, exam, emiliana, and new dosing on ccb sig concern for ccb toxicity especially with response to fluids, calcium Pt with no infectious sx. But will need treatment for sepsis as well given slight hypothermia, lactic acidosis D/w pcc, Izabella Jefferson who agrees with calcium, glucagon and rec infusion if responsive D/w dr Rakesh Boss who will admit D/w dr Arpan Huerta on call for icu who will consult ED Critical Care Note System at risk for life threatening failure: neuro, cardiac, pulm, renal 
Associated problems: renal failure, hypotension, afib Critical Care services provided: fluid, abx, ccb toxicity management, consult, mult bedside reassessments, documentation, Excluded procedures (time not included in critical care): ecg interp Total Critical Care Time (in minutes) 94 Reevaluation of patient:  
stable Disposition: 
Diagnosis: 1. Sepsis, due to unspecified organism ([address] 2. Acute congestive heart failure, unspecified heart failure type ([address] 3. Adverse reaction to calcium channel blocker, initial encounter Disposition: admit Follow-up Information None Medication List  
  
ASK your doctor about these medications   
carvedilol 6.25 mg tablet Commonly known as:  Sherwin Chong 
 Take 1 Tab by mouth two (2) times daily (with meals). digoxin 0.125 mg tablet Commonly known as:  LANOXIN Take 1 Tab by mouth every fourty-eight (48) hours. dilTIAZem 90 mg tablet Commonly known as:  CARDIZEM Take 1 Tab by mouth every eight (8) hours. magnesium oxide 200 mg magnesium Tab Take 200 mg by mouth daily. multivit-min-FA-lycopen-lutein 300-600-300 mcg Tab Commonly known as:  CENTRUM SILVER MEN Take 1 Tab by mouth daily. nitroglycerin 0.4 mg SL tablet Commonly known as:  NITROSTAT 
1 Tab by SubLINGual route as needed for Chest Pain. sacubitril-valsartan 24-26 mg tablet Commonly known as:  ENTRESTO Take 1 Tab by mouth every twelve (12) hours. vitamin k 100 mcg tablet Take 1 Tab by mouth daily. warfarin 4 mg tablet Commonly known as:  COUMADIN Take 1 Tab by mouth daily.

## 2018-11-29 NOTE — ED NOTES
Patient vomiting at bedside, suctioning patient at this time. Dr. Alexandre Flores notified. Medications ordered

## 2018-11-29 NOTE — ROUTINE PROCESS
0700: Bedside shift change report given to Audie Queen RN (oncoming nurse) by Ramona Arguello RN (offgoing nurse). Report included the following information SBAR, Kardex, ED Summary, Procedure Summary, Intake/Output, MAR, Accordion, Recent Results and Med Rec Status. 1120: HR elevated, BP increasing, paged CHELSIE Myles PA, new orders received. 1205: AILYN Moseley Dr. with poison control recommended to d/c glucagon and calcium, still thinks there is a chance the pt is septic. Recommends repeat EKG. 1340: HR remains elevated. Paged RONAN Ruggiero. New orders received. 1530: HR remains elevated. Paged RONAN Ruggiero. New orders received. 1720: HR remains elevated. Paged Cardiology on-call RONAN Ruggiero. New orders received. 1900: Bedside shift change report given to Juanjose Balbuena RN (oncoming nurse) by Audie Queen RN (offgoing nurse). Report included the following information SBAR, Kardex, ED Summary, Procedure Summary, Intake/Output, MAR, Accordion, Recent Results and Med Rec Status.

## 2018-11-29 NOTE — CONSULTS
[facility]  Pulmonary, Critical Care, and Sleep Medicine    Name: Miri Bryant. MRN: [id number]   : 1955 Hospital: [address] [place] [address]   Date: 2018        Critical Care Initial Patient Consult    Requesting MD:   Santino Luciano MD                              Reason for CC Consult:Arterial Hypotension  IMPRESSION:   Patient Active Problem List   Diagnosis Code    Arthritis M19.90    Elevated cholesterol E78.00    Atrial fibrillation (HCC) I48.91    Nontoxic multinodular goiter E04.2    Atrial fibrillation with RVR (HCC) I48.91    HTN (hypertension) I10    Alcoholism ([address] F10.20    Chronic systolic congestive heart failure (HCC) I50.22    HLD (hyperlipidemia) E78.5    OD (overdose of drug), accidental or unintentional, initial encounter T50.901A    Cardiogenic shock ([address] R57.0    SIRS (systemic inflammatory response syndrome) ([facility] R65.10 ·   Shock  · A) Mainly Cardiogenic : EF down to 15%  · B) Excess CCB  · C) UTI playing a role? · Acute on Chronic systolic CHF  · Type A Lactic Acidosis due to hypoperfusion       RECOMMENDATIONS:   · Has received IVF in the ER: Volume responsive  · On Pressors  · Received IV Lasix also  · Received IV Calcium in the ER  · Received IV Glucagon in the ER  · On Empiric ATBX: De escalate ASAP  · Cardiac Consult  · DVT proph  · Gastric proph  · D/W ICU NP: daytime ICU team will continue his PCCM care     Subjective/History: This patient has been seen and evaluated at the request of Dr. Santino Luciano for Shock.   Patient is a 61 y.o. male unknown to me who was admitted to [facility] 2-3 days ago with A fib with RVR, dc'd from [facility] yesterday on adjusted CCB dosing and who is brought by EMS very symptomatic with hypotension and bradycardia: was given IVF by EMS and found to be volume resposnsive, IVF were repeateed in the ER when he recurred to be hypotensive and again noted to be volume responsive, was given IV Calcium and IV Glucagon followed by NE.  His GFR has sharply decreased as has his EF : 15% per Dr Faiza Franklin. MARCEL, possible UTI by UA and hence they suspect Sepsis and empiric ATBX were started by Hospitalist service. Needs a cardiac consult. Admitted to ICU. Past Medical History:   Diagnosis Date    Arthritis     Atrial fibrillation ([address] 11/28/2017    Hearing loss     HLD (hyperlipidemia)     HTN (hypertension)     Nontoxic multinodular goiter 2/7/2018      Past Surgical History:   Procedure Laterality Date    HX HERNIA REPAIR        Prior to Admission medications    Medication Sig Start Date End Date Taking? Authorizing Provider   carvedilol (COREG) 6.25 mg tablet Take 1 Tab by mouth two (2) times daily (with meals). 11/28/18  Yes Marilyn Nieto PA   dilTIAZem (CARDIZEM) 90 mg tablet Take 1 Tab by mouth every eight (8) hours. 11/28/18  Yes Marilyn Nieto PA   warfarin (COUMADIN) 4 mg tablet Take 1 Tab by mouth daily. 11/26/18  Yes Sammi Olvera., DO   digoxin (LANOXIN) 0.125 mg tablet Take 1 Tab by mouth every fourty-eight (48) hours. 11/26/18  Yes Sammi Olvera., DO   sacubitril-valsartan (ENTRESTO) 24 mg/26 mg tablet Take 1 Tab by mouth every twelve (12) hours. 11/28/18   Marilyn Nieto PA   vitamin k 100 mcg tablet Take 1 Tab by mouth daily. 11/26/18   Sammi Olvera., DO   magnesium oxide 200 mg magnesium tab Take 200 mg by mouth daily. 11/26/18   Sammi Olvera., DO   nitroglycerin (NITROSTAT) 0.4 mg SL tablet 1 Tab by SubLINGual route as needed for Chest Pain. 11/26/18   Sammi Olvera., DO   multivit-min-FA-lycopen-lutein (CENTRUM SILVER MEN) 300-600-300 mcg tab Take 1 Tab by mouth daily.  2/21/18   Sammi Olvera., DO     Current Facility-Administered Medications   Medication Dose Route Frequency    lactated Ringers infusion 1,000 mL  1,000 mL IntraVENous CONTINUOUS    glucagon (GLUCAGEN) 0.04 mg/mL in dextrose 5% 250 mL infusion  2 mg/hr IntraVENous CONTINUOUS    calcium gluconate 100 mg/mL (10%) injection        glucagon (GLUCAGEN) 1 mg injection        NOREPINephrine (LEVOPHED) 8 mg in 5% dextrose 250mL infusion  2-16 mcg/min IntraVENous TITRATE    Norepinephrine Bitartrate (LEVOPHED) 8 mg/250 mL (32 mcg/mL) in NS 0.9% 250 ml infusion        heparin (porcine) injection 5,000 Units  5,000 Units SubCUTAneous Q8H    ondansetron (ZOFRAN) 4 mg/2 mL injection         No Known Allergies   Social History     Tobacco Use    Smoking status: Former Smoker    Smokeless tobacco: Never Used   Substance Use Topics    Alcohol use: Yes     Alcohol/week: 12.5 oz     Types: 25 Shots of liquor per week      Family History   Problem Relation Age of Onset    Diabetes Father         Review of Systems:  A comprehensive review of systems was negative except for that written in the HPI. Objective:   Vital Signs:    Visit Vitals  /89   Pulse 86   Temp 97.6 °F (36.4 °C)   Resp 19   Ht 5' 10\" (1.778 m)   Wt 70.3 kg (155 lb)   SpO2 97%   BMI 22.24 kg/m²               Temp (24hrs), Av °F (36.1 °C), Min:96.3 °F (35.7 °C), Max:97.6 °F (36.4 °C)       Intake/Output:   Last shift:      No intake/output data recorded. Last 3 shifts: 1901 -  07  In: 1000 [I.V.:1000]  Out: 620 [Urine:620]    Intake/Output Summary (Last 24 hours) at 2018 0725  Last data filed at 2018 0700  Gross per 24 hour   Intake 1000 ml   Output 620 ml   Net 380 ml     Hemodynamics:   . @MAP     . @CVP       Ventilator Settings:  Mode Rate Tidal Volume Pressure FiO2 PEEP                    Peak airway pressure:      Minute ventilation:        ARDS network Guidelines: Lung protective strategy and Pl pressure goals____________    Physical Exam:    General:  Alert, cooperative, no distress, appears stated age. Head:  Normocephalic, without obvious abnormality, atraumatic. Eyes:  Conjunctivae/corneas clear. PERRL, EOMs intact. Nose: Nares normal. Septum midline. Mucosa normal. No drainage or sinus tenderness. Throat: Lips, mucosa, and tongue normal. Teeth and gums normal.   Neck: Supple, symmetrical, trachea midline, no adenopathy, thyroid: no enlargment/tenderness/nodules, no carotid bruit and no JVD. Back:   Not examined   Lungs:   Clear to auscultation bilaterally. Chest wall:  No tenderness or deformity. Heart:  Irregularly/irregular rate and rhythm, S1, S2 normal, no murmur, click, rub or gallop. Abdomen:   Soft, non-tender. Bowel sounds normal. No masses,  No organomegaly. Extremities: Extremities normal, atraumatic, no cyanosis. 1+ edema. Pulses: 2+ and symmetric all extremities. Skin: Skin color, texture, turgor normal. No rashes or lesions   Lymph nodes: Cervical, supraclavicular, and axillary nodes normal.   Neurologic: Grossly non focal       Data:     Recent Results (from the past 24 hour(s))   CBC WITH AUTOMATED DIFF    Collection Time: 11/29/18  2:26 AM   Result Value Ref Range    WBC 12.4 4.6 - 13.2 K/uL    RBC 5.10 4.70 - 5.50 M/uL    HGB 16.7 (H) 13.0 - 16.0 g/dL    HCT 48.0 36.0 - 48.0 %    MCV 94.1 74.0 - 97.0 FL    MCH 32.7 24.0 - 34.0 PG    MCHC 34.8 31.0 - 37.0 g/dL    RDW 12.8 11.6 - 14.5 %    PLATELET 781 121 - 380 K/uL    MPV 11.1 9.2 - 11.8 FL    NEUTROPHILS 78 (H) 40 - 73 %    LYMPHOCYTES 13 (L) 21 - 52 %    MONOCYTES 8 3 - 10 %    EOSINOPHILS 1 0 - 5 %    BASOPHILS 0 0 - 2 %    ABS. NEUTROPHILS 9.8 (H) 1.8 - 8.0 K/UL    ABS. LYMPHOCYTES 1.6 0.9 - 3.6 K/UL    ABS. MONOCYTES 1.0 0.05 - 1.2 K/UL    ABS. EOSINOPHILS 0.1 0.0 - 0.4 K/UL    ABS.  BASOPHILS 0.0 0.0 - 0.1 K/UL    DF AUTOMATED     PROTHROMBIN TIME + INR    Collection Time: 11/29/18  2:26 AM   Result Value Ref Range    Prothrombin time 19.7 (H) 11.5 - 15.2 sec    INR 1.7 (H) 0.8 - 1.2     METABOLIC PANEL, COMPREHENSIVE    Collection Time: 11/29/18  2:26 AM   Result Value Ref Range    Sodium 136 136 - 145 mmol/L    Potassium 3.9 3.5 - 5.5 mmol/L    Chloride 102 100 - 108 mmol/L    CO2 22 21 - 32 mmol/L    Anion gap 12 3.0 - 18 mmol/L    Glucose 151 (H) 74 - 99 mg/dL    BUN 18 7.0 - 18 MG/DL    Creatinine 2.00 (H) 0.6 - 1.3 MG/DL    BUN/Creatinine ratio 9 (L) 12 - 20      GFR est AA 41 (L) >60 ml/min/1.73m2    GFR est non-AA 34 (L) >60 ml/min/1.73m2    Calcium 8.4 (L) 8.5 - 10.1 MG/DL    Bilirubin, total 0.8 0.2 - 1.0 MG/DL    ALT (SGPT) 34 16 - 61 U/L    AST (SGOT) 20 15 - 37 U/L    Alk.  phosphatase 133 (H) 45 - 117 U/L    Protein, total 7.5 6.4 - 8.2 g/dL    Albumin 3.8 3.4 - 5.0 g/dL    Globulin 3.7 2.0 - 4.0 g/dL    A-G Ratio 1.0 0.8 - 1.7     MAGNESIUM    Collection Time: 11/29/18  2:26 AM   Result Value Ref Range    Magnesium 2.3 1.6 - 2.6 mg/dL   TROPONIN I    Collection Time: 11/29/18  2:26 AM   Result Value Ref Range    Troponin-I, Qt. 0.04 0.0 - 0.045 NG/ML   DIGOXIN    Collection Time: 11/29/18  2:26 AM   Result Value Ref Range    Digoxin level 0.6 (L) 0.9 - 2.0 NG/ML   NT-PRO BNP    Collection Time: 11/29/18  4:11 AM   Result Value Ref Range    NT pro- 0 - 900 PG/ML   POC LACTIC ACID    Collection Time: 11/29/18  4:14 AM   Result Value Ref Range    Lactic Acid (POC) 3.49 (HH) 0.40 - 2.00 mmol/L   URINALYSIS W/ RFLX MICROSCOPIC    Collection Time: 11/29/18  4:15 AM   Result Value Ref Range    Color DARK YELLOW      Appearance TURBID      Specific gravity 1.030 1.005 - 1.030      pH (UA) 5.0 5.0 - 8.0      Protein 100 (A) NEG mg/dL    Glucose 100 (A) NEG mg/dL    Ketone TRACE (A) NEG mg/dL    Bilirubin MODERATE (A) NEG      Blood NEGATIVE  NEG      Urobilinogen 1.0 0.2 - 1.0 EU/dL    Nitrites POSITIVE (A) NEG      Leukocyte Esterase TRACE (A) NEG     URINE MICROSCOPIC ONLY    Collection Time: 11/29/18  4:15 AM   Result Value Ref Range    WBC 4 to 10 0 - 4 /hpf    RBC 0 to 3 0 - 5 /hpf    Epithelial cells FEW 0 - 5 /lpf    Bacteria 2+ (A) NEG /hpf    Mucus FEW (A) NEG /lpf    Amorphous Crystals FEW (A) NEG      CA Oxalate crystals 2+ (A) NEG    Hyaline cast TOO NUMEROUS TO COUNT 0 - 2 /lpf Telemetry:AFIB    Imaging:  I have personally reviewed the patients radiographs and have reviewed the reports: All notes read / D/W Dr Pavithra Arguelles and ICU NP   Best practice :  Not applicableCardiovascular:  An arterial systolic blood pressure (SBP) of < or equal to 90mm Hg or a mean arterial pressure (MAP) < or equal to 70mm Hg for at least 1 hour despite adequate fluid resuscitation or adequate intravascular volume status; or the need for vasopressors to maintain SBP>or equal to 90mm Hg or MAP > or equal to 70mm Hg., Renal: Urine output < 0.5mL/kg/hr for 1hour, despite adequate fluid resuscitation, Respiratory: PaO2/FiO2 < or equal to 250, or < or equal to 200 if the lung was the sole organ meeting the dysfunction criteria, Hematological: Platelet count of < 05,957/QI0 or a 50% decrease in the platelet count from the highest value recorded over the previous 3 days and Unexplained Metabolic: Acidosis pH < or equal to 7.3 or base deficit > or equal to 5.0mmol/L and a plasma lactate level > 1.5 times the upper limit of normal.Cardiac Consult/ Check Cultures de escalate ATBx as soon as feasible  Cooper Bundle Followed     Total critical care time exclusive of procedures: 45 minutes  Carissa Worthy MD

## 2018-11-29 NOTE — PROGRESS NOTES
11/29/2018 At last discharge, the new medications look to be Coreg and Entresto. I am not sure which one needed the auth for his insurance. Rufus Toure [facility] [phone], beeper [phone]

## 2018-11-29 NOTE — H&P
[address] [place], [place] [address] History & Physical 
Patient: Ashley Majano MRN: [id number]  [facility]: [id number] YOB: 1955  Age: 61 y.o. Sex: male DOA: 11/29/2018 LOS:  LOS: 0 days DOA: 11/29/2018 Assessment/Plan Active Problems: 
  Sepsis ([address] (11/29/2018) Plan: 1. Sepsis - IV Abx - Broad spectrum - Vanoc , Zosyn , IVF , IV levophed, Admit to ICU , PCCM consulted 2. Bradycardia & hypotension - ? CCB toxicity - poison control contacted - has recd IV calcium gluconate - will monitor 3. Cardiomyopathy - Most recent Echo from 11/27 showed EF <15% - significant decrease from previous Echo 04/18 - EF 45% - Cardiology consult 4. H/o A fib - rate controlled, hold warfarin for now - heparin SQ  
5. H/o CHF - Has been hypotensive & received multiple fluid boluses in the ER 6. ARF - Creatinine doubled this Am - has received multiple rounds of IVF for hypotension - will monitor for now - no further fluids since he is on Levophed now - monitor creatinine DVT Px - Heparin FC  
 
 
 
 
 
 
 
HPI:  
 
Ashley Majano is a 61 y.o. male who is being admitted for sepsis - unclear etiology Pt mentions he was discharged home yesterday from the hosp - mentions that he was admitted for SOB & A fib with RVR  - seen by Cardiology - who changed his dose of Cardizem & lasix He also had an Echo done which showed EF < 15% Pt says that last night he took his meds - went to bed - got up around 12.30am to go use the restroom - felt dizzy , light headed , felt like he was going to pass out - was also bradycardic - says he tried to check his BP at home but it was very low - called EMS  
ER eval - pt noted to have elevated lactic acid, ARF, hypotensive & bradycardic on presentation - initial thought was cardizem overdose - poison control contacted - IV calcium gluconate given Pt has received multiple rounds of IVF & started on IV pressors , PCCM consulted Pt is being admitted to the ICU for further eval   
 
Past Medical History:  
Diagnosis Date  Arthritis  Atrial fibrillation ([address] 11/28/2017  Hearing loss  HLD (hyperlipidemia)  HTN (hypertension)  Nontoxic multinodular goiter 2/7/2018 Past Surgical History:  
Procedure Laterality Date  HX HERNIA REPAIR Family History Problem Relation Age of Onset  Diabetes Father Social History Socioeconomic History  Marital status: SINGLE Spouse name: Not on file  Number of children: Not on file  Years of education: Not on file  Highest education level: Not on file Tobacco Use  Smoking status: Former Smoker  Smokeless tobacco: Never Used Substance and Sexual Activity  Alcohol use: Yes Alcohol/week: 12.5 oz Types: 25 Shots of liquor per week  Drug use: No  
 Sexual activity: Yes  
  Partners: Female Prior to Admission medications Medication Sig Start Date End Date Taking? Authorizing Provider  
carvedilol (COREG) 6.25 mg tablet Take 1 Tab by mouth two (2) times daily (with meals). 11/28/18   Gus Nieto PA  
sacubitril-valsartan (ENTRESTO) 24 mg/26 mg tablet Take 1 Tab by mouth every twelve (12) hours. 11/28/18   Gus Nieto PA  
dilTIAZem (CARDIZEM) 90 mg tablet Take 1 Tab by mouth every eight (8) hours. 11/28/18   Gus Nieto PA  
vitamin k 100 mcg tablet Take 1 Tab by mouth daily. 11/26/18   Carli Kraus., DO  
warfarin (COUMADIN) 4 mg tablet Take 1 Tab by mouth daily. 11/26/18   Carli Kraus., DO  
digoxin (LANOXIN) 0.125 mg tablet Take 1 Tab by mouth every fourty-eight (48) hours. 11/26/18   Carli Kraus., DO  
magnesium oxide 200 mg magnesium tab Take 200 mg by mouth daily.  11/26/18   Carli Kraus., DO  
nitroglycerin (NITROSTAT) 0.4 mg SL tablet 1 Tab by SubLINGual route as needed for Chest Pain. 11/26/18   Kathryn Cabrales., DO  
multivit-min-FA-lycopen-lutein (CENTRUM SILVER MEN) 300-600-300 mcg tab Take 1 Tab by mouth daily. 2/21/18   Kathryn Cabrales., DO No Known Allergies Review of Systems A comprehensive review of systems was negative except for that written in the History of Present Illness. Physical Exam:  
  
Visit Vitals BP 92/59 Pulse 85 Temp 96.3 °F (35.7 °C) Resp 18 Ht 5' 10\" (1.778 m) Wt 70.3 kg (155 lb) SpO2 91% BMI 22.24 kg/m² Physical Exam: 
 
Gen: In general, this is a well nourished male in no acute distress HEENT: Sclerae nonicteric. Oral mucous membranes moist. Dentition normal 
Neck: Supple with midline trachea. CV: RRR without murmur or rub appreciated. Resp:Respirations are unlabored without use of accessory muscles. Lung fields B/L without wheezes or rhonchi. Abd: Soft, nontender, nondistended. Extrem: Extremities are warm, without cyanosis or clubbing. No pitting pretibial edema Skin: Warm, no visible rashes. Neuro: Patient is alert, oriented, and cooperative. No obvious focal defects. Moves all 4 extremities. Labs Reviewed: 
 
Recent Results (from the past 24 hour(s)) CBC WITH AUTOMATED DIFF Collection Time: 11/28/18  6:40 AM  
Result Value Ref Range WBC 8.2 4.6 - 13.2 K/uL  
 RBC 5.35 4.70 - 5.50 M/uL  
 HGB 17.4 (H) 13.0 - 16.0 g/dL HCT 50.3 (H) 36.0 - 48.0 % MCV 94.0 74.0 - 97.0 FL  
 MCH 32.5 24.0 - 34.0 PG  
 MCHC 34.6 31.0 - 37.0 g/dL  
 RDW 13.0 11.6 - 14.5 % PLATELET 325 297 - 143 K/uL MPV 11.3 9.2 - 11.8 FL  
 NEUTROPHILS 69 40 - 73 % LYMPHOCYTES 19 (L) 21 - 52 % MONOCYTES 11 (H) 3 - 10 % EOSINOPHILS 1 0 - 5 % BASOPHILS 0 0 - 2 %  
 ABS. NEUTROPHILS 5.7 1.8 - 8.0 K/UL  
 ABS. LYMPHOCYTES 1.5 0.9 - 3.6 K/UL  
 ABS. MONOCYTES 0.9 0.05 - 1.2 K/UL  
 ABS. EOSINOPHILS 0.1 0.0 - 0.4 K/UL  
 ABS. BASOPHILS 0.0 0.0 - 0.1 K/UL  
 DF AUTOMATED METABOLIC PANEL, BASIC Collection Time: 11/28/18  6:40 AM  
Result Value Ref Range Sodium 141 136 - 145 mmol/L Potassium 4.3 3.5 - 5.5 mmol/L Chloride 105 100 - 108 mmol/L  
 CO2 25 21 - 32 mmol/L Anion gap 11 3.0 - 18 mmol/L Glucose 110 (H) 74 - 99 mg/dL BUN 10 7.0 - 18 MG/DL Creatinine 1.01 0.6 - 1.3 MG/DL  
 BUN/Creatinine ratio 10 (L) 12 - 20 GFR est AA >60 >60 ml/min/1.73m2 GFR est non-AA >60 >60 ml/min/1.73m2 Calcium 9.1 8.5 - 10.1 MG/DL  
CBC WITH AUTOMATED DIFF Collection Time: 11/29/18  2:26 AM  
Result Value Ref Range WBC 12.4 4.6 - 13.2 K/uL  
 RBC 5.10 4.70 - 5.50 M/uL  
 HGB 16.7 (H) 13.0 - 16.0 g/dL HCT 48.0 36.0 - 48.0 % MCV 94.1 74.0 - 97.0 FL  
 MCH 32.7 24.0 - 34.0 PG  
 MCHC 34.8 31.0 - 37.0 g/dL  
 RDW 12.8 11.6 - 14.5 % PLATELET 532 298 - 400 K/uL MPV 11.1 9.2 - 11.8 FL  
 NEUTROPHILS 78 (H) 40 - 73 % LYMPHOCYTES 13 (L) 21 - 52 % MONOCYTES 8 3 - 10 % EOSINOPHILS 1 0 - 5 % BASOPHILS 0 0 - 2 %  
 ABS. NEUTROPHILS 9.8 (H) 1.8 - 8.0 K/UL  
 ABS. LYMPHOCYTES 1.6 0.9 - 3.6 K/UL  
 ABS. MONOCYTES 1.0 0.05 - 1.2 K/UL  
 ABS. EOSINOPHILS 0.1 0.0 - 0.4 K/UL  
 ABS. BASOPHILS 0.0 0.0 - 0.1 K/UL  
 DF AUTOMATED PROTHROMBIN TIME + INR Collection Time: 11/29/18  2:26 AM  
Result Value Ref Range Prothrombin time 19.7 (H) 11.5 - 15.2 sec INR 1.7 (H) 0.8 - 1.2 METABOLIC PANEL, COMPREHENSIVE Collection Time: 11/29/18  2:26 AM  
Result Value Ref Range Sodium 136 136 - 145 mmol/L Potassium 3.9 3.5 - 5.5 mmol/L Chloride 102 100 - 108 mmol/L  
 CO2 22 21 - 32 mmol/L Anion gap 12 3.0 - 18 mmol/L Glucose 151 (H) 74 - 99 mg/dL BUN 18 7.0 - 18 MG/DL Creatinine 2.00 (H) 0.6 - 1.3 MG/DL  
 BUN/Creatinine ratio 9 (L) 12 - 20 GFR est AA 41 (L) >60 ml/min/1.73m2 GFR est non-AA 34 (L) >60 ml/min/1.73m2 Calcium 8.4 (L) 8.5 - 10.1 MG/DL  Bilirubin, total 0.8 0.2 - 1.0 MG/DL  
 ALT (SGPT) 34 16 - 61 U/L  
 AST (SGOT) 20 15 - 37 U/L Alk. phosphatase 133 (H) 45 - 117 U/L Protein, total 7.5 6.4 - 8.2 g/dL Albumin 3.8 3.4 - 5.0 g/dL Globulin 3.7 2.0 - 4.0 g/dL A-G Ratio 1.0 0.8 - 1.7 MAGNESIUM Collection Time: 11/29/18  2:26 AM  
Result Value Ref Range Magnesium 2.3 1.6 - 2.6 mg/dL TROPONIN I Collection Time: 11/29/18  2:26 AM  
Result Value Ref Range Troponin-I, Qt. 0.04 0.0 - 0.045 NG/ML  
DIGOXIN Collection Time: 11/29/18  2:26 AM  
Result Value Ref Range Digoxin level 0.6 (L) 0.9 - 2.0 NG/ML  
POC LACTIC ACID Collection Time: 11/29/18  4:14 AM  
Result Value Ref Range Lactic Acid (POC) 3.49 (HH) 0.40 - 2.00 mmol/L  
URINALYSIS W/ RFLX MICROSCOPIC Collection Time: 11/29/18  4:15 AM  
Result Value Ref Range Color DARK YELLOW Appearance TURBID Specific gravity 1.030 1.005 - 1.030    
 pH (UA) 5.0 5.0 - 8.0 Protein 100 (A) NEG mg/dL Glucose 100 (A) NEG mg/dL Ketone TRACE (A) NEG mg/dL Bilirubin MODERATE (A) NEG Blood NEGATIVE  NEG Urobilinogen 1.0 0.2 - 1.0 EU/dL Nitrites POSITIVE (A) NEG Leukocyte Esterase TRACE (A) NEG Imaging Reviewed: PCXR - final report pending Daysi Levine MD 
11/29/2018, 5:18 AM

## 2018-11-29 NOTE — ED NOTES
Noted pt /w wheezing and faint crackles heard in base of lungs. Dr. Tio Sheldon notified, per Dr. Tio Sheldon will order lasix and neb tx. Will admin tx and continue to monitor.

## 2018-11-29 NOTE — ED NOTES
Noted pt blood pressure hypotensive. 1L LR on pressure bag administered. Pt placed in slight trendelenburg. Dr. Tung Keen informed of pt vital signs and interventions. Noted pt blood pressure increased to systolic of 43C. Will continue to monitor.

## 2018-11-30 ENCOUNTER — HOME CARE VISIT (OUTPATIENT)
Dept: HOME HEALTH SERVICES | Facility: HOME HEALTH | Age: 63
End: 2018-11-30

## 2018-11-30 LAB
ALBUMIN SERPL-MCNC: 3.2 G/DL (ref 3.4–5)
ALBUMIN/GLOB SERPL: 1.3 {RATIO} (ref 0.8–1.7)
ALP SERPL-CCNC: 101 U/L (ref 45–117)
ALT SERPL-CCNC: 30 U/L (ref 16–61)
ANION GAP BLD CALC-SCNC: 17 MMOL/L (ref 10–20)
ANION GAP SERPL CALC-SCNC: 10 MMOL/L (ref 3–18)
APTT PPP: 49.9 SEC (ref 23–36.4)
AST SERPL-CCNC: 22 U/L (ref 15–37)
BACTERIA SPEC CULT: NORMAL
BILIRUB SERPL-MCNC: 1.4 MG/DL (ref 0.2–1)
BUN BLD-MCNC: 21 MG/DL (ref 7–18)
BUN SERPL-MCNC: 17 MG/DL (ref 7–18)
BUN/CREAT SERPL: 15 (ref 12–20)
CA-I BLD-MCNC: 1.1 MMOL/L (ref 1.12–1.32)
CA-I SERPL-SCNC: 1.09 MMOL/L (ref 1.12–1.32)
CALCIUM SERPL-MCNC: 8.4 MG/DL (ref 8.5–10.1)
CHLORIDE BLD-SCNC: 100 MMOL/L (ref 100–108)
CHLORIDE SERPL-SCNC: 107 MMOL/L (ref 100–108)
CHOLEST SERPL-MCNC: 179 MG/DL
CO2 BLD-SCNC: 24 MMOL/L (ref 19–24)
CO2 SERPL-SCNC: 23 MMOL/L (ref 21–32)
CREAT SERPL-MCNC: 1.1 MG/DL (ref 0.6–1.3)
CREAT UR-MCNC: 2.1 MG/DL (ref 0.6–1.3)
ERYTHROCYTE [DISTWIDTH] IN BLOOD BY AUTOMATED COUNT: 13 % (ref 11.6–14.5)
GLOBULIN SER CALC-MCNC: 2.5 G/DL (ref 2–4)
GLUCOSE BLD STRIP.AUTO-MCNC: 128 MG/DL (ref 70–110)
GLUCOSE BLD STRIP.AUTO-MCNC: 159 MG/DL (ref 74–106)
GLUCOSE SERPL-MCNC: 70 MG/DL (ref 74–99)
HCT VFR BLD AUTO: 46.1 % (ref 36–48)
HCT VFR BLD CALC: 53 % (ref 36–49)
HDLC SERPL-MCNC: 45 MG/DL (ref 40–60)
HDLC SERPL: 4 {RATIO} (ref 0–5)
HGB BLD-MCNC: 15.6 G/DL (ref 13–16)
HGB BLD-MCNC: 18 G/DL (ref 12–16)
LDLC SERPL CALC-MCNC: 109.6 MG/DL (ref 0–100)
LIPID PROFILE,FLP: ABNORMAL
MAGNESIUM SERPL-MCNC: 1.7 MG/DL (ref 1.6–2.6)
MCH RBC QN AUTO: 32 PG (ref 24–34)
MCHC RBC AUTO-ENTMCNC: 33.8 G/DL (ref 31–37)
MCV RBC AUTO: 94.5 FL (ref 74–97)
PHOSPHATE SERPL-MCNC: 3.3 MG/DL (ref 2.5–4.9)
PLATELET # BLD AUTO: 210 K/UL (ref 135–420)
PMV BLD AUTO: 11.1 FL (ref 9.2–11.8)
POTASSIUM BLD-SCNC: 3.9 MMOL/L (ref 3.5–5.5)
POTASSIUM SERPL-SCNC: 4.3 MMOL/L (ref 3.5–5.5)
PROT SERPL-MCNC: 5.7 G/DL (ref 6.4–8.2)
RBC # BLD AUTO: 4.88 M/UL (ref 4.7–5.5)
SERVICE CMNT-IMP: NORMAL
SODIUM BLD-SCNC: 136 MMOL/L (ref 136–145)
SODIUM SERPL-SCNC: 140 MMOL/L (ref 136–145)
TRIGL SERPL-MCNC: 122 MG/DL (ref ?–150)
VLDLC SERPL CALC-MCNC: 24.4 MG/DL
WBC # BLD AUTO: 7.8 K/UL (ref 4.6–13.2)

## 2018-11-30 PROCEDURE — 83735 ASSAY OF MAGNESIUM: CPT

## 2018-11-30 PROCEDURE — 74011250636 HC RX REV CODE- 250/636: Performed by: INTERNAL MEDICINE

## 2018-11-30 PROCEDURE — 74011250637 HC RX REV CODE- 250/637: Performed by: NURSE PRACTITIONER

## 2018-11-30 PROCEDURE — 82330 ASSAY OF CALCIUM: CPT

## 2018-11-30 PROCEDURE — 85730 THROMBOPLASTIN TIME PARTIAL: CPT

## 2018-11-30 PROCEDURE — 80061 LIPID PANEL: CPT

## 2018-11-30 PROCEDURE — 36415 COLL VENOUS BLD VENIPUNCTURE: CPT

## 2018-11-30 PROCEDURE — 74011250637 HC RX REV CODE- 250/637: Performed by: INTERNAL MEDICINE

## 2018-11-30 PROCEDURE — 65660000000 HC RM CCU STEPDOWN

## 2018-11-30 PROCEDURE — 77030021352 HC CBL LD SYS DISP COVD -B

## 2018-11-30 PROCEDURE — 74011250636 HC RX REV CODE- 250/636: Performed by: HOSPITALIST

## 2018-11-30 PROCEDURE — 74011000258 HC RX REV CODE- 258: Performed by: HOSPITALIST

## 2018-11-30 PROCEDURE — 84100 ASSAY OF PHOSPHORUS: CPT

## 2018-11-30 PROCEDURE — 74011250637 HC RX REV CODE- 250/637: Performed by: PHYSICIAN ASSISTANT

## 2018-11-30 PROCEDURE — 74011000250 HC RX REV CODE- 250: Performed by: HOSPITALIST

## 2018-11-30 PROCEDURE — 85027 COMPLETE CBC AUTOMATED: CPT

## 2018-11-30 PROCEDURE — 80053 COMPREHEN METABOLIC PANEL: CPT

## 2018-11-30 RX ORDER — DIGOXIN 125 MCG
0.12 TABLET ORAL EVERY OTHER DAY
Status: DISCONTINUED | OUTPATIENT
Start: 2018-11-30 | End: 2018-11-30

## 2018-11-30 RX ORDER — METOPROLOL TARTRATE 25 MG/1
25 TABLET, FILM COATED ORAL EVERY 8 HOURS
Status: DISCONTINUED | OUTPATIENT
Start: 2018-12-01 | End: 2018-12-01

## 2018-11-30 RX ORDER — DIGOXIN 125 MCG
0.12 TABLET ORAL EVERY OTHER DAY
Status: DISCONTINUED | OUTPATIENT
Start: 2018-12-01 | End: 2018-12-04 | Stop reason: HOSPADM

## 2018-11-30 RX ORDER — METOPROLOL TARTRATE 25 MG/1
25 TABLET, FILM COATED ORAL EVERY 8 HOURS
Status: DISCONTINUED | OUTPATIENT
Start: 2018-11-30 | End: 2018-11-30

## 2018-11-30 RX ORDER — METOPROLOL TARTRATE 5 MG/5ML
2.5 INJECTION INTRAVENOUS ONCE
Status: COMPLETED | OUTPATIENT
Start: 2018-11-30 | End: 2018-11-30

## 2018-11-30 RX ORDER — DILTIAZEM HYDROCHLORIDE 60 MG/1
60 TABLET, FILM COATED ORAL EVERY 12 HOURS
Status: DISCONTINUED | OUTPATIENT
Start: 2018-11-30 | End: 2018-12-03

## 2018-11-30 RX ORDER — DIGOXIN 0.25 MG/ML
250 INJECTION INTRAMUSCULAR; INTRAVENOUS
Status: COMPLETED | OUTPATIENT
Start: 2018-11-30 | End: 2018-11-30

## 2018-11-30 RX ADMIN — DILTIAZEM HYDROCHLORIDE 60 MG: 60 TABLET, FILM COATED ORAL at 20:55

## 2018-11-30 RX ADMIN — METOPROLOL TARTRATE 25 MG: 25 TABLET ORAL at 18:45

## 2018-11-30 RX ADMIN — DILTIAZEM HYDROCHLORIDE 60 MG: 60 TABLET, FILM COATED ORAL at 08:08

## 2018-11-30 RX ADMIN — WARFARIN SODIUM 4 MG: 2 TABLET ORAL at 21:00

## 2018-11-30 RX ADMIN — METOPROLOL TARTRATE 2.5 MG: 5 INJECTION, SOLUTION INTRAVENOUS at 06:54

## 2018-11-30 RX ADMIN — CALCIUM GLUCONATE 2 G: 94 INJECTION, SOLUTION INTRAVENOUS at 10:02

## 2018-11-30 RX ADMIN — METOPROLOL TARTRATE 25 MG: 25 TABLET ORAL at 09:24

## 2018-11-30 RX ADMIN — HEPARIN SODIUM 5000 UNITS: 5000 INJECTION INTRAVENOUS; SUBCUTANEOUS at 06:54

## 2018-11-30 RX ADMIN — DIGOXIN 250 MCG: 250 INJECTION, SOLUTION INTRAMUSCULAR; INTRAVENOUS; PARENTERAL at 09:24

## 2018-11-30 NOTE — PROGRESS NOTES
TRANSFER - OUT REPORT: 
 
Verbal report given to [facility] on Franky Reis.  being transferred to [facility] [place] for routine progression of care Report consisted of patients Situation, Background, Assessment and  
Recommendations(SBAR). Information from the following report(s) SBAR, OR Summary, Intake/Output, MAR, Cardiac Rhythm Atrial fibrillation with RVR and Alarm Parameters  was reviewed with the receiving nurse. Lines:  
Peripheral IV 11/29/18 Right Arm (Active) Site Assessment Clean, dry, & intact 11/30/2018 11:00 AM  
Phlebitis Assessment 0 11/30/2018 11:00 AM  
Infiltration Assessment 0 11/30/2018 11:00 AM  
Dressing Status Clean, dry, & intact 11/30/2018 11:00 AM  
Dressing Type Transparent;Tape 11/30/2018  8:00 AM  
Hub Color/Line Status Green;Flushed;Patent;Capped; End cap changed 11/30/2018  8:00 AM  
Action Taken Open ports on tubing capped 11/30/2018  8:00 AM  
Alcohol Cap Used Yes 11/30/2018  8:00 AM  
   
Peripheral IV 11/29/18 Right Antecubital (Active) Site Assessment Clean, dry, & intact 11/30/2018 11:00 AM  
Phlebitis Assessment 0 11/30/2018 11:00 AM  
Infiltration Assessment 0 11/30/2018 11:00 AM  
Dressing Status Clean, dry, & intact 11/30/2018 11:00 AM  
Dressing Type Transparent;Tape 11/30/2018  8:00 AM  
Hub Color/Line Status Green;Flushed;Patent; End cap changed; Capped 11/30/2018  8:00 AM  
Action Taken Open ports on tubing capped 11/30/2018  8:00 AM  
Alcohol Cap Used Yes 11/30/2018  8:00 AM  
  
 
Opportunity for questions and clarification was provided. Patient transported with: 
 Monitor Registered Nurse

## 2018-11-30 NOTE — PROGRESS NOTES
Physical Exam  
Skin: Skin is warm, dry and intact. 2-person skin assessment completed by Matilde Plummer RN.

## 2018-11-30 NOTE — PROGRESS NOTES
[facility] Date of  Admission: 11/29/2018  2:23 AM  
Primary Care Physician:  Beni Reis., DO Assessment: - Hypotension. Improved. - Recent hospital admission for CHF, afib and adjustment of antihypertensive regimen with new severe cardiomyopathy - Atrial fibrillation with RVR, CHADS2-Vasc score 2 (CHF, HTN) - Echo 11/27/18 with EF <15%, global hypokinesis 
- Echocardiogram 04/16/18 with an EF of 45%, mild diffuse hypokinesis 
- Nuclear stress test 04/17/18 with no evidence of ongoing ischemia, EF calculated at 38% 
- HTN 
- Hyperlipidemia - Hypokalemia 
- Hx nontoxic multinodular goiter 
  
 Plan:  
 
Off IV cardizem drip but now HR suboptimal and 110-120s again Will give IV digoxin 0.25 mg IV once now Start oral digoxin from tomorrow Will consider reintroducing metoprolol 25 every 8 hours Try to wean off oral Cardizem. Would prefer BB with new severe cardiomyopathy. Eventual goal of cardioversion once therapeutic INR achieved on consistent basis discussed with patienht by me today and also by primary cardiologist Zeny Craig yesterday. History of Present Illness: This is a 61 y.o. male admitted for Sepsis ([facility] [address] Patient complains of:  Almost passed out This is a 61year old male known to our service that cardiology is seeing in consult due to hypotension, recent admission for afib/CHF. He went home yesterday afternoon with adjustment of medications due to severe reduction in EF Noted on his admission. His cardiac regimen at discharge was Coreg 6.25 mg BID, Cardizem 90 mg IR every eight hours, and Entresto 24/26 mg. He took Coreg and Cardizem last night around 9:45, did not take Entresto as this required a preauthorization. At around midnight he woke up to go to the bathroom, felt dizzy, lightheaded, short of breath, and had palpitations. Denies overt syncope or chest pain.  He called EMS, was given fluids in the ER with improvement in BP. Was started on Levophed, also calcium gluconate due to suspected CCB toxicity. Currently he denies CP or shortness of breath. Cardiac risk factors: male gender, hypertension Review of Symptoms:  Except as stated above include: 
Constitutional:  negative Respiratory:  +dyspnea Cardiovascular:  Per HPI Gastrointestinal: negative Genitourinary:  negative Musculoskeletal:  Negative Neurological:  Negative Dermatological:  Negative Endocrinological: Negative Psychological:  Negative A comprehensive review of systems was negative except for that written in the HPI. Past Medical History:  
 
Past Medical History:  
Diagnosis Date  Arthritis  Atrial fibrillation ([address] 11/28/2017  Hearing loss  HLD (hyperlipidemia)  HTN (hypertension)  Nontoxic multinodular goiter 2/7/2018 Social History:  
 
Social History Socioeconomic History  Marital status: SINGLE Spouse name: Not on file  Number of children: Not on file  Years of education: Not on file  Highest education level: Not on file Tobacco Use  Smoking status: Former Smoker  Smokeless tobacco: Never Used Substance and Sexual Activity  Alcohol use: Yes Alcohol/week: 12.5 oz Types: 25 Shots of liquor per week  Drug use: No  
 Sexual activity: Yes  
  Partners: Female Family History:  
 
Family History Problem Relation Age of Onset  Diabetes Father Medications:  
No Known Allergies Current Facility-Administered Medications Medication Dose Route Frequency  calcium gluconate 2 g in 0.9% sodium chloride 50 mL IVPB  2 g IntraVENous ONCE  
 digoxin (LANOXIN) injection 250 mcg  250 mcg IntraVENous NOW  digoxin (LANOXIN) tablet 0.125 mg  0.125 mg Oral EVERY OTHER DAY  sodium chloride (NS) flush 5-10 mL  5-10 mL IntraVENous PRN  
 acetaminophen (TYLENOL) tablet 650 mg  650 mg Oral Q6H PRN  
  ondansetron (ZOFRAN) injection 4 mg  4 mg IntraVENous Q6H PRN  
 heparin (porcine) injection 5,000 Units  5,000 Units SubCUTAneous Q8H  
 NOREPINephrine (LEVOPHED) 8 mg in 0.9% NS 250ml infusion  2-16 mcg/min IntraVENous TITRATE  warfarin (COUMADIN) tablet 4 mg  4 mg Oral QHS  dilTIAZem (CARDIZEM) IR tablet 60 mg  60 mg Oral QID Physical Exam:  
 
Visit Vitals BP (!) 125/100 Pulse (!) 109 Temp 98.3 °F (36.8 °C) Resp 10 Ht 5' 10\" (1.778 m) Wt 159 lb (72.1 kg) SpO2 97% BMI 22.81 kg/m² BP Readings from Last 3 Encounters:  
11/30/18 (!) 125/100  
11/28/18 109/71  
11/26/18 95/68 Pulse Readings from Last 3 Encounters:  
11/30/18 (!) 109  
11/28/18 86  
11/26/18 77 Wt Readings from Last 3 Encounters:  
11/30/18 159 lb (72.1 kg) 11/28/18 149 lb 6.4 oz (67.8 kg)  
11/26/18 158 lb 7.2 oz (71.9 kg) General:  alert, cooperative, no distress Neck:  nontender, no JVD Lungs:  clear to auscultation bilaterally Heart:  irregularly irregular rhythm Abdomen:  abdomen is soft without significant tenderness, masses, organomegaly or guarding Extremities:  extremities normal, atraumatic, no cyanosis or edema Skin: Warm and dry. no hyperpigmentation, vitiligo, or suspicious lesions Neuro: alert, oriented x3, affect appropriate Psych: non focal 
 
 Data Review:  
 
Recent Labs 11/30/18 
0430 11/29/18 
0226 11/28/18 
5275 WBC 7.8 12.4 8.2 HGB 15.6 16.7* 17.4* HCT 46.1 48.0 50.3*  264 235 Recent Labs 11/30/18 
0430 11/29/18 
1310 11/29/18 
0945 11/29/18 
4016   --  137 136  
K 4.3 4.5 4.2 3.9   --  108 102 CO2 23  --  21 22 GLU 70*  --  74 151* BUN 17  --  19* 18  
CREA 1.10  --  1.36* 2.00* CA 8.4*  --  8.3* 8.4* MG 1.7  --   --  2.3 PHOS 3.3  --  3.5  --   
ALB 3.2*  --   --  3.8 SGOT 22  --   --  20 ALT 30  --   --  34 INR  --   --   --  1.7* Results for orders placed or performed during the hospital encounter of 11/29/18 EKG, 12 LEAD, INITIAL Result Value Ref Range Ventricular Rate 58 BPM  
 Atrial Rate 241 BPM  
 QRS Duration 84 ms Q-T Interval 424 ms QTC Calculation (Bezet) 416 ms Calculated R Axis 41 degrees Calculated T Axis 88 degrees Diagnosis Atrial fibrillation with slow ventricular response Abnormal ECG When compared with ECG of 27-NOV-2018 01:53, 
Vent. rate has decreased BY  64 BPM 
Confirmed by Michele Northome ([phone]) on 11/29/2018 7:57:00 PM 
  
 
 
All Cardiac Markers in the last 24 hours:   
No results found for: CPK, CK, CKMMB, CKMB, RCK3, CKMBT, CKNDX, CKND1, MICKI, TROPT, TROIQ, UYEN, TROPT, TNIPOC, BNP, BNPP Last Lipid:   
Lab Results Component Value Date/Time Cholesterol, total 179 11/30/2018 04:30 AM  
 HDL Cholesterol 45 11/30/2018 04:30 AM  
 LDL, calculated 109.6 (H) 11/30/2018 04:30 AM  
 Triglyceride 122 11/30/2018 04:30 AM  
 CHOL/HDL Ratio 4.0 11/30/2018 04:30 AM  
 
 
Signed By: Ova Simmonds, MD   
 November 30, 2018

## 2018-11-30 NOTE — PROGRESS NOTES
[facility] Hospitalist Division Inpatient Daily Progress Note Daily progress Note Patient: Halle Valente. MRN: [id number]  [facility]: [id number] YOB: 1955  Age: 61 y.o. Sex: male DOA: 11/29/2018 LOS:  LOS: 1 day Chief Complaint:  Cardiogenic Shock Interval History: 60 y/o  male admitted to [address] [place] [address] on 11/29 with hypotension, bradycardia, afib and CHF. Pt was discharged the day prior with adjustment in medications due to severe reduction in EF. Pt states he was unable to get [employer] because his insurance required authorization. Pt states he felt dizzy, lightheaded, SOB and had palpitations. He called EMS. Pt was given IVF's in the ED which slightly improved BP but required Levophed. Also was on Calcium Gluconate due to suspected CCB toxicity. Pt admitted to ICU. PCCM consulted. Pt was weaned from [address] on 11/29. Cardiology adjusting BP meds. HD stable off vasopressors. He was transferred to telemetry on 11/30. Given a one time dose of IV Digoxin w/ plans to start oral on 12/1. Plan is to wean off oral Cardizem and prefer BB w/ new severe cardiomyopathy. Per cardiology, eventual goal of cardioversion once therapeutic INR achieved on consistent basis. Assessment/Plan:  
 
Patient Active Problem List  
Diagnosis Code  Arthritis M19.90  Elevated cholesterol E78.00  Atrial fibrillation (HCC) I48.91  
 Nontoxic multinodular goiter E04.2  Atrial fibrillation with RVR (HCC) I48.91  
 HTN (hypertension) I10  
 Alcoholism ([facility] [address] F10.20  Chronic systolic congestive heart failure (HCC) I50.22  
 HLD (hyperlipidemia) E78.5  OD (overdose of drug), accidental or unintentional, initial encounter T50.901A  Cardiogenic shock (HCC) R57.0  SIRS (systemic inflammatory response syndrome) (HCC) R65.10 A/P: 
Cardiogenic Shock 
- suspect CCB toxicity, s/p Calcium Gluconate - off Levophed since 11/29 
- cardiology following - appreciate assistance Acute Kidney Injury - creatinine 2 on admission 
- resolved, continue to monitor Cardiomyopathy, CHF 
- cardiology following -  on admission 
- last Echo 11/27 with EF < 15% 
- monitor I/O's 
- meds per cardiology Afib  
-cardiology following - Digoxin IV x 1 today - Digoxin 0.125 mg every other day, Lopressor 25 mg TID, Cardizem 60 mg BID (cardiology plans to d/c Cardizem eventually) 
- continue tele monitoring - Coumadin 4 mg daily 
- monitor INR daily 
-  eventual goal of cardioversion once therapeutic INR achieved on consistent basis DVT Prophylaxis - Coumadin daily- monitor INR 
- d/c Heparin subcutaneously JAYLENE Hawkins [facility] Hospitalist Division Pager:  [phone] Office:  [phone] Subjective:  
  
Pt feels much better since admission. No SOB, chest pain, N/V. Objective:  
  
Visit Vitals /85 Pulse 87 Temp 98.3 °F (36.8 °C) Resp 20 Ht 5' 10\" (1.778 m) Wt 72.1 kg (159 lb) SpO2 96% BMI 22.81 kg/m² Physical Exam: 
General appearance: alert, cooperative, no distress, appears stated age Head: Normocephalic, without obvious abnormality, atraumatic Lungs: clear to auscultation bilaterally Heart: regular rate and rhythm, S1, S2 normal, no murmur, click, rub or gallop Abdomen: soft, non tender, non distended. Normoactive bowel sounds. Extremities: extremities normal, atraumatic, no cyanosis or edema Skin: Skin color, texture, turgor normal. No rashes or lesions Neurologic: Grossly normal 
PSY: Mood and affect normal, appropriately behaved Intake and Output: 
Current Shift:  11/30 0701 - 11/30 1900 In: 250 [P.O.:250] Out: - Last three shifts:  11/28 1901 - 11/30 0700 In: 2247.4 [P.O.:990; I.V.:1257.4] Out: 2495 [Urine:2495] Recent Results (from the past 24 hour(s)) LACTIC ACID  
 Collection Time: 11/29/18  1:10 PM  
Result Value Ref Range Lactic acid 1.1 0.4 - 2.0 MMOL/L  
POTASSIUM Collection Time: 11/29/18  1:10 PM  
Result Value Ref Range Potassium 4.5 3.5 - 5.5 mmol/L METABOLIC PANEL, COMPREHENSIVE Collection Time: 11/30/18  4:30 AM  
Result Value Ref Range Sodium 140 136 - 145 mmol/L Potassium 4.3 3.5 - 5.5 mmol/L Chloride 107 100 - 108 mmol/L  
 CO2 23 21 - 32 mmol/L Anion gap 10 3.0 - 18 mmol/L Glucose 70 (L) 74 - 99 mg/dL BUN 17 7.0 - 18 MG/DL Creatinine 1.10 0.6 - 1.3 MG/DL  
 BUN/Creatinine ratio 15 12 - 20 GFR est AA >60 >60 ml/min/1.73m2 GFR est non-AA >60 >60 ml/min/1.73m2 Calcium 8.4 (L) 8.5 - 10.1 MG/DL Bilirubin, total 1.4 (H) 0.2 - 1.0 MG/DL  
 ALT (SGPT) 30 16 - 61 U/L  
 AST (SGOT) 22 15 - 37 U/L Alk. phosphatase 101 45 - 117 U/L Protein, total 5.7 (L) 6.4 - 8.2 g/dL Albumin 3.2 (L) 3.4 - 5.0 g/dL Globulin 2.5 2.0 - 4.0 g/dL A-G Ratio 1.3 0.8 - 1.7 LIPID PANEL Collection Time: 11/30/18  4:30 AM  
Result Value Ref Range LIPID PROFILE Cholesterol, total 179 <200 MG/DL Triglyceride 122 <150 MG/DL  
 HDL Cholesterol 45 40 - 60 MG/DL  
 LDL, calculated 109.6 (H) 0 - 100 MG/DL VLDL, calculated 24.4 MG/DL  
 CHOL/HDL Ratio 4.0 0 - 5.0    
CBC W/O DIFF Collection Time: 11/30/18  4:30 AM  
Result Value Ref Range WBC 7.8 4.6 - 13.2 K/uL  
 RBC 4.88 4.70 - 5.50 M/uL  
 HGB 15.6 13.0 - 16.0 g/dL HCT 46.1 36.0 - 48.0 % MCV 94.5 74.0 - 97.0 FL  
 MCH 32.0 24.0 - 34.0 PG  
 MCHC 33.8 31.0 - 37.0 g/dL  
 RDW 13.0 11.6 - 14.5 % PLATELET 822 032 - 750 K/uL MPV 11.1 9.2 - 11.8 FL  
PTT Collection Time: 11/30/18  4:30 AM  
Result Value Ref Range aPTT 49.9 (H) 23.0 - 36.4 SEC PHOSPHORUS Collection Time: 11/30/18  4:30 AM  
Result Value Ref Range Phosphorus 3.3 2.5 - 4.9 MG/DL  
CALCIUM, IONIZED Collection Time: 11/30/18  4:30 AM  
Result Value Ref Range Ionized Calcium 1.09 (L) 1.12 - 1.32 MMOL/L  
MAGNESIUM Collection Time: 11/30/18  4:30 AM  
Result Value Ref Range Magnesium 1.7 1.6 - 2.6 mg/dL Lab Results Component Value Date/Time Glucose 70 (L) 11/30/2018 04:30 AM  
 Glucose 74 11/29/2018 09:45 AM  
 Glucose 151 (H) 11/29/2018 02:26 AM  
 Glucose 110 (H) 11/28/2018 06:40 AM  
 Glucose 105 (H) 11/27/2018 02:01 AM  
  
 
Imaging: No results found. Medication List Reviewed: 
Current Facility-Administered Medications Medication Dose Route Frequency  metoprolol tartrate (LOPRESSOR) tablet 25 mg  25 mg Oral Q8H  
 dilTIAZem (CARDIZEM) IR tablet 60 mg  60 mg Oral Q12H  
 [START ON 12/1/2018] digoxin (LANOXIN) tablet 0.125 mg  0.125 mg Oral EVERY OTHER DAY  WARFARIN INFORMATION NOTE (COUMADIN)   Other PRN  
 sodium chloride (NS) flush 5-10 mL  5-10 mL IntraVENous PRN  
 acetaminophen (TYLENOL) tablet 650 mg  650 mg Oral Q6H PRN  
 ondansetron (ZOFRAN) injection 4 mg  4 mg IntraVENous Q6H PRN  
 warfarin (COUMADIN) tablet 4 mg  4 mg Oral QHS

## 2018-11-30 NOTE — PROGRESS NOTES
Physical Exam  
Skin: Skin is warm, dry and intact. Dual skin assessment with AILYN Miller, at shift change.

## 2018-11-30 NOTE — PROGRESS NOTES
[employer] [facility] ICU Progress Note Name: Gayla Desai. : 1955 MRN: [id number] Date: 2018 8:38 AM 
  
[x]I have reviewed the flowsheet and previous days notes. Events overnight reviewed and discussed with nursing staff. Vital signs and records reviewed. HPI 
62 y/o male w/ PMHx of Afib, HTN, and HLD presented to the ED 12 hours after being discharged from [facility] for Afib w/ RVR who went home on adjusted cardizem dose. He presented to the ED via EMS w/ hypotension and bradycardia, and responded to fluid resuscitation. Poison control contacted for possible CCB toxicity- started on glucagon, calcium gluconate and norepi. Admitted to the ICU. Was able to come off of Levo yesterday morning. Cardiology adjusted cardizem and BP meds. Subjective: 
18 No new events overnight. HD stable off levo Awake, aox4, on room air No complaints Rosezetta Karl to have breakfast 
Has follow up appt w/ cardiology scheduled for Monday and PCP Wednesday ROS:A comprehensive review of systems was negative except for that written in the HPI. Medication Review: · Pressors - none · Sedation - none · Antibiotics - none · Pain - prn tylenol · GI/ DVT - SQH 
· Others (other gtts) Safety Bundles:  Electrolyte Replacement Protocol Vital Signs:   
Visit Vitals BP (!) 125/100 Pulse (!) 109 Temp 98.3 °F (36.8 °C) Resp 10 Ht 5' 10\" (1.778 m) Wt 72.1 kg (159 lb) SpO2 97% BMI 22.81 kg/m² O2 Device: Room air Temp (24hrs), Av.5 °F (36.9 °C), Min:98 °F (36.7 °C), Max:98.8 °F (37.1 °C) Intake/Output:  
Last shift:      No intake/output data recorded. Last 3 shifts:  1901 -  0700 In: 2247.4 [P.O.:990; I.V.:1257.4] Out: 2495 [Urine:2495] Intake/Output Summary (Last 24 hours) at 2018 5740 Last data filed at 2018 0700 Gross per 24 hour Intake 1155.63 ml Output 1375 ml Net -219.37 ml Physical Exam: General: Awake, alert, NAD HEENT:  Anicteric sclerae; pink palpebral conjunctivae; mucosa moist 
Resp:  Clear bilaterally to auscultation, Symmetrical chest expansion, no accessory muscle use; good airway entry; no rales/ wheezing/ rhonchi noted CV:  S1, S2 present; A fib GI:  Abdomen soft, non-tender; (+) active bowel sounds Extremities:  +2 pulses on all extremities; no edema/ cyanosis/ clubbing noted; normal capillary refill Skin:  Warm; no rashes/ lesions noted Neurologic:  Non-focal 
Devices:  None DATA:  
 
Current Facility-Administered Medications Medication Dose Route Frequency  calcium gluconate 2 g in 0.9% sodium chloride 50 mL IVPB  2 g IntraVENous ONCE  
 lactated Ringers infusion 1,000 mL  1,000 mL IntraVENous CONTINUOUS  
 sodium chloride (NS) flush 5-10 mL  5-10 mL IntraVENous PRN  
 acetaminophen (TYLENOL) tablet 650 mg  650 mg Oral Q6H PRN  
 ondansetron (ZOFRAN) injection 4 mg  4 mg IntraVENous Q6H PRN  
 heparin (porcine) injection 5,000 Units  5,000 Units SubCUTAneous Q8H  
 NOREPINephrine (LEVOPHED) 8 mg in 0.9% NS 250ml infusion  2-16 mcg/min IntraVENous TITRATE  warfarin (COUMADIN) tablet 4 mg  4 mg Oral QHS  dilTIAZem (CARDIZEM) IR tablet 60 mg  60 mg Oral QID  dilTIAZem (CARDIZEM) 125 mg in 0.9% sodium chloride 125 mL infusion  0-15 mg/hr IntraVENous TITRATE Labs: Results:  
   
Chemistry Recent Labs 11/30/18 
0430 11/29/18 
1310 11/29/18 
0945 11/29/18 
7329 GLU 70*  --  74 151*   --  137 136  
K 4.3 4.5 4.2 3.9   --  108 102 CO2 23  --  21 22 BUN 17  --  19* 18  
CREA 1.10  --  1.36* 2.00* CA 8.4*  --  8.3* 8.4* AGAP 10  --  8 12 BUCR 15  --  14 9*   --   --  133* TP 5.7*  --   --  7.5 ALB 3.2*  --   --  3.8 GLOB 2.5  --   --  3.7 AGRAT 1.3  --   --  1.0  
  
CBC w/Diff Recent Labs 11/30/18 
0430 11/29/18 
0226 11/28/18 
9808 WBC 7.8 12.4 8.2  
RBC 4.88 5.10 5.35  
HGB 15.6 16.7* 17.4*  
 HCT 46.1 48.0 50.3*  264 235 GRANS  --  78* 69  
LYMPH  --  13* 19* EOS  --  1 1 Coagulation Recent Labs 11/30/18 0430 11/29/18 
5914 PTP  --  19.7* INR  --  1.7* APTT 49.9*  -- Liver Enzymes Recent Labs 11/30/18 0430 TP 5.7* ALB 3.2*  SGOT 22 ABG No results found for: PH, PHI, PCO2, PCO2I, PO2, PO2I, HCO3, HCO3I, FIO2, FIO2I Microbiology No results for input(s): CULT in the last 72 hours. Telemetry: []Sinus []A-flutter []Paced [x]A-fib []Multiple PVCs Imaging: CXR Results  (Last 48 hours)  
          
 11/29/18 0430  XR CHEST PORT Final result Impression:  Impression:  
Mild interstitial edema, not significantly changed from prior. Narrative:  Chest, single view Indication: Shortness of breath Comparison: 11/27/2018 Findings:  Portable upright AP view of the chest was obtained. Persistent  
interstitial opacities again noted. No pneumothorax or pleural effusion. Slight  
elevation of the left hemidiaphragm as previously. Cardiac size and mediastinal  
contours are stable. Defibrillating pads noted to project over the chest wall. No acute osseous abnormality. CT Results  (Last 48 hours) None IMPRESSION:  
·  Adverse reaction to calcium channel blocker · Afib- cardiology following Hx · Afib 
· HTN 
· HLD · Arthritis · Nontoxic multinodular goiter PLAN:  
· Resp - Stable on room air. O2 goal > 90. HOB > 30. Pulmonary hygiene. · ID -  Afebrile and aleukocytosis. Trend temp and WBC curve. BCx NGTD, continue to trend. Monitor for signs of infection. · CVS - HD stable. Cardizem, coumadin. Cardiology following. · Heme/onc - Daily CBC. Monitor H/H and platelets. Monitor for signs of bleeding. · Metabolic - Daily CMP. Trend and replace electrolytes per replacement protocol. · Renal - Trend renal indices. Monitor UOP. · Endocrine - Glycemic control per protocol. Avoid hypoglycemia. · Neuro/ Pain/ Sedation - Monitor neuro status. · GI - Cardiac diet. · Prophylaxis - DVT, GI- SQH 
· Discussed in interdisciplinary rounds- OK to transfer to floor. · Code status- FULL [x]See my orders for details My assessment/plan was discussed with: 
[x]nursing []PT/OT   
[]respiratory therapy [x]Dr. Kris Aguirre  
[]family [] Navin Mansfield NP

## 2018-11-30 NOTE — ROUTINE PROCESS
Bedside and Verbal shift change report given to Jessica Leon (oncoming nurse) by Angela (offgoing nurse). Report included the following information SBAR, Kardex, ED Summary, Procedure Summary, Intake/Output, MAR, Accordion, Recent Results, Med Rec Status, Cardiac Rhythm Afib, Alarm Parameters  and Quality Measures.

## 2018-11-30 NOTE — PROGRESS NOTES
Shift report from AILYN Miller.  
 
0800 - Stable complex assessment. Pt A&Ox4, MAEE, and watching tv in room. No complaints of pain. 0900 - -120s; cardiology at bedside and aware - no PRN order for rate control at this time. Scheduled medications adjusted and cardizem gtt d/c'd per cardiology. 1000 - NP at bedside - transfer orders. Pt ate all of breakfast. Calcium being replaced per protocol. 1050 - Attempted to call report to nurse taking pt coming to room 3012 and was told RN will call back. 1145 - Pt transported to new floor on monitor with RN. No issues. Bedside handoff with Jose Guadalupe Arceo.

## 2018-11-30 NOTE — PROGRESS NOTES
Patient HR is in the the 120's. He is awake and lying in bed. Denies any SOB or chest pain/palpitations. He is in AFIB. Paged Dr. Ingrid Soulier for 1 x dose of PO antiarrhythmia instead of starting the cardizem gtt. Orders were given via telephone. Will continue to monitor.

## 2018-11-30 NOTE — PROGRESS NOTES
Met with pt to discuss entresto prior auth, provided him with entresto card for free 30 day supply. Will start on prior auth process. Naomy Winslow RN,ext [phone].

## 2018-11-30 NOTE — ROUTINE PROCESS
Verbal telephone report received from [facility]. Report consisted of situation, background, assessment and recommendation. 1200 Pt arrived to floor. Pt is stable with no complaints of pain and no change to condition. Will continue to monitor. 1520 Pt resting in bed with no complaints of pain and no change to condition. Heart rate currently 116-120's at rest. Will continue to monitor. 1720 Pt up and ambulating. Heart rate 140's. kjnd Informed pt. Pt back in bed and resting. Will continue to monitor. 1745 Pt's heart rate currently 116-120's at rest. Will continue to monitor. Bedside and Verbal shift change report given to Luana Patient (oncoming nurse) by 2250 [facility] (offgoing nurse). Report included the following information SBAR, Kardex, Intake/Output, MAR, Recent Results and Cardiac Rhythm AFIBB.

## 2018-11-30 NOTE — DIABETES MGMT
NUTRITIONAL ASSESSMENT GLYCEMIC CONTROL/ PLAN OF CARE Joseph Vasques.           61 y.o.           11/29/2018 1. Sepsis, due to unspecified organism ([facility] [address] 2. Acute congestive heart failure, unspecified heart failure type ([facility] [address] 3. Adverse reaction to calcium channel blocker, initial encounter INTERVENTIONS/PLAN:  
Monitor po intake, labs and weights. ASSESSMENT:  
Pt is 95% ideal weight;  BMI (calculated): 22.8 kg/m2 (normal weight classification). Pt appears well nourished and po intake is adequate. No nutrition related problems seen at this time. SUBJECTIVE/OBJECTIVE: Information obtained from: chart review, ICU rounds, pt 
11/30/18:  Pt reports he has a good appetite, c/o early satiety yet his weight has been stable. No known food allergies and pt denies having problems with chewing or swallowing. Diet: cardiac, regular texture Patient Vitals for the past 100 hrs: 
 % Diet Eaten 11/30/18 0930 100 % 11/29/18 1200 10 % 11/29/18 0900 5 % Medications: [x]                Reviewed Most Recent POC Glucose:  
Recent Labs 11/30/18 
0430 11/29/18 
0945 11/29/18 
0226 11/28/18 
3327 GLU 70* 74 151* 110* Labs:  
No results found for: HBA1C, HGBE8, [id number] Lab Results Component Value Date/Time Sodium 140 11/30/2018 04:30 AM  
 Potassium 4.3 11/30/2018 04:30 AM  
 Chloride 107 11/30/2018 04:30 AM  
 CO2 23 11/30/2018 04:30 AM  
 Anion gap 10 11/30/2018 04:30 AM  
 Glucose 70 (L) 11/30/2018 04:30 AM  
 BUN 17 11/30/2018 04:30 AM  
 Creatinine 1.10 11/30/2018 04:30 AM  
 Calcium 8.4 (L) 11/30/2018 04:30 AM  
 Magnesium 1.7 11/30/2018 04:30 AM  
 Phosphorus 3.3 11/30/2018 04:30 AM  
 Albumin 3.2 (L) 11/30/2018 04:30 AM  
 
 
Anthropometrics: IBW : 75.3 kg (166 lb), % IBW (Calculated): 95.78 %, BMI (calculated): 22.8 Wt Readings from Last 1 Encounters:  
11/30/18 72.1 kg (159 lb) Ht Readings from Last 1 Encounters: 11/30/18 5' 10\" (1.778 m) Wt Readings from Last 3 Encounters:  
11/30/18 72.1 kg (159 lb)  
11/28/18 67.8 kg (149 lb 6.4 oz)  
11/26/18 71.9 kg (158 lb 7.2 oz) Estimated Nutrition Needs:  1981 Kcals/day, Protein (g): 87 g Based on:   [x]          Actual BW    []          ABW   []            Adjusted BW   
 
Nutrition Diagnoses:  
None at this time. Nutrition Interventions: 
None at this time Goal: Pt will continue to consume > 75% meals by 12/7/18. Weight maintenance (+/- 1-2 kg) by 12/10/18. Nutrition Monitoring and Evaluation   
 
[x]     Monitor po intake on meal rounds 
[]     Continue inpatient monitoring and intervention 
[]     Other: 
 
 
Nutrition Risk:  []   High     []  Moderate    [x]  Minimal/Uncompromised Parth Jimenez RD, CDE Office:  [phone] [place] Pager:  [phone]

## 2018-12-01 LAB
ANION GAP SERPL CALC-SCNC: 11 MMOL/L (ref 3–18)
BASOPHILS # BLD: 0 K/UL (ref 0–0.1)
BASOPHILS NFR BLD: 0 % (ref 0–2)
BUN SERPL-MCNC: 13 MG/DL (ref 7–18)
BUN/CREAT SERPL: 14 (ref 12–20)
CALCIUM SERPL-MCNC: 8.9 MG/DL (ref 8.5–10.1)
CHLORIDE SERPL-SCNC: 106 MMOL/L (ref 100–108)
CO2 SERPL-SCNC: 26 MMOL/L (ref 21–32)
CREAT SERPL-MCNC: 0.95 MG/DL (ref 0.6–1.3)
DIFFERENTIAL METHOD BLD: ABNORMAL
EOSINOPHIL # BLD: 0.1 K/UL (ref 0–0.4)
EOSINOPHIL NFR BLD: 1 % (ref 0–5)
ERYTHROCYTE [DISTWIDTH] IN BLOOD BY AUTOMATED COUNT: 12.9 % (ref 11.6–14.5)
GLUCOSE SERPL-MCNC: 74 MG/DL (ref 74–99)
HCT VFR BLD AUTO: 46.7 % (ref 36–48)
HGB BLD-MCNC: 15.8 G/DL (ref 13–16)
INR PPP: 1.7 (ref 0.8–1.2)
LYMPHOCYTES # BLD: 1.6 K/UL (ref 0.9–3.6)
LYMPHOCYTES NFR BLD: 23 % (ref 21–52)
MCH RBC QN AUTO: 31.9 PG (ref 24–34)
MCHC RBC AUTO-ENTMCNC: 33.8 G/DL (ref 31–37)
MCV RBC AUTO: 94.3 FL (ref 74–97)
MONOCYTES # BLD: 0.9 K/UL (ref 0.05–1.2)
MONOCYTES NFR BLD: 13 % (ref 3–10)
NEUTS SEG # BLD: 4.3 K/UL (ref 1.8–8)
NEUTS SEG NFR BLD: 63 % (ref 40–73)
PLATELET # BLD AUTO: 200 K/UL (ref 135–420)
PMV BLD AUTO: 11.8 FL (ref 9.2–11.8)
POTASSIUM SERPL-SCNC: 3.9 MMOL/L (ref 3.5–5.5)
PROTHROMBIN TIME: 20.2 SEC (ref 11.5–15.2)
RBC # BLD AUTO: 4.95 M/UL (ref 4.7–5.5)
SODIUM SERPL-SCNC: 143 MMOL/L (ref 136–145)
WBC # BLD AUTO: 6.9 K/UL (ref 4.6–13.2)

## 2018-12-01 PROCEDURE — 74011250637 HC RX REV CODE- 250/637: Performed by: HOSPITALIST

## 2018-12-01 PROCEDURE — 65660000000 HC RM CCU STEPDOWN

## 2018-12-01 PROCEDURE — 74011250637 HC RX REV CODE- 250/637: Performed by: INTERNAL MEDICINE

## 2018-12-01 PROCEDURE — 80048 BASIC METABOLIC PNL TOTAL CA: CPT

## 2018-12-01 PROCEDURE — 85025 COMPLETE CBC W/AUTO DIFF WBC: CPT

## 2018-12-01 PROCEDURE — 36415 COLL VENOUS BLD VENIPUNCTURE: CPT

## 2018-12-01 PROCEDURE — 74011250636 HC RX REV CODE- 250/636: Performed by: INTERNAL MEDICINE

## 2018-12-01 PROCEDURE — 85610 PROTHROMBIN TIME: CPT

## 2018-12-01 PROCEDURE — 74011250637 HC RX REV CODE- 250/637: Performed by: NURSE PRACTITIONER

## 2018-12-01 RX ORDER — DIGOXIN 0.25 MG/ML
125 INJECTION INTRAMUSCULAR; INTRAVENOUS
Status: COMPLETED | OUTPATIENT
Start: 2018-12-01 | End: 2018-12-01

## 2018-12-01 RX ORDER — FUROSEMIDE 20 MG/1
20 TABLET ORAL EVERY OTHER DAY
Status: DISCONTINUED | OUTPATIENT
Start: 2018-12-01 | End: 2018-12-04 | Stop reason: HOSPADM

## 2018-12-01 RX ORDER — METOPROLOL TARTRATE 50 MG/1
50 TABLET ORAL EVERY 8 HOURS
Status: DISCONTINUED | OUTPATIENT
Start: 2018-12-01 | End: 2018-12-04

## 2018-12-01 RX ADMIN — DIGOXIN 0.12 MG: 125 TABLET ORAL at 10:20

## 2018-12-01 RX ADMIN — WARFARIN SODIUM 4 MG: 2 TABLET ORAL at 21:32

## 2018-12-01 RX ADMIN — DILTIAZEM HYDROCHLORIDE 60 MG: 60 TABLET, FILM COATED ORAL at 21:31

## 2018-12-01 RX ADMIN — DILTIAZEM HYDROCHLORIDE 60 MG: 60 TABLET, FILM COATED ORAL at 08:41

## 2018-12-01 RX ADMIN — METOPROLOL TARTRATE 25 MG: 25 TABLET ORAL at 05:01

## 2018-12-01 RX ADMIN — METOPROLOL TARTRATE 50 MG: 50 TABLET ORAL at 21:31

## 2018-12-01 RX ADMIN — METOPROLOL TARTRATE 50 MG: 50 TABLET ORAL at 14:35

## 2018-12-01 RX ADMIN — FUROSEMIDE 20 MG: 20 TABLET ORAL at 10:20

## 2018-12-01 RX ADMIN — DIGOXIN 125 MCG: 250 INJECTION, SOLUTION INTRAMUSCULAR; INTRAVENOUS; PARENTERAL at 10:19

## 2018-12-01 NOTE — PROGRESS NOTES
1900  -- Bedside, Verbal and Written shift change report given to 2309 Efren Schuster (oncoming nurse) by Tung Pina nurse). Report included the following information SBAR, Kardex, Intake/Output, MAR and Recent Results.  
   
 2055 -- PM medications administered, pt tolerated with ease, will continue to monitor. 
  
0000 -- Shift reassessment, pt condition unchanged, will continue to monitor. 
   
 0415 --  Shift reassessment, pt condition unchanged, will continue to monitor.   
   
 0700 -- Bedside, Verbal and Written shift change report given to JAYLAN  (oncoming nurse) by JEFFERY (offgoing nurse). Report included the following information SBAR, Kardex, Intake/Output, MAR and Recent Results. Skin assessment completed.

## 2018-12-01 NOTE — PROGRESS NOTES
[facility] Date of  Admission: 11/29/2018  2:23 AM  
Primary Care Physician:  Candace Vitale., DO Subjective: 
Still has occasional palpitatsions Felt some episode of SOB yesterdasy. Assessment: - Hypotension. Resolved. Likely from meds. - Recent hospital admission for CHF, afib and adjustment of antihypertensive regimen with new severe cardiomyopathy - Atrial fibrillation with RVR, CHADS2-Vasc score 2 (CHF, HTN) - Echo 11/27/18 with EF <15%, global hypokinesis 
- Echocardiogram 04/16/18 with an EF of 45%, mild diffuse hypokinesis 
- Nuclear stress test 04/17/18 with no evidence of ongoing ischemia, EF calculated at 38% 
- HTN 
- Hyperlipidemia - Hypokalemia 
- Hx nontoxic multinodular goiter 
  
 Plan: Will start lasix 20 mg every other day HR suboptimal and 120-160s again Will give IV digoxin 0.125 mg IV once now Will increase metoprolol 50 every 8 hours Continue same dose of Digoxini oral and oral cardizem for now Not ready for discharge yet because of HR in 120-160s. Physical Exam:  
 
Visit Vitals BP (!) 142/106 Pulse 95 Temp 97.5 °F (36.4 °C) Resp 18 Ht 5' 10\" (1.778 m) Wt 159 lb (72.1 kg) SpO2 97% BMI 22.81 kg/m² BP Readings from Last 3 Encounters:  
12/01/18 (!) 142/106  
11/28/18 109/71  
11/26/18 95/68 Pulse Readings from Last 3 Encounters:  
12/01/18 95  
11/28/18 86  
11/26/18 77 Wt Readings from Last 3 Encounters:  
11/30/18 159 lb (72.1 kg) 11/28/18 149 lb 6.4 oz (67.8 kg)  
11/26/18 158 lb 7.2 oz (71.9 kg) General:  alert, cooperative, no distress Neck:  nontender, no JVD Lungs:  Mild rales at base. Heart:  irregularly irregular rhythm Abdomen:  abdomen is soft without significant tenderness, masses, organomegaly or guarding Extremities:  No edema Data Review:  
 
Recent Labs 12/01/18 
0330 11/30/18 
0430 11/29/18 
5856 WBC 6.9 7.8 12.4 HGB 15.8 15.6 16.7* HCT 46.7 46.1 48.0  210 264 Recent Labs 12/01/18 
0330 11/30/18 
0430 11/29/18 
1310 11/29/18 
0945 11/29/18 
3271  140  --  137 136  
K 3.9 4.3 4.5 4.2 3.9  107  --  108 102 CO2 26 23  --  21 22 GLU 74 70*  --  74 151* BUN 13 17  --  19* 18  
CREA 0.95 1.10  --  1.36* 2.00* CA 8.9 8.4*  --  8.3* 8.4* MG  --  1.7  --   --  2.3 PHOS  --  3.3  --  3.5  --   
ALB  --  3.2*  --   --  3.8 SGOT  --  22  --   --  20 ALT  --  30  --   --  34 INR 1.7*  --   --   --  1.7* Results for orders placed or performed during the hospital encounter of 11/29/18 EKG, 12 LEAD, INITIAL Result Value Ref Range Ventricular Rate 58 BPM  
 Atrial Rate 241 BPM  
 QRS Duration 84 ms Q-T Interval 424 ms QTC Calculation (Bezet) 416 ms Calculated R Axis 41 degrees Calculated T Axis 88 degrees Diagnosis Atrial fibrillation with slow ventricular response Abnormal ECG When compared with ECG of 27-NOV-2018 01:53, 
Vent. rate has decreased BY  64 BPM 
Confirmed by Alana Diez ([id number]) on 11/29/2018 7:57:00 PM 
  
 
 
All Cardiac Markers in the last 24 hours:   
No results found for: CPK, CK, CKMMB, CKMB, RCK3, CKMBT, CKNDX, CKND1, MICKI, TROPT, TROIQ, UYEN, TROPT, TNIPOC, BNP, BNPP Last Lipid:   
Lab Results Component Value Date/Time Cholesterol, total 179 11/30/2018 04:30 AM  
 HDL Cholesterol 45 11/30/2018 04:30 AM  
 LDL, calculated 109.6 (H) 11/30/2018 04:30 AM  
 Triglyceride 122 11/30/2018 04:30 AM  
 CHOL/HDL Ratio 4.0 11/30/2018 04:30 AM  
 
 
Signed By: Curtis Dupont MD   
 December 1, 2018

## 2018-12-01 NOTE — PROGRESS NOTES
Problem: Falls - Risk of 
Goal: *Absence of Falls Document Linden Pichardo Fall Risk and appropriate interventions in the flowsheet. Outcome: Progressing Towards Goal 
Fall Risk Interventions: 
  
 
  
 
Medication Interventions: Evaluate medications/consider consulting pharmacy Problem: Pressure Injury - Risk of 
Goal: *Prevention of pressure injury Document Aiden Scale and appropriate interventions in the flowsheet. Outcome: Progressing Towards Goal 
Pressure Injury Interventions: Activity Interventions: Pressure redistribution bed/mattress(bed type) Nutrition Interventions: Document food/fluid/supplement intake

## 2018-12-01 NOTE — PROGRESS NOTES
Problem: Falls - Risk of 
Goal: *Absence of Falls Document Debria Check Fall Risk and appropriate interventions in the flowsheet. Outcome: Progressing Towards Goal 
Fall Risk Interventions: 
  
 
  
 
Medication Interventions: Teach patient to arise slowly History of Falls Interventions: Door open when patient unattended

## 2018-12-01 NOTE — PROGRESS NOTES
Internal Medicine Progress Note Patient's Name: Elvis Mosqueda. Admit Date: 11/29/2018 Length of Stay: 2 Assessment/Plan Active Hospital Problems Diagnosis Date Noted  HLD (hyperlipidemia) 11/29/2018  OD (overdose of drug), accidental or unintentional, initial encounter 11/29/2018  Cardiogenic shock ([facility] [address] 11/29/2018  SIRS (systemic inflammatory response syndrome) (HCC) 11/29/2018  Chronic systolic congestive heart failure ([facility] [address] 11/27/2018  
 HTN (hypertension) 11/04/2018  Atrial fibrillation ([facility] 11/28/2017 Chronic, [id number] score 1 to 2 
  
 
- IV dig x 1 dose today per cardio - Metoprolol increased to 50mg q8h 
- Cont prev dig dose and cardizem dose - Lasix every other day added 
- Cont tele monitor - BP better than on presentation, cont to monitor - Cont acceptable home medications for chronic conditions  
- DVT protocol I have personally reviewed all pertinent labs and films that have officially resulted over the last 24 hours. I have personally checked for all pending labs that are awaiting final results. Subjective Pt s/e @ bedside Had episode of palpitations this AM along w/ SOB Currently, without those complaints Tele monitor w/ evidence of -160s at times Objective Visit Vitals BP (!) 146/94 (BP 1 Location: Right arm, BP Patient Position: At rest) Pulse 99 Temp 97.7 °F (36.5 °C) Resp 18 Ht 5' 10\" (1.778 m) Wt 72.1 kg (159 lb) SpO2 97% BMI 22.81 kg/m² Physical Exam: 
General Appearance: NAD, conversant Lungs: CTA with normal respiratory effort CV: IRIR, no m/r/g Abdomen: soft, non-tender, normal bowel sounds Extremities: no cyanosis, no peripheral edema Neuro: No focal deficits, motor/sensory intact Lab/Data Reviewed: 
BMP:  
Lab Results Component Value Date/Time   12/01/2018 03:30 AM  
 K 3.9 12/01/2018 03:30 AM  
  12/01/2018 03:30 AM  
 CO2 26 12/01/2018 03:30 AM  
 AGAP 11 12/01/2018 03:30 AM  
 GLU 74 12/01/2018 03:30 AM  
 BUN 13 12/01/2018 03:30 AM  
 CREA 0.95 12/01/2018 03:30 AM  
 GFRAA >60 12/01/2018 03:30 AM  
 GFRNA >60 12/01/2018 03:30 AM  
 
CBC:  
Lab Results Component Value Date/Time WBC 6.9 12/01/2018 03:30 AM  
 HGB 15.8 12/01/2018 03:30 AM  
 HCT 46.7 12/01/2018 03:30 AM  
  12/01/2018 03:30 AM  
 
 
Imaging Reviewed: 
No results found. Medications Reviewed: 
Current Facility-Administered Medications Medication Dose Route Frequency  metoprolol tartrate (LOPRESSOR) tablet 50 mg  50 mg Oral Q8H  
 furosemide (LASIX) tablet 20 mg  20 mg Oral EVERY OTHER DAY  dilTIAZem (CARDIZEM) IR tablet 60 mg  60 mg Oral Q12H  digoxin (LANOXIN) tablet 0.125 mg  0.125 mg Oral EVERY OTHER DAY  WARFARIN INFORMATION NOTE (COUMADIN)   Other PRN  
 sodium chloride (NS) flush 5-10 mL  5-10 mL IntraVENous PRN  
 acetaminophen (TYLENOL) tablet 650 mg  650 mg Oral Q6H PRN  
 ondansetron (ZOFRAN) injection 4 mg  4 mg IntraVENous Q6H PRN  
 warfarin (COUMADIN) tablet 4 mg  4 mg Oral QHS Catherine Mckee DO Internal Medicine, Hospitalist 
Pager: [phone] [facility]

## 2018-12-01 NOTE — PROGRESS NOTES
Problem: Falls - Risk of 
Goal: *Absence of Falls Document Pedro Juárez Fall Risk and appropriate interventions in the flowsheet. Outcome: Progressing Towards Goal 
Fall Risk Interventions: 
  
 
  
 
Medication Interventions: Assess postural VS orthostatic hypotension Problem: Pressure Injury - Risk of 
Goal: *Prevention of pressure injury Document Aiden Scale and appropriate interventions in the flowsheet. Outcome: Progressing Towards Goal 
Pressure Injury Interventions: Activity Interventions: Pressure redistribution bed/mattress(bed type) Nutrition Interventions: Document food/fluid/supplement intake

## 2018-12-01 NOTE — PROGRESS NOTES
Problem: Falls - Risk of 
Goal: *Absence of Falls Document April Bernard Fall Risk and appropriate interventions in the flowsheet. Fall Risk Interventions: 
  
 
  
 
Medication Interventions: Evaluate medications/consider consulting pharmacy

## 2018-12-01 NOTE — ROUTINE PROCESS
0800 - assumed care of patient - alert and oriented - ambulatory to BR with 's with activity. Denies any pain. Some SOB with activity 0840 - sitting up eating breakfast - am med given. 3900 - cardiology at bedside. New orders noted 1020 - IV dig per orders

## 2018-12-02 LAB
ANION GAP SERPL CALC-SCNC: 10 MMOL/L (ref 3–18)
APTT PPP: 100 SEC (ref 23–36.4)
APTT PPP: 40.5 SEC (ref 23–36.4)
BASOPHILS # BLD: 0 K/UL (ref 0–0.1)
BASOPHILS # BLD: 0 K/UL (ref 0–0.1)
BASOPHILS NFR BLD: 0 % (ref 0–2)
BASOPHILS NFR BLD: 1 % (ref 0–2)
BUN SERPL-MCNC: 11 MG/DL (ref 7–18)
BUN/CREAT SERPL: 13 (ref 12–20)
CALCIUM SERPL-MCNC: 9.3 MG/DL (ref 8.5–10.1)
CHLORIDE SERPL-SCNC: 104 MMOL/L (ref 100–108)
CO2 SERPL-SCNC: 26 MMOL/L (ref 21–32)
CREAT SERPL-MCNC: 0.88 MG/DL (ref 0.6–1.3)
DIFFERENTIAL METHOD BLD: ABNORMAL
DIFFERENTIAL METHOD BLD: ABNORMAL
EOSINOPHIL # BLD: 0.1 K/UL (ref 0–0.4)
EOSINOPHIL # BLD: 0.1 K/UL (ref 0–0.4)
EOSINOPHIL NFR BLD: 1 % (ref 0–5)
EOSINOPHIL NFR BLD: 1 % (ref 0–5)
ERYTHROCYTE [DISTWIDTH] IN BLOOD BY AUTOMATED COUNT: 13 % (ref 11.6–14.5)
ERYTHROCYTE [DISTWIDTH] IN BLOOD BY AUTOMATED COUNT: 13 % (ref 11.6–14.5)
GLUCOSE SERPL-MCNC: 81 MG/DL (ref 74–99)
HCT VFR BLD AUTO: 50 % (ref 36–48)
HCT VFR BLD AUTO: 50.9 % (ref 36–48)
HGB BLD-MCNC: 17.5 G/DL (ref 13–16)
HGB BLD-MCNC: 17.6 G/DL (ref 13–16)
INR PPP: 1.6 (ref 0.8–1.2)
LYMPHOCYTES # BLD: 1.2 K/UL (ref 0.9–3.6)
LYMPHOCYTES # BLD: 1.6 K/UL (ref 0.9–3.6)
LYMPHOCYTES NFR BLD: 20 % (ref 21–52)
LYMPHOCYTES NFR BLD: 20 % (ref 21–52)
MCH RBC QN AUTO: 32.5 PG (ref 24–34)
MCH RBC QN AUTO: 32.8 PG (ref 24–34)
MCHC RBC AUTO-ENTMCNC: 34.6 G/DL (ref 31–37)
MCHC RBC AUTO-ENTMCNC: 35 G/DL (ref 31–37)
MCV RBC AUTO: 93.8 FL (ref 74–97)
MCV RBC AUTO: 93.9 FL (ref 74–97)
MONOCYTES # BLD: 1.1 K/UL (ref 0.05–1.2)
MONOCYTES # BLD: 1.3 K/UL (ref 0.05–1.2)
MONOCYTES NFR BLD: 16 % (ref 3–10)
MONOCYTES NFR BLD: 18 % (ref 3–10)
NEUTS SEG # BLD: 3.7 K/UL (ref 1.8–8)
NEUTS SEG # BLD: 5.2 K/UL (ref 1.8–8)
NEUTS SEG NFR BLD: 60 % (ref 40–73)
NEUTS SEG NFR BLD: 63 % (ref 40–73)
PLATELET # BLD AUTO: 208 K/UL (ref 135–420)
PLATELET # BLD AUTO: 215 K/UL (ref 135–420)
PMV BLD AUTO: 10.6 FL (ref 9.2–11.8)
PMV BLD AUTO: 11.8 FL (ref 9.2–11.8)
POTASSIUM SERPL-SCNC: 3.7 MMOL/L (ref 3.5–5.5)
PROTHROMBIN TIME: 18.6 SEC (ref 11.5–15.2)
RBC # BLD AUTO: 5.33 M/UL (ref 4.7–5.5)
RBC # BLD AUTO: 5.42 M/UL (ref 4.7–5.5)
SODIUM SERPL-SCNC: 140 MMOL/L (ref 136–145)
WBC # BLD AUTO: 6.1 K/UL (ref 4.6–13.2)
WBC # BLD AUTO: 8.2 K/UL (ref 4.6–13.2)

## 2018-12-02 PROCEDURE — 80048 BASIC METABOLIC PNL TOTAL CA: CPT

## 2018-12-02 PROCEDURE — 36415 COLL VENOUS BLD VENIPUNCTURE: CPT

## 2018-12-02 PROCEDURE — 85730 THROMBOPLASTIN TIME PARTIAL: CPT

## 2018-12-02 PROCEDURE — 74011250637 HC RX REV CODE- 250/637: Performed by: INTERNAL MEDICINE

## 2018-12-02 PROCEDURE — 74011250636 HC RX REV CODE- 250/636: Performed by: HOSPITALIST

## 2018-12-02 PROCEDURE — 65660000000 HC RM CCU STEPDOWN

## 2018-12-02 PROCEDURE — 85025 COMPLETE CBC W/AUTO DIFF WBC: CPT

## 2018-12-02 PROCEDURE — 85610 PROTHROMBIN TIME: CPT

## 2018-12-02 RX ORDER — WARFARIN SODIUM 5 MG/1
5 TABLET ORAL
Status: DISCONTINUED | OUTPATIENT
Start: 2018-12-02 | End: 2018-12-04

## 2018-12-02 RX ORDER — HEPARIN SODIUM 10000 [USP'U]/100ML
18-36 INJECTION, SOLUTION INTRAVENOUS
Status: DISCONTINUED | OUTPATIENT
Start: 2018-12-02 | End: 2018-12-04

## 2018-12-02 RX ADMIN — METOPROLOL TARTRATE 50 MG: 50 TABLET ORAL at 21:55

## 2018-12-02 RX ADMIN — DILTIAZEM HYDROCHLORIDE 60 MG: 60 TABLET, FILM COATED ORAL at 08:51

## 2018-12-02 RX ADMIN — METOPROLOL TARTRATE 50 MG: 50 TABLET ORAL at 14:10

## 2018-12-02 RX ADMIN — HEPARIN SODIUM AND DEXTROSE 18 UNITS/KG/HR: 10000; 5 INJECTION INTRAVENOUS at 11:56

## 2018-12-02 RX ADMIN — METOPROLOL TARTRATE 50 MG: 50 TABLET ORAL at 06:11

## 2018-12-02 RX ADMIN — WARFARIN SODIUM 5 MG: 5 TABLET ORAL at 21:55

## 2018-12-02 RX ADMIN — DILTIAZEM HYDROCHLORIDE 60 MG: 60 TABLET, FILM COATED ORAL at 21:55

## 2018-12-02 NOTE — PROGRESS NOTES
[facility] Date of  Admission: 11/29/2018  2:23 AM  
Primary Care Physician:  Dixon Pulido., DO Subjective: 
Still has occasional palpitatsions and SOB Felt some episode of SOB yesterdasy again Assessment: - Hypotension. Resolved. Likely from meds. - Recent hospital admission for CHF, afib and adjustment of antihypertensive regimen with new severe cardiomyopathy - Atrial fibrillation with RVR, CHADS2-Vasc score 2 (CHF, HTN) - Echo 11/27/18 with EF <15%, global hypokinesis 
- Echocardiogram 04/16/18 with an EF of 45%, mild diffuse hypokinesis 
- Nuclear stress test 04/17/18 with no evidence of ongoing ischemia, EF calculated at 38% 
- HTN 
- Hyperlipidemia - Hypokalemia 
- Hx nontoxic multinodular goiter 
  
 Plan:  
 
Continue lasix 20 mg every other day HR suboptimal and 120-160s again with minimal activity Already on 3 AV sabra blocking agent Continue same dose of Digoxini oral and oral cardizem for now Continue Metoprolol. Not ready for discharge yet because of HR in 120-160s. Will increase coumadin dose to 5 mg daily. Subtherapeutic for couple days. Will start IV heparin a.fib protocol.  will kindly place IV heparin order. DW patient in detail about management of a.fib in detail. Rate control vs. Rhythm control strategies were discussed in detail. Chemical vs electrical cardioversion discussed. Risk, benefit, alternatives and complications of each strategies discussed in detail as well. They understand it. Discuss with patient about CAITLIN procedure. Risk , benefit and alternatives and complication of both discussed Complication assocaited with CAITLIN and possible cardioversion discussed in detail including but not limited to emergent thoracic surgery, pacemaker need, cardiac arrest and stroke. .. Patient agreeable for CAITLIN and possible cardioversion. Physical Exam:  
 
Visit Vitals /89 Pulse 67 Temp 98.3 °F (36.8 °C) Resp 18 Ht 5' 10\" (1.778 m) Wt 150 lb 5.7 oz (68.2 kg) SpO2 98% BMI 21.57 kg/m² BP Readings from Last 3 Encounters:  
12/02/18 129/89  
11/28/18 109/71  
11/26/18 95/68 Pulse Readings from Last 3 Encounters:  
12/02/18 67  
11/28/18 86  
11/26/18 77 Wt Readings from Last 3 Encounters:  
12/01/18 150 lb 5.7 oz (68.2 kg) 11/28/18 149 lb 6.4 oz (67.8 kg)  
11/26/18 158 lb 7.2 oz (71.9 kg) General:  alert, cooperative, no distress Neck:  nontender, no JVD Lungs:  Mild rales at base. Heart:  irregularly irregular rhythm Abdomen:  abdomen is soft without significant tenderness, masses, organomegaly or guarding Extremities:  No edema Data Review:  
 
Recent Labs 12/02/18 
0523 12/01/18 
0330 11/30/18 
0430 WBC 6.1 6.9 7.8 HGB 17.6* 15.8 15.6 HCT 50.9* 46.7 46.1  200 210 Recent Labs 12/02/18 
0523 12/01/18 
0330 11/30/18 
0430  143 140  
K 3.7 3.9 4.3  106 107 CO2 26 26 23 GLU 81 74 70* BUN 11 13 17 CREA 0.88 0.95 1.10 CA 9.3 8.9 8.4* MG  --   --  1.7 PHOS  --   --  3.3 ALB  --   --  3.2* SGOT  --   --  22 ALT  --   --  30 INR 1.6* 1.7*  --   
 
 
Results for orders placed or performed during the hospital encounter of 11/29/18 EKG, 12 LEAD, INITIAL Result Value Ref Range Ventricular Rate 58 BPM  
 Atrial Rate 241 BPM  
 QRS Duration 84 ms Q-T Interval 424 ms QTC Calculation (Bezet) 416 ms Calculated R Axis 41 degrees Calculated T Axis 88 degrees Diagnosis Atrial fibrillation with slow ventricular response Abnormal ECG When compared with ECG of 27-NOV-2018 01:53, 
Vent. rate has decreased BY  64 BPM 
Confirmed by Jolene Blackwood ([id number]) on 11/29/2018 7:57:00 PM 
  
 
 
All Cardiac Markers in the last 24 hours:   
No results found for: CPK, CK, CKMMB, CKMB, RCK3, CKMBT, CKNDX, CKND1, MICKI, TROPT, TROIQ, UYEN, TROPT, TNIPOC, BNP, BNPP Last Lipid:   
Lab Results Component Value Date/Time Cholesterol, total 179 11/30/2018 04:30 AM  
 HDL Cholesterol 45 11/30/2018 04:30 AM  
 LDL, calculated 109.6 (H) 11/30/2018 04:30 AM  
 Triglyceride 122 11/30/2018 04:30 AM  
 CHOL/HDL Ratio 4.0 11/30/2018 04:30 AM  
 
 
Signed By: Alayna Cotton MD   
 December 2, 2018 Total critical care time spent in reviewing the case, multiple EMR databases, physician notes, procedure notes, examining the patient, and documentation, is > 45 minutes. Discussed in details with PATIENT at bedside. All questions were answered to their full satisfaction. Risk, benefit and alternatives were discussed. . >50% time spent in counseling and coordination of care.

## 2018-12-02 NOTE — PROGRESS NOTES
Internal Medicine Progress Note Patient's Name: Elvis Mosqueda. Admit Date: 11/29/2018 Length of Stay: 3 Assessment/Plan Active Hospital Problems Diagnosis Date Noted  HLD (hyperlipidemia) 11/29/2018  OD (overdose of drug), accidental or unintentional, initial encounter 11/29/2018  Cardiogenic shock ([facility] [address] 11/29/2018  SIRS (systemic inflammatory response syndrome) (HCC) 11/29/2018  Chronic systolic congestive heart failure ([facility] [address] 11/27/2018  
 HTN (hypertension) 11/04/2018  Atrial fibrillation ([facility] [address] 11/28/2017 Chronic, [id number] score 1 to 2 
  
 
- Cont lopressor, lasix and dig every other day 
- Cont to have uncontrolled HR at times - Plan is for Cardioversion evelia per cardio 
- Start heparin gtt for coumadin bridge - Trend INR 
- Cont tele monitor - BP in good range 
- Cont acceptable home medications for chronic conditions  
- DVT protocol I have personally reviewed all pertinent labs and films that have officially resulted over the last 24 hours. I have personally checked for all pending labs that are awaiting final results. Subjective Pt s/e @ bedside HR on tele still showing bouts of uncontrolled HR No complaints of SOB last night Denies CP Objective Visit Vitals /89 Pulse 67 Temp 98.3 °F (36.8 °C) Resp 18 Ht 5' 10\" (1.778 m) Wt 68.2 kg (150 lb 5.7 oz) SpO2 98% BMI 21.57 kg/m² Physical Exam: 
General Appearance: NAD, conversant Lungs: CTA with normal respiratory effort CV: IRIR, no m/r/g Abdomen: soft, non-tender, normal bowel sounds Extremities: no cyanosis, no peripheral edema Neuro: No focal deficits, motor/sensory intact Lab/Data Reviewed: 
BMP:  
Lab Results Component Value Date/Time   12/02/2018 05:23 AM  
 K 3.7 12/02/2018 05:23 AM  
  12/02/2018 05:23 AM  
 CO2 26 12/02/2018 05:23 AM  
 AGAP 10 12/02/2018 05:23 AM  
 GLU 81 12/02/2018 05:23 AM  
 BUN 11 12/02/2018 05:23 AM  
 CREA 0.88 12/02/2018 05:23 AM  
 GFRAA >60 12/02/2018 05:23 AM  
 GFRNA >60 12/02/2018 05:23 AM  
 
CBC:  
Lab Results Component Value Date/Time WBC 8.2 12/02/2018 10:30 AM  
 HGB 17.5 (H) 12/02/2018 10:30 AM  
 HCT 50.0 (H) 12/02/2018 10:30 AM  
  12/02/2018 10:30 AM  
 
 
Imaging Reviewed: 
No results found. Medications Reviewed: 
Current Facility-Administered Medications Medication Dose Route Frequency  warfarin (COUMADIN) tablet 5 mg  5 mg Oral QHS  heparin 25,000 units in D5W 250 ml infusion  18-36 Units/kg/hr IntraVENous TITRATE  metoprolol tartrate (LOPRESSOR) tablet 50 mg  50 mg Oral Q8H  
 furosemide (LASIX) tablet 20 mg  20 mg Oral EVERY OTHER DAY  dilTIAZem (CARDIZEM) IR tablet 60 mg  60 mg Oral Q12H  digoxin (LANOXIN) tablet 0.125 mg  0.125 mg Oral EVERY OTHER DAY  WARFARIN INFORMATION NOTE (COUMADIN)   Other PRN  
 sodium chloride (NS) flush 5-10 mL  5-10 mL IntraVENous PRN  
 acetaminophen (TYLENOL) tablet 650 mg  650 mg Oral Q6H PRN  
 ondansetron (ZOFRAN) injection 4 mg  4 mg IntraVENous Q6H PRN Tony Damon DO Internal Medicine, Hospitalist 
Pager: [phone] [facility]

## 2018-12-02 NOTE — PROGRESS NOTES
Problem: Falls - Risk of 
Goal: *Absence of Falls Document Ricoiffide Kapoor Fall Risk and appropriate interventions in the flowsheet. Fall Risk Interventions: 
  
 
  
 
Medication Interventions: Evaluate medications/consider consulting pharmacy, Patient to call before getting OOB, Teach patient to arise slowly History of Falls Interventions: Door open when patient unattended Problem: Pressure Injury - Risk of 
Goal: *Prevention of pressure injury Document Aiden Scale and appropriate interventions in the flowsheet. Outcome: Progressing Towards Goal 
Pressure Injury Interventions: Activity Interventions: Pressure redistribution bed/mattress(bed type) Nutrition Interventions: Discuss nutritional consult with provider

## 2018-12-02 NOTE — PROGRESS NOTES
1915  Received bedside verbal shift report from [facility]. Pt lying in bed resting comfortably. Afib on telemetry box # 12. Piv # 18  to right a/c and # 18 to right arm  Call bell within reach, side rails up x 3, bed low and locked. No complaints offered, pt instructed to call for assistance. 0720 Bedside and Verbal shift change report given to Syed BELL (oncoming nurse) by Anuradha Hendricks (offgoing nurse). Report included the following information SBAR, Kardex, Intake/Output, MAR, Recent Results and Cardiac Rhythm Afib.

## 2018-12-02 NOTE — ROUTINE PROCESS
Bedside and Verbal shift change report given to Carmen Weinstein RN (oncoming nurse) by Veronica Rowland RN (offgoing nurse). Report included the following information SBAR, Kardex and MAR.

## 2018-12-03 ENCOUNTER — ANESTHESIA EVENT (OUTPATIENT)
Dept: SURGERY | Age: 63
DRG: 917 | End: 2018-12-03
Payer: COMMERCIAL

## 2018-12-03 ENCOUNTER — APPOINTMENT (OUTPATIENT)
Dept: NON INVASIVE DIAGNOSTICS | Age: 63
DRG: 917 | End: 2018-12-03
Attending: INTERNAL MEDICINE
Payer: COMMERCIAL

## 2018-12-03 ENCOUNTER — ANESTHESIA (OUTPATIENT)
Dept: SURGERY | Age: 63
DRG: 917 | End: 2018-12-03
Payer: COMMERCIAL

## 2018-12-03 LAB
ANION GAP SERPL CALC-SCNC: 9 MMOL/L (ref 3–18)
APTT PPP: 80.8 SEC (ref 23–36.4)
APTT PPP: >180 SEC (ref 23–36.4)
ATRIAL RATE: 147 BPM
ATRIAL RATE: 69 BPM
BASOPHILS # BLD: 0 K/UL (ref 0–0.1)
BASOPHILS NFR BLD: 0 % (ref 0–2)
BUN SERPL-MCNC: 14 MG/DL (ref 7–18)
BUN/CREAT SERPL: 13 (ref 12–20)
CALCIUM SERPL-MCNC: 9.2 MG/DL (ref 8.5–10.1)
CALCULATED P AXIS, ECG09: 42 DEGREES
CALCULATED R AXIS, ECG10: -10 DEGREES
CALCULATED R AXIS, ECG10: -4 DEGREES
CALCULATED T AXIS, ECG11: 54 DEGREES
CALCULATED T AXIS, ECG11: 76 DEGREES
CHLORIDE SERPL-SCNC: 107 MMOL/L (ref 100–108)
CO2 SERPL-SCNC: 26 MMOL/L (ref 21–32)
CREAT SERPL-MCNC: 1.04 MG/DL (ref 0.6–1.3)
DIAGNOSIS, 93000: NORMAL
DIAGNOSIS, 93000: NORMAL
DIFFERENTIAL METHOD BLD: ABNORMAL
EOSINOPHIL # BLD: 0.1 K/UL (ref 0–0.4)
EOSINOPHIL NFR BLD: 2 % (ref 0–5)
ERYTHROCYTE [DISTWIDTH] IN BLOOD BY AUTOMATED COUNT: 13 % (ref 11.6–14.5)
GLUCOSE SERPL-MCNC: 95 MG/DL (ref 74–99)
HCT VFR BLD AUTO: 49.4 % (ref 36–48)
HGB BLD-MCNC: 17.4 G/DL (ref 13–16)
INR PPP: 1.6 (ref 0.8–1.2)
LYMPHOCYTES # BLD: 1.7 K/UL (ref 0.9–3.6)
LYMPHOCYTES NFR BLD: 26 % (ref 21–52)
MCH RBC QN AUTO: 33 PG (ref 24–34)
MCHC RBC AUTO-ENTMCNC: 35.2 G/DL (ref 31–37)
MCV RBC AUTO: 93.7 FL (ref 74–97)
MONOCYTES # BLD: 0.8 K/UL (ref 0.05–1.2)
MONOCYTES NFR BLD: 12 % (ref 3–10)
NEUTS SEG # BLD: 4.1 K/UL (ref 1.8–8)
NEUTS SEG NFR BLD: 60 % (ref 40–73)
P-R INTERVAL, ECG05: 170 MS
PLATELET # BLD AUTO: 204 K/UL (ref 135–420)
PMV BLD AUTO: 11.6 FL (ref 9.2–11.8)
POTASSIUM SERPL-SCNC: 3.9 MMOL/L (ref 3.5–5.5)
PROTHROMBIN TIME: 18.9 SEC (ref 11.5–15.2)
Q-T INTERVAL, ECG07: 310 MS
Q-T INTERVAL, ECG07: 374 MS
QRS DURATION, ECG06: 80 MS
QRS DURATION, ECG06: 80 MS
QTC CALCULATION (BEZET), ECG08: 400 MS
QTC CALCULATION (BEZET), ECG08: 428 MS
RBC # BLD AUTO: 5.27 M/UL (ref 4.7–5.5)
SODIUM SERPL-SCNC: 142 MMOL/L (ref 136–145)
VENTRICULAR RATE, ECG03: 115 BPM
VENTRICULAR RATE, ECG03: 69 BPM
WBC # BLD AUTO: 6.8 K/UL (ref 4.6–13.2)

## 2018-12-03 PROCEDURE — 74011250636 HC RX REV CODE- 250/636

## 2018-12-03 PROCEDURE — 74011250637 HC RX REV CODE- 250/637: Performed by: INTERNAL MEDICINE

## 2018-12-03 PROCEDURE — 85025 COMPLETE CBC W/AUTO DIFF WBC: CPT

## 2018-12-03 PROCEDURE — 85730 THROMBOPLASTIN TIME PARTIAL: CPT

## 2018-12-03 PROCEDURE — 65660000000 HC RM CCU STEPDOWN

## 2018-12-03 PROCEDURE — 74011250636 HC RX REV CODE- 250/636: Performed by: PHYSICIAN ASSISTANT

## 2018-12-03 PROCEDURE — 77010033678 HC OXYGEN DAILY

## 2018-12-03 PROCEDURE — C8925 2D TEE W OR W/O FOL W/CON,IN: HCPCS

## 2018-12-03 PROCEDURE — 74011000258 HC RX REV CODE- 258

## 2018-12-03 PROCEDURE — 80048 BASIC METABOLIC PNL TOTAL CA: CPT

## 2018-12-03 PROCEDURE — B246ZZ4 ULTRASONOGRAPHY OF RIGHT AND LEFT HEART, TRANSESOPHAGEAL: ICD-10-PCS | Performed by: INTERNAL MEDICINE

## 2018-12-03 PROCEDURE — 74011250636 HC RX REV CODE- 250/636: Performed by: HOSPITALIST

## 2018-12-03 PROCEDURE — 92960 CARDIOVERSION ELECTRIC EXT: CPT | Performed by: INTERNAL MEDICINE

## 2018-12-03 PROCEDURE — 5A2204Z RESTORATION OF CARDIAC RHYTHM, SINGLE: ICD-10-PCS | Performed by: INTERNAL MEDICINE

## 2018-12-03 PROCEDURE — 93005 ELECTROCARDIOGRAM TRACING: CPT

## 2018-12-03 PROCEDURE — 85610 PROTHROMBIN TIME: CPT

## 2018-12-03 PROCEDURE — 36415 COLL VENOUS BLD VENIPUNCTURE: CPT

## 2018-12-03 PROCEDURE — 76060000032 HC ANESTHESIA 0.5 TO 1 HR: Performed by: INTERNAL MEDICINE

## 2018-12-03 PROCEDURE — 92960 CARDIOVERSION ELECTRIC EXT: CPT

## 2018-12-03 RX ORDER — PROPOFOL 10 MG/ML
INJECTION, EMULSION INTRAVENOUS AS NEEDED
Status: DISCONTINUED | OUTPATIENT
Start: 2018-12-03 | End: 2018-12-03 | Stop reason: HOSPADM

## 2018-12-03 RX ORDER — SODIUM CHLORIDE 9 MG/ML
INJECTION, SOLUTION INTRAVENOUS
Status: DISCONTINUED | OUTPATIENT
Start: 2018-12-03 | End: 2018-12-03 | Stop reason: HOSPADM

## 2018-12-03 RX ADMIN — HEPARIN SODIUM AND DEXTROSE 18 UNITS/KG/HR: 10000; 5 INJECTION INTRAVENOUS at 05:11

## 2018-12-03 RX ADMIN — METOPROLOL TARTRATE 50 MG: 50 TABLET ORAL at 21:10

## 2018-12-03 RX ADMIN — METOPROLOL TARTRATE 50 MG: 50 TABLET ORAL at 14:07

## 2018-12-03 RX ADMIN — FUROSEMIDE 20 MG: 20 TABLET ORAL at 12:23

## 2018-12-03 RX ADMIN — PROPOFOL 20 MG: 10 INJECTION, EMULSION INTRAVENOUS at 10:38

## 2018-12-03 RX ADMIN — DILTIAZEM HYDROCHLORIDE 60 MG: 60 TABLET, FILM COATED ORAL at 08:10

## 2018-12-03 RX ADMIN — SODIUM CHLORIDE: 9 INJECTION, SOLUTION INTRAVENOUS at 10:26

## 2018-12-03 RX ADMIN — AMIODARONE HYDROCHLORIDE 0.5 MG/MIN: 1.8 INJECTION, SOLUTION INTRAVENOUS at 21:11

## 2018-12-03 RX ADMIN — PROPOFOL 10 MG: 10 INJECTION, EMULSION INTRAVENOUS at 10:52

## 2018-12-03 RX ADMIN — AMIODARONE HYDROCHLORIDE 0.5 MG/MIN: 1.8 INJECTION, SOLUTION INTRAVENOUS at 10:49

## 2018-12-03 RX ADMIN — DIGOXIN 0.12 MG: 125 TABLET ORAL at 12:23

## 2018-12-03 RX ADMIN — PROPOFOL 10 MG: 10 INJECTION, EMULSION INTRAVENOUS at 10:40

## 2018-12-03 RX ADMIN — PROPOFOL 10 MG: 10 INJECTION, EMULSION INTRAVENOUS at 10:57

## 2018-12-03 RX ADMIN — PROPOFOL 20 MG: 10 INJECTION, EMULSION INTRAVENOUS at 10:55

## 2018-12-03 RX ADMIN — PROPOFOL 100 MG: 10 INJECTION, EMULSION INTRAVENOUS at 10:36

## 2018-12-03 RX ADMIN — WARFARIN SODIUM 5 MG: 5 TABLET ORAL at 21:10

## 2018-12-03 RX ADMIN — PROPOFOL 10 MG: 10 INJECTION, EMULSION INTRAVENOUS at 10:44

## 2018-12-03 NOTE — PROGRESS NOTES
Problem: Falls - Risk of 
Goal: *Absence of Falls Document Tavia Richardson Fall Risk and appropriate interventions in the flowsheet. Outcome: Progressing Towards Goal 
Fall Risk Interventions: 
  
 
  
 
Medication Interventions: Patient to call before getting OOB History of Falls Interventions: Door open when patient unattended Problem: Pressure Injury - Risk of 
Goal: *Prevention of pressure injury Document Aiden Scale and appropriate interventions in the flowsheet. Outcome: Progressing Towards Goal 
Pressure Injury Interventions: Activity Interventions: Pressure redistribution bed/mattress(bed type) Nutrition Interventions: Discuss nutritional consult with provider

## 2018-12-03 NOTE — PROGRESS NOTES
1912  Received bedside verbal shift report from Syed BELL. Pt lying in bed resting comfortably. Afib on telemetry box # 12. Piv # 18  to right a/c and # 18 to right arm  Call bell within reach, side rails up x 3, bed low and locked. No complaints offered, pt instructed to call for assistance. 3387  Reassessment completed. 1350 reassessment completed. 
  
0720 Bedside and Verbal shift change report given to Daniel Hansen (oncoming nurse) by Josiah White (offgoing nurse). Report included the following information SBAR, Kardex, Intake/Output, MAR, Recent Results and Cardiac Rhythm Afib

## 2018-12-03 NOTE — PROGRESS NOTES
0730:  Bedside and Verbal shift change report given to Safia Joaquin RN (oncoming nurse) by Adalgisa Peña RN (offgoing nurse). Report included the following information SBAR, Kardex, MAR and Recent Results. 3749:  Administered due medications. 1156:  Heparin gtt started 1410:  Administered due medications 1748:  PTT therapeutic, no rate change, verified with Siena Krishna RN 
 
1930:  Bedside and Verbal shift change report given to Adalgisa Peña RN (oncoming nurse) by Safia Joaquin RN (offgoing nurse). Report included the following information SBAR, Kardex, MAR and Recent Results.

## 2018-12-03 NOTE — PROGRESS NOTES
[facility] - Progress Note Admit Date: 11/29/2018 I saw, evaluated, interviewed and examined the patient personally. I agree with the findings and plan of care as documented below with PAVEL note HR again went very high overnight Very difficult to control Plan for CAITLIN and possible cardioversion. Discussed with sister as well. Complications discussed again today Will start IV amiodarone infusion Arik Gonzales MD 
 
 
Assessment: - Hypotension. Resolved. Likely from meds. - Recent hospital admission for CHF, afib and adjustment of antihypertensive regimen with new severe cardiomyopathy - Atrial fibrillation with RVR, CHADS2-Vasc score 2 (CHF, HTN) - Echo 11/27/18 with EF <15%, global hypokinesis 
- Echocardiogram 04/16/18 with an EF of 45%, mild diffuse hypokinesis 
- Nuclear stress test 04/17/18 with no evidence of ongoing ischemia, EF calculated at 38% 
- HTN 
- Hyperlipidemia - Hypokalemia 
- Hx nontoxic multinodular goiter 
  
Plan: - For CAITLIN/Cardioversion today. Risks, benefits and alternatives were explained in detail, consent signed and placed in chart, all questions answered. - Rate still difficult to control and currently on calcium channel blockers and beta blockers, attempting rhythm control strategy with cardioversion.  
- Amiodarone gtt infusing in hopes to maintain sinus rhythm with cardioversion 
- Heparin bridge for planned procedure today. Subjective:  
 
Palpitations this AM. Objective:  
  
Patient Vitals for the past 8 hrs: 
 Temp Pulse Resp BP SpO2  
12/03/18 0507 98.6 °F (37 °C) (!) 121 18 128/80 99 % Patient Vitals for the past 96 hrs: 
 Weight 12/01/18 1845 150 lb 5.7 oz (68.2 kg)  
11/30/18 0800 159 lb (72.1 kg) Intake/Output Summary (Last 24 hours) at 12/3/2018 1010 Last data filed at 12/3/2018 4562 Gross per 24 hour Intake 834.32 ml Output 850 ml Net -15.68 ml Physical Exam: General:  alert, cooperative, no distress Neck:  nontender, no JVD Lungs:  clear to auscultation bilaterally Heart:  irregularly irregular rhythm Abdomen:  abdomen is soft without significant tenderness, masses, organomegaly or guarding Extremities:  extremities normal, atraumatic, no cyanosis or edema Data Review:  
 
Labs: Results:  
   
Chemistry Recent Labs 12/03/18 
0600 12/02/18 
0523 12/01/18 
0330 GLU 95 81 74  140 143  
K 3.9 3.7 3.9  104 106 CO2 26 26 26 BUN 14 11 13 CREA 1.04 0.88 0.95 CA 9.2 9.3 8.9 AGAP 9 10 11 BUCR 13 13 14 CBC w/Diff Recent Labs 12/03/18 
0600 12/02/18 56 12/02/18 
0523 WBC 6.8 8.2 6.1  
RBC 5.27 5.33 5.42  
HGB 17.4* 17.5* 17.6* HCT 49.4* 50.0* 50.9*  
 215 208 GRANS 60 63 60  
LYMPH 26 20* 20* EOS 2 1 1 Cardiac Enzymes No results found for: CPK, CK, CKMMB, CKMB, RCK3, CKMBT, CKNDX, CKND1, MICKI, TROPT, TROIQ, UYEN, TROPT, TNIPOC, BNP, BNPP Coagulation Recent Labs 12/03/18 
0600 12/02/18 
1623  12/02/18 
9924 PTP 18.9*  --   --  18.6* INR 1.6*  --   --  1.6* APTT >180.0* 100.0*   < >  --   
 < > = values in this interval not displayed. Lipid Panel Lab Results Component Value Date/Time Cholesterol, total 179 11/30/2018 04:30 AM  
 HDL Cholesterol 45 11/30/2018 04:30 AM  
 LDL, calculated 109.6 (H) 11/30/2018 04:30 AM  
 VLDL, calculated 24.4 11/30/2018 04:30 AM  
 Triglyceride 122 11/30/2018 04:30 AM  
 CHOL/HDL Ratio 4.0 11/30/2018 04:30 AM  
  
BNP No results found for: BNP, BNPP, XBNPT Liver Enzymes No results for input(s): TP, ALB, TBIL, AP, SGOT, GPT in the last 72 hours. No lab exists for component: DBIL Digoxin Thyroid Studies Lab Results Component Value Date/Time TSH 0.46 10/25/2018 08:38 AM  
    
 
Signed By: Oneida Rahman. Grisel Saba PA-C December 3, 2018

## 2018-12-03 NOTE — ANESTHESIA PREPROCEDURE EVALUATION
Anesthetic History No history of anesthetic complications Review of Systems / Medical History Patient summary reviewed, nursing notes reviewed and pertinent labs reviewed Pulmonary Within defined limits Neuro/Psych Within defined limits Cardiovascular Hypertension: well controlled Dysrhythmias : atrial fibrillation Exercise tolerance: >4 METS 
  
GI/Hepatic/Renal 
Within defined limits Endo/Other Hypothyroidism Arthritis Other Findings Physical Exam 
 
Airway Mallampati: II 
TM Distance: 4 - 6 cm Neck ROM: normal range of motion Mouth opening: Normal 
 
 Cardiovascular Rhythm: irregular Rate: abnormal 
 
 
 
 Dental 
 
Dentition: Poor dentition Pulmonary Breath sounds clear to auscultation Abdominal 
GI exam deferred Other Findings Anesthetic Plan ASA: 3 Anesthesia type: MAC Induction: Intravenous Anesthetic plan and risks discussed with: Patient

## 2018-12-03 NOTE — PERIOP NOTES
TRANSFER - OUT REPORT: 
 
Verbal report given to virgen rn(name) on [facility].  being transferred to 3012(unit) for routine post - op Report consisted of patients Situation, Background, Assessment and  
Recommendations(SBAR). Information from the following report(s) SBAR, OR Summary, Procedure Summary and MAR was reviewed with the receiving nurse. Lines:  
Peripheral IV 11/29/18 Right Antecubital (Active) Site Assessment Clean, dry, & intact 12/3/2018  8:12 AM  
Phlebitis Assessment 0 12/3/2018  8:12 AM  
Infiltration Assessment 0 12/3/2018  8:12 AM  
Dressing Status Clean, dry, & intact 12/3/2018  8:12 AM  
Dressing Type Tape;Transparent 12/3/2018  8:12 AM  
Hub Color/Line Status Green; Infusing;Flushed 12/3/2018  8:12 AM  
Action Taken Open ports on tubing capped 12/2/2018  8:52 PM  
Alcohol Cap Used Yes 12/3/2018  8:12 AM  
   
Peripheral IV 12/03/18 Anterior; Inferior; Lower;Right Arm (Active) Site Assessment Clean, dry, & intact 12/3/2018  8:12 AM  
Phlebitis Assessment 0 12/3/2018  8:12 AM  
Infiltration Assessment 0 12/3/2018  8:12 AM  
Dressing Status Clean, dry, & intact 12/3/2018  8:12 AM  
Dressing Type Tape;Transparent 12/3/2018  8:12 AM  
Hub Color/Line Status Blue;Capped;Flushed 12/3/2018  8:12 AM  
Alcohol Cap Used Yes 12/3/2018  8:12 AM  
  
 
Opportunity for questions and clarification was provided. Patient transported with: 
 Monitor Registered Nurse

## 2018-12-03 NOTE — ANESTHESIA POSTPROCEDURE EVALUATION
Procedure(s): 
yu cardioversion. Anesthesia Post Evaluation Multimodal analgesia: multimodal analgesia used between 6 hours prior to anesthesia start to PACU discharge Patient location during evaluation: bedside Patient participation: complete - patient participated Level of consciousness: awake Pain management: adequate Airway patency: patent Anesthetic complications: no 
Cardiovascular status: acceptable Respiratory status: acceptable Hydration status: acceptable Visit Vitals /79 Pulse 72 Temp 36.3 °C (97.3 °F) Resp 19 Ht 5' 10\" (1.778 m) Wt 68 kg (150 lb) SpO2 100% BMI 21.52 kg/m²

## 2018-12-03 NOTE — ROUTINE PROCESS
Bedside and Verbal shift change report given to Chayo Weber (oncoming nurse) by Duffy Babinski, RN (offgoing nurse). Report included the following information SBAR, Kardex, Intake/Output and MAR.

## 2018-12-03 NOTE — PROGRESS NOTES
Progress Note Patient: Francisca Murphy. Sex: male          DOA: 11/29/2018 YOB: 1955      Age:  61 y.o.        LOS:  LOS: 4 days CHIEF COMPLAINT:  A fib with severe cardiomyopathy Subjective:  
 
Patient awake and talking No SOB Objective:  
  
Visit Vitals /80 Pulse (!) 121 Temp 98.6 °F (37 °C) Resp 18 Ht 5' 10\" (1.778 m) Wt 68.2 kg (150 lb 5.7 oz) SpO2 99% BMI 21.57 kg/m² Physical Exam: 
Gen:  Patient is in no distress. No complaint HEENT and Neck:  PERRL, No cervical tenderness or masses Lungs:  Clear bilaterally. No rales, no wheeze. Normal effort. Heart:  Regular Rate and Rhythm. No murmur or gallop. No JVD. No edema. Abdomen:  Soft, non tender, no masses, no Hepatosplenomegally Extremities:  No digital clubbing, no cyanosis Neuro:  Alert and oriented, Normal affect, Cranial nerves intact, No sensory deficits Lab/Data Reviewed: All lab results for the last 24 hours reviewed. Assessment/Plan Principal Problem: 
  Cardiogenic shock ([address] (11/29/2018) Active Problems: 
  Chronic systolic congestive heart failure ([address] (11/27/2018) HTN (hypertension) (11/4/2018) Atrial fibrillation ([address] (11/28/2017) Overview: Chronic, [id number] score 1 to 2 HLD (hyperlipidemia) (11/29/2018) OD (overdose of drug), accidental or unintentional, initial encounter (11/29/2018) SIRS (systemic inflammatory response syndrome) ([address] (11/29/2018) Plan: For CAITLIN with Cardioversion today Rhythm control Follow HR Continue with diuresis Follow respiratory status. DVT prophylaxis.

## 2018-12-03 NOTE — PROCEDURES
Indication:   A.fib , uncrontrolled    Procedure:  - CAITLIN ( please see separate report)  - External cardioversion under moderate sedation    Detail:  Informed consent obtained. Complication assocaited with CAITLIN and possible cardioversion discussed in detail including but not limited to emergent thoracic surgery, pacemaker need, cardiac arrest and stroke. .. Under moderate sedation CAITLIN performed. External cardioversion performed using pads using 100 J of biphasic synchronized energy.   Successfully converted back to NSR from A.fib    Complication:  No immediate complication    Recommendation:  DC Oral cardizem  Continue oral BB  Continue IV heparin till INR > 2.0  IV amiodarone for 24 hours and then switch to oral.

## 2018-12-03 NOTE — INTERDISCIPLINARY ROUNDS
IDR Summary Patient: Pretty Oropeza MRN: [id number]    Age: 61 y.o.  : 1955 Admit Diagnosis: Sepsis ([address] DIET status:  Cardiac Lines/Tubes:  
IV: YES   Needed: YES Cooper: NO  Needed:NO Central Line: NO Needed: NO 
 
 
VTE Prophylaxis: Chemical 
 
Mobility needs: No  
 
 
 
Disposition/Care Management: 
Discharge plan: TBD Barriers to discharge:  
Financial concerns:No  
PCP: Nevin Turner, DO : NO Interventions: HR better LOS: 4 days Expected days until discharge: TBD Signed:  
 
[facility]ERGIO Dodge [facility] Pager:  [phone] Office:  [phone]

## 2018-12-03 NOTE — ROUTINE PROCESS
Bedside and Verbal shift change report given to Chano Irvin (oncoming nurse) by Lucero Calderon RN (offgoing nurse). Report included the following information SBAR, Kardex, Intake/Output, MAR, Recent Results and Cardiac Rhythm SR.  
 
1615 Pt resting in bed with no complaints of pain and no change to condition. Will continue to monitor. 1815 Pt took a walk in the halls. Heart rate maintaining in the 80's with ambulation. Will continue to monitor. Bedside and Verbal shift change report given to [facility]. (oncoming nurse) by Chano Irvin (offgoing nurse).  Report included the following information SBAR, Kardex, Intake/Output, MAR, Recent Results and Cardiac Rhythm SR.

## 2018-12-04 ENCOUNTER — HOME HEALTH ADMISSION (OUTPATIENT)
Dept: HOME HEALTH SERVICES | Facility: HOME HEALTH | Age: 63
End: 2018-12-04

## 2018-12-04 VITALS
WEIGHT: 155 LBS | OXYGEN SATURATION: 100 % | DIASTOLIC BLOOD PRESSURE: 77 MMHG | RESPIRATION RATE: 18 BRPM | HEART RATE: 82 BPM | BODY MASS INDEX: 22.19 KG/M2 | HEIGHT: 70 IN | TEMPERATURE: 97.4 F | SYSTOLIC BLOOD PRESSURE: 120 MMHG

## 2018-12-04 LAB
APTT PPP: 113.3 SEC (ref 23–36.4)
BASOPHILS # BLD: 0 K/UL (ref 0–0.1)
BASOPHILS NFR BLD: 1 % (ref 0–2)
DIFFERENTIAL METHOD BLD: ABNORMAL
EOSINOPHIL # BLD: 0.1 K/UL (ref 0–0.4)
EOSINOPHIL NFR BLD: 1 % (ref 0–5)
ERYTHROCYTE [DISTWIDTH] IN BLOOD BY AUTOMATED COUNT: 12.8 % (ref 11.6–14.5)
HCT VFR BLD AUTO: 49.3 % (ref 36–48)
HGB BLD-MCNC: 16.6 G/DL (ref 13–16)
INR PPP: 1.7 (ref 0.8–1.2)
LYMPHOCYTES # BLD: 1.9 K/UL (ref 0.9–3.6)
LYMPHOCYTES NFR BLD: 29 % (ref 21–52)
MCH RBC QN AUTO: 31.8 PG (ref 24–34)
MCHC RBC AUTO-ENTMCNC: 33.7 G/DL (ref 31–37)
MCV RBC AUTO: 94.4 FL (ref 74–97)
MONOCYTES # BLD: 0.8 K/UL (ref 0.05–1.2)
MONOCYTES NFR BLD: 13 % (ref 3–10)
NEUTS SEG # BLD: 3.8 K/UL (ref 1.8–8)
NEUTS SEG NFR BLD: 56 % (ref 40–73)
PLATELET # BLD AUTO: 213 K/UL (ref 135–420)
PMV BLD AUTO: 12 FL (ref 9.2–11.8)
PROTHROMBIN TIME: 19.8 SEC (ref 11.5–15.2)
RBC # BLD AUTO: 5.22 M/UL (ref 4.7–5.5)
WBC # BLD AUTO: 6.6 K/UL (ref 4.6–13.2)

## 2018-12-04 PROCEDURE — 85610 PROTHROMBIN TIME: CPT

## 2018-12-04 PROCEDURE — 74011250637 HC RX REV CODE- 250/637: Performed by: PHYSICIAN ASSISTANT

## 2018-12-04 PROCEDURE — 85730 THROMBOPLASTIN TIME PARTIAL: CPT

## 2018-12-04 PROCEDURE — 85025 COMPLETE CBC W/AUTO DIFF WBC: CPT

## 2018-12-04 PROCEDURE — 74011250636 HC RX REV CODE- 250/636: Performed by: HOSPITALIST

## 2018-12-04 PROCEDURE — 74011250637 HC RX REV CODE- 250/637: Performed by: INTERNAL MEDICINE

## 2018-12-04 PROCEDURE — 74011250636 HC RX REV CODE- 250/636: Performed by: PHYSICIAN ASSISTANT

## 2018-12-04 PROCEDURE — 36415 COLL VENOUS BLD VENIPUNCTURE: CPT

## 2018-12-04 RX ORDER — AMIODARONE HYDROCHLORIDE 200 MG/1
200 TABLET ORAL 2 TIMES DAILY
Qty: 60 TAB | Refills: 0 | Status: SHIPPED | OUTPATIENT
Start: 2018-12-04 | End: 2019-01-08

## 2018-12-04 RX ORDER — FUROSEMIDE 20 MG/1
TABLET ORAL
Qty: 15 TAB | Refills: 0 | Status: SHIPPED | OUTPATIENT
Start: 2018-12-05 | End: 2018-12-13 | Stop reason: SDUPTHER

## 2018-12-04 RX ORDER — METOPROLOL TARTRATE 75 MG/1
75 TABLET, FILM COATED ORAL 2 TIMES DAILY
Qty: 60 TAB | Refills: 0 | Status: SHIPPED | OUTPATIENT
Start: 2018-12-04 | End: 2018-12-31 | Stop reason: SDUPTHER

## 2018-12-04 RX ORDER — AMIODARONE HYDROCHLORIDE 200 MG/1
200 TABLET ORAL 2 TIMES DAILY
Status: DISCONTINUED | OUTPATIENT
Start: 2018-12-04 | End: 2018-12-04 | Stop reason: HOSPADM

## 2018-12-04 RX ADMIN — HEPARIN SODIUM AND DEXTROSE 13 UNITS/KG/HR: 10000; 5 INJECTION INTRAVENOUS at 11:02

## 2018-12-04 RX ADMIN — RIVAROXABAN 20 MG: 20 TABLET, FILM COATED ORAL at 13:51

## 2018-12-04 RX ADMIN — AMIODARONE HYDROCHLORIDE 200 MG: 200 TABLET ORAL at 13:51

## 2018-12-04 RX ADMIN — METOPROLOL TARTRATE 50 MG: 50 TABLET ORAL at 05:32

## 2018-12-04 RX ADMIN — AMIODARONE HYDROCHLORIDE 0.5 MG/MIN: 1.8 INJECTION, SOLUTION INTRAVENOUS at 11:03

## 2018-12-04 NOTE — PROGRESS NOTES
Pt to discharge to home today with home health, he is aware & agreeable. Available as needed. Naomy WinslowRN,ext [phone]. Care Management Interventions PCP Verified by CM: Yes 
Palliative Care Criteria Met (RRAT>21 & CHF Dx)?: No 
Mode of Transport at Discharge: Other (see comment)(family) Transition of Care Consult (CM Consult): Home Health [address] [place] [address] Road: Yes MyChart Signup: No 
Discharge Durable Medical Equipment: No 
Health Maintenance Reviewed: Yes Physical Therapy Consult: No 
Occupational Therapy Consult: No 
Speech Therapy Consult: No 
Current Support Network: Other(lives with roomate) Confirm Follow Up Transport: Family Plan discussed with Pt/Family/Caregiver: Yes Freedom of Choice Offered: Yes [employer] Information Provided?: No 
Discharge Location Discharge Placement: Home with home health

## 2018-12-04 NOTE — PROGRESS NOTES
Offered the pt FOC for home care, he chose [facility]. FOC form placed in the unit chart; copy given to the pt. Referral sent to intake via [facility] and called to intake Tammy machuca. Care Management Interventions PCP Verified by CM: Yes 
Palliative Care Criteria Met (RRAT>21 & CHF Dx)?: No 
Mode of Transport at Discharge: Other (see comment)(family) Transition of Care Consult (CM Consult): Home Health [address]: Yes MyChart Signup: No 
Discharge Durable Medical Equipment: No 
Health Maintenance Reviewed: Yes Physical Therapy Consult: No 
Occupational Therapy Consult: No 
Speech Therapy Consult: No 
Current Support Network: Other(lives with roomate) Confirm Follow Up Transport: Family Plan discussed with Pt/Family/Caregiver: Yes Freedom of Choice Offered: Yes [employer] Information Provided?: No 
Discharge Location Discharge Placement: Home with home health

## 2018-12-04 NOTE — PROGRESS NOTES
[facility] - Progress Note Admit Date: 11/29/2018 Patient seen and examined independently. Plan to switch to Xarelto from coumadin. Will transition to po Amiodarone. Agree with assessment and plan as noted below. Shadia Quinonez MD 
Assessment: - Hypotension. Resolved. Likely from meds. - S/p CAITLIN/Cardioversion with conversion to sinus rhythm 
- Recent hospital admission for CHF, afib and adjustment of antihypertensive regimen with new severe cardiomyopathy - Atrial fibrillation with RVR, CHADS2-Vasc score 2 (CHF, HTN) - Echo 11/27/18 with EF <15%, global hypokinesis 
- Echocardiogram 04/16/18 with an EF of 45%, mild diffuse hypokinesis 
- Nuclear stress test 04/17/18 with no evidence of ongoing ischemia, EF calculated at 38% 
- HTN 
- Hyperlipidemia 
- Hx nontoxic multinodular goiter 
  
Plan:  
 
- Stopping Amiodarone gtt and starting PO Amiodarone - Discussed with patient risks and benefits of newer anticoagulant agents instead of Coumadin. He understood and agrees to start Xarelto, discussed with Care Management. - Continue with Digoxin, changing Metoprolol Tartrate three times daily to 75 mg BID 
- Needs follow up with office next week Subjective: No new complaints. Feels breathing is better. Objective:  
  
Patient Vitals for the past 8 hrs: 
 Temp Pulse Resp BP SpO2  
12/04/18 1148 97.4 °F (36.3 °C) 72 18 115/85 100 % 12/04/18 0738 97.9 °F (36.6 °C) 72 18 113/84 98 % Patient Vitals for the past 96 hrs: 
 Weight 12/04/18 0415 155 lb (70.3 kg) 12/03/18 1109 150 lb (68 kg) 12/01/18 1845 150 lb 5.7 oz (68.2 kg) Intake/Output Summary (Last 24 hours) at 12/4/2018 1223 Last data filed at 12/4/2018 0800 Gross per 24 hour Intake 1353.01 ml Output 400 ml Net 953.01 ml Physical Exam: 
General:  alert, cooperative, no distress Neck:  nontender, no JVD Lungs:  clear to auscultation bilaterally Heart:  irregularly irregular rhythm Abdomen:  abdomen is soft without significant tenderness, masses, organomegaly or guarding Extremities:  extremities normal, atraumatic, no cyanosis or edema Data Review:  
 
Labs: Results:  
   
Chemistry Recent Labs 12/03/18 
0600 12/02/18 
0523 GLU 95 81  140  
K 3.9 3.7  104 CO2 26 26 BUN 14 11 CREA 1.04 0.88 CA 9.2 9.3 AGAP 9 10 BUCR 13 13 CBC w/Diff Recent Labs 12/04/18 
0529 12/03/18 
0600 12/02/18 [address] WBC 6.6 6.8 8.2  
RBC 5.22 5.27 5.33  
HGB 16.6* 17.4* 17.5* HCT 49.3* 49.4* 50.0*  
 204 215 GRANS 56 60 63 LYMPH 29 26 20* EOS 1 2 1 Cardiac Enzymes No results found for: CPK, CK, CKMMB, CKMB, RCK3, CKMBT, CKNDX, CKND1, MICKI, TROPT, TROIQ, UYEN, TROPT, TNIPOC, BNP, BNPP Coagulation Recent Labs 12/04/18 
0529 12/03/18 
1205 12/03/18 
0600 PTP 19.8*  --  18.9* INR 1.7*  --  1.6* APTT 113.3* 80.8* >180.0* Lipid Panel Lab Results Component Value Date/Time Cholesterol, total 179 11/30/2018 04:30 AM  
 HDL Cholesterol 45 11/30/2018 04:30 AM  
 LDL, calculated 109.6 (H) 11/30/2018 04:30 AM  
 VLDL, calculated 24.4 11/30/2018 04:30 AM  
 Triglyceride 122 11/30/2018 04:30 AM  
 CHOL/HDL Ratio 4.0 11/30/2018 04:30 AM  
  
BNP No results found for: BNP, BNPP, XBNPT Liver Enzymes No results for input(s): TP, ALB, TBIL, AP, SGOT, GPT in the last 72 hours. No lab exists for component: DBIL Digoxin Thyroid Studies Lab Results Component Value Date/Time TSH 0.46 10/25/2018 08:38 AM  
    
 
Signed By: Benton Blanton. Tanya Tamayo PA-C December 4, 2018

## 2018-12-04 NOTE — MANAGEMENT PLAN
Discharge/Transition Planning CM spoke with Dr Kamila Chowdary and Cardiac team. They are holding off on Entresto. Pt will discharge on Xarelto. CM will provide with trial offer cards. Cielo Arias RN BSN Outcomes Manager Pager # [phone]

## 2018-12-04 NOTE — ROUTINE PROCESS
1545 - Bedside, Verbal and Written shift change report given to Misbah Barnard RN (oncoming nurse) by Yesi Martin RN (offgoing nurse). Report included the following information SBAR, Kardex, Intake/Output, MAR, Recent Results, Med Rec Status, Procedure Summary and Cardiac Rhythm NSR. Shift re-assessment completed. Pt alert and oriented x4. No respiratory distress noted. No c/o pain reported. Call bell within reach, bed in low position. Will continue to monitor. 1610 - I have reviewed discharge instructions with the patient. The patient verbalized understanding. Discharge medications reviewed with patient and appropriate educational materials and side effects teaching were provided. IV catheter discontinued intact. Site without signs and symptoms of complications. Dressing and pressure applied. Patient armband removed and shredded. 26 - Pt discharged to home with family. All pt belongings went with him.

## 2018-12-04 NOTE — PROGRESS NOTES
Problem: Falls - Risk of 
Goal: *Absence of Falls Document April Bernard Fall Risk and appropriate interventions in the flowsheet. Outcome: Progressing Towards Goal 
Fall Risk Interventions: 
  
 
  
 
Medication Interventions: Patient to call before getting OOB, Teach patient to arise slowly History of Falls Interventions: Door open when patient unattended, Room close to nurse's station Problem: Pressure Injury - Risk of 
Goal: *Prevention of pressure injury Document Aiden Scale and appropriate interventions in the flowsheet. Outcome: Progressing Towards Goal 
Pressure Injury Interventions: Activity Interventions: Increase time out of bed, Pressure redistribution bed/mattress(bed type) Nutrition Interventions: Document food/fluid/supplement intake

## 2018-12-04 NOTE — ROUTINE PROCESS
Bedside and Verbal shift change report given to [address] [place] [address] (oncoming nurse) by Rafael Lilly (offgoing nurse). Report included the following information SBAR, Kardex, Intake/Output, MAR, Recent Results and Cardiac Rhythm SR  
 
0945 Pt resting in bed with no complaints of pain and no change to condition. Will continue to monitor. 1230 Pt resting in bed with no complaints of pain and no change to condition. Pt ambulating in fuller. Heart rate steady. Will continue to monitor. Bedside and Verbal shift change report given to [address] [place] [address] (oncoming nurse) by [address] [place] [address] (offgoing nurse).  Report included the following information SBAR, Kardex, Intake/Output, MAR, Recent Results and Cardiac Rhythm SR.

## 2018-12-04 NOTE — DISCHARGE INSTRUCTIONS
Patient armband removed and shredded. DISCHARGE SUMMARY from Nurse    PATIENT INSTRUCTIONS:    What to do at Home:  Recommended activity: Activity as tolerated. If you experience any of the following symptoms shortness of breath, difficulty breathing, nausea, vomiting, change in mental status, temperature greater than 100.5 please follow up with your PCP or call 911. *  Please give a list of your current medications to your Primary Care Provider. *  Please update this list whenever your medications are discontinued, doses are      changed, or new medications (including over-the-counter products) are added. *  Please carry medication information at all times in case of emergency situations. These are general instructions for a healthy lifestyle:    No smoking/ No tobacco products/ Avoid exposure to second hand smoke  Surgeon General's Warning:  Quitting smoking now greatly reduces serious risk to your health. Obesity, smoking, and sedentary lifestyle greatly increases your risk for illness    A healthy diet, regular physical exercise & weight monitoring are important for maintaining a healthy lifestyle    You may be retaining fluid if you have a history of heart failure or if you experience any of the following symptoms:  Weight gain of 3 pounds or more overnight or 5 pounds in a week, increased swelling in our hands or feet or shortness of breath while lying flat in bed. Please call your doctor as soon as you notice any of these symptoms; do not wait until your next office visit. Recognize signs and symptoms of STROKE:    F-face looks uneven    A-arms unable to move or move unevenly    S-speech slurred or non-existent    T-time-call 911 as soon as signs and symptoms begin-DO NOT go       Back to bed or wait to see if you get better-TIME IS BRAIN. Warning Signs of HEART ATTACK     Call 911 if you have these symptoms:   Chest discomfort.  Most heart attacks involve discomfort in the center of the chest that lasts more than a few minutes, or that goes away and comes back. It can feel like uncomfortable pressure, squeezing, fullness, or pain.  Discomfort in other areas of the upper body. Symptoms can include pain or discomfort in one or both arms, the back, neck, jaw, or stomach.  Shortness of breath with or without chest discomfort.  Other signs may include breaking out in a cold sweat, nausea, or lightheadedness. Don't wait more than five minutes to call 911 - MINUTES MATTER! Fast action can save your life. Calling 911 is almost always the fastest way to get lifesaving treatment. Emergency Medical Services staff can begin treatment when they arrive -- up to an hour sooner than if someone gets to the hospital by car. The discharge information has been reviewed with the patient. The patient verbalized understanding. Discharge medications reviewed with the patient and appropriate educational materials and side effects teaching were provided.   ___________________________________________________________________________________________________________________________________

## 2018-12-04 NOTE — PROGRESS NOTES
Received report from [employer]. Pt on room air and states he is feeling better. He is ready to go home. No other concerns on this shift. Bedside and Verbal shift change report given to [address] [place] [address] (oncoming nurse) by Kellee Frey (offgoing nurse). Report included the following information SBAR, Kardex, MAR and Cardiac Rhythm sr.

## 2018-12-05 ENCOUNTER — HOME CARE VISIT (OUTPATIENT)
Dept: SCHEDULING | Facility: HOME HEALTH | Age: 63
End: 2018-12-05

## 2018-12-05 LAB
BACTERIA SPEC CULT: NORMAL
BACTERIA SPEC CULT: NORMAL
SERVICE CMNT-IMP: NORMAL
SERVICE CMNT-IMP: NORMAL

## 2018-12-06 ENCOUNTER — OFFICE VISIT (OUTPATIENT)
Dept: FAMILY MEDICINE CLINIC | Age: 63
End: 2018-12-06

## 2018-12-06 VITALS
OXYGEN SATURATION: 97 % | DIASTOLIC BLOOD PRESSURE: 78 MMHG | BODY MASS INDEX: 22.05 KG/M2 | RESPIRATION RATE: 19 BRPM | WEIGHT: 154 LBS | TEMPERATURE: 97.5 F | HEIGHT: 70 IN | SYSTOLIC BLOOD PRESSURE: 119 MMHG | HEART RATE: 70 BPM

## 2018-12-06 DIAGNOSIS — I48.20 CHRONIC ATRIAL FIBRILLATION (HCC): Primary | ICD-10-CM

## 2018-12-06 PROBLEM — R65.10 SIRS (SYSTEMIC INFLAMMATORY RESPONSE SYNDROME) (HCC): Status: RESOLVED | Noted: 2018-11-29 | Resolved: 2018-12-06

## 2018-12-06 PROBLEM — R57.0 CARDIOGENIC SHOCK (HCC): Status: RESOLVED | Noted: 2018-11-29 | Resolved: 2018-12-06

## 2018-12-06 PROBLEM — T50.901A OD (OVERDOSE OF DRUG), ACCIDENTAL OR UNINTENTIONAL, INITIAL ENCOUNTER: Status: RESOLVED | Noted: 2018-11-29 | Resolved: 2018-12-06

## 2018-12-06 PROBLEM — I48.91 ATRIAL FIBRILLATION WITH RVR (HCC): Status: RESOLVED | Noted: 2018-11-03 | Resolved: 2018-12-06

## 2018-12-06 LAB
INR BLD: 3.7
PT POC: 44.3 SECONDS
VALID INTERNAL CONTROL?: YES

## 2018-12-06 NOTE — PROGRESS NOTES
Alexa Tamayo is a 61 y.o.  male and presents with    Chief Complaint   Patient presents with    Irregular Heart Beat    Alcohol Problem       Here today for petros  hosp records reviewed   meds reviewed with patient    Subjective:    Cardiovascular Review:  The patient has Atrial Fibrillation. Diet and Lifestyle: not attempting to follow a low fat, low cholesterol diet  Home BP Monitoring: is not measured at home. Pertinent ROS: taking medications as instructed, no medication side effects noted, no TIA's, no chest pain on exertion, no dyspnea on exertion, no swelling of ankles. Additional Concerns:          Patient Active Problem List    Diagnosis Date Noted    HLD (hyperlipidemia) 11/29/2018    Chronic systolic congestive heart failure ([facility] 11/27/2018    HTN (hypertension) 11/04/2018    Alcoholism ([facility] 11/04/2018    Nontoxic multinodular goiter 02/07/2018    Atrial fibrillation (HCC) 11/28/2017    Elevated cholesterol 04/12/2016    Arthritis 11/13/2014     Current Outpatient Medications   Medication Sig Dispense Refill    amiodarone (CORDARONE) 200 mg tablet Take 1 Tab by mouth two (2) times a day. 60 Tab 0    furosemide (LASIX) 20 mg tablet TAKE 1 tab every other day 15 Tab 0    metoprolol tartrate 75 mg tab Take 75 mg by mouth two (2) times a day. 60 Tab 0    rivaroxaban (XARELTO) 20 mg tab tablet Take 1 Tab by mouth daily (with breakfast). 30 Tab 0    nitroglycerin (NITROSTAT) 0.4 mg SL tablet 1 Tab by SubLINGual route as needed for Chest Pain. 1 Bottle 0    multivit-min-FA-lycopen-lutein (CENTRUM SILVER MEN) 300-600-300 mcg tab Take 1 Tab by mouth daily.  100 Caplet 12     No Known Allergies  Past Medical History:   Diagnosis Date    Arthritis     Atrial fibrillation ([facility] 11/28/2017    Cardioversion (11/18)    Hearing loss     HLD (hyperlipidemia)     HTN (hypertension)     Nontoxic multinodular goiter 2/7/2018     Past Surgical History:   Procedure Laterality Date  HX HERNIA REPAIR      [place] CARDIOVERSION ELECTIVE ARRHYTHMIA EXTERNAL N/A 12/3/2018    EP CARDIOVERSION performed by Gil Kaba MD at [facility]     Family History   Problem Relation Age of Onset    Diabetes Father      Social History     Tobacco Use    Smoking status: Former Smoker    Smokeless tobacco: Never Used   Substance Use Topics    Alcohol use: Yes     Alcohol/week: 12.5 oz     Types: 25 Shots of liquor per week       ROS       All other systems reviewed and are negative. Objective:  Vitals:    12/06/18 1344   BP: 119/78   Pulse: 70   Resp: 19   Temp: 97.5 °F (36.4 °C)   TempSrc: Oral   SpO2: 97%   Weight: 154 lb (69.9 kg)   Height: 5' 10\" (1.778 m)   PainSc:   0 - No pain                 alert, well appearing, and in no distress, oriented to person, place, and time and normal appearing weight  Chest - clear to auscultation, no wheezes, rales or rhonchi, symmetric air entry  Heart - normal rate, regular rhythm, normal S1, S2, no murmurs, rubs, clicks or gallops  Abdomen - soft, nontender, nondistended, no masses or organomegaly        LABS     TESTS      Assessment/Plan:    Atrial fib convereted to sinus rhythm  Note several inconsistencies in chart  Med list in errror  He is not taking entresto, he is not taking diltiazem, he is not taking coumadin ( all of which are listed as current meds)  He is not taking dig -- note dr Kris Keith last note states he is on dig  I have cleaned up the med record to reflect what the pateint tells me he is taking  Will send to dr Bita Faith to make sure this is correct      Lab review: labs are reviewed, up to date and normal    Diagnoses and all orders for this visit:    1. Chronic atrial fibrillation (HCC)  -     AMB POC PT/INR          I have discussed the diagnosis with the patient and the intended plan as seen in the above orders. The patient has received an after-visit summary and questions were answered concerning future plans.   I have discussed medication side effects and warnings with the patient as well. I have reviewed the plan of care with the patient, accepted their input and they are in agreement with the treatment goals.      Follow-up Disposition: Not on File

## 2018-12-06 NOTE — PROGRESS NOTES
Room #      SUBJECTIVE:    Hawa Berger is a 61 y.o. male who presents today for hospital follow up    1. Have you been to the ER, urgent care clinic since your last visit? Hospitalized since your last visit? Yes  2. Have you seen or consulted any other health care providers outside of the [address] [place] [address] since your last visit? Include any pap smears or colon screening. Yes  When :  Reason:    Health Maintenance reviewed Yes    Health Maintenance Due   Topic Date Due    Shingrix Vaccine Age 49> (1 of 2) 02/22/2005

## 2018-12-07 NOTE — DISCHARGE SUMMARY
Discharge Summary    Patient: Brittni De Souza Sex: male          DOA: 11/29/2018         YOB: 1955      Age:  61 y.o.        LOS:  LOS: 5 days                Admit Date: 11/29/2018    Discharge Date: 12/4/2018    Discharge Medications:     Discharge Medication List as of 12/4/2018  2:48 PM      START taking these medications    Details   amiodarone (CORDARONE) 200 mg tablet Take 1 Tab by mouth two (2) times a day., Print, Disp-60 Tab, R-0      furosemide (LASIX) 20 mg tablet TAKE 1 tab every other day, Print, Disp-15 Tab, R-0      metoprolol tartrate 75 mg tab Take 75 mg by mouth two (2) times a day., Print, Disp-60 Tab, R-0      rivaroxaban (XARELTO) 20 mg tab tablet Take 1 Tab by mouth daily (with breakfast). , Print, Disp-30 Tab, R-0         CONTINUE these medications which have NOT CHANGED    Details   dilTIAZem (CARDIZEM) 90 mg tablet Take 1 Tab by mouth every eight (8) hours. , Print, Disp-30 Tab, R-0      warfarin (COUMADIN) 4 mg tablet Take 1 Tab by mouth daily. , Normal, Disp-90 Tab, R-12      sacubitril-valsartan (ENTRESTO) 24 mg/26 mg tablet Take 1 Tab by mouth every twelve (12) hours. , Print, Disp-30 Tab, R-0      nitroglycerin (NITROSTAT) 0.4 mg SL tablet 1 Tab by SubLINGual route as needed for Chest Pain., Normal, Disp-1 Bottle, R-0      vitamin k 100 mcg tablet Take 1 Tab by mouth daily. , Normal, Disp-100 Tab, R-12      magnesium oxide 200 mg magnesium tab Take 200 mg by mouth daily. , Normal, Disp-100 Tab, R-12      multivit-min-FA-lycopen-lutein (CENTRUM SILVER MEN) 300-600-300 mcg tab Take 1 Tab by mouth daily. , Normal, Disp-100 Caplet, R-12         STOP taking these medications       carvedilol (COREG) 6.25 mg tablet Comments:   Reason for Stopping:         digoxin (LANOXIN) 0.125 mg tablet Comments:   Reason for Stopping:                Follow-up: PCP, cardiology    Discharge Condition: Stable    Activity: Activity as tolerated    Diet: Cardiac Diet    Labs:  Labs: Results:       Chemistry No results for input(s): GLU, NA, K, CL, CO2, BUN, CREA, CA, AGAP, BUCR, TBIL, GPT, AP, TP, ALB, GLOB, AGRAT in the last 72 hours. CBC w/Diff No results for input(s): WBC, RBC, HGB, HCT, PLT, GRANS, LYMPH, EOS, HGBEXT, HCTEXT, PLTEXT in the last 72 hours. Cardiac Enzymes No results for input(s): CPK, CKND1, MICKI in the last 72 hours. No lab exists for component: CKRMB, TROIP   Coagulation Recent Labs     12/06/18  1347   INR 3.7       Lipid Panel Lab Results   Component Value Date/Time    Cholesterol, total 179 11/30/2018 04:30 AM    HDL Cholesterol 45 11/30/2018 04:30 AM    LDL, calculated 109.6 (H) 11/30/2018 04:30 AM    VLDL, calculated 24.4 11/30/2018 04:30 AM    Triglyceride 122 11/30/2018 04:30 AM    CHOL/HDL Ratio 4.0 11/30/2018 04:30 AM      BNP No results for input(s): BNPP in the last 72 hours. Liver Enzymes No results for input(s): TP, ALB, TBIL, AP, SGOT, GPT in the last 72 hours. No lab exists for component: DBIL   Thyroid Studies Lab Results   Component Value Date/Time    TSH 0.46 10/25/2018 08:38 AM          Imaging:      CAITLIN  Left Ventricle Normal wall thickness. The cavity size is mildly dilated. There is severely decreased systolic function. The calculated ejection fraction is 20%. Visually measured ejection fraction. Global hypokinesis observed. Left Atrium The cavity size is mildly dilated. No atrial septal defect present. No patent foramen ovale visualized. There is no thrombus. Spontaneous echo contrast. There is no thrombus within the left atrial appendage. No spontaneous echo contrast Normal pulmonary vein connection. Right Ventricle Right ventricle not assessed. Normal cavity size. Global systolic function is reduced. Right Atrium The cavity size is mildly dilated. There is no thrombus. Aortic Valve Trileaflet aortic valve structure. No stenosis and no regurgitation. Mitral Valve No stenosis. Mitral valve non-specific thickening.  Mild regurgitation. Tricuspid Valve Tricuspid valve not assessed. Pulmonic Valve The pulmonic valve was not assessed. Aorta Normal aortic root, ascending aortic, and aortic arch. IVC/Hepatic Veins Inferior vena cava not assessed. Consults: Cardiology and Pulmonary/Critical Care    Treatment Team: Treatment Team: Consulting Provider: Jessica Devlin MD; Consulting Provider: Gold Bolanos MD; Care Manager: Jorge Calixto RN; Utilization Review: Blanche Tidwell RN; Care Manager: Verna Beckham; Care Manager: La Danielle RN    Significant Diagnostic Studies: labs: No results found for this or any previous visit (from the past 24 hour(s)).       Discharge diagnoses:    Problem List as of 12/4/2018 Date Reviewed: 12/3/2018          Codes Class Noted - Resolved    HLD (hyperlipidemia) ICD-10-CM: E78.5  ICD-9-CM: 272.4  11/29/2018 - Present        OD (overdose of drug), accidental or unintentional, initial encounter ICD-10-CM: T50.901A  ICD-9-CM: 977.9, E858.9  11/29/2018 - Present        * (Principal) Cardiogenic shock ([facility] ICD-10-CM: R57.0  ICD-9-CM: 785.51  11/29/2018 - Present        SIRS (systemic inflammatory response syndrome) ([facility] ICD-10-CM: R65.10  ICD-9-CM: 995.90  11/29/2018 - Present        Chronic systolic congestive heart failure ([facility] ICD-10-CM: I50.22  ICD-9-CM: 428.22, 428.0  11/27/2018 - Present        HTN (hypertension) ICD-10-CM: I10  ICD-9-CM: 401.9  11/4/2018 - Present        Alcoholism ([facility] ICD-10-CM: F10.20  ICD-9-CM: 303.90  11/4/2018 - Present        Atrial fibrillation with RVR ([facility] ICD-10-CM: I48.91  ICD-9-CM: 427.31  11/3/2018 - Present        Nontoxic multinodular goiter ICD-10-CM: E04.2  ICD-9-CM: 241.1  2/7/2018 - Present        Atrial fibrillation ([facility] ICD-10-CM: I48.91  ICD-9-CM: 427.31  11/28/2017 - Present    Overview Signed 11/4/2018 10:04 AM by Jayy Dooley MD     Chronic, [id number] score 1 to 2             Elevated cholesterol ICD-10-CM: E78.00  ICD-9-CM: 272.0  4/12/2016 - Present        Arthritis ICD-10-CM: M19.90  ICD-9-CM: 716.90  11/13/2014 - Present        RESOLVED: History of nuclear stress test ICD-10-CM: Z92.89  ICD-9-CM: V15.89  11/4/2018 - 11/8/2018    Overview Signed 11/4/2018 10:02 AM by Tennille Perla MD     4/17/2018; no evidence of significant ongoing ischemia. RESOLVED: Chronic anticoagulation ICD-10-CM: Z79.01  ICD-9-CM: V58.61  11/4/2018 - 11/8/2018    Overview Signed 11/4/2018 10:05 AM by Tennille Perla MD     Regulated by PCP             RESOLVED: Loose stools ICD-10-CM: R19.5  ICD-9-CM: 787.7  11/4/2018 - 11/8/2018        RESOLVED: Lower gastrointestinal bleeding ICD-10-CM: K92.2  ICD-9-CM: 578.9  11/4/2018 - 11/8/2018        RESOLVED: Persistent atrial fibrillation ([facility] ICD-10-CM: I48.1  ICD-9-CM: 427.31  4/17/2018 - 4/26/2018        RESOLVED: Atrial fibrillation with RVR ([address] ICD-10-CM: I48.91  ICD-9-CM: 427.31  4/16/2018 - 4/26/2018        RESOLVED: Normal cardiac stress test ICD-10-CM: VCC2389  ICD-9-CM: Lilly Plenty  12/5/2017 - 2/7/2018    Overview Signed 12/5/2017  3:42 PM by Tennille Perla MD     Normal Willia Limbo 3/28/2016, EF 57%             RESOLVED: Ganglion cyst ICD-10-CM: M67.40  ICD-9-CM: 727.43  4/12/2016 - 11/28/2017        RESOLVED: Thyroid mass ICD-10-CM: E07.9  ICD-9-CM: 246.9  4/12/2016 - 2/7/2018    Overview Signed 12/5/2017  3:45 PM by Tennille Perla MD     TSH on 11/28/2017 was 0.98             RESOLVED: Essential hypertension, malignant ICD-10-CM: I10  ICD-9-CM: 401.0  11/13/2014 - 11/8/2018        RESOLVED: Hearing loss ICD-10-CM: H91.90  ICD-9-CM: 389.9  11/13/2014 - 11/28/2017            Hospital Course:  Major issues addressed during hospitalization outlined  below.      Stella Christopher. is a 61 y.o. male who is being admitted for sepsis - unclear etiology   Pt mentions he was discharged home yesterday from the hosp - mentions that he was admitted for SOB & A fib with RVR  - seen by Cardiology - who changed his dose of Cardizem & lasix   He also had an Echo done which showed EF < 15%   Pt says that last night he took his meds - went to bed - got up around 12.30am to go use the restroom - felt dizzy , light headed , felt like he was going to pass out - was also bradycardic - says he tried to check his BP at home but it was very low - called EMS   ER eval - pt noted to have elevated lactic acid, ARF, hypotensive & bradycardic on presentation - initial thought was cardizem overdose - poison control contacted - IV calcium gluconate given   Pt has received multiple rounds of IVF & started on IV pressors , PCCM consulted   Pt is being admitted to the ICU for further eval    PCCM consulted. Pt was weaned from [address] on 11/29. Cardiology adjusting BP meds. HD stable off vasopressors. He was transferred to telemetry on 11/30. Given a one time dose of IV Digoxin w/ plans to start oral on 12/1. Plan is to wean off oral Cardizem and prefer BB w/ new severe cardiomyopathy. Per cardiology, eventual goal of cardioversion once therapeutic INR achieved on consistent basis. Eventually underwent CAITLIN / cardioversion to sinus rhythm. Before dc patient was switched to PO amio from IV and started on xarelto. Coumadin stopped.   PCP and cardiology to f/u     Daria Carranza MD  December 7, 2018        Total time spent 40 minutes

## 2018-12-08 ENCOUNTER — APPOINTMENT (OUTPATIENT)
Dept: GENERAL RADIOLOGY | Age: 63
End: 2018-12-08
Attending: EMERGENCY MEDICINE
Payer: COMMERCIAL

## 2018-12-08 ENCOUNTER — HOSPITAL ENCOUNTER (EMERGENCY)
Age: 63
Discharge: HOME | End: 2018-12-08
Attending: EMERGENCY MEDICINE | Admitting: EMERGENCY MEDICINE
Payer: COMMERCIAL

## 2018-12-08 VITALS
OXYGEN SATURATION: 98 % | HEIGHT: 70 IN | HEART RATE: 65 BPM | WEIGHT: 151 LBS | BODY MASS INDEX: 21.62 KG/M2 | RESPIRATION RATE: 17 BRPM | DIASTOLIC BLOOD PRESSURE: 80 MMHG | SYSTOLIC BLOOD PRESSURE: 127 MMHG | TEMPERATURE: 97.7 F

## 2018-12-08 DIAGNOSIS — R06.02 SOB (SHORTNESS OF BREATH): ICD-10-CM

## 2018-12-08 DIAGNOSIS — I50.23 ACUTE ON CHRONIC SYSTOLIC CONGESTIVE HEART FAILURE (HCC): Primary | ICD-10-CM

## 2018-12-08 LAB
ALBUMIN SERPL-MCNC: 3.7 G/DL (ref 3.4–5)
ALBUMIN/GLOB SERPL: 1.2 {RATIO} (ref 0.8–1.7)
ALP SERPL-CCNC: 98 U/L (ref 45–117)
ALT SERPL-CCNC: 50 U/L (ref 16–61)
ANION GAP SERPL CALC-SCNC: 8 MMOL/L (ref 3–18)
AST SERPL-CCNC: 32 U/L (ref 15–37)
BASOPHILS # BLD: 0 K/UL (ref 0–0.1)
BASOPHILS NFR BLD: 0 % (ref 0–2)
BILIRUB SERPL-MCNC: 0.8 MG/DL (ref 0.2–1)
BNP SERPL-MCNC: 2045 PG/ML (ref 0–900)
BUN SERPL-MCNC: 15 MG/DL (ref 7–18)
BUN/CREAT SERPL: 13 (ref 12–20)
CALCIUM SERPL-MCNC: 8.7 MG/DL (ref 8.5–10.1)
CHLORIDE SERPL-SCNC: 105 MMOL/L (ref 100–108)
CK MB CFR SERPL CALC: 1.5 % (ref 0–4)
CK MB SERPL-MCNC: 1.3 NG/ML (ref 5–25)
CK SERPL-CCNC: 89 U/L (ref 39–308)
CO2 SERPL-SCNC: 24 MMOL/L (ref 21–32)
CREAT SERPL-MCNC: 1.19 MG/DL (ref 0.6–1.3)
D DIMER PPP FEU-MCNC: 0.58 UG/ML(FEU)
DIFFERENTIAL METHOD BLD: ABNORMAL
EOSINOPHIL # BLD: 0.1 K/UL (ref 0–0.4)
EOSINOPHIL NFR BLD: 1 % (ref 0–5)
ERYTHROCYTE [DISTWIDTH] IN BLOOD BY AUTOMATED COUNT: 13.3 % (ref 11.6–14.5)
GLOBULIN SER CALC-MCNC: 3.2 G/DL (ref 2–4)
GLUCOSE SERPL-MCNC: 97 MG/DL (ref 74–99)
HCT VFR BLD AUTO: 47.4 % (ref 36–48)
HGB BLD-MCNC: 16.3 G/DL (ref 13–16)
LYMPHOCYTES # BLD: 1.4 K/UL (ref 0.9–3.6)
LYMPHOCYTES NFR BLD: 16 % (ref 21–52)
MCH RBC QN AUTO: 32.9 PG (ref 24–34)
MCHC RBC AUTO-ENTMCNC: 34.4 G/DL (ref 31–37)
MCV RBC AUTO: 95.8 FL (ref 74–97)
MONOCYTES # BLD: 1.2 K/UL (ref 0.05–1.2)
MONOCYTES NFR BLD: 14 % (ref 3–10)
NEUTS SEG # BLD: 5.9 K/UL (ref 1.8–8)
NEUTS SEG NFR BLD: 69 % (ref 40–73)
PLATELET # BLD AUTO: 240 K/UL (ref 135–420)
PMV BLD AUTO: 11.7 FL (ref 9.2–11.8)
POTASSIUM SERPL-SCNC: 4.5 MMOL/L (ref 3.5–5.5)
PROT SERPL-MCNC: 6.9 G/DL (ref 6.4–8.2)
RBC # BLD AUTO: 4.95 M/UL (ref 4.7–5.5)
SODIUM SERPL-SCNC: 137 MMOL/L (ref 136–145)
TROPONIN I SERPL-MCNC: 0.02 NG/ML (ref 0–0.04)
WBC # BLD AUTO: 8.6 K/UL (ref 4.6–13.2)

## 2018-12-08 PROCEDURE — 74011250636 HC RX REV CODE- 250/636: Performed by: NURSE PRACTITIONER

## 2018-12-08 PROCEDURE — 80053 COMPREHEN METABOLIC PANEL: CPT

## 2018-12-08 PROCEDURE — 74011000250 HC RX REV CODE- 250: Performed by: NURSE PRACTITIONER

## 2018-12-08 PROCEDURE — 96374 THER/PROPH/DIAG INJ IV PUSH: CPT

## 2018-12-08 PROCEDURE — 82553 CREATINE MB FRACTION: CPT

## 2018-12-08 PROCEDURE — 94640 AIRWAY INHALATION TREATMENT: CPT

## 2018-12-08 PROCEDURE — 93005 ELECTROCARDIOGRAM TRACING: CPT

## 2018-12-08 PROCEDURE — 71045 X-RAY EXAM CHEST 1 VIEW: CPT

## 2018-12-08 PROCEDURE — 85025 COMPLETE CBC W/AUTO DIFF WBC: CPT

## 2018-12-08 PROCEDURE — 83880 ASSAY OF NATRIURETIC PEPTIDE: CPT

## 2018-12-08 PROCEDURE — 77030029684 HC NEB SM VOL KT MONA -A

## 2018-12-08 PROCEDURE — 85379 FIBRIN DEGRADATION QUANT: CPT

## 2018-12-08 PROCEDURE — 99285 EMERGENCY DEPT VISIT HI MDM: CPT

## 2018-12-08 RX ORDER — FUROSEMIDE 10 MG/ML
80 INJECTION INTRAMUSCULAR; INTRAVENOUS
Status: COMPLETED | OUTPATIENT
Start: 2018-12-08 | End: 2018-12-08

## 2018-12-08 RX ORDER — IPRATROPIUM BROMIDE AND ALBUTEROL SULFATE 2.5; .5 MG/3ML; MG/3ML
3 SOLUTION RESPIRATORY (INHALATION)
Status: COMPLETED | OUTPATIENT
Start: 2018-12-08 | End: 2018-12-08

## 2018-12-08 RX ADMIN — FUROSEMIDE 80 MG: 10 INJECTION, SOLUTION INTRAMUSCULAR; INTRAVENOUS at 16:02

## 2018-12-08 RX ADMIN — IPRATROPIUM BROMIDE AND ALBUTEROL SULFATE 3 ML: .5; 3 SOLUTION RESPIRATORY (INHALATION) at 16:02

## 2018-12-08 NOTE — ED PROVIDER NOTES
Vashti Todd is a 61year old male who presents to the ED with a c/o SOB. States he has a recent Hx of A-Fib, and was cardioverted by Dr Caitlyn Ruiz. Pt Adds that he has a Hx of CHF as well. He was seen by Dr Kirti Glass two days ago, and was doing well. States he takes Laxis every other day. Feels like he can't get his breath at his time. Deneis all pain Past Medical History:  
Diagnosis Date  Arthritis  Atrial fibrillation ([address] 11/28/2017 Cardioversion (11/18)  Hearing loss  HLD (hyperlipidemia)  HTN (hypertension)  Nontoxic multinodular goiter 2/7/2018 Past Surgical History:  
Procedure Laterality Date  CARDIAC SURG PROCEDURE UNLIST  HX HERNIA REPAIR    
 [place] CARDIOVERSION ELECTIVE ARRHYTHMIA EXTERNAL N/A 12/3/2018 EP CARDIOVERSION performed by Gurdeep Gonzales MD at [facility] Family History:  
Problem Relation Age of Onset  Diabetes Father Social History Socioeconomic History  Marital status: SINGLE Spouse name: Not on file  Number of children: Not on file  Years of education: Not on file  Highest education level: Not on file Social Needs  Financial resource strain: Not on file  Food insecurity - worry: Not on file  Food insecurity - inability: Not on file  Transportation needs - medical: Not on file  Transportation needs - non-medical: Not on file Occupational History  Not on file Tobacco Use  Smoking status: Former Smoker  Smokeless tobacco: Never Used Substance and Sexual Activity  Alcohol use: Yes Alcohol/week: 12.5 oz Types: 25 Shots of liquor per week Comment: nothing to dinkk since 11/25/18  Drug use: No  
 Sexual activity: Yes  
  Partners: Female Other Topics Concern  Not on file Social History Narrative  Not on file ALLERGIES: Patient has no known allergies. Review of Systems Constitutional: Negative. HENT: Negative. Eyes: Negative. Respiratory: Positive for shortness of breath. Cardiovascular: Negative. Gastrointestinal: Negative. Endocrine: Negative. Genitourinary: Negative. Musculoskeletal: Negative. Skin: Negative. Allergic/Immunologic: Negative. Neurological: Negative. Hematological: Negative. Psychiatric/Behavioral: Negative. Vitals:  
 12/08/18 1645 12/08/18 1700 12/08/18 1715 12/08/18 1730 BP: 118/63 132/81 127/80 120/83 Pulse: 69 67 65 67 Resp: 19 19 18 21 Temp:      
SpO2: 91% 96% 97% 95% Weight:      
Height:      
      
 
Physical Exam  
Constitutional: He appears well-developed and well-nourished. No distress. HENT:  
Head: Normocephalic and atraumatic. Nose: Nose normal.  
Eyes: EOM are normal. Right eye exhibits no discharge. Left eye exhibits no discharge. No scleral icterus. Neck: Normal range of motion. Neck supple. No tracheal deviation present. Cardiovascular: Normal rate, regular rhythm and intact distal pulses. Pulmonary/Chest: Effort normal and breath sounds normal. No stridor. No respiratory distress. He has no wheezes. Abdominal: Soft. He exhibits no distension. There is no tenderness. There is no guarding. Genitourinary:  
Genitourinary Comments: NE 
  
Musculoskeletal: Normal range of motion. He exhibits no deformity. Neurological: He is alert. Coordination normal.  
Skin: Skin is warm and dry. NE. Psychiatric: He has a normal mood and affect. His behavior is normal.  
Nursing note and vitals reviewed. MDM Number of Diagnoses or Management Options Acute on chronic systolic congestive heart failure ([address]):  
SOB (shortness of breath):  
Diagnosis management comments: PROGRESS NOTE:  D dimer noted mildly elevated. Discused with DR Corinne Kearns. Fel the Pt bump in D Dimer related to exacerbated CHF. Do not feel the Pt has a PE. Do not plan to CTA the Pt.    Pastor Zepeda NP  6:43 PM 
 
 PROGRESS NOTE:  Pt states he feels 100% better after the Lasix. He has filled up 3 urine bottles. States he feels good to go home. Raul Calloway NP  6:43 PM 
 
 
 
 
  
Amount and/or Complexity of Data Reviewed Clinical lab tests: ordered and reviewed Tests in the radiology section of CPT®: reviewed and ordered Risk of Complications, Morbidity, and/or Mortality Presenting problems: low Diagnostic procedures: low Management options: low Patient Progress Patient progress: stable Procedures Diagnosis: 1. Acute on chronic systolic congestive heart failure ([address] 2. SOB (shortness of breath) Disposition:   Discharged to Home Follow-up Information Follow up With Specialties Details Why Contact Zi Olvera., DO Family Practice Call to arrange follow up on Monday [address] [facility] [address] [facility] [place] [address] [phone] Medication List  
  
ASK your doctor about these medications   
amiodarone 200 mg tablet Commonly known as:  CORDARONE Take 1 Tab by mouth two (2) times a day. furosemide 20 mg tablet Commonly known as:  LASIX TAKE 1 tab every other day 
  
metoprolol tartrate 75 mg Tab Take 75 mg by mouth two (2) times a day. multivit-min-FA-lycopen-lutein 300-600-300 mcg Tab Commonly known as:  CENTRUM SILVER MEN Take 1 Tab by mouth daily. nitroglycerin 0.4 mg SL tablet Commonly known as:  NITROSTAT 
1 Tab by SubLINGual route as needed for Chest Pain. 
  
rivaroxaban 20 mg Tab tablet Commonly known as:  Jerrol Sor Take 1 Tab by mouth daily (with breakfast).

## 2018-12-08 NOTE — DISCHARGE INSTRUCTIONS
[employer] Activation    Thank you for requesting access to Reactivity. Please follow the instructions below to securely access and download your online medical record. Reactivity allows you to send messages to your doctor, view your test results, renew your prescriptions, schedule appointments, and more. How Do I Sign Up? 1. In your internet browser, go to [email or web address]  2. Click on the First Time User? Click Here link in the Sign In box. You will be redirect to the New Member Sign Up page. 3. Enter your Reactivity Access Code exactly as it appears below. You will not need to use this code after youve completed the sign-up process. If you do not sign up before the expiration date, you must request a new code. Reactivity Access Code: Activation code not generated  Current Reactivity Status: Active (This is the date your Reactivity access code will )    4. Enter the last four digits of your Social Security Number (xxxx) and Date of Birth (mm/dd/yyyy) as indicated and click Submit. You will be taken to the next sign-up page. 5. Create a Reactivity ID. This will be your Reactivity login ID and cannot be changed, so think of one that is secure and easy to remember. 6. Create a Reactivity password. You can change your password at any time. 7. Enter your Password Reset Question and Answer. This can be used at a later time if you forget your password. 8. Enter your e-mail address. You will receive e-mail notification when new information is available in [address] 9. Click Sign Up. You can now view and download portions of your medical record. 10. Click the Download Summary menu link to download a portable copy of your medical information. Additional Information    If you have questions, please visit the Frequently Asked Questions section of the Reactivity website at [email or web address] Remember, Reactivity is NOT to be used for urgent needs. For medical emergencies, dial 911.     Follow up with  Salvador on Monday. If your symptoms worsen before that, return to the ER at once for re-evaluation.

## 2018-12-08 NOTE — ED TRIAGE NOTES
Onset of SOB Thursday Eveniong, states it occurs about 5-6 x per hour, lasting about 10 minutes, denies CP

## 2018-12-09 LAB
ATRIAL RATE: 63 BPM
CALCULATED P AXIS, ECG09: 47 DEGREES
CALCULATED R AXIS, ECG10: 11 DEGREES
CALCULATED T AXIS, ECG11: 47 DEGREES
DIAGNOSIS, 93000: NORMAL
P-R INTERVAL, ECG05: 192 MS
Q-T INTERVAL, ECG07: 428 MS
QRS DURATION, ECG06: 92 MS
QTC CALCULATION (BEZET), ECG08: 437 MS
VENTRICULAR RATE, ECG03: 63 BPM

## 2018-12-12 ENCOUNTER — OFFICE VISIT (OUTPATIENT)
Dept: CARDIOLOGY CLINIC | Age: 63
End: 2018-12-12

## 2018-12-12 VITALS
SYSTOLIC BLOOD PRESSURE: 117 MMHG | OXYGEN SATURATION: 99 % | WEIGHT: 151 LBS | HEART RATE: 58 BPM | BODY MASS INDEX: 21.67 KG/M2 | DIASTOLIC BLOOD PRESSURE: 70 MMHG

## 2018-12-12 DIAGNOSIS — Z92.89 HISTORY OF NUCLEAR STRESS TEST: ICD-10-CM

## 2018-12-12 DIAGNOSIS — I50.22 CHRONIC SYSTOLIC CONGESTIVE HEART FAILURE (HCC): ICD-10-CM

## 2018-12-12 DIAGNOSIS — I48.20 CHRONIC ATRIAL FIBRILLATION (HCC): Primary | ICD-10-CM

## 2018-12-12 DIAGNOSIS — E78.5 HYPERLIPIDEMIA, UNSPECIFIED HYPERLIPIDEMIA TYPE: ICD-10-CM

## 2018-12-12 DIAGNOSIS — I42.9 CARDIOMYOPATHY, UNSPECIFIED TYPE (HCC): ICD-10-CM

## 2018-12-12 DIAGNOSIS — I10 ESSENTIAL HYPERTENSION: ICD-10-CM

## 2018-12-12 RX ORDER — FUROSEMIDE 20 MG/1
20 TABLET ORAL 2 TIMES DAILY
Qty: 60 TAB | Refills: 2 | Status: CANCELLED | OUTPATIENT
Start: 2018-12-12

## 2018-12-12 NOTE — PROGRESS NOTES
1. Have you been to the ER, urgent care clinic since your last visit? Hospitalized since your last visit? Yes     2. Have you seen or consulted any other health care providers outside of the [facility] since your last visit? Include any pap smears or colon screening.  No

## 2018-12-13 ENCOUNTER — TELEPHONE (OUTPATIENT)
Dept: CARDIOLOGY CLINIC | Age: 63
End: 2018-12-13

## 2018-12-13 NOTE — TELEPHONE ENCOUNTER
PCP: Nolia Opitz., DO    Last appt: 12/12/2018  Future Appointments   Date Time Provider Ellie Katherine   1/8/2019  9:00 AM Nolia Opitz., DO WINTER ASHLEY SCHED   1/10/2019  8:30 AM Nolia Opitz., DO MOY RAMIREZ SCHED   1/11/2019  9:00 AM John Faria MD [address] [place] [address]       Requested Prescriptions     Pending Prescriptions Disp Refills    furosemide (LASIX) 20 mg tablet 90 Tab 3     Sig: Take 1 Tab by mouth daily.

## 2018-12-13 NOTE — TELEPHONE ENCOUNTER
Pt is calling in regards to rx for Lasix 20 mg, states he was supposed to get a refill sent to the [address] on [address] [place] [address] Street yesterday and that they don't have it yet. Pt would like to be able to pick it up today since he is planning on being out. Please advise.

## 2018-12-14 RX ORDER — FUROSEMIDE 20 MG/1
20 TABLET ORAL DAILY
Qty: 90 TAB | Refills: 3 | Status: SHIPPED | OUTPATIENT
Start: 2018-12-14 | End: 2019-04-08 | Stop reason: SDUPTHER

## 2018-12-28 PROBLEM — I42.9 CARDIOMYOPATHY (HCC): Status: ACTIVE | Noted: 2018-12-28

## 2018-12-28 NOTE — PROGRESS NOTES
Subjective: Jun Tellez is in the office today for cardiac reevaluation. He is a 59-year-old man that has a diagnosis of chronic atrial fibrillation. He saw Dr. Dean Simmons  in late November 2017 and was found to be in atrial fibrillation. At that time he was without symptoms. He had no limiting shortness of breath. He has noticed no peripheral swelling. He has had no dizziness, near syncope or syncope. He was admitted to [facility] in 04/2018 for evaluation of shoulder and neck pain. A nuclear stress test was done which did not demonstrate any evidence for ischemia. An Echocardiogram was also ordered and done in 04/2018. His EF was 45%. There was mild to moderate MR. In the office today, he is following up from a recent hospitalization. He  presented with atrial fibrillation with a rapid ventricular response on 12/4/2018. He was having congestive heart failure type symptoms. He subsequently had a CAITLIN directed cardioversion. He had been started on intravenous amiodarone which was switched to p.o dosing. In the office today he says his breathing has been fine. He has been sleeping well without PND or orthopnea. He has no specific complaints in the office today. Patient Active Problem List  
 Diagnosis Date Noted  Cardiomyopathy ([facility] 12/28/2018  HLD (hyperlipidemia) 11/29/2018  Chronic systolic congestive heart failure ([address] 11/27/2018  
 HTN (hypertension) 11/04/2018  Alcoholism ([facility] 11/04/2018  History of nuclear stress test 11/04/2018  Nontoxic multinodular goiter 02/07/2018  Atrial fibrillation ([facility] 11/28/2017  Elevated cholesterol 04/12/2016  Arthritis 11/13/2014 Current Outpatient Medications Medication Sig Dispense Refill  amiodarone (CORDARONE) 200 mg tablet Take 1 Tab by mouth two (2) times a day. 60 Tab 0  
 metoprolol tartrate 75 mg tab Take 75 mg by mouth two (2) times a day.  60 Tab 0  
  rivaroxaban (XARELTO) 20 mg tab tablet Take 1 Tab by mouth daily (with breakfast). 30 Tab 0  
 nitroglycerin (NITROSTAT) 0.4 mg SL tablet 1 Tab by SubLINGual route as needed for Chest Pain. 1 Bottle 0  
 multivit-min-FA-lycopen-lutein (CENTRUM SILVER MEN) 300-600-300 mcg tab Take 1 Tab by mouth daily. 100 Caplet 12  furosemide (LASIX) 20 mg tablet Take 1 Tab by mouth daily. 90 Tab 3 No Known Allergies Past Medical History:  
Diagnosis Date  Arthritis  Atrial fibrillation ([address] 11/28/2017 Cardioversion (11/18)  Hearing loss  HLD (hyperlipidemia)  HTN (hypertension)  Nontoxic multinodular goiter 2/7/2018 Past Surgical History:  
Procedure Laterality Date  CARDIAC SURG PROCEDURE UNLIST  HX HERNIA REPAIR    
 [place] CARDIOVERSION ELECTIVE ARRHYTHMIA EXTERNAL N/A 12/3/2018 EP CARDIOVERSION performed by Lazarus Buckle, MD at [address] [place] [address] CATH LAB Family History Problem Relation Age of Onset  Diabetes Father Social History Tobacco Use Smoking Status Former Smoker Smokeless Tobacco Never Used Review of Systems, additional: 
Constitutional: negative Eyes: negative Respiratory: negative Cardiovascular: negative Gastrointestinal: negative Musculoskeletal:negative Neurological: negative Behvioral/Psych: negative Endocrine: negative ENT: negative Objective:  
 
Visit Vitals /70 Pulse (!) 58 Wt 151 lb (68.5 kg) SpO2 99% BMI 21.67 kg/m² General:  alert, cooperative, no distress Chest Wall: inspection normal - no chest wall deformities or tenderness, respiratory effort normal  
Lung: clear to auscultation bilaterally Heart:  irregularly irregular rhythm with rate 81 Abdomen: soft, non-tender. Bowel sounds normal. No masses,  no organomegaly Extremities: extremities normal, atraumatic, no cyanosis or edema Skin: no rashes Neuro: alert, oriented, normal speech, no focal findings or movement disorder noted Assessment/Plan: ICD-10-CM ICD-9-CM 1. Atrial fibrillation, unspecified type ([facility] continue amiodarone, metoprolol and Xarelto. Echo done on 11/27/2018 showed ejection fraction of less than 15%. Maintaining sinus rhythm on amiodarone for now. Will reduce dosage to 200 mg daily. Instructed patient to weigh daily. Return 3-4 weeks I48.91 427.31 2D ECHO COMPLETE ADULT (TTE) W OR WO CONTR 2. Essential hypertension, malignant, BP controlled in the office today. I10 401.0 3. Thyroid mass E07.9 246.9 4. Elevated cholesterol, followed by Dr. Sam Flores E78.00 272.0 5. Normal cardiac stress test, 4/17/2018, no evidence for prior scarring or ongoing ischemia. Z13.6 V81.2

## 2019-01-02 RX ORDER — METOPROLOL TARTRATE 75 MG/1
75 TABLET, FILM COATED ORAL 2 TIMES DAILY
Qty: 180 TAB | Refills: 5 | Status: SHIPPED | OUTPATIENT
Start: 2019-01-02 | End: 2019-01-08 | Stop reason: DRUGHIGH

## 2019-01-03 ENCOUNTER — TELEPHONE (OUTPATIENT)
Dept: CARDIOLOGY CLINIC | Age: 64
End: 2019-01-03

## 2019-01-03 NOTE — TELEPHONE ENCOUNTER
Patient was told by pharmacy that his medication metoprolol tartrate 75 mg has been discontinued. He would like to know if something else will be prescribed?

## 2019-01-04 NOTE — TELEPHONE ENCOUNTER
Contacted pharmacy at number. Two patient Identifiers confirmed. Confirmed pts message. Advised to dispense metoprolol 50 mg tabs with sig: take 1.5 tabs bid. PPharmacist verbalized understanding. Cheiloplasty (Less Than 50%) Text: A decision was made to reconstruct the defect with a  cheiloplasty.  The defect was undermined extensively.  Additional obicularis oris muscle was excised with a 15 blade scalpel.  The defect was converted into a full thickness wedge, of less than 50% of the vertical height of the lip, to facilite a better cosmetic result.  Small vessels were then tied off with 5-0 monocyrl. The obicularis oris, superficial fascia, adipose and dermis were then reapproximated.  After the deeper layers were approximated the epidermis was reapproximated with particular care given to realign the vermillion border.

## 2019-01-08 ENCOUNTER — OFFICE VISIT (OUTPATIENT)
Dept: FAMILY MEDICINE CLINIC | Age: 64
End: 2019-01-08

## 2019-01-08 VITALS
HEART RATE: 54 BPM | DIASTOLIC BLOOD PRESSURE: 77 MMHG | RESPIRATION RATE: 20 BRPM | HEIGHT: 70 IN | OXYGEN SATURATION: 96 % | BODY MASS INDEX: 22.48 KG/M2 | TEMPERATURE: 98 F | WEIGHT: 157 LBS | SYSTOLIC BLOOD PRESSURE: 127 MMHG

## 2019-01-08 DIAGNOSIS — I48.20 CHRONIC ATRIAL FIBRILLATION (HCC): Primary | ICD-10-CM

## 2019-01-08 PROBLEM — Z92.89 HISTORY OF NUCLEAR STRESS TEST: Status: RESOLVED | Noted: 2018-11-04 | Resolved: 2019-01-08

## 2019-01-08 PROBLEM — E78.5 HLD (HYPERLIPIDEMIA): Status: RESOLVED | Noted: 2018-11-29 | Resolved: 2019-01-08

## 2019-01-08 LAB
INR BLD: 1.8
PT POC: 21.4 SECONDS
VALID INTERNAL CONTROL?: YES

## 2019-01-08 RX ORDER — AMIODARONE HYDROCHLORIDE 200 MG/1
200 TABLET ORAL DAILY
Qty: 90 TAB | Refills: 4 | Status: SHIPPED | OUTPATIENT
Start: 2019-01-08 | End: 2019-04-08 | Stop reason: SDUPTHER

## 2019-01-08 RX ORDER — METOPROLOL TARTRATE 50 MG/1
75 TABLET ORAL 2 TIMES DAILY
Qty: 270 TAB | Refills: 4 | Status: SHIPPED | OUTPATIENT
Start: 2019-01-08 | End: 2019-04-08 | Stop reason: SDUPTHER

## 2019-01-08 NOTE — PROGRESS NOTES
Maday Post. is a 61 y.o.  male and presents with    Chief Complaint   Patient presents with    Irregular Heart Beat    Alcohol Problem    CHF    Cardiomyopathy    Arthritis    Cholesterol Problem    Thyroid Problem    Hypertension           Subjective:  Pt in hosp last month with atrial fib and rvr  Switched from cardia to metoprol and amnio  Also from coumadin to xarelto  Here for f/u    Note multiple phone changes which are not correct in his current med records  1. On metoprol 50  1.5 tab bid  2. amniodarone decrease to 200 once a day    Cardiovascular Review:  The patient has hypertension and Atrial Fibrillation. Diet and Lifestyle: not attempting to follow a low fat, low cholesterol diet  Home BP Monitoring: is not measured at home. Pertinent ROS: taking medications as instructed, no medication side effects noted, no TIA's, no chest pain on exertion, no dyspnea on exertion, no swelling of ankles. Additional Concerns:          Patient Active Problem List    Diagnosis Date Noted    Cardiomyopathy [facility]) 12/28/2018    Chronic systolic congestive heart failure ([facility] 11/27/2018    HTN (hypertension) 11/04/2018    [facility] 11/04/2018    Nontoxic multinodular goiter 02/07/2018    Atrial fibrillation (HCC) 11/28/2017    Elevated cholesterol 04/12/2016    Arthritis 11/13/2014     Current Outpatient Medications   Medication Sig Dispense Refill    metoprolol tartrate (LOPRESSOR) 50 mg tablet Take 1.5 Tabs by mouth two (2) times a day. 270 Tab 4    amiodarone (CORDARONE) 200 mg tablet Take 1 Tab by mouth daily. 90 Tab 4    rivaroxaban (XARELTO) 20 mg tab tablet Take 1 Tab by mouth daily (with breakfast). 90 Tab 4    furosemide (LASIX) 20 mg tablet Take 1 Tab by mouth daily. 90 Tab 3    nitroglycerin (NITROSTAT) 0.4 mg SL tablet 1 Tab by SubLINGual route as needed for Chest Pain.  1 Bottle 0    multivit-min-FA-lycopen-lutein (CENTRUM SILVER MEN) 300-600-300 mcg tab Take 1 Tab by mouth daily. 100 Caplet 12     No Known Allergies  Past Medical History:   Diagnosis Date    Arthritis     Atrial fibrillation ([address] 11/28/2017    Cardioversion (11/18)    Hearing loss     HLD (hyperlipidemia)     HTN (hypertension)     Nontoxic multinodular goiter 2/7/2018     Past Surgical History:   Procedure Laterality Date    CARDIAC SURG PROCEDURE UNLIST      HX HERNIA REPAIR      [place] CARDIOVERSION ELECTIVE ARRHYTHMIA EXTERNAL N/A 12/3/2018    EP CARDIOVERSION performed by Grayson Reeves MD at [address] [place] [address] LAB     Family History   Problem Relation Age of Onset    Diabetes Father      Social History     Tobacco Use    Smoking status: Former Smoker    Smokeless tobacco: Never Used   Substance Use Topics    Alcohol use: Yes     Alcohol/week: 12.5 oz     Types: 25 Shots of liquor per week     Comment: nothing to dinkk since 11/25/18       ROS       All other systems reviewed and are negative. Objective:  Vitals:    01/08/19 0921   BP: 127/77   Pulse: (!) 54   Resp: 20   Temp: 98 °F (36.7 °C)   TempSrc: Oral   SpO2: 96%   Weight: 157 lb (71.2 kg)   Height: 5' 10\" (1.778 m)   PainSc:   0 - No pain                 alert, well appearing, and in no distress and oriented to person, place, and time  Chest - clear to auscultation, no wheezes, rales or rhonchi, symmetric air entry  Heart - normal rate, regular rhythm, normal S1, S2, no murmurs, rubs, clicks or gallops        LABS     TESTS      Assessment/Plan:    Hypertension - stable  Atrial fib improved  Alcoholism ongiong  chf improved  arthritsi stable  Goiter stable    Dr Chava Belcher notes reviewed. Med reconciled in computer        Lab review: labs are reviewed, up to date and normal    Diagnoses and all orders for this visit:    1. Chronic atrial fibrillation (HCC)  -     AMB POC PT/INR    Other orders  -     metoprolol tartrate (LOPRESSOR) 50 mg tablet; Take 1.5 Tabs by mouth two (2) times a day.   -     amiodarone (CORDARONE) 200 mg tablet; Take 1 Tab by mouth daily. -     rivaroxaban (XARELTO) 20 mg tab tablet; Take 1 Tab by mouth daily (with breakfast). I have discussed the diagnosis with the patient and the intended plan as seen in the above orders. The patient has received an after-visit summary and questions were answered concerning future plans. I have discussed medication side effects and warnings with the patient as well. I have reviewed the plan of care with the patient, accepted their input and they are in agreement with the treatment goals.      Follow-up Disposition: Not on File

## 2019-01-08 NOTE — PROGRESS NOTES
Room #      SUBJECTIVE:    Eliseo Bass is a 61 y.o. male who presents today for follow up for A-fib    1. Have you been to the ER, urgent care clinic since your last visit? Hospitalized since your last visit? NO    2. Have you seen or consulted any other health care providers outside of the [address] [place] [address] since your last visit? Include any pap smears or colon screening. NO  When :  Reason:    Health Maintenance reviewed Yes    Health Maintenance Due   Topic Date Due    Shingrix Vaccine Age 49> (1 of 2) 02/22/2005

## 2019-01-11 ENCOUNTER — OFFICE VISIT (OUTPATIENT)
Dept: CARDIOLOGY CLINIC | Age: 64
End: 2019-01-11

## 2019-01-11 VITALS
HEIGHT: 70 IN | BODY MASS INDEX: 22.76 KG/M2 | HEART RATE: 63 BPM | OXYGEN SATURATION: 95 % | WEIGHT: 159 LBS | SYSTOLIC BLOOD PRESSURE: 133 MMHG | DIASTOLIC BLOOD PRESSURE: 82 MMHG

## 2019-01-11 DIAGNOSIS — I50.22 CHRONIC SYSTOLIC CONGESTIVE HEART FAILURE (HCC): ICD-10-CM

## 2019-01-11 DIAGNOSIS — E78.00 ELEVATED CHOLESTEROL: ICD-10-CM

## 2019-01-11 DIAGNOSIS — I48.20 CHRONIC ATRIAL FIBRILLATION (HCC): Primary | ICD-10-CM

## 2019-01-11 DIAGNOSIS — I42.9 CARDIOMYOPATHY, UNSPECIFIED TYPE (HCC): ICD-10-CM

## 2019-01-11 DIAGNOSIS — I10 ESSENTIAL HYPERTENSION: ICD-10-CM

## 2019-01-11 NOTE — PROGRESS NOTES
1. Have you been to the ER, urgent care clinic since your last visit? Hospitalized since your last visit? No    2. Have you seen or consulted any other health care providers outside of the [address] [place] [address] since your last visit? Include any pap smears or colon screening.  No

## 2019-01-18 NOTE — PROGRESS NOTES
Subjective:      Uriel Carranza is in the office today for cardiac reevaluation. He is a 78-year-old man that has a diagnosis of chronic atrial fibrillation. He saw Dr. Natalie Miramontes  in late November 2017 and was found to be in atrial fibrillation. At that time he was without symptoms. He had no limiting shortness of breath. He was not experiencing peripheral swelling. He  had no dizziness, near syncope or syncope. He was admitted to [facility] in 04/2018 for evaluation of shoulder and neck pain. A nuclear stress test was done which did not demonstrate any evidence for ischemia. An Echocardiogram was also ordered and done in 04/2018. His EF was 45%. There was mild to moderate MR. He was hospitalized at [facility] in December 2018. He  presented with atrial fibrillation with a rapid ventricular response on 12/4/2018. He was having congestive heart failure type symptoms. He subsequently had a CAITLIN directed cardioversion. He had been started on intravenous amiodarone which was later switched to p.o dosing. In the office today, here he reports that he feels much better. His equilibrium is better. He is sleeping 8 hours nightly. Betha Madeline He reports that his breathing is 100% better. Patient Active Problem List    Diagnosis Date Noted    Cardiomyopathy [facility]) 12/28/2018    Chronic systolic congestive heart failure ([facility] 11/27/2018    HTN (hypertension) 11/04/2018    Alcoholism ([facility] 11/04/2018    Nontoxic multinodular goiter 02/07/2018    Atrial fibrillation (HCC) 11/28/2017    Elevated cholesterol 04/12/2016    Arthritis 11/13/2014     Current Outpatient Medications   Medication Sig Dispense Refill    metoprolol tartrate (LOPRESSOR) 50 mg tablet Take 1.5 Tabs by mouth two (2) times a day. 270 Tab 4    amiodarone (CORDARONE) 200 mg tablet Take 1 Tab by mouth daily. 90 Tab 4    rivaroxaban (XARELTO) 20 mg tab tablet Take 1 Tab by mouth daily (with breakfast).  90 Tab 4    nitroglycerin (NITROSTAT) 0.4 mg SL tablet 1 Tab by SubLINGual route as needed for Chest Pain. 1 Bottle 0    multivit-min-FA-lycopen-lutein (CENTRUM SILVER MEN) 300-600-300 mcg tab Take 1 Tab by mouth daily. 100 Caplet 12    furosemide (LASIX) 20 mg tablet Take 1 Tab by mouth daily. 80 Tab 3     No Known Allergies  Past Medical History:   Diagnosis Date    Arthritis     Atrial fibrillation ([address] 11/28/2017    Cardioversion (11/18)    Hearing loss     HLD (hyperlipidemia)     HTN (hypertension)     Nontoxic multinodular goiter 2/7/2018     Past Surgical History:   Procedure Laterality Date    CARDIAC SURG PROCEDURE UNLIST      HX HERNIA REPAIR      [place] CARDIOVERSION ELECTIVE ARRHYTHMIA EXTERNAL N/A 12/3/2018    EP CARDIOVERSION performed by Yissel Couch MD at [address] [place] [address] CATH LAB     Family History   Problem Relation Age of Onset    Diabetes Father      Social History     Tobacco Use   Smoking Status Former Smoker   Smokeless Tobacco Never Used          Review of Systems, additional:  Constitutional: negative  Eyes: negative  Respiratory: negative  Cardiovascular: negative  Gastrointestinal: negative  Musculoskeletal:negative  Neurological: negative  Behvioral/Psych: negative  Endocrine: negative  ENT: negative    Objective:     Visit Vitals  /82   Pulse 63   Ht 5' 10\" (1.778 m)   Wt 159 lb (72.1 kg)   SpO2 95%   BMI 22.81 kg/m²     General:  alert, cooperative, no distress   Chest Wall: inspection normal - no chest wall deformities or tenderness, respiratory effort normal   Lung: clear to auscultation bilaterally   Heart:  irregularly irregular rhythm with rate 81   Abdomen: soft, non-tender. Bowel sounds normal. No masses,  no organomegaly   Extremities: extremities normal, atraumatic, no cyanosis or edema Skin: no rashes   Neuro: alert, oriented, normal speech, no focal findings or movement disorder noted         Assessment/Plan:       ICD-10-CM ICD-9-CM    1.  Atrial fibrillation, unspecified type [facility]), continue amiodarone, metoprolol and Xarelto. Patient reports breathing 100% better. We will continue same cardiac Rx and have patient return in 3 months. Will consider repeat echocardiogram at that time his ejection fraction was marked had markedly decreased. I48.91 427.31 2D ECHO COMPLETE ADULT (TTE) W OR WO CONTR   2. Essential hypertension, malignant, BP controlled in the office today. I10 401.0    3. Thyroid mass E07.9 246.9    4. Elevated cholesterol, followed by Dr. Praveena Smith E78.00 272.0    5. Normal cardiac stress test, 4/17/2018, no evidence for prior scarring or ongoing ischemia.  Z13.6 V81.2

## 2019-01-31 ENCOUNTER — OFFICE VISIT (OUTPATIENT)
Dept: FAMILY MEDICINE CLINIC | Age: 64
End: 2019-01-31

## 2019-01-31 VITALS
HEIGHT: 70 IN | OXYGEN SATURATION: 96 % | RESPIRATION RATE: 20 BRPM | HEART RATE: 63 BPM | SYSTOLIC BLOOD PRESSURE: 128 MMHG | WEIGHT: 164.6 LBS | BODY MASS INDEX: 23.56 KG/M2 | TEMPERATURE: 98.6 F | DIASTOLIC BLOOD PRESSURE: 83 MMHG

## 2019-01-31 DIAGNOSIS — R21 RASH: Primary | ICD-10-CM

## 2019-01-31 RX ORDER — NYSTATIN AND TRIAMCINOLONE ACETONIDE 100000; 1 [USP'U]/G; MG/G
OINTMENT TOPICAL 4 TIMES DAILY
Qty: 30 G | Refills: 1 | Status: SHIPPED | OUTPATIENT
Start: 2019-01-31 | End: 2019-04-08 | Stop reason: SDUPTHER

## 2019-01-31 NOTE — PROGRESS NOTES
Karri Hughes is a 61 y.o.  male and presents with    Chief Complaint   Patient presents with    Rash           Subjective:  Has breaking out in left axilla  Has tried aspercrme with lidocaine without relief        Additional Concerns:          Patient Active Problem List    Diagnosis Date Noted    Cardiomyopathy ([facility] 12/28/2018    Chronic systolic congestive heart failure ([facility] 11/27/2018    HTN (hypertension) 11/04/2018    [facility] 11/04/2018    Nontoxic multinodular goiter 02/07/2018    Atrial fibrillation ([facility] 11/28/2017    Elevated cholesterol 04/12/2016    Arthritis 11/13/2014     Current Outpatient Medications   Medication Sig Dispense Refill    nystatin-triamcinolone (MYCOLOG) 100,000-0.1 unit/gram-% ointment Apply  to affected area four (4) times daily. 30 g 1    metoprolol tartrate (LOPRESSOR) 50 mg tablet Take 1.5 Tabs by mouth two (2) times a day. 270 Tab 4    amiodarone (CORDARONE) 200 mg tablet Take 1 Tab by mouth daily. 90 Tab 4    rivaroxaban (XARELTO) 20 mg tab tablet Take 1 Tab by mouth daily (with breakfast). 90 Tab 4    furosemide (LASIX) 20 mg tablet Take 1 Tab by mouth daily. 90 Tab 3    nitroglycerin (NITROSTAT) 0.4 mg SL tablet 1 Tab by SubLINGual route as needed for Chest Pain. 1 Bottle 0    multivit-min-FA-lycopen-lutein (CENTRUM SILVER MEN) 300-600-300 mcg tab Take 1 Tab by mouth daily.  100 Caplet 12     No Known Allergies  Past Medical History:   Diagnosis Date    Arthritis     [facility] ([facility] 11/28/2017    Cardioversion (11/18)    Hearing loss     HLD (hyperlipidemia)     HTN (hypertension)     Nontoxic multinodular goiter 2/7/2018     Past Surgical History:   Procedure Laterality Date    CARDIAC SURG PROCEDURE UNLIST      HX HERNIA REPAIR      [place] CARDIOVERSION ELECTIVE ARRHYTHMIA EXTERNAL N/A 12/3/2018    EP CARDIOVERSION performed by Hola Meza MD at [facility] CATH LAB     Family History   Problem Relation Age of Onset    Diabetes Father      Social History     Tobacco Use    Smoking status: Former Smoker    Smokeless tobacco: Never Used   Substance Use Topics    Alcohol use: Yes     Alcohol/week: 12.5 oz     Types: 25 Shots of liquor per week     Comment: nothing to dinkk since 11/25/18       ROS       All other systems reviewed and are negative. Objective:  Vitals:    01/31/19 1227   BP: 128/83   Pulse: 63   Resp: 20   Temp: 98.6 °F (37 °C)   TempSrc: Oral   SpO2: 96%   Weight: 164 lb 9.6 oz (74.7 kg)   Height: 5' 10\" (1.778 m)   PainSc:   0 - No pain                 alert, well appearing, and in no distress and axilla is bright red with round rash             LABS     TESTS      Assessment/Plan:    Fungus with ? Deodorant rash  Will try mycolog. F/u  1 wk      Lab review:     Diagnoses and all orders for this visit:    1. Rash    Other orders  -     nystatin-triamcinolone (MYCOLOG) 100,000-0.1 unit/gram-% ointment; Apply  to affected area four (4) times daily. I have discussed the diagnosis with the patient and the intended plan as seen in the above orders. The patient has received an after-visit summary and questions were answered concerning future plans. I have discussed medication side effects and warnings with the patient as well. I have reviewed the plan of care with the patient, accepted their input and they are in agreement with the treatment goals. Follow-up Disposition:  Return in about 1 week (around 2/7/2019) for rov.

## 2019-01-31 NOTE — PROGRESS NOTES
Room #      SUBJECTIVE:    Randy Askew is a 61 y.o. male who presents today for c/o rash under arm    1. Have you been to the ER, urgent care clinic since your last visit? Hospitalized since your last visit? NO    2. Have you seen or consulted any other health care providers outside of the [address] [place] [address] since your last visit? Include any pap smears or colon screening. NO  When :  Reason:    Health Maintenance reviewed Yes    Health Maintenance Due   Topic Date Due    Shingrix Vaccine Age 49> (1 of 2) 02/22/2005

## 2019-02-07 ENCOUNTER — OFFICE VISIT (OUTPATIENT)
Dept: FAMILY MEDICINE CLINIC | Age: 64
End: 2019-02-07

## 2019-02-07 VITALS
BODY MASS INDEX: 23.94 KG/M2 | HEIGHT: 70 IN | HEART RATE: 74 BPM | TEMPERATURE: 98.6 F | DIASTOLIC BLOOD PRESSURE: 80 MMHG | WEIGHT: 167.2 LBS | RESPIRATION RATE: 20 BRPM | SYSTOLIC BLOOD PRESSURE: 129 MMHG | OXYGEN SATURATION: 97 %

## 2019-02-07 DIAGNOSIS — R21 RASH: Primary | ICD-10-CM

## 2019-02-07 NOTE — PROGRESS NOTES
Room #      SUBJECTIVE:    Maday Post. is a 61 y.o. male who presents today for follow up for rash under arm    1. Have you been to the ER, urgent care clinic since your last visit? Hospitalized since your last visit? NO    2. Have you seen or consulted any other health care providers outside of the [address] [place] [address] since your last visit? Include any pap smears or colon screening. NO  When :  Reason:    Health Maintenance reviewed Yes    Health Maintenance Due   Topic Date Due    Shingrix Vaccine Age 49> (1 of 2) 02/22/2005

## 2019-02-12 ENCOUNTER — TELEPHONE (OUTPATIENT)
Dept: SURGERY | Age: 64
End: 2019-02-12

## 2019-04-08 ENCOUNTER — HOSPITAL ENCOUNTER (OUTPATIENT)
Dept: LAB | Age: 64
Discharge: HOME | End: 2019-04-08
Payer: COMMERCIAL

## 2019-04-08 ENCOUNTER — OFFICE VISIT (OUTPATIENT)
Dept: FAMILY MEDICINE CLINIC | Age: 64
End: 2019-04-08

## 2019-04-08 VITALS
RESPIRATION RATE: 20 BRPM | HEART RATE: 70 BPM | HEIGHT: 70 IN | SYSTOLIC BLOOD PRESSURE: 150 MMHG | DIASTOLIC BLOOD PRESSURE: 88 MMHG | BODY MASS INDEX: 24.11 KG/M2 | WEIGHT: 168.4 LBS | TEMPERATURE: 97.9 F | OXYGEN SATURATION: 98 %

## 2019-04-08 DIAGNOSIS — E78.00 ELEVATED CHOLESTEROL: ICD-10-CM

## 2019-04-08 DIAGNOSIS — Z00.00 ROUTINE GENERAL MEDICAL EXAMINATION AT A HEALTH CARE FACILITY: ICD-10-CM

## 2019-04-08 DIAGNOSIS — R21 RASH: ICD-10-CM

## 2019-04-08 DIAGNOSIS — I48.91 ATRIAL FIBRILLATION, UNSPECIFIED TYPE (HCC): ICD-10-CM

## 2019-04-08 DIAGNOSIS — Z23 ENCOUNTER FOR IMMUNIZATION: ICD-10-CM

## 2019-04-08 DIAGNOSIS — I10 ESSENTIAL HYPERTENSION: Primary | ICD-10-CM

## 2019-04-08 DIAGNOSIS — E04.2 NONTOXIC MULTINODULAR GOITER: ICD-10-CM

## 2019-04-08 DIAGNOSIS — K62.5 RECTAL BLEEDING: ICD-10-CM

## 2019-04-08 DIAGNOSIS — I10 ESSENTIAL HYPERTENSION, MALIGNANT: ICD-10-CM

## 2019-04-08 DIAGNOSIS — I10 ESSENTIAL HYPERTENSION: ICD-10-CM

## 2019-04-08 LAB
ALBUMIN SERPL-MCNC: 4.3 G/DL (ref 3.4–5)
ALBUMIN/GLOB SERPL: 1.5 {RATIO} (ref 0.8–1.7)
ALP SERPL-CCNC: 107 U/L (ref 45–117)
ALT SERPL-CCNC: 47 U/L (ref 16–61)
ANION GAP SERPL CALC-SCNC: 6 MMOL/L (ref 3–18)
APPEARANCE UR: CLEAR
AST SERPL-CCNC: 37 U/L (ref 15–37)
BASOPHILS # BLD: 0.1 K/UL (ref 0–0.1)
BASOPHILS NFR BLD: 1 % (ref 0–2)
BILIRUB SERPL-MCNC: 0.6 MG/DL (ref 0.2–1)
BILIRUB UR QL: NEGATIVE
BUN SERPL-MCNC: 12 MG/DL (ref 7–18)
BUN/CREAT SERPL: 10 (ref 12–20)
CALCIUM SERPL-MCNC: 9.3 MG/DL (ref 8.5–10.1)
CHLORIDE SERPL-SCNC: 102 MMOL/L (ref 100–108)
CHOLEST SERPL-MCNC: 298 MG/DL
CO2 SERPL-SCNC: 31 MMOL/L (ref 21–32)
COLOR UR: NORMAL
CREAT SERPL-MCNC: 1.16 MG/DL (ref 0.6–1.3)
DIFFERENTIAL METHOD BLD: ABNORMAL
EOSINOPHIL # BLD: 0.1 K/UL (ref 0–0.4)
EOSINOPHIL NFR BLD: 1 % (ref 0–5)
ERYTHROCYTE [DISTWIDTH] IN BLOOD BY AUTOMATED COUNT: 14.9 % (ref 11.6–14.5)
GLOBULIN SER CALC-MCNC: 2.9 G/DL (ref 2–4)
GLUCOSE SERPL-MCNC: 80 MG/DL (ref 74–99)
GLUCOSE UR STRIP.AUTO-MCNC: NEGATIVE MG/DL
HCT VFR BLD AUTO: 44.4 % (ref 36–48)
HDLC SERPL-MCNC: 93 MG/DL (ref 40–60)
HDLC SERPL: 3.2 {RATIO} (ref 0–5)
HGB BLD-MCNC: 14.7 G/DL (ref 13–16)
HGB UR QL STRIP: NEGATIVE
KETONES UR QL STRIP.AUTO: NEGATIVE MG/DL
LDLC SERPL CALC-MCNC: 187.8 MG/DL (ref 0–100)
LEUKOCYTE ESTERASE UR QL STRIP.AUTO: NEGATIVE
LIPID PROFILE,FLP: ABNORMAL
LYMPHOCYTES # BLD: 1.3 K/UL (ref 0.9–3.6)
LYMPHOCYTES NFR BLD: 22 % (ref 21–52)
MCH RBC QN AUTO: 32.5 PG (ref 24–34)
MCHC RBC AUTO-ENTMCNC: 33.1 G/DL (ref 31–37)
MCV RBC AUTO: 98 FL (ref 74–97)
MONOCYTES # BLD: 0.9 K/UL (ref 0.05–1.2)
MONOCYTES NFR BLD: 15 % (ref 3–10)
NEUTS SEG # BLD: 3.6 K/UL (ref 1.8–8)
NEUTS SEG NFR BLD: 61 % (ref 40–73)
NITRITE UR QL STRIP.AUTO: NEGATIVE
PH UR STRIP: 8 [PH] (ref 5–8)
PLATELET # BLD AUTO: 237 K/UL (ref 135–420)
PMV BLD AUTO: 10.4 FL (ref 9.2–11.8)
POTASSIUM SERPL-SCNC: 4.2 MMOL/L (ref 3.5–5.5)
PROT SERPL-MCNC: 7.2 G/DL (ref 6.4–8.2)
PROT UR STRIP-MCNC: NEGATIVE MG/DL
PSA SERPL-MCNC: 2.5 NG/ML (ref 0–4)
RBC # BLD AUTO: 4.53 M/UL (ref 4.7–5.5)
SODIUM SERPL-SCNC: 139 MMOL/L (ref 136–145)
SP GR UR REFRACTOMETRY: 1.02 (ref 1–1.03)
T4 FREE SERPL-MCNC: 1.2 NG/DL (ref 0.7–1.5)
TRIGL SERPL-MCNC: 86 MG/DL (ref ?–150)
TSH SERPL DL<=0.05 MIU/L-ACNC: 0.64 UIU/ML (ref 0.36–3.74)
UROBILINOGEN UR QL STRIP.AUTO: 1 EU/DL (ref 0.2–1)
VLDLC SERPL CALC-MCNC: 17.2 MG/DL
WBC # BLD AUTO: 5.9 K/UL (ref 4.6–13.2)

## 2019-04-08 PROCEDURE — 84443 ASSAY THYROID STIM HORMONE: CPT

## 2019-04-08 PROCEDURE — 85025 COMPLETE CBC W/AUTO DIFF WBC: CPT

## 2019-04-08 PROCEDURE — 36415 COLL VENOUS BLD VENIPUNCTURE: CPT

## 2019-04-08 PROCEDURE — 84153 ASSAY OF PSA TOTAL: CPT

## 2019-04-08 PROCEDURE — 80061 LIPID PANEL: CPT

## 2019-04-08 PROCEDURE — 80053 COMPREHEN METABOLIC PANEL: CPT

## 2019-04-08 PROCEDURE — 84439 ASSAY OF FREE THYROXINE: CPT

## 2019-04-08 PROCEDURE — 81003 URINALYSIS AUTO W/O SCOPE: CPT

## 2019-04-08 RX ORDER — METOPROLOL TARTRATE 50 MG/1
75 TABLET ORAL 2 TIMES DAILY
Qty: 270 TAB | Refills: 4 | Status: SHIPPED | OUTPATIENT
Start: 2019-04-08 | End: 2020-03-08 | Stop reason: SDUPTHER

## 2019-04-08 RX ORDER — NITROGLYCERIN 0.4 MG/1
0.4 TABLET SUBLINGUAL AS NEEDED
Qty: 1 BOTTLE | Refills: 0 | Status: SHIPPED | OUTPATIENT
Start: 2019-04-08 | End: 2019-07-19 | Stop reason: SDUPTHER

## 2019-04-08 RX ORDER — AMIODARONE HYDROCHLORIDE 200 MG/1
200 TABLET ORAL DAILY
Qty: 90 TAB | Refills: 4 | Status: SHIPPED | OUTPATIENT
Start: 2019-04-08 | End: 2020-05-03 | Stop reason: SDUPTHER

## 2019-04-08 RX ORDER — FUROSEMIDE 20 MG/1
20 TABLET ORAL DAILY
Qty: 90 TAB | Refills: 3 | Status: SHIPPED | OUTPATIENT
Start: 2019-04-08 | End: 2019-10-28 | Stop reason: ALTCHOICE

## 2019-04-08 RX ORDER — NYSTATIN AND TRIAMCINOLONE ACETONIDE 100000; 1 [USP'U]/G; MG/G
OINTMENT TOPICAL 4 TIMES DAILY
Qty: 30 G | Refills: 1 | Status: SHIPPED | OUTPATIENT
Start: 2019-04-08 | End: 2021-11-23

## 2019-04-08 NOTE — PROGRESS NOTES
Room # SUBJECTIVE: Anna Gudino is a 59 y.o. male who presents today for complete physical with EKG 1. Have you been to the ER, urgent care clinic since your last visit? Hospitalized since your last visit? NO 
 
2. Have you seen or consulted any other health care providers outside of the [address] [place] [address] since your last visit? Include any pap smears or colon screening. NO When : 
Reason: 
 
Health Maintenance reviewed Yes Health Maintenance Due Topic Date Due  Shingrix Vaccine Age 50> (1 of 2) 02/22/2005

## 2019-04-08 NOTE — PROGRESS NOTES
Pamela Wright. is a 59 y.o.  male and presents for a preventive health care visit Subjective: 
Health Maintenance History Immunizations reviewed, shingrex  indicated. colonoscopy: utd , Eye exam: utd , Chest CT: na , 
 
 
Patient Active Problem List  
 Diagnosis Date Noted  Cardiomyopathy ([facility] 12/28/2018  Chronic systolic congestive heart failure ([facility] 11/27/2018  
 HTN (hypertension) 11/04/2018  Alcoholism ([facility] 11/04/2018  Nontoxic multinodular goiter 02/07/2018  Atrial fibrillation ([facility] 11/28/2017  Elevated cholesterol 04/12/2016  Arthritis 11/13/2014 Current Outpatient Medications Medication Sig Dispense Refill  nystatin-triamcinolone (MYCOLOG) 100,000-0.1 unit/gram-% ointment Apply  to affected area four (4) times daily. 30 g 1  
 metoprolol tartrate (LOPRESSOR) 50 mg tablet Take 1.5 Tabs by mouth two (2) times a day. 270 Tab 4  
 amiodarone (CORDARONE) 200 mg tablet Take 1 Tab by mouth daily. 90 Tab 4  
 rivaroxaban (XARELTO) 20 mg tab tablet Take 1 Tab by mouth daily (with breakfast). 90 Tab 4  furosemide (LASIX) 20 mg tablet Take 1 Tab by mouth daily. 90 Tab 3  
 nitroglycerin (NITROSTAT) 0.4 mg SL tablet 1 Tab by SubLINGual route as needed for Chest Pain. 1 Bottle 0  
 multivit-min-FA-lycopen-lutein (CENTRUM SILVER MEN) 300-600-300 mcg tab Take 1 Tab by mouth daily. 100 Tab 12 No Known Allergies Past Medical History:  
Diagnosis Date  Arthritis  Atrial fibrillation ([facility] 11/28/2017 Cardioversion (11/18)  Hearing loss  HLD (hyperlipidemia)  HTN (hypertension)  Nontoxic multinodular goiter 2/7/2018 Past Surgical History:  
Procedure Laterality Date  CARDIAC SURG PROCEDURE UNLIST  HX HERNIA REPAIR    
 [place] CARDIOVERSION ELECTIVE ARRHYTHMIA EXTERNAL N/A 12/3/2018 EP CARDIOVERSION performed by Meda Dance, MD at [facility] LAB Family History Problem Relation Age of Onset  Diabetes Father Social History Tobacco Use  Smoking status: Former Smoker  Smokeless tobacco: Never Used Substance Use Topics  Alcohol use: Yes Alcohol/week: 12.5 oz Types: 25 Shots of liquor per week Comment: nothing to dinkk since 11/25/18 ROS General: negative for - chills, fatigue, fever, weight change Psych: negative for - anxiety, depression, irritability or mood swings ENT: negative for - headaches, hearing change, nasal congestion, oral lesions, sneezing or sore throat Heme/ Lymph: negative for - bleeding problems, bruising, pallor or swollen lymph nodes Endo: negative for - hot flashes, polydipsia/polyuria or temperature intolerance Resp: negative for - cough, shortness of breath or wheezing CV: negative for - chest pain, edema or palpitations GI: negative for - abdominal pain, change in bowel habits, constipation, diarrhea or nausea/vomiting : negative for - dysuria, hematuria, incontinence, pelvic pain or vulvar/vaginal symptoms MSK: negative for - joint pain, joint swelling or muscle pain Neuro: negative for - confusion, headaches, seizures or weakness Derm: negative for - dry skin, hair changes, rash or skin lesion changes Objective: 
Vitals:  
 04/08/19 7695 04/08/19 0940 BP: 150/88 150/88 Pulse: 70 70 Resp: 20 Temp: 97.9 °F (36.6 °C) TempSrc: Oral   
SpO2: 98% Weight: 168 lb 6.4 oz (76.4 kg) Height: 5' 10\" (1.778 m) PainSc:   0 - No pain   
alert, well appearing, and in no distress, oriented to person, place, and time and normal appearing weight General appearance - alert, well appearing, and in no distress, oriented to person, place, and time and normal appearing weight Visit Vitals /88 (BP 1 Location: Left arm, BP Patient Position: Sitting) Pulse 70 Temp 97.9 °F (36.6 °C) (Oral) Resp 20 Ht 5' 10\" (1.778 m) Wt 168 lb 6.4 oz (76.4 kg) SpO2 98% BMI 24.16 kg/m² General appearance  alert, cooperative, no distress, appears stated age Head  Normocephalic, without obvious abnormality, atraumatic Eyes  conjunctivae/corneas clear. PERRL, EOM's intact. Fundi benign Ears  normal TM's and external ear canals AU Nose Nares normal. Septum midline. Mucosa normal. No drainage or sinus tenderness. Throat Lips, mucosa, and tongue normal. Teeth and gums normal  
Neck supple, symmetrical, trachea midline, no adenopathy, thyroid: not enlarged, symmetric, no tenderness/mass/nodules, no carotid bruit and no JVD Back   symmetric, no curvature. ROM normal. No CVA tenderness Lungs   clear to auscultation bilaterally Chest wall  no tenderness Heart  regular rate and rhythm, S1, S2 normal, no murmur, click, rub or gallop Abdomen   soft, non-tender. Bowel sounds normal. No masses,  No organomegaly Genitalia  Normal male Rectal  Normal tone, normal prostate, no masses or tenderness Guaiac negative stool Extremities extremities normal, atraumatic, no cyanosis or edema Pulses 2+ and symmetric Skin Skin color, texture, turgor normal. No rashes or lesions Lymph nodes Cervical, supraclavicular, and axillary nodes normal.  
Neurologic Normal  
 
 
LABS Ordered TESTS 
ekg  nsr Assessment/Plan:  Healthy patient except as noted below: 
 
Health Maintenance up to date. Recommend f/u physical 1 year. Routine screening labs/tests recommended prior to next physical. 
 
 
Lab review: orders written for new lab studies as appropriate; see orders, no lab studies available for review at time of visit I have discussed the diagnosis with the patient and the intended plan as seen in the above orders. The patient has received an after-visit summary and questions were answered concerning future plans. I have discussed medication side effects and warnings with the patient as well. I have reviewed the plan of care with the patient, accepted their input and they are in agreement with the treatment goals. ------------------------------------------------------------------------------------------------------------------- 
 
Problem Assessment 
and also with Chief Complaint Patient presents with  CHF  Irregular Heart Beat  Thyroid Problem  Hypertension  Cholesterol Problem  Arthritis HPI ; 
Cardiovascular Review: 
The patient has hypertension, hyperlipidemia, CHF and Atrial Fibrillation. Diet and Lifestyle: not attempting to follow a low fat, low cholesterol diet, not attempting to follow a low sodium diet Home BP Monitoring: is not measured at home. Pertinent ROS: taking medications as instructed, no medication side effects noted, no TIA's, no chest pain on exertion, no dyspnea on exertion, no swelling of ankles. Thyroid Review: 
Patient is seen for followup of hypothyroidism. Thyroid ROS: denies fatigue, weight changes, heat/cold intolerance, bowel/skin changes or CVS symptoms. Osteoarthritis and Chronic Pain: 
Patient has osteoarthritis, primarily affecting the diffuse. Symptoms onset: problem is longstanding. Rheumatological ROS: no current joint or muscle symptoms, essentially pain-free. Response to treatment plan: stable. Additional Concerns:   
 
 
 
Assessment/Plan:   
 
Atrial fib controlled On xarelto for stroke 
chf stable Goiter stable 
arthris stable 
htn stable Cholesterol stable Diagnoses and all orders for this visit: 1. Essential hypertension -     AMB POC EKG ROUTINE W/ 12 LEADS, INTER & REP 
-     nystatin-triamcinolone (MYCOLOG) 100,000-0.1 unit/gram-% ointment; Apply  to affected area four (4) times daily. -     metoprolol tartrate (LOPRESSOR) 50 mg tablet; Take 1.5 Tabs by mouth two (2) times a day. -     amiodarone (CORDARONE) 200 mg tablet; Take 1 Tab by mouth daily. -     rivaroxaban (XARELTO) 20 mg tab tablet; Take 1 Tab by mouth daily (with breakfast). -     furosemide (LASIX) 20 mg tablet; Take 1 Tab by mouth daily. -     nitroglycerin (NITROSTAT) 0.4 mg SL tablet; 1 Tab by SubLINGual route as needed for Chest Pain. 
-     multivit-min-FA-lycopen-lutein (CENTRUM SILVER MEN) 300-600-300 mcg tab; Take 1 Tab by mouth daily. 
-     REFERRAL TO DERMATOLOGY 
-     LIPID PANEL; Future -     CBC WITH AUTOMATED DIFF; Future -     METABOLIC PANEL, COMPREHENSIVE; Future -     PSA, DIAGNOSTIC (PROSTATE SPECIFIC AG); Future -     URINALYSIS W/ RFLX MICROSCOPIC; Future 
-     TSH 3RD GENERATION; Future -     T4, FREE; Future 2. Atrial fibrillation, unspecified type ([facility] 
-     nystatin-triamcinolone (MYCOLOG) 100,000-0.1 unit/gram-% ointment; Apply  to affected area four (4) times daily. -     metoprolol tartrate (LOPRESSOR) 50 mg tablet; Take 1.5 Tabs by mouth two (2) times a day. -     amiodarone (CORDARONE) 200 mg tablet; Take 1 Tab by mouth daily. -     rivaroxaban (XARELTO) 20 mg tab tablet; Take 1 Tab by mouth daily (with breakfast). -     furosemide (LASIX) 20 mg tablet; Take 1 Tab by mouth daily. -     nitroglycerin (NITROSTAT) 0.4 mg SL tablet; 1 Tab by SubLINGual route as needed for Chest Pain. 
-     multivit-min-FA-lycopen-lutein (CENTRUM SILVER MEN) 300-600-300 mcg tab; Take 1 Tab by mouth daily. 
-     REFERRAL TO DERMATOLOGY 
-     LIPID PANEL; Future -     CBC WITH AUTOMATED DIFF; Future -     METABOLIC PANEL, COMPREHENSIVE; Future -     PSA, DIAGNOSTIC (PROSTATE SPECIFIC AG); Future -     URINALYSIS W/ RFLX MICROSCOPIC; Future 
-     TSH 3RD GENERATION; Future -     T4, FREE; Future 3. Nontoxic multinodular goiter 
-     nystatin-triamcinolone (MYCOLOG) 100,000-0.1 unit/gram-% ointment; Apply  to affected area four (4) times daily. -     metoprolol tartrate (LOPRESSOR) 50 mg tablet; Take 1.5 Tabs by mouth two (2) times a day. -     amiodarone (CORDARONE) 200 mg tablet; Take 1 Tab by mouth daily. -     rivaroxaban (XARELTO) 20 mg tab tablet; Take 1 Tab by mouth daily (with breakfast). -     furosemide (LASIX) 20 mg tablet; Take 1 Tab by mouth daily. -     nitroglycerin (NITROSTAT) 0.4 mg SL tablet; 1 Tab by SubLINGual route as needed for Chest Pain. 
-     multivit-min-FA-lycopen-lutein (CENTRUM SILVER MEN) 300-600-300 mcg tab; Take 1 Tab by mouth daily. 
-     REFERRAL TO DERMATOLOGY 
-     LIPID PANEL; Future -     CBC WITH AUTOMATED DIFF; Future -     METABOLIC PANEL, COMPREHENSIVE; Future -     PSA, DIAGNOSTIC (PROSTATE SPECIFIC AG); Future -     URINALYSIS W/ RFLX MICROSCOPIC; Future 
-     TSH 3RD GENERATION; Future -     T4, FREE; Future 4. Elevated cholesterol 
-     nystatin-triamcinolone (MYCOLOG) 100,000-0.1 unit/gram-% ointment; Apply  to affected area four (4) times daily. -     metoprolol tartrate (LOPRESSOR) 50 mg tablet; Take 1.5 Tabs by mouth two (2) times a day. -     amiodarone (CORDARONE) 200 mg tablet; Take 1 Tab by mouth daily. -     rivaroxaban (XARELTO) 20 mg tab tablet; Take 1 Tab by mouth daily (with breakfast). -     furosemide (LASIX) 20 mg tablet; Take 1 Tab by mouth daily. -     nitroglycerin (NITROSTAT) 0.4 mg SL tablet; 1 Tab by SubLINGual route as needed for Chest Pain. 
-     multivit-min-FA-lycopen-lutein (CENTRUM SILVER MEN) 300-600-300 mcg tab; Take 1 Tab by mouth daily. 
-     REFERRAL TO DERMATOLOGY 
-     LIPID PANEL; Future -     CBC WITH AUTOMATED DIFF; Future -     METABOLIC PANEL, COMPREHENSIVE; Future -     PSA, DIAGNOSTIC (PROSTATE SPECIFIC AG); Future -     URINALYSIS W/ RFLX MICROSCOPIC; Future 
-     TSH 3RD GENERATION; Future -     T4, FREE; Future 5. Essential hypertension, malignant 
-     nystatin-triamcinolone (MYCOLOG) 100,000-0.1 unit/gram-% ointment; Apply  to affected area four (4) times daily. -     metoprolol tartrate (LOPRESSOR) 50 mg tablet; Take 1.5 Tabs by mouth two (2) times a day. -     amiodarone (CORDARONE) 200 mg tablet; Take 1 Tab by mouth daily. -     rivaroxaban (XARELTO) 20 mg tab tablet; Take 1 Tab by mouth daily (with breakfast). -     furosemide (LASIX) 20 mg tablet; Take 1 Tab by mouth daily. -     nitroglycerin (NITROSTAT) 0.4 mg SL tablet; 1 Tab by SubLINGual route as needed for Chest Pain. 
-     multivit-min-FA-lycopen-lutein (CENTRUM SILVER MEN) 300-600-300 mcg tab; Take 1 Tab by mouth daily. 
-     REFERRAL TO DERMATOLOGY 
-     LIPID PANEL; Future -     CBC WITH AUTOMATED DIFF; Future -     METABOLIC PANEL, COMPREHENSIVE; Future -     PSA, DIAGNOSTIC (PROSTATE SPECIFIC AG); Future -     URINALYSIS W/ RFLX MICROSCOPIC; Future 
-     TSH 3RD GENERATION; Future -     T4, FREE; Future 6. Rash 
-     nystatin-triamcinolone (MYCOLOG) 100,000-0.1 unit/gram-% ointment; Apply  to affected area four (4) times daily. -     metoprolol tartrate (LOPRESSOR) 50 mg tablet; Take 1.5 Tabs by mouth two (2) times a day. -     amiodarone (CORDARONE) 200 mg tablet; Take 1 Tab by mouth daily. -     rivaroxaban (XARELTO) 20 mg tab tablet; Take 1 Tab by mouth daily (with breakfast). -     furosemide (LASIX) 20 mg tablet; Take 1 Tab by mouth daily. -     nitroglycerin (NITROSTAT) 0.4 mg SL tablet; 1 Tab by SubLINGual route as needed for Chest Pain. 
-     multivit-min-FA-lycopen-lutein (CENTRUM SILVER MEN) 300-600-300 mcg tab; Take 1 Tab by mouth daily. 
-     REFERRAL TO DERMATOLOGY 
-     LIPID PANEL; Future -     CBC WITH AUTOMATED DIFF; Future -     METABOLIC PANEL, COMPREHENSIVE; Future -     PSA, DIAGNOSTIC (PROSTATE SPECIFIC AG); Future -     URINALYSIS W/ RFLX MICROSCOPIC; Future 
-     TSH 3RD GENERATION; Future -     T4, FREE; Future 7. Rectal bleeding 
-     REFERRAL TO SURGERY 
-     LIPID PANEL; Future -     CBC WITH AUTOMATED DIFF; Future -     METABOLIC PANEL, COMPREHENSIVE; Future -     PSA, DIAGNOSTIC (PROSTATE SPECIFIC AG); Future -     URINALYSIS W/ RFLX MICROSCOPIC; Future 
-     TSH 3RD GENERATION; Future -     T4, FREE; Future 8. Encounter for immunization 
-     ZOSTER VACC RECOMBINANT ADJUVANTED Lab review: labs are reviewed, up to date and normal

## 2019-04-09 ENCOUNTER — TELEPHONE (OUTPATIENT)
Dept: FAMILY MEDICINE CLINIC | Age: 64
End: 2019-04-09

## 2019-04-09 DIAGNOSIS — Z00.00 ROUTINE GENERAL MEDICAL EXAMINATION AT A HEALTH CARE FACILITY: ICD-10-CM

## 2019-04-09 DIAGNOSIS — M19.90 ARTHRITIS: ICD-10-CM

## 2019-04-09 DIAGNOSIS — I10 ESSENTIAL HYPERTENSION, MALIGNANT: ICD-10-CM

## 2019-04-09 RX ORDER — ATORVASTATIN CALCIUM 10 MG/1
10 TABLET, FILM COATED ORAL DAILY
Qty: 90 TAB | Refills: 4 | Status: SHIPPED | OUTPATIENT
Start: 2019-04-09 | End: 2020-04-13 | Stop reason: SDUPTHER

## 2019-04-16 ENCOUNTER — TELEPHONE (OUTPATIENT)
Dept: FAMILY MEDICINE CLINIC | Age: 64
End: 2019-04-16

## 2019-04-16 DIAGNOSIS — K62.5 RECTAL BLEEDING: Primary | ICD-10-CM

## 2019-04-16 NOTE — TELEPHONE ENCOUNTER
Andrew Hanson from [facility] stated that they will not be able to accommodate the patient because they do not accept his [facility].

## 2019-04-16 NOTE — TELEPHONE ENCOUNTER
Attempted to contact patient Antonio Meyers. regarding referral to colorectal surgeon that accepts his insurance. Call back number left and I myself or one of the other nurses will attempt to contact again.

## 2019-04-17 NOTE — TELEPHONE ENCOUNTER
Patient called In  ref to referral [facility] [phone] on [address] and they take his insurance.  Please assist.

## 2019-04-18 NOTE — TELEPHONE ENCOUNTER
Attempted to contact patient Blake Bailonr. regarding change in his referral. Message left advising patient that referral was changed to [facility] [place] and he is welcome to call to schedule an appointment. Any further questions call back number was provided. Referral documents sent.

## 2019-04-24 ENCOUNTER — OFFICE VISIT (OUTPATIENT)
Dept: CARDIOLOGY CLINIC | Age: 64
End: 2019-04-24

## 2019-04-24 VITALS
BODY MASS INDEX: 24.05 KG/M2 | DIASTOLIC BLOOD PRESSURE: 87 MMHG | WEIGHT: 168 LBS | HEART RATE: 88 BPM | HEIGHT: 70 IN | OXYGEN SATURATION: 96 % | SYSTOLIC BLOOD PRESSURE: 156 MMHG

## 2019-04-24 DIAGNOSIS — I10 ESSENTIAL HYPERTENSION: ICD-10-CM

## 2019-04-24 DIAGNOSIS — I42.9 CARDIOMYOPATHY, UNSPECIFIED TYPE (HCC): ICD-10-CM

## 2019-04-24 DIAGNOSIS — E78.00 ELEVATED CHOLESTEROL: ICD-10-CM

## 2019-04-24 DIAGNOSIS — I48.20 CHRONIC ATRIAL FIBRILLATION (HCC): Primary | ICD-10-CM

## 2019-04-24 DIAGNOSIS — I50.22 CHRONIC SYSTOLIC CONGESTIVE HEART FAILURE (HCC): ICD-10-CM

## 2019-04-24 NOTE — PROGRESS NOTES
1. Have you been to the ER, urgent care clinic since your last visit? Hospitalized since your last visit? No  
 
2. Have you seen or consulted any other health care providers outside of the [address] [place] [address] since your last visit? Include any pap smears or colon screening.   No

## 2019-04-26 ENCOUNTER — TELEPHONE (OUTPATIENT)
Dept: FAMILY MEDICINE CLINIC | Age: 64
End: 2019-04-26

## 2019-04-26 NOTE — TELEPHONE ENCOUNTER
Pt. Needs gastro referral sent to another doctor office because Dr. Kuldip Siddiqui office jazmyn not accept his insurance.  Please assist.

## 2019-04-28 NOTE — PROGRESS NOTES
Subjective: Nusrat Young is in the office today for cardiac reevaluation. He is a 19-year-old man that has a diagnosis of chronic atrial fibrillation. He saw Dr. David Pabon  in late November 2017 and was found to be in atrial fibrillation. At that time he was without symptoms. He had no limiting shortness of breath. He was not experiencing peripheral swelling. He  had no dizziness, near syncope or syncope. He was admitted to [facility] in 04/2018 for evaluation of shoulder and neck pain. A nuclear stress test was done which did not demonstrate any evidence for ischemia. An Echocardiogram was also ordered and done in 04/2018. His EF was 45%. There was mild to moderate MR. He was hospitalized at [facility] in December 2018. He  presented with atrial fibrillation with a rapid ventricular response on 12/4/2018. He was having congestive heart failure type symptoms. He subsequently had a CAITLIN directed cardioversion. He had been started on intravenous amiodarone which was later switched to p.o dosing. In the office today, she reports that he feels \"good \". He is tired in the mornings. He has had no chest pain or shortness of breath. He has developed some frequent hemorrhoidal bleeding. This is to be evaluated by GI sometime here in the near future. Patient Active Problem List  
 Diagnosis Date Noted  Cardiomyopathy ([facility] 12/28/2018  Chronic systolic congestive heart failure ([facility] 11/27/2018  
 HTN (hypertension) 11/04/2018  Alcoholism ([facility] 11/04/2018  Nontoxic multinodular goiter 02/07/2018  Atrial fibrillation ([facility] 11/28/2017  Elevated cholesterol 04/12/2016  Arthritis 11/13/2014 Current Outpatient Medications Medication Sig Dispense Refill  POTASSIUM ACETATE by Does Not Apply route.  atorvastatin (LIPITOR) 10 mg tablet Take 1 Tab by mouth daily.  90 Tab 4  
 nystatin-triamcinolone (MYCOLOG) 100,000-0.1 unit/gram-% ointment Apply to affected area four (4) times daily. 30 g 1  
 metoprolol tartrate (LOPRESSOR) 50 mg tablet Take 1.5 Tabs by mouth two (2) times a day. 270 Tab 4  
 amiodarone (CORDARONE) 200 mg tablet Take 1 Tab by mouth daily. 90 Tab 4  
 rivaroxaban (XARELTO) 20 mg tab tablet Take 1 Tab by mouth daily (with breakfast). 90 Tab 4  furosemide (LASIX) 20 mg tablet Take 1 Tab by mouth daily. 90 Tab 3  
 nitroglycerin (NITROSTAT) 0.4 mg SL tablet 1 Tab by SubLINGual route as needed for Chest Pain. 1 Bottle 0  
 multivit-min-FA-lycopen-lutein (CENTRUM SILVER MEN) 300-600-300 mcg tab Take 1 Tab by mouth daily. 100 Tab 12 No Known Allergies Past Medical History:  
Diagnosis Date  Arthritis  Atrial fibrillation ([facility] 11/28/2017 Cardioversion (11/18)  Hearing loss  HLD (hyperlipidemia)  HTN (hypertension)  Nontoxic multinodular goiter 2/7/2018 Past Surgical History:  
Procedure Laterality Date  CARDIAC SURG PROCEDURE UNLIST  HX HERNIA REPAIR    
 [place] CARDIOVERSION ELECTIVE ARRHYTHMIA EXTERNAL N/A 12/3/2018 EP CARDIOVERSION performed by Alis Mayo MD at [address] [place] [address] LAB Family History Problem Relation Age of Onset  Diabetes Father Social History Tobacco Use Smoking Status Former Smoker Smokeless Tobacco Never Used Review of Systems, additional: 
Constitutional: negative Eyes: negative Respiratory: negative Cardiovascular: negative Gastrointestinal: negative Musculoskeletal:negative Neurological: negative Behvioral/Psych: negative Endocrine: negative ENT: negative Objective:  
 
Visit Vitals /87 Pulse 88 Ht 5' 10\" (1.778 m) Wt 168 lb (76.2 kg) SpO2 96% BMI 24.11 kg/m² General:  alert, cooperative, no distress Chest Wall: inspection normal - no chest wall deformities or tenderness, respiratory effort normal  
Lung: clear to auscultation bilaterally Heart:  irregularly irregular rhythm with rate 81  
 Abdomen: soft, non-tender. Bowel sounds normal. No masses,  no organomegaly Extremities: extremities normal, atraumatic, no cyanosis or edema Skin: no rashes Neuro: alert, oriented, normal speech, no focal findings or movement disorder noted Assessment/Plan: ICD-10-CM ICD-9-CM 1. Atrial fibrillation, unspecified type ([facility] continue amiodarone, metoprolol and Xarelto. Stable. Return 2 months I48.91 427.31 2D ECHO COMPLETE ADULT (TTE) W OR WO CONTR 2. Essential hypertension, malignant, mildly elevated systolic BP in the office today. I10 401.0 3. Thyroid mass E07.9 246.9 4. Elevated cholesterol, followed by Dr. Autumn Todd E78.00 272.0 5. Normal cardiac stress test, 4/17/2018, no evidence for prior scarring or ongoing ischemia. Z13.6 V81.2   
6       Presumed hemorrhoidal bleeding, GI evaluation planned. Patient on Xarelto.

## 2019-05-30 ENCOUNTER — TELEPHONE (OUTPATIENT)
Dept: FAMILY MEDICINE CLINIC | Age: 64
End: 2019-05-30

## 2019-05-30 NOTE — TELEPHONE ENCOUNTER
Pharmacy called in and was wanting to let us know that the medication nystatin-triamcinolone (MYCOLOG) 100,000-0.1 unit/gram-% ointment needs a prior authorization. Please advise.

## 2019-06-03 ENCOUNTER — TELEPHONE (OUTPATIENT)
Dept: FAMILY MEDICINE CLINIC | Age: 64
End: 2019-06-03

## 2019-06-03 NOTE — TELEPHONE ENCOUNTER
Called patient:. ...: identified self and office, obtained two patient identifiers of name and date of birth) to let him know that he has a refill, patient stated that he is aware of this and that he was told that it is not covered by his insurance but it is covered and that the  told him that it is and that he will contact his insurance company again because the people at [facility] is always doing this. Patient will contact the office with a follow up maxime he has an update on this. No other question at this time.  Closing encounter

## 2019-06-07 ENCOUNTER — TELEPHONE (OUTPATIENT)
Dept: FAMILY MEDICINE CLINIC | Age: 64
End: 2019-06-07

## 2019-06-07 NOTE — TELEPHONE ENCOUNTER
Received call from [facility], [place] Jeron[place] stating that the prior auth needs to be redone in order for it to be approve. Forms faxed to office to be completed. No other issues at this time.

## 2019-06-10 NOTE — TELEPHONE ENCOUNTER
Pt. Stated medication is $154. He either needs a new prescription or he needs 2 separate prescriptions because it is a \"compound\". Please advise.

## 2019-06-11 ENCOUNTER — TELEPHONE (OUTPATIENT)
Dept: FAMILY MEDICINE CLINIC | Age: 64
End: 2019-06-11

## 2019-06-11 NOTE — TELEPHONE ENCOUNTER
Called patient (Called patient:. ...: identified self and office, obtained two patient identifiers of name and date of birth) to notify him that our office has received prior authorization request approval for medication Nystatin/Triamcinolone Ointment [facility] 141881-4.1 unit gram for 1 year duration Patient and pharmacy notified.

## 2019-06-19 ENCOUNTER — OFFICE VISIT (OUTPATIENT)
Dept: FAMILY MEDICINE CLINIC | Age: 64
End: 2019-06-19

## 2019-06-19 ENCOUNTER — HOSPITAL ENCOUNTER (OUTPATIENT)
Dept: LAB | Age: 64
Discharge: HOME | End: 2019-06-19
Payer: COMMERCIAL

## 2019-06-19 VITALS
OXYGEN SATURATION: 98 % | SYSTOLIC BLOOD PRESSURE: 151 MMHG | TEMPERATURE: 98.2 F | HEIGHT: 70 IN | DIASTOLIC BLOOD PRESSURE: 99 MMHG | HEART RATE: 79 BPM | BODY MASS INDEX: 23.77 KG/M2 | RESPIRATION RATE: 20 BRPM | WEIGHT: 166 LBS

## 2019-06-19 DIAGNOSIS — K62.5 RECTAL BLEEDING: ICD-10-CM

## 2019-06-19 DIAGNOSIS — Z23 ENCOUNTER FOR IMMUNIZATION: ICD-10-CM

## 2019-06-19 DIAGNOSIS — B36.9 FUNGAL SKIN INFECTION: ICD-10-CM

## 2019-06-19 DIAGNOSIS — R25.1 TREMOR: ICD-10-CM

## 2019-06-19 DIAGNOSIS — E04.2 NONTOXIC MULTINODULAR GOITER: ICD-10-CM

## 2019-06-19 DIAGNOSIS — M54.12 CERVICAL RADICULOPATHY: ICD-10-CM

## 2019-06-19 DIAGNOSIS — F10.20 ALCOHOLISM (HCC): ICD-10-CM

## 2019-06-19 DIAGNOSIS — D75.89 MACROCYTOSIS: ICD-10-CM

## 2019-06-19 DIAGNOSIS — R20.2 PARESTHESIA: ICD-10-CM

## 2019-06-19 DIAGNOSIS — R25.1 TREMOR: Primary | ICD-10-CM

## 2019-06-19 LAB
FOLATE SERPL-MCNC: >20 NG/ML (ref 3.1–17.5)
VIT B12 SERPL-MCNC: 499 PG/ML (ref 211–911)

## 2019-06-19 PROCEDURE — 82746 ASSAY OF FOLIC ACID SERUM: CPT

## 2019-06-19 PROCEDURE — 36415 COLL VENOUS BLD VENIPUNCTURE: CPT

## 2019-06-19 NOTE — PROGRESS NOTES
Subjective     Patient ID:  Solitario Marcelo. is a 59 y.o. ( 1955) male who presents for the following:   Establish Care      HPI   His previous PCP was Dr Natalio Ramirez. Here to establish care with me. He reports left arm tingling on and off, worse in the last 2 months, occurring multiple times throughout the day. Also reporting tingling of the bilateral hands and feet. Also reporting intermittent action tremor of the hands. Getting worse. Left thumb pain. Go [place] and hit the thumb last year, but did not have pain until recently. Also popping and swelling. No treatment so far. Left axilla red rash. Nystatin helped, but needs a refill. History of thyroid nodules. Biopsy in 2016 which showed 2 benign follicular nodules. Admits to drinking multiple glasses of liquor every day. History of hemorrhoids with rectal bleeding, however no symptoms currently. Macrocytosis without anemia on recent labs. Hypertension follow up:  Taking medications as prescribed: Yes  Checking BP at home: Yes, running about 120/80   Symptoms: none  Low sodium diet: Yes  Exercise: Yes walking daily          Review of Systems   Constitutional: Negative for appetite change, diaphoresis, fatigue and unexpected weight change. Eyes: Negative for visual disturbance. Respiratory: Negative for cough, chest tightness and shortness of breath. Cardiovascular: Negative for chest pain, palpitations and leg swelling. Gastrointestinal: Negative for abdominal distention, abdominal pain, blood in stool, constipation, diarrhea, nausea, rectal pain and vomiting. Endocrine: Negative for polydipsia, polyphagia and polyuria. Genitourinary: Negative for decreased urine volume, dysuria and frequency. Musculoskeletal: Positive for arthralgias (Left thumb). Negative for joint swelling and myalgias. Skin: Positive for rash. Negative for wound. Neurological: Positive for tremors.  Negative for dizziness, weakness, light-headedness, numbness and headaches. Tingling   Psychiatric/Behavioral: Negative for dysphoric mood and sleep disturbance. The patient is not nervous/anxious. Past Medical History, Past Surgery History, Allergies, Social History, and Family History were reviewed and updated.       Past Medical History:   Diagnosis Date    Arthritis     Atrial fibrillation ([address] 2017    Cardioversion ()    Colon polyps 2016    Hearing loss     HLD (hyperlipidemia)     HTN (hypertension)     Nontoxic multinodular goiter 2018    Biopsy of two nodules in 2016 - benign     Past Surgical History:   Procedure Laterality Date    CARDIAC SURG PROCEDURE UNLIST      HX HERNIA REPAIR      [place] CARDIOVERSION ELECTIVE ARRHYTHMIA EXTERNAL N/A 12/3/2018    EP CARDIOVERSION performed by Christine Fenton [facility] at [facility]     Family History   Problem Relation Age of Onset    Diabetes Father      Social History     Socioeconomic History    Marital status: SINGLE     Spouse name: Not on file    Number of children: Not on file    Years of education: Not on file    Highest education level: Not on file   Occupational History    Occupation: [employer]   Social Needs    Financial resource strain: Not on file    Food insecurity:     Worry: Not on file     Inability: Not on file   [email or web address] needs:     Medical: Not on file     Non-medical: Not on file   Tobacco Use    Smoking status: Former Smoker     Last attempt to quit: 2015     Years since quittin.0    Smokeless tobacco: Never Used   Substance and Sexual Activity    Alcohol use: Yes     Comment: about 10 oz of scotch per day    Drug use: No    Sexual activity: Yes     Partners: Female   Lifestyle    Physical activity:     Days per week: Not on file     Minutes per session: Not on file    Stress: Not on file   Relationships    Social connections:     Talks on phone: Not on file     Gets together: Not on file     Attends Scientologist service: Not on file     Active member of club or organization: Not on file     Attends meetings of clubs or organizations: Not on file     Relationship status: Not on file    Intimate partner violence:     Fear of current or ex partner: Not on file     Emotionally abused: Not on file     Physically abused: Not on file     Forced sexual activity: Not on file   Other Topics Concern     Service Not Asked    Blood Transfusions Not Asked    Caffeine Concern Not Asked    Occupational Exposure Not Asked    Hobby Hazards Not Asked    Sleep Concern Yes     Comment: wakes up once, 7 hours total    Stress Concern No    Weight Concern Not Asked    Special Diet Not Asked    Back Care Not Asked    Exercise Not Asked    Bike Helmet Not Asked   [address] [facility] Not Asked    Self-Exams Not Asked   Social History Narrative    Not on file     No Known Allergies  Current Outpatient Medications on File Prior to Visit   Medication Sig Dispense Refill    POTASSIUM ACETATE 1 Tab by Does Not Apply route daily.  atorvastatin (LIPITOR) 10 mg tablet Take 1 Tab by mouth daily. 90 Tab 4    nystatin-triamcinolone (MYCOLOG) 100,000-0.1 unit/gram-% ointment Apply  to affected area four (4) times daily. 30 g 1    metoprolol tartrate (LOPRESSOR) 50 mg tablet Take 1.5 Tabs by mouth two (2) times a day. 270 Tab 4    amiodarone (CORDARONE) 200 mg tablet Take 1 Tab by mouth daily. 90 Tab 4    rivaroxaban (XARELTO) 20 mg tab tablet Take 1 Tab by mouth daily (with breakfast). 90 Tab 4    furosemide (LASIX) 20 mg tablet Take 1 Tab by mouth daily. 90 Tab 3    nitroglycerin (NITROSTAT) 0.4 mg SL tablet 1 Tab by SubLINGual route as needed for Chest Pain. 1 Bottle 0    multivit-min-FA-lycopen-lutein (CENTRUM SILVER MEN) 300-600-300 mcg tab Take 1 Tab by mouth daily. 100 Tab 12     No current facility-administered medications on file prior to visit.         Objective     Visit Vitals  BP (!) 151/99   Pulse 79   Temp 98.2 °F (36.8 °C) (Oral)   Resp 20   Ht 5' 10\" (1.778 m)   Wt 166 lb (75.3 kg)   SpO2 98%   BMI 23.82 kg/m²     No LMP for male patient. Physical Exam   Constitutional: He is oriented to person, place, and time. He appears well-developed and well-nourished. No distress. Eyes: Conjunctivae and EOM are normal. Pupils are equal, round, and reactive to light. Neck: Carotid bruit is not present. Cardiovascular: Normal rate, regular rhythm, normal heart sounds, intact distal pulses and normal pulses. No murmur heard. Pulmonary/Chest: Effort normal and breath sounds normal. No respiratory distress. Abdominal: Soft. Normal appearance and bowel sounds are normal. He exhibits no distension, no ascites and no mass. There is no hepatosplenomegaly. There is no tenderness. Musculoskeletal: He exhibits no edema. Cervical back: Normal.        Left upper arm: Normal.        Left forearm: Normal.        Left hand: Normal sensation noted. Normal strength noted. Hands:  Neurological: He is alert and oriented to person, place, and time. He has normal strength. He displays tremor (Tremors of bilateral hands, increased when arms are extended. ). He displays no atrophy. No cranial nerve deficit or sensory deficit. He exhibits normal muscle tone. Coordination and gait normal.   Skin: Skin is warm and dry. No rash noted. He is not diaphoretic. Psychiatric: He has a normal mood and affect. LABS     TESTS      Assessment and Plan     1. Tremor  Recent thyroid function labs were normal.  This is likely due to alcohol withdrawal since he drinks heavily in the evenings and not during the day. - VITAMIN B12 & FOLATE; Future    2. Paresthesia    - VITAMIN B12 & FOLATE; Future    3. Fungal skin infection  Continue antifungal.      4. Rectal bleeding  None currently. If rectal bleeding recurs again seek care right away. 5. Nontoxic multinodular goiter    - US THYROID/PARATHYROID/SOFT TISS; Future    6. Macrocytosis    - VITAMIN B12 & FOLATE; Future    7. Encounter for immunization    - varicella-zoster recombinant, PF, (SHINGRIX) 50 mcg/0.5 mL susr injection; 0.5 mL by IntraMUSCular route once for 1 dose. Dispense: 1 Each; Refill: 1    8. Cervical radiculopathy  May use topical heat, ice, stretches and strengthening exercises. Follow-up if symptoms worsen or fail to improve. 9.  Alcoholism  Discussed and strongly encouraged alcohol cessation. Follow-up and Dispositions    · Return in about 3 months (around 9/19/2019) for Medication follow up. Risks, benefits, and alternatives of the medications and treatment plan prescribed today were discussed, and patient expressed understanding. Printed after visit summary was given to patient and reviewed. All patient questions and concerns were addressed. Plan follow-up as discussed or as needed if any worsening symptoms or change in condition.            Signed electronically by Lis Saeed DNP, FNP-BC

## 2019-06-19 NOTE — PROGRESS NOTES
1. Have you been to the ER, urgent care clinic since your last visit? Hospitalized since your last visit? No    2. Have you seen or consulted any other health care providers outside of the [address] [place] [address] since your last visit? Include any pap smears or colon screening.  No

## 2019-06-19 NOTE — PATIENT INSTRUCTIONS
Learning About Alcohol Misuse  What is alcohol misuse? Alcohol misuse means drinking so much that it causes problems for you or others. Early problems with alcohol can start at home. You may argue with loved ones about how much you're drinking. Your job may be affected because of drinking. You may drink when it's dangerous or illegal, such as when you drive. Drinking too much for a long time can lead to health conditions like high blood pressure and liver problems. What are the symptoms? Symptoms of alcohol misuse may include:  · Drinking much more than you planned. · Drinking even though it's causing problems for you or others. · Putting yourself in situations where you might get hurt. · Wanting to cut down or stop drinking, but not being able to. · Feeling guilty about how much you're drinking. How is alcohol misuse treated? Getting help for problems with alcohol is up to you. But you don't have to do it alone. There are many people and kinds of treatments to help with alcohol problems. Talking to your doctor is the first step. When you get a doctor's help, treatment for alcohol problems can be safer and quicker. Treatment options can include:  · Treatment programs. Examples are group therapy, one or more types of counseling, and alcohol education. · Medicines. A doctor or counselor can help you know what kinds of medicines might help with cravings. · Free social support groups. These groups include AA (Alcoholics Anonymous) and SMART (Self-Management and Recovery Training). Your doctor can help you decide which type of program is best for you. Follow-up care is a key part of your treatment and safety. Be sure to make and go to all appointments, and call your doctor if you are having problems. It's also a good idea to know your test results and keep a list of the medicines you take. Where can you learn more? Go to http://jg-dave.info/.   Enter [phone] in the search box to learn more about \"Learning About Alcohol Misuse. \"  Current as of: May 7, 2018  Content Version: 11.9  © 0050-1119 [employer]. Care instructions adapted under license by [employer] (which disclaims liability or warranty for this information). If you have questions about a medical condition or this instruction, always ask your healthcare professional. [address] any warranty or liability for your use of this information.

## 2019-06-26 ENCOUNTER — OFFICE VISIT (OUTPATIENT)
Dept: CARDIOLOGY CLINIC | Age: 64
End: 2019-06-26

## 2019-06-26 VITALS
OXYGEN SATURATION: 98 % | BODY MASS INDEX: 24.54 KG/M2 | SYSTOLIC BLOOD PRESSURE: 172 MMHG | DIASTOLIC BLOOD PRESSURE: 99 MMHG | HEART RATE: 74 BPM | WEIGHT: 171 LBS

## 2019-06-26 DIAGNOSIS — E78.00 ELEVATED CHOLESTEROL: ICD-10-CM

## 2019-06-26 DIAGNOSIS — I42.9 CARDIOMYOPATHY, UNSPECIFIED TYPE (HCC): ICD-10-CM

## 2019-06-26 DIAGNOSIS — I48.20 CHRONIC ATRIAL FIBRILLATION (HCC): Primary | ICD-10-CM

## 2019-06-26 DIAGNOSIS — I10 ESSENTIAL HYPERTENSION: ICD-10-CM

## 2019-06-26 DIAGNOSIS — I50.22 CHRONIC SYSTOLIC CONGESTIVE HEART FAILURE (HCC): ICD-10-CM

## 2019-06-26 NOTE — PROGRESS NOTES
1. Have you been to the ER, urgent care clinic since your last visit? Hospitalized since your last visit? No     2. Have you seen or consulted any other health care providers outside of the [address] [place] [address] since your last visit? Include any pap smears or colon screening.   No

## 2019-06-27 ENCOUNTER — HOSPITAL ENCOUNTER (OUTPATIENT)
Dept: ULTRASOUND IMAGING | Age: 64
Discharge: HOME | End: 2019-06-27
Attending: NURSE PRACTITIONER
Payer: COMMERCIAL

## 2019-06-27 DIAGNOSIS — E04.2 NONTOXIC MULTINODULAR GOITER: ICD-10-CM

## 2019-06-27 PROCEDURE — 76536 US EXAM OF HEAD AND NECK: CPT

## 2019-06-30 NOTE — PROGRESS NOTES
Subjective:      Abebe Kenny is in the office today for cardiac reevaluation. He is a 77-year-old man that has a diagnosis of chronic atrial fibrillation. He saw Dr. Michelle Hyde  in late November 2017 and was found to be in atrial fibrillation. At that time he was without symptoms. He had no limiting shortness of breath. He was not experiencing peripheral swelling. He  had no dizziness, near syncope or syncope. He was admitted to [address] [place] [address] in 04/2018 for evaluation of shoulder and neck pain. A nuclear stress test was done which did not demonstrate any evidence for ischemia. An Echocardiogram was also ordered and done in 04/2018. His EF was 45%. There was mild to moderate MR. He was hospitalized at [facility] in December 2018. He  presented with atrial fibrillation with a rapid ventricular response on 12/4/2018. He was having congestive heart failure type symptoms. He subsequently had a CAITLIN directed cardioversion. He had been started on intravenous amiodarone which was later switched to p.o dosing. In the office today, she reports that he is feeling okay. He has had no chest pain or shortness of breath. He has had no peripheral swelling. He has no specific complaints in the office today. Patient Active Problem List    Diagnosis Date Noted    Cardiomyopathy [facility]) 12/28/2018    Chronic systolic congestive heart failure ([facility] 11/27/2018    HTN (hypertension) 11/04/2018    Alcoholism ([facility] 11/04/2018    Nontoxic multinodular goiter 02/07/2018    Atrial fibrillation ([facility] 11/28/2017    Elevated cholesterol 04/12/2016    Arthritis 11/13/2014     Current Outpatient Medications   Medication Sig Dispense Refill    POTASSIUM ACETATE 1 Tab by Does Not Apply route daily.  atorvastatin (LIPITOR) 10 mg tablet Take 1 Tab by mouth daily. 90 Tab 4    nystatin-triamcinolone (MYCOLOG) 100,000-0.1 unit/gram-% ointment Apply  to affected area four (4) times daily.  30 g 1    metoprolol tartrate (LOPRESSOR) 50 mg tablet Take 1.5 Tabs by mouth two (2) times a day. 270 Tab 4    amiodarone (CORDARONE) 200 mg tablet Take 1 Tab by mouth daily. 90 Tab 4    rivaroxaban (XARELTO) 20 mg tab tablet Take 1 Tab by mouth daily (with breakfast). 90 Tab 4    furosemide (LASIX) 20 mg tablet Take 1 Tab by mouth daily. 90 Tab 3    nitroglycerin (NITROSTAT) 0.4 mg SL tablet 1 Tab by SubLINGual route as needed for Chest Pain. 1 Bottle 0    multivit-min-FA-lycopen-lutein (CENTRUM SILVER MEN) 300-600-300 mcg tab Take 1 Tab by mouth daily.  100 Tab 12     No Known Allergies  Past Medical History:   Diagnosis Date    Arthritis     Atrial fibrillation ([address] 2017    Cardioversion ()    Colon polyps 2016    Hearing loss     HLD (hyperlipidemia)     HTN (hypertension)     Nontoxic multinodular goiter 2018    Biopsy of two nodules in 2016 - benign     Past Surgical History:   Procedure Laterality Date    CARDIAC SURG PROCEDURE UNLIST      HX HERNIA REPAIR      [place] CARDIOVERSION ELECTIVE ARRHYTHMIA EXTERNAL N/A 12/3/2018    EP CARDIOVERSION performed by Caren Gifford MD at [address] [place] [address] CATH LAB     Family History   Problem Relation Age of Onset    Diabetes Father      Social History     Tobacco Use   Smoking Status Former Smoker    Last attempt to quit: 2015    Years since quittin.0   Smokeless Tobacco Never Used          Review of Systems, additional:  Constitutional: negative  Eyes: negative  Respiratory: negative  Cardiovascular: negative  Gastrointestinal: negative  Musculoskeletal:negative  Neurological: negative  Behvioral/Psych: negative  Endocrine: negative  ENT: negative    Objective:     Visit Vitals  BP (!) 172/99   Pulse 74   Wt 171 lb (77.6 kg)   SpO2 98%   BMI 24.54 kg/m²     General:  alert, cooperative, no distress   Chest Wall: inspection normal - no chest wall deformities or tenderness, respiratory effort normal   Lung: clear to auscultation bilaterally Heart:  irregularly irregular rhythm with rate 81   Abdomen: soft, non-tender. Bowel sounds normal. No masses,  no organomegaly   Extremities: extremities normal, atraumatic, no cyanosis or edema Skin: no rashes   Neuro: alert, oriented, normal speech, no focal findings or movement disorder noted         Assessment/Plan:       ICD-10-CM ICD-9-CM    1. Atrial fibrillation, unspecified type ([facility] continue amiodarone, metoprolol and Xarelto. Stable. Return 4 months. CMP prior to visit I48.91 427.31 2D ECHO COMPLETE ADULT (TTE) W OR WO CONTR   2. Essential hypertension, malignant, moderately elevated systolic BP in the office today. Patient reported he just took his medications I10 401.0    3. Thyroid mass E07.9 246.9    4. Elevated cholesterol, followed by Dr. Tara Torres E78.00 272.0    5. Normal cardiac stress test, 4/17/2018, no evidence for prior scarring or ongoing ischemia. Z13.6 V81.2    6       Presumed hemorrhoidal bleeding,  Patient on Xarelto.

## 2019-07-19 ENCOUNTER — TELEPHONE (OUTPATIENT)
Dept: FAMILY MEDICINE CLINIC | Age: 64
End: 2019-07-19

## 2019-07-19 DIAGNOSIS — I48.91 ATRIAL FIBRILLATION, UNSPECIFIED TYPE (HCC): ICD-10-CM

## 2019-07-19 DIAGNOSIS — R21 RASH: ICD-10-CM

## 2019-07-19 DIAGNOSIS — E04.2 NONTOXIC MULTINODULAR GOITER: ICD-10-CM

## 2019-07-19 DIAGNOSIS — E78.00 ELEVATED CHOLESTEROL: ICD-10-CM

## 2019-07-19 DIAGNOSIS — I10 ESSENTIAL HYPERTENSION, MALIGNANT: ICD-10-CM

## 2019-07-19 DIAGNOSIS — I10 ESSENTIAL HYPERTENSION: ICD-10-CM

## 2019-07-19 RX ORDER — NITROGLYCERIN 0.4 MG/1
0.4 TABLET SUBLINGUAL AS NEEDED
Qty: 1 BOTTLE | Refills: 0 | Status: SHIPPED | OUTPATIENT
Start: 2019-07-19 | End: 2021-02-27 | Stop reason: SDUPTHER

## 2019-07-19 NOTE — TELEPHONE ENCOUNTER
Requested Prescriptions     Pending Prescriptions Disp Refills    nitroglycerin (NITROSTAT) 0.4 mg SL tablet 1 Bottle 0     Si Tab by SubLINGual route as needed for Chest Pain.

## 2019-07-22 ENCOUNTER — OFFICE VISIT (OUTPATIENT)
Dept: SURGERY | Age: 64
End: 2019-07-22

## 2019-07-22 VITALS
BODY MASS INDEX: 23.77 KG/M2 | RESPIRATION RATE: 17 BRPM | HEART RATE: 81 BPM | DIASTOLIC BLOOD PRESSURE: 75 MMHG | SYSTOLIC BLOOD PRESSURE: 137 MMHG | TEMPERATURE: 97.3 F | OXYGEN SATURATION: 95 % | HEIGHT: 70 IN | WEIGHT: 166 LBS

## 2019-07-22 DIAGNOSIS — K64.2 GRADE III HEMORRHOIDS: Primary | ICD-10-CM

## 2019-07-22 NOTE — PROGRESS NOTES
HPI: Laney Edwards is a 59 y.o. male presenting with chief complain of hemorrhoids. He has significant bleeding. He passes mucus and has clear drainage on his pad. He feels prolapse out and this requires manual reduction. This is been going on for the last 3 years. The bleeding is quite significant and can be dripping in the bowl. He denies fevers or chills. He has mild bloating on occasion. He moves his bowels twice per day. He is not on a bowel regimen. His bowels are loose. He denies fecal incontinence. He has no family history of colorectal cancer. He had a tubulovillous adenoma on his last colonoscopy and 2016 with a 3-year recall. He is on anticoagulation for atrial fibrillation history. He underwent cardioversion last December.     Past Medical History:   Diagnosis Date    Arthritis     Atrial fibrillation ([address] 2017    Cardioversion ()    Colon polyps 2016    Hearing loss     HLD (hyperlipidemia)     HTN (hypertension)     Nontoxic multinodular goiter 2018    Biopsy of two nodules in 2016 - benign       Past Surgical History:   Procedure Laterality Date    CARDIAC SURG PROCEDURE UNLIST      HX HERNIA REPAIR      x 2    [place] CARDIOVERSION ELECTIVE ARRHYTHMIA EXTERNAL N/A 12/3/2018    EP CARDIOVERSION performed by Ashley Renteria MD at [facility] CATH LAB       Family History   Problem Relation Age of Onset    Diabetes Father        Social History     Socioeconomic History    Marital status: SINGLE     Spouse name: Not on file    Number of children: Not on file    Years of education: Not on file    Highest education level: Not on file   Occupational History    Occupation: [employer]   Tobacco Use    Smoking status: Former Smoker     Last attempt to quit: 2015     Years since quittin.0    Smokeless tobacco: Never Used   Substance and Sexual Activity    Alcohol use: Yes     Comment: about 10 oz of scotch per day    Drug use: No    Sexual activity: Yes     Partners: Female   Other Topics Concern    Sleep Concern Yes     Comment: wakes up once, 7 hours total    Stress Concern No       Review of Systems - Review of Systems   Constitutional: Positive for diaphoresis. Negative for chills, fever, malaise/fatigue and weight loss. HENT: Positive for hearing loss. Negative for congestion, ear discharge, ear pain, nosebleeds, sinus pain, sore throat and tinnitus. Left ear   Eyes: Negative. Respiratory: Negative. Negative for stridor. Cardiovascular: Negative. Gastrointestinal: Positive for blood in stool and diarrhea. Negative for abdominal pain, constipation, heartburn, melena, nausea and vomiting. Genitourinary: Negative. Musculoskeletal: Negative. Skin: Positive for itching and rash. Under left arm   Neurological: Positive for tingling. Negative for dizziness, tremors, sensory change, speech change, focal weakness, seizures, loss of consciousness, weakness and headaches. Left arm   Endo/Heme/Allergies: Negative for environmental allergies and polydipsia. Bruises/bleeds easily. Psychiatric/Behavioral: Negative for depression, hallucinations, memory loss, substance abuse and suicidal ideas. The patient has insomnia. The patient is not nervous/anxious. Outpatient Medications Marked as Taking for the 7/22/19 encounter (Office Visit) with Alisson Olivier MD   Medication Sig Dispense Refill    nitroglycerin (NITROSTAT) 0.4 mg SL tablet 1 Tab by SubLINGual route as needed for Chest Pain. 1 Bottle 0    POTASSIUM ACETATE 1 Tab by Does Not Apply route daily.  atorvastatin (LIPITOR) 10 mg tablet Take 1 Tab by mouth daily. 90 Tab 4    nystatin-triamcinolone (MYCOLOG) 100,000-0.1 unit/gram-% ointment Apply  to affected area four (4) times daily. 30 g 1    metoprolol tartrate (LOPRESSOR) 50 mg tablet Take 1.5 Tabs by mouth two (2) times a day.  270 Tab 4    amiodarone (CORDARONE) 200 mg tablet Take 1 Tab by mouth daily. 90 Tab 4    rivaroxaban (XARELTO) 20 mg tab tablet Take 1 Tab by mouth daily (with breakfast). 90 Tab 4    furosemide (LASIX) 20 mg tablet Take 1 Tab by mouth daily. 90 Tab 3    multivit-min-FA-lycopen-lutein (CENTRUM SILVER MEN) 300-600-300 mcg tab Take 1 Tab by mouth daily. 100 Tab 12       No Known Allergies    Vitals:    07/22/19 0856   BP: 137/75   Pulse: 81   Resp: 17   Temp: 97.3 °F (36.3 °C)   TempSrc: Oral   SpO2: 95%   Weight: 75.3 kg (166 lb)   Height: 5' 10\" (1.778 m)   PainSc:   0 - No pain       Physical Exam   Constitutional: He appears well-developed and well-nourished. HENT:   Head: Normocephalic and atraumatic. Eyes: Conjunctivae and EOM are normal.   Abdominal: Soft. He exhibits no distension and no mass. There is no tenderness. Musculoskeletal: Normal range of motion. Lymphadenopathy:     He has no cervical adenopathy. Right: No inguinal adenopathy present. Left: No inguinal adenopathy present. Neurological: No sensory deficit. Skin: Skin is warm and dry. Psychiatric: He has a normal mood and affect. His speech is normal.   Rectum: No significant external hemorrhoids, no fissure  Digital rectal exam: Moderate tone, no mass  And anoscopy: Enlarged internal hemorrhoids in all 3 regions, worse left lateral    Assessment / Plan    Grade 3 hemorrhoids  High-fiber diet with fiber supplement  Return for rubber band ligation if symptoms persist  Should check in with his cardiologist and come off the Xarelto for procedure  Consider 2 rubber bands given need to come off anticoagulant for the procedure    The diagnoses and plan were discussed with the patient. All questions answered. Plan of care agreed to by all concerned.

## 2019-07-22 NOTE — PATIENT INSTRUCTIONS
Metamucil or Citrucel fiber powder daily    Use Citrucel if trouble with bloating    Stop Xarelto 3 days prior to procedure if cardiologistt ok with holding it.

## 2019-07-22 NOTE — LETTER
7/22/19 Patient: Travis Locke. YOB: 1955 Date of Visit: 7/22/2019 Lexie Martinez, NP 
[address] [facility] [address] [place] [id number] VIA In Basket Dear Luanne Saba, I saw Mary Guerrero in the office today regarding his hemorrhoids. He has significant bleeding and discharge as well as prolapse. On anoscopy he has enlarged hemorrhoids in all 3 regions, the worst being the left lateral.  I will start him on a high-fiber diet with a fiber supplement and if symptoms persist he will come off his anticoagulation and return to the office for rubber band ligation. If you have questions, please do not hesitate to call me. I look forward to following your patient along with you. Sincerely, Bell Olmedo MD

## 2019-07-24 ENCOUNTER — TELEPHONE (OUTPATIENT)
Dept: CARDIOLOGY CLINIC | Age: 64
End: 2019-07-24

## 2019-07-24 NOTE — TELEPHONE ENCOUNTER
Has to have the \"rubberband procedure\" for his hemorids and doctor said he needs to be off the [address] for 2 weeks. Please contact  at [facility] if need more info. Please advise.     [phone]

## 2019-07-26 ENCOUNTER — TELEPHONE (OUTPATIENT)
Dept: FAMILY MEDICINE CLINIC | Age: 64
End: 2019-07-26

## 2019-07-26 NOTE — TELEPHONE ENCOUNTER
Faxed signed clearance letter to [employer] [facility]  at [phone] on 07/26/2019 by SANDRA Reilly LPN. Paperwork and confirmation has been placed to be scanned into patient's chart.

## 2019-07-26 NOTE — TELEPHONE ENCOUNTER
Nurse called patient, two patient identifiers used and confirmed by patient. Spoke with patient, advised of MM message. Pt verbalized understanding.

## 2019-07-26 NOTE — TELEPHONE ENCOUNTER
Patient wants to know what over the counter medication he can take for his hand pain from osetoathritis. Patient doesn't want anything to counter react with his blood pressure meds. Please advise, thank you.

## 2019-08-01 ENCOUNTER — OFFICE VISIT (OUTPATIENT)
Dept: SURGERY | Age: 64
End: 2019-08-01

## 2019-08-01 VITALS
BODY MASS INDEX: 23.62 KG/M2 | RESPIRATION RATE: 16 BRPM | OXYGEN SATURATION: 97 % | HEIGHT: 70 IN | WEIGHT: 165 LBS | TEMPERATURE: 97.5 F | SYSTOLIC BLOOD PRESSURE: 151 MMHG | DIASTOLIC BLOOD PRESSURE: 77 MMHG | HEART RATE: 56 BPM

## 2019-08-01 DIAGNOSIS — K64.1 GRADE II HEMORRHOIDS: Primary | ICD-10-CM

## 2019-08-01 RX ORDER — ACETAMINOPHEN 325 MG/1
TABLET ORAL
COMMUNITY

## 2019-08-01 NOTE — PATIENT INSTRUCTIONS
Patient understands all post op instructions. Learning About Rubber Band Ligation for Hemorrhoids  What is rubber band ligation? Rubber band ligation treats hemorrhoids. Hemorrhoids are swollen veins in the rectal area. In most cases, this procedure can be done in the doctor's office. Your doctor can treat one or two hemorrhoids at a time. This treatment is only for internal hemorrhoids. How is this procedure done? Your doctor will insert a viewing instrument (anoscope) into your anus. The hemorrhoid is grasped with an instrument, and a device places a rubber band around the base of the hemorrhoid. This stops blood flow to the hemorrhoids. The hemorrhoids shrink and fall off 7 to 10 days after the procedure. You will be awake during the procedure. It takes about 30 minutes. You may feel some discomfort. You will be able to go home right after the procedure. What can you expect after the procedure? After the procedure, you may feel pain and have a feeling of fullness in your lower belly, or you may feel as if you need to have a bowel movement. This usually goes away after several days. You may need pain medicine during this time. You may have a small amount of bleeding from your anus about 7 to 10 days after surgery, when your hemorrhoid falls off. This is normal.  Some people are able to return to regular activities right away. Others may need to take a few days off work. You will need to avoid heavy lifting and straining with bowel movements while you recover. Follow-up care is a key part of your treatment and safety. Be sure to make and go to all appointments, and call your doctor if you are having problems. It's also a good idea to know your test results and keep a list of the medicines you take. Where can you learn more? Go to http://jg-dave.info/. Enter D111 in the search box to learn more about \"Learning About Rubber Band Ligation for Hemorrhoids. \"  Current as of: November 7, 2018  Content Version: 12.1  © 2413-3103 [employer]. Care instructions adapted under license by [employer] (which disclaims liability or warranty for this information). If you have questions about a medical condition or this instruction, always ask your healthcare professional. [address] any warranty or liability for your use of this information. Rubber Band Ligation for Hemorrhoids: What to Expect at Home  Your Recovery  After rubber band ligation, you may feel pain and have a feeling of fullness in your lower belly, or you may feel as if you need to have a bowel movement. This usually goes away within several days after the surgery. You may need pain medicine during this time. You may have a small amount of bleeding from your anus about 7 to 10 days after surgery, when your hemorrhoid falls off. This is normal.  Some people are able to return to regular activities right away. Others may need 2 to 3 days of bed rest.  You will need to avoid heavy lifting and straining with bowel movements while you recover. This care sheet gives you a general idea about how long it will take for you to recover. But each person recovers at a different pace. Follow the steps below to get better as quickly as possible. How can you care for yourself at home? Activity    · Rest when you feel tired. Getting enough sleep will help you recover.     · Try to walk each day. Start by walking a little more than you did the day before. Bit by bit, increase the amount you walk. Walking boosts blood flow and helps prevent pneumonia and constipation.     · Avoid strenuous activities, such as bicycle riding, jogging, weight lifting, or aerobic exercise, until your doctor says it is okay.     · For 2 to 3 weeks, avoid lifting anything that would make you strain.  This may include heavy grocery bags and milk containers, a heavy briefcase or backpack, cat litter or dog food bags, a vacuum , or a child.     · You may take showers and baths as usual. Pat your anal area dry when you are done.     · Ask your doctor when you can drive again.     · You may need to take a few days to a few weeks off work. It depends on the procedure you had, the type of work you do, and how you feel. Diet    · You can eat your normal diet. If your stomach is upset, try eating bland, low-fat foods like plain rice, broiled chicken, toast, and yogurt.     · Drink plenty of fluids (unless your doctor has told you not to).   · It is important to eat high-fiber foods after your procedure. This will make it easier to have bowel movements and keep your hemorrhoids from coming back.     · You may notice that your bowel movements are not regular right after your procedure. This is common. Try to avoid constipation and straining with bowel movements. You may want to take a fiber supplement every day. If you have not had a bowel movement after a couple of days, ask your doctor about taking a mild laxative. Medicines    · Your doctor will tell you if and when you can restart your medicines. He or she will also give you instructions about taking any new medicines.     · If you take blood thinners, such as warfarin (Coumadin), clopidogrel (Plavix), or aspirin, be sure to talk to your doctor. He or she will tell you if and when to start taking those medicines again. Make sure that you understand exactly what your doctor wants you to do.     · Take pain medicines exactly as directed. ? If the doctor gave you a prescription medicine for pain, take it as prescribed. ? If you are not taking a prescription pain medicine, ask your doctor if you can take an over-the-counter medicine. ? Do not take two or more pain medicines at the same time unless the doctor told you to. Many pain medicines have acetaminophen, which is Tylenol.  Too much acetaminophen (Tylenol) can be harmful.     · If your doctor prescribed antibiotics, take them as directed. Do not stop taking them just because you feel better. You need to take the full course of antibiotics.     · If you think your pain medicine is making you sick to your stomach:  ? Take your medicine after meals (unless your doctor has told you not to). ? Ask your doctor for a different pain medicine.     · You may apply numbing medicines before and after bowel movements to relieve pain. Other instructions    · Sit in a few inches of warm water (sitz bath) for 15 to 20 minutes 3 times a day and after bowel movements. Then pat the area dry. Do this as long as you have pain in your anal area.     · Put ice or a cold pack on the area for 10 to 20 minutes at a time. Try to do this every 1 to 2 hours for the next 3 days (when you are awake). Put a thin cloth between the ice and your skin.     · Support your feet with a small step stool when you sit on the toilet. This helps flex your hips and places your pelvis in a squatting position. This can make bowel movements easier after your procedure.     · Try lying on your stomach with a pillow under your hips to decrease swelling. Follow-up care is a key part of your treatment and safety. Be sure to make and go to all appointments, and call your doctor if you are having problems. It's also a good idea to know your test results and keep a list of the medicines you take. When should you call for help? Call 911 anytime you think you may need emergency care. For example, call if:    · You passed out (lost consciousness).     · You are short of breath.    Call your doctor now or seek immediate medical care if:    · You cannot pass stools or gas.     · You are sick to your stomach and cannot drink fluids.     · Bright red blood has soaked through the bandage.     · You have signs of a blood clot in your leg (called a deep vein thrombosis), such as:  ? Pain in the calf, back of your knee, thigh, or groin. ?  Redness and swelling in your leg or groin.     · You have pain that does not get better after you take your pain medicine.     · You have signs of infection, such as:  ? Increased pain, swelling, warmth, or redness. ? Red streaks leading from the area. ? Pus draining from the area. ? A fever.    Watch closely for changes in your health, and be sure to contact your doctor if you have any problems. Where can you learn more? Go to http://jg-dave.info/. Enter D618 in the search box to learn more about \"Rubber Band Ligation for Hemorrhoids: What to Expect at Home. \"  Current as of: November 7, 2018  Content Version: 12.1  © 9946-0778 [employer], [employer]. Care instructions adapted under license by [employer] (which disclaims liability or warranty for this information). If you have questions about a medical condition or this instruction, always ask your healthcare professional. [place] [address] any warranty or liability for your use of this information.

## 2019-08-01 NOTE — PROGRESS NOTES
Subjective: Continues to have hemorrhoidal bleeding. Has prolapsing disease as well. Has come off of anticoagulation. Past medical history and ROS were reviewed and unchanged. Rectum: No external hemorrhoids  Digital rectal exam: Moderate tone, no mass  Anoscopy: Very large internal hemorrhoid in left lateral region  Rubber band ligation performed of left lateral internal hemorrhoid    [facility] [place] [facility]  OFFICE PROCEDURE PROGRESS NOTE        Chart reviewed for the following:   Rodriguez Vaca MD, have reviewed the History, Physical and updated the Allergic reactions for [address] performed immediately prior to start of procedure:   I, Gaby Dewitt MD, have performed the following reviews on Sharleen December. prior to the start of the procedure:            * Patient was identified by name and date of birth   * Agreement on procedure being performed was verified  * Risks and Benefits explained to the patient  * Procedure site verified and marked as necessary  * Patient was positioned for comfort  * Consent was signed and verified     Time: 4802      Date of procedure: 8/1/2019    Procedure performed by:  Gaby Dewitt MD    Provider assisted by: Early LPN    Patient assisted by: self    How tolerated by patient: tolerated the procedure well with no complications    Post Procedural Pain Scale: 2 - Hurts Little Bit    Comments: none      Assessment / Plan    Status post rubber band ligation of left lateral internal hemorrhoid for grade 2 hemorrhoids  Restart anticoagulation in 2 weeks  Follow-up in 3 to 4 weeks, may need additional banding if bleeding persists    A total of 15 minutes was spent with the patient, with >50% of time spent on counseling and coordination of care. The diagnoses and plan were discussed with patient. All questions answered. Plan of care agreed to by all concerned.

## 2019-08-29 ENCOUNTER — OFFICE VISIT (OUTPATIENT)
Dept: SURGERY | Age: 64
End: 2019-08-29

## 2019-08-29 VITALS
HEIGHT: 70 IN | HEART RATE: 60 BPM | OXYGEN SATURATION: 98 % | TEMPERATURE: 97 F | RESPIRATION RATE: 16 BRPM | WEIGHT: 166 LBS | DIASTOLIC BLOOD PRESSURE: 87 MMHG | SYSTOLIC BLOOD PRESSURE: 186 MMHG | BODY MASS INDEX: 23.77 KG/M2

## 2019-08-29 DIAGNOSIS — K64.0 GRADE I HEMORRHOIDS: Primary | ICD-10-CM

## 2019-08-29 NOTE — PROGRESS NOTES
Sudeep Diaz presents today for   Chief Complaint   Patient presents with    Follow-up     post RBL, bleeding still x 2 days       Is someone accompanying this pt? no    Is the patient using any DME equipment during OV? no    Coordination of Care:  1. Have you been to the ER, urgent care clinic since your last visit? Hospitalized since your last visit? no    2. Have you seen or consulted any other health care providers outside of the [address] [place] [address] since your last visit? Include any pap smears or colon screening.  no

## 2019-08-29 NOTE — PROGRESS NOTES
Subjective: States that the pain was minimal.  Bleeding had stopped. He restarted his Xarelto and the bleeding has recurred in a substantial fashion. Past medical history and ROS were reviewed and unchanged. Rectum: Digital rectal exam: Moderate tone, no mass  Anoscopy: Enlarged right anterior and posterior internal hemorrhoids    Assessment / Plan    Status post rubber band ligation of left lateral hemorrhoid, continued bleeding  Return to the office after holding Xarelto for additional banding    A total of 15 minutes was spent with the patient, with >50% of time spent on counseling and coordination of care. The diagnoses and plan were discussed with patient. All questions answered. Plan of care agreed to by all concerned.

## 2019-09-03 ENCOUNTER — APPOINTMENT (OUTPATIENT)
Dept: VASCULAR SURGERY | Age: 64
End: 2019-09-03
Attending: PHYSICIAN ASSISTANT
Payer: COMMERCIAL

## 2019-09-03 ENCOUNTER — TELEPHONE (OUTPATIENT)
Dept: FAMILY MEDICINE CLINIC | Age: 64
End: 2019-09-03

## 2019-09-03 ENCOUNTER — HOSPITAL ENCOUNTER (EMERGENCY)
Age: 64
Discharge: HOME | End: 2019-09-03
Attending: EMERGENCY MEDICINE | Admitting: EMERGENCY MEDICINE
Payer: COMMERCIAL

## 2019-09-03 ENCOUNTER — APPOINTMENT (OUTPATIENT)
Dept: GENERAL RADIOLOGY | Age: 64
End: 2019-09-03
Attending: PHYSICIAN ASSISTANT
Payer: COMMERCIAL

## 2019-09-03 VITALS
RESPIRATION RATE: 16 BRPM | WEIGHT: 165 LBS | HEART RATE: 66 BPM | BODY MASS INDEX: 23.62 KG/M2 | DIASTOLIC BLOOD PRESSURE: 97 MMHG | OXYGEN SATURATION: 98 % | SYSTOLIC BLOOD PRESSURE: 154 MMHG | TEMPERATURE: 99.3 F | HEIGHT: 70 IN

## 2019-09-03 DIAGNOSIS — M79.605 LEFT LEG PAIN: Primary | ICD-10-CM

## 2019-09-03 PROCEDURE — 73564 X-RAY EXAM KNEE 4 OR MORE: CPT

## 2019-09-03 PROCEDURE — 93971 EXTREMITY STUDY: CPT

## 2019-09-03 PROCEDURE — 99282 EMERGENCY DEPT VISIT SF MDM: CPT

## 2019-09-03 RX ORDER — ACETAMINOPHEN 500 MG
1000 TABLET ORAL
Status: DISCONTINUED | OUTPATIENT
Start: 2019-09-03 | End: 2019-09-03

## 2019-09-03 NOTE — ED NOTES
I have reviewed discharge instructions with the patient. The patient verbalized understanding. Patient NAD, VSS and patient denies pain at discharge.   Patient armband removed and shredded

## 2019-09-03 NOTE — ED PROVIDER NOTES
[address] EMERGENCY DEPT    Date: 9/3/2019  Patient Name: Malcolm Merlin. History of Presenting Illness     Chief Complaint   Patient presents with    Leg Pain       59 y.o. male with noted past medical history including Arthritis and Afib on Xarelto who presents to the emergency department c/o left leg pain for the past 4 days. Pt states it started with left knee pain, which then went to his left ankle and left calf. States he has swelling throughout his calf and ankle now. He has taken OTC medication and has some mild improvement of his symptoms since yesterday. He denies any fever, chills, numbness, weakness, CP, SOB, or other symptoms at this time. Patient denies any other associated signs or symptoms. Patient denies any other complaints. Nursing notes regarding the HPI and triage nursing notes were reviewed. Prior medical records were reviewed. Current Outpatient Medications   Medication Sig Dispense Refill    acetaminophen (TYLENOL) 325 mg tablet Take  by mouth every four (4) hours as needed for Pain.  Methylcellulose, with Sugar, (CITRUCEL, SUCROSE,) powd Take  by mouth.  nitroglycerin (NITROSTAT) 0.4 mg SL tablet 1 Tab by SubLINGual route as needed for Chest Pain. 1 Bottle 0    POTASSIUM ACETATE 1 Tab by Does Not Apply route daily.  atorvastatin (LIPITOR) 10 mg tablet Take 1 Tab by mouth daily. 90 Tab 4    nystatin-triamcinolone (MYCOLOG) 100,000-0.1 unit/gram-% ointment Apply  to affected area four (4) times daily. 30 g 1    metoprolol tartrate (LOPRESSOR) 50 mg tablet Take 1.5 Tabs by mouth two (2) times a day. 270 Tab 4    amiodarone (CORDARONE) 200 mg tablet Take 1 Tab by mouth daily. 90 Tab 4    rivaroxaban (XARELTO) 20 mg tab tablet Take 1 Tab by mouth daily (with breakfast). 90 Tab 4    furosemide (LASIX) 20 mg tablet Take 1 Tab by mouth daily.  90 Tab 3    multivit-min-FA-lycopen-lutein (CENTRUM SILVER MEN) 300-600-300 mcg tab Take 1 Tab by mouth daily. 100 Tab 12       Past History     Past Medical History:  Past Medical History:   Diagnosis Date    Arthritis     Atrial fibrillation ([address] 2017    Cardioversion ()    Colon polyps 2016    Hearing loss     HLD (hyperlipidemia)     HTN (hypertension)     Nontoxic multinodular goiter 2018    Biopsy of two nodules in 2016 - benign       Past Surgical History:  Past Surgical History:   Procedure Laterality Date    CARDIAC SURG PROCEDURE UNLIST      HX COLONOSCOPY  2016    tubular adenoma    HX HERNIA REPAIR      x 2    DC CARDIOVERSION ELECTIVE ARRHYTHMIA EXTERNAL N/A 12/3/2018    EP CARDIOVERSION performed by Gracie Villeda MD at [facility] CATH LAB       Family History:  Family History   Problem Relation Age of Onset    Diabetes Father        Social History:  Social History     Tobacco Use    Smoking status: Former Smoker     Last attempt to quit: 2015     Years since quittin.2    Smokeless tobacco: Never Used   Substance Use Topics    Alcohol use: Yes     Comment: about 10 oz of scotch per day    Drug use: No       Allergies:  No Known Allergies    Patient's primary care provider (as noted in EPIC): Lavinia Drummond NP    Review of Systems  Constitutional:  Denies malaise, fever, chills. Cardiac:  Denies chest pain or palpitations. Extremity/MS: + left leg pain/swelling. Neuro:  Denies neurologic symptoms/deficits/paresthesias. Skin: Denies injury, rash, itching or skin changes. All other systems negative as reviewed. Visit Vitals  BP (!) 154/97 (BP 1 Location: Right arm, BP Patient Position: At rest)   Pulse 66   Temp 99.3 °F (37.4 °C)   Resp 16   Ht 5' 10\" (1.778 m)   Wt 74.8 kg (165 lb)   SpO2 98%   BMI 23.68 kg/m²       PHYSICAL EXAM:    CONSTITUTIONAL:  Alert, in no apparent distress;  well developed;  well nourished. HEAD:  Normocephalic, atraumatic. EYES:  EOMI. Non-icteric sclera. Normal conjunctiva. ENTM:  Nose:  no rhinorrhea. Throat:  no erythema or exudate, mucous membranes moist.  NECK:  Supple  RESPIRATORY:  Chest clear, equal breath sounds, good air movement. Without wheezes, rhonchi or rales. CARDIOVASCULAR:  Regular rate and rhythm. No murmurs, rubs, or gallops. GI:  Normal bowel sounds, abdomen soft and non-tender. No rebound or guarding. BACK:  Non-tender. UPPER EXT:  Normal inspection. LOWER EXT: Mild TTP to left knee, minimal pain with ROM; mild edema to left lower leg, without erythema; NVI distally with 5/5 strength throughout. No rash, bruises, lesions. No cellulitis. NEURO:  Moves all four extremities, and grossly normal motor exam.  SKIN:  No rashes;  Normal for age. PSYCH:  Alert and normal affect. DIFFERENTIAL DIAGNOSES/ MEDICAL DECISION MAKING:  Arthritis, tendonitis, deep venous thrombosis, dependent edema, other etiologies versus a combination of the above. Xr Knee Lt Min 4 V    Result Date: 9/3/2019  EXAM: LEFT KNEE RADIOGRAPHS CLINICAL INDICATION/HISTORY: Left knee pain -Additional: None COMPARISON: None TECHNIQUE: 4 views of the left knee _______________ FINDINGS: BONES: Osseous alignment is as expected on the provided projections. No evidence of fracture. There is mild medial compartment joint space narrowing and osteophyte formation. SOFT TISSUES: No joint effusion. Atherosclerotic vascular calcifications noted in the distal thigh and proximal leg. No retained radiopaque foreign object. _______________     IMPRESSION: Mild medial compartment predominant left knee joint osteoarthritis without evidence of fracture or acute malalignment. PVL left leg: Unremarkable     IMPRESSION AND MEDICAL DECISION MAKING:  Based upon the patients presentation with noted HPI and PE, along with the work up done in the emergency department, I believe that the patient is having leg pain likely from arthritis. Pt to take Tylenol/Motrin for pain and f/u with orthopedics and PCP. Diagnosis:   1.  Left leg pain Disposition: Discharge    Follow-up Information     Follow up With Specialties Details Why [address] [facility], [place]e [address]  In 3 days  [address]  [place] [address] [facility] [address]    Kandi Carrasco NP Nurse Practitioner In 3 days  [address]  [address] [place] [id number]  [phone]      [facility] EMERGENCY DEPT Emergency Medicine  If symptoms worsen [address]  [phone]          Discharge Medication List as of 9/3/2019 11:46 AM      CONTINUE these medications which have NOT CHANGED    Details   acetaminophen (TYLENOL) 325 mg tablet Take  by mouth every four (4) hours as needed for Pain., Historical Med      Methylcellulose, with Sugar, (CITRUCEL, SUCROSE,) powd Take  by mouth., Historical Med      nitroglycerin (NITROSTAT) 0.4 mg SL tablet 1 Tab by SubLINGual route as needed for Chest Pain., Normal, Disp-1 Bottle, R-0      POTASSIUM ACETATE 1 Tab by Does Not Apply route daily. , Historical Med      atorvastatin (LIPITOR) 10 mg tablet Take 1 Tab by mouth daily. , Normal, Disp-90 Tab, R-4      nystatin-triamcinolone (MYCOLOG) 100,000-0.1 unit/gram-% ointment Apply  to affected area four (4) times daily. , Normal, Disp-30 g, R-1      metoprolol tartrate (LOPRESSOR) 50 mg tablet Take 1.5 Tabs by mouth two (2) times a day., Normal, Disp-270 Tab, R-4      amiodarone (CORDARONE) 200 mg tablet Take 1 Tab by mouth daily. , Normal, Disp-90 Tab, R-4      rivaroxaban (XARELTO) 20 mg tab tablet Take 1 Tab by mouth daily (with breakfast). , Normal, Disp-90 Tab, R-4      furosemide (LASIX) 20 mg tablet Take 1 Tab by mouth daily. , Normal, Disp-90 Tab, R-3      multivit-min-FA-lycopen-lutein (CENTRUM SILVER MEN) 300-600-300 mcg tab Take 1 Tab by mouth daily. , Normal, Disp-100 Tab, R-12           Fiona Guma

## 2019-09-03 NOTE — ED TRIAGE NOTES
Woke Saturday with left knee hurting. Then ankle. Wore braces and noticed yesterday leg was swollen. Took off but left ankle and knee feel swollen ant tight.  No deformity noted

## 2019-09-03 NOTE — TELEPHONE ENCOUNTER
Spoke with patient states his knee was hurting on Sunday and he had to put his brace on, Monday his ankle started swelling so he put his ankle brace on now his leg is swollen. Told patient to go to urgent care or the ER. To have his leg evaluated.

## 2019-09-03 NOTE — DISCHARGE INSTRUCTIONS
Patient Education        Leg Pain: Care Instructions  Your Care Instructions  Many things can cause leg pain. Too much exercise or overuse can cause a muscle cramp (or charley horse). You can get leg cramps from not eating a balanced diet that has enough potassium, calcium, and other minerals. If you do not drink enough fluids or are taking certain medicines, you may develop leg cramps. Other causes of leg pain include injuries, blood flow problems, nerve damage, and twisted and enlarged veins (varicose veins). You can usually ease pain with self-care. Your doctor may recommend that you rest your leg and keep it elevated. Follow-up care is a key part of your treatment and safety. Be sure to make and go to all appointments, and call your doctor if you are having problems. It's also a good idea to know your test results and keep a list of the medicines you take. How can you care for yourself at home? · Take pain medicines exactly as directed. ? If the doctor gave you a prescription medicine for pain, take it as prescribed. ? If you are not taking a prescription pain medicine, ask your doctor if you can take an over-the-counter medicine. · Take any other medicines exactly as prescribed. Call your doctor if you think you are having a problem with your medicine. · Rest your leg while you have pain, and avoid standing for long periods of time. · Prop up your leg at or above the level of your heart when possible. · Make sure you are eating a balanced diet that is rich in calcium, potassium, and magnesium, especially if you are pregnant. · If directed by your doctor, put ice or a cold pack on the area for 10 to 20 minutes at a time. Put a thin cloth between the ice and your skin. · Your leg may be in a splint, a brace, or an elastic bandage, and you may have crutches to help you walk. Follow your doctor's directions about how long to wear supports and how to use the crutches.   When should you call for help?  Call 911 anytime you think you may need emergency care. For example, call if:    · You have sudden chest pain and shortness of breath, or you cough up blood.     · Your leg is cool or pale or changes color.    Call your doctor now or seek immediate medical care if:    · You have increasing or severe pain.     · Your leg suddenly feels weak and you cannot move it.     · You have signs of a blood clot, such as:  ? Pain in your calf, back of the knee, thigh, or groin. ? Redness and swelling in your leg or groin.     · You have signs of infection, such as:  ? Increased pain, swelling, warmth, or redness. ? Red streaks leading from the sore area. ? Pus draining from a place on your leg. ? A fever.     · You cannot bear weight on your leg.    Watch closely for changes in your health, and be sure to contact your doctor if:    · You do not get better as expected. Where can you learn more? Go to http://jg-dave.info/. Enter N600 in the search box to learn more about \"Leg Pain: Care Instructions. \"  Current as of: September 23, 2018  Content Version: 12.1  © 9352-1761 [employer], [employer]. Care instructions adapted under license by [employer] (which disclaims liability or warranty for this information). If you have questions about a medical condition or this instruction, always ask your healthcare professional. [place] [address] any warranty or liability for your use of this information.

## 2019-09-03 NOTE — TELEPHONE ENCOUNTER
Patient stated his left leg is very swollen and in pain, no avaliable appointments until next week but is seeking advice on what to do to get the swelling down. Please advise, thank you.

## 2019-10-07 ENCOUNTER — OFFICE VISIT (OUTPATIENT)
Dept: SURGERY | Age: 64
End: 2019-10-07

## 2019-10-07 ENCOUNTER — OFFICE VISIT (OUTPATIENT)
Dept: FAMILY MEDICINE CLINIC | Age: 64
End: 2019-10-07

## 2019-10-07 VITALS
OXYGEN SATURATION: 98 % | SYSTOLIC BLOOD PRESSURE: 150 MMHG | RESPIRATION RATE: 20 BRPM | HEART RATE: 68 BPM | BODY MASS INDEX: 23.68 KG/M2 | DIASTOLIC BLOOD PRESSURE: 95 MMHG | WEIGHT: 165.4 LBS | TEMPERATURE: 98.4 F | HEIGHT: 70 IN

## 2019-10-07 VITALS
BODY MASS INDEX: 23.62 KG/M2 | DIASTOLIC BLOOD PRESSURE: 92 MMHG | WEIGHT: 165 LBS | RESPIRATION RATE: 17 BRPM | SYSTOLIC BLOOD PRESSURE: 174 MMHG | HEIGHT: 70 IN | OXYGEN SATURATION: 100 % | TEMPERATURE: 97 F | HEART RATE: 79 BPM

## 2019-10-07 DIAGNOSIS — F10.20 ALCOHOLISM (HCC): ICD-10-CM

## 2019-10-07 DIAGNOSIS — K64.2 GRADE III HEMORRHOIDS: Primary | ICD-10-CM

## 2019-10-07 DIAGNOSIS — M79.602 LEFT ARM PAIN: ICD-10-CM

## 2019-10-07 DIAGNOSIS — M65.4 DE QUERVAIN'S TENOSYNOVITIS, LEFT: Primary | ICD-10-CM

## 2019-10-07 DIAGNOSIS — R20.2 PARESTHESIA: ICD-10-CM

## 2019-10-07 RX ORDER — LIDOCAINE 50 MG/G
PATCH TOPICAL
Qty: 30 EACH | Refills: 2 | Status: SHIPPED | OUTPATIENT
Start: 2019-10-07 | End: 2021-11-23

## 2019-10-07 NOTE — PROGRESS NOTES
Subjective     Patient ID:  Kaylee Falcon. is a 59 y.o. ( 1955) male who presents for the following: Follow-up      HPI   Presents to follow up on left upper extremity pain. He has been treated for cervical radiculopathy. The pain is mainly upper arm pain and aching and wrist pain. Continuing with numbness from wrist to posterior shoulder - off and on. Occurs regardless of activity or position. Using a wrist brace and doing the neck exercises without much change in symptoms. Right handed. Still drinking about the same. Taking tylenol, helps sometimes. Review of Systems   Constitutional: Negative for fatigue and fever. HENT: Negative for congestion and sore throat. Respiratory: Negative for cough. Cardiovascular: Negative for chest pain and palpitations. Genitourinary: Negative for dysuria. Musculoskeletal: Positive for arthralgias (LUE per HPI). Negative for back pain, neck pain and neck stiffness. Neurological: Negative for dizziness and headaches. Past Medical History, Past Surgery History, Allergies, Social History, and Family History were reviewed and updated.       Past Medical History:   Diagnosis Date    Arthritis     Atrial fibrillation ([address] 2017    Cardioversion ()    Colon polyps 2016    Hearing loss     HLD (hyperlipidemia)     HTN (hypertension)     Nontoxic multinodular goiter 2018    Biopsy of two nodules in 2016 - benign     Past Surgical History:   Procedure Laterality Date    CARDIAC SURG PROCEDURE UNLIST      HX COLONOSCOPY  2016    tubular adenoma    HX HERNIA REPAIR      x 2    [facility] E[facility]IVE ARRHYTHMIA EXTERNAL N/A 12/3/2018    EP CARDIOVERSION performed by Jaqueline Hernandez MD at [address] [place] [address] LAB     Family History   Problem Relation Age of Onset    Diabetes Father      Social History     Socioeconomic History    Marital status: SINGLE     Spouse name: Not on file    Number of children: Not on file    Years of education: Not on file    Highest education level: Not on file   Occupational History    Occupation: [employer]   Social Needs    Financial resource strain: Not on file    Food insecurity:     Worry: Not on file     Inability: Not on file   [employer] needs:     Medical: Not on file     Non-medical: Not on file   Tobacco Use    Smoking status: Former Smoker     Last attempt to quit: 2015     Years since quittin.3    Smokeless tobacco: Never Used   Substance and Sexual Activity    Alcohol use: Yes     Comment: about 10 oz of scotch per day    Drug use: No    Sexual activity: Yes     Partners: Female   Lifestyle    Physical activity:     Days per week: Not on file     Minutes per session: Not on file    Stress: Not on file   Relationships    Social connections:     Talks on phone: Not on file     Gets together: Not on file     Attends Mandaen service: Not on file     Active member of club or organization: Not on file     Attends meetings of clubs or organizations: Not on file     Relationship status: Not on file    Intimate partner violence:     Fear of current or ex partner: Not on file     Emotionally abused: Not on file     Physically abused: Not on file     Forced sexual activity: Not on file   Other Topics Concern     Service Not Asked    Blood Transfusions Not Asked    Caffeine Concern Not Asked    Occupational Exposure Not Asked    Hobby Hazards Not Asked    Sleep Concern Yes     Comment: wakes up once, 7 hours total    Stress Concern No    Weight Concern Not Asked    Special Diet Not Asked    Back Care Not Asked    Exercise Not Asked    Bike Helmet Not Asked    [facility] Not Asked    Self-Exams Not Asked   Social History Narrative    Not on file     No Known Allergies  Current Outpatient Medications on File Prior to Visit   Medication Sig Dispense Refill    acetaminophen (TYLENOL) 325 mg tablet Take  by mouth every four (4) hours as needed for Pain.  Methylcellulose, with Sugar, (CITRUCEL, SUCROSE,) powd Take  by mouth.  POTASSIUM ACETATE 1 Tab by Does Not Apply route daily.  atorvastatin (LIPITOR) 10 mg tablet Take 1 Tab by mouth daily. 90 Tab 4    nystatin-triamcinolone (MYCOLOG) 100,000-0.1 unit/gram-% ointment Apply  to affected area four (4) times daily. 30 g 1    metoprolol tartrate (LOPRESSOR) 50 mg tablet Take 1.5 Tabs by mouth two (2) times a day. 270 Tab 4    amiodarone (CORDARONE) 200 mg tablet Take 1 Tab by mouth daily. 90 Tab 4    rivaroxaban (XARELTO) 20 mg tab tablet Take 1 Tab by mouth daily (with breakfast). 90 Tab 4    furosemide (LASIX) 20 mg tablet Take 1 Tab by mouth daily. 90 Tab 3    multivit-min-FA-lycopen-lutein (CENTRUM SILVER MEN) 300-600-300 mcg tab Take 1 Tab by mouth daily. 100 Tab 12    nitroglycerin (NITROSTAT) 0.4 mg SL tablet 1 Tab by SubLINGual route as needed for Chest Pain. 1 Bottle 0     No current facility-administered medications on file prior to visit. Objective     Visit Vitals  BP (!) 150/95   Pulse 68   Temp 98.4 °F (36.9 °C) (Oral)   Resp 20   Ht 5' 10\" (1.778 m)   Wt 165 lb 6.4 oz (75 kg)   SpO2 98%   BMI 23.73 kg/m²     No LMP for male patient. Physical Exam   Constitutional: He is oriented to person, place, and time. He appears well-developed and well-nourished. No distress. Cardiovascular: Normal rate, regular rhythm and normal heart sounds. No murmur heard. Pulmonary/Chest: Effort normal and breath sounds normal. No respiratory distress. Musculoskeletal:        Left elbow: Normal.        Left wrist: He exhibits tenderness. He exhibits normal range of motion, no bony tenderness, no swelling, no effusion, no crepitus, no deformity and no laceration. Cervical back: Normal.        Left upper arm: Normal.        Left forearm: Normal.        Left hand: He exhibits normal range of motion, no tenderness, no bony tenderness, no deformity and no swelling. Normal sensation noted. Decreased strength noted. Left wrist: David Casa test positive. Neurological: He is alert and oriented to person, place, and time. Skin: He is not diaphoretic. Psychiatric: He has a normal mood and affect. LABS     TESTS      Assessment and Plan     1. De Quervain's tenosynovitis, left  Continue wrist brace. Ice. OTC analgesics as needed. 2. Left arm pain  3. Paresthesia  - lidocaine (LIDODERM) 5 %; Apply patch to the affected area for 12 hours a day and remove for 12 hours a day. Dispense: 30 Each; Refill: 2  - REFERRAL TO ORTHOPEDIC SURGERY        4. Alcoholism ([facility] [address]  Strongly encouraged alcohol cessation and discussed resources. He is not interested in quitting currently. Risks, benefits, and alternatives of the medications and treatment plan prescribed today were discussed, and patient expressed understanding. Printed after visit summary was given to patient and reviewed. All patient questions and concerns were addressed. Plan follow-up as discussed or as needed if any worsening symptoms or change in condition.            Signed electronically by Thien Gross DNP, FNP-BC

## 2019-10-07 NOTE — PROGRESS NOTES
1. Have you been to the ER, urgent care clinic since your last visit? Hospitalized since your last visit?10/2019 urgent care, ear infection. 2. Have you seen or consulted any other health care providers outside of the [address] [place] [address] since your last visit? Include any pap smears or colon screening.  No

## 2019-10-07 NOTE — PROGRESS NOTES
Subjective: Had some recurrent bleeding issues. Has come off his Xarelto again. Past medical history and ROS were reviewed and unchanged. Rectum: No external hemorrhoids  Digital rectal exam: Moderate tone, no mass  Anoscopy: Enlarged internal hemorrhoids in right anterior and right posterior quadrants    [facility]  OFFICE PROCEDURE PROGRESS NOTE        Chart reviewed for the following:   Ann Tamayo MD, have reviewed the History, Physical and updated the Allergic reactions for [address] performed immediately prior to start of procedure:   Ann Tamayo MD, have performed the following reviews on Phyllistine Laurie. prior to the start of the procedure:            * Patient was identified by name and date of birth   * Agreement on procedure being performed was verified  * Risks and Benefits explained to the patient  * Procedure site verified and marked as necessary  * Patient was positioned for comfort  * Consent was signed and verified     Time: 0850       Date of procedure: 10/7/2019    Procedure performed by:  Ana Maria Foley MD    Provider assisted by: Early LPN    Patient assisted by: self    How tolerated by patient: tolerated the procedure well with no complications    Post Procedural Pain Scale: 2 - Hurts Little Bit    Comments: none    Rubber band ligation performed, 2 rubber bands placed in the right anterior quadrant, patient tolerated the procedure well    Assessment / Plan    Status post rubber band ligation x2 to the right anterior quadrant  Restart Xarelto in 2 weeks  Follow-up in 1 month    A total of 15 minutes was spent with the patient, with >50% of time spent on counseling and coordination of care. The diagnoses and plan were discussed with patient. All questions answered. Plan of care agreed to by all concerned.

## 2019-10-28 ENCOUNTER — HOSPITAL ENCOUNTER (OUTPATIENT)
Dept: LAB | Age: 64
Discharge: HOME | End: 2019-10-28
Payer: COMMERCIAL

## 2019-10-28 ENCOUNTER — OFFICE VISIT (OUTPATIENT)
Dept: CARDIOLOGY CLINIC | Age: 64
End: 2019-10-28

## 2019-10-28 VITALS
HEART RATE: 73 BPM | SYSTOLIC BLOOD PRESSURE: 157 MMHG | OXYGEN SATURATION: 96 % | BODY MASS INDEX: 23.53 KG/M2 | DIASTOLIC BLOOD PRESSURE: 85 MMHG | WEIGHT: 164 LBS

## 2019-10-28 DIAGNOSIS — Z79.899 LONG-TERM USE OF HIGH-RISK MEDICATION: Primary | ICD-10-CM

## 2019-10-28 LAB
ALBUMIN SERPL-MCNC: 4.6 G/DL (ref 3.4–5)
ALBUMIN/GLOB SERPL: 1.5 {RATIO} (ref 0.8–1.7)
ALP SERPL-CCNC: 120 U/L (ref 45–117)
ALT SERPL-CCNC: 70 U/L (ref 16–61)
AST SERPL-CCNC: 50 U/L (ref 10–38)
BILIRUB DIRECT SERPL-MCNC: 0.1 MG/DL (ref 0–0.2)
BILIRUB SERPL-MCNC: 0.5 MG/DL (ref 0.2–1)
GLOBULIN SER CALC-MCNC: 3.1 G/DL (ref 2–4)
PROT SERPL-MCNC: 7.7 G/DL (ref 6.4–8.2)
T4 FREE SERPL-MCNC: 1.2 NG/DL (ref 0.7–1.5)
TSH SERPL DL<=0.05 MIU/L-ACNC: 0.47 UIU/ML (ref 0.36–3.74)

## 2019-10-28 PROCEDURE — 36415 COLL VENOUS BLD VENIPUNCTURE: CPT

## 2019-10-28 PROCEDURE — 80076 HEPATIC FUNCTION PANEL: CPT

## 2019-10-28 PROCEDURE — 84439 ASSAY OF FREE THYROXINE: CPT

## 2019-10-28 NOTE — PROGRESS NOTES
1. Have you been to the ER, urgent care clinic since your last visit? Hospitalized since your last visit? No     2. Have you seen or consulted any other health care providers outside of the [address] [place] [address] since your last visit? Include any pap smears or colon screening.   no

## 2019-10-28 NOTE — PATIENT INSTRUCTIONS
Stop Lasix   Weigh Daily        All testing/lab work is completed at [address] [place] [address]     No appointment required for lab work or CXR   Hours are Mon-Fri 7:00 am-5:30 pm

## 2019-11-02 NOTE — PROGRESS NOTES
Subjective:      Dolores Alexandra is in the office today for cardiac reevaluation. He is a 60-year-old man that has a diagnosis of chronic atrial fibrillation. At a routine office visit in November 2017, he was found to be in atrial fibrillation. At that time he was without symptoms. He had no limiting shortness of breath. He was not experiencing peripheral swelling. He  had no dizziness, near syncope or syncope. He was admitted to [facility] in 04/2018 for evaluation of shoulder and neck pain. A nuclear stress test was done which did not demonstrate any evidence for ischemia. An Echocardiogram was also ordered and done in 04/2018. His EF was 45%. There was mild to moderate MR. He was hospitalized at [facility] in December 2018. He  presented with atrial fibrillation with a rapid ventricular response on 12/4/2018. He had some congestive heart failure type symptoms at that time. He subsequently had a CAITLIN directed cardioversion. He was discharged on amiodarone. In the office today, she reports that he is feeling more tired now. His breathing is essentially unchanged. He believes he is sleeping well at night. Patient Active Problem List    Diagnosis Date Noted    Cardiomyopathy [facility]) 12/28/2018    Chronic systolic congestive heart failure ([facility] 11/27/2018    HTN (hypertension) 11/04/2018    Alcoholism ([facility] [address] 11/04/2018    Nontoxic multinodular goiter 02/07/2018    Atrial fibrillation ([facility] 11/28/2017    Elevated cholesterol 04/12/2016    Arthritis 11/13/2014     Current Outpatient Medications   Medication Sig Dispense Refill    lidocaine (LIDODERM) 5 % Apply patch to the affected area for 12 hours a day and remove for 12 hours a day. 30 Each 2    acetaminophen (TYLENOL) 325 mg tablet Take  by mouth every four (4) hours as needed for Pain.  Methylcellulose, with Sugar, (CITRUCEL, SUCROSE,) powd Take  by mouth.       nitroglycerin (NITROSTAT) 0.4 mg SL tablet 1 Tab by SubLINGual route as needed for Chest Pain. 1 Bottle 0    POTASSIUM ACETATE 1 Tab by Does Not Apply route daily.  atorvastatin (LIPITOR) 10 mg tablet Take 1 Tab by mouth daily. 90 Tab 4    nystatin-triamcinolone (MYCOLOG) 100,000-0.1 unit/gram-% ointment Apply  to affected area four (4) times daily. 30 g 1    metoprolol tartrate (LOPRESSOR) 50 mg tablet Take 1.5 Tabs by mouth two (2) times a day. 270 Tab 4    amiodarone (CORDARONE) 200 mg tablet Take 1 Tab by mouth daily. 90 Tab 4    rivaroxaban (XARELTO) 20 mg tab tablet Take 1 Tab by mouth daily (with breakfast). 90 Tab 4    multivit-min-FA-lycopen-lutein (CENTRUM SILVER MEN) 300-600-300 mcg tab Take 1 Tab by mouth daily.  100 Tab 12     No Known Allergies  Past Medical History:   Diagnosis Date    Arthritis     Atrial fibrillation ([address] 2017    Cardioversion ()    Colon polyps 2016    Hearing loss     HLD (hyperlipidemia)     HTN (hypertension)     Nontoxic multinodular goiter 2018    Biopsy of two nodules in 2016 - benign     Past Surgical History:   Procedure Laterality Date    CARDIAC SURG PROCEDURE UNLIST      HX COLONOSCOPY  2016    tubular adenoma    HX HERNIA REPAIR      x 2    [facility] CARDIOVERSION ELECTIVE ARRHYTHMIA EXTERNAL N/A 12/3/2018    EP CARDIOVERSION performed by Jessi Briggs MD at [address] [place] [address] CATH LAB     Family History   Problem Relation Age of Onset    Diabetes Father      Social History     Tobacco Use   Smoking Status Former Smoker    Last attempt to quit: 2015    Years since quittin.3   Smokeless Tobacco Never Used          Review of Systems, additional:  Constitutional: negative  Eyes: negative  Respiratory: negative  Cardiovascular: negative  Gastrointestinal: negative  Musculoskeletal:negative  Neurological: negative  Behvioral/Psych: negative  Endocrine: negative  ENT: negative    Objective:     Visit Vitals  /85   Pulse 73   Wt 74.4 kg (164 lb)   SpO2 96%   BMI 23.53 kg/m² General:  alert, cooperative, no distress   Chest Wall: inspection normal - no chest wall deformities or tenderness, respiratory effort normal   Lung: clear to auscultation bilaterally   Heart:  irregularly irregular rhythm with rate 81   Abdomen: soft, non-tender. Bowel sounds normal. No masses,  no organomegaly   Extremities: extremities normal, atraumatic, no cyanosis or edema Skin: no rashes   Neuro: alert, oriented, normal speech, no focal findings or movement disorder noted         Assessment/Plan:       ICD-10-CM ICD-9-CM    1. Atrial fibrillation, unspecified type ([facility] [address] continue amiodarone, metoprolol and Xarelto. Will check amiodarone labs; AST, ALT, T4 and TSH. Will order chest x-ray. I48.91 427.31 2D ECHO COMPLETE ADULT (TTE) W OR WO CONTR   2. Essential hypertension, malignant, moderately elevated systolic BP in the office again today. I10 401.0    3. Thyroid mass E07.9 246.9    4. Elevated cholesterol, followed by PCP E78.00 272.0    5. Normal cardiac stress test, 4/17/2018, no evidence for prior scarring or ongoing ischemia. Z13.6 V81.2    6       Presumed hemorrhoidal bleeding,  Patient on Xarelto. 7.      Fatigue, will DC Lasix and have patient weigh daily. Return in 6 weeks.

## 2019-11-11 ENCOUNTER — HOSPITAL ENCOUNTER (EMERGENCY)
Dept: GENERAL RADIOLOGY | Age: 64
Discharge: HOME | End: 2019-11-11
Attending: PHYSICIAN ASSISTANT
Payer: COMMERCIAL

## 2019-11-11 ENCOUNTER — HOSPITAL ENCOUNTER (EMERGENCY)
Age: 64
Discharge: HOME | End: 2019-11-11
Attending: EMERGENCY MEDICINE
Payer: COMMERCIAL

## 2019-11-11 VITALS
SYSTOLIC BLOOD PRESSURE: 139 MMHG | TEMPERATURE: 97.9 F | BODY MASS INDEX: 23.19 KG/M2 | HEART RATE: 71 BPM | OXYGEN SATURATION: 96 % | WEIGHT: 162 LBS | DIASTOLIC BLOOD PRESSURE: 97 MMHG | HEIGHT: 70 IN | RESPIRATION RATE: 18 BRPM

## 2019-11-11 DIAGNOSIS — M54.50 ACUTE LEFT-SIDED LOW BACK PAIN WITHOUT SCIATICA: ICD-10-CM

## 2019-11-11 DIAGNOSIS — S22.31XA CLOSED FRACTURE OF ONE RIB OF RIGHT SIDE, INITIAL ENCOUNTER: Primary | ICD-10-CM

## 2019-11-11 DIAGNOSIS — S70.02XA CONTUSION OF LEFT HIP, INITIAL ENCOUNTER: ICD-10-CM

## 2019-11-11 PROCEDURE — 99282 EMERGENCY DEPT VISIT SF MDM: CPT

## 2019-11-11 PROCEDURE — 71100 X-RAY EXAM RIBS UNI 2 VIEWS: CPT

## 2019-11-11 PROCEDURE — 73502 X-RAY EXAM HIP UNI 2-3 VIEWS: CPT

## 2019-11-11 RX ORDER — HYDROCODONE BITARTRATE AND ACETAMINOPHEN 5; 325 MG/1; MG/1
1 TABLET ORAL
Qty: 8 TAB | Refills: 0 | Status: SHIPPED | OUTPATIENT
Start: 2019-11-11 | End: 2019-11-14

## 2019-11-11 NOTE — ED TRIAGE NOTES
Pt states fell last Tuesday when he tripped over the dog. Pain in left hip, buttock and low back. Denies dysuria, or bowel movements. Pain 7/10. Pt states pain is not improving.

## 2019-11-11 NOTE — ED PROVIDER NOTES
EMERGENCY DEPARTMENT HISTORY AND PHYSICAL EXAM    Date: 11/11/2019  Patient Name: Jordyn Strickland. History of Presenting Illness     Chief Complaint   Patient presents with    Back Pain    Hip Pain         History Provided By: patient        Additional History (Context): Jordyn Gomse is a 58yo male with h/o etoh use, athritis, afib, HTN here with c/o left hip and left rib pain s/p trip and fall 1 week ago. States he tripped over the dog, striking left rib and hip on furniture. Did not fall to the ground, no head injury or LOC. States pain has gradually worsened in hip with walking over the past week and pt can pin point exact area of left rib that is painful. No pain at rest, no pain with exertion in chest, no sob, fever, chills, abdominal pain, NVD or any other complaints. PCP: Glynn Concepcion NP    Current Outpatient Medications   Medication Sig Dispense Refill    HYDROcodone-acetaminophen (NORCO) 5-325 mg per tablet Take 1 Tab by mouth every four (4) hours as needed for Pain for up to 3 days. Max Daily Amount: 6 Tabs. 8 Tab 0    lidocaine (LIDODERM) 5 % Apply patch to the affected area for 12 hours a day and remove for 12 hours a day. 30 Each 2    acetaminophen (TYLENOL) 325 mg tablet Take  by mouth every four (4) hours as needed for Pain.  Methylcellulose, with Sugar, (CITRUCEL, SUCROSE,) powd Take  by mouth.  nitroglycerin (NITROSTAT) 0.4 mg SL tablet 1 Tab by SubLINGual route as needed for Chest Pain. 1 Bottle 0    POTASSIUM ACETATE 1 Tab by Does Not Apply route daily.  atorvastatin (LIPITOR) 10 mg tablet Take 1 Tab by mouth daily. 90 Tab 4    nystatin-triamcinolone (MYCOLOG) 100,000-0.1 unit/gram-% ointment Apply  to affected area four (4) times daily. 30 g 1    metoprolol tartrate (LOPRESSOR) 50 mg tablet Take 1.5 Tabs by mouth two (2) times a day. 270 Tab 4    amiodarone (CORDARONE) 200 mg tablet Take 1 Tab by mouth daily.  90 Tab 4    rivaroxaban (Irven Pamela) 20 mg tab tablet Take 1 Tab by mouth daily (with breakfast). 90 Tab 4    multivit-min-FA-lycopen-lutein (CENTRUM SILVER MEN) 300-600-300 mcg tab Take 1 Tab by mouth daily. 100 Tab 12       Past History     Past Medical History:  Past Medical History:   Diagnosis Date    Arthritis     Atrial fibrillation ([address] 2017    Cardioversion ()    Colon polyps 2016    Hearing loss     HLD (hyperlipidemia)     HTN (hypertension)     Nontoxic multinodular goiter 2018    Biopsy of two nodules in 2016 - benign       Past Surgical History:  Past Surgical History:   Procedure Laterality Date    CARDIAC SURG PROCEDURE UNLIST      HX COLONOSCOPY  2016    tubular adenoma    HX HERNIA REPAIR      x 2    NE CARDIOVERSION ELECTIVE ARRHYTHMIA EXTERNAL N/A 12/3/2018    EP CARDIOVERSION performed by Loyd Harris MD at [address] [place] [address] CATH LAB       Family History:  Family History   Problem Relation Age of Onset    Diabetes Father        Social History:  Social History     Tobacco Use    Smoking status: Former Smoker     Last attempt to quit: 2015     Years since quittin.4    Smokeless tobacco: Never Used   Substance Use Topics    Alcohol use: Yes     Comment: about 10 oz of scotch per day    Drug use: No       Allergies:  No Known Allergies      Review of Systems       Review of Systems   Constitutional: Negative for chills and fever. HENT: Negative for nasal congestion, sore throat, rhinorrhea  Eyes: Negative. Respiratory: Negative for cough and negative for shortness of breath. Cardiovascular: Negative for chest pain and palpitations. Gastrointestinal: Negative for abdominal pain, constipation, diarrhea, nausea and vomiting. Genitourinary: Negative. Negative for difficulty urinating and flank pain. Musculoskeletal: Negative for back pain. Pos for hip and rib pain   Negative for gait problem and neck pain. Skin: Negative. Allergic/Immunologic: Negative.     Neurological: Negative for dizziness, weakness, numbness and headaches. Psychiatric/Behavioral: Negative. All other systems reviewed and are negative. All Other Systems Negative  Physical Exam     Vitals:    11/11/19 1102   BP: (!) 139/97   Pulse: 71   Resp: 18   Temp: 97.9 °F (36.6 °C)   SpO2: 96%   Weight: 73.5 kg (162 lb)   Height: 5' 10\" (1.778 m)     Physical Exam   Constitutional: He is oriented to person, place, and time. He appears well-developed and well-nourished. No distress. NAD, well hydrated, non toxic     HENT:   Head: Normocephalic and atraumatic. Nose: Nose normal.   Mouth/Throat: Oropharynx is clear and moist. No oropharyngeal exudate. Eyes: Pupils are equal, round, and reactive to light. Conjunctivae and EOM are normal.   Neck: Normal range of motion. Neck supple. Cardiovascular: Normal rate, regular rhythm and normal heart sounds. No murmur heard. Pulmonary/Chest: Effort normal and breath sounds normal. No respiratory distress. He has no wheezes. He has no rales. Chest wall is not dull to percussion. He exhibits tenderness and bony tenderness. He exhibits no mass, no laceration, no crepitus, no edema, no deformity, no swelling and no retraction. Abdominal: Soft. Normal appearance. He exhibits no shifting dullness, no distension, no pulsatile liver, no fluid wave, no abdominal bruit, no ascites, no pulsatile midline mass and no mass. There is no tenderness. There is no rigidity, no rebound, no guarding, no CVA tenderness, no tenderness at McBurney's point and negative Santana's sign. Musculoskeletal:        Left hip: He exhibits tenderness and bony tenderness. He exhibits normal range of motion, normal strength, no swelling, no crepitus, no deformity and no laceration.         Cervical back: Normal.        Thoracic back: Normal.        Lumbar back: He exhibits normal range of motion, no tenderness, no bony tenderness, no swelling, no edema, no deformity, no laceration, no pain, no spasm and normal pulse.   Non tender to midline palpation of the cervical, thoracic, and lumbar spine. No step off or deformity. FROM of BUE and BLE against resistance in flexion and extension with 5/5 strength. Non tender to bilateral shoulders/elbows/hands//knees/ankles. Pulses intact and equal.         Lymphadenopathy:     He has no cervical adenopathy. Neurological: He is alert and oriented to person, place, and time. No cranial nerve deficit. Coordination normal.   Skin: Skin is warm. No rash noted. He is not diaphoretic. Psychiatric: He has a normal mood and affect. His behavior is normal.   Nursing note and vitals reviewed. Diagnostic Study Results     Labs -   No results found for this or any previous visit (from the past 12 hour(s)). Radiologic Studies -   XR HIP LT W OR WO PELV 2-3 VWS   Final Result   IMPRESSION:         1. No acute osseous abnormality. Degenerative changes. 2. Atherosclerosis. XR RIBS LT UNI 2 V   Final Result   IMPRESSION:         1. Findings suspicious for acute minimally displaced fracture lateral left ninth   rib. CT Results  (Last 48 hours)    None        CXR Results  (Last 48 hours)    None            Medical Decision Making   I am the first provider for this patient. I reviewed the vital signs, available nursing notes, past medical history, past surgical history, family history and social history. Vital Signs-Reviewed the patient's vital signs. Records Reviewed: Nursing notes, old medical records and any previous labs, imaging, visits, consultations pertinent to patient care    Procedures:  Procedures      ED Course: Progress Notes, Reevaluation, and Consults:  Xray shows acute mildly displaced left 9th rib, otherwise WNL. Will give spirometer and pain medication.  Remaining PE and work up unremarkable, no cp, sob, abdominal pain, back pain, neck pain or any other concerning sx to warrant further work up/imaging, etc.     Provider Notes (Medical Decision Making):       MED RECONCILIATION:  No current facility-administered medications for this encounter. Current Outpatient Medications   Medication Sig    HYDROcodone-acetaminophen (NORCO) 5-325 mg per tablet Take 1 Tab by mouth every four (4) hours as needed for Pain for up to 3 days. Max Daily Amount: 6 Tabs.  lidocaine (LIDODERM) 5 % Apply patch to the affected area for 12 hours a day and remove for 12 hours a day.  acetaminophen (TYLENOL) 325 mg tablet Take  by mouth every four (4) hours as needed for Pain.  Methylcellulose, with Sugar, (CITRUCEL, SUCROSE,) powd Take  by mouth.  nitroglycerin (NITROSTAT) 0.4 mg SL tablet 1 Tab by SubLINGual route as needed for Chest Pain.  POTASSIUM ACETATE 1 Tab by Does Not Apply route daily.  atorvastatin (LIPITOR) 10 mg tablet Take 1 Tab by mouth daily.  nystatin-triamcinolone (MYCOLOG) 100,000-0.1 unit/gram-% ointment Apply  to affected area four (4) times daily.  metoprolol tartrate (LOPRESSOR) 50 mg tablet Take 1.5 Tabs by mouth two (2) times a day.  amiodarone (CORDARONE) 200 mg tablet Take 1 Tab by mouth daily.  rivaroxaban (XARELTO) 20 mg tab tablet Take 1 Tab by mouth daily (with breakfast).  multivit-min-FA-lycopen-lutein (CENTRUM SILVER MEN) 300-600-300 mcg tab Take 1 Tab by mouth daily. Disposition:  home    DISCHARGE NOTE:     Pt has been reexamined. Patient has no new complaints, changes, or physical findings. Care plan outlined and precautions discussed. Discussed proper way to take medications. Discussed treatment plan, return precautions, symptomatic relief, and expected time to improvement. All questions answered. Patient is stable for discharge and outpatient management. Patient is ready to go home.     Follow-up Information     Follow up With Specialties Details Why [address] [facility] EMERGENCY DEPT Emergency Medicine   [address] [place] [address]    Shay Palacio NP Nurse Practitioner   [address] [facility]  [address] [facility] [address] [facility]    [address] [address]  [phone]          Discharge Medication List as of 11/11/2019  1:04 PM      START taking these medications    Details   HYDROcodone-acetaminophen (NORCO) 5-325 mg per tablet Take 1 Tab by mouth every four (4) hours as needed for Pain for up to 3 days. Max Daily Amount: 6 Tabs., Print, Disp-8 Tab, R-0         CONTINUE these medications which have NOT CHANGED    Details   lidocaine (LIDODERM) 5 % Apply patch to the affected area for 12 hours a day and remove for 12 hours a day., Normal, Disp-30 Each, R-2      acetaminophen (TYLENOL) 325 mg tablet Take  by mouth every four (4) hours as needed for Pain., Historical Med      Methylcellulose, with Sugar, (CITRUCEL, SUCROSE,) powd Take  by mouth., Historical Med      nitroglycerin (NITROSTAT) 0.4 mg SL tablet 1 Tab by SubLINGual route as needed for Chest Pain., Normal, Disp-1 Bottle, R-0      POTASSIUM ACETATE 1 Tab by Does Not Apply route daily. , Historical Med      atorvastatin (LIPITOR) 10 mg tablet Take 1 Tab by mouth daily. , Normal, Disp-90 Tab, R-4      nystatin-triamcinolone (MYCOLOG) 100,000-0.1 unit/gram-% ointment Apply  to affected area four (4) times daily. , Normal, Disp-30 g, R-1      metoprolol tartrate (LOPRESSOR) 50 mg tablet Take 1.5 Tabs by mouth two (2) times a day., Normal, Disp-270 Tab, R-4      amiodarone (CORDARONE) 200 mg tablet Take 1 Tab by mouth daily. , Normal, Disp-90 Tab, R-4      rivaroxaban (XARELTO) 20 mg tab tablet Take 1 Tab by mouth daily (with breakfast). , Normal, Disp-90 Tab, R-4      multivit-min-FA-lycopen-lutein (CENTRUM SILVER MEN) 300-600-300 mcg tab Take 1 Tab by mouth daily. , Normal, Disp-100 Tab, R-12                   Diagnosis     Clinical Impression:   1. Closed fracture of one rib of right side, initial encounter    2.  Acute left-sided low back pain without sciatica 3. Contusion of left hip, initial encounter            Dictation disclaimer:  Please note that this dictation was completed with [email or web address], the computer voice recognition software. Quite often unanticipated grammatical, syntax, homophones, and other interpretive errors are inadvertently transcribed by the computer software. Please disregard these errors. Please excuse any errors that have escaped final proofreading.

## 2019-11-11 NOTE — DISCHARGE INSTRUCTIONS
Broken Rib: Care Instructions  Your Care Instructions    A broken rib is a crack or break in one of the bones of the rib cage. Breathing can be very painful because the muscles used for breathing pull on the rib. In most cases, a broken rib will heal on its own. You can take pain medicine while the rib mends. Pain relief allows you to take deep breaths. In the past, doctors recommended taping or wrapping broken ribs. This is no longer done because taping makes it hard for you to take deep breaths. Taking deep breaths may help prevent pneumonia or a partial collapse of a lung. Your rib will heal in about 6 weeks. You heal best when you take good care of yourself. Eat a variety of healthy foods, and don't smoke. Follow-up care is a key part of your treatment and safety. Be sure to make and go to all appointments, and call your doctor if you are having problems. It's also a good idea to know your test results and keep a list of the medicines you take. How can you care for yourself at home? · Be safe with medicines. Read and follow all instructions on the label. ? If the doctor gave you a prescription medicine for pain, take it as prescribed. ? If you are not taking a prescription pain medicine, ask your doctor if you can take an over-the-counter medicine. · Even if it hurts, try to cough or take the deepest breath you can at least once every hour. This will get air deeply into your lungs. This may reduce your chance of getting pneumonia or a partial collapse of a lung. Hold a pillow against your chest to make this less painful. · Put ice or a cold pack on the area for 10 to 20 minutes at a time. Put a thin cloth between the ice and your skin. When should you call for help? Call 911 anytime you think you may need emergency care.  For example, call if:    · You have severe trouble breathing.    Call your doctor now or seek immediate medical care if:    · You have some trouble breathing.     · You have a fever.     · You have a new or worse cough.    Watch closely for changes in your health, and be sure to contact your doctor if:    · You have pain even after taking your medicine.     · You do not get better as expected. Where can you learn more? Go to http://jg-dave.info/. Enter M135 in the search box to learn more about \"Broken Rib: Care Instructions. \"  Current as of: June 26, 2019  Content Version: 12.2  © 4879-5000 [employer]. Care instructions adapted under license by [employer] (which disclaims liability or warranty for this information). If you have questions about a medical condition or this instruction, always ask your healthcare professional. [place] [address] any warranty or liability for your use of this information. Patient Education      Patient Education        Learning About Using an Incentive Spirometer  What is an incentive spirometer? An incentive spirometer is a handheld device that exercises your lungs and measures how much air you can breathe in. It tells you and your doctor how well your lungs are working. The spirometer can help you practice taking deep breaths. Deep breaths can help open your airways and prevent fluid or mucus from building up in your lungs, and make it easier for you to breathe. Using the device can help prevent serious lung infections like pneumonia, improve your breathing after you've had pneumonia or surgery, and keep your airways open and lungs active if you can't get out of bed. How do you use an incentive spirometer? When you use an incentive spirometer, you breathe in air through a tube that is connected to a large air column containing a piston or ball. As you breathe in, the piston or ball inside the column moves up. The height of the piston or ball shows how much air you breathed in. You may feel lightheaded when you breathe in deeply for this exercise.  If you feel dizzy or feel like you're going to pass out, stop the exercise and rest.  Each time you do this exercise, keep track of your progress by writing down how high the piston or ball moves up the column. 1. Move the slider on the outside of the large column to the level that you want to reach or that your doctor recommended. 2. Sit or stand up straight, and hold the spirometer in front of you. Be sure to keep it level. 3. To start, breathe out normally. Then close your lips tightly around the mouthpiece. Make sure that you don't block the mouthpiece with your tongue. 4. Take a slow, deep breath. Breathe in as deeply as you can. As you breathe in, the piston or ball inside the large column will move up. Try to move the piston or ball as high up as you can or to the level your doctor recommended. When you can't breathe in anymore, hold your breath for 2 to 5 seconds. 5. Relax, take the mouthpiece out of your mouth, and breathe out normally. 6. Repeat steps 1 through 5 as many times as your doctor tells you to.  7. After you've taken the recommended number of breaths, try to cough a few times. This will help loosen any mucus that has built up in your lungs. It will make it easier for you to breathe. If you just had surgery on your belly or chest, hold a pillow over your cut (incision) when you cough. Follow-up care is a key part of your treatment and safety. Be sure to make and go to all appointments, and call your doctor if you are having problems. It's also a good idea to know your test results and keep a list of the medicines you take. Where can you learn more? Go to http://jg-dave.info/. Enter Y669 in the search box to learn more about \"Learning About Using an [employer]. \"  Current as of: June 9, 2019  Content Version: 12.2  © 8436-1650 [employer]. Care instructions adapted under license by [employer] (which disclaims liability or warranty for this information).  If you have questions about a medical condition or this instruction, always ask your healthcare professional. [address] any warranty or liability for your use of this information. Broken Rib: Care Instructions  Your Care Instructions    A broken rib is a crack or break in one of the bones of the rib cage. Breathing can be very painful because the muscles used for breathing pull on the rib. In most cases, a broken rib will heal on its own. You can take pain medicine while the rib mends. Pain relief allows you to take deep breaths. In the past, doctors recommended taping or wrapping broken ribs. This is no longer done because taping makes it hard for you to take deep breaths. Taking deep breaths may help prevent pneumonia or a partial collapse of a lung. Your rib will heal in about 6 weeks. You heal best when you take good care of yourself. Eat a variety of healthy foods, and don't smoke. Follow-up care is a key part of your treatment and safety. Be sure to make and go to all appointments, and call your doctor if you are having problems. It's also a good idea to know your test results and keep a list of the medicines you take. How can you care for yourself at home? · Be safe with medicines. Read and follow all instructions on the label. ? If the doctor gave you a prescription medicine for pain, take it as prescribed. ? If you are not taking a prescription pain medicine, ask your doctor if you can take an over-the-counter medicine. · Even if it hurts, try to cough or take the deepest breath you can at least once every hour. This will get air deeply into your lungs. This may reduce your chance of getting pneumonia or a partial collapse of a lung. Hold a pillow against your chest to make this less painful. · Put ice or a cold pack on the area for 10 to 20 minutes at a time. Put a thin cloth between the ice and your skin. When should you call for help?   Call 911 anytime you think you may need emergency care. For example, call if:    · You have severe trouble breathing.    Call your doctor now or seek immediate medical care if:    · You have some trouble breathing.     · You have a fever.     · You have a new or worse cough.    Watch closely for changes in your health, and be sure to contact your doctor if:    · You have pain even after taking your medicine.     · You do not get better as expected. Where can you learn more? Go to http://jg-dave.info/. Enter M135 in the search box to learn more about \"Broken Rib: Care Instructions. \"  Current as of: June 26, 2019  Content Version: 12.2  © 6286-6758 [employer]. Care instructions adapted under license by [employer] (which disclaims liability or warranty for this information). If you have questions about a medical condition or this instruction, always ask your healthcare professional. [address] any warranty or liability for your use of this information.

## 2019-11-27 ENCOUNTER — OFFICE VISIT (OUTPATIENT)
Dept: FAMILY MEDICINE CLINIC | Age: 64
End: 2019-11-27

## 2019-11-27 VITALS
RESPIRATION RATE: 16 BRPM | SYSTOLIC BLOOD PRESSURE: 158 MMHG | BODY MASS INDEX: 23.48 KG/M2 | HEART RATE: 83 BPM | HEIGHT: 70 IN | TEMPERATURE: 96.5 F | WEIGHT: 164 LBS | DIASTOLIC BLOOD PRESSURE: 96 MMHG | OXYGEN SATURATION: 97 %

## 2019-11-27 DIAGNOSIS — F10.20 ALCOHOLISM (HCC): ICD-10-CM

## 2019-11-27 DIAGNOSIS — G89.29 CHRONIC PAIN OF LEFT KNEE: Primary | ICD-10-CM

## 2019-11-27 DIAGNOSIS — I10 ESSENTIAL HYPERTENSION: ICD-10-CM

## 2019-11-27 DIAGNOSIS — I48.91 ATRIAL FIBRILLATION, UNSPECIFIED TYPE (HCC): ICD-10-CM

## 2019-11-27 DIAGNOSIS — M25.562 CHRONIC PAIN OF LEFT KNEE: Primary | ICD-10-CM

## 2019-11-27 DIAGNOSIS — Z23 NEEDS FLU SHOT: ICD-10-CM

## 2019-11-27 RX ORDER — DICLOFENAC SODIUM 10 MG/G
2 GEL TOPICAL 4 TIMES DAILY
Qty: 100 G | Refills: 1 | Status: SHIPPED | OUTPATIENT
Start: 2019-11-27 | End: 2019-12-11 | Stop reason: ALTCHOICE

## 2019-11-27 NOTE — PATIENT INSTRUCTIONS
Iliotibial Band Syndrome: Exercises  Introduction  Here are some examples of exercises for you to try. The exercises may be suggested for a condition or for rehabilitation. Start each exercise slowly. Ease off the exercises if you start to have pain. You will be told when to start these exercises and which ones will work best for you. How to do the exercises  Iliotibial band stretch    1. Lean sideways against a wall. If you are not steady on your feet, hold on to a chair or counter. 2. Stand on the leg with the affected hip, with that leg close to the wall. Then cross your other leg in front of it. 3. Let your affected hip drop out to the side of your body and against the wall. Then lean away from your affected hip until you feel a stretch. 4. Hold the stretch for 15 to 30 seconds. 5. Repeat 2 to 4 times. Piriformis stretch    1. Lie on your back with your legs straight. 2. Lift your affected leg and bend your knee. With your opposite hand, reach across your body, and then gently pull your knee toward your opposite shoulder. 3. Hold the stretch for 15 to 30 seconds. 4. Repeat 2 to 4 times. Hamstring wall stretch    1. Lie on your back in a doorway, with your good leg through the open door. 2. Slide your affected leg up the wall to straighten your knee. You should feel a gentle stretch down the back of your leg. 1. Do not arch your back. 2. Do not bend either knee. 3. Keep one heel touching the floor and the other heel touching the wall. Do not point your toes. 3. Hold the stretch for at least 1 minute to begin. Then try to lengthen the time you hold the stretch to as long as 6 minutes. 4. Repeat 2 to 4 times. 5. If you do not have a place to do this exercise in a doorway, there is another way to do it:  6. Lie on your back, and bend the knee of your affected leg. 7. Loop a towel under the ball and toes of that foot, and hold the ends of the towel in your hands.   8. Straighten your knee, and slowly pull back on the towel. You should feel a gentle stretch down the back of your leg. 9. Hold the stretch for 15 to 30 seconds. Or even better, hold the stretch for 1 minute if you can. 10. Repeat 2 to 4 times. Follow-up care is a key part of your treatment and safety. Be sure to make and go to all appointments, and call your doctor if you are having problems. It's also a good idea to know your test results and keep a list of the medicines you take. Where can you learn more? Go to http://jg-dave.info/. Enter P252 in the search box to learn more about \"Iliotibial Band Syndrome: Exercises. \"  Current as of: June 26, 2019  Content Version: 12.2  © 1002-8321 [employer]. Care instructions adapted under license by [employer] (which disclaims liability or warranty for this information). If you have questions about a medical condition or this instruction, always ask your healthcare professional. [address] any warranty or liability for your use of this information.

## 2019-11-27 NOTE — PROGRESS NOTES
1. Have you been to the ER, urgent care clinic since your last visit? Hospitalized since your last visit? Yes, 11/11/19, [facility], fall at home with injury     2. Have you seen or consulted any other health care providers outside of the [address] [place] [address] since your last visit? Include any pap smears or colon screening. No     Patient presents in office today for routine care.   Patient concerns: leg leg pain from a fall

## 2019-11-27 NOTE — PROGRESS NOTES
Leia Pena. is a 59 y.o.  male and presents with    Chief Complaint   Patient presents with    Knee Pain     Subjective:  Knee Pain  Patient complains of left knee pain. Onset of the symptoms was 3 months ago. Inciting event: injured during a fall while in bathroom and his dog jumped on him and knocked him down. Current symptoms include pain location: left: lateral. Associated symptoms: knee giving out. Aggravating symptoms: standing, walking. Patient's overall course: symptoms have progressed to a point and plateaued. Patient has had prior knee problems. Patient denies difficulty swallowing, weight loss, melena, hematochezia, fever. Evaluation to date: plain films: abnormal: mild arthritis. Treatment to date: none. Cardiovascular Review:  The patient has Atrial Fibrillation and hypertension. He did not take medication this morning. Diet and Lifestyle: not attempting to follow a low fat, low cholesterol diet, not attempting to follow a low sodium diet, does not rigorously follow a diabetic diet, exercises sporadically, nonsmoker  Home BP Monitoring: is not measured at home. Pertinent ROS: taking medications as instructed, no medication side effects noted, no TIA's, no chest pain on exertion, no dyspnea on exertion, no swelling of ankles. ROS   Constitutional: Negative for fatigue and fever. HENT: positive for nasal congestion and rhinorrhea  Respiratory: Negative for cough. Cardiovascular: Negative for chest pain and palpitations. Genitourinary: Negative for dysuria. Musculoskeletal: Positive for arthralgias (LUE per HPI). Negative for back pain, neck pain and neck stiffness. Neurological: Negative for dizziness and headaches. All other systems reviewed and are negative.     Objective:  Vitals:    11/27/19 0849   BP: (!) 158/96   Pulse: 83   Resp: 16   Temp: 96.5 °F (35.8 °C)   TempSrc: Oral   SpO2: 97%   Weight: 164 lb (74.4 kg)   Height: 5' 10\" (1.778 m)   PainSc:  10 - Worst pain ever   PainLoc: Generalized       Constitutional: He is oriented to person, place, and time. He appears well-developed and well-nourished. No distress. Cardiovascular: Normal rate, regular rhythm and normal heart sounds. No murmur heard. Pulmonary/Chest: Effort normal and breath sounds normal. No respiratory distress. Knee exam:  Right knee: No erythema, edema, or bruising. Nontender to quadriceps tendon Nontender to patellar tendon or tibial tuberosity. No joint line tenderness laterally or medially. No patellar facet tenderness. No laxity to cruiciate or collateral ligaments. No menisceal sign medial or lateral aspect. Left knee: No erythema, edema, or bruising. Nontender to quadriceps tendon Nontender to patellar tendon or tibial tuberosity. No joint line tenderness laterally or medially. No patellar facet tenderness. No laxity to cruiciate or collateral ligaments. No menisceal sign medial or lateral aspect. Positive for pain at site of IT band    TESTS  Knee xray - mild tricompartmental degenerative disease    Assessment/Plan:   1. Chronic pain of left knee  Start antinflammatory topical treatment due to chronic anticoagulation; exercises demonstrated  - diclofenac (VOLTAREN) 1 % gel; Apply 2 g to affected area four (4) times daily. Dispense: 100 g; Refill: 1    2. Essential hypertension  Goal <130/80    3. Atrial fibrillation, unspecified type ([address]  Continue xarelto; f/u with cardiology    4. Alcoholism ([facility] [address] [facility]  Encourage decreasing intake    Lab review: no lab studies available for review at time of visit      I have discussed the diagnosis with the patient and the intended plan as seen in the above orders. The patient has received an after-visit summary and questions were answered concerning future plans. I have discussed medication side effects and warnings with the patient as well.  I have reviewed the plan of care with the patient, accepted their input and they are in agreement with the treatment goals.

## 2019-12-02 ENCOUNTER — OFFICE VISIT (OUTPATIENT)
Dept: ORTHOPEDIC SURGERY | Age: 64
End: 2019-12-02

## 2019-12-02 VITALS
DIASTOLIC BLOOD PRESSURE: 96 MMHG | WEIGHT: 164.6 LBS | RESPIRATION RATE: 15 BRPM | BODY MASS INDEX: 23.56 KG/M2 | HEIGHT: 70 IN | SYSTOLIC BLOOD PRESSURE: 182 MMHG | TEMPERATURE: 98.5 F | HEART RATE: 84 BPM

## 2019-12-02 DIAGNOSIS — G89.29 CHRONIC PAIN OF LEFT WRIST: ICD-10-CM

## 2019-12-02 DIAGNOSIS — M65.4 DE QUERVAIN'S TENOSYNOVITIS, LEFT: Primary | ICD-10-CM

## 2019-12-02 DIAGNOSIS — M25.532 CHRONIC PAIN OF LEFT WRIST: ICD-10-CM

## 2019-12-02 RX ORDER — DICLOFENAC SODIUM 10 MG/G
2 GEL TOPICAL
Qty: 100 G | Refills: 1 | Status: SHIPPED | OUTPATIENT
Start: 2019-12-02

## 2019-12-02 RX ORDER — BETAMETHASONE SODIUM PHOSPHATE AND BETAMETHASONE ACETATE 3; 3 MG/ML; MG/ML
6 INJECTION, SUSPENSION INTRA-ARTICULAR; INTRALESIONAL; INTRAMUSCULAR; SOFT TISSUE ONCE
Qty: 1 ML | Refills: 0
Start: 2019-12-02 | End: 2019-12-02

## 2019-12-02 NOTE — PROCEDURES
PROCEDURE NOTE:  Time out: 1004am  * Patient was identified by name and date of birth   * Agreement on procedure being performed was verified  * Risks and Benefits explained to the patient  * Procedure site verified and marked as necessary  * Patient was positioned for comfort  * Consent was signed and verified. Risks/benefits including but not limited to bleeding, infection, and scarring discussed and Pt wishes to proceed with procedure. The area was prepped with betadine. Under sterile technique with ethyl chloride spray and with ultrasound guidance 1cc of 6mg/cc celestone and 1cc lidocaine were injected into 1st dorsal compartment left wrist using radial styloid as a giude. Sterile gauze used to clean the area. Blood loss minimal.  Noticed improvement in pain Sx within 5 minutes (now rated 1/10). Tolerated procedure well. Discussed possible signs/Sx of infxn, and advised to seek care if concerned.

## 2019-12-02 NOTE — PATIENT INSTRUCTIONS
Use medication as prescribed, wear brace regularly, stretch 2 - 5 times daily, and return to the office in about 6 weeks. Finkelstein stretch    1. Hold your arms out in front of you. (Your hand should be in the position used for shaking someone's hand.)  2. Bend your thumb toward your palm. 3. Use your other hand to gently stretch your thumb and wrist downward until you feel the stretch on the thumb side of your wrist.  4. Hold for at least 15 to 30 seconds. 5. Repeat 2 to 4 times.

## 2019-12-02 NOTE — LETTER
NAME: Kaylee Falcon. : 1955 MRN: [id number] CONSENT FOR TREATMENT/PROCEDURE I authorize Bernice Huizar DO at [address] [place] [address] and [facility] to perform the medical treatment and/or procedure(s) described below:  
 
Description of Medical Treatment and/or Procedure(s): (Specify number of treatments or procedures if repeated treatment is recommended) 
 
_____________inject steroid into left thumb de Quervain's_________________________ I understand my provider may be assisted with significant procedural tasks by other qualified medical practitioners, who may perform important parts of the treatment/procedure(s) or assist with the administration of analgesia (pain-relieving medications) as necessary for my treatment/procedure(s). These practitioners will only perform tasks within the scope of their licensure and practice, as determined under [place] law and any other applicable regulation(s). Name and Credentials of Practitioners Who May Assist with My Treatment/Procedure(s):  
 
______________________________________________________________________ I understand that a medical company representative may be present during my treatment/procedure(s) to observe and provide verbal, technical advice to my provider. I consent to the participation of this representative in my treatment/procedure(s). My provider has explained that unforeseen conditions may be identified during the performance of my treatment/procedure(s) that necessitate an extension of the original treatment/procedure(s) or the performance of procedures other than those identified above. I consent to the performance of additional treatment/procedure(s) by my provider and the qualified medical practitioners assisting my provider as deemed necessary by my provider for treatment of my medical condition(s).   
 
I understand that certain complications may arise during the use of analgesia during my treatment/procedure(s) that may include, but are not limited to, decreased/altered awareness, respiratory problems, drug reactions, paralysis, brain damage, or possibly, death. I understand that the analgesia required for the performance of my treatment/procedure(s) involves additional risks beyond those of the treatment/procedure(s) to be performed, and authorize the use of analgesia by my provider as deemed necessary for the control of pain during my treatment/procedure(s). I understand that my provider may need to change the type of analgesia or medications used, possibly without explanation to me, and I consent to any such changes as deemed necessary by my provider for treatment of my medical condition(s). I understand that there are risks of infection and other unexpected complications associated with any medical or surgical treatment/procedure including the treatment/procedure(s) listed above or other treatment/procedure(s) necessitated by my medical condition, and that such complications may occur in the absence of any negligence on the part of my healthcare providers. My provider has explained to my satisfaction my medical condition and the specific treatment/procedure(s) recommended and identified above. I have been given an opportunity to ask and have answered to my satisfaction questions about: (CONTINUED PAGE 2) NAME: Lan August. : 1955 MRN: [id number]  The nature and extent of the treatment/procedure(s) to be performed;  The benefit of treatment, and the risks associated with not having the recommended treatment/procedure(s);  The risks and possible complications associated with having the treatment, including those which, even though unlikely to occur, may involve serious consequences;  
 Analgesia and alternative forms of analgesia;  Alternative procedures and methods of treatment, and the risk associated with each;  
  The expected consequences of the treatment/procedure(s) on my future health. I understand that no assurance can be given that the treatment/procedure(s) performed will be a success, and no guarantee or warranty of success for the treatment/procedure(s) has been given to me by my provider. I consent to the disposal by the examining Pathologist of any tissue removed during my treatment/procedure(s) in accordance with the receiving hospitals or laboratorys policy and any associated regulations specific to such disposal.  
 
I DO_____  DO NOT__x__ consent to other health care personnel observing my treatment/procedure(s) for the purpose of medical education or other specified purposes as may be explained by my provider. I DO_______ DO NOT__x__ consent to photography or videotaping of all or any part of my treatment/procedure(s) for medical and/or educational purposes. I understand that my identity will not be revealed in any photographs, videos, or accompanying explanations should these images be used by my healthcare providers. I certify that I have read and fully understand the above Consent for Treatment/Procedure and that all blanks were completed before I signed this consent.  
 
__________Max Prieto Jr._________                      _______________ Print Name of Patient or Legal Representative        Relationship to Patient (if not self) X_______________________________            __________________________ Signature of Patient (or legal representative)  Witness to signature __12/2/2019___/_________AM/PM 
                                                                   Date/Time (If patient is a minor or is unable to sign, complete the following) Patient is a minor, ________ years of age, or is unable to sign due to:______________________ I have explained the nature, purpose and anticipated benefits as well as any possible alternative methods of treatment, the known risks that are involved, and the possibility of complications of the proposed procedure(s) to the patient or patients legal representative. I have provided the patient or legal representative with an opportunity to ask and have answered to their satisfaction any questions about the proposed treatment/procedure(s) and alternative methods of treatment.   
 
Provider Signature:___________________  Date:__12/2/2019__Time:_____________AM/PM

## 2019-12-02 NOTE — PROGRESS NOTES
AVS reviewed: YES  HEP: AT demonstrated  Resources Provided: YES Printed Photos. Pt confirmed his thumb spica at home resembles the one pictured.   Patient questions/concerns answered: NO. Pt denied questions/concerns  Patient verbalized understanding of treatment plan: YES

## 2019-12-02 NOTE — PROGRESS NOTES
HISTORY OF PRESENT ILLNESS    Yuri Mandel is a 59y.o. year old male comes in today as new patient to be evaluated and treated at the request of Helga Lr NP for my opinion/advice regarding: left thumb/wrist    Patients symptoms have been present for 1 years. Pain level 8/10 left thumb into wrist and now popping into arm lateral. It has slightly improved with brace. Patient has tried:  Tylenol w/ small benefit. It is described as pain and numb feeling/tingle into arm w/ popping. No known cause/injury. Has been told has OA but no XR. Not currently employed. IMAGING: XR left thumb pending    Past Surgical History:   Procedure Laterality Date    CARDIAC SURG PROCEDURE UNLIST      HX COLONOSCOPY  2016    tubular adenoma    HX HERNIA REPAIR      x 2    MS CARDIOVERSION ELECTIVE ARRHYTHMIA EXTERNAL N/A 12/3/2018    EP CARDIOVERSION performed by Fabby Nguyen MD at [address] [place] [address] CATH LAB     Social History     Socioeconomic History    Marital status: SINGLE     Spouse name: Not on file    Number of children: Not on file    Years of education: Not on file    Highest education level: Not on file   Occupational History    Occupation: [employer]   Tobacco Use    Smoking status: Former Smoker     Last attempt to quit: 2015     Years since quittin.4    Smokeless tobacco: Never Used   Substance and Sexual Activity    Alcohol use: Yes     Comment: about 10 oz of scotch per day    Drug use: No    Sexual activity: Yes     Partners: Female   Other Topics Concern    Sleep Concern Yes     Comment: wakes up once, 7 hours total    Stress Concern No     Current Outpatient Medications   Medication Sig Dispense Refill    diclofenac (VOLTAREN) 1 % gel Apply 2 g to affected area four (4) times daily. 100 g 1    acetaminophen (TYLENOL) 325 mg tablet Take  by mouth every four (4) hours as needed for Pain.  Methylcellulose, with Sugar, (CITRUCEL, SUCROSE,) powd Take  by mouth.       nitroglycerin (NITROSTAT) 0.4 mg SL tablet 1 Tab by SubLINGual route as needed for Chest Pain. 1 Bottle 0    POTASSIUM ACETATE 1 Tab by Does Not Apply route daily.  atorvastatin (LIPITOR) 10 mg tablet Take 1 Tab by mouth daily. 90 Tab 4    nystatin-triamcinolone (MYCOLOG) 100,000-0.1 unit/gram-% ointment Apply  to affected area four (4) times daily. 30 g 1    metoprolol tartrate (LOPRESSOR) 50 mg tablet Take 1.5 Tabs by mouth two (2) times a day. 270 Tab 4    amiodarone (CORDARONE) 200 mg tablet Take 1 Tab by mouth daily. 90 Tab 4    rivaroxaban (XARELTO) 20 mg tab tablet Take 1 Tab by mouth daily (with breakfast). 90 Tab 4    multivit-min-FA-lycopen-lutein (CENTRUM SILVER MEN) 300-600-300 mcg tab Take 1 Tab by mouth daily. 100 Tab 12    lidocaine (LIDODERM) 5 % Apply patch to the affected area for 12 hours a day and remove for 12 hours a day. 27 Each 2     Past Medical History:   Diagnosis Date    Arthritis     Atrial fibrillation ([facility] [address] 11/28/2017    Cardioversion (11/18)    Colon polyps 2016    Hearing loss     HLD (hyperlipidemia)     HTN (hypertension)     Nontoxic multinodular goiter 2/7/2018    Biopsy of two nodules in 2016 - benign     Family History   Problem Relation Age of Onset    Diabetes Father          ROS:  + swell. All other systems reviewed and negative aside from that written in the HPI. Objective:  BP (!) 182/96   Pulse 84   Temp 98.5 °F (36.9 °C)   Resp 15   Ht 5' 10\" (1.778 m)   Wt 164 lb 9.6 oz (74.7 kg)   BMI 23.62 kg/m²   GEN:  Appears stated age in NAD. HEAD:  Normocephalic, Atraumatic. NEURO:  Sensation intact light touch upper and lower extremities. Biceps & Triceps reflexes +2/4 bilaterally. right hand dominant. M/S:  left elbow/wrist:  Negative bel tenderness. Phalen's negative. Tinel's negative. Strength +5/5 bilateral .  Piano key sign Negative bilateral .  Carpal bone motion normal.  Finklestein's positive  TFCC Load Test negative.   BILATERAL neither epicondyle(s) without TTP not changed with wrist extension. negative muscular atrophy. EXT:  no clubbing/cyanosis. no edema. HEENT: Conjunctiva/lids WNL. External canals/nares WNL. Tongue midline. PERRL, EOMI. Hearing intact. NECK: Trachea midline. Supple, Full ROM. No thyromegaly. CARDIAC: No edema. LUNGS: Normal effort. ABD: Soft, NT. No HSM. PSYCH: A+O x3. Appropriate judgment and insight. Assessment/Plan:     ICD-10-CM ICD-9-CM    1. De Quervain's tenosynovitis, left M65.4 727.04 XR THUMB LT MIN 2 V      diclofenac (VOLTAREN) 1 % gel      AMB POC US, SONO GUIDE NEEDLE   2. Chronic pain of left wrist M25.532 719.43 XR THUMB LT MIN 2 V    G89.29 338.29 diclofenac (VOLTAREN) 1 % gel       Patient (or guardian if minor) verbalizes understanding of evaluation and plan. Inject de Quervain today and will use spica/voltarn gel/stretch and RTC 6 weeks.

## 2019-12-11 ENCOUNTER — OFFICE VISIT (OUTPATIENT)
Dept: CARDIOLOGY CLINIC | Age: 64
End: 2019-12-11

## 2019-12-11 VITALS
OXYGEN SATURATION: 97 % | SYSTOLIC BLOOD PRESSURE: 144 MMHG | BODY MASS INDEX: 23.1 KG/M2 | DIASTOLIC BLOOD PRESSURE: 80 MMHG | HEART RATE: 85 BPM | WEIGHT: 161 LBS

## 2019-12-11 DIAGNOSIS — I48.0 PAROXYSMAL ATRIAL FIBRILLATION (HCC): Primary | ICD-10-CM

## 2019-12-11 NOTE — PROGRESS NOTES
1. Have you been to the ER, urgent care clinic since your last visit? Hospitalized since your last visit?no     2. Have you seen or consulted any other health care providers outside of the [address] [place] [address] since your last visit? Include any pap smears or colon screening.   No

## 2019-12-15 NOTE — PROGRESS NOTES
Subjective:      Dalton Paredes is in the office today for cardiac reevaluation. He is a 60-year-old man that has a diagnosis of chronic atrial fibrillation. At a routine office visit in November 2017, he was found to be in atrial fibrillation. At that time he was without symptoms. He had no limiting shortness of breath. He was not experiencing peripheral swelling. He  had no dizziness, near syncope or syncope. He was admitted to [facility] in 04/2018 for evaluation of shoulder and neck pain. A nuclear stress test was done which did not demonstrate any evidence for ischemia. An Echocardiogram was also ordered and done in 04/2018. His EF was 45%. There was mild to moderate MR. He was hospitalized at [facility] in December 2018. He  presented with atrial fibrillation with a rapid ventricular response on 12/4/2018. He had some congestive heart failure type symptoms at that time. He subsequently had a CAITLIN directed cardioversion. He was discharged on amiodarone. He continues to be on amiodarone. He recently had some lab and that ordered which is listed below. In the office on 10/28/2019, the patient was complaining about being tired. He was told to stop his Lasix. He still on the Lasix for unclear reasons. He does not complain about being tired today. He has no particular complaints including no chest pain or shortness of breath. Patient Active Problem List    Diagnosis Date Noted    Cardiomyopathy [facility]) 12/28/2018    Chronic systolic congestive heart failure ([facility] [address] 11/27/2018    HTN (hypertension) 11/04/2018    Alcoholism ([facility] [address] 11/04/2018    Nontoxic multinodular goiter 02/07/2018    Atrial fibrillation ([facility]) 11/28/2017    Elevated cholesterol 04/12/2016    Arthritis 11/13/2014     Current Outpatient Medications   Medication Sig Dispense Refill    diclofenac (VOLTAREN) 1 % gel Apply 2 g to affected area every six (6) hours as needed for Pain (left thumb).  100 g 1    lidocaine (LIDODERM) 5 % Apply patch to the affected area for 12 hours a day and remove for 12 hours a day. 30 Each 2    acetaminophen (TYLENOL) 325 mg tablet Take  by mouth every four (4) hours as needed for Pain.  Methylcellulose, with Sugar, (CITRUCEL, SUCROSE,) powd Take  by mouth.  nitroglycerin (NITROSTAT) 0.4 mg SL tablet 1 Tab by SubLINGual route as needed for Chest Pain. 1 Bottle 0    POTASSIUM ACETATE 1 Tab by Does Not Apply route daily.  atorvastatin (LIPITOR) 10 mg tablet Take 1 Tab by mouth daily. 90 Tab 4    nystatin-triamcinolone (MYCOLOG) 100,000-0.1 unit/gram-% ointment Apply  to affected area four (4) times daily. 30 g 1    metoprolol tartrate (LOPRESSOR) 50 mg tablet Take 1.5 Tabs by mouth two (2) times a day. 270 Tab 4    amiodarone (CORDARONE) 200 mg tablet Take 1 Tab by mouth daily. 90 Tab 4    rivaroxaban (XARELTO) 20 mg tab tablet Take 1 Tab by mouth daily (with breakfast). 90 Tab 4    multivit-min-FA-lycopen-lutein (CENTRUM SILVER MEN) 300-600-300 mcg tab Take 1 Tab by mouth daily.  100 Tab 12     No Known Allergies  Past Medical History:   Diagnosis Date    Arthritis     Atrial fibrillation ([address] 2017    Cardioversion ()    Colon polyps 2016    Hearing loss     HLD (hyperlipidemia)     HTN (hypertension)     Nontoxic multinodular goiter 2018    Biopsy of two nodules in 2016 - benign     Past Surgical History:   Procedure Laterality Date    CARDIAC SURG PROCEDURE UNLIST      HX COLONOSCOPY  2016    tubular adenoma    HX HERNIA REPAIR      x 2    [facility] CARDIOVERSION ELECTIVE ARRHYTHMIA EXTERNAL N/A 12/3/2018    EP CARDIOVERSION performed by Lesvia Maya MD at [address] [place] [address] CATH LAB     Family History   Problem Relation Age of Onset    Diabetes Father      Social History     Tobacco Use   Smoking Status Former Smoker    Last attempt to quit: 2015    Years since quittin.4   Smokeless Tobacco Never Used          Review of Systems, additional:  Constitutional: negative  Eyes: negative  Respiratory: negative  Cardiovascular: negative  Gastrointestinal: negative  Musculoskeletal:negative  Neurological: negative  Behvioral/Psych: negative  Endocrine: negative  ENT: negative    Objective:     Visit Vitals  /80   Pulse 85   Wt 73 kg (161 lb)   SpO2 97%   BMI 23.10 kg/m²     General:  alert, cooperative, no distress   Chest Wall: inspection normal - no chest wall deformities or tenderness, respiratory effort normal   Lung: clear to auscultation bilaterally   Heart:  irregularly irregular rhythm with rate 81   Abdomen: soft, non-tender. Bowel sounds normal. No masses,  no organomegaly   Extremities: extremities normal, atraumatic, no cyanosis or edema Skin: no rashes   Neuro: alert, oriented, normal speech, no focal findings or movement disorder noted         Assessment/Plan:       ICD-10-CM ICD-9-CM    1. Atrial fibrillation, unspecified type ([facility] [address] continue amiodarone, metoprolol and Xarelto. AST was 50, ALT was 70 on 10/28/2019. T4 was 1.2 and TSH was 0.47. Chest x-ray not completed. Return 4 months. I48.91 427.31 2D ECHO COMPLETE ADULT (TTE) W OR WO CONTR   2. Essential hypertension, malignant, mildly elevated systolic BP in the office again today. I10 401.0    3. Thyroid mass E07.9 246.9    4. Elevated cholesterol, followed by PCP E78.00 272.0    5. Normal cardiac stress test, 4/17/2018, no evidence for prior scarring or ongoing ischemia. Z13.6 V81.2    6       Presumed hemorrhoidal bleeding, plans to have reevaluated soon. He is on Xarelto. 7.      Fatigue, discontinued Lasix last appointment but the patient is taking Lasix again. The patient is uncertain why he is on Lasix.

## 2020-01-13 ENCOUNTER — OFFICE VISIT (OUTPATIENT)
Dept: ORTHOPEDIC SURGERY | Age: 65
End: 2020-01-13

## 2020-01-13 VITALS
WEIGHT: 162 LBS | TEMPERATURE: 98.2 F | RESPIRATION RATE: 15 BRPM | DIASTOLIC BLOOD PRESSURE: 84 MMHG | HEIGHT: 70 IN | SYSTOLIC BLOOD PRESSURE: 148 MMHG | BODY MASS INDEX: 23.19 KG/M2 | HEART RATE: 71 BPM

## 2020-01-13 DIAGNOSIS — M65.4 DE QUERVAIN'S TENOSYNOVITIS, LEFT: Primary | ICD-10-CM

## 2020-01-13 NOTE — PROGRESS NOTES
HISTORY OF PRESENT ILLNESS    Mira Terry is a 59y.o. year old male comes in today to be evaluated and treated for: left de Quervain's    Since last appt has noticed great improvement in pain afte rinjected and no brace in 2 weeks. Did well with stretch. Pain level 0 - No pain/10. Using voltaren gel with benefit. IMAGING: XR left thumb 19  IMPRESSION:     1. No significant abnormality. Past Surgical History:   Procedure Laterality Date    CARDIAC SURG PROCEDURE UNLIST      HX COLONOSCOPY  2016    tubular adenoma    HX HERNIA REPAIR      x 2    [place] CARDIOVERSION ELECTIVE ARRHYTHMIA EXTERNAL N/A 12/3/2018    EP CARDIOVERSION performed by Margoth Hager MD at [address] [place] [address] CATH LAB     Social History     Socioeconomic History    Marital status: SINGLE     Spouse name: Not on file    Number of children: Not on file    Years of education: Not on file    Highest education level: Not on file   Occupational History    Occupation: [employer]   Tobacco Use    Smoking status: Former Smoker     Last attempt to quit: 2015     Years since quittin.5    Smokeless tobacco: Never Used   Substance and Sexual Activity    Alcohol use: Yes     Comment: about 10 oz of scotch per day    Drug use: No    Sexual activity: Yes     Partners: Female   Other Topics Concern    Sleep Concern Yes     Comment: wakes up once, 7 hours total    Stress Concern No     Current Outpatient Medications   Medication Sig Dispense Refill    lidocaine (LIDODERM) 5 % Apply patch to the affected area for 12 hours a day and remove for 12 hours a day. 30 Each 2    acetaminophen (TYLENOL) 325 mg tablet Take  by mouth every four (4) hours as needed for Pain.  Methylcellulose, with Sugar, (CITRUCEL, SUCROSE,) powd Take  by mouth.  nitroglycerin (NITROSTAT) 0.4 mg SL tablet 1 Tab by SubLINGual route as needed for Chest Pain. 1 Bottle 0    POTASSIUM ACETATE 1 Tab by Does Not Apply route daily.       atorvastatin (LIPITOR) 10 mg tablet Take 1 Tab by mouth daily. 90 Tab 4    nystatin-triamcinolone (MYCOLOG) 100,000-0.1 unit/gram-% ointment Apply  to affected area four (4) times daily. 30 g 1    metoprolol tartrate (LOPRESSOR) 50 mg tablet Take 1.5 Tabs by mouth two (2) times a day. 270 Tab 4    amiodarone (CORDARONE) 200 mg tablet Take 1 Tab by mouth daily. 90 Tab 4    rivaroxaban (XARELTO) 20 mg tab tablet Take 1 Tab by mouth daily (with breakfast). 90 Tab 4    multivit-min-FA-lycopen-lutein (CENTRUM SILVER MEN) 300-600-300 mcg tab Take 1 Tab by mouth daily. 100 Tab 12    diclofenac (VOLTAREN) 1 % gel Apply 2 g to affected area every six (6) hours as needed for Pain (left thumb). 100 g 1     Past Medical History:   Diagnosis Date    Arthritis     Atrial fibrillation ([address] 11/28/2017    Cardioversion (11/18)    Colon polyps 2016    Hearing loss     HLD (hyperlipidemia)     HTN (hypertension)     Nontoxic multinodular goiter 2/7/2018    Biopsy of two nodules in 2016 - benign     Family History   Problem Relation Age of Onset    Diabetes Father      ROS:  No swell    Objective:  /84   Pulse 71   Temp 98.2 °F (36.8 °C)   Resp 15   Ht 5' 10\" (1.778 m)   Wt 162 lb (73.5 kg)   BMI 23.24 kg/m²   GEN:  Appears stated age in NAD. HEAD:  Normocephalic, Atraumatic. NEURO:  Sensation intact light touch upper and lower extremities. Biceps & Triceps reflexes +2/4 bilaterally. right hand dominant. M/S:  left elbow/wrist:  Negative bel tenderness. Phalen's negative. Tinel's negative. Strength +5/5 bilateral .  Piano key sign Negative bilateral .  Carpal bone motion normal.  Finklestein's negative. TFCC Load Test negative. BILATERAL neither epicondyle(s) without TTP not changed with wrist extension. negative muscular atrophy. EXT:  no clubbing/cyanosis. no edema. Assessment/Plan:   Encounter Diagnoses   Name Primary?     De Quervain's tenosynovitis, left Yes       Patient (or guardian if minor) verbalizes understanding of evaluation and plan.     Will use voltaren gel and stretch as needed and RTC not improved w/ self Tx.

## 2020-01-13 NOTE — PROGRESS NOTES
AVS reviewed: YES  HEP: Pt declined demo  Resources Provided: YES AVS  Patient questions/concerns answered: NO. Pt denied questions/concerns  Patient verbalized understanding of treatment plan: YES

## 2020-01-17 ENCOUNTER — OFFICE VISIT (OUTPATIENT)
Dept: FAMILY MEDICINE CLINIC | Age: 65
End: 2020-01-17

## 2020-01-17 ENCOUNTER — HOSPITAL ENCOUNTER (OUTPATIENT)
Dept: LAB | Age: 65
Discharge: HOME | End: 2020-01-17
Payer: COMMERCIAL

## 2020-01-17 VITALS
OXYGEN SATURATION: 99 % | SYSTOLIC BLOOD PRESSURE: 120 MMHG | DIASTOLIC BLOOD PRESSURE: 70 MMHG | BODY MASS INDEX: 23.19 KG/M2 | HEIGHT: 70 IN | HEART RATE: 63 BPM | RESPIRATION RATE: 20 BRPM | TEMPERATURE: 98.5 F | WEIGHT: 162 LBS

## 2020-01-17 DIAGNOSIS — R42 DIZZINESS: Primary | ICD-10-CM

## 2020-01-17 DIAGNOSIS — R42 DIZZINESS: ICD-10-CM

## 2020-01-17 LAB
ALBUMIN SERPL-MCNC: 4 G/DL (ref 3.4–5)
ALBUMIN/GLOB SERPL: 1.3 {RATIO} (ref 0.8–1.7)
ALP SERPL-CCNC: 151 U/L (ref 45–117)
ALT SERPL-CCNC: 56 U/L (ref 16–61)
ANION GAP SERPL CALC-SCNC: 6 MMOL/L (ref 3–18)
APPEARANCE UR: CLEAR
AST SERPL-CCNC: 42 U/L (ref 10–38)
BASOPHILS # BLD: 0.1 K/UL (ref 0–0.1)
BASOPHILS NFR BLD: 1 % (ref 0–2)
BILIRUB SERPL-MCNC: 0.4 MG/DL (ref 0.2–1)
BILIRUB UR QL: NEGATIVE
BUN SERPL-MCNC: 10 MG/DL (ref 7–18)
BUN/CREAT SERPL: 9 (ref 12–20)
CALCIUM SERPL-MCNC: 9.6 MG/DL (ref 8.5–10.1)
CHLORIDE SERPL-SCNC: 108 MMOL/L (ref 100–111)
CHOLEST SERPL-MCNC: 235 MG/DL
CO2 SERPL-SCNC: 28 MMOL/L (ref 21–32)
COLOR UR: NORMAL
CREAT SERPL-MCNC: 1.09 MG/DL (ref 0.6–1.3)
DIFFERENTIAL METHOD BLD: ABNORMAL
EOSINOPHIL # BLD: 0.1 K/UL (ref 0–0.4)
EOSINOPHIL NFR BLD: 2 % (ref 0–5)
ERYTHROCYTE [DISTWIDTH] IN BLOOD BY AUTOMATED COUNT: 16 % (ref 11.6–14.5)
GLOBULIN SER CALC-MCNC: 3.2 G/DL (ref 2–4)
GLUCOSE SERPL-MCNC: 92 MG/DL (ref 74–99)
GLUCOSE UR STRIP.AUTO-MCNC: NEGATIVE MG/DL
HCT VFR BLD AUTO: 39.8 % (ref 36–48)
HDLC SERPL-MCNC: 82 MG/DL (ref 40–60)
HDLC SERPL: 2.9 {RATIO} (ref 0–5)
HGB BLD-MCNC: 12.6 G/DL (ref 13–16)
HGB UR QL STRIP: NEGATIVE
KETONES UR QL STRIP.AUTO: NEGATIVE MG/DL
LDLC SERPL CALC-MCNC: 123 MG/DL (ref 0–100)
LEUKOCYTE ESTERASE UR QL STRIP.AUTO: NEGATIVE
LIPID PROFILE,FLP: ABNORMAL
LYMPHOCYTES # BLD: 1.3 K/UL (ref 0.9–3.6)
LYMPHOCYTES NFR BLD: 26 % (ref 21–52)
MCH RBC QN AUTO: 26.3 PG (ref 24–34)
MCHC RBC AUTO-ENTMCNC: 31.7 G/DL (ref 31–37)
MCV RBC AUTO: 82.9 FL (ref 74–97)
MONOCYTES # BLD: 0.7 K/UL (ref 0.05–1.2)
MONOCYTES NFR BLD: 14 % (ref 3–10)
NEUTS SEG # BLD: 3 K/UL (ref 1.8–8)
NEUTS SEG NFR BLD: 57 % (ref 40–73)
NITRITE UR QL STRIP.AUTO: NEGATIVE
PH UR STRIP: 6 [PH] (ref 5–8)
PLATELET # BLD AUTO: 319 K/UL (ref 135–420)
PMV BLD AUTO: 10.6 FL (ref 9.2–11.8)
POTASSIUM SERPL-SCNC: 4.5 MMOL/L (ref 3.5–5.5)
PROT SERPL-MCNC: 7.2 G/DL (ref 6.4–8.2)
PROT UR STRIP-MCNC: NEGATIVE MG/DL
RBC # BLD AUTO: 4.8 M/UL (ref 4.7–5.5)
SODIUM SERPL-SCNC: 142 MMOL/L (ref 136–145)
SP GR UR REFRACTOMETRY: 1.02 (ref 1–1.03)
TRIGL SERPL-MCNC: 150 MG/DL (ref ?–150)
UROBILINOGEN UR QL STRIP.AUTO: 1 EU/DL (ref 0.2–1)
VLDLC SERPL CALC-MCNC: 30 MG/DL
WBC # BLD AUTO: 5.1 K/UL (ref 4.6–13.2)

## 2020-01-17 PROCEDURE — 80053 COMPREHEN METABOLIC PANEL: CPT

## 2020-01-17 PROCEDURE — 81003 URINALYSIS AUTO W/O SCOPE: CPT

## 2020-01-17 PROCEDURE — 36415 COLL VENOUS BLD VENIPUNCTURE: CPT

## 2020-01-17 PROCEDURE — 80061 LIPID PANEL: CPT

## 2020-01-17 PROCEDURE — 85025 COMPLETE CBC W/AUTO DIFF WBC: CPT

## 2020-01-17 NOTE — PROGRESS NOTES
Subjective     Patient ID:  Destinee Ferguson is a 59 y.o. ( 1955) male who presents for the following: Follow-up      HPI  Fasting today. Wrist pain:  Had wrist injections. Pain is better. Dizziness and falls:  2 months ago tripped over his dog, and broke a left rib and bruised the left hip. Then 2 weeks ago tripped over his dog again and bruised his face. Equilibrium has been off for one year, worse for the last 6 month. Feels unsteady if he closed his eyes. Occurs if standing up or moving quickly. Lost hearing in the left ear, and has tinnitus in that ear for last 4 years. He does drink alcohol, but this occurs even when not drinking. Has reduced to 3-4 drinks/day. Tremor is somewhat improved from before. Cardiology:  Dr. Jen Bray  Procedure last year for a fib. He takes Xarelto. Review of Systems   Constitutional: Negative for appetite change, chills, diaphoresis, fatigue, fever and unexpected weight change. HENT: Positive for hearing loss and tinnitus. Negative for congestion, ear discharge, ear pain, postnasal drip, rhinorrhea, sinus pressure, sinus pain, sneezing, sore throat and trouble swallowing. Eyes: Negative for discharge, redness and visual disturbance. Respiratory: Negative for cough, chest tightness, shortness of breath and wheezing. Cardiovascular: Negative for chest pain, palpitations and leg swelling. Gastrointestinal: Negative for abdominal distention, abdominal pain, blood in stool, constipation, diarrhea, nausea, rectal pain and vomiting. Endocrine: Negative for polydipsia, polyphagia and polyuria. Genitourinary: Negative for decreased urine volume, dysuria and frequency. Musculoskeletal: Negative for joint swelling, myalgias, neck pain and neck stiffness. Skin: Negative for rash and wound. Allergic/Immunologic: Negative for environmental allergies. Neurological: Positive for dizziness.  Negative for weakness, light-headedness, numbness and headaches. Psychiatric/Behavioral: Negative for dysphoric mood and sleep disturbance. The patient is not nervous/anxious. Past Medical History, Past Surgery History, Allergies, Social History, and Family History were reviewed and updated.       Past Medical History:   Diagnosis Date    Arthritis     Atrial fibrillation ([address] 2017    Cardioversion ()    Colon polyps 2016    Hearing loss     HLD (hyperlipidemia)     HTN (hypertension)     Nontoxic multinodular goiter 2018    Biopsy of two nodules in 2016 - benign     Past Surgical History:   Procedure Laterality Date    CARDIAC SURG PROCEDURE UNLIST      HX COLONOSCOPY  2016    tubular adenoma    HX HERNIA REPAIR      x 2    [place] CARDIOVERSION ELECTIVE ARRHYTHMIA EXTERNAL N/A 12/3/2018    EP CARDIOVERSION [facility] by Daryle Cole, [facility] at [facility]     Family History   Problem Relation Age of Onset    Diabetes Father      Social History     Socioeconomic History    Marital status: SINGLE     Spouse name: Not on file    Number of children: Not on file    Years of education: Not on file    Highest education level: Not on file   Occupational History    Occupation: [employer]   Social Needs    Financial resource strain: Not on file    Food insecurity:     Worry: Not on file     Inability: Not on file   Manifact needs:     Medical: Not on file     Non-medical: Not on file   Tobacco Use    Smoking status: Former Smoker     Last attempt to quit: 2015     Years since quittin.5    Smokeless tobacco: Never Used   Substance and Sexual Activity    Alcohol use: Yes     Comment: about 10 oz of scotch per day    Drug use: No    Sexual activity: Yes     Partners: Female   Lifestyle    Physical activity:     Days per week: Not on file     Minutes per session: Not on file    Stress: Not on file   Relationships    Social connections:     Talks on phone: Not on file     Gets together: Not on file Attends Gnosticism service: Not on file     Active member of club or organization: Not on file     Attends meetings of clubs or organizations: Not on file     Relationship status: Not on file    Intimate partner violence:     Fear of current or ex partner: Not on file     Emotionally abused: Not on file     Physically abused: Not on file     Forced sexual activity: Not on file   Other Topics Concern     Service Not Asked    Blood Transfusions Not Asked    Caffeine Concern Not Asked    Occupational Exposure Not Asked    Hobby Hazards Not Asked    Sleep Concern Yes     Comment: wakes up once, 7 hours total    Stress Concern No    Weight Concern Not Asked    Special Diet Not Asked    Back Care Not Asked    Exercise Not Asked    Bike Helmet Not Asked   [address] Not Asked    Self-Exams Not Asked   Social History Narrative    Not on file     No Known Allergies  Current Outpatient Medications on File Prior to Visit   Medication Sig Dispense Refill    diclofenac (VOLTAREN) 1 % gel Apply 2 g to affected area every six (6) hours as needed for Pain (left thumb). 100 g 1    acetaminophen (TYLENOL) 325 mg tablet Take  by mouth every four (4) hours as needed for Pain.  Methylcellulose, with Sugar, (CITRUCEL, SUCROSE,) powd Take  by mouth.  POTASSIUM ACETATE 1 Tab by Does Not Apply route daily.  atorvastatin (LIPITOR) 10 mg tablet Take 1 Tab by mouth daily. 90 Tab 4    metoprolol tartrate (LOPRESSOR) 50 mg tablet Take 1.5 Tabs by mouth two (2) times a day. 270 Tab 4    amiodarone (CORDARONE) 200 mg tablet Take 1 Tab by mouth daily. 90 Tab 4    rivaroxaban (XARELTO) 20 mg tab tablet Take 1 Tab by mouth daily (with breakfast). 90 Tab 4    multivit-min-FA-lycopen-lutein (CENTRUM SILVER MEN) 300-600-300 mcg tab Take 1 Tab by mouth daily. 100 Tab 12    lidocaine (LIDODERM) 5 % Apply patch to the affected area for 12 hours a day and remove for 12 hours a day.  30 Each 2    nitroglycerin (NITROSTAT) 0.4 mg SL tablet 1 Tab by SubLINGual route as needed for Chest Pain. 1 Bottle 0    nystatin-triamcinolone (MYCOLOG) 100,000-0.1 unit/gram-% ointment Apply  to affected area four (4) times daily. 30 g 1     No current facility-administered medications on file prior to visit. Objective     Visit Vitals  /70 (BP 1 Location: Left arm, BP Patient Position: Supine) Comment: Time 9:45 Am   Pulse 63   Temp 98.5 °F (36.9 °C) (Oral)   Resp 20   Ht 5' 10\" (1.778 m)   Wt 162 lb (73.5 kg)   SpO2 99%   BMI 23.24 kg/m²     No LMP for male patient. Physical Exam  Constitutional:       General: He is not in acute distress. Appearance: Normal appearance. He is well-developed. He is not diaphoretic. Eyes:      Conjunctiva/sclera: Conjunctivae normal.      Pupils: Pupils are equal, round, and reactive to light. Neck:      Vascular: No carotid bruit. Cardiovascular:      Rate and Rhythm: Normal rate and regular rhythm. Pulses: Normal pulses. Heart sounds: Normal heart sounds. No murmur. Pulmonary:      Effort: Pulmonary effort is normal. No respiratory distress. Breath sounds: Normal breath sounds. Abdominal:      General: Bowel sounds are normal. There is no distension. Palpations: Abdomen is soft. There is no mass. Tenderness: There is no tenderness. Skin:     General: Skin is warm and dry. Findings: No rash. Neurological:      Mental Status: He is alert and oriented to person, place, and time. Cranial Nerves: No cranial nerve deficit, dysarthria or facial asymmetry. Sensory: Sensation is intact. No sensory deficit. Motor: Tremor (mild intension tremor of bilateral upper extremities) present. No weakness. Coordination: Romberg sign positive. Coordination normal. Finger-Nose-Finger Test and Heel to [facility] Test normal.      Gait: Gait normal.      Deep Tendon Reflexes: Reflexes are normal and symmetric.    Psychiatric:         Behavior: Behavior normal.         Thought Content: Thought content normal.         Judgment: Judgment normal.           LABS     TESTS      Assessment and Plan     1. Dizziness  Orthostatic vital signs normal.  Soso-Hallpike maneuver normal.  Romberg sign positive. Due to his history of A. fib, alcoholism, and unilateral hearing loss, need to rule out brain lesion and stroke. Will obtain MRI of the brain. Follow-up for results. - MRI BRAIN W WO CONT; Future  - LIPID PANEL; Future  - CBC WITH AUTOMATED DIFF; Future  - METABOLIC PANEL, COMPREHENSIVE; Future  - URINALYSIS W/ RFLX MICROSCOPIC; Future      Follow-up and Dispositions    · Return for 2-3 weeks after MRI for results. Risks, benefits, and alternatives of the medications and treatment plan prescribed today were discussed, and patient expressed understanding. Printed after visit summary was given to patient and reviewed. All patient questions and concerns were addressed. Plan follow-up as discussed or as needed if any worsening symptoms or change in condition.            Signed electronically by Brandy Jerome DNP, SUSAN-BC

## 2020-01-17 NOTE — PROGRESS NOTES
1. Have you been to the ER, urgent care clinic since your last visit? Hospitalized since your last visit? No    2. Have you seen or consulted any other health care providers outside of the [address] [place] [address] since your last visit? Include any pap smears or colon screening.  No

## 2020-01-24 ENCOUNTER — HOSPITAL ENCOUNTER (OUTPATIENT)
Dept: MRI IMAGING | Age: 65
Discharge: HOME | End: 2020-01-24
Attending: NURSE PRACTITIONER
Payer: COMMERCIAL

## 2020-01-24 VITALS — WEIGHT: 162 LBS | BODY MASS INDEX: 23.24 KG/M2

## 2020-01-24 DIAGNOSIS — R42 DIZZINESS: ICD-10-CM

## 2020-01-24 PROCEDURE — 70553 MRI BRAIN STEM W/O & W/DYE: CPT

## 2020-01-24 PROCEDURE — A9575 INJ GADOTERATE MEGLUMI 0.1ML: HCPCS | Performed by: NURSE PRACTITIONER

## 2020-01-24 PROCEDURE — 74011636320 HC RX REV CODE- 636/320: Performed by: NURSE PRACTITIONER

## 2020-01-24 RX ADMIN — GADOTERATE MEGLUMINE 15 ML: 376.9 INJECTION INTRAVENOUS at 08:11

## 2020-02-07 ENCOUNTER — OFFICE VISIT (OUTPATIENT)
Dept: FAMILY MEDICINE CLINIC | Age: 65
End: 2020-02-07

## 2020-02-07 VITALS
DIASTOLIC BLOOD PRESSURE: 71 MMHG | TEMPERATURE: 98 F | WEIGHT: 163.4 LBS | RESPIRATION RATE: 20 BRPM | HEIGHT: 70 IN | BODY MASS INDEX: 23.39 KG/M2 | OXYGEN SATURATION: 97 % | HEART RATE: 72 BPM | SYSTOLIC BLOOD PRESSURE: 131 MMHG

## 2020-02-07 DIAGNOSIS — R42 DIZZINESS: ICD-10-CM

## 2020-02-07 DIAGNOSIS — G31.2 CEREBELLAR DEGENERATION DUE TO CHRONIC ALCOHOLISM (HCC): Primary | ICD-10-CM

## 2020-02-07 DIAGNOSIS — M76.62 LEFT ACHILLES TENDINITIS: ICD-10-CM

## 2020-02-07 DIAGNOSIS — F10.20 CEREBELLAR DEGENERATION DUE TO CHRONIC ALCOHOLISM (HCC): Primary | ICD-10-CM

## 2020-02-07 DIAGNOSIS — M25.462 SWELLING OF LEFT KNEE JOINT: ICD-10-CM

## 2020-02-07 NOTE — PROGRESS NOTES
Subjective     Patient ID:  Sarabjit Merida. is a 59 y.o. ( 1955) male who presents for the following: Follow-up and Results      HPI  He presents for follow-up of dizziness and to review MRI results. He continues with disequilibrium, especially when he closes his eyes. Reports that when he walks he feels like he is leaning to the right. He does have a long history of alcoholism and continues to drink. The MRI showed cerebellar volume loss consistent with alcoholism. He has had multiple falls although denies any in the last 2 weeks since his last visit. Left knee problem:  Reports a 2-week history of a swollen area inferior and medial to the tibial tuberosity without pain. Does not recall trauma of the area. He did fall but it was several weeks before he noticed the swelling. Left ankle problem:  Reports 2 incidents of a sharp pain lateral to the left Achilles tendon which occurred while walking and then resolved. Denies trauma. Review of Systems   Constitutional: Negative for fatigue and fever. HENT: Negative for congestion and sore throat. Respiratory: Negative for cough. Cardiovascular: Negative for chest pain and palpitations. Genitourinary: Negative for dysuria. Musculoskeletal: Positive for arthralgias. Negative for back pain. Neurological: Positive for dizziness. Negative for headaches. Past Medical History, Past Surgery History, Allergies, Social History, and Family History were reviewed and updated.       Patient Active Problem List   Diagnosis Code    Arthritis M19.90    Elevated cholesterol E78.00    Atrial fibrillation (HCC) I48.91    Nontoxic multinodular goiter E04.2    HTN (hypertension) I10    Alcoholism ([address] F10.20    Chronic systolic congestive heart failure (HCC) I50.22    Cardiomyopathy ([address] I42.9     Past Medical History:   Diagnosis Date    Arthritis     Atrial fibrillation ([address] 2017    Cardioversion ()    Colon polyps 2016    Hearing loss     HLD (hyperlipidemia)     HTN (hypertension)     Nontoxic multinodular goiter 2018    Biopsy of two nodules in 2016 - benign     Patient Care Team:  Jeff Trujillo NP as PCP - General (Nurse Practitioner)  Jeff Trujillo NP as PCP - [facility] Provider  Reba Faulkner MD (General Surgery)  Martha Oneil MD (Cardiology)  Tere Baldwin (Physician Assistant)  Stephanie Morales MD as Surgeon (Colon and Rectal Surgery)    Past Surgical History:   Procedure Laterality Date    CARDIAC SURG PROCEDURE UNLIST      HX COLONOSCOPY  2016    tubular adenoma    HX HERNIA REPAIR      x 2    [place] CARDIOVERSION ELECTIVE ARRHYTHMIA EXTERNAL N/A 12/3/2018    EP CARDIOVERSION performed by Tereso Odonnell MD at [facility] LAB     Family History   Problem Relation Age of Onset    Diabetes Father      Social History     Tobacco Use    Smoking status: Former Smoker     Last attempt to quit: 2015     Years since quittin.6    Smokeless tobacco: Never Used   Substance Use Topics    Alcohol use: Yes     Comment: about 10 oz of scotch per day    Drug use: No     No Known Allergies  Current Outpatient Medications on File Prior to Visit   Medication Sig Dispense Refill    diclofenac (VOLTAREN) 1 % gel Apply 2 g to affected area every six (6) hours as needed for Pain (left thumb). 100 g 1    lidocaine (LIDODERM) 5 % Apply patch to the affected area for 12 hours a day and remove for 12 hours a day. 30 Each 2    acetaminophen (TYLENOL) 325 mg tablet Take  by mouth every four (4) hours as needed for Pain.  Methylcellulose, with Sugar, (CITRUCEL, SUCROSE,) powd Take  by mouth.  nitroglycerin (NITROSTAT) 0.4 mg SL tablet 1 Tab by SubLINGual route as needed for Chest Pain. 1 Bottle 0    POTASSIUM ACETATE 1 Tab by Does Not Apply route daily.  atorvastatin (LIPITOR) 10 mg tablet Take 1 Tab by mouth daily.  90 Tab 4    nystatin-triamcinolone (MYCOLOG) 100,000-0.1 unit/gram-% ointment Apply  to affected area four (4) times daily. 30 g 1    metoprolol tartrate (LOPRESSOR) 50 mg tablet Take 1.5 Tabs by mouth two (2) times a day. 270 Tab 4    amiodarone (CORDARONE) 200 mg tablet Take 1 Tab by mouth daily. 90 Tab 4    rivaroxaban (XARELTO) 20 mg tab tablet Take 1 Tab by mouth daily (with breakfast). 90 Tab 4    multivit-min-FA-lycopen-lutein (CENTRUM SILVER MEN) 300-600-300 mcg tab Take 1 Tab by mouth daily. 100 Tab 12     No current facility-administered medications on file prior to visit. Objective     Visit Vitals  /71   Pulse 72   Temp 98 °F (36.7 °C) (Oral)   Resp 20   Ht 5' 10\" (1.778 m)   Wt 163 lb 6.4 oz (74.1 kg)   SpO2 97%   BMI 23.45 kg/m²     No LMP for male patient. Physical Exam  Constitutional:       General: He is not in acute distress. Appearance: He is well-developed. He is not diaphoretic. Cardiovascular:      Rate and Rhythm: Normal rate and regular rhythm. Heart sounds: Normal heart sounds. No murmur. Pulmonary:      Effort: Pulmonary effort is normal. No respiratory distress. Breath sounds: Normal breath sounds. Musculoskeletal:      Left knee: He exhibits normal range of motion, no effusion, no ecchymosis, no deformity, no laceration, no erythema, normal alignment, no LCL laxity, normal patellar mobility and no bony tenderness. No tenderness found. Left ankle: Normal.        Legs:    Neurological:      Mental Status: He is alert and oriented to person, place, and time. Motor: Tremor present. Coordination: Romberg sign negative. Coordination normal.           LABS     TESTS  MRI Brain 1/24/20  IMPRESSION:     1.   No mass or acute findings.     2.  Redemonstrated disproportionate cerebellar volume loss -- while less common  etiologies such as Friedreich's ataxia and sequela of remote cerebellitis can  have this appearance, usually this reflects sequela of either chronic alcohol  use or chronic anticonvulsant therapy.      3. Low-grade generalized chronic microvascular changes. Assessment and Plan     1. 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 Dizziness  MRI findings consistent with chronic alcoholism. Will refer to neurology for further evaluation and to rule out other etiologies of his symptoms. Discussed and strongly encouraged alcohol cessation. He states he has thought about it. He states AA did not help and is not interested in going back. He says he does not need counseling and refused a referral.  States that he wants to \"do it on my own. \"  - REFERRAL TO NEUROLOGY    3. Swelling of left knee joint  Soft tissue swelling likely secondary to trauma from a fall. Will likely resolve spontaneously. Monitor. 4. Left Achilles tendinitis  Instructed to do stretches and strengthening exercises for the Achilles tendon. Follow-up and Dispositions    · Return in about 3 months (around 5/7/2020) for High blood pressure follow up. Risks, benefits, and alternatives of the medications and treatment plan prescribed today were discussed, and patient expressed understanding. Printed after visit summary was given to patient and reviewed. All patient questions and concerns were addressed. Plan follow-up as discussed or as needed if any worsening symptoms or change in condition.            Signed electronically by Kit Valerio DNP, SUSAN-BC

## 2020-02-07 NOTE — PROGRESS NOTES
1. Have you been to the ER, urgent care clinic since your last visit? Hospitalized since your last visit? No    2. Have you seen or consulted any other health care providers outside of the [address] [place] [address] since your last visit? Include any pap smears or colon screening.  No

## 2020-03-06 ENCOUNTER — OFFICE VISIT (OUTPATIENT)
Dept: FAMILY MEDICINE CLINIC | Age: 65
End: 2020-03-06

## 2020-03-06 ENCOUNTER — HOSPITAL ENCOUNTER (OUTPATIENT)
Dept: LAB | Age: 65
Discharge: HOME | End: 2020-03-06
Payer: MEDICARE

## 2020-03-06 VITALS
HEART RATE: 69 BPM | RESPIRATION RATE: 20 BRPM | TEMPERATURE: 97.9 F | DIASTOLIC BLOOD PRESSURE: 77 MMHG | SYSTOLIC BLOOD PRESSURE: 146 MMHG | OXYGEN SATURATION: 97 % | WEIGHT: 160 LBS | BODY MASS INDEX: 22.9 KG/M2 | HEIGHT: 70 IN

## 2020-03-06 DIAGNOSIS — J06.9 VIRAL URI WITH COUGH: ICD-10-CM

## 2020-03-06 DIAGNOSIS — K62.5 RECTAL BLEEDING: ICD-10-CM

## 2020-03-06 DIAGNOSIS — R19.5 LOOSE STOOLS: ICD-10-CM

## 2020-03-06 DIAGNOSIS — J31.0 RHINITIS, UNSPECIFIED TYPE: ICD-10-CM

## 2020-03-06 DIAGNOSIS — K62.5 RECTAL BLEEDING: Primary | ICD-10-CM

## 2020-03-06 LAB
ALBUMIN SERPL-MCNC: 4.2 G/DL (ref 3.4–5)
ALBUMIN/GLOB SERPL: 1.3 {RATIO} (ref 0.8–1.7)
ALP SERPL-CCNC: 153 U/L (ref 45–117)
ALT SERPL-CCNC: 47 U/L (ref 16–61)
ANION GAP SERPL CALC-SCNC: 7 MMOL/L (ref 3–18)
AST SERPL-CCNC: 42 U/L (ref 10–38)
BASOPHILS # BLD: 0.1 K/UL (ref 0–0.1)
BASOPHILS NFR BLD: 1 % (ref 0–2)
BILIRUB SERPL-MCNC: 0.4 MG/DL (ref 0.2–1)
BUN SERPL-MCNC: 11 MG/DL (ref 7–18)
BUN/CREAT SERPL: 10 (ref 12–20)
CALCIUM SERPL-MCNC: 8.9 MG/DL (ref 8.5–10.1)
CHLORIDE SERPL-SCNC: 101 MMOL/L (ref 100–111)
CO2 SERPL-SCNC: 30 MMOL/L (ref 21–32)
CREAT SERPL-MCNC: 1.11 MG/DL (ref 0.6–1.3)
DIFFERENTIAL METHOD BLD: ABNORMAL
EOSINOPHIL # BLD: 0.1 K/UL (ref 0–0.4)
EOSINOPHIL NFR BLD: 1 % (ref 0–5)
ERYTHROCYTE [DISTWIDTH] IN BLOOD BY AUTOMATED COUNT: 16.4 % (ref 11.6–14.5)
GLOBULIN SER CALC-MCNC: 3.3 G/DL (ref 2–4)
GLUCOSE SERPL-MCNC: 86 MG/DL (ref 74–99)
HCT VFR BLD AUTO: 42 % (ref 36–48)
HGB BLD-MCNC: 13.3 G/DL (ref 13–16)
LYMPHOCYTES # BLD: 1.3 K/UL (ref 0.9–3.6)
LYMPHOCYTES NFR BLD: 21 % (ref 21–52)
MCH RBC QN AUTO: 26 PG (ref 24–34)
MCHC RBC AUTO-ENTMCNC: 31.7 G/DL (ref 31–37)
MCV RBC AUTO: 82.2 FL (ref 74–97)
MONOCYTES # BLD: 1.4 K/UL (ref 0.05–1.2)
MONOCYTES NFR BLD: 22 % (ref 3–10)
NEUTS SEG # BLD: 3.5 K/UL (ref 1.8–8)
NEUTS SEG NFR BLD: 55 % (ref 40–73)
PLATELET # BLD AUTO: 203 K/UL (ref 135–420)
PMV BLD AUTO: 11.2 FL (ref 9.2–11.8)
POTASSIUM SERPL-SCNC: 3.6 MMOL/L (ref 3.5–5.5)
PROT SERPL-MCNC: 7.5 G/DL (ref 6.4–8.2)
RBC # BLD AUTO: 5.11 M/UL (ref 4.7–5.5)
SODIUM SERPL-SCNC: 138 MMOL/L (ref 136–145)
WBC # BLD AUTO: 6.2 K/UL (ref 4.6–13.2)

## 2020-03-06 PROCEDURE — 85025 COMPLETE CBC W/AUTO DIFF WBC: CPT

## 2020-03-06 PROCEDURE — 36415 COLL VENOUS BLD VENIPUNCTURE: CPT

## 2020-03-06 PROCEDURE — 80053 COMPREHEN METABOLIC PANEL: CPT

## 2020-03-06 RX ORDER — AZELASTINE 1 MG/ML
2 SPRAY, METERED NASAL 2 TIMES DAILY
Qty: 1 BOTTLE | Refills: 5 | Status: SHIPPED | OUTPATIENT
Start: 2020-03-06 | End: 2021-12-20 | Stop reason: SDUPTHER

## 2020-03-06 NOTE — PROGRESS NOTES
Subjective     Patient ID:  Basia Mcguire is a 72 y.o. ( 1955) male who presents for the following:   GI Problem (loose stools X 1 month) and Melena      HPI     GI symptoms:  He presents with a 1 month history of increased stool frequency, loose to watery stool, dark brown to black stool. He denies abdominal pain. Does report intermittent abdominal bloating. On several occasions he has had bright red blood with bowel movements. He does report a history of hemorrhoids as well as colon polyps. I reviewed the available records. He had a colonoscopy in 2016 with Dr. Juliet Montez.  Polyps were removed and he was told to repeat in 3 years. He has not yet been back for repeat colonoscopy. He does take Xarelto for A. fib. He reports increased fatigue as well as continued intermittent dizziness. Will be seeing neurology for the dizziness and abnormal brain imaging. Will be following up with his cardiologist, Dr. Kaylah Forrester, next month. Respiratory symptoms:  Onset 2 to 3 days ago. Symptoms: Dry cough, nasal congestion, rhinorrhea, sneezing, fatigue, and achiness. NyQuil helped. He also reports chronic rhinitis for years. Flonase did not help. Review of Systems   Constitutional: Positive for fatigue. Negative for appetite change, chills, diaphoresis and fever. HENT: Positive for rhinorrhea and sneezing. Negative for congestion, ear discharge, ear pain, hearing loss, postnasal drip, sinus pressure, sinus pain, sore throat and trouble swallowing. Eyes: Negative for discharge, redness and visual disturbance. Respiratory: Positive for cough (mostly ). Negative for chest tightness, shortness of breath and wheezing. Cardiovascular: Negative for chest pain. Gastrointestinal: Positive for abdominal distention, anal bleeding, blood in stool and diarrhea. Negative for abdominal pain, nausea and vomiting. Genitourinary: Negative for decreased urine volume.    Musculoskeletal: Positive for myalgias. Negative for neck pain (cmp) and neck stiffness. Skin: Negative for rash. Allergic/Immunologic: Negative for environmental allergies. Neurological: Negative for dizziness, light-headedness and headaches. Past Medical History, Past Surgery History, Allergies, Social History, and Family History were reviewed and updated. 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     Past Medical History:   Diagnosis Date    Arthritis     Atrial fibrillation ([address] 2017    Cardioversion ()    Colon polyps 2016    Hearing loss     HLD (hyperlipidemia)     HTN (hypertension)     Nontoxic multinodular goiter 2018    Biopsy of two nodules in 2016 - benign     Patient Care Team:  Jose Martinez NP as PCP - General (Nurse Practitioner)  Jose Martinez NP [facility]  Liang Arriaga MD (General Surgery)  Giancarlo Duran MD (Cardiology)  [place] (Physician Assistant)  Karma Luo MD as Surgeon (Colon and Rectal Surgery)    Past Surgical History:   Procedure Laterality Date    CARDIAC SURG PROCEDURE UNLIST      HX COLONOSCOPY  2016    tubular adenoma    HX HERNIA REPAIR      x 2    [place] CARDIOVERSION ELECTIVE ARRHYTHMIA EXTERNAL N/A 12/3/2018    EP CARDIOVERSION performed by Jenelle Pearson MD at [facility]     Family History   Problem Relation Age of Onset    Diabetes Father      Social History     Tobacco Use    Smoking status: Former Smoker     Last attempt to quit: 2015     Years since quittin.7    Smokeless tobacco: Never Used   Substance Use Topics    Alcohol use:  Yes Comment: about 10 oz of scotch per day    Drug use: No     No Known Allergies  Current Outpatient Medications on File Prior to Visit   Medication Sig Dispense Refill    diclofenac (VOLTAREN) 1 % gel Apply 2 g to affected area every six (6) hours as needed for Pain (left thumb). 100 g 1    lidocaine (LIDODERM) 5 % Apply patch to the affected area for 12 hours a day and remove for 12 hours a day. 30 Each 2    acetaminophen (TYLENOL) 325 mg tablet Take  by mouth every four (4) hours as needed for Pain.  Methylcellulose, with Sugar, (CITRUCEL, SUCROSE,) powd Take  by mouth.  nitroglycerin (NITROSTAT) 0.4 mg SL tablet 1 Tab by SubLINGual route as needed for Chest Pain. 1 Bottle 0    POTASSIUM ACETATE 1 Tab by Does Not Apply route daily.  atorvastatin (LIPITOR) 10 mg tablet Take 1 Tab by mouth daily. 90 Tab 4    nystatin-triamcinolone (MYCOLOG) 100,000-0.1 unit/gram-% ointment Apply  to affected area four (4) times daily. 30 g 1    metoprolol tartrate (LOPRESSOR) 50 mg tablet Take 1.5 Tabs by mouth two (2) times a day. 270 Tab 4    amiodarone (CORDARONE) 200 mg tablet Take 1 Tab by mouth daily. 90 Tab 4    rivaroxaban (XARELTO) 20 mg tab tablet Take 1 Tab by mouth daily (with breakfast). 90 Tab 4    multivit-min-FA-lycopen-lutein (CENTRUM SILVER MEN) 300-600-300 mcg tab Take 1 Tab by mouth daily. 100 Tab 12     No current facility-administered medications on file prior to visit. Health Maintenance Due   Topic Date Due    GLAUCOMA SCREENING Q2Y  02/22/2020    Pneumococcal 65+ years (2 of 2 - PPSV23) 02/22/2020    Medicare Yearly Exam  02/07/2020         Objective     Visit Vitals  /77 (BP 1 Location: Right arm, BP Patient Position: Sitting)   Pulse 69   Temp 97.9 °F (36.6 °C) (Oral)   Resp 20   Ht 5' 10\" (1.778 m)   Wt 160 lb (72.6 kg)   SpO2 97%   BMI 22.96 kg/m²     No LMP for male patient. Physical Exam  Constitutional:       General: He is not in acute distress.      Appearance: Normal appearance. He is well-developed. He is not toxic-appearing or diaphoretic. HENT:      Right Ear: Hearing, tympanic membrane and ear canal normal.      Left Ear: Hearing, tympanic membrane and ear canal normal.      Nose: Congestion and rhinorrhea present. Mouth/Throat:      Pharynx: No oropharyngeal exudate, posterior oropharyngeal erythema or uvula swelling. Tonsils: No tonsillar abscesses. Eyes:      General:         Right eye: No discharge. Left eye: No discharge. Conjunctiva/sclera: Conjunctivae normal.   Neck:      Musculoskeletal: Neck supple. Cardiovascular:      Rate and Rhythm: Normal rate and regular rhythm. Heart sounds: Normal heart sounds. No murmur. Pulmonary:      Effort: Pulmonary effort is normal. No respiratory distress. Breath sounds: Normal breath sounds. No decreased breath sounds, wheezing, rhonchi or rales. Abdominal:      General: Bowel sounds are increased. There is no distension or abdominal bruit. Palpations: Abdomen is soft. There is no fluid wave, mass or pulsatile mass. Tenderness: There is no abdominal tenderness. There is no rebound. Negative signs include Santana's sign and McBurney's sign. Hernia: No hernia is present. Lymphadenopathy:      Cervical: No cervical adenopathy. Skin:     General: Skin is warm and dry. Findings: No rash. Neurological:      Mental Status: He is alert and oriented to person, place, and time. LABS     TESTS      Assessment and Plan     1. Rectal bleeding  Hold Xarelto for now. Will check CBC for acute anemia. If weakness, dizziness, large amounts of bloody stool, abdominal pain, or other new symptoms over the weekend then go immediately to the ER. Otherwise follow-up with your GI specialist as soon as possible. - CBC WITH AUTOMATED DIFF; Future  - METABOLIC PANEL, COMPREHENSIVE; Future    2. Loose stools  - CBC WITH AUTOMATED DIFF;  Future  - METABOLIC PANEL, COMPREHENSIVE; Future    3. Rhinitis, unspecified type  Trial of azelastine nasal spray. - azelastine (ASTELIN) 137 mcg (0.1 %) nasal spray; 2 Sprays by Both Nostrils route two (2) times a day. Use in each nostril as directed  Dispense: 1 Bottle; Refill: 5    4. Viral URI with cough  May use OTC cough suppressants, and decongestants as needed. Risks, benefits, and alternatives of the medications and treatment plan prescribed today were discussed, and patient expressed understanding. Printed after visit summary was given to patient and reviewed. All patient questions and concerns were addressed. Plan follow-up as discussed or as needed if any worsening symptoms or change in condition.            Signed electronically by Lorna Cerda DNP, SUSAN-BC

## 2020-03-06 NOTE — PROGRESS NOTES
Room #  21    Chief Complaint:  Loose bowel movement   HPI:    Antonio Escalona is a 72 y.o. male who presents today for c/o gastrointestinal issues that relates to continually having Loose bowel movement X 1 month. Blood in stool    1. Have you been to the ER, urgent care clinic since your last visit? Hospitalized since your last visit? NO When:    2. Have you seen or consulted any other health care providers outside of the [address] [place] [address] since your last visit? Include any pap smears or colon screening. YES  When :  Reason:      Health Maintenance reviewed Yes    Health Maintenance Due   Topic Date Due    GLAUCOMA SCREENING Q2Y  02/22/2020    Pneumococcal 65+ years (2 of 2 - PPSV23) 02/22/2020    Medicare Yearly Exam  02/07/2020     Depression Screening:  3 most recent PHQ Screens 3/6/2020   Little interest or pleasure in doing things Not at all   Feeling down, depressed, irritable, or hopeless Not at all   Total Score PHQ 2 0     Learning Assessment:  Learning Assessment 7/22/2019   PRIMARY LEARNER Patient   HIGHEST LEVEL OF EDUCATION - PRIMARY LEARNER  -   BARRIERS PRIMARY LEARNER NONE     -   CO-LEARNER CAREGIVER -   PRIMARY LANGUAGE ENGLISH   LEARNER PREFERENCE PRIMARY DEMONSTRATION     READING   ANSWERED BY patient   RELATIONSHIP SELF     Abuse Screening:  Abuse Screening Questionnaire 4/8/2019   Do you ever feel afraid of your partner? N   Are you in a relationship with someone who physically or mentally threatens you? N   Is it safe for you to go home? Y     Fall Risk  Fall Risk Assessment, last 12 mths 3/6/2020   Able to walk? Yes   Fall in past 12 months?  No       Last  Checked n/a  Last UDS Checked n/a  Last Pain contract signed: Ayana Carlin

## 2020-03-08 DIAGNOSIS — E04.2 NONTOXIC MULTINODULAR GOITER: ICD-10-CM

## 2020-03-08 DIAGNOSIS — R21 RASH: ICD-10-CM

## 2020-03-08 DIAGNOSIS — I10 ESSENTIAL HYPERTENSION, MALIGNANT: ICD-10-CM

## 2020-03-08 DIAGNOSIS — I10 ESSENTIAL HYPERTENSION: ICD-10-CM

## 2020-03-08 DIAGNOSIS — I48.91 ATRIAL FIBRILLATION, UNSPECIFIED TYPE (HCC): ICD-10-CM

## 2020-03-08 DIAGNOSIS — E78.00 ELEVATED CHOLESTEROL: ICD-10-CM

## 2020-03-09 RX ORDER — METOPROLOL TARTRATE 50 MG/1
75 TABLET ORAL 2 TIMES DAILY
Qty: 270 TAB | Refills: 4 | Status: SHIPPED | OUTPATIENT
Start: 2020-03-09 | End: 2022-01-19 | Stop reason: SDUPTHER

## 2020-04-08 ENCOUNTER — TELEPHONE (OUTPATIENT)
Dept: FAMILY MEDICINE CLINIC | Age: 65
End: 2020-04-08

## 2020-04-13 DIAGNOSIS — M19.90 ARTHRITIS: ICD-10-CM

## 2020-04-13 DIAGNOSIS — Z00.00 ROUTINE GENERAL MEDICAL EXAMINATION AT A HEALTH CARE FACILITY: ICD-10-CM

## 2020-04-13 DIAGNOSIS — I10 ESSENTIAL HYPERTENSION, MALIGNANT: ICD-10-CM

## 2020-04-13 NOTE — TELEPHONE ENCOUNTER
Pt. Stated he is still taking furosemide. He is requesting a refill. Pt. Is also requesting refill on Lipitor. Pt. Would like prescriptions sent to [facility] on [address]. Is appt. Needed?

## 2020-04-14 RX ORDER — FUROSEMIDE 20 MG/1
TABLET ORAL
COMMUNITY
Start: 2020-02-01 | End: 2020-04-14 | Stop reason: SDUPTHER

## 2020-04-14 RX ORDER — FUROSEMIDE 20 MG/1
20 TABLET ORAL DAILY
Qty: 90 TAB | Refills: 1 | Status: SHIPPED | OUTPATIENT
Start: 2020-04-14 | End: 2020-12-09

## 2020-04-14 RX ORDER — ATORVASTATIN CALCIUM 10 MG/1
10 TABLET, FILM COATED ORAL DAILY
Qty: 90 TAB | Refills: 1 | Status: SHIPPED | OUTPATIENT
Start: 2020-04-14 | End: 2020-12-15

## 2020-04-14 NOTE — TELEPHONE ENCOUNTER
Requested Prescriptions     Pending Prescriptions Disp Refills    atorvastatin (LIPITOR) 10 mg tablet 90 Tab 4     Sig: Take 1 Tab by mouth daily.     furosemide (LASIX) 20 mg tablet

## 2020-04-22 ENCOUNTER — VIRTUAL VISIT (OUTPATIENT)
Dept: CARDIOLOGY CLINIC | Age: 65
End: 2020-04-22

## 2020-04-22 VITALS — HEIGHT: 70 IN | BODY MASS INDEX: 22.96 KG/M2

## 2020-04-22 NOTE — PROGRESS NOTES
1. Have you been to the ER, urgent care clinic since your last visit? Hospitalized since your last visit? No     2. Have you seen or consulted any other health care providers outside of the [address] [place] [address] since your last visit? Include any pap smears or colon screening. No      Pt unable to connect to doxy. me.

## 2020-06-16 ENCOUNTER — OFFICE VISIT (OUTPATIENT)
Dept: FAMILY MEDICINE CLINIC | Age: 65
End: 2020-06-16

## 2020-06-16 VITALS
BODY MASS INDEX: 23.48 KG/M2 | WEIGHT: 164 LBS | TEMPERATURE: 97.7 F | OXYGEN SATURATION: 98 % | DIASTOLIC BLOOD PRESSURE: 86 MMHG | HEIGHT: 70 IN | SYSTOLIC BLOOD PRESSURE: 151 MMHG | HEART RATE: 68 BPM | RESPIRATION RATE: 15 BRPM

## 2020-06-16 DIAGNOSIS — K92.2 GASTROINTESTINAL HEMORRHAGE, UNSPECIFIED GASTROINTESTINAL HEMORRHAGE TYPE: ICD-10-CM

## 2020-06-16 DIAGNOSIS — M79.604 PAIN OF RIGHT LOWER EXTREMITY: ICD-10-CM

## 2020-06-16 DIAGNOSIS — Z00.00 MEDICARE ANNUAL WELLNESS VISIT, SUBSEQUENT: Primary | ICD-10-CM

## 2020-06-16 NOTE — PATIENT INSTRUCTIONS
Medicare Wellness Visit, Male The best way to live healthy is to have a lifestyle where you eat a well-balanced diet, exercise regularly, limit alcohol use, and quit all forms of tobacco/nicotine, if applicable. Regular preventive services are another way to keep healthy. Preventive services (vaccines, screening tests, monitoring & exams) can help personalize your care plan, which helps you manage your own care. Screening tests can find health problems at the earliest stages, when they are easiest to treat. [facility] follows the current, evidence-based guidelines published by the [facility] ([facility]) when recommending preventive services for our patients. Because we follow these guidelines, sometimes recommendations change over time as research supports it. (For example, a prostate screening blood test is no longer routinely recommended for men with no symptoms). Of course, you and your doctor may decide to screen more often for some diseases, based on your risk and co-morbidities (chronic disease you are already diagnosed with). Preventive services for you include: - Medicare offers their members a free annual wellness visit, which is time for you and your primary care provider to discuss and plan for your preventive service needs. Take advantage of this benefit every year! 
-All adults over age 72 should receive the recommended pneumonia vaccines. Current USPSTF guidelines recommend a series of two vaccines for the best pneumonia protection.  
-All adults should have a flu vaccine yearly and tetanus vaccine every 10 years. 
-All adults age 48 and older should receive the shingles vaccines (series of two vaccines).       
-All adults age 38-68 who are overweight should have a diabetes screening test once every three years.  
-Other screening tests & preventive services for persons with diabetes include: an eye exam to screen for diabetic retinopathy, a kidney function test, a foot exam, and stricter control over your cholesterol.  
-Cardiovascular screening for adults with routine risk involves an electrocardiogram (ECG) at intervals determined by the provider.  
-Colorectal cancer screening should be done for adults age 54-65 with no increased risk factors for colorectal cancer. There are a number of acceptable methods of screening for this type of cancer. Each test has its own benefits and drawbacks. Discuss with your provider what is most appropriate for you during your annual wellness visit. The different tests include: colonoscopy (considered the best screening method), a fecal occult blood test, a fecal DNA test, and sigmoidoscopy. 
-All adults born between [facility] should be screened once for Hepatitis C. 
-An Abdominal Aortic Aneurysm (AAA) Screening is recommended for men age 73-68 who has ever smoked in their lifetime. Here is a list of your current Health Maintenance items (your personalized list of preventive services) with a due date: 
Health Maintenance Due Topic Date Due  
 Annual Well Visit  02/07/2020  Glaucoma Screening   02/22/2020  Pneumococcal Vaccine (2 of 2 - PPSV23) 02/22/2020 Medicare Wellness Visit, Male The best way to live healthy is to have a lifestyle where you eat a well-balanced diet, exercise regularly, limit alcohol use, and quit all forms of tobacco/nicotine, if applicable. Regular preventive services are another way to keep healthy. Preventive services (vaccines, screening tests, monitoring & exams) can help personalize your care plan, which helps you manage your own care. Screening tests can find health problems at the earliest stages, when they are easiest to treat.   
[facility] follows the current, evidence-based guidelines published by the [employer] (USPSTF) when recommending preventive services for our patients. Because we follow these guidelines, sometimes recommendations change over time as research supports it. (For example, a prostate screening blood test is no longer routinely recommended for men with no symptoms). Of course, you and your doctor may decide to screen more often for some diseases, based on your risk and co-morbidities (chronic disease you are already diagnosed with). Preventive services for you include: - Medicare offers their members a free annual wellness visit, which is time for you and your primary care provider to discuss and plan for your preventive service needs. Take advantage of this benefit every year! 
-All adults over age 72 should receive the recommended pneumonia vaccines. Current USPSTF guidelines recommend a series of two vaccines for the best pneumonia protection.  
-All adults should have a flu vaccine yearly and tetanus vaccine every 10 years. 
-All adults age 48 and older should receive the shingles vaccines (series of two vaccines). -All adults age 38-68 who are overweight should have a diabetes screening test once every three years.  
-Other screening tests & preventive services for persons with diabetes include: an eye exam to screen for diabetic retinopathy, a kidney function test, a foot exam, and stricter control over your cholesterol.  
-Cardiovascular screening for adults with routine risk involves an electrocardiogram (ECG) at intervals determined by the provider.  
-Colorectal cancer screening should be done for adults age 54-65 with no increased risk factors for colorectal cancer. There are a number of acceptable methods of screening for this type of cancer. Each test has its own benefits and drawbacks. Discuss with your provider what is most appropriate for you during your annual wellness visit.  The different tests include: colonoscopy (considered the best screening method), a fecal occult blood test, a fecal DNA test, and sigmoidoscopy. 
-All adults born between [facility] should be screened once for Hepatitis C. 
-An Abdominal Aortic Aneurysm (AAA) Screening is recommended for men age 73-68 who has ever smoked in their lifetime. Here is a list of your current Health Maintenance items (your personalized list of preventive services) with a due date: 
Health Maintenance Due Topic Date Due  
 Annual Well Visit  02/07/2020  Glaucoma Screening   02/22/2020  Pneumococcal Vaccine (2 of 2 - PPSV23) 02/22/2020

## 2020-06-16 NOTE — PROGRESS NOTES
This is the Subsequent Medicare Annual Wellness Exam, performed 12 months or more after the Initial AWV or the last Subsequent AWV    I have reviewed the patient's medical history in detail and updated the computerized patient record. 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 11/28/2017    Cardioversion (11/18)    Colon polyps 2016    Hearing loss     HLD (hyperlipidemia)     HTN (hypertension)     Nontoxic multinodular goiter 2/7/2018    Biopsy of two nodules in 2016 - benign      Past Surgical History:   Procedure Laterality Date    CARDIAC SURG PROCEDURE UNLIST      HX COLONOSCOPY  2016    tubular adenoma    HX HERNIA REPAIR      x 2    [place] CARDIOVERSION ELECTIVE ARRHYTHMIA EXTERNAL N/A 12/3/2018    EP CARDIOVERSION performed by Michael Jarrett MD at [facility] CATH LAB     Current Outpatient Medications   Medication Sig Dispense Refill    amiodarone (CORDARONE) 200 mg tablet Take 1 Tab by mouth daily. 90 Tab 0    atorvastatin (LIPITOR) 10 mg tablet Take 1 Tab by mouth daily. 90 Tab 1    furosemide (LASIX) 20 mg tablet Take 1 Tab by mouth daily. 90 Tab 1    metoprolol tartrate (LOPRESSOR) 50 mg tablet Take 1.5 Tabs by mouth two (2) times a day. 270 Tab 4    azelastine (ASTELIN) 137 mcg (0.1 %) nasal spray 2 Sprays by Both Nostrils route two (2) times a day. Use in each nostril as directed 1 Bottle 5    diclofenac (VOLTAREN) 1 % gel Apply 2 g to affected area every six (6) hours as needed for Pain (left thumb).  100 g 1    lidocaine (LIDODERM) 5 % Apply patch to the affected area for 12 hours a day and remove for 12 hours a day. 30 Each 2    acetaminophen (TYLENOL) 325 mg tablet Take  by mouth every four (4) hours as needed for Pain.  Methylcellulose, with Sugar, (CITRUCEL, SUCROSE,) powd Take  by mouth.  nitroglycerin (NITROSTAT) 0.4 mg SL tablet 1 Tab by SubLINGual route as needed for Chest Pain. 1 Bottle 0    POTASSIUM ACETATE 1 Tab by Does Not Apply route daily.  nystatin-triamcinolone (MYCOLOG) 100,000-0.1 unit/gram-% ointment Apply  to affected area four (4) times daily. 30 g 1    multivit-min-FA-lycopen-lutein (CENTRUM SILVER MEN) 300-600-300 mcg tab Take 1 Tab by mouth daily. 100 Tab 12     No Known Allergies    Family History   Problem Relation Age of Onset    Diabetes Father      Social History     Tobacco Use    Smoking status: Former Smoker     Last attempt to quit: 2015     Years since quittin.0    Smokeless tobacco: Never Used   Substance Use Topics    Alcohol use: Yes     Comment: Working on cutting back alcohol consumption. Now down to 4 drinks/day. Depression Risk Factor Screening:     3 most recent PHQ Screens 2020   Little interest or pleasure in doing things Not at all   Feeling down, depressed, irritable, or hopeless Not at all   Total Score PHQ 2 0       Alcohol Risk Factor Screening (MALE > 65): Do you average more 1 drink per night or more than 7 drinks a week: yes    In the past three months have you have had more than 4 drinks containing alcohol on one occasion: Yes    Working on cutting back alcohol consumption. Now down to 4 drinks/day. Functional Ability and Level of Safety:   Hearing: left ear hearing loss x2 years  He does not want to see anyone now for hearing aids. Activities of Daily Living: The home contains: no safety equipment. Patient does total self care    Ambulation: with no difficulty    Fall Risk:  Fall Risk Assessment, last 12 mths 2020   Able to walk?  Yes Fall in past 12 months? Yes   Fall with injury? No   Number of falls in past 12 months 2   Fall Risk Score 2       Abuse Screen:  Patient is not abused    Cognitive Screening   Has your family/caregiver stated any concerns about your memory: no       Patient Care Team   Patient Care Team:  Roberto Cho NP as PCP - General (Nurse Practitioner)  Roberto Cho NP as PCP - [facility] Empaneled Provider  Pari Fernandez MD (General Surgery)  Srinivas Shelton MD (Cardiology)  Magalis Beltre (Physician Assistant)  Margaret Esposito MD as Surgeon (Colon and Rectal Surgery)    Assessment/Plan   Education and counseling provided:  Are appropriate based on today's review and evaluation    Diagnoses and all orders for this visit:    1. Medicare annual wellness visit, subsequent    See my separate note for problems discussed during this visit.      Health Maintenance Due   Topic Date Due    GLAUCOMA SCREENING Q2Y  02/22/2020    Pneumococcal 65+ years (2 of 2 - PPSV23) 02/22/2020

## 2020-06-16 NOTE — PROGRESS NOTES
Subjective     Patient ID:  Tammi Newton. is a 72 y.o. ( 1955) male who presents for the following:   No chief complaint on file. HPI    Leg pain:   8 day history of right leg pain. Sharp in the right thigh and calf. The sharp pain resolved and now has dull pain in the right thigh. Denies trauma and denies any precipitating event. GI bleeding:   Now resolved. Stopped 5 days after stopping plavix. Will have Dr. Efraín Grady follow up this month. Taking probiotic which he thinks helps with digestion. Review of Systems   Constitutional: Negative for appetite change, diaphoresis, fatigue and unexpected weight change. Eyes: Negative for visual disturbance. Respiratory: Negative for cough, chest tightness and shortness of breath. Cardiovascular: Negative for chest pain, palpitations and leg swelling. Gastrointestinal: Negative for abdominal distention, abdominal pain, blood in stool, constipation, diarrhea, nausea, rectal pain and vomiting. Endocrine: Negative for polydipsia, polyphagia and polyuria. Genitourinary: Negative for decreased urine volume, dysuria and frequency. Musculoskeletal: Positive for arthralgias. Negative for joint swelling. Skin: Negative for rash and wound. Neurological: Negative for dizziness, weakness, light-headedness, numbness and headaches. Psychiatric/Behavioral: Negative for dysphoric mood and sleep disturbance. The patient is not nervous/anxious. Past Medical History, Past Surgery History, Allergies, Social History, and Family History were reviewed and updated.       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     Past Medical History:   Diagnosis Date    Arthritis     Atrial fibrillation ([facility] [address] 2017    Cardioversion ()    Colon polyps 2016    Hearing loss     left ear    HLD (hyperlipidemia)     HTN (hypertension)     Nontoxic multinodular goiter 2018    Biopsy of two nodules in 2016 - benign     Patient Care Team:  Roberto Cho NP as PCP - General (Nurse Practitioner)  Roberto Cho NP as PCP - [facility]  Pari Fernandez MD (General Surgery)  Srinivas Shelton MD (Cardiology)  Magalis Beltre (Physician Assistant)  Margaret Esposito MD as Surgeon (Colon and Rectal Surgery)    Past Surgical History:   Procedure Laterality Date    CARDIAC SURG PROCEDURE UNLIST      HX COLONOSCOPY  2016    tubular adenoma    HX HERNIA REPAIR      x 2    [place] CARDIOVERSION ELECTIVE ARRHYTHMIA EXTERNAL N/A 12/3/2018    EP CARDIOVERSION performed by Nadia Aguillon MD at [facility] CATH LAB     Family History   Problem Relation Age of Onset    Diabetes Father      Social History     Tobacco Use    Smoking status: Former Smoker     Last attempt to quit: 2015     Years since quittin.0    Smokeless tobacco: Never Used   Substance Use Topics    Alcohol use: Yes     Comment: Working on cutting back alcohol consumption. Now down to 4 drinks/day.  Drug use: No     No Known Allergies  Current Outpatient Medications on File Prior to Visit   Medication Sig Dispense Refill    amiodarone (CORDARONE) 200 mg tablet Take 1 Tab by mouth daily. 90 Tab 0    atorvastatin (LIPITOR) 10 mg tablet Take 1 Tab by mouth daily. 90 Tab 1    furosemide (LASIX) 20 mg tablet Take 1 Tab by mouth daily. 90 Tab 1    metoprolol tartrate (LOPRESSOR) 50 mg tablet Take 1.5 Tabs by mouth two (2) times a day. 270 Tab 4    azelastine (ASTELIN) 137 mcg (0.1 %) nasal spray 2 Sprays by Both Nostrils route two (2) times a day.  Use in each nostril as directed 1 Bottle 5    diclofenac (VOLTAREN) 1 % gel Apply 2 g to affected area every six (6) hours as needed for Pain (left thumb). 100 g 1    lidocaine (LIDODERM) 5 % Apply patch to the affected area for 12 hours a day and remove for 12 hours a day. 30 Each 2    acetaminophen (TYLENOL) 325 mg tablet Take  by mouth every four (4) hours as needed for Pain.  Methylcellulose, with Sugar, (CITRUCEL, SUCROSE,) powd Take  by mouth.  nitroglycerin (NITROSTAT) 0.4 mg SL tablet 1 Tab by SubLINGual route as needed for Chest Pain. 1 Bottle 0    POTASSIUM ACETATE 1 Tab by Does Not Apply route daily.  nystatin-triamcinolone (MYCOLOG) 100,000-0.1 unit/gram-% ointment Apply  to affected area four (4) times daily. 30 g 1    multivit-min-FA-lycopen-lutein (CENTRUM SILVER MEN) 300-600-300 mcg tab Take 1 Tab by mouth daily. 100 Tab 12     No current facility-administered medications on file prior to visit. Health Maintenance Due   Topic Date Due    GLAUCOMA SCREENING Q2Y  02/22/2020    Pneumococcal 65+ years (2 of 2 - PPSV23) 02/22/2020         Objective     Visit Vitals  /86   Pulse 68   Temp 97.7 °F (36.5 °C) (Oral)   Resp 15   Ht 5' 10\" (1.778 m)   Wt 164 lb (74.4 kg)   SpO2 98%   BMI 23.53 kg/m²     No LMP for male patient. Physical Exam  Constitutional:       General: He is not in acute distress. Appearance: He is well-developed. He is not diaphoretic. Cardiovascular:      Rate and Rhythm: Normal rate and regular rhythm. Heart sounds: Normal heart sounds. No murmur. Pulmonary:      Effort: Pulmonary effort is normal. No respiratory distress. Breath sounds: Normal breath sounds. Musculoskeletal:      Right hip: Normal.      Right knee: Normal.      Right upper leg: He exhibits tenderness (mild tenderness of the quadriceps muscle group). He exhibits no bony tenderness, no swelling, no edema, no deformity and no laceration.    Neurological:      Mental Status: He is alert and oriented to person, place, and time. LABS     TESTS      Assessment and Plan     1. Pain of right lower extremity  Likely muscle strain. Mostly resolved now. Follow up as needed. 2. Medicare annual wellness visit, subsequent  See separate note. 3. Gastrointestinal hemorrhage, unspecified gastrointestinal hemorrhage type  Now resolved. Has been off plavix. Discuss risk/benefits with your cardiologist.           Risks, benefits, and alternatives of the medications and treatment plan prescribed today were discussed, and patient expressed understanding. Printed after visit summary was given to patient and reviewed. All patient questions and concerns were addressed. Plan follow-up as discussed or as needed if any worsening symptoms or change in condition.            Signed electronically by Jc Love DNP, FNP-BC

## 2020-06-16 NOTE — PROGRESS NOTES
Malcom Andres. presents today for dull ache in right thigh  - Is someone accompanying this pt? no  - Is the patient using any durable medical equipment during office visit? no    Coordination of Care:  1. Have you been to the ER, urgent care clinic since your last visit? Hospitalized since your last visit? no    2. Have you seen or consulted any other health care providers outside of the [address] [place] [address] since your last visit? Include any pap smears or colon screening. No    Depression Screening:  3 most recent PHQ Screens 4/22/2020   Little interest or pleasure in doing things Not at all   Feeling down, depressed, irritable, or hopeless Not at all   Total Score PHQ 2 0       Learning Assessment:  Learning Assessment 7/22/2019   PRIMARY LEARNER Patient   HIGHEST LEVEL OF EDUCATION - PRIMARY LEARNER  -   BARRIERS PRIMARY LEARNER NONE     -   [address]  CAREGIVER -   PRIMARY LANGUAGE ENGLISH   LEARNER PREFERENCE PRIMARY DEMONSTRATION     READING   ANSWERED BY patient   RELATIONSHIP SELF       Abuse Screening:  Abuse Screening Questionnaire 3/6/2020   Do you ever feel afraid of your partner? N   Are you in a relationship with someone who physically or mentally threatens you? N   Is it safe for you to go home? Y       Fall Risk  Fall Risk Assessment, last 12 mths 4/22/2020   Able to walk? Yes   Fall in past 12 months? No       Health Maintenance reviewed and discussed and ordered per Provider.   Health Maintenance Due   Topic Date Due    Medicare Yearly Exam  02/07/2020    GLAUCOMA SCREENING Q2Y  02/22/2020    Pneumococcal 65+ years (2 of 2 - PPSV23) 02/22/2020

## 2020-06-28 ENCOUNTER — PATIENT MESSAGE (OUTPATIENT)
Dept: CARDIOLOGY CLINIC | Age: 65
End: 2020-06-28

## 2020-09-02 ENCOUNTER — PATIENT MESSAGE (OUTPATIENT)
Dept: FAMILY MEDICINE CLINIC | Age: 65
End: 2020-09-02

## 2020-09-02 DIAGNOSIS — I48.91 ATRIAL FIBRILLATION, UNSPECIFIED TYPE (HCC): ICD-10-CM

## 2020-09-02 DIAGNOSIS — I10 ESSENTIAL HYPERTENSION: ICD-10-CM

## 2020-09-02 DIAGNOSIS — R21 RASH: ICD-10-CM

## 2020-09-02 DIAGNOSIS — E78.00 ELEVATED CHOLESTEROL: ICD-10-CM

## 2020-09-02 DIAGNOSIS — E04.2 NONTOXIC MULTINODULAR GOITER: ICD-10-CM

## 2020-09-02 DIAGNOSIS — I10 ESSENTIAL HYPERTENSION, MALIGNANT: ICD-10-CM

## 2020-09-03 RX ORDER — AMIODARONE HYDROCHLORIDE 200 MG/1
200 TABLET ORAL DAILY
Qty: 90 TAB | Refills: 0 | Status: SHIPPED | OUTPATIENT
Start: 2020-09-03 | End: 2020-12-20 | Stop reason: SDUPTHER

## 2020-09-03 NOTE — TELEPHONE ENCOUNTER
Requested Prescriptions     Pending Prescriptions Disp Refills    amiodarone (CORDARONE) 200 mg tablet 90 Tab 0     Sig: Take 1 Tab by mouth daily.

## 2020-12-09 RX ORDER — FUROSEMIDE 20 MG/1
TABLET ORAL
Qty: 90 TAB | Refills: 0 | Status: SHIPPED | OUTPATIENT
Start: 2020-12-09 | End: 2021-04-03 | Stop reason: SDUPTHER

## 2020-12-15 DIAGNOSIS — I10 ESSENTIAL HYPERTENSION, MALIGNANT: ICD-10-CM

## 2020-12-15 DIAGNOSIS — Z00.00 ROUTINE GENERAL MEDICAL EXAMINATION AT A HEALTH CARE FACILITY: ICD-10-CM

## 2020-12-15 DIAGNOSIS — M19.90 ARTHRITIS: ICD-10-CM

## 2020-12-15 RX ORDER — ATORVASTATIN CALCIUM 10 MG/1
TABLET, FILM COATED ORAL
Qty: 90 TAB | Refills: 0 | Status: SHIPPED | OUTPATIENT
Start: 2020-12-15 | End: 2021-04-30 | Stop reason: SDUPTHER

## 2020-12-20 DIAGNOSIS — R21 RASH: ICD-10-CM

## 2020-12-20 DIAGNOSIS — E04.2 NONTOXIC MULTINODULAR GOITER: ICD-10-CM

## 2020-12-20 DIAGNOSIS — I48.91 ATRIAL FIBRILLATION, UNSPECIFIED TYPE (HCC): ICD-10-CM

## 2020-12-20 DIAGNOSIS — E78.00 ELEVATED CHOLESTEROL: ICD-10-CM

## 2020-12-20 DIAGNOSIS — I10 ESSENTIAL HYPERTENSION: ICD-10-CM

## 2020-12-20 DIAGNOSIS — I10 ESSENTIAL HYPERTENSION, MALIGNANT: ICD-10-CM

## 2020-12-21 RX ORDER — AMIODARONE HYDROCHLORIDE 200 MG/1
200 TABLET ORAL DAILY
Qty: 90 TAB | Refills: 0 | Status: SHIPPED | OUTPATIENT
Start: 2020-12-21 | End: 2021-05-10 | Stop reason: SDUPTHER

## 2021-02-27 DIAGNOSIS — R21 RASH: ICD-10-CM

## 2021-02-27 DIAGNOSIS — I48.91 ATRIAL FIBRILLATION, UNSPECIFIED TYPE (HCC): ICD-10-CM

## 2021-02-27 DIAGNOSIS — E04.2 NONTOXIC MULTINODULAR GOITER: ICD-10-CM

## 2021-02-27 DIAGNOSIS — I10 ESSENTIAL HYPERTENSION: ICD-10-CM

## 2021-02-27 DIAGNOSIS — E78.00 ELEVATED CHOLESTEROL: ICD-10-CM

## 2021-02-27 DIAGNOSIS — I10 ESSENTIAL HYPERTENSION, MALIGNANT: ICD-10-CM

## 2021-03-01 RX ORDER — NITROGLYCERIN 0.4 MG/1
0.4 TABLET SUBLINGUAL AS NEEDED
Qty: 1 BOTTLE | Refills: 0 | Status: SHIPPED | OUTPATIENT
Start: 2021-03-01

## 2021-03-01 NOTE — TELEPHONE ENCOUNTER
Requested Prescriptions     Pending Prescriptions Disp Refills    nitroglycerin (NITROSTAT) 0.4 mg SL tablet 1 Bottle 0     Si Tab by SubLINGual route as needed for Chest Pain.   '

## 2021-03-08 ENCOUNTER — OFFICE VISIT (OUTPATIENT)
Dept: FAMILY MEDICINE CLINIC | Age: 66
End: 2021-03-08
Payer: MEDICARE

## 2021-03-08 VITALS
TEMPERATURE: 97 F | OXYGEN SATURATION: 97 % | BODY MASS INDEX: 24.22 KG/M2 | HEART RATE: 59 BPM | RESPIRATION RATE: 20 BRPM | SYSTOLIC BLOOD PRESSURE: 164 MMHG | WEIGHT: 168.8 LBS | DIASTOLIC BLOOD PRESSURE: 88 MMHG

## 2021-03-08 DIAGNOSIS — K14.8 TONGUE LESION: ICD-10-CM

## 2021-03-08 DIAGNOSIS — B37.0 THRUSH: ICD-10-CM

## 2021-03-08 DIAGNOSIS — Z23 ENCOUNTER FOR IMMUNIZATION: ICD-10-CM

## 2021-03-08 DIAGNOSIS — I10 ESSENTIAL HYPERTENSION: Primary | ICD-10-CM

## 2021-03-08 DIAGNOSIS — E78.00 ELEVATED CHOLESTEROL: ICD-10-CM

## 2021-03-08 PROCEDURE — G8420 CALC BMI NORM PARAMETERS: HCPCS | Performed by: NURSE PRACTITIONER

## 2021-03-08 PROCEDURE — 90694 VACC AIIV4 NO PRSRV 0.5ML IM: CPT | Performed by: NURSE PRACTITIONER

## 2021-03-08 PROCEDURE — 3017F COLORECTAL CA SCREEN DOC REV: CPT | Performed by: NURSE PRACTITIONER

## 2021-03-08 PROCEDURE — G8428 CUR MEDS NOT DOCUMENT: HCPCS | Performed by: NURSE PRACTITIONER

## 2021-03-08 PROCEDURE — G0008 ADMIN INFLUENZA VIRUS VAC: HCPCS | Performed by: NURSE PRACTITIONER

## 2021-03-08 PROCEDURE — 1101F PT FALLS ASSESS-DOCD LE1/YR: CPT | Performed by: NURSE PRACTITIONER

## 2021-03-08 PROCEDURE — G8753 SYS BP > OR = 140: HCPCS | Performed by: NURSE PRACTITIONER

## 2021-03-08 PROCEDURE — 99214 OFFICE O/P EST MOD 30 MIN: CPT | Performed by: NURSE PRACTITIONER

## 2021-03-08 PROCEDURE — G8432 DEP SCR NOT DOC, RNG: HCPCS | Performed by: NURSE PRACTITIONER

## 2021-03-08 PROCEDURE — G8754 DIAS BP LESS 90: HCPCS | Performed by: NURSE PRACTITIONER

## 2021-03-08 PROCEDURE — G8536 NO DOC ELDER MAL SCRN: HCPCS | Performed by: NURSE PRACTITIONER

## 2021-03-08 RX ORDER — GUAIFENESIN 100 MG/5ML
81 LIQUID (ML) ORAL DAILY
COMMUNITY

## 2021-03-08 RX ORDER — CLOTRIMAZOLE 10 MG/1
10 LOZENGE ORAL; TOPICAL
Qty: 50 TAB | Refills: 0 | Status: SHIPPED | OUTPATIENT
Start: 2021-03-08 | End: 2021-03-18

## 2021-03-08 NOTE — PROGRESS NOTES
Mina Rickie. presents today for   Chief Complaint   Patient presents with    Follow-up     sore tongue       Is someone accompanying this pt? no    Is the patient using any DME equipment during OV? no    Depression Screening:  3 most recent PHQ Screens 3/8/2021   Little interest or pleasure in doing things Not at all   Feeling down, depressed, irritable, or hopeless Not at all   Total Score PHQ 2 0       Learning Assessment:  Learning Assessment 7/22/2019   PRIMARY LEARNER Patient   HIGHEST LEVEL OF EDUCATION - PRIMARY LEARNER  -   BARRIERS PRIMARY LEARNER NONE     -   CO-LEARNER CAREGIVER -   PRIMARY LANGUAGE ENGLISH   LEARNER PREFERENCE PRIMARY DEMONSTRATION     READING   ANSWERED BY patient   RELATIONSHIP SELF       Abuse Screening:  Abuse Screening Questionnaire 6/16/2020   Do you ever feel afraid of your partner? N   Are you in a relationship with someone who physically or mentally threatens you? N   Is it safe for you to go home? Y       Fall Risk  Fall Risk Assessment, last 12 mths 3/8/2021   Able to walk? Yes   Fall in past 12 months? 0   Number of falls in past 12 months -   Fall with injury? -       Health Maintenance reviewed and discussed and ordered per Provider. Health Maintenance Due   Topic Date Due    COVID-19 Vaccine (1 of 2) Never done    GLAUCOMA SCREENING Q2Y  Never done    Pneumococcal 65+ years (2 of 2 - PPSV23) 02/22/2020    Flu Vaccine (1) 09/01/2020    Lipid Screen  01/17/2021   . Coordination of Care:  1. Have you been to the ER, urgent care clinic since your last visit? Hospitalized since your last visit? no    2. Have you seen or consulted any other health care providers outside of the [address] [place] [address] since your last visit? Include any pap smears or colon screening.  no      Last  Checked na  Last UDS Checked na  Last Pain contract signed: rd

## 2021-03-08 NOTE — PROGRESS NOTES
Subjective     Patient ID:  Judy Lindquist is a 77 y.o. ( 1955) male who presents for the following: Follow-up (sore tongue)      HPI     Tongue pain:   Onset was 3 weeks ago. Right postererior tongue. No trauma that he can recall although he does have a broken tooth near the sore area. Now having pressure in his throat with swallowing. Still able to eat his usual diet. He actually has multiple broken and carious teeth. He has a history of tobacco use. He quit smoking 8-9 years ago. Hypertension/a fib/CHF follow up:  Taking medications as prescribed: Yes but did not take them today  Checking BP at home: Yes, does not remember the readings  Symptoms: diaphoresis with activity, shortness of breath sometimes when leaning over  Low sodium diet: somewhat  Exercise: Yes walking  Last cardiology follow up was about 1 year ago. Alcoholism:   Down to 3-4 drinks of scotch per day. He has been cutting back. Review of Systems   Constitutional: Negative for appetite change, chills, diaphoresis, fatigue, fever and unexpected weight change. HENT: Positive for dental problem, mouth sores (tongue sore per HPI) and sore throat. Negative for congestion, ear discharge, ear pain, hearing loss, postnasal drip, rhinorrhea, sinus pressure, sinus pain, sneezing and trouble swallowing. Eyes: Negative for discharge, redness and visual disturbance. Respiratory: Negative for cough, chest tightness, shortness of breath and wheezing. Cardiovascular: Negative for chest pain, palpitations and leg swelling. Gastrointestinal: Negative for abdominal distention, abdominal pain, blood in stool, constipation, diarrhea, nausea, rectal pain and vomiting. Endocrine: Negative for polydipsia, polyphagia and polyuria. Genitourinary: Negative for decreased urine volume, dysuria and frequency. Musculoskeletal: Negative for joint swelling, myalgias, neck pain and neck stiffness.    Skin: Negative for rash and wound. Allergic/Immunologic: Negative for environmental allergies. Neurological: Negative for dizziness, weakness, light-headedness, numbness and headaches. Psychiatric/Behavioral: Negative for dysphoric mood and sleep disturbance. The patient is not nervous/anxious. Past Medical History, Past Surgery History, Allergies, Social History, and Family History were reviewed and updated.       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     Past Medical History:   Diagnosis Date    Alcoholism ([address]     Arthritis     Atrial fibrillation ([address] 11/28/2017    Cardioversion (11/18)    Colon polyps 2016    Hearing loss     left ear    HLD (hyperlipidemia)     HTN (hypertension)     Nontoxic multinodular goiter 2/7/2018    Biopsy of two nodules in 2016 - benign     Patient Care Team:  Linda Dawn NP as PCP - General (Nurse Practitioner)  Linda Dawn NP as PCP - [facility]  Hermila Evans MD (General Surgery)  Cira Sanches MD (Cardiology)  Max Mendiola (Physician Assistant)  Jasper Post MD as Surgeon (Colon and Rectal Surgery)    Past Surgical History:   Procedure Laterality Date    HX COLONOSCOPY  2016    tubular adenoma    HX HERNIA REPAIR      x 2    [place] CARDIAC SURG PROCEDURE UNLIST      [place] CARDIOVERSION ELECTIVE ARRHYTHMIA EXTERNAL N/A 12/3/2018    EP CARDIOVERSION performed by Aure Johnson MD at [facility] CATH LAB     Family History   Problem Relation Age of Onset    Diabetes Father      Social History     Tobacco Use    Smoking status: Former Smoker     Quit date: 6/19/2015     Years since quitting: 5. 7    Smokeless tobacco: Never Used   Substance Use Topics    Alcohol use: Yes     Frequency: 4 or more times a week     Drinks per session: 3 or 4     Binge frequency: Less than monthly     Comment: Working on cutting back alcohol consumption. Now down to 4 drinks/day.  Drug use: No     No Known Allergies  Current Outpatient Medications on File Prior to Visit   Medication Sig Dispense Refill    aspirin 81 mg chewable tablet Take 81 mg by mouth daily.  amiodarone (CORDARONE) 200 mg tablet Take 1 Tab by mouth daily. 90 Tab 0    atorvastatin (LIPITOR) 10 mg tablet Take 1 tablet by mouth once daily 90 Tab 0    furosemide (LASIX) 20 mg tablet Take 1 tablet by mouth once daily 90 Tab 0    azelastine (ASTELIN) 137 mcg (0.1 %) nasal spray 2 Sprays by Both Nostrils route two (2) times a day. Use in each nostril as directed 1 Bottle 5    diclofenac (VOLTAREN) 1 % gel Apply 2 g to affected area every six (6) hours as needed for Pain (left thumb). 100 g 1    lidocaine (LIDODERM) 5 % Apply patch to the affected area for 12 hours a day and remove for 12 hours a day. 30 Each 2    Methylcellulose, with Sugar, (CITRUCEL, SUCROSE,) powd Take  by mouth.  POTASSIUM ACETATE 1 Tab by Does Not Apply route daily.  nystatin-triamcinolone (MYCOLOG) 100,000-0.1 unit/gram-% ointment Apply  to affected area four (4) times daily. 30 g 1    multivit-min-FA-lycopen-lutein (CENTRUM SILVER MEN) 300-600-300 mcg tab Take 1 Tab by mouth daily. 100 Tab 12    nitroglycerin (NITROSTAT) 0.4 mg SL tablet 1 Tab by SubLINGual route as needed for Chest Pain. 1 Bottle 0    metoprolol tartrate (LOPRESSOR) 50 mg tablet Take 1.5 Tabs by mouth two (2) times a day. 270 Tab 4    acetaminophen (TYLENOL) 325 mg tablet Take  by mouth every four (4) hours as needed for Pain. No current facility-administered medications on file prior to visit.       Health Maintenance Due   Topic Date Due    COVID-19 Vaccine (1) Never done   [facility] 65+ years (2 of 2 - PPSV23) 02/22/2020         Objective     Visit Vitals  BP (!) 164/88   Pulse (!) 59   Temp 97 °F (36.1 °C)   Resp 20   Wt 168 lb 12.8 oz (76.6 kg)   SpO2 97%   BMI 24.22 kg/m²     No LMP for male patient. Physical Exam  Constitutional:       General: He is not in acute distress. Appearance: Normal appearance. He is well-developed. He is not toxic-appearing or diaphoretic. HENT:      Right Ear: Hearing, tympanic membrane and ear canal normal.      Left Ear: Hearing, tympanic membrane and ear canal normal.      Nose: No mucosal edema or rhinorrhea. Mouth/Throat:      Pharynx: No oropharyngeal exudate, posterior oropharyngeal erythema or uvula swelling. Tonsils: No tonsillar abscesses. Comments: Thick, white coating over the tongue. Eyes:      General:         Right eye: No discharge. Left eye: No discharge. Conjunctiva/sclera: Conjunctivae normal.      Pupils: Pupils are equal, round, and reactive to light. Neck:      Musculoskeletal: Neck supple. Vascular: No carotid bruit. Cardiovascular:      Rate and Rhythm: Normal rate and regular rhythm. Pulses: Normal pulses. Heart sounds: Normal heart sounds. No murmur. Pulmonary:      Effort: Pulmonary effort is normal. No respiratory distress. Breath sounds: Normal breath sounds. No decreased breath sounds, wheezing, rhonchi or rales. Abdominal:      General: Bowel sounds are normal. There is no distension. Palpations: Abdomen is soft. There is no mass. Tenderness: There is no abdominal tenderness. Lymphadenopathy:      Cervical: No cervical adenopathy. Skin:     General: Skin is warm and dry. Findings: No rash. Neurological:      Mental Status: He is alert and oriented to person, place, and time. LABS     TESTS      Assessment and Plan     1. Essential hypertension  Labs today. Resume taking BP meds and follow up for BP check.  Advised alcohol cessation or at least moderation.  - CBC WITH AUTOMATED DIFF; Future  - METABOLIC PANEL, COMPREHENSIVE; Future  - TSH 3RD GENERATION; Future  - URINALYSIS W/ RFLX MICROSCOPIC; Future    2. Elevated cholesterol  - LIPID PANEL; Future    3. Encounter for immunization  - FLU (FLUAD QUAD INFLUENZA VACCINE,QUAD,ADJUVANTED)    4. Tongue lesion  It does appear to be caused by rubbing on a broken tooth. Advised to get the dental work done ASAP. Then follow up to confirm resolution of the sore. 5. Thrush  - clotrimazole (MYCELEX) 10 mg lucas; Take 1 Tab by mouth five (5) times daily for 10 days. Dispense: 50 Tab; Refill: 0      Follow-up and Dispositions    · Return in about 2 weeks (around 3/22/2021) for tongue sore - follow up with new PCP, schedule with your cardiologist.         Risks, benefits, and alternatives of the medications and treatment plan prescribed today were discussed, and patient expressed understanding. Printed after visit summary was given to patient and reviewed. All patient questions and concerns were addressed. Plan follow-up as discussed or as needed if any worsening symptoms or change in condition.            Signed electronically by Michele Saucedo DNP, FNP-BC

## 2021-03-11 LAB
ALBUMIN SERPL-MCNC: 5 G/DL (ref 3.8–4.8)
ALBUMIN/GLOB SERPL: 2.1 {RATIO} (ref 1.2–2.2)
ALP SERPL-CCNC: 116 IU/L (ref 39–117)
ALT SERPL-CCNC: 51 IU/L (ref 0–44)
APPEARANCE UR: CLEAR
AST SERPL-CCNC: 43 IU/L (ref 0–40)
BASOPHILS # BLD AUTO: 0.1 X10E3/UL (ref 0–0.2)
BASOPHILS NFR BLD AUTO: 1 %
BILIRUB SERPL-MCNC: 0.7 MG/DL (ref 0–1.2)
BILIRUB UR QL STRIP: NEGATIVE
BUN SERPL-MCNC: 13 MG/DL (ref 8–27)
BUN/CREAT SERPL: 11 (ref 10–24)
CALCIUM SERPL-MCNC: 9.9 MG/DL (ref 8.6–10.2)
CHLORIDE SERPL-SCNC: 99 MMOL/L (ref 96–106)
CHOLEST SERPL-MCNC: 257 MG/DL (ref 100–199)
CO2 SERPL-SCNC: 27 MMOL/L (ref 20–29)
COLOR UR: YELLOW
CREAT SERPL-MCNC: 1.15 MG/DL (ref 0.76–1.27)
EOSINOPHIL # BLD AUTO: 0.1 X10E3/UL (ref 0–0.4)
EOSINOPHIL NFR BLD AUTO: 1 %
ERYTHROCYTE [DISTWIDTH] IN BLOOD BY AUTOMATED COUNT: 14.4 % (ref 11.6–15.4)
GLOBULIN SER CALC-MCNC: 2.4 G/DL (ref 1.5–4.5)
GLUCOSE SERPL-MCNC: 99 MG/DL (ref 65–99)
GLUCOSE UR QL: NEGATIVE
HCT VFR BLD AUTO: 44 % (ref 37.5–51)
HDLC SERPL-MCNC: 87 MG/DL
HGB BLD-MCNC: 14.4 G/DL (ref 13–17.7)
HGB UR QL STRIP: NEGATIVE
IMM GRANULOCYTES # BLD AUTO: 0 X10E3/UL (ref 0–0.1)
IMM GRANULOCYTES NFR BLD AUTO: 0 %
IMP & REVIEW OF LAB RESULTS: NORMAL
KETONES UR QL STRIP: NEGATIVE
LDLC SERPL CALC-MCNC: 149 MG/DL (ref 0–99)
LEUKOCYTE ESTERASE UR QL STRIP: NEGATIVE
LYMPHOCYTES # BLD AUTO: 1.5 X10E3/UL (ref 0.7–3.1)
LYMPHOCYTES NFR BLD AUTO: 20 %
MCH RBC QN AUTO: 27.5 PG (ref 26.6–33)
MCHC RBC AUTO-ENTMCNC: 32.7 G/DL (ref 31.5–35.7)
MCV RBC AUTO: 84 FL (ref 79–97)
MICRO URNS: ABNORMAL
MONOCYTES # BLD AUTO: 0.9 X10E3/UL (ref 0.1–0.9)
MONOCYTES NFR BLD AUTO: 12 %
NEUTROPHILS # BLD AUTO: 5.1 X10E3/UL (ref 1.4–7)
NEUTROPHILS NFR BLD AUTO: 66 %
NITRITE UR QL STRIP: NEGATIVE
PH UR STRIP: 8 [PH] (ref 5–7.5)
PLATELET # BLD AUTO: 267 X10E3/UL (ref 150–450)
POTASSIUM SERPL-SCNC: 4.4 MMOL/L (ref 3.5–5.2)
PROT SERPL-MCNC: 7.4 G/DL (ref 6–8.5)
PROT UR QL STRIP: NEGATIVE
RBC # BLD AUTO: 5.23 X10E6/UL (ref 4.14–5.8)
SODIUM SERPL-SCNC: 139 MMOL/L (ref 134–144)
SP GR UR: 1.02 (ref 1–1.03)
TRIGL SERPL-MCNC: 121 MG/DL (ref 0–149)
TSH SERPL DL<=0.005 MIU/L-ACNC: 0.55 UIU/ML (ref 0.45–4.5)
UROBILINOGEN UR STRIP-MCNC: 1 MG/DL (ref 0.2–1)
VLDLC SERPL CALC-MCNC: 21 MG/DL (ref 5–40)
WBC # BLD AUTO: 7.8 X10E3/UL (ref 3.4–10.8)

## 2021-03-25 ENCOUNTER — OFFICE VISIT (OUTPATIENT)
Dept: FAMILY MEDICINE CLINIC | Age: 66
End: 2021-03-25
Payer: MEDICARE

## 2021-03-25 VITALS
TEMPERATURE: 98.6 F | DIASTOLIC BLOOD PRESSURE: 89 MMHG | BODY MASS INDEX: 24.05 KG/M2 | WEIGHT: 168 LBS | RESPIRATION RATE: 19 BRPM | HEIGHT: 70 IN | HEART RATE: 74 BPM | OXYGEN SATURATION: 97 % | SYSTOLIC BLOOD PRESSURE: 154 MMHG

## 2021-03-25 DIAGNOSIS — K14.8 TONGUE LESION: Primary | ICD-10-CM

## 2021-03-25 DIAGNOSIS — R19.5 LOOSE STOOLS: ICD-10-CM

## 2021-03-25 DIAGNOSIS — I10 ESSENTIAL HYPERTENSION: ICD-10-CM

## 2021-03-25 PROCEDURE — 99212 OFFICE O/P EST SF 10 MIN: CPT | Performed by: NURSE PRACTITIONER

## 2021-03-25 PROCEDURE — G8536 NO DOC ELDER MAL SCRN: HCPCS | Performed by: NURSE PRACTITIONER

## 2021-03-25 PROCEDURE — 3017F COLORECTAL CA SCREEN DOC REV: CPT | Performed by: NURSE PRACTITIONER

## 2021-03-25 PROCEDURE — G8432 DEP SCR NOT DOC, RNG: HCPCS | Performed by: NURSE PRACTITIONER

## 2021-03-25 PROCEDURE — 1101F PT FALLS ASSESS-DOCD LE1/YR: CPT | Performed by: NURSE PRACTITIONER

## 2021-03-25 PROCEDURE — G8753 SYS BP > OR = 140: HCPCS | Performed by: NURSE PRACTITIONER

## 2021-03-25 PROCEDURE — G8427 DOCREV CUR MEDS BY ELIG CLIN: HCPCS | Performed by: NURSE PRACTITIONER

## 2021-03-25 PROCEDURE — G8754 DIAS BP LESS 90: HCPCS | Performed by: NURSE PRACTITIONER

## 2021-03-25 PROCEDURE — G8420 CALC BMI NORM PARAMETERS: HCPCS | Performed by: NURSE PRACTITIONER

## 2021-03-25 NOTE — PATIENT INSTRUCTIONS
Neck: Exercises Introduction Here are some examples of exercises for you to try. The exercises may be suggested for a condition or for rehabilitation. Start each exercise slowly. Ease off the exercises if you start to have pain. You will be told when to start these exercises and which ones will work best for you. How to do the exercises Neck stretch 1. This stretch works best if you keep your shoulder down as you lean away from it. To help you remember to do this, start by relaxing your shoulders and lightly holding on to your thighs or your chair. 2. Tilt your head toward your shoulder and hold for 15 to 30 seconds. Let the weight of your head stretch your muscles. 3. If you would like a little added stretch, use your hand to gently and steadily pull your head toward your shoulder. For example, keeping your right shoulder down, lean your head to the left. 4. Repeat 2 to 4 times toward each shoulder. Diagonal neck stretch 1. Turn your head slightly toward the direction you will be stretching, and tilt your head diagonally toward your chest and hold for 15 to 30 seconds. 2. If you would like a little added stretch, use your hand to gently and steadily pull your head forward on the diagonal. 
3. Repeat 2 to 4 times toward each side. Dorsal glide stretch The dorsal glide stretches the back of the neck. If you feel pain, do not glide so far back. Some people find this exercise easier to do while lying on their backs with an ice pack on the neck. 1. Sit or stand tall and look straight ahead. 2. Slowly tuck your chin as you glide your head backward over your body 3. Hold for a count of 6, and then relax for up to 10 seconds. 4. Repeat 8 to 12 times. Chest and shoulder stretch 1. Sit or stand tall and glide your head backward as in the dorsal glide stretch. 2. Raise both arms so that your hands are next to your ears.  
3. Take a deep breath, and as you breathe out, lower your elbows down and behind your back. You will feel your shoulder blades slide down and together, and at the same time you will feel a stretch across your chest and the front of your shoulders. 4. Hold for about 6 seconds, and then relax for up to 10 seconds. 5. Repeat 8 to 12 times. Strengthening: Hands on head 1. Move your head backward, forward, and side to side against gentle pressure from your hands, holding each position for about 6 seconds. 2. Repeat 8 to 12 times. Follow-up care is a key part of your treatment and safety. Be sure to make and go to all appointments, and call your doctor if you are having problems. It's also a good idea to know your test results and keep a list of the medicines you take. Where can you learn more? Go to http://www.dave.com/ Enter P975 in the search box to learn more about \"Neck: Exercises. \" Current as of: March 2, 2020               Content Version: 12.6 © 2006-2020 [employer]. Care instructions adapted under license by [facility] (which disclaims liability or warranty for this information). If you have questions about a medical condition or this instruction, always ask your healthcare professional. [place] [address] any warranty or liability for your use of this information. Neck Arthritis: Exercises Introduction Here are some examples of exercises for you to try. The exercises may be suggested for a condition or for rehabilitation. Start each exercise slowly. Ease off the exercises if you start to have pain. You will be told when to start these exercises and which ones will work best for you. How to do the exercises Neck stretches to the side 1. This stretch works best if you keep your shoulder down as you lean away from it. To help you remember to do this, start by relaxing your shoulders and lightly holding on to your thighs or your chair.  
2. Tilt your head toward your shoulder and hold for 15 to 30 seconds. Let the weight of your head stretch your muscles. 3. Repeat 2 to 4 times toward each shoulder. Chin tuck 1. Lie on the floor with a rolled-up towel under your neck. Your head should be touching the floor. 2. Slowly bring your chin toward your chest. 
3. Hold for a count of 6, and then relax for up to 10 seconds. 4. Repeat 8 to 12 times. Active cervical rotation 1. Sit in a firm chair, or stand up straight. 2. Keeping your chin level, turn your head to the right, and hold for 15 to 30 seconds. 3. Turn your head to the left and hold for 15 to 30 seconds. 4. Repeat 2 to 4 times to each side. Shoulder blade squeeze 1. While standing, squeeze your shoulder blades together. 2. Do not raise your shoulders up as you are squeezing. 3. Hold for 6 seconds. 4. Repeat 8 to 12 times. Shoulder rolls 1. Sit comfortably with your feet shoulder-width apart. You can also do this exercise standing up. 2. Roll your shoulders up, then back, and then down in a smooth, circular motion. 3. Repeat 2 to 4 times. Follow-up care is a key part of your treatment and safety. Be sure to make and go to all appointments, and call your doctor if you are having problems. It's also a good idea to know your test results and keep a list of the medicines you take. Where can you learn more? Go to http://www.gray.com/ Enter Q826 in the search box to learn more about \"Neck Arthritis: Exercises. \" Current as of: March 2, 2020               Content Version: 12.6 © 5752-5278 [employer]. Care instructions adapted under license by [employer] (which disclaims liability or warranty for this information). If you have questions about a medical condition or this instruction, always ask your healthcare professional. [address] any warranty or liability for your use of this information.

## 2021-03-25 NOTE — PROGRESS NOTES
HISTORY OF PRESENT ILLNESS  Yeimi Paniagua is a 77 y.o. male seen for follow-up related to sore tongue. HPI  NP Rafael Huffman patient. Patient is establishing care with me. Patient has a history of alcoholism, atrial fibrillation, hypertension, hyperlipidemia and poor dentition. Patient says he is well and he looks well today. He says the right side of his tongue is much better and on visual inspection it looks better. He reports the lozenges NP Rafael Huffman advised him to take were very helpful. Hypertension, patient says he did not take his medication this morning before coming to office. Patient reports he has some bloating and gas, with loose bowel movement 3-4 times a day. Patient says normally bowel movement once in morning and afternoon. Advised patient to decrease stress and find better ways to manage stress. Also advised GI referral to rule out any other issues like colitis. Covid vaccine  3/22/2021 first [employer] vaccine completed. Hypertension, patient advises he is taking his medication as directed. Review of Systems   Constitutional: Negative. HENT: Negative. Eyes: Negative. Respiratory: Negative. Cardiovascular: Negative. Gastrointestinal: Negative for abdominal pain, blood in stool, heartburn, melena, nausea and vomiting. Patient reports loose stools 3-4 times a day, when he used to have bowel movements in the morning and at night   Genitourinary: Negative. Musculoskeletal: Negative. Skin: Negative. Neurological: Negative. Psychiatric/Behavioral: Negative. Visit Vitals  BP (!) 154/89 (BP 1 Location: Right upper arm, BP Patient Position: Sitting, BP Cuff Size: Large adult)   Pulse 74   Temp 98.6 °F (37 °C) (Temporal)   Resp 19   Ht 5' 10\" (1.778 m)   Wt 168 lb (76.2 kg)   SpO2 97%   BMI 24.11 kg/m²     Physical Exam  Constitutional:       Appearance: Normal appearance. He is normal weight. He is not ill-appearing.    HENT:      Head: Normocephalic and atraumatic. Nose: Nose normal.      Mouth/Throat:      Mouth: Mucous membranes are moist.      Pharynx: Oropharynx is clear. No oropharyngeal exudate or posterior oropharyngeal erythema. Eyes:      General:         Right eye: No discharge. Left eye: No discharge. Conjunctiva/sclera: Conjunctivae normal.      Pupils: Pupils are equal, round, and reactive to light. Neck:      Musculoskeletal: Normal range of motion and neck supple. Cardiovascular:      Rate and Rhythm: Normal rate and regular rhythm. Pulses: Normal pulses. Heart sounds: Normal heart sounds. No murmur. No friction rub. No gallop. Pulmonary:      Effort: Pulmonary effort is normal.      Breath sounds: Normal breath sounds. No wheezing. Abdominal:      General: There is no distension. Palpations: Abdomen is soft. Tenderness: There is no abdominal tenderness. Musculoskeletal: Normal range of motion. Skin:     General: Skin is warm and dry. Coloration: Skin is not jaundiced or pale. Neurological:      Mental Status: He is alert and oriented to person, place, and time. Psychiatric:         Mood and Affect: Mood normal.         Behavior: Behavior normal.         Thought Content:  Thought content normal.         Judgment: Judgment normal.       Past Medical History:   Diagnosis Date    Alcoholism ([address]     Arthritis     Atrial fibrillation ([address] 11/28/2017    Cardioversion (11/18)    Colon polyps 2016    Hearing loss     left ear    HLD (hyperlipidemia)     HTN (hypertension)     Nontoxic multinodular goiter 2/7/2018    Biopsy of two nodules in 2016 - benign     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  nitroglycerin (NITROSTAT) 0.4 mg SL tablet 1 Tab by SubLINGual route as needed for Chest Pain. 1 Bottle 0    amiodarone (CORDARONE) 200 mg tablet Take 1 Tab by mouth daily. 90 Tab 0    atorvastatin (LIPITOR) 10 mg tablet Take 1 tablet by mouth once daily 90 Tab 0    furosemide (LASIX) 20 mg tablet Take 1 tablet by mouth once daily 90 Tab 0    metoprolol tartrate (LOPRESSOR) 50 mg tablet Take 1.5 Tabs by mouth two (2) times a day. 270 Tab 4    azelastine (ASTELIN) 137 mcg (0.1 %) nasal spray 2 Sprays by Both Nostrils route two (2) times a day. Use in each nostril as directed 1 Bottle 5    diclofenac (VOLTAREN) 1 % gel Apply 2 g to affected area every six (6) hours as needed for Pain (left thumb). 100 g 1    lidocaine (LIDODERM) 5 % Apply patch to the affected area for 12 hours a day and remove for 12 hours a day. 30 Each 2    acetaminophen (TYLENOL) 325 mg tablet Take  by mouth every four (4) hours as needed for Pain.  Methylcellulose, with Sugar, (CITRUCEL, SUCROSE,) powd Take  by mouth.  POTASSIUM ACETATE 1 Tab by Does Not Apply route daily.  nystatin-triamcinolone (MYCOLOG) 100,000-0.1 unit/gram-% ointment Apply  to affected area four (4) times daily. 30 g 1    multivit-min-FA-lycopen-lutein (CENTRUM SILVER MEN) 300-600-300 mcg tab Take 1 Tab by mouth daily. 100 Tab 12     No current facility-administered medications on file prior to visit. ASSESSMENT and PLAN    ICD-10-CM ICD-9-CM    1. Tongue lesion  K14.8 529.8    2. Essential hypertension  I10 401.9    3. Loose stools  R19.5 787.7        50% of 15 minutes spent on counseling and managing of care.

## 2021-03-25 NOTE — PROGRESS NOTES
Room #  12    Chief Complaint:    Sore tongue  re-establish   HPI:    Mina Nielsen is a 77 y.o. male who presents today for c/o sore tongue and re-establish     1. Have you been to the ER, urgent care clinic since your last visit? Hospitalized since your last visit? NO When:    2. Have you seen or consulted any other health care providers outside of the [address] [place] [address] since your last visit? Include any pap smears or colon screening. NO  When :  Reason:    Last  Checked na  Last UDS Checked na  Last Pain contract signed: na    Consent:  He and/or health care decision maker is aware that that he may receive a bill for this telephone service, depending on his insurance coverage, and has provided verbal consent to proceed: Yes      Health Maintenance reviewed Yes    Health Maintenance Due   Topic Date Due    COVID-19 Vaccine (1) Never done    Pneumococcal 65+ years (2 of 2 - PPSV23) 02/22/2020     Depression Screening:  3 most recent PHQ Screens 3/25/2021   Little interest or pleasure in doing things Not at all   Feeling down, depressed, irritable, or hopeless Not at all   Total Score PHQ 2 0     Learning Assessment:  Learning Assessment 7/22/2019   PRIMARY LEARNER Patient   HIGHEST LEVEL OF EDUCATION - PRIMARY LEARNER  -   BARRIERS PRIMARY LEARNER NONE     -   CO-LEARNER CAREGIVER -   PRIMARY LANGUAGE ENGLISH   LEARNER PREFERENCE PRIMARY DEMONSTRATION     READING   ANSWERED BY patient   RELATIONSHIP SELF     Abuse Screening:  Abuse Screening Questionnaire 3/25/2021   Do you ever feel afraid of your partner? -   Are you in a relationship with someone who physically or mentally threatens you? N   Is it safe for you to go home? Y     Fall Risk  Fall Risk Assessment, last 12 mths 3/8/2021   Able to walk? Yes   Fall in past 12 months? 0   Number of falls in past 12 months -   Fall with injury?  -

## 2021-04-05 RX ORDER — FUROSEMIDE 20 MG/1
TABLET ORAL
Qty: 90 TAB | Refills: 0 | Status: CANCELLED | OUTPATIENT
Start: 2021-04-05

## 2021-04-06 RX ORDER — FUROSEMIDE 20 MG/1
TABLET ORAL
Qty: 90 TAB | Refills: 0 | Status: SHIPPED | OUTPATIENT
Start: 2021-04-06 | End: 2021-07-23 | Stop reason: SDUPTHER

## 2021-04-06 NOTE — TELEPHONE ENCOUNTER
Requested Prescriptions     Pending Prescriptions Disp Refills    furosemide (LASIX) 20 mg tablet 90 Tab 0

## 2021-04-30 DIAGNOSIS — Z00.00 ROUTINE GENERAL MEDICAL EXAMINATION AT A HEALTH CARE FACILITY: ICD-10-CM

## 2021-04-30 DIAGNOSIS — M19.90 ARTHRITIS: ICD-10-CM

## 2021-04-30 DIAGNOSIS — I10 ESSENTIAL HYPERTENSION, MALIGNANT: ICD-10-CM

## 2021-04-30 RX ORDER — ATORVASTATIN CALCIUM 10 MG/1
TABLET, FILM COATED ORAL
Qty: 90 TAB | Refills: 0 | Status: SHIPPED | OUTPATIENT
Start: 2021-04-30 | End: 2021-09-03 | Stop reason: SDUPTHER

## 2021-04-30 NOTE — TELEPHONE ENCOUNTER
PCP: Madi Zaidi NP    Last appt: Visit date not found  Future Appointments   Date Time Provider Ellie Katherine   9/23/2021  8:30 AM Madi Zaidi NP [facility]       Requested Prescriptions     Pending Prescriptions Disp Refills    atorvastatin (LIPITOR) 10 mg tablet 90 Tab 0       Request for a 30 or 90 day supply? Provider Discretion    Pharmacy: Confirmed    Other Comments:  Medication refill request via fax. Thank you.

## 2021-05-10 DIAGNOSIS — R21 RASH: ICD-10-CM

## 2021-05-10 DIAGNOSIS — I48.91 ATRIAL FIBRILLATION, UNSPECIFIED TYPE (HCC): ICD-10-CM

## 2021-05-10 DIAGNOSIS — E04.2 NONTOXIC MULTINODULAR GOITER: ICD-10-CM

## 2021-05-10 DIAGNOSIS — I10 ESSENTIAL HYPERTENSION: ICD-10-CM

## 2021-05-10 DIAGNOSIS — I10 ESSENTIAL HYPERTENSION, MALIGNANT: ICD-10-CM

## 2021-05-10 DIAGNOSIS — E78.00 ELEVATED CHOLESTEROL: ICD-10-CM

## 2021-05-10 RX ORDER — AMIODARONE HYDROCHLORIDE 200 MG/1
200 TABLET ORAL DAILY
Qty: 90 TAB | Refills: 0 | Status: SHIPPED | OUTPATIENT
Start: 2021-05-10 | End: 2021-08-16 | Stop reason: SDUPTHER

## 2021-07-14 ENCOUNTER — OFFICE VISIT (OUTPATIENT)
Dept: FAMILY MEDICINE CLINIC | Age: 66
End: 2021-07-14
Payer: MEDICARE

## 2021-07-14 VITALS
TEMPERATURE: 98.1 F | OXYGEN SATURATION: 97 % | WEIGHT: 165.6 LBS | HEART RATE: 64 BPM | HEIGHT: 70 IN | DIASTOLIC BLOOD PRESSURE: 78 MMHG | SYSTOLIC BLOOD PRESSURE: 135 MMHG | BODY MASS INDEX: 23.71 KG/M2 | RESPIRATION RATE: 16 BRPM

## 2021-07-14 DIAGNOSIS — Z12.11 SCREEN FOR COLON CANCER: ICD-10-CM
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
## 2021-07-14 DIAGNOSIS — L60.3 BRITTLE NAILS: ICD-10-CM

## 2021-07-14 DIAGNOSIS — G89.29 CHRONIC PAIN OF LEFT THUMB: ICD-10-CM

## 2021-07-14 DIAGNOSIS — Z00.00 PREVENTATIVE HEALTH CARE: ICD-10-CM

## 2021-07-14 DIAGNOSIS — Z12.5 SCREENING FOR PROSTATE CANCER: ICD-10-CM

## 2021-07-14 DIAGNOSIS — Z13.29 SCREENING FOR THYROID DISORDER: ICD-10-CM

## 2021-07-14 DIAGNOSIS — F10.20 ALCOHOLISM (HCC): ICD-10-CM

## 2021-07-14 DIAGNOSIS — M79.645 CHRONIC PAIN OF LEFT THUMB: ICD-10-CM

## 2021-07-14 PROCEDURE — G8427 DOCREV CUR MEDS BY ELIG CLIN: HCPCS | Performed by: NURSE PRACTITIONER

## 2021-07-14 PROCEDURE — G8536 NO DOC ELDER MAL SCRN: HCPCS | Performed by: NURSE PRACTITIONER

## 2021-07-14 PROCEDURE — 99212 OFFICE O/P EST SF 10 MIN: CPT | Performed by: NURSE PRACTITIONER

## 2021-07-14 PROCEDURE — G8752 SYS BP LESS 140: HCPCS | Performed by: NURSE PRACTITIONER

## 2021-07-14 PROCEDURE — 1101F PT FALLS ASSESS-DOCD LE1/YR: CPT | Performed by: NURSE PRACTITIONER

## 2021-07-14 PROCEDURE — G8754 DIAS BP LESS 90: HCPCS | Performed by: NURSE PRACTITIONER

## 2021-07-14 PROCEDURE — G8432 DEP SCR NOT DOC, RNG: HCPCS | Performed by: NURSE PRACTITIONER

## 2021-07-14 PROCEDURE — 3017F COLORECTAL CA SCREEN DOC REV: CPT | Performed by: NURSE PRACTITIONER

## 2021-07-14 PROCEDURE — G8420 CALC BMI NORM PARAMETERS: HCPCS | Performed by: NURSE PRACTITIONER

## 2021-07-14 RX ORDER — MELATONIN
2000 DAILY
Qty: 90 TABLET | Refills: 1 | Status: SHIPPED | OUTPATIENT
Start: 2021-07-14 | End: 2021-10-12

## 2021-07-14 NOTE — PROGRESS NOTES
OFFICE NOTE    Isabel Grover is a 77 y.o. male presenting today for office visit. 7/14/2021  10:36 AM    Chief Complaint   Patient presents with    Toe Pain     Bilateral toe pain - curved toenails x month     HPI:   In office visit today. C/o bilateral second toe nails curving over into skin. No apparent infection, referral to podiatry. Nails thickened. Drinks 3-4 scotch and water. No tobacco smoking, quit 7 years ago. No illicit drugs. C/o chronic left base thumb pain. He wears a brace that helps, and has had a cortisone injection once, was very painful. Affects ROM, says \"feels a popping sometimes. \" Report no trauma or fall. Use to be a . Patient reports appetite is good, no urinary issues, sleeps well and regular bowel movements. Patient denies fever, chills, chest pain, shortness of breath, abdomen pain or dark tarry stool. ROS:    · General: negative for - chills, fever, weight changes or malaise  · HEENT: no sore throat, nasal congestion, vision problems or ear problems  · Respiratory: no cough, shortness of breath, or wheezing  · Cardiovascular: no chest pain, palpitations, or dyspnea on exertion  · Gastrointestinal: no abdominal pain, N/V, change in bowel habits, or black or bloody stools  · Musculoskeletal: no back pain, joint pain, joint stiffness, muscle pain or muscle weakness  · Neurological: no numbness, tingling, headache or dizziness  · Endo:  No polyuria or polydipsia. · : no hematuria, dysuria, frequency, hesitancy, or nocturia.     · Skin: No itching or rash, no open skin, no unusual lesions   · Psychological: negative for - anxiety, depression, sleep disturbances, suicidal or homicidal ideations     PHQ Screening   3 most recent PHQ Screens 7/14/2021   Little interest or pleasure in doing things Not at all   Feeling down, depressed, irritable, or hopeless Not at all   Total Score PHQ 2 0     History  Past Medical History:   Diagnosis Date    [facility] ([facility]     Arthritis     Atrial fibrillation ([facility] 2017    Cardioversion ()    Colon polyps 2016    Hearing loss     left ear    HLD (hyperlipidemia)     HTN (hypertension)     Nontoxic multinodular goiter 2018    Biopsy of two nodules in 2016 - benign       Past Surgical History:   Procedure Laterality Date    HX COLONOSCOPY  2016    tubular adenoma    HX HERNIA REPAIR      x 2    NE CARDIAC SURG PROCEDURE UNLIST      NE CARDIOVERSION ELECTIVE ARRHYTHMIA EXTERNAL N/A 12/3/2018    EP CARDIOVERSION performed by Winter Tanner MD at [facility] C[facility]       Social History     Socioeconomic History    Marital status:      Spouse name: Not on file    Number of children: Not on file    Years of education: Not on file    Highest education level: Not on file   Occupational History    Occupation: [employer]   Tobacco Use    Smoking status: Former Smoker     Quit date: 2015     Years since quittin.0    Smokeless tobacco: Never Used   Substance and Sexual Activity    Alcohol use: Yes     Comment: Working on cutting back alcohol consumption. Now down to 4 drinks/day.     Drug use: No    Sexual activity: Yes     Partners: Female   Other Topics Concern     Service Not Asked    Blood Transfusions Not Asked    Caffeine Concern Not Asked    Occupational Exposure Not Asked    Hobby Hazards Not Asked    Sleep Concern Yes     Comment: wakes up once, 7 hours total    Stress Concern No    Weight Concern Not Asked    Special Diet Not Asked    Back Care Not Asked    Exercise Not Asked    Bike Helmet Not Asked    [address] Not Asked    Self-Exams Not Asked   Social History Narrative    Not on file     Social Determinants of Health     Financial Resource Strain:     Difficulty of Paying Living Expenses:    Food Insecurity:     Worried About Running Out of Food in the Last Year:     [address] in the Last Year:    Transportation Needs:     Lack of Transportation (Medical):  Lack of Transportation (Non-Medical):    Physical Activity:     Days of Exercise per Week:     Minutes of Exercise per Session:    Stress:     Feeling of Stress :    Social Connections:     Frequency of Communication with Friends and Family:     Frequency of Social Gatherings with Friends and Family:     Attends [employer] Services:     Active Member of Clubs or Organizations:     Attends Club or Organization Meetings:     Marital Status:    Intimate Partner Violence:     Fear of Current or Ex-Partner:     Emotionally Abused:     Physically Abused:     Sexually Abused:        No Known Allergies    Current Outpatient Medications   Medication Sig Dispense Refill    cholecalciferol (VITAMIN D3) (1000 Units /25 mcg) tablet Take 2 Tablets by mouth daily for 90 days. Indications: prevention of vitamin D deficiency 90 Tablet 1    amiodarone (CORDARONE) 200 mg tablet Take 1 Tab by mouth daily. 90 Tab 0    atorvastatin (LIPITOR) 10 mg tablet Take 1 tablet by mouth once daily 90 Tab 0    furosemide (LASIX) 20 mg tablet Take 1 tablet by mouth once daily 90 Tab 0    aspirin 81 mg chewable tablet Take 81 mg by mouth daily.  nitroglycerin (NITROSTAT) 0.4 mg SL tablet 1 Tab by SubLINGual route as needed for Chest Pain. 1 Bottle 0    metoprolol tartrate (LOPRESSOR) 50 mg tablet Take 1.5 Tabs by mouth two (2) times a day. 270 Tab 4    azelastine (ASTELIN) 137 mcg (0.1 %) nasal spray 2 Sprays by Both Nostrils route two (2) times a day. Use in each nostril as directed 1 Bottle 5    diclofenac (VOLTAREN) 1 % gel Apply 2 g to affected area every six (6) hours as needed for Pain (left thumb). 100 g 1    lidocaine (LIDODERM) 5 % Apply patch to the affected area for 12 hours a day and remove for 12 hours a day. 30 Each 2    acetaminophen (TYLENOL) 325 mg tablet Take  by mouth every four (4) hours as needed for Pain.       Methylcellulose, with Sugar, (CITRUCEL, SUCROSE,) powd Take  by mouth.  POTASSIUM ACETATE 1 Tab by Does Not Apply route daily.  nystatin-triamcinolone (MYCOLOG) 100,000-0.1 unit/gram-% ointment Apply  to affected area four (4) times daily. 30 g 1    multivit-min-FA-lycopen-lutein (CENTRUM SILVER MEN) 300-600-300 mcg tab Take 1 Tab by mouth daily. 100 Tab 12     Patient Care Team:  Patient Care Team:  Alexis Faith NP as PCP - General (Nurse Practitioner)  Alexis Faith NP as PCP - [address] [place] [address] Provider  Anali Rodrigues MD (General Surgery)  Colin Romero MD (Cardiology)  Emely Zaman (Physician Assistant)  Vedia Bosworth, MD as Surgeon (Colon and Rectal Surgery)    LABS:  None new to review    RADIOLOGY:  None new to review    Physical Exam  The patient appears well, she is pleasant, alert, oriented x 3, in no distress. Normal weight. ENT normal.  Neck supple. No adenopathy or thyromegaly. LUANNE. Lungs are clear, good air entry, no wheezes, rhonchi or rales. Cardiovascular ,S1 and S2 normal, no murmurs, regular rate and rhythm. No LAD. Chest wall negative for tenderness  Abdomen is soft without tenderness  Muscleskeletal, no swelling, no tenderness, no injury. Extremities show no edema  Neurological is normal without focal findings. Skin: no concerning lesions. Psych: normal affect. Mood good. Oriented x 3. Judgement and insight intact. Vitals:    07/14/21 0948   BP: 135/78   Pulse: 64   Resp: 16   Temp: 98.1 °F (36.7 °C)   TempSrc: Temporal   SpO2: 97%   Weight: 165 lb 9.6 oz (75.1 kg)   Height: 5' 10\" (1.778 m)   PainSc:   7   PainLoc: Toe       Assessment and Plan    Diagnoses and all orders for this visit:    Nail entering through skin, initial encounter  -     REFERRAL TO PODIATRY    Brittle nails  -     TSH 3RD GENERATION; Future  -     T4, FREE; Future    Screening for thyroid disorder    Screening for prostate cancer  -     PSA SCREENING (SCREENING);  Future    Screen for colon cancer  -     REFERRAL TO GASTROENTEROLOGY    Preventative health care  -     cholecalciferol (VITAMIN D3) (1000 Units /25 mcg) tablet; Take 2 Tablets by mouth daily for 90 days. Indications: prevention of vitamin D deficiency, Normal, Disp-90 Tablet, R-1    Alcoholism ([facility]    Chronic pain of left thumb  -     REFERRAL TO HAND SURGERY         *Plan of care reviewed with patient. Patient in agreement with plan and expresses understanding. All questions answered and patient encouraged to call or RTO if further questions or concerns. 50% of 15 minutes spent on counseling and managing her care. Follow-up and Dispositions    · Return if symptoms worsen or fail to improve.        Rhianna Weller, ASA-C

## 2021-07-14 NOTE — PROGRESS NOTES
[facility] for   Chief Complaint   Patient presents with    Toe Pain     Bilateral toe pain - curved toenails x month       [facility]. preferred language for health care discussion is english/other. Personal Protective Equipment:   Personal Protective Equipment was used including: mask-surgical and hands-gloves. Patient was placed on no precaution(s). Patient was masked. Precautions:   Patient currently on None  Patient currently roomed with door closed    Is someone accompanying this pt? No    Is the patient using any DME equipment during OV? No    Depression Screening:  3 most recent PHQ Screens 7/14/2021   Little interest or pleasure in doing things Not at all   Feeling down, depressed, irritable, or hopeless Not at all   Total Score PHQ 2 0       Learning Assessment:  Learning Assessment 7/22/2019   PRIMARY LEARNER Patient   HIGHEST LEVEL OF EDUCATION - PRIMARY LEARNER  -   BARRIERS PRIMARY LEARNER NONE     -   CO-LEARNER CAREGIVER -   PRIMARY LANGUAGE ENGLISH   LEARNER PREFERENCE PRIMARY DEMONSTRATION     READING   ANSWERED BY patient   RELATIONSHIP SELF       Abuse Screening:  Abuse Screening Questionnaire 3/25/2021   Do you ever feel afraid of your partner? -   Are you in a relationship with someone who physically or mentally threatens you? N   Is it safe for you to go home? Y       Fall Risk  Fall Risk Assessment, last 12 mths 3/8/2021   Able to walk? Yes   Fall in past 12 months? 0   Number of falls in past 12 months -   Fall with injury? -       Pt currently taking Anticoagulant therapy? No    Coordination of Care:  1. Have you been to the ER, urgent care clinic since your last visit? Hospitalized since your last visit? No    2. Have you seen or consulted any other health care providers outside of the [address] [place] [address] since your last visit? Include any pap smears or colon screening.  No

## 2021-07-14 NOTE — PATIENT INSTRUCTIONS
Learning About Foot and Toenail Care  Foot and toenail care: Overview  Checking your loved one's feet and keeping them clean and soft can help prevent cracks and infection in the skin. This is especially important for people who have diabetes. Keeping toenails trimmedand polished if that's what the person likesalso helps the person feel well-groomed. If the person you care for has diabetes or has foot problems, such as bad bunions and corns, think about taking them to see a podiatrist. This is a doctor who specializes in the care of the feet. Sometimes a podiatrist will come to the home if the person can't go out for visits. Try to take the person for salon pedicures if that is what they want. It's a chance to get out and see people and continue a favorite activity. You can do basic nail care at home. Usually all you need to do is keep the nails clean and at a safe length. How do you trim someone's toenails? Try to trim the person's nails every week. Or check the nails each week to see if they need to be trimmed. It's easiest to trim nails after the person has had a shower or foot bath. It makes the nails softer and easier to trim. Start by gathering your supplies. You will need toenail clippers and a nail file. You may also need nail polish and nail polish remover. To trim the nails:  1. Wash and dry your hands. You don't need to wear gloves. 2. Use nail polish remover to take off any polish. 3. Hold the person's foot and toe steady with one hand while you trim the nail with your other hand. Trim the nails straight across. Leave the nails a little longer at the corners so that the sharp ends don't cut into the skin. 4. Keep the nails no longer than the tip of the toes. 5. Let the nails dry if they are still damp and soft. 6. Use a nail file to gently smooth the edges of the nails, especially at the corners. They may be sharp after the nails are cut straight.   7. Apply nail polish, if the person wants it. If the person's nails are thick and discolored, it may be safest to have a podiatrist cut them. What else do you need to know? When you're caring for someone's nails, it is important to remember not to trim or cut the cuticles. A minor cut in a cuticle could lead to an infection. Wash the feet daily in the shower or bath or in a basin made for washing feet. It's extra important to wash the feet carefully if the person has diabetes. After washing the feet, dry gently. Put lotion on the feet, especially on the heels. But don't put it between the toes. If the person doesn't have diabetes and you see signs of athlete's foot (such as dry, cracking, or itchy skin between the toes), you can try an over-the-counter medicine. These medicines can kill the fungus that causes athlete's foot. If the problem doesn't go away, talk to the person's doctor. Look every day for cuts or signs of infection, such as pain, swelling, redness, or warmth. If you see any of these signsespecially in someone who has diabetescall the doctor. Where can you learn more? Go to http://www.gray.com/  Enter X526 in the search box to learn more about \"Learning About Foot and Toenail Care. \"  Current as of: July 17, 2020               Content Version: 12.8  © 2006-2021 [employer]. Care instructions adapted under license by [employer] (which disclaims liability or warranty for this information). If you have questions about a medical condition or this instruction, always ask your healthcare professional. [place] [address] any warranty or liability for your use of this information. Learning About Foot and Toenail Care  Foot and toenail care: Overview  Checking your loved one's feet and keeping them clean and soft can help prevent cracks and infection in the skin. This is especially important for people who have diabetes.  Keeping toenails trimmedand polished if that's what the person likesalso helps the person feel well-groomed. If the person you care for has diabetes or has foot problems, such as bad bunions and corns, think about taking them to see a podiatrist. This is a doctor who specializes in the care of the feet. Sometimes a podiatrist will come to the home if the person can't go out for visits. Try to take the person for salon pedicures if that is what they want. It's a chance to get out and see people and continue a favorite activity. You can do basic nail care at home. Usually all you need to do is keep the nails clean and at a safe length. How do you trim someone's toenails? Try to trim the person's nails every week. Or check the nails each week to see if they need to be trimmed. It's easiest to trim nails after the person has had a shower or foot bath. It makes the nails softer and easier to trim. Start by gathering your supplies. You will need toenail clippers and a nail file. You may also need nail polish and nail polish remover. To trim the nails:  8. Wash and dry your hands. You don't need to wear gloves. 9. Use nail polish remover to take off any polish. 10. Hold the person's foot and toe steady with one hand while you trim the nail with your other hand. Trim the nails straight across. Leave the nails a little longer at the corners so that the sharp ends don't cut into the skin. 11. Keep the nails no longer than the tip of the toes. 12. Let the nails dry if they are still damp and soft. 13. Use a nail file to gently smooth the edges of the nails, especially at the corners. They may be sharp after the nails are cut straight. 14. Apply nail polish, if the person wants it. If the person's nails are thick and discolored, it may be safest to have a podiatrist cut them. What else do you need to know? When you're caring for someone's nails, it is important to remember not to trim or cut the cuticles.  A minor cut in a cuticle could lead to an infection. Wash the feet daily in the shower or bath or in a basin made for washing feet. It's extra important to wash the feet carefully if the person has diabetes. After washing the feet, dry gently. Put lotion on the feet, especially on the heels. But don't put it between the toes. If the person doesn't have diabetes and you see signs of athlete's foot (such as dry, cracking, or itchy skin between the toes), you can try an over-the-counter medicine. These medicines can kill the fungus that causes athlete's foot. If the problem doesn't go away, talk to the person's doctor. Look every day for cuts or signs of infection, such as pain, swelling, redness, or warmth. If you see any of these signsespecially in someone who has diabetescall the doctor. Where can you learn more? Go to http://www.gray.com/  Enter A726 in the search box to learn more about \"Learning About Foot and Toenail Care. \"  Current as of: July 17, 2020               Content Version: 12.8  © 2006-2021 [employer]. Care instructions adapted under license by [employer] (which disclaims liability or warranty for this information). If you have questions about a medical condition or this instruction, always ask your healthcare professional. [place] [address] any warranty or liability for your use of this information.

## 2021-07-15 LAB
PSA SERPL-MCNC: 0.8 NG/ML (ref 0–4)
T4 FREE SERPL-MCNC: 1.71 NG/DL (ref 0.82–1.77)
TSH SERPL DL<=0.005 MIU/L-ACNC: 0.15 UIU/ML (ref 0.45–4.5)

## 2021-07-23 RX ORDER — FUROSEMIDE 20 MG/1
TABLET ORAL
Qty: 90 TABLET | Refills: 0 | Status: SHIPPED | OUTPATIENT
Start: 2021-07-23 | End: 2021-11-01 | Stop reason: SDUPTHER

## 2021-07-23 NOTE — TELEPHONE ENCOUNTER
Requested Prescriptions     Pending Prescriptions Disp Refills    furosemide (LASIX) 20 mg tablet 90 Tablet 0     Sig: Take 1 tablet by mouth once daily

## 2021-07-27 DIAGNOSIS — R79.89 ABNORMAL THYROID BLOOD TEST: Primary | ICD-10-CM

## 2021-07-27 NOTE — PROGRESS NOTES
Patient's prostate-specific antigen within normal limits. T4 was normal and TSH was slightly decreased at 0.154. I ordered a free T3 lab to differentiate between clinical hyperthyroidism from subclinical hyperthyroidism.

## 2021-08-16 DIAGNOSIS — I48.91 ATRIAL FIBRILLATION, UNSPECIFIED TYPE (HCC): ICD-10-CM

## 2021-08-16 RX ORDER — AMIODARONE HYDROCHLORIDE 200 MG/1
200 TABLET ORAL DAILY
Qty: 90 TABLET | Refills: 0 | Status: SHIPPED | OUTPATIENT
Start: 2021-08-16 | End: 2021-12-10 | Stop reason: SDUPTHER

## 2021-08-16 NOTE — TELEPHONE ENCOUNTER
PCP: Katie Holly, NP    Last appt: 7/14/2021  Future Appointments   Date Time Provider Ellie Ortizi   8/20/2021  2:00 PM Jb Carter, DO [facility]       Requested Prescriptions      No prescriptions requested or ordered in this encounter       Request for a 30 or 90 day supply? Provider Discretion    Pharmacy: Confirmed    Other Comments:  Medication refill request via fax.  Thank you

## 2021-09-03 DIAGNOSIS — Z00.00 ROUTINE GENERAL MEDICAL EXAMINATION AT A HEALTH CARE FACILITY: ICD-10-CM

## 2021-09-03 DIAGNOSIS — M19.90 ARTHRITIS: ICD-10-CM

## 2021-09-03 DIAGNOSIS — I10 ESSENTIAL HYPERTENSION, MALIGNANT: ICD-10-CM

## 2021-09-05 RX ORDER — ATORVASTATIN CALCIUM 10 MG/1
TABLET, FILM COATED ORAL
Qty: 90 TABLET | Refills: 0 | Status: SHIPPED | OUTPATIENT
Start: 2021-09-05 | End: 2021-12-10 | Stop reason: SDUPTHER

## 2021-11-01 NOTE — TELEPHONE ENCOUNTER
Patient called stating that he requested a medication refill with his pharmacy last week and states that they sent us a fax. Patient was informed that I didn't see a request in the system but I would send a message, please advise.     Requested Prescriptions     Pending Prescriptions Disp Refills    furosemide (LASIX) 20 mg tablet 90 Tablet 0     Sig: Take 1 tablet by mouth once daily

## 2021-11-04 RX ORDER — FUROSEMIDE 20 MG/1
TABLET ORAL
Qty: 90 TABLET | Refills: 0 | Status: SHIPPED | OUTPATIENT
Start: 2021-11-04 | End: 2021-11-19

## 2021-11-19 RX ORDER — FUROSEMIDE 20 MG/1
TABLET ORAL
Qty: 90 TABLET | Refills: 0 | Status: SHIPPED | OUTPATIENT
Start: 2021-11-19 | End: 2022-01-19 | Stop reason: SDUPTHER

## 2021-12-10 ENCOUNTER — PATIENT MESSAGE (OUTPATIENT)
Dept: FAMILY MEDICINE CLINIC | Age: 66
End: 2021-12-10

## 2021-12-10 DIAGNOSIS — Z00.00 ROUTINE GENERAL MEDICAL EXAMINATION AT A HEALTH CARE FACILITY: ICD-10-CM

## 2021-12-10 DIAGNOSIS — M19.90 ARTHRITIS: ICD-10-CM

## 2021-12-10 DIAGNOSIS — I48.91 ATRIAL FIBRILLATION, UNSPECIFIED TYPE (HCC): ICD-10-CM

## 2021-12-10 DIAGNOSIS — I10 ESSENTIAL HYPERTENSION, MALIGNANT: ICD-10-CM

## 2021-12-10 RX ORDER — ATORVASTATIN CALCIUM 10 MG/1
TABLET, FILM COATED ORAL
Qty: 90 TABLET | Refills: 3 | Status: SHIPPED | OUTPATIENT
Start: 2021-12-10

## 2021-12-10 RX ORDER — AMIODARONE HYDROCHLORIDE 200 MG/1
200 TABLET ORAL DAILY
Qty: 90 TABLET | Refills: 1 | Status: SHIPPED | OUTPATIENT
Start: 2021-12-10 | End: 2022-07-21 | Stop reason: SDUPTHER

## 2021-12-20 DIAGNOSIS — J31.0 RHINITIS, UNSPECIFIED TYPE: ICD-10-CM

## 2021-12-20 RX ORDER — AZELASTINE 1 MG/ML
2 SPRAY, METERED NASAL 2 TIMES DAILY
Qty: 1 EACH | Refills: 3 | Status: SHIPPED | OUTPATIENT
Start: 2021-12-20

## 2022-01-12 ENCOUNTER — VIRTUAL VISIT (OUTPATIENT)
Dept: FAMILY MEDICINE CLINIC | Age: 67
End: 2022-01-12
Payer: MEDICARE

## 2022-01-12 DIAGNOSIS — J06.9 UPPER RESPIRATORY TRACT INFECTION, UNSPECIFIED TYPE: Primary | ICD-10-CM

## 2022-01-12 DIAGNOSIS — Z02.89 ENCOUNTER FOR OTHER ADMINISTRATIVE EXAMINATIONS: ICD-10-CM

## 2022-01-12 PROCEDURE — G8756 NO BP MEASURE DOC: HCPCS | Performed by: STUDENT IN AN ORGANIZED HEALTH CARE EDUCATION/TRAINING PROGRAM

## 2022-01-12 PROCEDURE — G8432 DEP SCR NOT DOC, RNG: HCPCS | Performed by: STUDENT IN AN ORGANIZED HEALTH CARE EDUCATION/TRAINING PROGRAM

## 2022-01-12 PROCEDURE — 99442 PR PHYS/QHP TELEPHONE EVALUATION 11-20 MIN: CPT | Performed by: STUDENT IN AN ORGANIZED HEALTH CARE EDUCATION/TRAINING PROGRAM

## 2022-01-12 PROCEDURE — G8428 CUR MEDS NOT DOCUMENT: HCPCS | Performed by: STUDENT IN AN ORGANIZED HEALTH CARE EDUCATION/TRAINING PROGRAM

## 2022-01-12 PROCEDURE — 1101F PT FALLS ASSESS-DOCD LE1/YR: CPT | Performed by: STUDENT IN AN ORGANIZED HEALTH CARE EDUCATION/TRAINING PROGRAM

## 2022-01-12 PROCEDURE — 3017F COLORECTAL CA SCREEN DOC REV: CPT | Performed by: STUDENT IN AN ORGANIZED HEALTH CARE EDUCATION/TRAINING PROGRAM

## 2022-01-12 RX ORDER — AMOXICILLIN AND CLAVULANATE POTASSIUM 875; 125 MG/1; MG/1
1 TABLET, FILM COATED ORAL EVERY 12 HOURS
Qty: 14 TABLET | Refills: 0 | Status: SHIPPED | OUTPATIENT
Start: 2022-01-12 | End: 2022-01-19

## 2022-01-12 NOTE — LETTER
NOTIFICATION RETURN TO WORK     1/12/2022 8:28 AM    Mr. [address] [facility] [address]      To Whom It May Concern:    Burnie Hamman. is currently under the care of [address] [place] [address]. He will return to work: 1/17/2022    If there are questions or concerns please have the patient contact our office.         Sincerely,          Jelani Kuhn MD

## 2022-01-12 NOTE — PROGRESS NOTES
Wendi Petty is a 77 y.o.  male and presents with    No chief complaint on file. Wendi Petty, who was evaluated through a synchronous (real-time) audio only encounter, and/or his healthcare decision maker, is aware that it is a billable service, with coverage as determined by his insurance carrier. He provided verbal consent to proceed: Yes, and patient identification was verified. This visit was conducted pursuant to the emergency declaration under the [address] authority and the [employer] General Act. A caregiver was present when appropriate. Ability to conduct physical exam was limited. The patient was located in a state where the provider was credentialed to provide care. --Den Valerio MD on 1/12/2022 at 8:09 AM          Subjective:    Started feeling bad over 1 week ago. Positive for sore throat, and cough. Has been using store brand Nyquil with some relief. About 4-5 days ago he had congestion on his chest, feels tightness in his chest.  No fever, sinus pressure, or wheezing. When he takes a breath he feels the congestion at the end of the breath. His cough has become more productive and states that phlegm is yellow, but when he blows his nose is brownish in color. Has taken 3 COVID test that he has taken within the last week has been negative. Took real Nyquil last night and felt better. Positive for night chills. He is supposed to return to work today, but feels like he might still be contagious.       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      Past Medical History:   Diagnosis Date    Alcoholism ([facility]     Arthritis     Atrial fibrillation ([facility] 2017    Cardioversion ()    Colon polyps 2016    Hearing loss     left ear    HLD (hyperlipidemia)     HTN (hypertension)     Nontoxic multinodular goiter 2018    Biopsy of two nodules in 2016 - benign      Past Surgical History:   Procedure Laterality Date    HX COLONOSCOPY  2016    tubular adenoma    HX HERNIA REPAIR      x 2    GA CARDIAC SURG PROCEDURE UNLIST      [place] CARDIOVERSION ELECTIVE ARRHYTHMIA EXTERNAL N/A 12/3/2018    EP CARDIOVERSION performed by Curry Chiu MD at [facility]      Family History   Problem Relation Age of Onset    Diabetes Father      Social History     Socioeconomic History    Marital status:      Spouse name: Not on file    Number of children: Not on file    Years of education: Not on file    Highest education level: Not on file   Occupational History    Occupation: [employer]   Tobacco Use    Smoking status: Former Smoker     Quit date: 2015     Years since quittin.5    Smokeless tobacco: Never Used   Substance and Sexual Activity    Alcohol use: Yes     Comment: Working on cutting back alcohol consumption. Now down to 4 drinks/day.     Drug use: No    Sexual activity: Yes     Partners: Female   Other Topics Concern     Service Not Asked    Blood Transfusions Not Asked    Caffeine Concern Not Asked    Occupational Exposure Not Asked    Hobby Hazards Not Asked    Sleep Concern Yes     Comment: wakes up once, 7 hours total    Stress Concern No    Weight Concern Not Asked    Special Diet Not Asked    Back Care Not Asked    Exercise Not Asked    Bike Helmet Not Asked    [address] Not Asked    Self-Exams Not Asked   Social History Narrative    Not on file     Social Determinants of Health     Financial Resource Strain:     Difficulty of Paying Living Expenses: Not on file   Food Insecurity:     Worried About [facility] in the Last Year: Not on file    Nikita of Food in the Last Year: Not on file   Transportation Needs:     Lack of Transportation (Medical): Not on file    Lack of Transportation (Non-Medical): Not on file   Physical Activity:     Days of Exercise per Week: Not on file    Minutes of Exercise per Session: Not on file   Stress:     Feeling of Stress : Not on file   Social Connections:     Frequency of Communication with Friends and Family: Not on file    Frequency of Social Gatherings with Friends and Family: Not on file    Attends Orthodoxy Services: Not on file    Active Member of [address] [place] [address] or Organizations: Not on file    Attends Club or Organization Meetings: Not on file    Marital Status: Not on file   Intimate Partner Violence:     Fear of Current or Ex-Partner: Not on file    Emotionally Abused: Not on file    Physically Abused: Not on file    Sexually Abused: Not on file   Housing Stability:     Unable to Pay for Housing in the Last Year: Not on file    Number of [place] in the Last Year: Not on file    Unstable Housing in the Last Year: Not on file        Current Outpatient Medications   Medication Sig Dispense Refill    azelastine (ASTELIN) 137 mcg (0.1 %) nasal spray 2 Sprays by Both Nostrils route two (2) times a day. Use in each nostril as directed 1 Each 3    atorvastatin (LIPITOR) 10 mg tablet Take 1 tablet by mouth once daily 90 Tablet 3    amiodarone (CORDARONE) 200 mg tablet Take 1 Tablet by mouth daily. 90 Tablet 1    cholecalciferol (Vitamin D3) (1000 Units /25 mcg) tablet Take  by mouth daily.  B.infantis-B.ani-B.long-B.bifi (Probiotic 4X) 10-15 mg TbEC Take  by mouth daily.  furosemide (LASIX) 20 mg tablet Take 1 tablet by mouth once daily 90 Tablet 0    aspirin 81 mg chewable tablet Take 81 mg by mouth daily.       nitroglycerin (NITROSTAT) 0.4 mg SL tablet 1 Tab by SubLINGual route as needed for Chest Pain. 1 Bottle 0    metoprolol tartrate (LOPRESSOR) 50 mg tablet Take 1.5 Tabs by mouth two (2) times a day. 270 Tab 4    diclofenac (VOLTAREN) 1 % gel Apply 2 g to affected area every six (6) hours as needed for Pain (left thumb). 100 g 1    acetaminophen (TYLENOL) 325 mg tablet Take  by mouth every four (4) hours as needed for Pain.  POTASSIUM ACETATE 1 Tab by Does Not Apply route daily.  multivit-min-FA-lycopen-lutein (CENTRUM SILVER MEN) 300-600-300 mcg tab Take 1 Tab by mouth daily. 100 Tab 12        ROS   Review of Systems   Constitutional: Positive for malaise/fatigue. Negative for chills and fever. HENT: Positive for congestion. Negative for ear discharge, ear pain and sinus pain. Eyes: Negative for blurred vision, pain and discharge. Respiratory: Positive for cough, sputum production and shortness of breath (d/t chest tightness). Negative for wheezing. Cardiovascular: Negative for chest pain and palpitations. Gastrointestinal: Negative for abdominal pain, nausea and vomiting. Genitourinary: Negative for dysuria, frequency and urgency. Skin: Negative for itching and rash. Neurological: Negative for dizziness, seizures, loss of consciousness and headaches. Psychiatric/Behavioral: Negative for substance abuse. Objective: There were no vitals filed for this visit. Physical Exam  HENT:      Nose: Congestion present. Pulmonary:      Effort: No respiratory distress. Neurological:      Mental Status: He is alert and oriented to person, place, and time. Psychiatric:         Mood and Affect: Mood normal.         Behavior: Behavior normal.         Thought Content: Thought content normal.         Judgment: Judgment normal.           LABS     TESTS      Assessment/Plan:    1. Upper respiratory tract infection, unspecified type  Since infection has been over 1 week, at this point is appropriate for pt to be started on Abx.  Discussed w/ pt to make sure he takes plenty of fluids. He verbalized understanding.   - guaiFENesin-dextromethorphan SR (HUMIBID DM) 600-30 mg per tablet; Take 1 Tablet by mouth two (2) times a day for 7 days. Dispense: 14 Tablet; Refill: 0  - amoxicillin-clavulanate (AUGMENTIN) 875-125 mg per tablet; Take 1 Tablet by mouth every twelve (12) hours for 7 days. Dispense: 14 Tablet; Refill: 0    Lab review: no lab studies available for review at time of visit    Appt took approximately 20 mins. Unable to connect through video so this was an audio appt. I have discussed the diagnosis with the patient and the intended plan as seen in the above orders. Questions were answered concerning future plans. I have discussed medication side effects and warnings with the patient as well. I have reviewed the plan of care with the patient, accepted their input and they are in agreement with the treatment goals.          Tiffany Whittaker MD

## 2022-01-19 DIAGNOSIS — I10 ESSENTIAL HYPERTENSION, MALIGNANT: ICD-10-CM

## 2022-01-19 DIAGNOSIS — I48.91 ATRIAL FIBRILLATION, UNSPECIFIED TYPE (HCC): ICD-10-CM

## 2022-01-19 DIAGNOSIS — R21 RASH: ICD-10-CM

## 2022-01-19 DIAGNOSIS — E04.2 NONTOXIC MULTINODULAR GOITER: ICD-10-CM

## 2022-01-19 DIAGNOSIS — I10 ESSENTIAL HYPERTENSION: ICD-10-CM

## 2022-01-19 DIAGNOSIS — E78.00 ELEVATED CHOLESTEROL: ICD-10-CM

## 2022-01-19 RX ORDER — FUROSEMIDE 20 MG/1
TABLET ORAL
Qty: 90 TABLET | Refills: 0 | Status: SHIPPED | OUTPATIENT
Start: 2022-01-19 | End: 2022-05-03 | Stop reason: SDUPTHER

## 2022-01-19 RX ORDER — METOPROLOL TARTRATE 50 MG/1
75 TABLET ORAL 2 TIMES DAILY
Qty: 270 TABLET | Refills: 4 | Status: SHIPPED | OUTPATIENT
Start: 2022-01-19

## 2022-02-04 ENCOUNTER — TELEPHONE (OUTPATIENT)
Dept: FAMILY MEDICINE CLINIC | Age: 67
End: 2022-02-04

## 2022-02-04 NOTE — TELEPHONE ENCOUNTER
----- Message from [facility] sent at 2/4/2022  1:03 PM EST -----  Subject: Referral Request    QUESTIONS   Reason for referral request? pt is requesting an EKG before 02/15/22 with   results. Has the physician seen you for this condition before? No   Preferred Specialist (if applicable)? Do you already have an appointment scheduled? No  Additional Information for Provider? Pt will be seeing Dr. Hoang Doing for   procedure dated 02/15/22 [facility]  ---------------------------------------------------------------------------  --------------  [address]  What is the best way for the office to contact you? OK to leave message on   voicemail  Preferred Call Back Phone Number?  [phone]

## 2022-03-08 NOTE — PROGRESS NOTES
0730:  Bedside and Verbal shift change report given to Art Matthews RN (oncoming nurse) by Vinita Harrington RN (offgoing nurse). Report included the following information SBAR, Kardex, MAR and Recent Results. 8315:  Administered due medications, patient tolerated well. 1050:  Educated patient on discharge papers, including afib, ETOH and coumadin. Orders to stop taking Vitamin K. Patient voiced understanding. Patient called for ride and is waiting for person to arrive. unknown

## 2022-03-18 PROBLEM — I50.22 CHRONIC SYSTOLIC CONGESTIVE HEART FAILURE (HCC): Status: ACTIVE | Noted: 2018-11-27

## 2022-03-18 PROBLEM — G31.2 CEREBELLAR DEGENERATION DUE TO CHRONIC ALCOHOLISM (HCC): Status: ACTIVE | Noted: 2020-02-07

## 2022-03-18 PROBLEM — F10.20 CEREBELLAR DEGENERATION DUE TO CHRONIC ALCOHOLISM (HCC): Status: ACTIVE | Noted: 2020-02-07

## 2022-03-18 PROBLEM — E04.2 NONTOXIC MULTINODULAR GOITER: Status: ACTIVE | Noted: 2018-02-07

## 2022-03-19 PROBLEM — I10 HTN (HYPERTENSION): Status: ACTIVE | Noted: 2018-11-04

## 2022-03-19 PROBLEM — I42.9 CARDIOMYOPATHY (HCC): Status: ACTIVE | Noted: 2018-12-28

## 2022-03-20 PROBLEM — F10.20 ALCOHOLISM (HCC): Status: ACTIVE | Noted: 2018-11-04

## 2022-03-20 PROBLEM — I48.91 ATRIAL FIBRILLATION (HCC): Status: ACTIVE | Noted: 2017-11-28

## 2022-05-04 RX ORDER — FUROSEMIDE 20 MG/1
TABLET ORAL
Qty: 90 TABLET | Refills: 0 | Status: SHIPPED | OUTPATIENT
Start: 2022-05-04 | End: 2022-08-12 | Stop reason: SDUPTHER

## 2022-07-21 DIAGNOSIS — I48.91 ATRIAL FIBRILLATION, UNSPECIFIED TYPE (HCC): ICD-10-CM

## 2022-07-23 RX ORDER — AMIODARONE HYDROCHLORIDE 200 MG/1
200 TABLET ORAL DAILY
Qty: 90 TABLET | Refills: 1 | Status: SHIPPED | OUTPATIENT
Start: 2022-07-23

## 2022-08-12 RX ORDER — FUROSEMIDE 20 MG/1
TABLET ORAL
Qty: 58 TABLET | Refills: 0 | Status: SHIPPED | OUTPATIENT
Start: 2022-08-12 | End: 2022-09-29 | Stop reason: SDUPTHER

## 2022-09-30 RX ORDER — FUROSEMIDE 20 MG/1
20 TABLET ORAL DAILY
Qty: 90 TABLET | Refills: 1 | Status: SHIPPED | OUTPATIENT
Start: 2022-09-30 | End: 2022-10-03

## 2022-11-30 ENCOUNTER — OFFICE VISIT (OUTPATIENT)
Dept: FAMILY MEDICINE CLINIC | Age: 67
End: 2022-11-30
Payer: MEDICARE

## 2022-11-30 VITALS
DIASTOLIC BLOOD PRESSURE: 88 MMHG | TEMPERATURE: 98.2 F | HEART RATE: 58 BPM | BODY MASS INDEX: 22.76 KG/M2 | WEIGHT: 159 LBS | HEIGHT: 70 IN | OXYGEN SATURATION: 99 % | RESPIRATION RATE: 15 BRPM | SYSTOLIC BLOOD PRESSURE: 138 MMHG

## 2022-11-30 DIAGNOSIS — F10.20 ALCOHOLISM (HCC): ICD-10-CM

## 2022-11-30 DIAGNOSIS — J31.0 CHRONIC RHINITIS: Primary | ICD-10-CM

## 2022-11-30 DIAGNOSIS — Z87.891 FORMER SMOKER: ICD-10-CM

## 2022-11-30 DIAGNOSIS — I48.0 PAROXYSMAL ATRIAL FIBRILLATION (HCC): ICD-10-CM

## 2022-11-30 DIAGNOSIS — G31.2 CEREBELLAR DEGENERATION DUE TO CHRONIC ALCOHOLISM (HCC): ICD-10-CM

## 2022-11-30 DIAGNOSIS — I42.6 ALCOHOLIC CARDIOMYOPATHY (HCC): ICD-10-CM

## 2022-11-30 DIAGNOSIS — Z12.5 SCREENING FOR PROSTATE CANCER: ICD-10-CM

## 2022-11-30 DIAGNOSIS — Z00.00 ANNUAL PHYSICAL EXAM: ICD-10-CM

## 2022-11-30 DIAGNOSIS — Z00.00 PREVENTATIVE HEALTH CARE: ICD-10-CM

## 2022-11-30 DIAGNOSIS — Z13.29 SCREENING FOR THYROID DISORDER: ICD-10-CM

## 2022-11-30 DIAGNOSIS — Z71.89 ADVANCED DIRECTIVES, COUNSELING/DISCUSSION: ICD-10-CM

## 2022-11-30 DIAGNOSIS — E78.00 ELEVATED CHOLESTEROL: ICD-10-CM

## 2022-11-30 DIAGNOSIS — F10.20 CEREBELLAR DEGENERATION DUE TO CHRONIC ALCOHOLISM (HCC): ICD-10-CM

## 2022-11-30 PROCEDURE — G8752 SYS BP LESS 140: HCPCS | Performed by: NURSE PRACTITIONER

## 2022-11-30 PROCEDURE — 3017F COLORECTAL CA SCREEN DOC REV: CPT | Performed by: NURSE PRACTITIONER

## 2022-11-30 PROCEDURE — G8420 CALC BMI NORM PARAMETERS: HCPCS | Performed by: NURSE PRACTITIONER

## 2022-11-30 PROCEDURE — 1101F PT FALLS ASSESS-DOCD LE1/YR: CPT | Performed by: NURSE PRACTITIONER

## 2022-11-30 PROCEDURE — 3074F SYST BP LT 130 MM HG: CPT | Performed by: NURSE PRACTITIONER

## 2022-11-30 PROCEDURE — G8754 DIAS BP LESS 90: HCPCS | Performed by: NURSE PRACTITIONER

## 2022-11-30 PROCEDURE — 3078F DIAST BP <80 MM HG: CPT | Performed by: NURSE PRACTITIONER

## 2022-11-30 PROCEDURE — 99214 OFFICE O/P EST MOD 30 MIN: CPT | Performed by: NURSE PRACTITIONER

## 2022-11-30 PROCEDURE — 1123F ACP DISCUSS/DSCN MKR DOCD: CPT | Performed by: NURSE PRACTITIONER

## 2022-11-30 PROCEDURE — G8432 DEP SCR NOT DOC, RNG: HCPCS | Performed by: NURSE PRACTITIONER

## 2022-11-30 PROCEDURE — G8427 DOCREV CUR MEDS BY ELIG CLIN: HCPCS | Performed by: NURSE PRACTITIONER

## 2022-11-30 PROCEDURE — G8536 NO DOC ELDER MAL SCRN: HCPCS | Performed by: NURSE PRACTITIONER

## 2022-11-30 NOTE — ACP (ADVANCE CARE PLANNING)
Advance Care Planning     General Advance Care Planning (ACP) Conversation      Date of Conversation: 11/30/2022  Conducted with: Patient with Decision Making Capacity    Healthcare Decision Maker:   No healthcare decision makers have been documented. Click here to complete [address] including selection of the Healthcare Decision Maker Relationship (ie \"Primary\")  Today we documented Decision Maker(s) consistent with Legal Next of Kin hierarchy.     Content/Action Overview:   Has NO ACP documents/care preferences - requested patient complete ACP documents  Reviewed DNR/DNI and patient elects Full Code (Attempt Resuscitation)  Topics discussed: treatment goals, ventilation preferences, and resuscitation preferences  Additional Comments : yes to CPR and his sister will decisions about life support       Length of Voluntary ACP Conversation in minutes:  27 minutes    Felipe Aguirre NP

## 2022-11-30 NOTE — PROGRESS NOTES
Chuy Awad. is a 79 y.o. male  established patient, here for evaluation of the following chief complaint(s):  Chief Complaint   Patient presents with    Allergies    Hypertension        Assessment and Plan  1. Chronic rhinitis  -     REFERRAL TO ENT-OTOLARYNGOLOGY  2. 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 Alcoholic cardiomyopathy ([facility] [address]  4. Alcoholism ([facility] [address]  5. Paroxysmal atrial fibrillation ([facility] [address]  6. Former smoker  -     CT LOW DOSE LUNG CANCER SCREENING; Future  7. Annual physical exam  8. Advanced directives, counseling/discussion  -     FULL CODE  9. Elevated cholesterol  -     LIPID PANEL; Future  10. Preventative health care  -     METABOLIC PANEL, COMPREHENSIVE; Future  -     CBC WITH AUTOMATED DIFF; Future  -     URINALYSIS W/ RFLX MICROSCOPIC; Future  11. Screening for thyroid disorder  -     TSH 3RD GENERATION; Future  12. Screening for prostate cancer  -     PSA, DIAGNOSTIC (PROSTATE SPECIFIC AG); Future     Follow-up and Dispositions    Return in about 6 months (around 5/30/2023) for routine, 15. HPI:   In office visit. Patient appears well. He works at [employer]. He has been trying to loose weight with good results. He is taking a probiotic and bowel movements are great. He says chronic \"runny nose. \" He says his nose doesn't in his sleep. He has tried different nasal sprays without relief. Patient sees Dr. Jarrett Guerra with cardiology. Patient has cut down on his alcohol intake. He has 3 scotch and water a night now. He says his dog chewed up his hearing aid.      ROS:    General: negative for - chills, fever, weight changes or malaise  HEENT: no sore throat, nasal congestion, vision problems or ear problems  Respiratory: no cough, shortness of breath, or wheezing  Cardiovascular: no chest pain, palpitations, or dyspnea on exertion  Gastrointestinal: no abdominal pain, N/V, change in bowel habits  Musculoskeletal: no back pain or joint pain  Neurological: no headache or dizziness  Endo:  No polyuria or polydipsia  : no urinary  Psychological: negative for - anxiety, depression, sleeps issues    Prior to Admission medications    Medication Sig Start Date End Date Taking? Authorizing Provider   furosemide (LASIX) 20 mg tablet Take 1 tablet by mouth once daily 10/3/22  Yes Elvira Alvarez NP   metoprolol tartrate (LOPRESSOR) 50 mg tablet Take 1.5 Tablets by mouth two (2) times a day. 1/19/22  Yes Frankey Becket L, NP   atorvastatin (LIPITOR) 10 mg tablet Take 1 tablet by mouth once daily 12/10/21  Yes Cecilia Alvarez NP   cholecalciferol (VITAMIN D3) (1000 Units /25 mcg) tablet Take  by mouth daily. Yes Provider, Historical   B.infantis-B.ani-B.long-B.bifi (Probiotic 4X) 10-15 mg TbEC Take  by mouth daily. Yes Provider, Historical   aspirin 81 mg chewable tablet Take 81 mg by mouth daily. Yes Provider, Historical   nitroglycerin (NITROSTAT) 0.4 mg SL tablet 1 Tab by SubLINGual route as needed for Chest Pain. 3/1/21  Yes Ailyn Alonso NP   acetaminophen (TYLENOL) 325 mg tablet Take  by mouth every four (4) hours as needed for Pain. Yes Provider, Historical   POTASSIUM ACETATE 1 Tab by Does Not Apply route daily. Yes Provider, Historical   multivit-min-FA-lycopen-lutein (CENTRUM SILVER MEN) 300-600-300 mcg tab Take 1 Tab by mouth daily. 4/8/19  Yes Marcelina Swift., DO   amiodarone (CORDARONE) 200 mg tablet Take 1 Tablet by mouth in the morning. 7/23/22   Reyna Khan MD   azelastine (ASTELIN) 137 mcg (0.1 %) nasal spray 2 Sprays by Both Nostrils route two (2) times a day. Use in each nostril as directed  Patient not taking: Reported on 11/30/2022 12/20/21   Shaista Colon NP   diclofenac (VOLTAREN) 1 % gel Apply 2 g to affected area every six (6) hours as needed for Pain (left thumb).  12/2/19   Vicky Valadez, DO        Results for orders placed or performed in visit on 07/14/21   PSA SCREENING (SCREENING)   Result Value Ref Range Prostate Specific Ag 0.8 0.0 - 4.0 ng/mL   T4, FREE   Result Value Ref Range    T4, Free 1.71 0.82 - 1.77 ng/dL   TSH 3RD GENERATION   Result Value Ref Range    TSH 0.154 (L) 0.450 - 4.500 uIU/mL        Physical Exam  Patient appears well, she is pleasant, alert, oriented x 3, in no distress. ENT normal.  Neck supple. No adenopathy or thyromegaly. LUANNE. Lungs are clear, good air entry, no wheezes  Cardiovascular, S1 and S2 normal, no murmurs, regular rate and rhythm. Chest wall negative for tenderness  Abdomen is soft without tenderness, guarding  /Anorectal, deferred. Muscleskeletal, no swelling, no tenderness, no injury. Extremities show no edema  Neurological is normal without focal findings. Skin: no concerning lesions. Psych: normal affect. Mood good. Oriented x 3. Vitals:    11/30/22 1303 11/30/22 1307 11/30/22 1328   BP: (!) 153/88 (!) 152/88 138/88   Pulse: 60  (!) 58   Resp: 15     Temp: 98.2 °F (36.8 °C)     TempSrc: Temporal     SpO2: 99%     Weight: 159 lb (72.1 kg)     Height: 5' 10\" (1.778 m)     PainSc:   0 - No pain         *Plan of care reviewed with patient. Patient in agreement with plan and expresses understanding. All questions answered and patient encouraged to call or RTO if further questions or concerns. On this date 11/30/2022 I have spent 39 minutes reviewing previous notes, test results and face to face with the patient discussing the diagnosis and importance of compliance with the treatment plan as well as documenting on the day of the visit. SERGIO Georges                  This is the Subsequent Medicare Annual Wellness Exam, performed 12 months or more after the Initial AWV or the last Subsequent AWV    I have reviewed the patient's medical history in detail and updated the computerized patient record.        Assessment/Plan   Education and counseling provided:  Are appropriate based on today's review and evaluation  End-of-Life planning (with patient's consent)    1. Chronic rhinitis  -     REFERRAL TO ENT-OTOLARYNGOLOGY  2. 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 Alcoholic cardiomyopathy ([facility] [address]  4. Alcoholism ([facility] [address]  5. Paroxysmal atrial fibrillation ([facility] [address]  6. Former smoker  -     CT LOW DOSE LUNG CANCER SCREENING; Future  7. Annual physical exam  8. Advanced directives, counseling/discussion  -     FULL CODE  9. Elevated cholesterol  -     LIPID PANEL; Future  10. Preventative health care  -     METABOLIC PANEL, COMPREHENSIVE; Future  -     CBC WITH AUTOMATED DIFF; Future  -     URINALYSIS W/ RFLX MICROSCOPIC; Future  11. Screening for thyroid disorder  -     TSH 3RD GENERATION; Future  12. Screening for prostate cancer  -     PSA, DIAGNOSTIC (PROSTATE SPECIFIC AG); Future       Depression Risk Factor Screening     3 most recent PHQ Screens 11/30/2022   Little interest or pleasure in doing things Not at all   Feeling down, depressed, irritable, or hopeless Not at all   Total Score PHQ 2 0       Alcohol & Drug Abuse Risk Screen    Do you average more than 1 drink per night or more than 7 drinks a week: Yes    In the past three months have you have had more than 4 drinks containing alcohol on one occasion: No          Functional Ability and Level of Safety    Hearing: Hearing is good. Activities of Daily Living: The home contains: no safety equipment. Patient does total self care      Ambulation: with no difficulty     Fall Risk:  Fall Risk Assessment, last 12 mths 11/30/2022   Able to walk? Yes   Fall in past 12 months? 0   Do you feel unsteady? 0   Are you worried about falling 0   Number of falls in past 12 months -   Fall with injury?  -      Abuse Screen:  Patient is not abused       Cognitive Screening    Has your family/caregiver stated any concerns about your memory: no     Cognitive Screening: no issues     Health Maintenance Due     Health Maintenance Due   Topic Date Due    COVID-19 Vaccine (1) Never done    Medicare Yearly Exam  06/17/2021    Lipid Screen  03/08/2022       Patient Care Team   Patient Care Team:  Edson Cannon NP as PCP - General (Nurse Practitioner)  Edson Cannon NP as PCP - 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 ([address] G31.2, F10.20     Past Medical History:   Diagnosis Date    Alcoholism ([address]     Arthritis     Atrial fibrillation ([address] 11/28/2017    Cardioversion (11/18)    Colon polyps 2016    Hearing loss     left ear    HLD (hyperlipidemia)     HTN (hypertension)     Nontoxic multinodular goiter 2/7/2018    Biopsy of two nodules in 2016 - benign      Past Surgical History:   Procedure Laterality Date    HX COLONOSCOPY  2016    tubular adenoma    HX HERNIA REPAIR      x 2    SC CARDIAC SURG PROCEDURE UNLIST      SC CARDIOVERSION ELECTIVE ARRHYTHMIA EXTERNAL N/A 12/3/2018    EP CARDIOVERSION performed by Edith Lopez MD at [facility] CATH LAB     Current Outpatient Medications   Medication Sig Dispense Refill    furosemide (LASIX) 20 mg tablet Take 1 tablet by mouth once daily 30 Tablet 0    metoprolol tartrate (LOPRESSOR) 50 mg tablet Take 1.5 Tablets by mouth two (2) times a day. 270 Tablet 4    atorvastatin (LIPITOR) 10 mg tablet Take 1 tablet by mouth once daily 90 Tablet 3    cholecalciferol (VITAMIN D3) (1000 Units /25 mcg) tablet Take  by mouth daily.       B.infantis-B.ani-B.long-B.bifi (Probiotic 4X) 10-15 mg TbEC Take  by mouth daily.      aspirin 81 mg chewable tablet Take 81 mg by mouth daily. nitroglycerin (NITROSTAT) 0.4 mg SL tablet 1 Tab by SubLINGual route as needed for Chest Pain. 1 Bottle 0    acetaminophen (TYLENOL) 325 mg tablet Take  by mouth every four (4) hours as needed for Pain. POTASSIUM ACETATE 1 Tab by Does Not Apply route daily. multivit-min-FA-lycopen-lutein (CENTRUM SILVER MEN) 300-600-300 mcg tab Take 1 Tab by mouth daily. 100 Tab 12    amiodarone (CORDARONE) 200 mg tablet Take 1 Tablet by mouth in the morning. 90 Tablet 1    azelastine (ASTELIN) 137 mcg (0.1 %) nasal spray 2 Sprays by Both Nostrils route two (2) times a day. Use in each nostril as directed (Patient not taking: Reported on 2022) 1 Each 3    diclofenac (VOLTAREN) 1 % gel Apply 2 g to affected area every six (6) hours as needed for Pain (left thumb). 100 g 1     No Known Allergies    Family History   Problem Relation Age of Onset    Diabetes Father      Social History     Tobacco Use    Smoking status: Former     Types: Cigarettes     Quit date: 2015     Years since quittin.4    Smokeless tobacco: Never   Substance Use Topics    Alcohol use: Yes     Comment: Working on cutting back alcohol consumption. Now down to 4 drinks/day.          Arden Villafuerte NP

## 2022-11-30 NOTE — PROGRESS NOTES
Burnie Hamman. presents today for   Chief Complaint   Patient presents with    Allergies       Is someone accompanying this pt? No    Is the patient using any DME equipment during OV? No    Depression Screening:  3 most recent PHQ Screens 11/30/2022   Little interest or pleasure in doing things Not at all   Feeling down, depressed, irritable, or hopeless Not at all   Total Score PHQ 2 0       Learning Assessment:  Learning Assessment 7/22/2019   PRIMARY LEARNER Patient   HIGHEST LEVEL OF EDUCATION - PRIMARY LEARNER  -   BARRIERS PRIMARY LEARNER NONE     -   [address] CAREGIVER -   PRIMARY LANGUAGE ENGLISH   LEARNER PREFERENCE PRIMARY DEMONSTRATION     READING   ANSWERED BY patient   RELATIONSHIP SELF       Abuse Screening:  Abuse Screening Questionnaire 11/30/2022   Do you ever feel afraid of your partner? N   Are you in a relationship with someone who physically or mentally threatens you? N   Is it safe for you to go home? Y       Fall Risk  Fall Risk Assessment, last 12 mths 3/8/2021   Able to walk? Yes   Fall in past 12 months? 0   Number of falls in past 12 months -   Fall with injury? -       Health Maintenance reviewed and discussed and ordered per Provider. Health Maintenance Due   Topic Date Due    COVID-19 Vaccine (1) Never done    Medicare Yearly Exam  06/17/2021    Lipid Screen  03/08/2022    Depression Screen  07/14/2022    Flu Vaccine (1) 08/01/2022   .      1. Have you been to the ER, urgent care clinic since your last visit? Hospitalized since your last visit? No    2. Have you seen or consulted any other health care providers outside of the [address] [place] [address] since your last visit? No    3. For patients aged 39-70: Has the patient had a colonoscopy / FIT/ Cologuard? Not due      If the patient is female:    4. For patients aged 41-77: Has the patient had a mammogram within the past 2 years? NA      5. For patients aged 21-65: Has the patient had a pap smear?  NA

## 2022-11-30 NOTE — PATIENT INSTRUCTIONS
Medicare Wellness Visit, Male    The best way to live healthy is to have a lifestyle where you eat a well-balanced diet, exercise regularly, limit alcohol use, and quit all forms of tobacco/nicotine, if applicable. Regular preventive services are another way to keep healthy. Preventive services (vaccines, screening tests, monitoring & exams) can help personalize your care plan, which helps you manage your own care. Screening tests can find health problems at the earliest stages, when they are easiest to treat. [facility] follows the current, evidence-based guidelines published by the [facility] ([facility]) when recommending preventive services for our patients. Because we follow these guidelines, sometimes recommendations change over time as research supports it. (For example, a prostate screening blood test is no longer routinely recommended for men with no symptoms). Of course, you and your doctor may decide to screen more often for some diseases, based on your risk and co-morbidities (chronic disease you are already diagnosed with). Preventive services for you include:  - Medicare offers their members a free annual wellness visit, which is time for you and your primary care provider to discuss and plan for your preventive service needs. Take advantage of this benefit every year!  -All adults over age 72 should receive the recommended pneumonia vaccines. Current USPSTF guidelines recommend a series of two vaccines for the best pneumonia protection.   -All adults should have a flu vaccine yearly and tetanus vaccine every 10 years.  -All adults age 48 and older should receive the shingles vaccines (series of two vaccines).        -All adults age 38-68 who are overweight should have a diabetes screening test once every three years.   -Other screening tests & preventive services for persons with diabetes include: an eye exam to screen for diabetic retinopathy, a kidney function test, a foot exam, and stricter control over your cholesterol.   -Cardiovascular screening for adults with routine risk involves an electrocardiogram (ECG) at intervals determined by the provider.   -Colorectal cancer screening should be done for adults age 54-65 with no increased risk factors for colorectal cancer. There are a number of acceptable methods of screening for this type of cancer. Each test has its own benefits and drawbacks. Discuss with your provider what is most appropriate for you during your annual wellness visit. The different tests include: colonoscopy (considered the best screening method), a fecal occult blood test, a fecal DNA test, and sigmoidoscopy.  -All adults born between [facility] should be screened once for Hepatitis C.  -An Abdominal Aortic Aneurysm (AAA) Screening is recommended for men age 73-68 who has ever smoked in their lifetime.      Here is a list of your current Health Maintenance items (your personalized list of preventive services) with a due date:  Health Maintenance Due   Topic Date Due    COVID-19 Vaccine (1) Never done    Annual Well Visit  06/17/2021    Cholesterol Test   03/08/2022

## 2022-12-01 LAB
ALBUMIN SERPL-MCNC: 4.8 G/DL (ref 3.8–4.8)
ALBUMIN/GLOB SERPL: 2.4 {RATIO} (ref 1.2–2.2)
ALP SERPL-CCNC: 122 IU/L (ref 44–121)
ALT SERPL-CCNC: 30 IU/L (ref 0–44)
APPEARANCE UR: CLEAR
AST SERPL-CCNC: 32 IU/L (ref 0–40)
BASOPHILS # BLD AUTO: 0.1 X10E3/UL (ref 0–0.2)
BASOPHILS NFR BLD AUTO: 1 %
BILIRUB SERPL-MCNC: 0.5 MG/DL (ref 0–1.2)
BILIRUB UR QL STRIP: NEGATIVE
BUN SERPL-MCNC: 10 MG/DL (ref 8–27)
BUN/CREAT SERPL: 9 (ref 10–24)
CALCIUM SERPL-MCNC: 9.9 MG/DL (ref 8.6–10.2)
CHLORIDE SERPL-SCNC: 100 MMOL/L (ref 96–106)
CHOLEST SERPL-MCNC: 202 MG/DL (ref 100–199)
CO2 SERPL-SCNC: 25 MMOL/L (ref 20–29)
COLOR UR: YELLOW
CREAT SERPL-MCNC: 1.11 MG/DL (ref 0.76–1.27)
EGFR: 73 ML/MIN/1.73
EOSINOPHIL # BLD AUTO: 0.2 X10E3/UL (ref 0–0.4)
EOSINOPHIL NFR BLD AUTO: 2 %
ERYTHROCYTE [DISTWIDTH] IN BLOOD BY AUTOMATED COUNT: 13.6 % (ref 11.6–15.4)
GLOBULIN SER CALC-MCNC: 2 G/DL (ref 1.5–4.5)
GLUCOSE SERPL-MCNC: 87 MG/DL (ref 70–99)
GLUCOSE UR QL STRIP: NEGATIVE
HCT VFR BLD AUTO: 42.9 % (ref 37.5–51)
HDLC SERPL-MCNC: 78 MG/DL
HGB BLD-MCNC: 15.2 G/DL (ref 13–17.7)
HGB UR QL STRIP: NEGATIVE
IMM GRANULOCYTES # BLD AUTO: 0 X10E3/UL (ref 0–0.1)
IMM GRANULOCYTES NFR BLD AUTO: 0 %
IMP & REVIEW OF LAB RESULTS: NORMAL
KETONES UR QL STRIP: NEGATIVE
LDLC SERPL CALC-MCNC: 103 MG/DL (ref 0–99)
LEUKOCYTE ESTERASE UR QL STRIP: NEGATIVE
LYMPHOCYTES # BLD AUTO: 1.3 X10E3/UL (ref 0.7–3.1)
LYMPHOCYTES NFR BLD AUTO: 19 %
MCH RBC QN AUTO: 30.8 PG (ref 26.6–33)
MCHC RBC AUTO-ENTMCNC: 35.4 G/DL (ref 31.5–35.7)
MCV RBC AUTO: 87 FL (ref 79–97)
MICRO URNS: NORMAL
MONOCYTES # BLD AUTO: 0.7 X10E3/UL (ref 0.1–0.9)
MONOCYTES NFR BLD AUTO: 11 %
NEUTROPHILS # BLD AUTO: 4.5 X10E3/UL (ref 1.4–7)
NEUTROPHILS NFR BLD AUTO: 67 %
NITRITE UR QL STRIP: NEGATIVE
PH UR STRIP: 7.5 [PH] (ref 5–7.5)
PLATELET # BLD AUTO: 299 X10E3/UL (ref 150–450)
POTASSIUM SERPL-SCNC: 4.3 MMOL/L (ref 3.5–5.2)
PROT SERPL-MCNC: 6.8 G/DL (ref 6–8.5)
PROT UR QL STRIP: NEGATIVE
PSA SERPL-MCNC: 0.9 NG/ML (ref 0–4)
RBC # BLD AUTO: 4.94 X10E6/UL (ref 4.14–5.8)
SODIUM SERPL-SCNC: 141 MMOL/L (ref 134–144)
SP GR UR STRIP: 1.01 (ref 1–1.03)
TRIGL SERPL-MCNC: 121 MG/DL (ref 0–149)
TSH SERPL DL<=0.005 MIU/L-ACNC: <0.005 UIU/ML (ref 0.45–4.5)
UROBILINOGEN UR STRIP-MCNC: 0.2 MG/DL (ref 0.2–1)
VLDLC SERPL CALC-MCNC: 21 MG/DL (ref 5–40)
WBC # BLD AUTO: 6.7 X10E3/UL (ref 3.4–10.8)

## 2022-12-08 ENCOUNTER — TELEPHONE (OUTPATIENT)
Dept: FAMILY MEDICINE CLINIC | Age: 67
End: 2022-12-08

## 2022-12-08 NOTE — TELEPHONE ENCOUNTER
Called PT and Informed him we do not do Covid test in office and MS. Carrie Hinds said since he feeling better he no longer need to COVID test unless he want to then he can go to a pharmacy to get tested. PT confirmed understanding.

## 2022-12-08 NOTE — TELEPHONE ENCOUNTER
Pt states that he tested himself for covid twice and both test came back unreadable, pt states he was feeling a little congested the past week but states hes feeling much better and returning to work today would like a return call from provider to advise should he make an appt to come in for a more accurate test. MH

## 2023-01-02 DIAGNOSIS — I48.91 ATRIAL FIBRILLATION, UNSPECIFIED TYPE (HCC): ICD-10-CM

## 2023-01-02 RX ORDER — AMIODARONE HYDROCHLORIDE 200 MG/1
200 TABLET ORAL DAILY
Qty: 90 TABLET | Refills: 0 | Status: SHIPPED | OUTPATIENT
Start: 2023-01-02

## 2023-01-05 DIAGNOSIS — M19.90 ARTHRITIS: ICD-10-CM

## 2023-01-05 DIAGNOSIS — I10 ESSENTIAL HYPERTENSION, MALIGNANT: ICD-10-CM

## 2023-01-05 DIAGNOSIS — Z00.00 ROUTINE GENERAL MEDICAL EXAMINATION AT A HEALTH CARE FACILITY: ICD-10-CM

## 2023-01-05 NOTE — TELEPHONE ENCOUNTER
This patient contacted the office for the following prescriptions to be refilled:    Medication requested :   Requested Prescriptions     Pending Prescriptions Disp Refills    atorvastatin (LIPITOR) 10 mg tablet 90 Tablet 3     Sig: Take 1 tablet by mouth once daily      PCP: Debi Toussaint NP  LOV: 11/30/2022 NOV DMA: Visit date not found  FUTURE APPT: No future appointments. Thank you.

## 2023-01-06 RX ORDER — ATORVASTATIN CALCIUM 10 MG/1
TABLET, FILM COATED ORAL
Qty: 90 TABLET | Refills: 3 | Status: SHIPPED | OUTPATIENT
Start: 2023-01-06

## 2023-02-01 ENCOUNTER — HOSPITAL ENCOUNTER (OUTPATIENT)
Dept: CT IMAGING | Age: 68
Discharge: HOME | End: 2023-02-01
Attending: NURSE PRACTITIONER
Payer: MEDICARE

## 2023-02-01 DIAGNOSIS — Z87.891 FORMER SMOKER: ICD-10-CM

## 2023-02-01 PROCEDURE — 71271 CT THORAX LUNG CANCER SCR C-: CPT

## 2023-03-23 RX ORDER — FUROSEMIDE 20 MG/1
20 TABLET ORAL DAILY
Qty: 90 TABLET | Refills: 1 | Status: SHIPPED | OUTPATIENT
Start: 2023-03-23

## 2023-06-19 RX ORDER — AMIODARONE HYDROCHLORIDE 200 MG/1
TABLET ORAL
Qty: 90 TABLET | Refills: 0 | Status: SHIPPED | OUTPATIENT
Start: 2023-06-19

## 2023-09-15 NOTE — PROGRESS NOTES
conducted a Follow up consultation and Spiritual Assessment for Franklyn Nissen., who is a 61 y.o.,male. The  provided the following Interventions: 
Continued the relationship of care and support. Listened empathically. Offered prayer and assurance of continued prayer on patients behalf. Chart reviewed. The following outcomes were achieved: 
Patient expressed gratitude for pastoral care visit. Assessment: 
There are no further spiritual or Hindu issues which require Spiritual Care Services interventions at this time. Plan: 
Chaplains will continue to follow and will provide pastoral care on an as needed/requested basis.  recommends bedside caregivers page  on duty if patient shows signs of acute spiritual or emotional distress. [facility] Staff  Spiritual Care  
[phone] Discharge criteria met per jose eduardo score. Will continue to monitor patient here in PACU for phase 2 and discharge patient later.

## 2023-10-01 RX ORDER — AMIODARONE HYDROCHLORIDE 200 MG/1
200 TABLET ORAL EVERY MORNING
Qty: 90 TABLET | Refills: 0 | Status: CANCELLED | OUTPATIENT
Start: 2023-10-01

## 2023-10-02 SDOH — ECONOMIC STABILITY: FOOD INSECURITY: WITHIN THE PAST 12 MONTHS, YOU WORRIED THAT YOUR FOOD WOULD RUN OUT BEFORE YOU GOT MONEY TO BUY MORE.: NEVER TRUE

## 2023-10-02 SDOH — ECONOMIC STABILITY: INCOME INSECURITY: HOW HARD IS IT FOR YOU TO PAY FOR THE VERY BASICS LIKE FOOD, HOUSING, MEDICAL CARE, AND HEATING?: NOT HARD AT ALL

## 2023-10-02 SDOH — ECONOMIC STABILITY: HOUSING INSECURITY
IN THE LAST 12 MONTHS, WAS THERE A TIME WHEN YOU DID NOT HAVE A STEADY PLACE TO SLEEP OR SLEPT IN A SHELTER (INCLUDING NOW)?: NO

## 2023-10-02 SDOH — ECONOMIC STABILITY: TRANSPORTATION INSECURITY
IN THE PAST 12 MONTHS, HAS LACK OF TRANSPORTATION KEPT YOU FROM MEETINGS, WORK, OR FROM GETTING THINGS NEEDED FOR DAILY LIVING?: NO

## 2023-10-02 SDOH — HEALTH STABILITY: PHYSICAL HEALTH: ON AVERAGE, HOW MANY MINUTES DO YOU ENGAGE IN EXERCISE AT THIS LEVEL?: 60 MIN

## 2023-10-02 SDOH — HEALTH STABILITY: PHYSICAL HEALTH: ON AVERAGE, HOW MANY DAYS PER WEEK DO YOU ENGAGE IN MODERATE TO STRENUOUS EXERCISE (LIKE A BRISK WALK)?: 7 DAYS

## 2023-10-02 SDOH — ECONOMIC STABILITY: FOOD INSECURITY: WITHIN THE PAST 12 MONTHS, THE FOOD YOU BOUGHT JUST DIDN'T LAST AND YOU DIDN'T HAVE MONEY TO GET MORE.: NEVER TRUE

## 2023-10-02 ASSESSMENT — LIFESTYLE VARIABLES
HOW OFTEN DURING THE LAST YEAR HAVE YOU BEEN UNABLE TO REMEMBER WHAT HAPPENED THE NIGHT BEFORE BECAUSE YOU HAD BEEN DRINKING: 0
HOW MANY STANDARD DRINKS CONTAINING ALCOHOL DO YOU HAVE ON A TYPICAL DAY: 3 OR 4
HOW OFTEN DO YOU HAVE A DRINK CONTAINING ALCOHOL: 5
HOW OFTEN DURING THE LAST YEAR HAVE YOU FAILED TO DO WHAT WAS NORMALLY EXPECTED FROM YOU BECAUSE OF DRINKING: NEVER
HOW OFTEN DURING THE LAST YEAR HAVE YOU NEEDED AN ALCOHOLIC DRINK FIRST THING IN THE MORNING TO GET YOURSELF GOING AFTER A NIGHT OF HEAVY DRINKING: 0
HOW OFTEN DURING THE LAST YEAR HAVE YOU FOUND THAT YOU WERE NOT ABLE TO STOP DRINKING ONCE YOU HAD STARTED: NEVER
HOW OFTEN DURING THE LAST YEAR HAVE YOU FOUND THAT YOU WERE NOT ABLE TO STOP DRINKING ONCE YOU HAD STARTED: 0
HOW OFTEN DURING THE LAST YEAR HAVE YOU NEEDED AN ALCOHOLIC DRINK FIRST THING IN THE MORNING TO GET YOURSELF GOING AFTER A NIGHT OF HEAVY DRINKING: NEVER
HAS A RELATIVE, FRIEND, DOCTOR, OR ANOTHER HEALTH PROFESSIONAL EXPRESSED CONCERN ABOUT YOUR DRINKING OR SUGGESTED YOU CUT DOWN: 2
HAS A RELATIVE, FRIEND, DOCTOR, OR ANOTHER HEALTH PROFESSIONAL EXPRESSED CONCERN ABOUT YOUR DRINKING OR SUGGESTED YOU CUT DOWN: YES, BUT NOT IN THE PAST YEAR
HOW OFTEN DURING THE LAST YEAR HAVE YOU BEEN UNABLE TO REMEMBER WHAT HAPPENED THE NIGHT BEFORE BECAUSE YOU HAD BEEN DRINKING: NEVER
HOW OFTEN DO YOU HAVE SIX OR MORE DRINKS ON ONE OCCASION: 2
HAVE YOU OR SOMEONE ELSE BEEN INJURED AS A RESULT OF YOUR DRINKING: NO
HOW OFTEN DURING THE LAST YEAR HAVE YOU FAILED TO DO WHAT WAS NORMALLY EXPECTED FROM YOU BECAUSE OF DRINKING: 0
HOW OFTEN DO YOU HAVE A DRINK CONTAINING ALCOHOL: 4 OR MORE TIMES A WEEK
HOW OFTEN DURING THE LAST YEAR HAVE YOU HAD A FEELING OF GUILT OR REMORSE AFTER DRINKING: 0
HOW OFTEN DURING THE LAST YEAR HAVE YOU HAD A FEELING OF GUILT OR REMORSE AFTER DRINKING: NEVER
HOW MANY STANDARD DRINKS CONTAINING ALCOHOL DO YOU HAVE ON A TYPICAL DAY: 2
HAVE YOU OR SOMEONE ELSE BEEN INJURED AS A RESULT OF YOUR DRINKING: 0

## 2023-10-02 ASSESSMENT — PATIENT HEALTH QUESTIONNAIRE - PHQ9
SUM OF ALL RESPONSES TO PHQ9 QUESTIONS 1 & 2: 0
1. LITTLE INTEREST OR PLEASURE IN DOING THINGS: 0
SUM OF ALL RESPONSES TO PHQ QUESTIONS 1-9: 0
SUM OF ALL RESPONSES TO PHQ QUESTIONS 1-9: 0
2. FEELING DOWN, DEPRESSED OR HOPELESS: 0
SUM OF ALL RESPONSES TO PHQ QUESTIONS 1-9: 0
SUM OF ALL RESPONSES TO PHQ QUESTIONS 1-9: 0

## 2023-10-03 RX ORDER — AMIODARONE HYDROCHLORIDE 200 MG/1
200 TABLET ORAL EVERY MORNING
Qty: 90 TABLET | Refills: 0 | Status: CANCELLED | OUTPATIENT
Start: 2023-10-03

## 2023-10-03 NOTE — TELEPHONE ENCOUNTER
Medication(s) requesting:   Requested Prescriptions     Pending Prescriptions Disp Refills    amiodarone (CORDARONE) 200 MG tablet 90 tablet 0     Sig: Take 1 tablet by mouth every morning       Last office visit:  last seen 11.30.23  Next office visit DMA: 10/4/2023  FUTURE APPT:   Future Appointments   Date Time Provider [facility] [address]   10/4/2023 10:20 AM ALIRIO Contreras - DIXON ELIZONDO BS AMB

## 2023-10-03 NOTE — TELEPHONE ENCOUNTER
Medication(s) requesting:   Requested Prescriptions     Pending Prescriptions Disp Refills    amiodarone (CORDARONE) 200 MG tablet 90 tablet 0     Sig: Take 1 tablet by mouth every morning       Last office visit:  11.30.2022  Next office visit DMA: 10/3/2023  FUTURE APPT:   Future Appointments   Date Time Provider [address] [place] [address]   10/4/2023 10:20 AM ALIRIO [place] - DIXON CONNOR AMB

## 2023-10-04 ENCOUNTER — HOSPITAL ENCOUNTER (OUTPATIENT)
Facility: HOSPITAL | Age: 68
Setting detail: SPECIMEN
Discharge: HOME | End: 2023-10-07
Payer: MEDICARE

## 2023-10-04 ENCOUNTER — OFFICE VISIT (OUTPATIENT)
Facility: CLINIC | Age: 68
End: 2023-10-04
Payer: MEDICARE

## 2023-10-04 VITALS
WEIGHT: 162 LBS | HEART RATE: 69 BPM | OXYGEN SATURATION: 95 % | BODY MASS INDEX: 23.19 KG/M2 | HEIGHT: 70 IN | SYSTOLIC BLOOD PRESSURE: 147 MMHG | TEMPERATURE: 98.7 F | RESPIRATION RATE: 17 BRPM | DIASTOLIC BLOOD PRESSURE: 69 MMHG

## 2023-10-04 DIAGNOSIS — Z78.9 FULL CODE STATUS: ICD-10-CM

## 2023-10-04 DIAGNOSIS — I48.0 PAROXYSMAL ATRIAL FIBRILLATION (HCC): ICD-10-CM

## 2023-10-04 DIAGNOSIS — E78.00 ELEVATED CHOLESTEROL: ICD-10-CM

## 2023-10-04 DIAGNOSIS — Z00.00 PREVENTATIVE HEALTH CARE: ICD-10-CM

## 2023-10-04 DIAGNOSIS — Z13.29 SCREENING FOR THYROID DISORDER: ICD-10-CM

## 2023-10-04 DIAGNOSIS — G31.2 CEREBELLAR DEGENERATION DUE TO CHRONIC ALCOHOLISM (HCC): ICD-10-CM

## 2023-10-04 DIAGNOSIS — Z71.89 ACP (ADVANCE CARE PLANNING): ICD-10-CM

## 2023-10-04 DIAGNOSIS — Z87.891 HISTORY OF SMOKING: ICD-10-CM

## 2023-10-04 DIAGNOSIS — Z13.6 ENCOUNTER FOR ABDOMINAL AORTIC ANEURYSM (AAA) SCREENING: ICD-10-CM

## 2023-10-04 DIAGNOSIS — F10.20 ALCOHOLISM (HCC): ICD-10-CM

## 2023-10-04 DIAGNOSIS — Z13.220 SCREENING FOR CHOLESTEROL LEVEL: ICD-10-CM

## 2023-10-04 DIAGNOSIS — Z12.5 SCREENING PSA (PROSTATE SPECIFIC ANTIGEN): ICD-10-CM

## 2023-10-04 DIAGNOSIS — I10 PRIMARY HYPERTENSION: ICD-10-CM

## 2023-10-04 DIAGNOSIS — I50.22 CHRONIC SYSTOLIC CONGESTIVE HEART FAILURE (HCC): ICD-10-CM

## 2023-10-04 DIAGNOSIS — Z00.00 MEDICARE ANNUAL WELLNESS VISIT, SUBSEQUENT: Primary | ICD-10-CM

## 2023-10-04 DIAGNOSIS — I42.6 ALCOHOLIC CARDIOMYOPATHY (HCC): ICD-10-CM

## 2023-10-04 DIAGNOSIS — F10.20 CEREBELLAR DEGENERATION DUE TO CHRONIC ALCOHOLISM (HCC): ICD-10-CM

## 2023-10-04 LAB
ALBUMIN SERPL-MCNC: 4.4 G/DL (ref 3.4–5)
ALBUMIN/GLOB SERPL: 1.5 (ref 0.8–1.7)
ALP SERPL-CCNC: 123 U/L (ref 45–117)
ALT SERPL-CCNC: 49 U/L (ref 16–61)
ANION GAP SERPL CALC-SCNC: 4 MMOL/L (ref 3–18)
APPEARANCE UR: CLEAR
AST SERPL-CCNC: 40 U/L (ref 10–38)
BACTERIA URNS QL MICRO: NEGATIVE /HPF
BASOPHILS # BLD: 0.1 K/UL (ref 0–0.1)
BASOPHILS NFR BLD: 1 % (ref 0–2)
BILIRUB SERPL-MCNC: 0.6 MG/DL (ref 0.2–1)
BILIRUB UR QL: NEGATIVE
BUN SERPL-MCNC: 11 MG/DL (ref 7–18)
BUN/CREAT SERPL: 10 (ref 12–20)
CALCIUM SERPL-MCNC: 9.5 MG/DL (ref 8.5–10.1)
CHLORIDE SERPL-SCNC: 106 MMOL/L (ref 100–111)
CHOLEST SERPL-MCNC: 232 MG/DL
CO2 SERPL-SCNC: 28 MMOL/L (ref 21–32)
COLOR UR: ABNORMAL
CREAT SERPL-MCNC: 1.12 MG/DL (ref 0.6–1.3)
DIFFERENTIAL METHOD BLD: ABNORMAL
EOSINOPHIL # BLD: 0.1 K/UL (ref 0–0.4)
EOSINOPHIL NFR BLD: 2 % (ref 0–5)
EPITH CASTS URNS QL MICRO: ABNORMAL /LPF (ref 0–5)
ERYTHROCYTE [DISTWIDTH] IN BLOOD BY AUTOMATED COUNT: 13.8 % (ref 11.6–14.5)
GLOBULIN SER CALC-MCNC: 2.9 G/DL (ref 2–4)
GLUCOSE SERPL-MCNC: 96 MG/DL (ref 74–99)
GLUCOSE UR STRIP.AUTO-MCNC: NEGATIVE MG/DL
HCT VFR BLD AUTO: 47.2 % (ref 36–48)
HDLC SERPL-MCNC: 70 MG/DL (ref 40–60)
HDLC SERPL: 3.3 (ref 0–5)
HGB BLD-MCNC: 14.9 G/DL (ref 13–16)
HGB UR QL STRIP: NEGATIVE
IMM GRANULOCYTES # BLD AUTO: 0 K/UL (ref 0–0.04)
IMM GRANULOCYTES NFR BLD AUTO: 0 % (ref 0–0.5)
KETONES UR QL STRIP.AUTO: NEGATIVE MG/DL
LDLC SERPL CALC-MCNC: 87 MG/DL (ref 0–100)
LEUKOCYTE ESTERASE UR QL STRIP.AUTO: ABNORMAL
LIPID PANEL: ABNORMAL
LYMPHOCYTES # BLD: 1.4 K/UL (ref 0.9–3.6)
LYMPHOCYTES NFR BLD: 23 % (ref 21–52)
MCH RBC QN AUTO: 29.5 PG (ref 24–34)
MCHC RBC AUTO-ENTMCNC: 31.6 G/DL (ref 31–37)
MCV RBC AUTO: 93.5 FL (ref 78–100)
MONOCYTES # BLD: 0.8 K/UL (ref 0.05–1.2)
MONOCYTES NFR BLD: 13 % (ref 3–10)
NEUTS SEG # BLD: 3.7 K/UL (ref 1.8–8)
NEUTS SEG NFR BLD: 61 % (ref 40–73)
NITRITE UR QL STRIP.AUTO: NEGATIVE
NRBC # BLD: 0 K/UL (ref 0–0.01)
NRBC BLD-RTO: 0 PER 100 WBC
PH UR STRIP: 6 (ref 5–8)
PLATELET # BLD AUTO: 285 K/UL (ref 135–420)
PMV BLD AUTO: 10.4 FL (ref 9.2–11.8)
POTASSIUM SERPL-SCNC: 4.1 MMOL/L (ref 3.5–5.5)
PROT SERPL-MCNC: 7.3 G/DL (ref 6.4–8.2)
PROT UR STRIP-MCNC: NEGATIVE MG/DL
PSA SERPL-MCNC: 1.5 NG/ML (ref 0–4)
RBC # BLD AUTO: 5.05 M/UL (ref 4.35–5.65)
RBC #/AREA URNS HPF: NEGATIVE /HPF (ref 0–5)
SODIUM SERPL-SCNC: 138 MMOL/L (ref 136–145)
SP GR UR REFRACTOMETRY: 1.02 (ref 1–1.03)
T4 FREE SERPL-MCNC: 1.2 NG/DL (ref 0.7–1.5)
TRIGL SERPL-MCNC: 375 MG/DL
TSH SERPL DL<=0.05 MIU/L-ACNC: 0.15 UIU/ML (ref 0.36–3.74)
UROBILINOGEN UR QL STRIP.AUTO: 1 EU/DL (ref 0.2–1)
VLDLC SERPL CALC-MCNC: 75 MG/DL
WBC # BLD AUTO: 6.2 K/UL (ref 4.6–13.2)
WBC URNS QL MICRO: ABNORMAL /HPF (ref 0–4)

## 2023-10-04 PROCEDURE — G0103 PSA SCREENING: HCPCS

## 2023-10-04 PROCEDURE — 99214 OFFICE O/P EST MOD 30 MIN: CPT | Performed by: NURSE PRACTITIONER

## 2023-10-04 PROCEDURE — 3078F DIAST BP <80 MM HG: CPT | Performed by: NURSE PRACTITIONER

## 2023-10-04 PROCEDURE — 84439 ASSAY OF FREE THYROXINE: CPT

## 2023-10-04 PROCEDURE — 1123F ACP DISCUSS/DSCN MKR DOCD: CPT | Performed by: NURSE PRACTITIONER

## 2023-10-04 PROCEDURE — 80053 COMPREHEN METABOLIC PANEL: CPT

## 2023-10-04 PROCEDURE — 84443 ASSAY THYROID STIM HORMONE: CPT

## 2023-10-04 PROCEDURE — G0439 PPPS, SUBSEQ VISIT: HCPCS | Performed by: NURSE PRACTITIONER

## 2023-10-04 PROCEDURE — 80061 LIPID PANEL: CPT

## 2023-10-04 PROCEDURE — 36415 COLL VENOUS BLD VENIPUNCTURE: CPT

## 2023-10-04 PROCEDURE — 81001 URINALYSIS AUTO W/SCOPE: CPT

## 2023-10-04 PROCEDURE — 85025 COMPLETE CBC W/AUTO DIFF WBC: CPT

## 2023-10-04 PROCEDURE — 3077F SYST BP >= 140 MM HG: CPT | Performed by: NURSE PRACTITIONER

## 2023-10-04 RX ORDER — ASPIRIN 81 MG/1
81 TABLET, CHEWABLE ORAL DAILY
Qty: 90 TABLET | Refills: 3 | Status: SHIPPED | OUTPATIENT
Start: 2023-10-04

## 2023-10-04 RX ORDER — ATORVASTATIN CALCIUM 10 MG/1
10 TABLET, FILM COATED ORAL DAILY
Qty: 90 TABLET | Refills: 3 | Status: SHIPPED | OUTPATIENT
Start: 2023-10-04

## 2023-10-04 RX ORDER — AMIODARONE HYDROCHLORIDE 200 MG/1
200 TABLET ORAL EVERY MORNING
Qty: 90 TABLET | Refills: 0 | Status: SHIPPED | OUTPATIENT
Start: 2023-10-04

## 2023-10-04 RX ORDER — METOPROLOL TARTRATE 50 MG/1
75 TABLET, FILM COATED ORAL 2 TIMES DAILY
Qty: 180 TABLET | Refills: 3 | Status: SHIPPED | OUTPATIENT
Start: 2023-10-04

## 2023-10-04 RX ORDER — FUROSEMIDE 20 MG/1
20 TABLET ORAL DAILY
Qty: 90 TABLET | Refills: 1 | Status: SHIPPED | OUTPATIENT
Start: 2023-10-04

## 2023-10-04 ASSESSMENT — VISUAL ACUITY
OS_CC: 20/25
OD_CC: 20/25

## 2023-10-04 NOTE — ACP (ADVANCE CARE PLANNING)
Advance Care Planning     General Advance Care Planning (ACP) Conversation    Date of Conversation: 10/4/2023  Conducted with: Patient with Decision Making Capacity    Healthcare Decision Maker:    Primary Decision Maker: Lujan,Air[employer] - Rosalee - [phone]  Click here to complete Healthcare Decision Makers including selection of the Healthcare Decision Maker Relationship (ie \"Primary\"). Today we documented Decision Maker(s) consistent with Legal Next of Kin hierarchy.     Content/Action Overview:  Has NO ACP documents/care preferences - requested patient complete ACP documents  Reviewed DNR/DNI and patient elects Full Code (Attempt Resuscitation)  treatment goals, artificial nutrition, ventilation preferences, and resuscitation preferences  Yes to resuscitation and his step sister will make decisions about life support     Length of Voluntary ACP Conversation in minutes:  <16 minutes (Non-Billable)    Camille Clark, ALIRIO - CNP

## 2023-10-04 NOTE — PROGRESS NOTES
Maci Mckeon. is a 76 y.o. presents today for   Chief Complaint   Patient presents with    Medicare AWV     Is someone accompanying this pt? no    Is the patient using any DME equipment during OV? no  Vitals:    10/04/23 1027   BP: (!) 153/82   Pulse:    Resp:    Temp:    SpO2:        Depression Screening:       10/2/2023    10:19 AM 11/30/2022     1:04 PM 3/8/2021     8:20 AM   PHQ-9 Questionaire   Little interest or pleasure in doing things 0 0 0   Feeling down, depressed, or hopeless 0 0 0   PHQ-9 Total Score 0 0 0        Abuse Screening:       No data to display                 Learning Assessment Screening:   No question data found. Fall Risk Screening:       10/2/2023    10:19 AM   Fall Risk   2 or more falls in past year? no   Fall with injury in past year? no           Health Maintenance: reviewed and discussed and ordered per Provider. Health Maintenance Due   Topic Date Due    COVID-19 Vaccine (1) Never done    Pneumococcal 65+ years Vaccine (1 - PCV) 02/22/1961    AAA screen  Never done    Annual Wellness Visit (AWV)  05/23/2022    Flu vaccine (1) 08/01/2023         Coordination of Care:   1. \"Have you been to the ER, urgent care clinic since your last visit? Hospitalized since your last visit? \" no    2. \"Have you seen or consulted any other health care providers outside of the [address] [place] [address] since your last visit? \" no    3. For patients aged 43-73: Has the patient had a colonoscopy / FIT/ Cologuard? Yes - no Care Gap present  If the patient is female:    4. For patients aged 43-66: Has the patient had a mammogram within the past 2 years? NA - based on age or sex    11. For patients aged 21-65: Has the patient had a pap smear? NA - based on age or sex    Advanced Directive:  1. Do you have an Advanced Directive? No     2. Would you like information on Advanced Directives?  No    Milton Jung CMA
furosemide (LASIX) 20 MG tablet Take 1 tablet by mouth daily Yes ALIRIO Hoover CNP   vitamin D (CHOLECALCIFEROL) 25 MCG (1000 UT) TABS tablet Take by mouth daily Yes Automatic Reconciliation, Ar   nitroGLYCERIN (NITROSTAT) 0.4 MG SL tablet Place 1 tablet under the tongue as needed Yes Automatic Reconciliation, Ar   acetaminophen (TYLENOL) 325 MG tablet Take by mouth every 4 hours as needed  Patient not taking: Reported on 10/4/2023  Automatic Reconciliation, Ar       CareTeam (Including outside providers/suppliers regularly involved in providing care):   Patient Care Team:  ALIRIO Hoover CNP as PCP - General  [facility]  Dionisio Pineda MD as Surgeon     Reviewed and updated this visit:  Allergies  Meds  Med Hx  Surg Hx  Soc Hx  Fam Hx

## 2023-10-04 NOTE — TELEPHONE ENCOUNTER
From: Thuy Pinon.   To: Office of [facility]: 10/1/2023 8:09 PM EDT  Subject: Medication Renewal Request    Refills have been requested for the following medications:     furosemide (LASIX) 20 MG tablet ALIRIO Alfonso - CNP]    Preferred pharmacy: [address] [place] [address] [address] [phone] - F [phone]      Medication renewals requested in this message routed separately:     amiodarone (CORDARONE) 200 MG tablet [Dr. Areli Reyes MD]

## 2023-10-04 NOTE — PATIENT INSTRUCTIONS
degeneration, and other eye disorders. A preventive dental visit is recommended every 6 months. Try to get at least 150 minutes of exercise per week or 10,000 steps per day on a pedometer . Order or download the FREE \"Exercise & Physical Activity: Your Everyday Guide\" from [employer]. Call 2-258.532.1599 or search [employer] online. You need 1883-2349 mg of calcium and 0578-1297 IU of vitamin D per day. It is possible to meet your calcium requirement with diet alone, but a vitamin D supplement is usually necessary to meet this goal.  When exposed to the sun, use a sunscreen that protects against both UVA and UVB radiation with an SPF of 30 or greater. Reapply every 2 to 3 hours or after sweating, drying off with a towel, or swimming. Always wear a seat belt when traveling in a car. Always wear a helmet when riding a bicycle or motorcycle.

## 2023-10-09 ENCOUNTER — CLINICAL DOCUMENTATION (OUTPATIENT)
Facility: CLINIC | Age: 68
End: 2023-10-09

## 2023-11-01 ENCOUNTER — OFFICE VISIT (OUTPATIENT)
Age: 68
End: 2023-11-01

## 2023-11-01 VITALS — HEIGHT: 70 IN | BODY MASS INDEX: 23.48 KG/M2 | WEIGHT: 164 LBS | RESPIRATION RATE: 20 BRPM

## 2023-11-01 DIAGNOSIS — M75.101 ROTATOR CUFF SYNDROME, RIGHT: Primary | ICD-10-CM

## 2023-11-01 DIAGNOSIS — M25.511 ACUTE PAIN OF RIGHT SHOULDER: ICD-10-CM

## 2023-11-01 SDOH — HEALTH STABILITY: PHYSICAL HEALTH: ON AVERAGE, HOW MANY DAYS PER WEEK DO YOU ENGAGE IN MODERATE TO STRENUOUS EXERCISE (LIKE A BRISK WALK)?: 5 DAYS

## 2023-11-01 SDOH — HEALTH STABILITY: PHYSICAL HEALTH: ON AVERAGE, HOW MANY MINUTES DO YOU ENGAGE IN EXERCISE AT THIS LEVEL?: 150+ MIN

## 2023-11-01 ASSESSMENT — SOCIAL DETERMINANTS OF HEALTH (SDOH)
WITHIN THE LAST YEAR, HAVE YOU BEEN KICKED, HIT, SLAPPED, OR OTHERWISE PHYSICALLY HURT BY YOUR PARTNER OR EX-PARTNER?: NO
WITHIN THE LAST YEAR, HAVE YOU BEEN AFRAID OF YOUR PARTNER OR EX-PARTNER?: NO
WITHIN THE LAST YEAR, HAVE YOU BEEN HUMILIATED OR EMOTIONALLY ABUSED IN OTHER WAYS BY YOUR PARTNER OR EX-PARTNER?: NO
WITHIN THE LAST YEAR, HAVE TO BEEN RAPED OR FORCED TO HAVE ANY KIND OF SEXUAL ACTIVITY BY YOUR PARTNER OR EX-PARTNER?: NO

## 2023-11-01 NOTE — PROGRESS NOTES
Estefani Holloway is a 61 y.o.  male and presents with    Chief Complaint   Patient presents with    Irregular Heart Beat    Hypertension    Cholesterol Problem    Thyroid Problem           Subjective:  Pt admitted to hosp 11/3 with atrial fib and RVR  Received IV cardioazem with good resolution  D/c  Essentially on same meds he was admitted with minus vit K   Other problem preceeding hosp was bleeding hemorrhoids and episodes of hot flash and diarrhea. Cardiovascular Review:  The patient has hypertension, hyperlipidemia and Atrial Fibrillation. Diet and Lifestyle: not attempting to follow a low fat, low cholesterol diet, not attempting to follow a low sodium diet  Home BP Monitoring: is not measured at home. Pertinent ROS: taking medications as instructed, no medication side effects noted, no TIA's, no chest pain on exertion, no dyspnea on exertion, no swelling of ankles. Anticoagulation  Patient here for followup of chronic anticoagulation. Indication: Atrial Fibrillation. Bleeding Signs/Symptoms:  None. Thromboembolic Signs/Symptoms:  None. INR's are drawn regularly and have been therapeutic. Lab Results   Component Value Date/Time    INR 1.4 (H) 11/05/2018 04:40 AM    INR POC 1.3 11/08/2018 07:33 AM     Thyroid Review:  Patient is seen for followup of goiter. Thyroid ROS: denies fatigue, weight changes, heat/cold intolerance, bowel/skin changes or CVS symptoms. Additional Concerns: still having loose stools and bleeding hemorrhoids         Patient Active Problem List    Diagnosis Date Noted    HTN (hypertension) 11/04/2018    Alcoholism ([address] 11/04/2018    Nontoxic multinodular goiter 02/07/2018    Atrial fibrillation ([address] 11/28/2017    Elevated cholesterol 04/12/2016    Arthritis 11/13/2014     Current Outpatient Medications   Medication Sig Dispense Refill    vitamin k 100 mcg tablet Take 1 Tab by mouth daily.  100 Tab 12    warfarin (COUMADIN) 4 mg tablet Take 1 Tab by mouth daily. 90 Tab 12    digoxin (LANOXIN) 0.125 mg tablet Take 1 Tab by mouth every fourty-eight (48) hours. 45 Tab 4    dilTIAZem CD (CARDIZEM CD) 180 mg ER capsule Take 1 Cap by mouth nightly. 90 Cap 4    dilTIAZem CD (CARDIZEM CD) 240 mg ER capsule Take 1 Cap by mouth daily. 90 Cap 4    magnesium oxide 200 mg magnesium tab Take 200 mg by mouth daily. 100 Tab 12    nitroglycerin (NITROSTAT) 0.4 mg SL tablet 1 Tab by SubLINGual route as needed for Chest Pain. 1 Bottle 0    multivit-min-FA-lycopen-lutein (CENTRUM SILVER MEN) 300-600-300 mcg tab Take 1 Tab by mouth daily. 100 Caplet 12     No Known Allergies  Past Medical History:   Diagnosis Date    Arthritis     Atrial fibrillation ([address] 11/28/2017    Hearing loss     HLD (hyperlipidemia)     HTN (hypertension)     Nontoxic multinodular goiter 2/7/2018     Past Surgical History:   Procedure Laterality Date    HX HERNIA REPAIR       Family History   Problem Relation Age of Onset    Diabetes Father      Social History     Tobacco Use    Smoking status: Former Smoker    Smokeless tobacco: Never Used   Substance Use Topics    Alcohol use: Yes     Alcohol/week: 12.5 oz     Types: 25 Shots of liquor per week       ROS       All other systems reviewed and are negative.       Objective:  Vitals:    11/08/18 0728   BP: 119/90   Pulse: 68   Resp: 19   Temp: 98.2 °F (36.8 °C)   TempSrc: Oral   SpO2: 99%   Weight: 159 lb 9.6 oz (72.4 kg)   Height: 5' 10\" (1.778 m)   PainSc:   0 - No pain                 alert, well appearing, and in no distress, oriented to person, place, and time and normal appearing weight  Chest - clear to auscultation, no wheezes, rales or rhonchi, symmetric air entry  Heart - irregularly irregular rhythm with rate 90        LABS   Component      Latest Ref Rng & Units 11/8/2018 11/5/2018 11/5/2018 11/4/2018           7:33 AM  4:40 AM  4:40 AM  1:05 PM   WBC      4.6 - 13.2 K/uL       RBC      4.70 - 5.50 M/uL       HGB      13.0 - 16.0 g/dL       HCT      36.0 - 48.0 %       MCV      74.0 - 97.0 FL       MCH      24.0 - 34.0 PG       MCHC      31.0 - 37.0 g/dL       RDW      11.6 - 14.5 %       PLATELET      020 - 437 K/uL       MPV      9.2 - 11.8 FL       NEUTROPHILS      40 - 73 %       LYMPHOCYTES      21 - 52 %       MONOCYTES      3 - 10 %       EOSINOPHILS      0 - 5 %       BASOPHILS      0 - 2 %       ABS. NEUTROPHILS      1.8 - 8.0 K/UL       ABS. LYMPHOCYTES      0.9 - 3.6 K/UL       ABS. MONOCYTES      0.05 - 1.2 K/UL       ABS. EOSINOPHILS      0.0 - 0.4 K/UL       ABS. BASOPHILS      0.0 - 0.1 K/UL       DF             Sodium      136 - 145 mmol/L  139     Potassium      3.5 - 5.5 mmol/L  3.6     Chloride      100 - 108 mmol/L  105     CO2      21 - 32 mmol/L  26     Anion gap      3.0 - 18 mmol/L  8     Glucose      74 - 99 mg/dL  92     BUN      7.0 - 18 MG/DL  10     Creatinine      0.6 - 1.3 MG/DL  0.90     BUN/Creatinine ratio      12 - 20    11 (L)     GFR est AA      >60 ml/min/1.73m2  >60     GFR est non-AA      >60 ml/min/1.73m2  >60     Calcium      8.5 - 10.1 MG/DL  8.4 (L)     Bilirubin, total      0.2 - 1.0 MG/DL  1.5 (H)     ALT (SGPT)      16 - 61 U/L  40     AST      15 - 37 U/L  23     Alk.  phosphatase      45 - 117 U/L  113     Protein, total      6.4 - 8.2 g/dL  6.1 (L)     Albumin      3.4 - 5.0 g/dL  3.7     Globulin      2.0 - 4.0 g/dL  2.4     A-G Ratio      0.8 - 1.7    1.5     Lymphocytes      20 - 45 %       ABSOLUTE LYMPHOCYTE COUNT      1.0 - 4.8 K/uL       GFRAA      >60.0       GFRNA      >60.0       Specific Gravity      1.005 - 1.03       pH (UA)      5.0 - 8.0 pH       Protein      Negative, mg/dL       Glucose      Negative mg/dL       Ketone      Negative mg/dL       Bilirubin      Negative       Blood      Negative       Nitrites      Negative       Leukocyte Esterase      Negative       Urobilinogen      <2.0 mg/dL       WBC      0 - 2 /hpf       RBC      Negative, /hpf       Triglyceride 40 - 149 mg/dL       HDL Cholesterol      40 - 59 mg/dL       Cholesterol, total      110 - 200 mg/dL       CHOLESTEROL/HDL      0.0 - 5.0       LDL, calculated      50 - 99 mg/dL       VLDL, calculated      8 - 30 mg/dL       CK      39 - 308 U/L    105   CK - MB      <3.6 ng/ml    1.9   CK-MB Index      0.0 - 4.0 %    1.8   Troponin-I, Qt.      0.0 - 0.045 NG/ML    0.04   VALID INTERNAL CONTROL POC       Yes      Prothrombin time (POC)      seconds 15.0      INR       1.3  1.4 (H)    Prothrombin time      11.5 - 15.2 sec   17.2 (H)    T4, Free      0.9 - 1.8 ng/dL       Uric acid      3.9 - 9.0 mg/dL       Magnesium      1.6 - 2.5 mg/dL       TSH      0.27 - 4.20 mcU/mL       Prostate Specific Ag      <=4.100 ng/mL       Digoxin level      0.9 - 2.0 NG/ML         Component      Latest Ref Rng & Units 11/4/2018 11/4/2018 11/4/2018           6:00 AM  6:00 AM  6:00 AM   WBC      4.6 - 13.2 K/uL   8.1   RBC      4.70 - 5.50 M/uL   4.94   HGB      13.0 - 16.0 g/dL   16.1 (H)   HCT      36.0 - 48.0 %   46.1   MCV      74.0 - 97.0 FL   93.3   MCH      24.0 - 34.0 PG   32.6   MCHC      31.0 - 37.0 g/dL   34.9   RDW      11.6 - 14.5 %   13.0   PLATELET      032 - 788 K/uL   202   MPV      9.2 - 11.8 FL   10.5   NEUTROPHILS      40 - 73 %   71   LYMPHOCYTES      21 - 52 %   15 (L)   MONOCYTES      3 - 10 %   12 (H)   EOSINOPHILS      0 - 5 %   1   BASOPHILS      0 - 2 %   1   ABS. NEUTROPHILS      1.8 - 8.0 K/UL   5.7   ABS. LYMPHOCYTES      0.9 - 3.6 K/UL   1.2   ABS. MONOCYTES      0.05 - 1.2 K/UL   1.0   ABS. EOSINOPHILS      0.0 - 0.4 K/UL   0.1   ABS.  BASOPHILS      0.0 - 0.1 K/UL   0.0   DF         AUTOMATED   Sodium      136 - 145 mmol/L  137    Potassium      3.5 - 5.5 mmol/L  3.6    Chloride      100 - 108 mmol/L  103    CO2      21 - 32 mmol/L  24    Anion gap      3.0 - 18 mmol/L  10    Glucose      74 - 99 mg/dL  103 (H)    BUN      7.0 - 18 MG/DL  9    Creatinine      0.6 - 1.3 MG/DL  0.87    BUN/Creatinine ratio      12 - 20    10 (L)    GFR est AA      >60 ml/min/1.73m2  >60    GFR est non-AA      >60 ml/min/1.73m2  >60    Calcium      8.5 - 10.1 MG/DL  8.6    Bilirubin, total      0.2 - 1.0 MG/DL      ALT (SGPT)      16 - 61 U/L      AST      15 - 37 U/L      Alk.  phosphatase      45 - 117 U/L      Protein, total      6.4 - 8.2 g/dL      Albumin      3.4 - 5.0 g/dL      Globulin      2.0 - 4.0 g/dL      A-G Ratio      0.8 - 1.7        Lymphocytes      20 - 45 %      ABSOLUTE LYMPHOCYTE COUNT      1.0 - 4.8 K/uL      GFRAA      >60.0      GFRNA      >60.0      Specific Gravity      1.005 - 1.03      pH (UA)      5.0 - 8.0 pH      Protein      Negative, mg/dL      Glucose      Negative mg/dL      Ketone      Negative mg/dL      Bilirubin      Negative      Blood      Negative      Nitrites      Negative      Leukocyte Esterase      Negative      Urobilinogen      <2.0 mg/dL      WBC      0 - 2 /hpf      RBC      Negative, /hpf      Triglyceride      40 - 149 mg/dL      HDL Cholesterol      40 - 59 mg/dL      Cholesterol, total      110 - 200 mg/dL      CHOLESTEROL/HDL      0.0 - 5.0      LDL, calculated      50 - 99 mg/dL      VLDL, calculated      8 - 30 mg/dL      CK      39 - 308 U/L 96     CK - MB      <3.6 ng/ml 2.1     CK-MB Index      0.0 - 4.0 % 2.2     Troponin-I, Qt.      0.0 - 0.045 NG/ML 0.05 (H)     VALID INTERNAL CONTROL POC            Prothrombin time (POC)      seconds      INR            Prothrombin time      11.5 - 15.2 sec      T4, Free      0.9 - 1.8 ng/dL      Uric acid      3.9 - 9.0 mg/dL      Magnesium      1.6 - 2.5 mg/dL      TSH      0.27 - 4.20 mcU/mL      Prostate Specific Ag      <=4.100 ng/mL      Digoxin level      0.9 - 2.0 NG/ML        Component      Latest Ref Rng & Units 11/4/2018 11/3/2018 11/3/2018 11/3/2018           1:00 AM  8:07 PM  8:07 PM  8:07 PM   WBC      4.6 - 13.2 K/uL       RBC      4.70 - 5.50 M/uL       HGB      13.0 - 16.0 g/dL       HCT      36.0 - 48.0 % MCV      74.0 - 97.0 FL       MCH      24.0 - 34.0 PG       MCHC      31.0 - 37.0 g/dL       RDW      11.6 - 14.5 %       PLATELET      641 - 312 K/uL       MPV      9.2 - 11.8 FL       NEUTROPHILS      40 - 73 %       LYMPHOCYTES      21 - 52 %       MONOCYTES      3 - 10 %       EOSINOPHILS      0 - 5 %       BASOPHILS      0 - 2 %       ABS. NEUTROPHILS      1.8 - 8.0 K/UL       ABS. LYMPHOCYTES      0.9 - 3.6 K/UL       ABS. MONOCYTES      0.05 - 1.2 K/UL       ABS. EOSINOPHILS      0.0 - 0.4 K/UL       ABS. BASOPHILS      0.0 - 0.1 K/UL       DF             Sodium      136 - 145 mmol/L    137   Potassium      3.5 - 5.5 mmol/L    3.9   Chloride      100 - 108 mmol/L    100   CO2      21 - 32 mmol/L    24   Anion gap      3.0 - 18 mmol/L    13   Glucose      74 - 99 mg/dL    115 (H)   BUN      7.0 - 18 MG/DL    10   Creatinine      0.6 - 1.3 MG/DL    1.13   BUN/Creatinine ratio      12 - 20      9 (L)   GFR est AA      >60 ml/min/1.73m2    >60   GFR est non-AA      >60 ml/min/1.73m2    >60   Calcium      8.5 - 10.1 MG/DL    9.5   Bilirubin, total      0.2 - 1.0 MG/DL    1.8 (H)   ALT (SGPT)      16 - 61 U/L    68 (H)   AST      15 - 37 U/L    51 (H)   Alk.  phosphatase      45 - 117 U/L    166 (H)   Protein, total      6.4 - 8.2 g/dL    8.9 (H)   Albumin      3.4 - 5.0 g/dL    5.1 (H)   Globulin      2.0 - 4.0 g/dL    3.8   A-G Ratio      0.8 - 1.7      1.3   Lymphocytes      20 - 45 %       ABSOLUTE LYMPHOCYTE COUNT      1.0 - 4.8 K/uL       GFRAA      >60.0       GFRNA      >60.0       Specific Gravity      1.005 - 1.03       pH (UA)      5.0 - 8.0 pH       Protein      Negative, mg/dL       Glucose      Negative mg/dL       Ketone      Negative mg/dL       Bilirubin      Negative       Blood      Negative       Nitrites      Negative       Leukocyte Esterase      Negative       Urobilinogen      <2.0 mg/dL       WBC      0 - 2 /hpf       RBC      Negative, /hpf       Triglyceride      40 - 149 mg/dL       HDL Cholesterol      40 - 59 mg/dL       Cholesterol, total      110 - 200 mg/dL       CHOLESTEROL/HDL      0.0 - 5.0       LDL, calculated      50 - 99 mg/dL       VLDL, calculated      8 - 30 mg/dL       CK      39 - 308 U/L 121      CK - MB      <3.6 ng/ml 2.5      CK-MB Index      0.0 - 4.0 % 2.1      Troponin-I, Qt.      0.0 - 0.045 NG/ML 0.06 (H)  0.05 (H)    VALID INTERNAL CONTROL POC             Prothrombin time (POC)      seconds       INR             Prothrombin time      11.5 - 15.2 sec       T4, Free      0.9 - 1.8 ng/dL       Uric acid      3.9 - 9.0 mg/dL       Magnesium      1.6 - 2.5 mg/dL       TSH      0.27 - 4.20 mcU/mL       Prostate Specific Ag      <=4.100 ng/mL       Digoxin level      0.9 - 2.0 NG/ML  0.3 (L)       Component      Latest Ref Rng & Units 11/3/2018 10/25/2018 10/25/2018           8:07 PM  8:38 AM  8:38 AM   WBC      4.6 - 13.2 K/uL 12.1  5.2   RBC      4.70 - 5.50 M/uL 5.88 (H)  5.08   HGB      13.0 - 16.0 g/dL 19.4 (H)  16.6   HCT      36.0 - 48.0 % 55.0 (H)  49.7   MCV      74.0 - 97.0 FL 93.9  98 (H)   MCH      24.0 - 34.0 PG 33.0  33   MCHC      31.0 - 37.0 g/dL 35.1  33   RDW      11.6 - 14.5 % 13.0  14.1   PLATELET      509 - 103 K/uL 239  234   MPV      9.2 - 11.8 FL 10.7  11.6   NEUTROPHILS      40 - 73 % 69  55   LYMPHOCYTES      21 - 52 % 20 (L)     MONOCYTES      3 - 10 % 9  12   EOSINOPHILS      0 - 5 % 1  2   BASOPHILS      0 - 2 % 1  1   ABS. NEUTROPHILS      1.8 - 8.0 K/UL 8.5 (H)  2.9   ABS. LYMPHOCYTES      0.9 - 3.6 K/UL 2.4     ABS. MONOCYTES      0.05 - 1.2 K/UL 1.1  0.6   ABS. EOSINOPHILS      0.0 - 0.4 K/UL 0.1  0.1   ABS.  BASOPHILS      0.0 - 0.1 K/UL 0.1  0.1   DF       AUTOMATED     Sodium      136 - 145 mmol/L      Potassium      3.5 - 5.5 mmol/L      Chloride      100 - 108 mmol/L      CO2      21 - 32 mmol/L      Anion gap      3.0 - 18 mmol/L      Glucose      74 - 99 mg/dL      BUN      7.0 - 18 MG/DL      Creatinine      0.6 - 1.3 MG/DL BUN/Creatinine ratio      12 - 20        GFR est AA      >60 ml/min/1.73m2      GFR est non-AA      >60 ml/min/1.73m2      Calcium      8.5 - 10.1 MG/DL      Bilirubin, total      0.2 - 1.0 MG/DL      ALT (SGPT)      16 - 61 U/L      AST      15 - 37 U/L      Alk.  phosphatase      45 - 117 U/L      Protein, total      6.4 - 8.2 g/dL      Albumin      3.4 - 5.0 g/dL      Globulin      2.0 - 4.0 g/dL      A-G Ratio      0.8 - 1.7        Lymphocytes      20 - 45 %   29   ABSOLUTE LYMPHOCYTE COUNT      1.0 - 4.8 K/uL   1.5   GFRAA      >60.0      GFRNA      >60.0      Specific Gravity      1.005 - 1.03      pH (UA)      5.0 - 8.0 pH      Protein      Negative, mg/dL      Glucose      Negative mg/dL      Ketone      Negative mg/dL      Bilirubin      Negative      Blood      Negative      Nitrites      Negative      Leukocyte Esterase      Negative      Urobilinogen      <2.0 mg/dL      WBC      0 - 2 /hpf      RBC      Negative, /hpf      Triglyceride      40 - 149 mg/dL  152 (H)    HDL Cholesterol      40 - 59 mg/dL  63 (H)    Cholesterol, total      110 - 200 mg/dL  245 (H)    CHOLESTEROL/HDL      0.0 - 5.0  3.9    LDL, calculated      50 - 99 mg/dL  151 (H)    VLDL, calculated      8 - 30 mg/dL  30    CK      39 - 308 U/L      CK - MB      <3.6 ng/ml      CK-MB Index      0.0 - 4.0 %      Troponin-I, Qt.      0.0 - 0.045 NG/ML      VALID INTERNAL CONTROL POC            Prothrombin time (POC)      seconds      INR            Prothrombin time      11.5 - 15.2 sec      T4, Free      0.9 - 1.8 ng/dL      Uric acid      3.9 - 9.0 mg/dL      Magnesium      1.6 - 2.5 mg/dL      TSH      0.27 - 4.20 mcU/mL      Prostate Specific Ag      <=4.100 ng/mL      Digoxin level      0.9 - 2.0 NG/ML        Component      Latest Ref Rng & Units 10/25/2018 10/25/2018 10/25/2018           8:38 AM  8:38 AM  8:38 AM   WBC      4.6 - 13.2 K/uL      RBC      4.70 - 5.50 M/uL      HGB      13.0 - 16.0 g/dL      HCT      36.0 - 48.0 % MCV      74.0 - 97.0 FL      MCH      24.0 - 34.0 PG      MCHC      31.0 - 37.0 g/dL      RDW      11.6 - 14.5 %      PLATELET      497 - 819 K/uL      MPV      9.2 - 11.8 FL      NEUTROPHILS      40 - 73 %      LYMPHOCYTES      21 - 52 %      MONOCYTES      3 - 10 %      EOSINOPHILS      0 - 5 %      BASOPHILS      0 - 2 %      ABS. NEUTROPHILS      1.8 - 8.0 K/UL      ABS. LYMPHOCYTES      0.9 - 3.6 K/UL      ABS. MONOCYTES      0.05 - 1.2 K/UL      ABS. EOSINOPHILS      0.0 - 0.4 K/UL      ABS. BASOPHILS      0.0 - 0.1 K/UL      DF            Sodium      136 - 145 mmol/L 145     Potassium      3.5 - 5.5 mmol/L 4.2     Chloride      100 - 108 mmol/L 100     CO2      21 - 32 mmol/L 18 (L)     Anion gap      3.0 - 18 mmol/L 27.0     Glucose      74 - 99 mg/dL 110 (H)     BUN      7.0 - 18 MG/DL 11     Creatinine      0.6 - 1.3 MG/DL 1.0     BUN/Creatinine ratio      12 - 20        GFR est AA      >60 ml/min/1.73m2      GFR est non-AA      >60 ml/min/1.73m2      Calcium      8.5 - 10.1 MG/DL 10.0     Bilirubin, total      0.2 - 1.0 MG/DL 0.6     ALT (SGPT)      16 - 61 U/L 35     AST      15 - 37 U/L 41 (H)     Alk.  phosphatase      45 - 117 U/L 123     Protein, total      6.4 - 8.2 g/dL 7.1     Albumin      3.4 - 5.0 g/dL 4.9     Globulin      2.0 - 4.0 g/dL 2.2     A-G Ratio      0.8 - 1.7   2.2     Lymphocytes      20 - 45 %      ABSOLUTE LYMPHOCYTE COUNT      1.0 - 4.8 K/uL      GFRAA      >60.0 >60.0     GFRNA      >60.0 >60.0     Specific Gravity      1.005 - 1.03      pH (UA)      5.0 - 8.0 pH      Protein      Negative, mg/dL      Glucose      Negative mg/dL      Ketone      Negative mg/dL      Bilirubin      Negative      Blood      Negative      Nitrites      Negative      Leukocyte Esterase      Negative      Urobilinogen      <2.0 mg/dL      WBC      0 - 2 /hpf      RBC      Negative, /hpf      Triglyceride      40 - 149 mg/dL      HDL Cholesterol      40 - 59 mg/dL      Cholesterol, total      110 - 200 mg/dL      CHOLESTEROL/HDL      0.0 - 5.0      LDL, calculated      50 - 99 mg/dL      VLDL, calculated      8 - 30 mg/dL      CK      39 - 308 U/L      CK - MB      <3.6 ng/ml      CK-MB Index      0.0 - 4.0 %      Troponin-I, Qt.      0.0 - 0.045 NG/ML      VALID INTERNAL CONTROL POC            Prothrombin time (POC)      seconds      INR            Prothrombin time      11.5 - 15.2 sec      T4, Free      0.9 - 1.8 ng/dL      Uric acid      3.9 - 9.0 mg/dL      Magnesium      1.6 - 2.5 mg/dL   2.0   TSH      0.27 - 4.20 mcU/mL      Prostate Specific Ag      <=4.100 ng/mL  0.965    Digoxin level      0.9 - 2.0 NG/ML        Component      Latest Ref Rng & Units 10/25/2018 10/25/2018 10/25/2018           8:38 AM  8:38 AM  8:38 AM   WBC      4.6 - 13.2 K/uL      RBC      4.70 - 5.50 M/uL      HGB      13.0 - 16.0 g/dL      HCT      36.0 - 48.0 %      MCV      74.0 - 97.0 FL      MCH      24.0 - 34.0 PG      MCHC      31.0 - 37.0 g/dL      RDW      11.6 - 14.5 %      PLATELET      801 - 859 K/uL      MPV      9.2 - 11.8 FL      NEUTROPHILS      40 - 73 %      LYMPHOCYTES      21 - 52 %      MONOCYTES      3 - 10 %      EOSINOPHILS      0 - 5 %      BASOPHILS      0 - 2 %      ABS. NEUTROPHILS      1.8 - 8.0 K/UL      ABS. LYMPHOCYTES      0.9 - 3.6 K/UL      ABS. MONOCYTES      0.05 - 1.2 K/UL      ABS. EOSINOPHILS      0.0 - 0.4 K/UL      ABS.  BASOPHILS      0.0 - 0.1 K/UL      DF            Sodium      136 - 145 mmol/L      Potassium      3.5 - 5.5 mmol/L      Chloride      100 - 108 mmol/L      CO2      21 - 32 mmol/L      Anion gap      3.0 - 18 mmol/L      Glucose      74 - 99 mg/dL      BUN      7.0 - 18 MG/DL      Creatinine      0.6 - 1.3 MG/DL      BUN/Creatinine ratio      12 - 20        GFR est AA      >60 ml/min/1.73m2      GFR est non-AA      >60 ml/min/1.73m2      Calcium      8.5 - 10.1 MG/DL      Bilirubin, total      0.2 - 1.0 MG/DL      ALT (SGPT)      16 - 61 U/L      AST      15 - 37 U/L Alk. phosphatase      45 - 117 U/L      Protein, total      6.4 - 8.2 g/dL      Albumin      3.4 - 5.0 g/dL      Globulin      2.0 - 4.0 g/dL      A-G Ratio      0.8 - 1.7        Lymphocytes      20 - 45 %      ABSOLUTE LYMPHOCYTE COUNT      1.0 - 4.8 K/uL      GFRAA      >60.0      GFRNA      >60.0      Specific Gravity      1.005 - 1.03   1.018   pH (UA)      5.0 - 8.0 pH   6.0   Protein      Negative, mg/dL   Negative   Glucose      Negative mg/dL   Negative   Ketone      Negative mg/dL   Negative   Bilirubin      Negative   Negative   Blood      Negative   Negative   Nitrites      Negative   Negative   Leukocyte Esterase      Negative   Negative   Urobilinogen      <2.0 mg/dL   2.0   WBC      0 - 2 /hpf   0-2   RBC      Negative, /hpf   0-2   Triglyceride      40 - 149 mg/dL      HDL Cholesterol      40 - 59 mg/dL      Cholesterol, total      110 - 200 mg/dL      CHOLESTEROL/HDL      0.0 - 5.0      LDL, calculated      50 - 99 mg/dL      VLDL, calculated      8 - 30 mg/dL      CK      39 - 308 U/L      CK - MB      <3.6 ng/ml      CK-MB Index      0.0 - 4.0 %      Troponin-I, Qt.      0.0 - 0.045 NG/ML      VALID INTERNAL CONTROL POC            Prothrombin time (POC)      seconds      INR            Prothrombin time      11.5 - 15.2 sec      T4, Free      0.9 - 1.8 ng/dL  1.3    Uric acid      3.9 - 9.0 mg/dL 7.9     Magnesium      1.6 - 2.5 mg/dL      TSH      0.27 - 4.20 mcU/mL      Prostate Specific Ag      <=4.100 ng/mL      Digoxin level      0.9 - 2.0 NG/ML        Component      Latest Ref Rng & Units 10/25/2018 10/25/2018 9/27/2018           8:38 AM  8:13 AM  7:20 AM   WBC      4.6 - 13.2 K/uL      RBC      4.70 - 5.50 M/uL      HGB      13.0 - 16.0 g/dL      HCT      36.0 - 48.0 %      MCV      74.0 - 97.0 FL      MCH      24.0 - 34.0 PG      MCHC      31.0 - 37.0 g/dL      RDW      11.6 - 14.5 %      PLATELET      837 - 537 K/uL      MPV      9.2 - 11.8 FL      NEUTROPHILS      40 - 73 % LYMPHOCYTES      21 - 52 %      MONOCYTES      3 - 10 %      EOSINOPHILS      0 - 5 %      BASOPHILS      0 - 2 %      ABS. NEUTROPHILS      1.8 - 8.0 K/UL      ABS. LYMPHOCYTES      0.9 - 3.6 K/UL      ABS. MONOCYTES      0.05 - 1.2 K/UL      ABS. EOSINOPHILS      0.0 - 0.4 K/UL      ABS. BASOPHILS      0.0 - 0.1 K/UL      DF            Sodium      136 - 145 mmol/L      Potassium      3.5 - 5.5 mmol/L      Chloride      100 - 108 mmol/L      CO2      21 - 32 mmol/L      Anion gap      3.0 - 18 mmol/L      Glucose      74 - 99 mg/dL      BUN      7.0 - 18 MG/DL      Creatinine      0.6 - 1.3 MG/DL      BUN/Creatinine ratio      12 - 20        GFR est AA      >60 ml/min/1.73m2      GFR est non-AA      >60 ml/min/1.73m2      Calcium      8.5 - 10.1 MG/DL      Bilirubin, total      0.2 - 1.0 MG/DL      ALT (SGPT)      16 - 61 U/L      AST      15 - 37 U/L      Alk.  phosphatase      45 - 117 U/L      Protein, total      6.4 - 8.2 g/dL      Albumin      3.4 - 5.0 g/dL      Globulin      2.0 - 4.0 g/dL      A-G Ratio      0.8 - 1.7        Lymphocytes      20 - 45 %      ABSOLUTE LYMPHOCYTE COUNT      1.0 - 4.8 K/uL      GFRAA      >60.0      GFRNA      >60.0      Specific Gravity      1.005 - 1.03      pH (UA)      5.0 - 8.0 pH      Protein      Negative, mg/dL      Glucose      Negative mg/dL      Ketone      Negative mg/dL      Bilirubin      Negative      Blood      Negative      Nitrites      Negative      Leukocyte Esterase      Negative      Urobilinogen      <2.0 mg/dL      WBC      0 - 2 /hpf      RBC      Negative, /hpf      Triglyceride      40 - 149 mg/dL      HDL Cholesterol      40 - 59 mg/dL      Cholesterol, total      110 - 200 mg/dL      CHOLESTEROL/HDL      0.0 - 5.0      LDL, calculated      50 - 99 mg/dL      VLDL, calculated      8 - 30 mg/dL      CK      39 - 308 U/L      CK - MB      <3.6 ng/ml      CK-MB Index      0.0 - 4.0 %      Troponin-I, Qt.      0.0 - 0.045 NG/ML      VALID INTERNAL CONTROL POC        Yes Yes   Prothrombin time (POC)      seconds  25.3 29.0   INR        2.1 2.4   Prothrombin time      11.5 - 15.2 sec      T4, Free      0.9 - 1.8 ng/dL      Uric acid      3.9 - 9.0 mg/dL      Magnesium      1.6 - 2.5 mg/dL      TSH      0.27 - 4.20 mcU/mL 0.46     Prostate Specific Ag      <=4.100 ng/mL      Digoxin level      0.9 - 2.0 NG/ML        Component      Latest Ref Rng & Units 8/30/2018 7/30/2018 4/17/2018 4/17/2018           8:17 AM  9:51 AM  4:45 AM 12:45 AM   WBC      4.6 - 13.2 K/uL       RBC      4.70 - 5.50 M/uL       HGB      13.0 - 16.0 g/dL       HCT      36.0 - 48.0 %       MCV      74.0 - 97.0 FL       MCH      24.0 - 34.0 PG       MCHC      31.0 - 37.0 g/dL       RDW      11.6 - 14.5 %       PLATELET      129 - 969 K/uL       MPV      9.2 - 11.8 FL       NEUTROPHILS      40 - 73 %       LYMPHOCYTES      21 - 52 %       MONOCYTES      3 - 10 %       EOSINOPHILS      0 - 5 %       BASOPHILS      0 - 2 %       ABS. NEUTROPHILS      1.8 - 8.0 K/UL       ABS. LYMPHOCYTES      0.9 - 3.6 K/UL       ABS. MONOCYTES      0.05 - 1.2 K/UL       ABS. EOSINOPHILS      0.0 - 0.4 K/UL       ABS. BASOPHILS      0.0 - 0.1 K/UL       DF             Sodium      136 - 145 mmol/L   139    Potassium      3.5 - 5.5 mmol/L   3.8    Chloride      100 - 108 mmol/L   103    CO2      21 - 32 mmol/L   26    Anion gap      3.0 - 18 mmol/L   10    Glucose      74 - 99 mg/dL   100 (H)    BUN      7.0 - 18 MG/DL   12    Creatinine      0.6 - 1.3 MG/DL   1.01    BUN/Creatinine ratio      12 - 20     12    GFR est AA      >60 ml/min/1.73m2   >60    GFR est non-AA      >60 ml/min/1.73m2   >60    Calcium      8.5 - 10.1 MG/DL   8.6    Bilirubin, total      0.2 - 1.0 MG/DL   1.0    ALT (SGPT)      16 - 61 U/L   39    AST      15 - 37 U/L   27    Alk.  phosphatase      45 - 117 U/L   105    Protein, total      6.4 - 8.2 g/dL   7.0    Albumin      3.4 - 5.0 g/dL   4.1    Globulin      2.0 - 4.0 g/dL   2.9    A-G Ratio      0.8 - 1.7     1.4    Lymphocytes      20 - 45 %       ABSOLUTE LYMPHOCYTE COUNT      1.0 - 4.8 K/uL       GFRAA      >60.0       GFRNA      >60.0       Specific Gravity      1.005 - 1.03       pH (UA)      5.0 - 8.0 pH       Protein      Negative, mg/dL       Glucose      Negative mg/dL       Ketone      Negative mg/dL       Bilirubin      Negative       Blood      Negative       Nitrites      Negative       Leukocyte Esterase      Negative       Urobilinogen      <2.0 mg/dL       WBC      0 - 2 /hpf       RBC      Negative, /hpf       Triglyceride      40 - 149 mg/dL       HDL Cholesterol      40 - 59 mg/dL       Cholesterol, total      110 - 200 mg/dL       CHOLESTEROL/HDL      0.0 - 5.0       LDL, calculated      50 - 99 mg/dL       VLDL, calculated      8 - 30 mg/dL       CK      39 - 308 U/L       CK - MB      <3.6 ng/ml       CK-MB Index      0.0 - 4.0 %       Troponin-I, Qt.      0.0 - 0.045 NG/ML    <0.02   VALID INTERNAL CONTROL POC       Yes Yes     Prothrombin time (POC)      seconds 19.5 31.8     INR       1.6 2.7     Prothrombin time      11.5 - 15.2 sec       T4, Free      0.9 - 1.8 ng/dL       Uric acid      3.9 - 9.0 mg/dL       Magnesium      1.6 - 2.5 mg/dL       TSH      0.27 - 4.20 mcU/mL       Prostate Specific Ag      <=4.100 ng/mL       Digoxin level      0.9 - 2.0 NG/ML         TESTS      Assessment/Plan:    Hypertension - stable  Hyperlipidemia - stable  Atrial fib now controlled  Not sure why he had this episode -- maybe the diarrhea caused meds to go subtherapeutic ?? Coumadin is fine. He needs the vit K to balance given his drinking. Will recheck in a couple of weeks. Hemorrhoids ongiong problem -- not sure that he should be having surgery at this time but will ask Dr Forest Motta to eval       Lab review: labs are reviewed, up to date and normal    Diagnoses and all orders for this visit:    1.  Chronic atrial fibrillation (HCC)  -     AMB POC PT/INR  -     vitamin k 100 mcg tablet; Take 1 Tab by mouth daily. 2. Nontoxic multinodular goiter  -     vitamin k 100 mcg tablet; Take 1 Tab by mouth daily. 3. Elevated cholesterol  -     vitamin k 100 mcg tablet; Take 1 Tab by mouth daily. 4. Essential hypertension, malignant  -     vitamin k 100 mcg tablet; Take 1 Tab by mouth daily. 5. Arthritis  -     vitamin k 100 mcg tablet; Take 1 Tab by mouth daily. I have discussed the diagnosis with the patient and the intended plan as seen in the above orders. The patient has received an after-visit summary and questions were answered concerning future plans. I have discussed medication side effects and warnings with the patient as well. I have reviewed the plan of care with the patient, accepted their input and they are in agreement with the treatment goals. Follow-up Disposition: Not on File   More than 1/2 of this 45 minute visit was spent in counselling and coordination of care, as described above. Daina ATKISN

## 2023-11-01 NOTE — PROGRESS NOTES
and drop arm testing. We discussed the treatment options for this today at length. He has been taking the anti-inflammatories without much resolution in his pain. He would like to move forward with an MRI which was ordered. The MRI is medically necessary to guide further treatment. I will like to see him back after the MRI is completed. Prescription medication management discussed. Plan was discussed in detail with patient, all questions were answered, and patient voiced understanding of plan. Electronically signed by: aMndy Hager MD    Note: This note was completed using voice recognition software.   Any typographical/name errors or mistakes are unintentional.

## 2023-11-01 NOTE — PATIENT INSTRUCTIONS
If we order a Diagnostic test (such as MRI or CT) during your office visit please see below:     Coordination of Care will be calling you to schedule your diagnostic test. If you have not heard from Coordination of Care within 3 business days, please call [phone]. Once you have a date scheduled for your diagnostic test, you will need to contact our office to schedule a follow up appointment, as this is when the physician will review your diagnostic test results with you. You can contact our office to schedule appointment by phone at [phone], or you can send a message via emere to request an appointment.     Right Shoulder MRI ordered today, 11/1/2023

## 2023-11-07 ENCOUNTER — OFFICE VISIT (OUTPATIENT)
Facility: CLINIC | Age: 68
End: 2023-11-07
Payer: MEDICARE

## 2023-11-07 VITALS
OXYGEN SATURATION: 93 % | WEIGHT: 162 LBS | HEART RATE: 72 BPM | HEIGHT: 70 IN | SYSTOLIC BLOOD PRESSURE: 162 MMHG | DIASTOLIC BLOOD PRESSURE: 92 MMHG | BODY MASS INDEX: 23.19 KG/M2 | TEMPERATURE: 98.6 F | RESPIRATION RATE: 16 BRPM

## 2023-11-07 DIAGNOSIS — R09.89 CHEST CONGESTION: ICD-10-CM

## 2023-11-07 DIAGNOSIS — E78.00 ELEVATED CHOLESTEROL: ICD-10-CM

## 2023-11-07 DIAGNOSIS — I10 PRIMARY HYPERTENSION: ICD-10-CM

## 2023-11-07 DIAGNOSIS — I42.6 ALCOHOLIC CARDIOMYOPATHY (HCC): ICD-10-CM

## 2023-11-07 DIAGNOSIS — I42.9 CARDIOMYOPATHY, UNSPECIFIED TYPE (HCC): ICD-10-CM

## 2023-11-07 DIAGNOSIS — R05.1 ACUTE COUGH: ICD-10-CM

## 2023-11-07 DIAGNOSIS — Z87.891 FORMER SMOKER: ICD-10-CM

## 2023-11-07 DIAGNOSIS — R06.02 SOB (SHORTNESS OF BREATH): ICD-10-CM

## 2023-11-07 DIAGNOSIS — I50.22 CHRONIC SYSTOLIC CONGESTIVE HEART FAILURE (HCC): ICD-10-CM

## 2023-11-07 DIAGNOSIS — J40 BRONCHITIS: Primary | ICD-10-CM

## 2023-11-07 DIAGNOSIS — M25.511 ACUTE PAIN OF RIGHT SHOULDER: ICD-10-CM

## 2023-11-07 LAB
EXP DATE SOLUTION: NORMAL
EXP DATE SWAB: NORMAL
EXPIRATION DATE: NORMAL
LOT NUMBER POC: NORMAL
LOT NUMBER SOLUTION: NORMAL
LOT NUMBER SWAB: NORMAL
QUICKVUE INFLUENZA TEST: NEGATIVE
SARS-COV-2 RNA, POC: NEGATIVE
VALID INTERNAL CONTROL, POC: YES

## 2023-11-07 PROCEDURE — 3080F DIAST BP >= 90 MM HG: CPT | Performed by: NURSE PRACTITIONER

## 2023-11-07 PROCEDURE — 3077F SYST BP >= 140 MM HG: CPT | Performed by: NURSE PRACTITIONER

## 2023-11-07 PROCEDURE — 1123F ACP DISCUSS/DSCN MKR DOCD: CPT | Performed by: NURSE PRACTITIONER

## 2023-11-07 PROCEDURE — 99214 OFFICE O/P EST MOD 30 MIN: CPT | Performed by: NURSE PRACTITIONER

## 2023-11-07 PROCEDURE — 87635 SARS-COV-2 COVID-19 AMP PRB: CPT | Performed by: NURSE PRACTITIONER

## 2023-11-07 PROCEDURE — 87804 INFLUENZA ASSAY W/OPTIC: CPT | Performed by: NURSE PRACTITIONER

## 2023-11-07 RX ORDER — ALBUTEROL SULFATE 90 UG/1
2 AEROSOL, METERED RESPIRATORY (INHALATION) 4 TIMES DAILY PRN
Qty: 54 G | Refills: 1 | Status: SHIPPED | OUTPATIENT
Start: 2023-11-07

## 2023-11-07 RX ORDER — ATORVASTATIN CALCIUM 40 MG/1
40 TABLET, FILM COATED ORAL DAILY
Qty: 90 TABLET | Refills: 3 | Status: SHIPPED | OUTPATIENT
Start: 2023-11-07

## 2023-11-07 RX ORDER — AZITHROMYCIN 500 MG/1
1000 TABLET, FILM COATED ORAL ONCE
Qty: 2 TABLET | Refills: 0 | Status: SHIPPED | OUTPATIENT
Start: 2023-11-07 | End: 2023-11-07

## 2023-11-07 RX ORDER — AMOXICILLIN AND CLAVULANATE POTASSIUM 875; 125 MG/1; MG/1
1 TABLET, FILM COATED ORAL 2 TIMES DAILY
Qty: 14 TABLET | Refills: 0 | Status: SHIPPED | OUTPATIENT
Start: 2023-11-07 | End: 2023-11-14

## 2023-11-07 NOTE — PROGRESS NOTES
Millicent Simon is a 76 y.o. male  established patient, here for evaluation of the following chief complaint(s):  Chief Complaint   Patient presents with    Cough    Congestion    Generalized Body Aches      Assessment and Plan  1. Bronchitis  -     azithromycin (ZITHROMAX) 500 MG tablet; Take 2 tablets by mouth once for 1 dose, Disp-2 tablet, R-0Normal  -     XR CHEST (2 VIEWS); Future  -     amoxicillin-clavulanate (AUGMENTIN) 875-125 MG per tablet; Take 1 tablet by mouth 2 times daily for 7 days, Disp-14 tablet, R-0Normal  -     albuterol sulfate HFA (VENTOLIN HFA) 108 (90 Base) MCG/ACT inhaler; Inhale 2 puffs into the lungs 4 times daily as needed for Wheezing, Disp-54 g, R-1Normal  2. Former smoker  -     [address]; Future  -     amoxicillin-clavulanate (AUGMENTIN) 875-125 MG per tablet; Take 1 tablet by mouth 2 times daily for 7 days, Disp-14 tablet, R-0Normal  -     albuterol sulfate HFA (VENTOLIN HFA) 108 (90 Base) MCG/ACT inhaler; Inhale 2 puffs into the lungs 4 times daily as needed for Wheezing, Disp-54 g, R-1Normal  3. Chronic systolic congestive heart failure (HCC)  4. Cardiomyopathy, unspecified type ([address])  5. 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 Primary hypertension  7. SOB (shortness of breath)  -     albuterol sulfate HFA (VENTOLIN HFA) 108 (90 Base) MCG/ACT inhaler; Inhale 2 puffs into the lungs 4 times daily as needed for Wheezing, Disp-54 g, R-1Normal  8. Acute pain of right shoulder  9. Elevated cholesterol  -     atorvastatin (LIPITOR) 40 MG tablet; Take 1 tablet by mouth daily, Disp-90 tablet, R-3Normal       Return in about 2 weeks (around 11/21/2023), or if symptoms worsen or fail to improve, for close follow-up, 20; if you don't feel better go to the hospital .   HPI:   In office visit. HTN, he has not had his medications today   Pt has nasal congestion and cough w/ green sputum for 1 week. He has a pneumonia and RSV vaccines. He has felt like he is breathing \"shallow. \" He

## 2023-11-12 ENCOUNTER — HOSPITAL ENCOUNTER (OUTPATIENT)
Facility: HOSPITAL | Age: 68
Discharge: HOME | End: 2023-11-15
Attending: ORTHOPAEDIC SURGERY
Payer: MEDICARE

## 2023-11-12 DIAGNOSIS — M75.101 ROTATOR CUFF SYNDROME, RIGHT: ICD-10-CM

## 2023-11-12 PROCEDURE — 73221 MRI JOINT UPR EXTREM W/O DYE: CPT

## 2023-11-21 ENCOUNTER — OFFICE VISIT (OUTPATIENT)
Facility: CLINIC | Age: 68
End: 2023-11-21
Payer: MEDICARE

## 2023-11-21 VITALS
HEIGHT: 70 IN | RESPIRATION RATE: 16 BRPM | WEIGHT: 157 LBS | HEART RATE: 68 BPM | DIASTOLIC BLOOD PRESSURE: 88 MMHG | OXYGEN SATURATION: 95 % | SYSTOLIC BLOOD PRESSURE: 152 MMHG | TEMPERATURE: 98.5 F | BODY MASS INDEX: 22.48 KG/M2

## 2023-11-21 DIAGNOSIS — Z87.891 FORMER SMOKER: ICD-10-CM

## 2023-11-21 DIAGNOSIS — F10.20 ALCOHOLISM (HCC): ICD-10-CM

## 2023-11-21 DIAGNOSIS — I10 PRIMARY HYPERTENSION: ICD-10-CM

## 2023-11-21 DIAGNOSIS — I42.6 ALCOHOLIC CARDIOMYOPATHY (HCC): ICD-10-CM

## 2023-11-21 DIAGNOSIS — I50.22 CHRONIC SYSTOLIC CONGESTIVE HEART FAILURE (HCC): ICD-10-CM

## 2023-11-21 DIAGNOSIS — R06.02 SOB (SHORTNESS OF BREATH): ICD-10-CM

## 2023-11-21 DIAGNOSIS — I48.0 PAROXYSMAL ATRIAL FIBRILLATION (HCC): ICD-10-CM

## 2023-11-21 DIAGNOSIS — I42.9 CARDIOMYOPATHY, UNSPECIFIED TYPE (HCC): Primary | ICD-10-CM

## 2023-11-21 DIAGNOSIS — I48.91 ATRIAL FIBRILLATION, UNSPECIFIED TYPE (HCC): ICD-10-CM

## 2023-11-21 PROCEDURE — 3077F SYST BP >= 140 MM HG: CPT | Performed by: NURSE PRACTITIONER

## 2023-11-21 PROCEDURE — 99214 OFFICE O/P EST MOD 30 MIN: CPT | Performed by: NURSE PRACTITIONER

## 2023-11-21 PROCEDURE — 3079F DIAST BP 80-89 MM HG: CPT | Performed by: NURSE PRACTITIONER

## 2023-11-21 PROCEDURE — 1123F ACP DISCUSS/DSCN MKR DOCD: CPT | Performed by: NURSE PRACTITIONER

## 2023-11-21 RX ORDER — AMLODIPINE BESYLATE 5 MG/1
5 TABLET ORAL DAILY
Qty: 30 TABLET | Refills: 3 | Status: SHIPPED | OUTPATIENT
Start: 2023-11-21

## 2023-11-21 RX ORDER — AMIODARONE HYDROCHLORIDE 200 MG/1
200 TABLET ORAL EVERY MORNING
Qty: 90 TABLET | Refills: 1 | Status: SHIPPED | OUTPATIENT
Start: 2023-11-21

## 2023-11-21 RX ORDER — ALBUTEROL SULFATE 90 UG/1
2 AEROSOL, METERED RESPIRATORY (INHALATION) 4 TIMES DAILY PRN
Qty: 54 G | Refills: 1 | Status: SHIPPED | OUTPATIENT
Start: 2023-11-21

## 2023-11-21 NOTE — PROGRESS NOTES
Heaven Pineda. is a 76 y.o. male  established patient, here for evaluation of the following chief complaint(s):  Chief Complaint   Patient presents with    2 Week Follow-Up      Assessment and Plan  1. Cardiomyopathy, unspecified type ([address])  2. Atrial fibrillation, unspecified type ([address])  3. Chronic systolic congestive heart failure ([address])  4. Alcoholism ([address])  5. Alcoholic cardiomyopathy (HCC)  -     amiodarone (CORDARONE) 200 MG tablet; Take 1 tablet by mouth every morning, Disp-90 tablet, R-1Normal  6. Paroxysmal atrial fibrillation (HCC)  -     amiodarone (CORDARONE) 200 MG tablet; Take 1 tablet by mouth every morning, Disp-90 tablet, R-1Normal  7. Former smoker  -     albuterol sulfate HFA (VENTOLIN HFA) 108 (90 Base) MCG/ACT inhaler; Inhale 2 puffs into the lungs 4 times daily as needed for Wheezing, Disp-54 g, R-1Normal  8. SOB (shortness of breath)  -     albuterol sulfate HFA (VENTOLIN HFA) 108 (90 Base) MCG/ACT inhaler; Inhale 2 puffs into the lungs 4 times daily as needed for Wheezing, Disp-54 g, R-1Normal  9. Primary hypertension  -     amLODIPine (NORVASC) 5 MG tablet; Take 1 tablet by mouth daily, Disp-30 tablet, R-3Normal       Return in about 3 weeks (around 12/12/2023). HPI:   In office visit. Pt appears well. He has no congestion today. Suspect pneumonia last visit 11/7/2023. He has finished has antibiotics. He says he feels well.    Pt follows up w/ cardiology 1/3/2023  HTN, not controlled, add amlodipine 5 mg  Pt drinks scotch daily but not as much as he use to   ROS:    General: negative for - chills, fever, weight changes or malaise  HEENT: no sore throat, nasal congestion, vision problems or ear problems  Respiratory: no cough, shortness of breath, or wheezing  Cardiovascular: no chest pain, palpitations, or dyspnea on exertion  Gastrointestinal: no abdominal pain, N/V, change in bowel habits  Musculoskeletal: no back pain or joint pain  Neurological: no headache or dizziness  Endo:

## 2023-11-29 ENCOUNTER — OFFICE VISIT (OUTPATIENT)
Age: 68
End: 2023-11-29
Payer: MEDICARE

## 2023-11-29 VITALS — RESPIRATION RATE: 18 BRPM | BODY MASS INDEX: 22.53 KG/M2 | HEIGHT: 70 IN

## 2023-11-29 DIAGNOSIS — M75.121 NONTRAUMATIC COMPLETE TEAR OF RIGHT ROTATOR CUFF: Primary | ICD-10-CM

## 2023-11-29 DIAGNOSIS — M75.21 BICEPS TENDINITIS OF RIGHT SHOULDER: ICD-10-CM

## 2023-11-29 PROCEDURE — 1123F ACP DISCUSS/DSCN MKR DOCD: CPT | Performed by: ORTHOPAEDIC SURGERY

## 2023-11-29 PROCEDURE — 99214 OFFICE O/P EST MOD 30 MIN: CPT | Performed by: ORTHOPAEDIC SURGERY

## 2023-12-05 NOTE — ED NOTES
Attending Physician Addendum  Management and plan discussed with resident at time of clinic visit. Care was reasonable and  necessary. Routine follow up.     Report called to [facility] in ICU.

## 2023-12-21 ENCOUNTER — OFFICE VISIT (OUTPATIENT)
Facility: CLINIC | Age: 68
End: 2023-12-21
Payer: MEDICARE

## 2023-12-21 VITALS
SYSTOLIC BLOOD PRESSURE: 129 MMHG | HEART RATE: 63 BPM | RESPIRATION RATE: 16 BRPM | DIASTOLIC BLOOD PRESSURE: 78 MMHG | HEIGHT: 70 IN | BODY MASS INDEX: 22.19 KG/M2 | OXYGEN SATURATION: 95 % | WEIGHT: 155 LBS | TEMPERATURE: 98.2 F

## 2023-12-21 DIAGNOSIS — R05.8 OTHER COUGH: Primary | ICD-10-CM

## 2023-12-21 DIAGNOSIS — J18.9 COMMUNITY ACQUIRED PNEUMONIA, UNSPECIFIED LATERALITY: ICD-10-CM

## 2023-12-21 PROCEDURE — 3074F SYST BP LT 130 MM HG: CPT

## 2023-12-21 PROCEDURE — 99213 OFFICE O/P EST LOW 20 MIN: CPT

## 2023-12-21 PROCEDURE — 1123F ACP DISCUSS/DSCN MKR DOCD: CPT

## 2023-12-21 PROCEDURE — 3078F DIAST BP <80 MM HG: CPT

## 2023-12-21 RX ORDER — DOXYCYCLINE HYCLATE 100 MG
100 TABLET ORAL 2 TIMES DAILY
Qty: 20 TABLET | Refills: 0 | Status: SHIPPED | OUTPATIENT
Start: 2023-12-21 | End: 2023-12-31

## 2023-12-21 SDOH — ECONOMIC STABILITY: INCOME INSECURITY: HOW HARD IS IT FOR YOU TO PAY FOR THE VERY BASICS LIKE FOOD, HOUSING, MEDICAL CARE, AND HEATING?: NOT HARD AT ALL

## 2023-12-21 SDOH — ECONOMIC STABILITY: FOOD INSECURITY: WITHIN THE PAST 12 MONTHS, YOU WORRIED THAT YOUR FOOD WOULD RUN OUT BEFORE YOU GOT MONEY TO BUY MORE.: NEVER TRUE

## 2023-12-21 SDOH — ECONOMIC STABILITY: FOOD INSECURITY: WITHIN THE PAST 12 MONTHS, THE FOOD YOU BOUGHT JUST DIDN'T LAST AND YOU DIDN'T HAVE MONEY TO GET MORE.: NEVER TRUE

## 2023-12-21 ASSESSMENT — PATIENT HEALTH QUESTIONNAIRE - PHQ9
SUM OF ALL RESPONSES TO PHQ QUESTIONS 1-9: 0
SUM OF ALL RESPONSES TO PHQ QUESTIONS 1-9: 0
2. FEELING DOWN, DEPRESSED OR HOPELESS: 0
SUM OF ALL RESPONSES TO PHQ9 QUESTIONS 1 & 2: 0
1. LITTLE INTEREST OR PLEASURE IN DOING THINGS: 0
SUM OF ALL RESPONSES TO PHQ QUESTIONS 1-9: 0
SUM OF ALL RESPONSES TO PHQ QUESTIONS 1-9: 0

## 2023-12-21 NOTE — PROGRESS NOTES
García Forman Jr. is a 68 y.o. year old male who presents in office today for   Chief Complaint   Patient presents with    Cough    Nasal Congestion    Head Congestion       Is someone accompanying this pt? NO    Is the patient using any DME equipment during OV? NO    Depression Screenin/21/2023     1:35 PM 10/2/2023    10:19 AM 2022     1:04 PM 3/8/2021     8:20 AM   PHQ-9 Questionaire   Little interest or pleasure in doing things 0 0 0 0   Feeling down, depressed, or hopeless 0 0 0 0   PHQ-9 Total Score 0 0 0 0       Abuse Screenin/21/2023     1:00 PM   AMB Abuse Screening   Do you ever feel afraid of your partner? N   Are you in a relationship with someone who physically or mentally threatens you? N   Is it safe for you to go home? Y       Learning Assessment:  No question data found.    Fall Risk:      2023     1:35 PM 10/2/2023    10:19 AM   Fall Risk   2 or more falls in past year? no no   Fall with injury in past year? no no           Coordination of Care:   1. \"Have you been to the ER, urgent care clinic since your last visit?  Hospitalized since your last visit?\" NO    2. \"Have you seen or consulted any other health care providers outside of the [facility] since your last visit?\" NO    3. For patients aged 45-75: Has the patient had a colonoscopy / FIT/ Cologuard? NOT DUE    If the patient is female:    4. For patients aged 40-74: Has the patient had a mammogram within the past 2 years? NA    5. For patients aged 21-65: Has the patient had a pap smear? NA    Health Maintenance: reviewed and discussed and ordered per Provider.    Health Maintenance Due   Topic Date Due    COVID-19 Vaccine (1) Never done    Respiratory Syncytial Virus (RSV) Pregnant or age 60 yrs+ (1 - 1-dose 60+ series) Never done    AAA screen  Never done        -ARDEN Baron  [address]  [place], [place]  Ph: [phone]

## 2023-12-21 NOTE — PROGRESS NOTES
Patient ID: García Forman Jr. is a 68 y.o. male established patient presents for the following:      Subjective:     HPI    He reports cough that started over 1 month ago and was put on amoxicillin. Infection cleared but not cough returned. Cough is productive with clear and green sputum.        Past Medical History:   Diagnosis Date    Alcoholism (HCC)     Arthritis     Atrial fibrillation (HCC) 11/28/2017    Cardioversion (11/18)    Colon polyps 2016    Hearing loss     left ear    HLD (hyperlipidemia)     HTN (hypertension)     Nontoxic multinodular goiter 2/7/2018    Biopsy of two nodules in 2016 - benign    Osteoarthritis November 2017       Past Surgical History:   Procedure Laterality Date    CARDIOVERSION ELECTIVE ARRHYTHMIA EXTERNAL N/A 12/3/2018    EP CARDIOVERSION performed by Vinnie Obando MD at [facility] CARDIAC CATH LAB    COLONOSCOPY  2016    tubular adenoma    HERNIA REPAIR      x 2    OK UNLISTED PROCEDURE CARDIAC SURGERY      TONSILLECTOMY  1960       Current Outpatient Medications   Medication Sig Dispense Refill    amiodarone (CORDARONE) 200 MG tablet Take 1 tablet by mouth every morning 90 tablet 1    albuterol sulfate HFA (VENTOLIN HFA) 108 (90 Base) MCG/ACT inhaler Inhale 2 puffs into the lungs 4 times daily as needed for Wheezing 54 g 1    amLODIPine (NORVASC) 5 MG tablet Take 1 tablet by mouth daily 30 tablet 3    atorvastatin (LIPITOR) 40 MG tablet Take 1 tablet by mouth daily 90 tablet 3    furosemide (LASIX) 20 MG tablet Take 1 tablet by mouth daily 90 tablet 1    LYCOPENE PO Take 1 tablet by mouth daily      aspirin 81 MG chewable tablet Take 1 tablet by mouth daily 90 tablet 3    metoprolol tartrate (LOPRESSOR) 50 MG tablet Take 1.5 tablets by mouth 2 times daily 180 tablet 3    vitamin D (CHOLECALCIFEROL) 25 MCG (1000 UT) TABS tablet Take by mouth daily      nitroGLYCERIN (NITROSTAT) 0.4 MG SL tablet Place 1 tablet under the tongue as needed      NONFORMULARY Take 99 mg by mouth daily

## 2023-12-26 RX ORDER — M-VIT,TX,IRON,MINS/CALC/FOLIC 27MG-0.4MG
1 TABLET ORAL DAILY
COMMUNITY

## 2023-12-26 NOTE — PERIOP NOTE
Instructions for your surgery at [facility]      Today's Date:  12/26/2023      Patient's Name:  García Forman Jr.           Surgery Date:  1/15/2024              Please enter the main entrance of the hospital and check-in at the  located in the lobby. Once checked in at the , you will take the elevators to the second floor, and report to the waiting room on the left. The room will say Procedure Registration.    Do NOT eat or drink anything, including candy, gum, or ice chips after midnight prior to your surgery, unless you have specific instructions from your surgeon or anesthesia provider to do so.  Brush your teeth before coming to the hospital. You may swish with water, but do not swallow.  No smoking/Vaping/E-Cigarettes 24 hours prior to the day of surgery.  No alcohol 24 hours prior to the day of surgery.  No recreational drugs for one week prior to the day of surgery.  Bring Photo ID, Insurance information, and Co-pay if required on day of surgery.  Bring in pertinent legal documents, such as, Medical Power of , DNR, Advance Directive, etc.  Leave all valuables, including money/purse, at home.  Remove all jewelry, including ALL body piercings, nail polish, acrylic nails, and makeup (including mascara); no lotions, powders, deodorant, or perfume/cologne/after shave on the skin.  Follow instruction for Hibiclens washes and CHG wipes from surgeon's office.   Glasses and dentures may be worn to the hospital. They must be removed prior to surgery. Please bring case/container for glasses or dentures.   Contact lenses should not be worn on day of surgery.   Call your doctor's office if symptoms of a cold or illness develop within 24-48 hours prior to your surgery.  Call your doctor's office if you have any questions concerning insurance or co-pays.  15. AN ADULT (relative or friend 18 years or older) MUST DRIVE YOU HOME AFTER YOUR SURGERY.  16. Please make

## 2023-12-28 ENCOUNTER — OFFICE VISIT (OUTPATIENT)
Age: 68
End: 2023-12-28
Payer: MEDICARE

## 2023-12-28 VITALS
BODY MASS INDEX: 22.48 KG/M2 | SYSTOLIC BLOOD PRESSURE: 128 MMHG | WEIGHT: 157 LBS | OXYGEN SATURATION: 98 % | HEART RATE: 58 BPM | DIASTOLIC BLOOD PRESSURE: 88 MMHG | HEIGHT: 70 IN

## 2023-12-28 DIAGNOSIS — E78.5 DYSLIPIDEMIA: ICD-10-CM

## 2023-12-28 DIAGNOSIS — I10 PRIMARY HYPERTENSION: ICD-10-CM

## 2023-12-28 DIAGNOSIS — I42.9 CARDIOMYOPATHY, UNSPECIFIED TYPE (HCC): Primary | ICD-10-CM

## 2023-12-28 DIAGNOSIS — I48.0 PAROXYSMAL ATRIAL FIBRILLATION (HCC): ICD-10-CM

## 2023-12-28 DIAGNOSIS — Z01.818 PRE-OP TESTING: ICD-10-CM

## 2023-12-28 PROBLEM — K57.30 DIVERTICULAR DISEASE OF COLON: Status: ACTIVE | Noted: 2023-12-28

## 2023-12-28 PROBLEM — D12.4 BENIGN NEOPLASM OF DESCENDING COLON: Status: ACTIVE | Noted: 2023-12-28

## 2023-12-28 PROBLEM — K64.2 THIRD DEGREE HEMORRHOIDS: Status: ACTIVE | Noted: 2023-12-28

## 2023-12-28 PROBLEM — D12.8 BENIGN NEOPLASM OF RECTUM: Status: ACTIVE | Noted: 2023-12-28

## 2023-12-28 PROBLEM — I48.91 ATRIAL FIBRILLATION (HCC): Status: ACTIVE | Noted: 2017-11-28

## 2023-12-28 PROBLEM — D12.5 BENIGN NEOPLASM OF SIGMOID COLON: Status: ACTIVE | Noted: 2023-12-28

## 2023-12-28 PROCEDURE — 3079F DIAST BP 80-89 MM HG: CPT | Performed by: INTERNAL MEDICINE

## 2023-12-28 PROCEDURE — 3074F SYST BP LT 130 MM HG: CPT | Performed by: INTERNAL MEDICINE

## 2023-12-28 PROCEDURE — 1123F ACP DISCUSS/DSCN MKR DOCD: CPT | Performed by: INTERNAL MEDICINE

## 2023-12-28 PROCEDURE — 93000 ELECTROCARDIOGRAM COMPLETE: CPT | Performed by: INTERNAL MEDICINE

## 2023-12-28 PROCEDURE — 99204 OFFICE O/P NEW MOD 45 MIN: CPT | Performed by: INTERNAL MEDICINE

## 2023-12-28 NOTE — PROGRESS NOTES
Yecenia Dhillon presents today for   Chief Complaint   Patient presents with    New Patient     Self referred for surgical clearance     Procedure     1/15/24: right shoulder arthroscopic rotator cuff repair at [facility] with Dr. Bruce Kahn. preferred language for health care discussion is english/other. Is someone accompanying this pt? no    Is the patient using any DME equipment during OV? no    Depression Screening:  Depression: Not at risk (12/28/2023)    PHQ-2     PHQ-2 Score: 0        Learning Assessment:  Who is the primary learner? Patient    What is the preferred language for health care of the primary learner? ENGLISH    How does the primary learner prefer to learn new concepts? DEMONSTRATION    Answered By patient    Relationship to Learner SELF           Pt currently taking Anticoagulant therapy? no    Pt currently taking Antiplatelet therapy ? Aspirin 81 mg daily      Coordination of Care:  1. Have you been to the ER, urgent care clinic since your last visit? Hospitalized since your last visit? no    2. Have you seen or consulted any other health care providers outside of the [address] [place] [address] since your last visit? Include any pap smears or colon screening.  no
General: No focal deficit present. Mental Status: He is alert and oriented to person, place, and time. Psychiatric:         Mood and Affect: Mood normal.         Behavior: Behavior normal.     EKG: Sinus bradycardia, normal axis, normal QTc interval, borderline voltage criteria for LVH, borderline poor R wave progression, no ST or T wave changes concerning for ischemia. Compared to the previous EKG, no significant change. Assessment / Plan:   History of cardiomyopathy. Ejection fraction historically has been mildly depressed, though a echocardiogram done in the setting of rapid atrial fibrillation in November 2018 showed a severely depressed LVEF. This has not been reassessed since he underwent a cardioversion in December 2018. He does not appear to be volume overloaded and does not have any symptoms to suggest decompensated heart failure, though he did have transient orthopnea last month in the setting of a URI. I would like to arrange for a fairly urgent echocardiogram to be done next week at the [place] office prior to his orthopedic surgery. This will help with restratification. For now he can continue his current metoprolol dosage and furosemide dosage. Paroxysmal atrial fibrillation. Last episode in 2018. No recurrence. He states he does have a home rhythm monitoring system and has been checking at least twice a week and has not had any recurrent arrhythmias. If he does have a recurrence, I would have a low threshold to start him on anticoagulation such as Eliquis. For now he can continue his amiodarone for rhythm control. Hypertension. Patient's blood pressure appears to be well-controlled on his current medical regimen, all of which I would continue. Dyslipidemia. Patient been managed with atorvastatin 40 mg daily. This is followed by his PCP. History of alcohol use. Patient states on average he drinks about 3 drinks a night.   He was encouraged to drink in

## 2023-12-29 ENCOUNTER — OFFICE VISIT (OUTPATIENT)
Facility: CLINIC | Age: 68
End: 2023-12-29
Payer: MEDICARE

## 2023-12-29 VITALS
HEIGHT: 70 IN | WEIGHT: 160 LBS | OXYGEN SATURATION: 95 % | BODY MASS INDEX: 22.9 KG/M2 | TEMPERATURE: 98.5 F | SYSTOLIC BLOOD PRESSURE: 133 MMHG | HEART RATE: 61 BPM | RESPIRATION RATE: 16 BRPM | DIASTOLIC BLOOD PRESSURE: 83 MMHG

## 2023-12-29 DIAGNOSIS — I48.91 ATRIAL FIBRILLATION, UNSPECIFIED TYPE (HCC): ICD-10-CM

## 2023-12-29 DIAGNOSIS — I10 PRIMARY HYPERTENSION: ICD-10-CM

## 2023-12-29 DIAGNOSIS — I50.22 CHRONIC SYSTOLIC CONGESTIVE HEART FAILURE (HCC): ICD-10-CM

## 2023-12-29 DIAGNOSIS — Z87.01 HISTORY OF PNEUMONIA: Primary | ICD-10-CM

## 2023-12-29 PROCEDURE — 3079F DIAST BP 80-89 MM HG: CPT | Performed by: NURSE PRACTITIONER

## 2023-12-29 PROCEDURE — 3075F SYST BP GE 130 - 139MM HG: CPT | Performed by: NURSE PRACTITIONER

## 2023-12-29 PROCEDURE — 99214 OFFICE O/P EST MOD 30 MIN: CPT | Performed by: NURSE PRACTITIONER

## 2023-12-29 PROCEDURE — 1123F ACP DISCUSS/DSCN MKR DOCD: CPT | Performed by: NURSE PRACTITIONER

## 2024-01-01 ASSESSMENT — ENCOUNTER SYMPTOMS
SHORTNESS OF BREATH: 0
SORE THROAT: 0
BLOOD IN STOOL: 0
DIARRHEA: 0
CONSTIPATION: 0
NAUSEA: 0
COLOR CHANGE: 0
STRIDOR: 0
VOMITING: 0
WHEEZING: 0
TROUBLE SWALLOWING: 0
CHEST TIGHTNESS: 0
EYE REDNESS: 0
EYE PAIN: 0
EYE ITCHING: 0
VOICE CHANGE: 0
COUGH: 1

## 2024-01-03 ENCOUNTER — OFFICE VISIT (OUTPATIENT)
Age: 69
End: 2024-01-03

## 2024-01-03 ENCOUNTER — HOSPITAL ENCOUNTER (OUTPATIENT)
Facility: HOSPITAL | Age: 69
Discharge: HOME | End: 2024-01-06
Payer: MEDICARE

## 2024-01-03 VITALS
HEART RATE: 72 BPM | RESPIRATION RATE: 18 BRPM | HEIGHT: 70 IN | BODY MASS INDEX: 22.48 KG/M2 | DIASTOLIC BLOOD PRESSURE: 82 MMHG | WEIGHT: 157 LBS | SYSTOLIC BLOOD PRESSURE: 146 MMHG

## 2024-01-03 DIAGNOSIS — Z01.810 PREOP CARDIOVASCULAR EXAM: ICD-10-CM

## 2024-01-03 DIAGNOSIS — Z01.818 PREOPERATIVE TESTING: ICD-10-CM

## 2024-01-03 DIAGNOSIS — M75.21 BICEPS TENDINITIS OF RIGHT SHOULDER: ICD-10-CM

## 2024-01-03 DIAGNOSIS — M75.121 NONTRAUMATIC COMPLETE TEAR OF RIGHT ROTATOR CUFF: ICD-10-CM

## 2024-01-03 DIAGNOSIS — M75.121 NONTRAUMATIC COMPLETE TEAR OF RIGHT ROTATOR CUFF: Primary | ICD-10-CM

## 2024-01-03 LAB
ALBUMIN SERPL-MCNC: 4.2 G/DL (ref 3.4–5)
ALBUMIN/GLOB SERPL: 1.6 (ref 0.8–1.7)
ALP SERPL-CCNC: 141 U/L (ref 45–117)
ALT SERPL-CCNC: 44 U/L (ref 16–61)
ANION GAP SERPL CALC-SCNC: 3 MMOL/L (ref 3–18)
AST SERPL-CCNC: 31 U/L (ref 10–38)
BASOPHILS # BLD: 0 K/UL (ref 0–0.1)
BASOPHILS NFR BLD: 1 % (ref 0–2)
BILIRUB SERPL-MCNC: 0.7 MG/DL (ref 0.2–1)
BUN SERPL-MCNC: 11 MG/DL (ref 7–18)
BUN/CREAT SERPL: 11 (ref 12–20)
CALCIUM SERPL-MCNC: 9.3 MG/DL (ref 8.5–10.1)
CHLORIDE SERPL-SCNC: 103 MMOL/L (ref 100–111)
CO2 SERPL-SCNC: 31 MMOL/L (ref 21–32)
CREAT SERPL-MCNC: 1.03 MG/DL (ref 0.6–1.3)
DIFFERENTIAL METHOD BLD: ABNORMAL
EOSINOPHIL # BLD: 0.1 K/UL (ref 0–0.4)
EOSINOPHIL NFR BLD: 1 % (ref 0–5)
ERYTHROCYTE [DISTWIDTH] IN BLOOD BY AUTOMATED COUNT: 14.2 % (ref 11.6–14.5)
GLOBULIN SER CALC-MCNC: 2.6 G/DL (ref 2–4)
GLUCOSE SERPL-MCNC: 74 MG/DL (ref 74–99)
HCT VFR BLD AUTO: 45.8 % (ref 36–48)
HGB BLD-MCNC: 15.3 G/DL (ref 13–16)
IMM GRANULOCYTES # BLD AUTO: 0 K/UL (ref 0–0.04)
IMM GRANULOCYTES NFR BLD AUTO: 1 % (ref 0–0.5)
LYMPHOCYTES # BLD: 1.3 K/UL (ref 0.9–3.6)
LYMPHOCYTES NFR BLD: 19 % (ref 21–52)
MCH RBC QN AUTO: 30.7 PG (ref 24–34)
MCHC RBC AUTO-ENTMCNC: 33.4 G/DL (ref 31–37)
MCV RBC AUTO: 91.8 FL (ref 78–100)
MONOCYTES # BLD: 0.8 K/UL (ref 0.05–1.2)
MONOCYTES NFR BLD: 12 % (ref 3–10)
NEUTS SEG # BLD: 4.7 K/UL (ref 1.8–8)
NEUTS SEG NFR BLD: 67 % (ref 40–73)
NRBC # BLD: 0 K/UL (ref 0–0.01)
NRBC BLD-RTO: 0 PER 100 WBC
PLATELET # BLD AUTO: 332 K/UL (ref 135–420)
PMV BLD AUTO: 10.3 FL (ref 9.2–11.8)
POTASSIUM SERPL-SCNC: 4.1 MMOL/L (ref 3.5–5.5)
PROT SERPL-MCNC: 6.8 G/DL (ref 6.4–8.2)
RBC # BLD AUTO: 4.99 M/UL (ref 4.35–5.65)
SODIUM SERPL-SCNC: 137 MMOL/L (ref 136–145)
WBC # BLD AUTO: 7 K/UL (ref 4.6–13.2)

## 2024-01-03 PROCEDURE — 85025 COMPLETE CBC W/AUTO DIFF WBC: CPT

## 2024-01-03 PROCEDURE — 80053 COMPREHEN METABOLIC PANEL: CPT

## 2024-01-03 PROCEDURE — 99024 POSTOP FOLLOW-UP VISIT: CPT | Performed by: ORTHOPAEDIC SURGERY

## 2024-01-03 PROCEDURE — 36415 COLL VENOUS BLD VENIPUNCTURE: CPT

## 2024-01-03 RX ORDER — OXYCODONE HYDROCHLORIDE AND ACETAMINOPHEN 5; 325 MG/1; MG/1
1-2 TABLET ORAL EVERY 4 HOURS PRN
Qty: 35 TABLET | Refills: 0 | Status: SHIPPED | OUTPATIENT
Start: 2024-01-03 | End: 2024-01-10

## 2024-01-03 NOTE — H&P (VIEW-ONLY)
[facility] AMBULATORY OFFICE NOTE      Patient: García Forman Jr.                MRN: [id number]       SSN: [id number]  YOB: 1955        AGE: 68 y.o.        SEX: male  Body mass index is 22.53 kg/m².    PCP: Shae Peters APRN - CNP  01/03/24    H&P    CHIEF COMPLAINT: Right shoulder preop    HPI: García Forman Jr. is a 68 y.o. male patient who returns today for his preoperative appointment for his upcoming right shoulder surgery.  He is still wearing the sling.  No change in his symptoms or pain since his last visit.    Past Medical History:   Diagnosis Date    Alcoholism (HCC)     Arthritis     Atrial fibrillation (HCC) 11/28/2017    Cardioversion (11/18)    Colon polyps 2016    Hearing loss     left ear    HLD (hyperlipidemia)     HTN (hypertension)     Nontoxic multinodular goiter 2/7/2018    Biopsy of two nodules in 2016 - benign    Osteoarthritis November 2017       Family History   Problem Relation Age of Onset    Arthritis Mother         Rheumatoid    Diabetes Father     Alcohol Abuse Father     Heart Disease Father     No Known Problems Sister     No Known Problems Brother     Diabetes Maternal Grandmother     No Known Problems Maternal Grandfather     No Known Problems Paternal Grandmother     No Known Problems Paternal Grandfather     No Known Problems Other        Current Outpatient Medications   Medication Sig Dispense Refill    NONFORMULARY Take 99 mg by mouth daily Potassium: Over the counter [place]      Multiple Vitamins-Minerals (THERAPEUTIC MULTIVITAMIN-MINERALS) tablet Take 1 tablet by mouth daily      amiodarone (CORDARONE) 200 MG tablet Take 1 tablet by mouth every morning 90 tablet 1    albuterol sulfate HFA (VENTOLIN HFA) 108 (90 Base) MCG/ACT inhaler Inhale 2 puffs into the lungs 4 times daily as needed for Wheezing 54 g 1    amLODIPine (NORVASC) 5 MG tablet Take 1 tablet by mouth daily 30 tablet 3    atorvastatin (LIPITOR) 40 MG

## 2024-01-03 NOTE — PROGRESS NOTES
[facility] AMBULATORY OFFICE NOTE      Patient: García Forman Jr.                MRN: [id number]       SSN: [id number]  YOB: 1955        AGE: 68 y.o.        SEX: male  Body mass index is 22.53 kg/m².    PCP: Shae Peters APRN - CNP  01/03/24    H&P    CHIEF COMPLAINT: Right shoulder preop    HPI: García Forman Jr. is a 68 y.o. male patient who returns today for his preoperative appointment for his upcoming right shoulder surgery.  He is still wearing the sling.  No change in his symptoms or pain since his last visit.    Past Medical History:   Diagnosis Date    Alcoholism (HCC)     Arthritis     Atrial fibrillation (HCC) 11/28/2017    Cardioversion (11/18)    Colon polyps 2016    Hearing loss     left ear    HLD (hyperlipidemia)     HTN (hypertension)     Nontoxic multinodular goiter 2/7/2018    Biopsy of two nodules in 2016 - benign    Osteoarthritis November 2017       Family History   Problem Relation Age of Onset    Arthritis Mother         Rheumatoid    Diabetes Father     Alcohol Abuse Father     Heart Disease Father     No Known Problems Sister     No Known Problems Brother     Diabetes Maternal Grandmother     No Known Problems Maternal Grandfather     No Known Problems Paternal Grandmother     No Known Problems Paternal Grandfather     No Known Problems Other        Current Outpatient Medications   Medication Sig Dispense Refill    NONFORMULARY Take 99 mg by mouth daily Potassium: Over the counter [place]      Multiple Vitamins-Minerals (THERAPEUTIC MULTIVITAMIN-MINERALS) tablet Take 1 tablet by mouth daily      amiodarone (CORDARONE) 200 MG tablet Take 1 tablet by mouth every morning 90 tablet 1    albuterol sulfate HFA (VENTOLIN HFA) 108 (90 Base) MCG/ACT inhaler Inhale 2 puffs into the lungs 4 times daily as needed for Wheezing 54 g 1    amLODIPine (NORVASC) 5 MG tablet Take 1 tablet by mouth daily 30 tablet 3    atorvastatin (LIPITOR) 40 MG

## 2024-01-12 ENCOUNTER — PREP FOR PROCEDURE (OUTPATIENT)
Age: 69
End: 2024-01-12

## 2024-01-12 ENCOUNTER — ANESTHESIA EVENT (OUTPATIENT)
Facility: HOSPITAL | Age: 69
End: 2024-01-12
Payer: MEDICARE

## 2024-01-12 RX ORDER — SODIUM CHLORIDE 0.9 % (FLUSH) 0.9 %
5-40 SYRINGE (ML) INJECTION PRN
Status: CANCELLED | OUTPATIENT
Start: 2024-01-12

## 2024-01-12 RX ORDER — SODIUM CHLORIDE 9 MG/ML
INJECTION, SOLUTION INTRAVENOUS PRN
Status: CANCELLED | OUTPATIENT
Start: 2024-01-12

## 2024-01-12 RX ORDER — SODIUM CHLORIDE 0.9 % (FLUSH) 0.9 %
5-40 SYRINGE (ML) INJECTION EVERY 12 HOURS SCHEDULED
Status: CANCELLED | OUTPATIENT
Start: 2024-01-12

## 2024-01-12 RX ORDER — CELECOXIB 100 MG/1
200 CAPSULE ORAL ONCE
Status: CANCELLED | OUTPATIENT
Start: 2024-01-12 | End: 2024-01-12

## 2024-01-12 RX ORDER — ACETAMINOPHEN 325 MG/1
1000 TABLET ORAL ONCE
Status: CANCELLED | OUTPATIENT
Start: 2024-01-12 | End: 2024-01-12

## 2024-01-12 RX ORDER — GABAPENTIN 600 MG/1
300 TABLET ORAL
Status: CANCELLED | OUTPATIENT
Start: 2024-01-12 | End: 2024-01-13

## 2024-01-15 ENCOUNTER — ANESTHESIA (OUTPATIENT)
Facility: HOSPITAL | Age: 69
End: 2024-01-15
Payer: MEDICARE

## 2024-01-15 ENCOUNTER — HOSPITAL ENCOUNTER (OUTPATIENT)
Facility: HOSPITAL | Age: 69
Setting detail: OUTPATIENT SURGERY
Discharge: HOME | End: 2024-01-15
Attending: ORTHOPAEDIC SURGERY | Admitting: ORTHOPAEDIC SURGERY
Payer: MEDICARE

## 2024-01-15 VITALS
HEART RATE: 64 BPM | TEMPERATURE: 97.7 F | OXYGEN SATURATION: 96 % | RESPIRATION RATE: 16 BRPM | WEIGHT: 153 LBS | HEIGHT: 70 IN | BODY MASS INDEX: 21.9 KG/M2 | SYSTOLIC BLOOD PRESSURE: 129 MMHG | DIASTOLIC BLOOD PRESSURE: 70 MMHG

## 2024-01-15 LAB
AMPHET UR QL SCN: NEGATIVE
BARBITURATES UR QL SCN: NEGATIVE
BENZODIAZ UR QL: NEGATIVE
CANNABINOIDS UR QL SCN: NEGATIVE
COCAINE UR QL SCN: NEGATIVE
EKG ATRIAL RATE: 56 BPM
EKG DIAGNOSIS: NORMAL
EKG P AXIS: -12 DEGREES
EKG P-R INTERVAL: 170 MS
EKG Q-T INTERVAL: 454 MS
EKG QRS DURATION: 92 MS
EKG QTC CALCULATION (BAZETT): 438 MS
EKG R AXIS: 18 DEGREES
EKG T AXIS: 41 DEGREES
EKG VENTRICULAR RATE: 56 BPM
Lab: NORMAL
METHADONE UR QL: NEGATIVE
OPIATES UR QL: NEGATIVE
PCP UR QL: NEGATIVE

## 2024-01-15 PROCEDURE — 2720000010 HC SURG SUPPLY STERILE: Performed by: ORTHOPAEDIC SURGERY

## 2024-01-15 PROCEDURE — 93005 ELECTROCARDIOGRAM TRACING: CPT | Performed by: NURSE ANESTHETIST, CERTIFIED REGISTERED

## 2024-01-15 PROCEDURE — 2580000003 HC RX 258: Performed by: NURSE ANESTHETIST, CERTIFIED REGISTERED

## 2024-01-15 PROCEDURE — 7100000001 HC PACU RECOVERY - ADDTL 15 MIN: Performed by: ORTHOPAEDIC SURGERY

## 2024-01-15 PROCEDURE — C1713 ANCHOR/SCREW BN/BN,TIS/BN: HCPCS | Performed by: ORTHOPAEDIC SURGERY

## 2024-01-15 PROCEDURE — 3600000002 HC SURGERY LEVEL 2 BASE: Performed by: ORTHOPAEDIC SURGERY

## 2024-01-15 PROCEDURE — A4217 STERILE WATER/SALINE, 500 ML: HCPCS | Performed by: ORTHOPAEDIC SURGERY

## 2024-01-15 PROCEDURE — 2500000003 HC RX 250 WO HCPCS: Performed by: ANESTHESIOLOGY

## 2024-01-15 PROCEDURE — 80307 DRUG TEST PRSMV CHEM ANLYZR: CPT

## 2024-01-15 PROCEDURE — 7100000000 HC PACU RECOVERY - FIRST 15 MIN: Performed by: ORTHOPAEDIC SURGERY

## 2024-01-15 PROCEDURE — 93010 ELECTROCARDIOGRAM REPORT: CPT | Performed by: INTERNAL MEDICINE

## 2024-01-15 PROCEDURE — 2709999900 HC NON-CHARGEABLE SUPPLY: Performed by: ORTHOPAEDIC SURGERY

## 2024-01-15 PROCEDURE — 6370000000 HC RX 637 (ALT 250 FOR IP): Performed by: NURSE ANESTHETIST, CERTIFIED REGISTERED

## 2024-01-15 PROCEDURE — 7100000011 HC PHASE II RECOVERY - ADDTL 15 MIN: Performed by: ORTHOPAEDIC SURGERY

## 2024-01-15 PROCEDURE — 3700000000 HC ANESTHESIA ATTENDED CARE: Performed by: ORTHOPAEDIC SURGERY

## 2024-01-15 PROCEDURE — 2580000003 HC RX 258: Performed by: ANESTHESIOLOGY

## 2024-01-15 PROCEDURE — 6360000002 HC RX W HCPCS: Performed by: NURSE ANESTHETIST, CERTIFIED REGISTERED

## 2024-01-15 PROCEDURE — 6360000002 HC RX W HCPCS: Performed by: ANESTHESIOLOGY

## 2024-01-15 PROCEDURE — 3600000012 HC SURGERY LEVEL 2 ADDTL 15MIN: Performed by: ORTHOPAEDIC SURGERY

## 2024-01-15 PROCEDURE — 3700000001 HC ADD 15 MINUTES (ANESTHESIA): Performed by: ORTHOPAEDIC SURGERY

## 2024-01-15 PROCEDURE — 6360000002 HC RX W HCPCS: Performed by: ORTHOPAEDIC SURGERY

## 2024-01-15 PROCEDURE — 7100000010 HC PHASE II RECOVERY - FIRST 15 MIN: Performed by: ORTHOPAEDIC SURGERY

## 2024-01-15 PROCEDURE — 64415 NJX AA&/STRD BRCH PLXS IMG: CPT | Performed by: ANESTHESIOLOGY

## 2024-01-15 PROCEDURE — 2580000003 HC RX 258: Performed by: ORTHOPAEDIC SURGERY

## 2024-01-15 PROCEDURE — 6370000000 HC RX 637 (ALT 250 FOR IP): Performed by: ORTHOPAEDIC SURGERY

## 2024-01-15 DEVICE — ANCHOR SUT L19.1MM DIA7MM BIOCOMPOSITE SWIVELOCK TENODESIS: Type: IMPLANTABLE DEVICE | Site: SHOULDER | Status: FUNCTIONAL

## 2024-01-15 DEVICE — ANCHOR SUT L14.7MM DIA5.5MM BIOCOMPOSITE FULL THRD W/ 1.3MM: Type: IMPLANTABLE DEVICE | Site: SHOULDER | Status: FUNCTIONAL

## 2024-01-15 DEVICE — ANCHOR SUTURE BIOCOMP 4.75X19.1 MM SWIVELOCK C: Type: IMPLANTABLE DEVICE | Site: SHOULDER | Status: FUNCTIONAL

## 2024-01-15 RX ORDER — GABAPENTIN 600 MG/1
300 TABLET ORAL
Status: DISCONTINUED | OUTPATIENT
Start: 2024-01-15 | End: 2024-01-15

## 2024-01-15 RX ORDER — ONDANSETRON 2 MG/ML
4 INJECTION INTRAMUSCULAR; INTRAVENOUS
Status: DISCONTINUED | OUTPATIENT
Start: 2024-01-15 | End: 2024-01-15 | Stop reason: HOSPADM

## 2024-01-15 RX ORDER — SODIUM CHLORIDE 0.9 % (FLUSH) 0.9 %
5-40 SYRINGE (ML) INJECTION PRN
Status: DISCONTINUED | OUTPATIENT
Start: 2024-01-15 | End: 2024-01-15 | Stop reason: HOSPADM

## 2024-01-15 RX ORDER — GABAPENTIN 300 MG/1
300 CAPSULE ORAL
Status: COMPLETED | OUTPATIENT
Start: 2024-01-15 | End: 2024-01-15

## 2024-01-15 RX ORDER — DEXTROSE MONOHYDRATE 100 MG/ML
INJECTION, SOLUTION INTRAVENOUS CONTINUOUS PRN
Status: DISCONTINUED | OUTPATIENT
Start: 2024-01-15 | End: 2024-01-15 | Stop reason: HOSPADM

## 2024-01-15 RX ORDER — LIDOCAINE HYDROCHLORIDE 20 MG/ML
INJECTION, SOLUTION EPIDURAL; INFILTRATION; INTRACAUDAL; PERINEURAL PRN
Status: DISCONTINUED | OUTPATIENT
Start: 2024-01-15 | End: 2024-01-15 | Stop reason: SDUPTHER

## 2024-01-15 RX ORDER — MEPERIDINE HYDROCHLORIDE 25 MG/ML
12.5 INJECTION INTRAMUSCULAR; INTRAVENOUS; SUBCUTANEOUS EVERY 5 MIN PRN
Status: DISCONTINUED | OUTPATIENT
Start: 2024-01-15 | End: 2024-01-15 | Stop reason: HOSPADM

## 2024-01-15 RX ORDER — DIPHENHYDRAMINE HYDROCHLORIDE 50 MG/ML
12.5 INJECTION INTRAMUSCULAR; INTRAVENOUS
Status: DISCONTINUED | OUTPATIENT
Start: 2024-01-15 | End: 2024-01-15 | Stop reason: HOSPADM

## 2024-01-15 RX ORDER — HYDRALAZINE HYDROCHLORIDE 20 MG/ML
10 INJECTION INTRAMUSCULAR; INTRAVENOUS
Status: DISCONTINUED | OUTPATIENT
Start: 2024-01-15 | End: 2024-01-15 | Stop reason: HOSPADM

## 2024-01-15 RX ORDER — CELECOXIB 100 MG/1
200 CAPSULE ORAL ONCE
Status: COMPLETED | OUTPATIENT
Start: 2024-01-15 | End: 2024-01-15

## 2024-01-15 RX ORDER — DROPERIDOL 2.5 MG/ML
0.62 INJECTION, SOLUTION INTRAMUSCULAR; INTRAVENOUS
Status: DISCONTINUED | OUTPATIENT
Start: 2024-01-15 | End: 2024-01-15 | Stop reason: HOSPADM

## 2024-01-15 RX ORDER — CEFAZOLIN SODIUM 1 G/3ML
INJECTION, POWDER, FOR SOLUTION INTRAMUSCULAR; INTRAVENOUS PRN
Status: DISCONTINUED | OUTPATIENT
Start: 2024-01-15 | End: 2024-01-15 | Stop reason: SDUPTHER

## 2024-01-15 RX ORDER — ACETAMINOPHEN 500 MG
1000 TABLET ORAL ONCE
Status: COMPLETED | OUTPATIENT
Start: 2024-01-15 | End: 2024-01-15

## 2024-01-15 RX ORDER — SODIUM CHLORIDE, SODIUM LACTATE, POTASSIUM CHLORIDE, CALCIUM CHLORIDE 600; 310; 30; 20 MG/100ML; MG/100ML; MG/100ML; MG/100ML
INJECTION, SOLUTION INTRAVENOUS CONTINUOUS
Status: DISCONTINUED | OUTPATIENT
Start: 2024-01-15 | End: 2024-01-15 | Stop reason: HOSPADM

## 2024-01-15 RX ORDER — ROPIVACAINE HYDROCHLORIDE 5 MG/ML
30 INJECTION, SOLUTION EPIDURAL; INFILTRATION; PERINEURAL ONCE
Status: COMPLETED | OUTPATIENT
Start: 2024-01-15 | End: 2024-01-15

## 2024-01-15 RX ORDER — SODIUM CHLORIDE, SODIUM LACTATE, POTASSIUM CHLORIDE, CALCIUM CHLORIDE 600; 310; 30; 20 MG/100ML; MG/100ML; MG/100ML; MG/100ML
INJECTION, SOLUTION INTRAVENOUS CONTINUOUS PRN
Status: DISCONTINUED | OUTPATIENT
Start: 2024-01-15 | End: 2024-01-15 | Stop reason: SDUPTHER

## 2024-01-15 RX ORDER — ACETAMINOPHEN 160 MG/5ML
650 LIQUID ORAL
Status: DISCONTINUED | OUTPATIENT
Start: 2024-01-15 | End: 2024-01-15 | Stop reason: HOSPADM

## 2024-01-15 RX ORDER — FENTANYL CITRATE 50 UG/ML
50 INJECTION, SOLUTION INTRAMUSCULAR; INTRAVENOUS EVERY 5 MIN PRN
Status: DISCONTINUED | OUTPATIENT
Start: 2024-01-15 | End: 2024-01-15 | Stop reason: HOSPADM

## 2024-01-15 RX ORDER — FENTANYL CITRATE 50 UG/ML
25 INJECTION, SOLUTION INTRAMUSCULAR; INTRAVENOUS EVERY 5 MIN PRN
Status: DISCONTINUED | OUTPATIENT
Start: 2024-01-15 | End: 2024-01-15 | Stop reason: HOSPADM

## 2024-01-15 RX ORDER — LIDOCAINE HYDROCHLORIDE 10 MG/ML
1 INJECTION, SOLUTION EPIDURAL; INFILTRATION; INTRACAUDAL; PERINEURAL
Status: DISCONTINUED | OUTPATIENT
Start: 2024-01-15 | End: 2024-01-15 | Stop reason: HOSPADM

## 2024-01-15 RX ORDER — ROPIVACAINE HYDROCHLORIDE 5 MG/ML
INJECTION, SOLUTION EPIDURAL; INFILTRATION; PERINEURAL
Status: COMPLETED | OUTPATIENT
Start: 2024-01-15 | End: 2024-01-15

## 2024-01-15 RX ORDER — FAMOTIDINE 20 MG/1
20 TABLET, FILM COATED ORAL ONCE
Status: COMPLETED | OUTPATIENT
Start: 2024-01-15 | End: 2024-01-15

## 2024-01-15 RX ORDER — FENTANYL CITRATE 50 UG/ML
100 INJECTION, SOLUTION INTRAMUSCULAR; INTRAVENOUS
Status: DISCONTINUED | OUTPATIENT
Start: 2024-01-15 | End: 2024-01-15 | Stop reason: HOSPADM

## 2024-01-15 RX ORDER — SODIUM CHLORIDE 0.9 % (FLUSH) 0.9 %
5-40 SYRINGE (ML) INJECTION EVERY 12 HOURS SCHEDULED
Status: DISCONTINUED | OUTPATIENT
Start: 2024-01-15 | End: 2024-01-15 | Stop reason: HOSPADM

## 2024-01-15 RX ORDER — IPRATROPIUM BROMIDE AND ALBUTEROL SULFATE 2.5; .5 MG/3ML; MG/3ML
1 SOLUTION RESPIRATORY (INHALATION)
Status: DISCONTINUED | OUTPATIENT
Start: 2024-01-15 | End: 2024-01-15 | Stop reason: HOSPADM

## 2024-01-15 RX ORDER — ALBUTEROL SULFATE 2.5 MG/3ML
2.5 SOLUTION RESPIRATORY (INHALATION) ONCE
Status: COMPLETED | OUTPATIENT
Start: 2024-01-15 | End: 2024-01-15

## 2024-01-15 RX ORDER — ONDANSETRON 2 MG/ML
INJECTION INTRAMUSCULAR; INTRAVENOUS PRN
Status: DISCONTINUED | OUTPATIENT
Start: 2024-01-15 | End: 2024-01-15 | Stop reason: SDUPTHER

## 2024-01-15 RX ORDER — ROCURONIUM BROMIDE 10 MG/ML
INJECTION, SOLUTION INTRAVENOUS PRN
Status: DISCONTINUED | OUTPATIENT
Start: 2024-01-15 | End: 2024-01-15 | Stop reason: SDUPTHER

## 2024-01-15 RX ORDER — SODIUM CHLORIDE 9 MG/ML
INJECTION, SOLUTION INTRAVENOUS PRN
Status: DISCONTINUED | OUTPATIENT
Start: 2024-01-15 | End: 2024-01-15 | Stop reason: HOSPADM

## 2024-01-15 RX ORDER — MIDAZOLAM HYDROCHLORIDE 2 MG/2ML
2 INJECTION, SOLUTION INTRAMUSCULAR; INTRAVENOUS
Status: COMPLETED | OUTPATIENT
Start: 2024-01-15 | End: 2024-01-15

## 2024-01-15 RX ADMIN — ROPIVACAINE HYDROCHLORIDE 20 ML: 5 INJECTION, SOLUTION EPIDURAL; INFILTRATION; PERINEURAL at 08:31

## 2024-01-15 RX ADMIN — SODIUM CHLORIDE, SODIUM LACTATE, POTASSIUM CHLORIDE, AND CALCIUM CHLORIDE: 600; 310; 30; 20 INJECTION, SOLUTION INTRAVENOUS at 08:53

## 2024-01-15 RX ADMIN — ONDANSETRON 4 MG: 2 INJECTION INTRAMUSCULAR; INTRAVENOUS at 10:42

## 2024-01-15 RX ADMIN — ROPIVACAINE HYDROCHLORIDE 30 ML: 5 INJECTION EPIDURAL; INFILTRATION; PERINEURAL at 08:37

## 2024-01-15 RX ADMIN — ROCURONIUM BROMIDE 50 MG: 10 INJECTION, SOLUTION INTRAVENOUS at 08:55

## 2024-01-15 RX ADMIN — CELECOXIB 200 MG: 100 CAPSULE ORAL at 08:09

## 2024-01-15 RX ADMIN — PROPOFOL 12 MG: 10 INJECTION, EMULSION INTRAVENOUS at 08:55

## 2024-01-15 RX ADMIN — ACETAMINOPHEN 1000 MG: 500 TABLET ORAL at 08:09

## 2024-01-15 RX ADMIN — CEFAZOLIN 2 G: 330 INJECTION, POWDER, FOR SOLUTION INTRAMUSCULAR; INTRAVENOUS at 09:14

## 2024-01-15 RX ADMIN — MIDAZOLAM 2 MG: 1 INJECTION INTRAMUSCULAR; INTRAVENOUS at 08:31

## 2024-01-15 RX ADMIN — GABAPENTIN 300 MG: 300 CAPSULE ORAL at 08:27

## 2024-01-15 RX ADMIN — LIDOCAINE HYDROCHLORIDE 100 MG: 20 INJECTION, SOLUTION EPIDURAL; INFILTRATION; INTRACAUDAL; PERINEURAL at 08:55

## 2024-01-15 RX ADMIN — ALBUTEROL SULFATE 2.5 MG: 2.5 SOLUTION RESPIRATORY (INHALATION) at 12:13

## 2024-01-15 RX ADMIN — PHENYLEPHRINE HYDROCHLORIDE 100 MCG: 10 INJECTION INTRAVENOUS at 09:21

## 2024-01-15 RX ADMIN — FAMOTIDINE 20 MG: 20 TABLET ORAL at 08:09

## 2024-01-15 RX ADMIN — SODIUM CHLORIDE, POTASSIUM CHLORIDE, SODIUM LACTATE AND CALCIUM CHLORIDE: 600; 310; 30; 20 INJECTION, SOLUTION INTRAVENOUS at 08:11

## 2024-01-15 ASSESSMENT — PAIN - FUNCTIONAL ASSESSMENT
PAIN_FUNCTIONAL_ASSESSMENT: NONE - DENIES PAIN
PAIN_FUNCTIONAL_ASSESSMENT: 0-10

## 2024-01-15 NOTE — ANESTHESIA POSTPROCEDURE EVALUATION
Department of Anesthesiology  Postprocedure Note    Patient: García Forman Jr.  MRN: [id number]  YOB: 1955  Date of evaluation: 1/15/2024    Procedure Summary       Date: 01/15/24 Room / Location: [facility] MAIN 05 / [facility] OR    Anesthesia Start: 0852 Anesthesia Stop: 1108    Procedure: RIGHT SHOULDER ARTHROSCOPIC ROTATOR CUFF REPAIR; BICEPS TENODESIS (Right: Shoulder) Diagnosis:       Complete tear of right rotator cuff, unspecified whether traumatic      Biceps tendinitis on right      (Complete tear of right rotator cuff, unspecified whether traumatic [M75.121])      (Biceps tendinitis on right [M75.21])    Surgeons: Jay Kaminski MD Responsible Provider: Mat Burleson MD    Anesthesia Type: general, regional ASA Status: 3            Anesthesia Type: No value filed.    Radha Phase I: Radha Score: 5    Radha Phase II:      Anesthesia Post Evaluation    Patient location during evaluation: PACU  Patient participation: complete - patient participated  Level of consciousness: awake  Airway patency: patent  Nausea & Vomiting: no vomiting and no nausea  Respiratory status: acceptable  Hydration status: euvolemic  Pain management: adequate    No notable events documented.

## 2024-01-15 NOTE — ANESTHESIA PROCEDURE NOTES
Peripheral Block    Patient location during procedure: OR  Reason for block: post-op pain management and at surgeon's request  Start time: 1/15/2024 8:31 AM  End time: 1/15/2024 8:42 AM  Staffing  Performed: anesthesiologist   Performed by: Mat Burleson MD  Authorized by: Mat Burleson MD    Preanesthetic Checklist  Completed: patient identified, IV checked, site marked, risks and benefits discussed, surgical/procedural consents, equipment checked, pre-op evaluation, timeout performed, anesthesia consent given, oxygen available, monitors applied/VS acknowledged, fire risk safety assessment completed and verbalized and blood product R/B/A discussed and consented  Peripheral Block   Block type: Brachial plexus  Interscalene  Laterality: right  Injection technique: single-shot  Guidance: ultrasound guided    Needle   Needle type: short-bevel   Needle gauge: 21 G  Needle localization: ultrasound guidance  Test dose: negative  Needle length: 10 cm  Assessment   Injection assessment: negative aspiration for heme, no paresthesia on injection, local visualized surrounding nerve on ultrasound and no intravascular symptoms  Slow fractionated injection: yes  Hemodynamics: stable  Real-time US image taken/store: yes  Outcomes: uncomplicated    Medications Administered  ropivacaine (NAROPIN) injection 0.5% - Perineural   20 mL - 1/15/2024 8:31:00 AM

## 2024-01-15 NOTE — ANESTHESIA PRE PROCEDURE
Department of Anesthesiology  Preprocedure Note       Name:  García Forman Jr.   Age:  68 y.o.  :  1955                                          MRN:  [id number]         Date:  1/15/2024      Surgeon: Surgeon(s):  Jay Kaminski MD    Procedure: Procedure(s):  RIGHT SHOULDER ARTHROSCOPIC ROTATOR CUFF REPAIR; BICEPS TENODESIS; [[employer]], SPIDER, TMAX; NERVE BLOCK    Medications prior to admission:   Prior to Admission medications    Medication Sig Start Date End Date Taking? Authorizing Provider   NONFORMULARY Take 99 mg by mouth daily Potassium: Over the counter    Yes Esvin Rai MD   Multiple Vitamins-Minerals (THERAPEUTIC MULTIVITAMIN-MINERALS) tablet Take 1 tablet by mouth daily   Yes Esvin Rai MD   amiodarone (CORDARONE) 200 MG tablet Take 1 tablet by mouth every morning 23   hSae Peters APRN - CNP   albuterol sulfate HFA (VENTOLIN HFA) 108 (90 Base) MCG/ACT inhaler Inhale 2 puffs into the lungs 4 times daily as needed for Wheezing 23   Shae Peters APRN - CNP   amLODIPine (NORVASC) 5 MG tablet Take 1 tablet by mouth daily 23   Shae Peters APRN - CNP   atorvastatin (LIPITOR) 40 MG tablet Take 1 tablet by mouth daily 23   Shae Peters APRN - CNP   furosemide (LASIX) 20 MG tablet Take 1 tablet by mouth daily 10/4/23   Shae Peters APRN - CNP   LYCOPENE PO Take 1 tablet by mouth daily 19   Esvin Rai MD   aspirin 81 MG chewable tablet Take 1 tablet by mouth daily 10/4/23   Shae Peters APRN - CNP   metoprolol tartrate (LOPRESSOR) 50 MG tablet Take 1.5 tablets by mouth 2 times daily 10/4/23   Shae Peters APRN - CNP   vitamin D (CHOLECALCIFEROL) 25 MCG (1000 UT) TABS tablet Take by mouth daily    Automatic Reconciliation, Ar   nitroGLYCERIN (NITROSTAT) 0.4 MG SL tablet Place 1 tablet under the tongue as needed 3/1/21   Automatic Reconciliation, Ar       Current medications:    Current

## 2024-01-15 NOTE — DISCHARGE INSTRUCTIONS
DISCHARGE SUMMARY from Nurse    PATIENT INSTRUCTIONS:    After general anesthesia or intravenous sedation, for 24 hours or while taking prescription Narcotics:  Limit your activities  Do not drive and operate hazardous machinery  Do not make important personal or business decisions  Do  not drink alcoholic beverages  If you have not urinated within 8 hours after discharge, please contact your surgeon on call.    Report the following to your surgeon:  Excessive pain, swelling, redness or odor of or around the surgical area  Temperature over 100.5  Nausea and vomiting lasting longer than 4 hours or if unable to take medications  Any signs of decreased circulation or nerve impairment to extremity: change in color, persistent  numbness, tingling, coldness or increase pain  Any questions    These are general instructions for a healthy lifestyle:    No smoking/ No tobacco products/ Avoid exposure to second hand smoke  Surgeon General's Warning:  Quitting smoking now greatly reduces serious risk to your health.    Obesity, smoking, and sedentary lifestyle greatly increases your risk for illness    A healthy diet, regular physical exercise & weight monitoring are important for maintaining a healthy lifestyle    You may be retaining fluid if you have a history of heart failure or if you experience any of the following symptoms:  Weight gain of 3 pounds or more overnight or 5 pounds in a week, increased swelling in our hands or feet or shortness of breath while lying flat in bed.  Please call your doctor as soon as you notice any of these symptoms; do not wait until your next office visit.        The discharge information has been reviewed with the patient and roommate.  The patient and roommate verbalized understanding.  Discharge medications reviewed with the patient and roommate and appropriate educational materials and side effects teaching were

## 2024-01-15 NOTE — OP NOTE
Operative Note      Patient: García Forman Jr.  YOB: 1955  MRN: [id number]    Date of Procedure: 1/15/2024    Pre-Op Diagnosis Codes:     * Complete tear of right rotator cuff, unspecified whether traumatic [M75.121]     * Biceps tendinitis on right [M75.21]    Post-Op Diagnosis: Same     Type II SLAP tear right shoulder, biceps tendon tearing right shoulder    Procedure(s):  RIGHT SHOULDER ARTHROSCOPIC ROTATOR CUFF REPAIR; BICEPS TENODESIS    Surgeon(s):  Jay Kaminski MD    Assistant:   Surgical Assistant: Quang Demarco Sara    Anesthesia: General    Estimated Blood Loss (mL): Minimal    Complications: None    Specimens:   * No specimens in log *    Implants:  Implant Name Type Inv. Item Serial No.  Lot No. LRB No. Used Action   ANCHOR SUTURE BIOCOMP 4.75X19.1 MM SWIVELOCK C - [id number]  ANCHOR SUTURE BIOCOMP 4.75X19.1 MM SWIVELOCK C [id number] [employer] [place] [id number] Right 1 Implanted   ANCHOR SUT L19.1MM DIA7MM BIOCOMPOSITE SWIVELOCK TENODESIS - [id number]  ANCHOR SUT L19.1MM DIA7MM BIOCOMPOSITE SWIVELOCK TENODESIS [id number] [employer] [place] [id number] Right 1 Implanted   ANCHOR SUT L14.7MM DIA5.5MM BIOCOMPOSITE FULL THRD W/ 1.3MM - [id number]  ANCHOR SUT L14.7MM DIA5.5MM BIOCOMPOSITE FULL THRD W/ 1.3MM [id number] [employer] [id number] Right 2 Implanted   ANCHOR SUTURE BIOCOMP 4.75X19.1 MM SWIVELOCK C - [id number]  ANCHOR SUTURE BIOCOMP 4.75X19.1 MM SWIVELOCK C [id number] [employer] [place] [id number] Right 1 Implanted   ANCHOR SUTURE BIOCOMP 4.75X19.1 MM SWIVELOCK C - [id number]  ANCHOR SUTURE BIOCOMP 4.75X19.1 MM SWIVELOCK C  [employer] [place] [id number] Right 1 Implanted         Drains: * No LDAs found *    Findings: Right shoulder upper border subscapularis and supraspinatus rotator cuff tear, L tear  Right shoulder type II SLAP tear with intra-articular long head biceps tendon tearing        Detailed Description of Procedure:   The patient was seen in the preoperative

## 2024-01-15 NOTE — INTERVAL H&P NOTE
Update History & Physical    The patient's History and Physical of January 3, 2024 was reviewed with the patient and I examined the patient. There was no change. The surgical site was confirmed by the patient and me.     Plan: The risks, benefits, expected outcome, and alternative to the recommended procedure have been discussed with the patient. Patient understands and wants to proceed with the procedure.     Electronically signed by Jay Kaminski MD on 1/15/2024 at 7:45 AM

## 2024-01-15 NOTE — BRIEF OP NOTE
Brief Postoperative Note      Patient: García Forman Jr.  YOB: 1955  MRN: [id number]    Date of Procedure: 1/15/2024    Pre-Op Diagnosis Codes:     * Complete tear of right rotator cuff, unspecified whether traumatic [M75.121]     * Biceps tendinitis on right [M75.21]    Post-Op Diagnosis: Same       Procedure(s):  RIGHT SHOULDER ARTHROSCOPIC ROTATOR CUFF REPAIR; BICEPS TENODESIS    Surgeon(s):  Jay Kaminski MD    Assistant:  Surgical Assistant: Quang Demarco Sara    Anesthesia: General    Estimated Blood Loss (mL): Minimal    Complications: None    Specimens:   * No specimens in log *    Implants:  Implant Name Type Inv. Item Serial No.  Lot No. LRB No. Used Action   ANCHOR SUTURE BIOCOMP 4.75X19.1 MM SWIVELOCK C - [id number]  ANCHOR SUTURE BIOCOMP 4.75X19.1 MM SWIVELOCK C [id number] [employer] [id number] Right 1 Implanted   ANCHOR SUT L19.1MM DIA7MM BIOCOMPOSITE SWIVELOCK TENODESIS - [id number]  ANCHOR SUT L19.1MM DIA7MM BIOCOMPOSITE SWIVELOCK TENODESIS [id number] [employer] [id number] Right 1 Implanted   ANCHOR SUT L14.7MM DIA5.5MM BIOCOMPOSITE FULL THRD W/ 1.3MM - [id number]  ANCHOR SUT L14.7MM DIA5.5MM BIOCOMPOSITE FULL THRD W/ 1.3MM [id number] [employer] [id number] Right 2 Implanted   ANCHOR SUTURE BIOCOMP 4.75X19.1 MM SWIVELOCK C - [id number]  ANCHOR SUTURE BIOCOMP 4.75X19.1 MM SWIVELOCK C [id number] [employer] [id number] Right 1 Implanted   ANCHOR SUTURE BIOCOMP 4.75X19.1 MM SWIVELOCK C - [id number]  ANCHOR SUTURE BIOCOMP 4.75X19.1 MM SWIVELOCK C  [employer]WD [id number] Right 1 Implanted         Drains: * No LDAs found *    Findings: Right rotator cuff tear  Biceps SLAP tear/biceps tendon tearing      Electronically signed by Jay Kaminski MD on 1/15/2024 at 10:45 AM

## 2024-01-22 ENCOUNTER — OFFICE VISIT (OUTPATIENT)
Facility: CLINIC | Age: 69
End: 2024-01-22
Payer: MEDICARE

## 2024-01-22 VITALS
BODY MASS INDEX: 22.05 KG/M2 | RESPIRATION RATE: 16 BRPM | SYSTOLIC BLOOD PRESSURE: 132 MMHG | HEART RATE: 58 BPM | OXYGEN SATURATION: 99 % | DIASTOLIC BLOOD PRESSURE: 81 MMHG | WEIGHT: 154 LBS | TEMPERATURE: 99.2 F | HEIGHT: 70 IN

## 2024-01-22 DIAGNOSIS — F10.11 HISTORY OF ALCOHOL ABUSE: ICD-10-CM

## 2024-01-22 DIAGNOSIS — Z86.79 HISTORY OF CARDIOMYOPATHY: ICD-10-CM

## 2024-01-22 DIAGNOSIS — Z86.79 HISTORY OF CHF (CONGESTIVE HEART FAILURE): ICD-10-CM

## 2024-01-22 DIAGNOSIS — Z87.01 HISTORY OF PNEUMONIA: Primary | ICD-10-CM

## 2024-01-22 DIAGNOSIS — I10 PRIMARY HYPERTENSION: ICD-10-CM

## 2024-01-22 PROCEDURE — 3079F DIAST BP 80-89 MM HG: CPT | Performed by: NURSE PRACTITIONER

## 2024-01-22 PROCEDURE — 3075F SYST BP GE 130 - 139MM HG: CPT | Performed by: NURSE PRACTITIONER

## 2024-01-22 PROCEDURE — 99214 OFFICE O/P EST MOD 30 MIN: CPT | Performed by: NURSE PRACTITIONER

## 2024-01-22 PROCEDURE — 1123F ACP DISCUSS/DSCN MKR DOCD: CPT | Performed by: NURSE PRACTITIONER

## 2024-01-22 RX ORDER — AMLODIPINE BESYLATE 5 MG/1
5 TABLET ORAL DAILY
Qty: 90 TABLET | Refills: 3 | Status: SHIPPED | OUTPATIENT
Start: 2024-01-22

## 2024-01-22 NOTE — PROGRESS NOTES
García Forman Jr. is a 68 y.o. male  established patient, here for evaluation of the following chief complaint(s):  Chief Complaint   Patient presents with    1 Month Follow-Up      Assessment and Plan  1. History of pneumonia  2. History of CHF (congestive heart failure)  3. History of alcohol abuse  4. History of cardiomyopathy  5. Primary hypertension  -     amLODIPine (NORVASC) 5 MG tablet; Take 1 tablet by mouth daily, Disp-90 tablet, R-3Normal       Return in about 6 months (around 7/22/2024) for Routine, 15.   HPI:   In office visit.  Pt had right shoulder surgery w/ complete tear of right rotator cuff biceps tendinitis last 1/15/2024 w/ Dr. Kaminski and sees him tomorrow. He has been taking Tylenol.      Pt has 2 drinks liquor a night, he has cut back from his previous alcohol drinking  ROS:    General: negative for - chills, fever, weight changes or malaise  HEENT: no sore throat, nasal congestion, vision problems or ear problems  Respiratory: no cough, shortness of breath, or wheezing  Cardiovascular: no chest pain, palpitations, or dyspnea on exertion  Gastrointestinal: no abdominal pain, N/V, change in bowel habits  Musculoskeletal: no back pain or joint pain  Neurological: no headache or dizziness  Endo:  No polyuria or polydipsia  : no urinary  Skin: Sutures of right shoulder  Psychological: negative for - anxiety, depression, sleeps issues    Prior to Admission medications    Medication Sig Start Date End Date Taking? Authorizing Provider   amLODIPine (NORVASC) 5 MG tablet Take 1 tablet by mouth daily 1/22/24  Yes Shae Peters APRN - CNP   NONFORMULARY Take 99 mg by mouth daily Potassium: Over the counter [place]   Yes ProviderEsvin MD   Multiple Vitamins-Minerals (THERAPEUTIC MULTIVITAMIN-MINERALS) tablet Take 1 tablet by mouth daily   Yes Esvin Rai MD   amiodarone (CORDARONE) 200 MG tablet Take 1 tablet by mouth every morning 11/21/23  Yes Shae Peters APRN -

## 2024-01-23 ENCOUNTER — OFFICE VISIT (OUTPATIENT)
Age: 69
End: 2024-01-23

## 2024-01-23 DIAGNOSIS — M75.121 NONTRAUMATIC COMPLETE TEAR OF RIGHT ROTATOR CUFF: Primary | ICD-10-CM

## 2024-01-23 DIAGNOSIS — M75.21 BICEPS TENDINITIS OF RIGHT SHOULDER: ICD-10-CM

## 2024-01-23 PROCEDURE — 99024 POSTOP FOLLOW-UP VISIT: CPT | Performed by: ORTHOPAEDIC SURGERY

## 2024-01-23 NOTE — INTERDISCIPLINARY ROUNDS
IDR Summary      Patient: Sunni Spear MRN: [id number]    Age: 61 y.o.  : 1955     Admit Diagnosis: Atrial fibrillation with RVR ([address]      Problems pertinent to hospital stay:     Consults:Case Management     Testing due for patient today? YES    Nutrition plan:Yes     Mobility needs: Yes      Lines/Tubes:   IV: YES   Needed: YES  Cooper: NO  Needed:NO  Central Line: NO Needed: NO      VTE Prophylaxis: Chemical    Influenza Vaccine received? YES        Care Management:  Discharge plan: home   PCP: Dulce Wilson, DO    : NO  Financial concerns:No   Interventions:       LOS: 1 days     Expected days until discharge: possibly tomorrow- pending stress test results and afib controlled       Signed:      JEFFERSON RogersC  [facility]  Hospitalist Division  Pager:  [phone]  Office:  [phone]
[phone]

## 2024-01-23 NOTE — PROGRESS NOTES
return to light duty only work with no use of the right arm starting next week.    Prescription medication management discussed with patient.     Plan was discussed in detail with patient, all questions were answered, and patient voiced understanding of plan.    Electronically signed by: Jay Kaminski MD     Note: This note was completed using voice recognition software.  Any typographical/name errors or mistakes are unintentional.

## 2024-02-27 ENCOUNTER — OFFICE VISIT (OUTPATIENT)
Age: 69
End: 2024-02-27

## 2024-02-27 DIAGNOSIS — M75.121 NONTRAUMATIC COMPLETE TEAR OF RIGHT ROTATOR CUFF: Primary | ICD-10-CM

## 2024-02-27 PROCEDURE — 99024 POSTOP FOLLOW-UP VISIT: CPT | Performed by: ORTHOPAEDIC SURGERY

## 2024-02-27 NOTE — PROGRESS NOTES
[facility] AMBULATORY OFFICE NOTE    Patient: García Forman Jr.                MRN: [id number]       SSN: [id number]  YOB: 1955        AGE: 69 y.o.        SEX: male  There is no height or weight on file to calculate BMI.    PCP: Shae Peters APRN - CNP  02/27/24    Chief Complaint: Right shoulder follow-up    HPI: García Forman Jr. is a 69 y.o. male patient who returns today for his right shoulder.  1 month out from his shoulder surgery.  He is doing well.    Past Medical History:   Diagnosis Date    Alcoholism (HCC)     Arthritis     Atrial fibrillation (HCC) 11/28/2017    Cardioversion (11/18)    Colon polyps 2016    Hearing loss     left ear    HLD (hyperlipidemia)     HTN (hypertension)     Nontoxic multinodular goiter 2/7/2018    Biopsy of two nodules in 2016 - benign    Osteoarthritis November 2017       Family History   Problem Relation Age of Onset    Arthritis Mother         Rheumatoid    Diabetes Father     Alcohol Abuse Father     Heart Disease Father     No Known Problems Sister     No Known Problems Brother     Diabetes Maternal Grandmother     No Known Problems Maternal Grandfather     No Known Problems Paternal Grandmother     No Known Problems Paternal Grandfather     No Known Problems Other        Current Outpatient Medications   Medication Sig Dispense Refill    amLODIPine (NORVASC) 5 MG tablet Take 1 tablet by mouth daily 90 tablet 3    NONFORMULARY Take 99 mg by mouth daily Potassium: Over the counter [place]      Multiple Vitamins-Minerals (THERAPEUTIC MULTIVITAMIN-MINERALS) tablet Take 1 tablet by mouth daily      amiodarone (CORDARONE) 200 MG tablet Take 1 tablet by mouth every morning 90 tablet 1    albuterol sulfate HFA (VENTOLIN HFA) 108 (90 Base) MCG/ACT inhaler Inhale 2 puffs into the lungs 4 times daily as needed for Wheezing 54 g 1    atorvastatin (LIPITOR) 40 MG tablet Take 1 tablet by mouth daily 90 tablet 3

## 2024-03-29 ENCOUNTER — OFFICE VISIT (OUTPATIENT)
Facility: CLINIC | Age: 69
End: 2024-03-29
Payer: MEDICARE

## 2024-03-29 VITALS
HEART RATE: 53 BPM | SYSTOLIC BLOOD PRESSURE: 130 MMHG | BODY MASS INDEX: 25.27 KG/M2 | DIASTOLIC BLOOD PRESSURE: 77 MMHG | RESPIRATION RATE: 17 BRPM | OXYGEN SATURATION: 98 % | TEMPERATURE: 98.2 F | WEIGHT: 161 LBS | HEIGHT: 67 IN

## 2024-03-29 DIAGNOSIS — I50.22 CHRONIC SYSTOLIC CONGESTIVE HEART FAILURE (HCC): ICD-10-CM

## 2024-03-29 DIAGNOSIS — Z71.89 ACP (ADVANCE CARE PLANNING): ICD-10-CM

## 2024-03-29 DIAGNOSIS — I42.6 ALCOHOLIC CARDIOMYOPATHY (HCC): ICD-10-CM

## 2024-03-29 DIAGNOSIS — I48.0 PAROXYSMAL ATRIAL FIBRILLATION (HCC): ICD-10-CM

## 2024-03-29 DIAGNOSIS — Z00.00 MEDICARE ANNUAL WELLNESS VISIT, SUBSEQUENT: Primary | ICD-10-CM

## 2024-03-29 DIAGNOSIS — Z87.891 PERSONAL HISTORY OF TOBACCO USE: ICD-10-CM

## 2024-03-29 DIAGNOSIS — I10 PRIMARY HYPERTENSION: ICD-10-CM

## 2024-03-29 PROCEDURE — 1123F ACP DISCUSS/DSCN MKR DOCD: CPT | Performed by: NURSE PRACTITIONER

## 2024-03-29 PROCEDURE — 3075F SYST BP GE 130 - 139MM HG: CPT | Performed by: NURSE PRACTITIONER

## 2024-03-29 PROCEDURE — G0439 PPPS, SUBSEQ VISIT: HCPCS | Performed by: NURSE PRACTITIONER

## 2024-03-29 PROCEDURE — 3078F DIAST BP <80 MM HG: CPT | Performed by: NURSE PRACTITIONER

## 2024-03-29 PROCEDURE — 99214 OFFICE O/P EST MOD 30 MIN: CPT | Performed by: NURSE PRACTITIONER

## 2024-03-29 RX ORDER — AMIODARONE HYDROCHLORIDE 200 MG/1
200 TABLET ORAL EVERY MORNING
Qty: 90 TABLET | Refills: 1 | Status: SHIPPED | OUTPATIENT
Start: 2024-03-29

## 2024-03-29 RX ORDER — METOPROLOL TARTRATE 50 MG/1
75 TABLET, FILM COATED ORAL 2 TIMES DAILY
Qty: 180 TABLET | Refills: 3 | Status: SHIPPED | OUTPATIENT
Start: 2024-03-29

## 2024-03-29 RX ORDER — FUROSEMIDE 20 MG/1
20 TABLET ORAL DAILY
Qty: 90 TABLET | Refills: 1 | Status: SHIPPED | OUTPATIENT
Start: 2024-03-29

## 2024-03-29 ASSESSMENT — PATIENT HEALTH QUESTIONNAIRE - PHQ9
SUM OF ALL RESPONSES TO PHQ QUESTIONS 1-9: 0
1. LITTLE INTEREST OR PLEASURE IN DOING THINGS: NOT AT ALL
SUM OF ALL RESPONSES TO PHQ QUESTIONS 1-9: 0
SUM OF ALL RESPONSES TO PHQ9 QUESTIONS 1 & 2: 0
2. FEELING DOWN, DEPRESSED OR HOPELESS: NOT AT ALL

## 2024-03-29 NOTE — PROGRESS NOTES
García Forman Jr. is a 69 y.o. year old male who presents today for   Chief Complaint   Patient presents with    Follow-up       Is someone accompanying this pt? no    Is the patient using any DME equipment during OV? no    Depression Screening:       3/29/2024     1:20 PM 2023    12:53 PM 2023     1:35 PM 10/2/2023    10:19 AM 2022     1:04 PM 3/8/2021     8:20 AM   PHQ-9 Questionaire   Little interest or pleasure in doing things 0 0 0 0 0 0   Feeling down, depressed, or hopeless 0 0 0 0 0 0   PHQ-9 Total Score 0 0 0 0 0 0       Abuse Screenin/21/2023     1:00 PM   AMB Abuse Screening   Do you ever feel afraid of your partner? N   Are you in a relationship with someone who physically or mentally threatens you? N   Is it safe for you to go home? Y       Learning Assessment:  No question data found.    Fall Risk:      2023    12:53 PM 2023     1:35 PM 10/2/2023    10:19 AM   Fall Risk   2 or more falls in past year? no no no   Fall with injury in past year? no no no           Coordination of Care:   1. \"Have you been to the ER, urgent care clinic since your last visit?  Hospitalized since your last visit?\" no    2. \"Have you seen or consulted any other health care providers outside of the [facility] since your last visit?\" no    3. For patients aged 45-75: Has the patient had a colonoscopy / FIT/ Cologuard? yes    If the patient is female:    4. For patients aged 40-74: Has the patient had a mammogram within the past 2 years? N/A    5. For patients aged 21-65: Has the patient had a pap smear? N/A    Health Maintenance: reviewed and discussed and ordered per Provider.    Health Maintenance Due   Topic Date Due    COVID-19 Vaccine (1) Never done    Respiratory Syncytial Virus (RSV) Pregnant or age 60 yrs+ (1 - 1-dose 60+ series) Never done    AAA screen  Never done    Annual Wellness Visit (Medicare Advantage)  2024    Low dose CT lung screening &/or 
Take 1.5 tablets by mouth 2 times daily Yes Shae Peters APRN - CNP   furosemide (LASIX) 20 MG tablet Take 1 tablet by mouth daily Yes Shae Peters APRN - CNP   amLODIPine (NORVASC) 5 MG tablet Take 1 tablet by mouth daily Yes Shae Peters APRN - CNP   NONFORMULARY Take 99 mg by mouth daily Potassium: Over the counter [place] Yes ProviderEsvin MD   Multiple Vitamins-Minerals (THERAPEUTIC MULTIVITAMIN-MINERALS) tablet Take 1 tablet by mouth daily Yes ProviderEsvin MD   albuterol sulfate HFA (VENTOLIN HFA) 108 (90 Base) MCG/ACT inhaler Inhale 2 puffs into the lungs 4 times daily as needed for Wheezing Yes Shae Peters APRN - CNP   atorvastatin (LIPITOR) 40 MG tablet Take 1 tablet by mouth daily Yes Shae Peters APRN - CNP   LYCOPENE PO Take 1 tablet by mouth daily Yes ProviderEsvin MD   aspirin 81 MG chewable tablet Take 1 tablet by mouth daily Yes Shae Peters APRN - CNP   vitamin D (CHOLECALCIFEROL) 25 MCG (1000 UT) TABS tablet Take by mouth daily Yes Automatic Reconciliation, Ar   nitroGLYCERIN (NITROSTAT) 0.4 MG SL tablet Place 1 tablet under the tongue as needed Yes Automatic Reconciliation, Ar       CareTeam (Including outside providers/suppliers regularly involved in providing care):   Patient Care Team:  Shae Peters APRN - CNP as PCP - General  Shae Peters APRN - CNP as PCP - Empaneled Provider  Toribio Good MD as Surgeon     Reviewed and updated this visit:  Tobacco  Allergies  Meds  Problems  Med Hx  Surg Hx  Soc Hx  Fam Hx

## 2024-04-17 ENCOUNTER — OFFICE VISIT (OUTPATIENT)
Age: 69
End: 2024-04-17

## 2024-04-17 VITALS — BODY MASS INDEX: 25.22 KG/M2 | HEIGHT: 67 IN | RESPIRATION RATE: 16 BRPM

## 2024-04-17 DIAGNOSIS — M75.121 NONTRAUMATIC COMPLETE TEAR OF RIGHT ROTATOR CUFF: Primary | ICD-10-CM

## 2024-04-17 DIAGNOSIS — M75.21 BICEPS TENDINITIS OF RIGHT SHOULDER: ICD-10-CM

## 2024-04-17 PROCEDURE — 99024 POSTOP FOLLOW-UP VISIT: CPT | Performed by: ORTHOPAEDIC SURGERY

## 2024-04-17 NOTE — PROGRESS NOTES
[facility] AMBULATORY OFFICE NOTE    Patient: García Forman Jr.                MRN: [id number]       SSN: [id number]  YOB: 1955        AGE: 69 y.o.        SEX: male  Body mass index is 25.22 kg/m².    PCP: Shae Peters APRN - CNP  04/17/24    Chief Complaint: Postop right shoulder    HPI: García Forman Jr. is a 69 y.o. male patient who is 2 months postop from his right shoulder rotator cuff repair.  He is doing well.  Some forearm soreness at times.    Past Medical History:   Diagnosis Date    Alcoholism (HCC)     Arthritis     Atrial fibrillation (HCC) 11/28/2017    Cardioversion (11/18)    Colon polyps 2016    Hearing loss     left ear    HLD (hyperlipidemia)     HTN (hypertension)     Nontoxic multinodular goiter 2/7/2018    Biopsy of two nodules in 2016 - benign    Osteoarthritis November 2017       Family History   Problem Relation Age of Onset    Arthritis Mother         Rheumatoid    Diabetes Father     Alcohol Abuse Father     Heart Disease Father     No Known Problems Sister     No Known Problems Brother     Diabetes Maternal Grandmother     No Known Problems Maternal Grandfather     No Known Problems Paternal Grandmother     No Known Problems Paternal Grandfather     No Known Problems Other        Current Outpatient Medications   Medication Sig Dispense Refill    amiodarone (CORDARONE) 200 MG tablet Take 1 tablet by mouth every morning 90 tablet 1    metoprolol tartrate (LOPRESSOR) 50 MG tablet Take 1.5 tablets by mouth 2 times daily 180 tablet 3    furosemide (LASIX) 20 MG tablet Take 1 tablet by mouth daily 90 tablet 1    amLODIPine (NORVASC) 5 MG tablet Take 1 tablet by mouth daily 90 tablet 3    NONFORMULARY Take 99 mg by mouth daily Potassium: Over the counter [place]      Multiple Vitamins-Minerals (THERAPEUTIC MULTIVITAMIN-MINERALS) tablet Take 1 tablet by mouth daily      albuterol sulfate HFA (VENTOLIN HFA) 108 (90 Base) MCG/ACT

## 2024-05-29 ENCOUNTER — OFFICE VISIT (OUTPATIENT)
Age: 69
End: 2024-05-29
Payer: MEDICARE

## 2024-05-29 VITALS — HEIGHT: 67 IN | BODY MASS INDEX: 25.22 KG/M2 | RESPIRATION RATE: 16 BRPM

## 2024-05-29 DIAGNOSIS — M75.21 BICEPS TENDINITIS OF RIGHT SHOULDER: ICD-10-CM

## 2024-05-29 DIAGNOSIS — M75.121 NONTRAUMATIC COMPLETE TEAR OF RIGHT ROTATOR CUFF: Primary | ICD-10-CM

## 2024-05-29 PROCEDURE — 1123F ACP DISCUSS/DSCN MKR DOCD: CPT | Performed by: ORTHOPAEDIC SURGERY

## 2024-05-29 PROCEDURE — 99213 OFFICE O/P EST LOW 20 MIN: CPT | Performed by: ORTHOPAEDIC SURGERY

## 2024-05-29 NOTE — PROGRESS NOTES
Take 1 tablet by mouth daily      albuterol sulfate HFA (VENTOLIN HFA) 108 (90 Base) MCG/ACT inhaler Inhale 2 puffs into the lungs 4 times daily as needed for Wheezing 54 g 1    atorvastatin (LIPITOR) 40 MG tablet Take 1 tablet by mouth daily 90 tablet 3    LYCOPENE PO Take 1 tablet by mouth daily      aspirin 81 MG chewable tablet Take 1 tablet by mouth daily 90 tablet 3    vitamin D (CHOLECALCIFEROL) 25 MCG (1000 UT) TABS tablet Take by mouth daily      nitroGLYCERIN (NITROSTAT) 0.4 MG SL tablet Place 1 tablet under the tongue as needed       No current facility-administered medications for this visit.       No Known Allergies    Past Surgical History:   Procedure Laterality Date    CARDIOVERSION ELECTIVE ARRHYTHMIA EXTERNAL N/A 12/3/2018    EP CARDIOVERSION performed by Vinnie Obando MD at [facility] [place] CARDIAC CATH LAB    COLONOSCOPY  2016    tubular adenoma    HERNIA REPAIR      x 2    NE UNLISTED PROCEDURE CARDIAC SURGERY      SHOULDER ARTHROSCOPY Right 1/15/2024    RIGHT SHOULDER ARTHROSCOPIC ROTATOR CUFF REPAIR; BICEPS TENODESIS performed by Jay Kaminski MD at [facility] MAIN OR    TONSILLECTOMY  1960       Social History     Socioeconomic History    Marital status:      Spouse name: Not on file    Number of children: Not on file    Years of education: Not on file    Highest education level: Not on file   Occupational History    Not on file   Tobacco Use    Smoking status: Former     Current packs/day: 0.00     Average packs/day: 1 pack/day for 30.0 years (30.0 ttl pk-yrs)     Types: Cigarettes     Start date: 1985     Quit date: 2015     Years since quittin.9    Smokeless tobacco: Never   Vaping Use    Vaping Use: Former    Quit date: 2015    Substances: Nicotine    Devices: Disposable   Substance and Sexual Activity    Alcohol use: Yes     Alcohol/week: 21.0 standard drinks of alcohol     Types: 21 Shots of liquor per week    Drug use: Not Currently     Types: Marijuana (Weed), Cocaine

## 2024-06-30 ENCOUNTER — TELEPHONE (OUTPATIENT)
Dept: FAMILY MEDICINE CLINIC | Facility: CLINIC | Age: 69
End: 2024-06-30

## 2024-06-30 DIAGNOSIS — M54.50 ACUTE MIDLINE LOW BACK PAIN WITHOUT SCIATICA: Primary | ICD-10-CM

## 2024-06-30 RX ORDER — BACLOFEN 10 MG/1
10 TABLET ORAL 3 TIMES DAILY
Qty: 20 TABLET | Refills: 1 | Status: SHIPPED | OUTPATIENT
Start: 2024-06-30

## 2024-07-01 ENCOUNTER — OFFICE VISIT (OUTPATIENT)
Age: 69
End: 2024-07-01

## 2024-07-01 VITALS
WEIGHT: 158 LBS | DIASTOLIC BLOOD PRESSURE: 76 MMHG | HEIGHT: 67 IN | HEART RATE: 72 BPM | SYSTOLIC BLOOD PRESSURE: 137 MMHG | OXYGEN SATURATION: 98 % | BODY MASS INDEX: 24.8 KG/M2

## 2024-07-01 DIAGNOSIS — I48.0 PAROXYSMAL ATRIAL FIBRILLATION (HCC): Primary | ICD-10-CM

## 2024-07-01 NOTE — PROGRESS NOTES
García Forman Jr. presents today for   Chief Complaint   Patient presents with    Follow-up       García Forman Jr. preferred language for health care discussion is english/other.    Is someone accompanying this pt? no    Is the patient using any DME equipment during OV? no    Depression Screening:  Depression: Not at risk (7/1/2024)    PHQ-2     PHQ-2 Score: 0        Learning Assessment:  Who is the primary learner? Patient    What is the preferred language for health care of the primary learner? ENGLISH    How does the primary learner prefer to learn new concepts? DEMONSTRATION    Answered By patient    Relationship to Learner SELF           Pt currently taking Anticoagulant therapy? no    Pt currently taking Antiplatelet therapy ? aspirin      Coordination of Care:  1. Have you been to the ER, urgent care clinic since your last visit? Hospitalized since your last visit? no    2. Have you seen or consulted any other health care providers outside of the [facility] since your last visit? Include any pap smears or colon screening. no

## 2024-07-02 ENCOUNTER — TELEPHONE (OUTPATIENT)
Facility: CLINIC | Age: 69
End: 2024-07-02

## 2024-07-02 DIAGNOSIS — M54.50 ACUTE MIDLINE LOW BACK PAIN WITHOUT SCIATICA: ICD-10-CM

## 2024-07-02 NOTE — TELEPHONE ENCOUNTER
Patient called in asking for an increase in baclofen (LIORESAL) 10 MG tablet for lower back pain radiating from middle of the back down.    Pharmacy info:  [address] - P [phone] - F [phone]    Patient contact number: [phone]

## 2024-07-10 DIAGNOSIS — E78.00 ELEVATED CHOLESTEROL: ICD-10-CM

## 2024-07-10 DIAGNOSIS — I42.6 ALCOHOLIC CARDIOMYOPATHY (HCC): ICD-10-CM

## 2024-07-10 DIAGNOSIS — I48.0 PAROXYSMAL ATRIAL FIBRILLATION (HCC): ICD-10-CM

## 2024-07-10 RX ORDER — ATORVASTATIN CALCIUM 40 MG/1
40 TABLET, FILM COATED ORAL DAILY
Qty: 90 TABLET | Refills: 3 | Status: CANCELLED | OUTPATIENT
Start: 2024-07-10

## 2024-07-10 RX ORDER — AMIODARONE HYDROCHLORIDE 200 MG/1
200 TABLET ORAL EVERY MORNING
Qty: 90 TABLET | Refills: 1 | Status: CANCELLED | OUTPATIENT
Start: 2024-07-10

## 2024-07-10 SDOH — HEALTH STABILITY: PHYSICAL HEALTH: ON AVERAGE, HOW MANY DAYS PER WEEK DO YOU ENGAGE IN MODERATE TO STRENUOUS EXERCISE (LIKE A BRISK WALK)?: 6 DAYS

## 2024-07-10 SDOH — HEALTH STABILITY: PHYSICAL HEALTH: ON AVERAGE, HOW MANY MINUTES DO YOU ENGAGE IN EXERCISE AT THIS LEVEL?: 120 MIN

## 2024-07-10 ASSESSMENT — LIFESTYLE VARIABLES
HOW OFTEN DURING THE LAST YEAR HAVE YOU FOUND THAT YOU WERE NOT ABLE TO STOP DRINKING ONCE YOU HAD STARTED: NEVER
HOW OFTEN DURING THE LAST YEAR HAVE YOU BEEN UNABLE TO REMEMBER WHAT HAPPENED THE NIGHT BEFORE BECAUSE YOU HAD BEEN DRINKING: LESS THAN MONTHLY
HOW OFTEN DURING THE LAST YEAR HAVE YOU NEEDED AN ALCOHOLIC DRINK FIRST THING IN THE MORNING TO GET YOURSELF GOING AFTER A NIGHT OF HEAVY DRINKING: NEVER
HOW OFTEN DURING THE LAST YEAR HAVE YOU HAD A FEELING OF GUILT OR REMORSE AFTER DRINKING: NEVER
HOW OFTEN DURING THE LAST YEAR HAVE YOU BEEN UNABLE TO REMEMBER WHAT HAPPENED THE NIGHT BEFORE BECAUSE YOU HAD BEEN DRINKING: LESS THAN MONTHLY
HAS A RELATIVE, FRIEND, DOCTOR, OR ANOTHER HEALTH PROFESSIONAL EXPRESSED CONCERN ABOUT YOUR DRINKING OR SUGGESTED YOU CUT DOWN: YES, BUT NOT IN THE PAST YEAR
HAS A RELATIVE, FRIEND, DOCTOR, OR ANOTHER HEALTH PROFESSIONAL EXPRESSED CONCERN ABOUT YOUR DRINKING OR SUGGESTED YOU CUT DOWN: YES, BUT NOT IN THE PAST YEAR
HAVE YOU OR SOMEONE ELSE BEEN INJURED AS A RESULT OF YOUR DRINKING: NO
HOW MANY STANDARD DRINKS CONTAINING ALCOHOL DO YOU HAVE ON A TYPICAL DAY: 3 OR 4
HOW OFTEN DURING THE LAST YEAR HAVE YOU NEEDED AN ALCOHOLIC DRINK FIRST THING IN THE MORNING TO GET YOURSELF GOING AFTER A NIGHT OF HEAVY DRINKING: NEVER
HOW OFTEN DO YOU HAVE A DRINK CONTAINING ALCOHOL: 4 OR MORE TIMES A WEEK
HAVE YOU OR SOMEONE ELSE BEEN INJURED AS A RESULT OF YOUR DRINKING: NO
HOW OFTEN DURING THE LAST YEAR HAVE YOU FOUND THAT YOU WERE NOT ABLE TO STOP DRINKING ONCE YOU HAD STARTED: NEVER
HOW OFTEN DO YOU HAVE SIX OR MORE DRINKS ON ONE OCCASION: 1
HOW OFTEN DURING THE LAST YEAR HAVE YOU HAD A FEELING OF GUILT OR REMORSE AFTER DRINKING: NEVER
HOW MANY STANDARD DRINKS CONTAINING ALCOHOL DO YOU HAVE ON A TYPICAL DAY: 2
HOW OFTEN DO YOU HAVE A DRINK CONTAINING ALCOHOL: 5
HOW OFTEN DURING THE LAST YEAR HAVE YOU FAILED TO DO WHAT WAS NORMALLY EXPECTED FROM YOU BECAUSE OF DRINKING: NEVER
HOW OFTEN DURING THE LAST YEAR HAVE YOU FAILED TO DO WHAT WAS NORMALLY EXPECTED FROM YOU BECAUSE OF DRINKING: NEVER

## 2024-07-10 ASSESSMENT — PATIENT HEALTH QUESTIONNAIRE - PHQ9
1. LITTLE INTEREST OR PLEASURE IN DOING THINGS: NOT AT ALL
2. FEELING DOWN, DEPRESSED OR HOPELESS: NOT AT ALL
SUM OF ALL RESPONSES TO PHQ QUESTIONS 1-9: 0
SUM OF ALL RESPONSES TO PHQ9 QUESTIONS 1 & 2: 0
2. FEELING DOWN, DEPRESSED OR HOPELESS: NOT AT ALL
1. LITTLE INTEREST OR PLEASURE IN DOING THINGS: NOT AT ALL
SUM OF ALL RESPONSES TO PHQ QUESTIONS 1-9: 0
SUM OF ALL RESPONSES TO PHQ9 QUESTIONS 1 & 2: 0
SUM OF ALL RESPONSES TO PHQ QUESTIONS 1-9: 0

## 2024-07-11 ENCOUNTER — OFFICE VISIT (OUTPATIENT)
Facility: CLINIC | Age: 69
End: 2024-07-11
Payer: MEDICARE

## 2024-07-11 VITALS
WEIGHT: 157 LBS | BODY MASS INDEX: 24.64 KG/M2 | OXYGEN SATURATION: 94 % | SYSTOLIC BLOOD PRESSURE: 138 MMHG | DIASTOLIC BLOOD PRESSURE: 79 MMHG | RESPIRATION RATE: 16 BRPM | HEART RATE: 59 BPM | TEMPERATURE: 97.6 F | HEIGHT: 67 IN

## 2024-07-11 DIAGNOSIS — E78.00 ELEVATED CHOLESTEROL: ICD-10-CM

## 2024-07-11 DIAGNOSIS — I42.6 ALCOHOLIC CARDIOMYOPATHY (HCC): ICD-10-CM

## 2024-07-11 DIAGNOSIS — I48.0 PAROXYSMAL ATRIAL FIBRILLATION (HCC): ICD-10-CM

## 2024-07-11 DIAGNOSIS — I50.22 CHRONIC SYSTOLIC CONGESTIVE HEART FAILURE (HCC): ICD-10-CM

## 2024-07-11 DIAGNOSIS — M54.50 ACUTE MIDLINE LOW BACK PAIN WITHOUT SCIATICA: Primary | ICD-10-CM

## 2024-07-11 DIAGNOSIS — I10 PRIMARY HYPERTENSION: ICD-10-CM

## 2024-07-11 PROCEDURE — 1123F ACP DISCUSS/DSCN MKR DOCD: CPT | Performed by: NURSE PRACTITIONER

## 2024-07-11 PROCEDURE — 3075F SYST BP GE 130 - 139MM HG: CPT | Performed by: NURSE PRACTITIONER

## 2024-07-11 PROCEDURE — 99214 OFFICE O/P EST MOD 30 MIN: CPT | Performed by: NURSE PRACTITIONER

## 2024-07-11 PROCEDURE — 3078F DIAST BP <80 MM HG: CPT | Performed by: NURSE PRACTITIONER

## 2024-07-11 RX ORDER — AMLODIPINE BESYLATE 5 MG/1
5 TABLET ORAL DAILY
Qty: 90 TABLET | Refills: 3 | Status: SHIPPED | OUTPATIENT
Start: 2024-07-11

## 2024-07-11 RX ORDER — BACLOFEN 10 MG/1
10 TABLET ORAL 3 TIMES DAILY
Qty: 90 TABLET | Refills: 0 | Status: SHIPPED | OUTPATIENT
Start: 2024-07-11

## 2024-07-11 RX ORDER — ATORVASTATIN CALCIUM 40 MG/1
40 TABLET, FILM COATED ORAL DAILY
Qty: 90 TABLET | Refills: 3 | Status: SHIPPED | OUTPATIENT
Start: 2024-07-11

## 2024-07-11 RX ORDER — AMIODARONE HYDROCHLORIDE 200 MG/1
200 TABLET ORAL EVERY MORNING
Qty: 90 TABLET | Refills: 3 | Status: SHIPPED | OUTPATIENT
Start: 2024-07-11

## 2024-07-11 RX ORDER — FUROSEMIDE 20 MG/1
20 TABLET ORAL DAILY
Qty: 90 TABLET | Refills: 1 | Status: SHIPPED | OUTPATIENT
Start: 2024-07-11

## 2024-07-11 NOTE — PATIENT INSTRUCTIONS
X-RAY  [facility] (next to [employer])  [address] [facility], [facility] [address]  [place] [address] [phone]

## 2024-07-11 NOTE — PROGRESS NOTES
García Forman Jr. is a 69 y.o. year old male who presents today for   Chief Complaint   Patient presents with    6 Month Follow-Up     Back pain       Is someone accompanying this pt? no    Is the patient using any DME equipment during OV? no    Depression Screenin/10/2024    10:31 AM 7/10/2024    10:30 AM 2024    10:49 AM 3/29/2024     1:20 PM 2023    12:53 PM 2023     1:35 PM 10/2/2023    10:19 AM   PHQ-9 Questionaire   Little interest or pleasure in doing things 0 0 0 0 0 0 0   Feeling down, depressed, or hopeless 0 0 0 0 0 0 0   PHQ-9 Total Score 0 0 0 0 0 0 0       Abuse Screenin/21/2023     1:00 PM   AMB Abuse Screening   Do you ever feel afraid of your partner? N   Are you in a relationship with someone who physically or mentally threatens you? N   Is it safe for you to go home? Y       Learning Assessment:  No question data found.    Fall Risk:      7/10/2024    10:30 AM 2024    10:49 AM 2023    12:53 PM 2023     1:35 PM 10/2/2023    10:19 AM   Fall Risk   2 or more falls in past year? no no no no no   Fall with injury in past year? no no no no no           Coordination of Care:   1. \"Have you been to the ER, urgent care clinic since your last visit?  Hospitalized since your last visit?\" no    2. \"Have you seen or consulted any other health care providers outside of the [facility] since your last visit?\" no    3. For patients aged 45-75: Has the patient had a colonoscopy / FIT/ Cologuard? yes    If the patient is female:    4. For patients aged 40-74: Has the patient had a mammogram within the past 2 years? N/A    5. For patients aged 21-65: Has the patient had a pap smear? N/A    Health Maintenance: reviewed and discussed and ordered per Provider.    Health Maintenance Due   Topic Date Due    COVID-19 Vaccine (1) Never done    Respiratory Syncytial Virus (RSV) Pregnant or age 60 yrs+ (1 - 1-dose 60+ series) Never done    AAA screen

## 2024-07-11 NOTE — PROGRESS NOTES
García Forman Jr. is a 69 y.o. male  established patient, here for evaluation of the following chief complaint(s):  Chief Complaint   Patient presents with    6 Month Follow-Up     Back pain      Assessment and Plan  1. Acute midline low back pain without sciatica  -     XR LUMBAR SPINE (MIN 4 VIEWS); Future  -     baclofen (LIORESAL) 10 MG tablet; Take 1 tablet by mouth 3 times daily, Disp-90 tablet, R-0Normal  -     External Referral To Orthopedic Surgery  -     [facility]  -     [facility] [place] ([facility])  2. Alcoholic cardiomyopathy (HCC)  -     amiodarone (CORDARONE) 200 MG tablet; Take 1 tablet by mouth every morning, Disp-90 tablet, R-3He threw the bottle awayNormal  3. Paroxysmal atrial fibrillation (HCC)  -     amiodarone (CORDARONE) 200 MG tablet; Take 1 tablet by mouth every morning, Disp-90 tablet, R-3He threw the bottle awayNormal  4. Primary hypertension  Comments:  controlled, just saw cardiology  Orders:  -     amLODIPine (NORVASC) 5 MG tablet; Take 1 tablet by mouth daily, Disp-90 tablet, R-3Normal  5. Elevated cholesterol  -     atorvastatin (LIPITOR) 40 MG tablet; Take 1 tablet by mouth daily, Disp-90 tablet, R-3He threw the bottle awayNormal  6. Chronic systolic congestive heart failure (HCC)  -     furosemide (LASIX) 20 MG tablet; Take 1 tablet by mouth daily, Disp-90 tablet, R-1He threw the bottle awayNormal       Return in about 3 months (around 10/11/2024) for back pain, 15.   HPI:   In office visit. Pt works at [employer]    Pt has had recent midline back pain that started 2 weeks ago, worse when on feet. Denies injury. Pt has bought a firmer mattress and he is sleeping better. On Tylenol and has oxycodone from a recent shoulder surgery. Denies saddle numbness and incontinence of bladder or bowel.      ROS:    General: negative for - chills, fever, weight changes or malaise  HEENT: no sore

## 2024-07-17 ENCOUNTER — OFFICE VISIT (OUTPATIENT)
Age: 69
End: 2024-07-17
Payer: MEDICARE

## 2024-07-17 VITALS — BODY MASS INDEX: 24.59 KG/M2 | RESPIRATION RATE: 16 BRPM | HEIGHT: 67 IN

## 2024-07-17 DIAGNOSIS — M75.21 BICEPS TENDINITIS OF RIGHT SHOULDER: ICD-10-CM

## 2024-07-17 DIAGNOSIS — M75.121 NONTRAUMATIC COMPLETE TEAR OF RIGHT ROTATOR CUFF: Primary | ICD-10-CM

## 2024-07-17 DIAGNOSIS — M19.011 PRIMARY OSTEOARTHRITIS OF RIGHT SHOULDER: ICD-10-CM

## 2024-07-17 PROCEDURE — 73030 X-RAY EXAM OF SHOULDER: CPT | Performed by: ORTHOPAEDIC SURGERY

## 2024-07-17 PROCEDURE — 1123F ACP DISCUSS/DSCN MKR DOCD: CPT | Performed by: ORTHOPAEDIC SURGERY

## 2024-07-17 PROCEDURE — 99213 OFFICE O/P EST LOW 20 MIN: CPT | Performed by: ORTHOPAEDIC SURGERY

## 2024-07-17 NOTE — PROGRESS NOTES
date: 6/19/2015    Substances: Nicotine    Devices: Disposable   Substance and Sexual Activity    Alcohol use: Yes     Alcohol/week: 21.0 standard drinks of alcohol     Types: 21 Shots of liquor per week    Drug use: Not Currently     Types: Marijuana (Weed), Cocaine    Sexual activity: Not Currently     Partners: Female   Other Topics Concern    Not on file   Social History Narrative    Not on file     Social Determinants of Health     Financial Resource Strain: Low Risk  (12/21/2023)    Overall Financial Resource Strain (CARDIA)     Difficulty of Paying Living Expenses: Not hard at all   Food Insecurity: No Food Insecurity (12/21/2023)    Hunger Vital Sign     Worried About Running Out of Food in the Last Year: Never true     Ran Out of Food in the Last Year: Never true   Transportation Needs: Unknown (12/21/2023)    PRAPARE - Transportation     Lack of Transportation (Medical): Not on file     Lack of Transportation (Non-Medical): No   Physical Activity: Sufficiently Active (7/10/2024)    Exercise Vital Sign     Days of Exercise per Week: 6 days     Minutes of Exercise per Session: 120 min   Stress: Not on file   Social Connections: Not on file   Intimate Partner Violence: Not At Risk (11/1/2023)    Humiliation, Afraid, Rape, and Kick questionnaire     Fear of Current or Ex-Partner: No     Emotionally Abused: No     Physically Abused: No     Sexually Abused: No   Housing Stability: Unknown (12/21/2023)    Housing Stability Vital Sign     Unable to Pay for Housing in the Last Year: Not on file     Number of Places Lived in the Last Year: Not on file     Unstable Housing in the Last Year: No       REVIEW OF SYSTEMS:      No changes from previous review of systems unless noted.    PHYSICAL EXAMINATION:  Resp 16   Ht 1.702 m (5' 7\")   BMI 24.59 kg/m²   Body mass index is 24.59 kg/m².  GENERAL: Alert and oriented x3, in no acute distress.  HEENT: Normocephalic, atraumatic.    Shoulder

## 2024-07-18 ENCOUNTER — TELEPHONE (OUTPATIENT)
Facility: CLINIC | Age: 69
End: 2024-07-18

## 2024-07-19 NOTE — TELEPHONE ENCOUNTER
Attempted to call pt to schedule, lvm to call back.  
If his armpit pain continues he would have to follow-up.
Pt called stating he was seen last week and after then he started experiencing pain in (Lt) armpit.  Pt also mentioned he start PT on 7/24/24.    Please call to discuss.  Thank you.  
no

## 2024-07-24 ENCOUNTER — HOSPITAL ENCOUNTER (OUTPATIENT)
Facility: HOSPITAL | Age: 69
Setting detail: RECURRING SERIES
Discharge: HOME | End: 2024-07-27
Payer: MEDICARE

## 2024-07-24 PROCEDURE — 97161 PT EVAL LOW COMPLEX 20 MIN: CPT

## 2024-07-24 NOTE — PROGRESS NOTES
PT DAILY TREATMENT NOTE/LUMBAR EVAL     Patient Name: García Forman Jr.    Date: 2024    : 1955  [facility] / [facility] / Product Type: *No Product type* /      Patient  verified yes     Visit #   Current / Total 1 10   Time   In / Out 2:27 3:00   Pain   In / Out 2 2   Subjective Functional Status/Changes: See POC   Changes to:  Meds, Allergies, Med Hx, Sx Hx?  If yes, update Summary List yes, see POC     Treatment Area: Other low back pain [M54.59]    SUBJECTIVE    CC: low back pain  History/Mechanism of Injury: sleeping on different mattress earlier this month  Current Symptoms/Complaints: lower back pain- radiating into right or left back, sometimes both  Radiates pressure into mid/lower back  Achy pain  Pain-  Current: 2/10     Worst: 9/10   Best: 0/10  Aggravated By: prolonged standing for 30 minutes, prolonged walking  Alleviated By: changing to firmer mattress, lying straight, sitting down, bending forward over furniture  Previous Treatment/Compliance: Tylenol extra strength  PMHx/Surgical Hx: hx of lumbar HNP, hx of right recent RTC, HTN, tobacco use, HLD  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[employer], building furniture for ther  Living Situation: difficulty with yard work  Riding mower for yard work  Hobbies: \"I am 69 years old, I don't have any hobbies\"  PLOF: functionally independent,   Limitations to PLOF: difficulty with prolonged walking  Pt Goals: feel better, sleep better (pt reports waking nightly)  OBJECTIVE/EXAMINATION    23 min [x]Eval  - untimed                 Therapeutic Procedures:  Tx Min Billable or 1:1 Min (if diff from Tx Min) Procedure, Rationale, Specifics   10 10 80087 Therapeutic Activity (timed):  use of dynamic activities replicating functional movements to increase ROM, strength, coordination, balance, and proprioception in order to improve patient's ability to progress to PLOF and address remaining functional goals.  (see flow

## 2024-07-24 NOTE — PROGRESS NOTES
[facility]  [address] [place] [id number] Phone: [phone] Fax   Plan of Care / Statement of Necessity for Physical Therapy Services     Patient Name: García Forman Jr. : 1955   Medical   Diagnosis: Other low back pain [M54.59] Treatment Diagnosis: M54.59  OTHER LOWER BACK PAIN     Onset Date: 2024 Payor Payor: JENSENMARK MEDICARE / Plan: [facility] MEDICARE / Product Type: *No Product type* /    Referral Source: Shae Peters APRN -* Start of Care (SOC): 2024   Prior Hospitalization: See medical history Provider #: [id number]   Prior Level of Function: Functionally independent, working in [employer]   Comorbidities: hx of lumbar HNP, hx of right recent RTC, HTN, tobacco use, HLD   Social determinants of health: Physical activity     Assessment / key information:    Pt is a pleasant 69 y.o. male who presents with c/o lower back pain. The patient reports an insidious onset of lower back pain in early 2024 after he slept a few nights on a mattress that was too soft. Signs/symptoms at eval consistent with mechanical low back pain with flexion bias.  Functional deficits include: impaired hip strength, decreased standing tolerance due to pain, impaired SLS balance, difficulty with work activities due to increased pain.  Rehab potential is good due to desire to attain PLOF. Pt would benefit from skilled PT to address above deficits to improve Pt's function and ability to return to PLOF with decreased pain and improved functional mobility.      Evaluation Complexity:  History:  HIGH Complexity :3+ comorbidities / personal factors will impact the outcome/ POC ; Examination:  MEDIUM Complexity : 3 Standardized tests and measures addressin body structure, function, activity limitation and / or participation in recreation  ;Presentation:  LOW Complexity : Stable, uncomplicated  ;Clinical Decision Making:  Clinical

## 2024-07-27 ASSESSMENT — ENCOUNTER SYMPTOMS
ABDOMINAL DISTENTION: 0
NAUSEA: 0
ABDOMINAL PAIN: 0
SHORTNESS OF BREATH: 0
COUGH: 0
VOMITING: 0
SORE THROAT: 0

## 2024-07-27 NOTE — PROGRESS NOTES
07/27/24     García Forman Jr.  is a 69 y.o. male     Chief Complaint   Patient presents with    Follow-up       HPI      He was referred here for preoperative cardiac risk stratification prior to undergoing arthroscopic right shoulder surgery which is scheduled for December of last year (2019).  He did well with that surgery..  He has a past medical history significant for a ill-defined cardiomyopathy, EF 45% in 2018,  presumably alcohol-related.  He also developed rapid atrial fibrillation at the end of 2018 and underwent a VINCENT and cardioversion at that time and has been maintained on sinus rhythm with amiodarone.  Interestingly during that admission, his ejection fraction had dropped to less than 15% and has not been reassessed.  He has not been seen by cardiologist in over 4 years.  He was a heavy drinker in the past and still drinks regularly on average 3 drinks per night.  He states he quit smoking completely 8 to 10 years ago.    He denies any recurrent heart palpitations over the past few years.  No chest pain, no exertional dyspnea, no leg swelling.       Past Medical History:   Diagnosis Date    Alcoholism (HCC)     Arthritis     Atrial fibrillation (HCC) 11/28/2017    Cardioversion (11/18)    Colon polyps 2016    Hearing loss     left ear    HLD (hyperlipidemia)     HTN (hypertension)     Nontoxic multinodular goiter 2/7/2018    Biopsy of two nodules in 2016 - benign    Osteoarthritis November 2017     Current Outpatient Medications   Medication Sig Dispense Refill    metoprolol tartrate (LOPRESSOR) 50 MG tablet Take 1.5 tablets by mouth 2 times daily 180 tablet 3    NONFORMULARY Take 99 mg by mouth daily Potassium: Over the counter Ulysses      Multiple Vitamins-Minerals (THERAPEUTIC MULTIVITAMIN-MINERALS) tablet Take 1 tablet by mouth daily      albuterol sulfate HFA (VENTOLIN HFA) 108 (90 Base) MCG/ACT inhaler Inhale 2 puffs into the lungs 4 times daily as needed for Wheezing 54 g 1

## 2024-07-31 ENCOUNTER — OFFICE VISIT (OUTPATIENT)
Facility: CLINIC | Age: 69
End: 2024-07-31
Payer: MEDICARE

## 2024-07-31 VITALS
DIASTOLIC BLOOD PRESSURE: 72 MMHG | SYSTOLIC BLOOD PRESSURE: 129 MMHG | WEIGHT: 154 LBS | RESPIRATION RATE: 16 BRPM | HEIGHT: 66 IN | OXYGEN SATURATION: 95 % | TEMPERATURE: 97.8 F | BODY MASS INDEX: 24.75 KG/M2 | HEART RATE: 68 BPM

## 2024-07-31 DIAGNOSIS — I42.6 ALCOHOLIC CARDIOMYOPATHY (HCC): ICD-10-CM

## 2024-07-31 DIAGNOSIS — S29.011A INTERCOSTAL MUSCLE STRAIN, INITIAL ENCOUNTER: ICD-10-CM

## 2024-07-31 DIAGNOSIS — I10 PRIMARY HYPERTENSION: Primary | ICD-10-CM

## 2024-07-31 PROCEDURE — 1123F ACP DISCUSS/DSCN MKR DOCD: CPT | Performed by: NURSE PRACTITIONER

## 2024-07-31 PROCEDURE — 99214 OFFICE O/P EST MOD 30 MIN: CPT | Performed by: NURSE PRACTITIONER

## 2024-07-31 PROCEDURE — 3074F SYST BP LT 130 MM HG: CPT | Performed by: NURSE PRACTITIONER

## 2024-07-31 PROCEDURE — 3078F DIAST BP <80 MM HG: CPT | Performed by: NURSE PRACTITIONER

## 2024-07-31 ASSESSMENT — ANXIETY QUESTIONNAIRES
2. NOT BEING ABLE TO STOP OR CONTROL WORRYING: NOT AT ALL
4. TROUBLE RELAXING: NOT AT ALL
1. FEELING NERVOUS, ANXIOUS, OR ON EDGE: NOT AT ALL
6. BECOMING EASILY ANNOYED OR IRRITABLE: NOT AT ALL
3. WORRYING TOO MUCH ABOUT DIFFERENT THINGS: NOT AT ALL
7. FEELING AFRAID AS IF SOMETHING AWFUL MIGHT HAPPEN: NOT AT ALL
GAD7 TOTAL SCORE: 0

## 2024-07-31 ASSESSMENT — PATIENT HEALTH QUESTIONNAIRE - PHQ9
SUM OF ALL RESPONSES TO PHQ QUESTIONS 1-9: 0
1. LITTLE INTEREST OR PLEASURE IN DOING THINGS: NOT AT ALL
SUM OF ALL RESPONSES TO PHQ9 QUESTIONS 1 & 2: 0
2. FEELING DOWN, DEPRESSED OR HOPELESS: NOT AT ALL

## 2024-07-31 NOTE — PROGRESS NOTES
García Forman Jr. is a 69 y.o. year old male who presents today for   Chief Complaint   Patient presents with    armpit pain       Is someone accompanying this pt? no    Is the patient using any DME equipment during OV? no    Depression Screenin/31/2024     9:30 AM 7/10/2024    10:31 AM 7/10/2024    10:30 AM 2024    10:49 AM 3/29/2024     1:20 PM 2023    12:53 PM 2023     1:35 PM   PHQ-9 Questionaire   Little interest or pleasure in doing things 0 0 0 0 0 0 0   Feeling down, depressed, or hopeless 0 0 0 0 0 0 0   PHQ-9 Total Score 0 0 0 0 0 0 0       Abuse Screenin/21/2023     1:00 PM   AMB Abuse Screening   Do you ever feel afraid of your partner? N   Are you in a relationship with someone who physically or mentally threatens you? N   Is it safe for you to go home? Y       Learning Assessment:  No question data found.    Fall Risk:      7/10/2024    10:30 AM 2024    10:49 AM 2023    12:53 PM 2023     1:35 PM 10/2/2023    10:19 AM   Fall Risk   Do you feel unsteady or are you worried about falling?  yes no no no no   2 or more falls in past year? no no no no no   Fall with injury in past year? no no no no no           Coordination of Care:   1. \"Have you been to the ER, urgent care clinic since your last visit?  Hospitalized since your last visit?\" no    2. \"Have you seen or consulted any other health care providers outside of the [facility] since your last visit?\" no    3. For patients aged 45-75: Has the patient had a colonoscopy / FIT/ Cologuard? yes    If the patient is female:    4. For patients aged 40-74: Has the patient had a mammogram within the past 2 years? N/A    5. For patients aged 21-65: Has the patient had a pap smear? N/A    Health Maintenance: reviewed and discussed and ordered per Provider.    Health Maintenance Due   Topic Date Due    COVID-19 Vaccine (1) Never done    Respiratory Syncytial Virus (RSV) Pregnant or age 60 yrs+ 
  Position: Sitting   Cuff Size: Medium Adult   Pulse: 68   Resp: 16   Temp: 97.8 °F (36.6 °C)   SpO2: 95%   Weight: 69.9 kg (154 lb)   Height: 1.676 m (5' 6\")            On this date 07/31/24  have spent 30 minutes reviewing previous notes, test results and face to face with the patient discussing the diagnosis and importance of compliance with the treatment plan as well as documenting on the day of the visit.      LAURIE MatiasC

## 2024-08-03 NOTE — PERIOP NOTE
Case Management Discharge Planning Note    Patient name Amado Chin  Location 2 212/CW2 212-02 MRN [id number]  : 1983 Date 8/3/2024       Current Admission Date: 2024  Current Admission Diagnosis:Cellulitis of left lower extremity   Patient Active Problem List    Diagnosis Date Noted Date Diagnosed    Psychoactive substance-induced psychosis (HCC) 2024     Drug use 2024     Cellulitis of left lower extremity 2024     Sepsis (HCC) 2024     Homelessness 2024     Cervical spinal stenosis 2024     Cervical radiculopathy 2024     Chronic bilateral low back pain 2023     Chronic neck pain 2023     Recurrent major depressive disorder (HCC) 2022     Anxiety 2022     Substance induced mood disorder (HCC) 2022     Mood disorder due to medical condition 2022     Opioid abuse (HCC) 02/10/2022     Fecal smearing 2022     Urinary incontinence 2022     Drug-induced constipation 2022     Intractable pain 2021     Vitamin B12 deficiency 2021     Asthma 2021     Tobacco abuse 2021     Dizziness 2021     Neck pain 2021     Elevated blood pressure reading 2021     Weakness of both lower extremities 2021     Cervical disc herniation 2020     Paresthesia and pain of extremity 2020     Fall 2020     Concussion without loss of consciousness 2020     Acute pain of right shoulder 2020     Alcohol abuse 2020     Burn 2020     Furuncle of axilla 2020       LOS (days): 5  Geometric Mean LOS (GMLOS) (days):   Days to GMLOS:     OBJECTIVE:  Risk of Unplanned Readmission Score: 19.15         Current admission status: Inpatient   Preferred Pharmacy:   [id number] [place], [place] - [address] [place] [address]  [place] [address]  Phone: [phone] Fax: [phone]    [facility] [place] - [facility]  1330: Dr. Quintero evaluated and ordered patient to be discharged from PACU. Spo2 currently 95-96%. Patient denies pain and nausea. +C/S/M to right hand.   [place] [address]  [address] [facility]  [place] [address]  Phone: [phone] Fax: [phone]    [facility] [place] - [address]  [address]  [place] [address]  Phone: [phone] Fax: [phone]    Primary Care Provider: Iva Fisher MD    Primary Insurance: [employer] [facility]  Secondary Insurance:     DISCHARGE DETAILS:    Discharge planning discussed with:: Patient  Freedom of Choice: Yes     Other Referral/Resources/Interventions Provided:  Interventions: Transportation  Referral Comments: Pt states he is going to [address] [place] [address]  at d/c and would like transport.  Pt signed waiver.  Pt to be picked up from [facility]         Treatment Team Recommendation: Shelter  Discharge Destination Plan:: Home  Transport at Discharge : Ride Share

## 2024-08-07 ENCOUNTER — HOSPITAL ENCOUNTER (OUTPATIENT)
Facility: HOSPITAL | Age: 69
Setting detail: RECURRING SERIES
Discharge: HOME | End: 2024-08-10
Payer: MEDICARE

## 2024-08-07 PROCEDURE — 97110 THERAPEUTIC EXERCISES: CPT | Performed by: PHYSICAL THERAPIST

## 2024-08-07 PROCEDURE — 97530 THERAPEUTIC ACTIVITIES: CPT | Performed by: PHYSICAL THERAPIST

## 2024-08-07 PROCEDURE — 97112 NEUROMUSCULAR REEDUCATION: CPT | Performed by: PHYSICAL THERAPIST

## 2024-08-07 NOTE — PROGRESS NOTES
PHYSICAL / OCCUPATIONAL THERAPY - DAILY TREATMENT NOTE    Patient Name: García Forman Jr.    Date: 2024    : 1955  Insurance: Payor: DIVYA MEDICARE / Plan: [facility] MEDICARE / Product Type: *No Product type* /      Patient  verified Yes     Visit #   Current / Total 2 10   Time   In / Out 221pm 323pm   Pain   In / Out 2 0   Subjective Functional Status/Changes: The stretches have really helped with my HEP.      TREATMENT AREA =  Other low back pain [M54.59]     OBJECTIVE    Modalities Rationale:     decrease inflammation, decrease pain, increase tissue extensibility, and increase muscle contraction/control to improve patient's ability to progress to PLOF and address remaining functional goals.     min [] Estim Unattended, type/location:                                      []  w/ice    []  w/heat    min [] Estim Attended, type/location:                                     []  w/US     []  w/ice    []  w/heat    []  TENS insruct      min []  Mechanical Traction: type/lbs                   []  pro   []  sup   []  int   []  cont    []  before manual    []  after manual    min []  Ultrasound, settings/location:     10 min  unbill []  Ice     [x]  Heat    location/position: To l/s with LE on wedge    min []  Paraffin,  details:     min []  Vasopneumatic Device, press/temp:     min []  Whirlpool / Fluido:    If using vaso (only need to measure limb vaso being performed on)      pre-treatment girth :       post-treatment girth :       measured at (landmark location) :      min []  Other:    Skin assessment post-treatment:   Intact      Therapeutic Procedures:    Tx Min Billable or 1:1 Min (if diff from Tx Min) Procedure, Rationale, Specifics   22 17 [id number] Therapeutic Exercise (timed):  increase ROM, strength, coordination, balance, and proprioception to improve patient's ability to progress to PLOF and address remaining functional goals. (see flow sheet as applicable)     Details if

## 2024-08-09 ENCOUNTER — HOSPITAL ENCOUNTER (OUTPATIENT)
Facility: HOSPITAL | Age: 69
Setting detail: RECURRING SERIES
Discharge: HOME | End: 2024-08-12
Payer: MEDICARE

## 2024-08-09 PROCEDURE — 97112 NEUROMUSCULAR REEDUCATION: CPT

## 2024-08-09 PROCEDURE — 97110 THERAPEUTIC EXERCISES: CPT

## 2024-08-09 NOTE — PROGRESS NOTES
- CNP [facility] DEP   1/3/2025 11:40 AM Edu Boo MD [facility] BS AMB

## 2024-08-12 ENCOUNTER — HOSPITAL ENCOUNTER (OUTPATIENT)
Facility: HOSPITAL | Age: 69
Setting detail: RECURRING SERIES
Discharge: HOME | End: 2024-08-15
Payer: MEDICARE

## 2024-08-12 PROCEDURE — 97110 THERAPEUTIC EXERCISES: CPT

## 2024-08-12 PROCEDURE — 97530 THERAPEUTIC ACTIVITIES: CPT

## 2024-08-12 PROCEDURE — 97112 NEUROMUSCULAR REEDUCATION: CPT

## 2024-08-12 NOTE — PROGRESS NOTES
PHYSICAL / OCCUPATIONAL THERAPY - DAILY TREATMENT NOTE (updated )    Patient Name: García Forman Jr.    Date: 2024    : 1955  Insurance: Payor: DIVYA MEDICARE / Plan: [facility] MEDICARE / Product Type: *No Product type* /      Patient  verified yes     Visit #   Current / Total 4 10 Total Time   Time   In / Out 140 220 40   Pain   In / Out 1 1    Subjective Functional Status/Changes: Pt stated he was able to stand at work for 5.5 hours at work with no pain.      TREATMENT AREA =  Other low back pain [M54.59]    OBJECTIVE      Therapeutic Procedures:  40    [facility] Totals Reminder: bill using total billable min of TIMED therapeutic procedures (example: do not include dry needle or estim unattended, both untimed codes, in totals to left)  8-22 min = 1 unit; 23-37 min = 2 units; 38-52 min = 3 units; 53-67 min = 4 units; 68-82 min = 5 units   Tx Min Billable or 1:1 Min (if diff from Tx Min) Procedure, Rationale, Specifics   20  59133 Therapeutic Exercise (timed):  increase ROM, strength, coordination, balance, and proprioception to improve patient's ability to progress to PLOF and address remaining functional goals. (see flow sheet as applicable)   Details if applicable:       10  43164 Therapeutic Activity (timed):  use of dynamic activities replicating functional movements to increase ROM, strength, coordination, balance, and proprioception in order to improve patient's ability to progress to PLOF and address remaining functional goals.  (see flow sheet as applicable)   Details if applicable:       10  26021 Neuromuscular Re-Education (timed):  improve balance, coordination, kinesthetic sense, posture, core stability and proprioception to improve patient's ability to develop conscious control of individual muscles and awareness of position of extremities in order to progress to PLOF and address remaining functional goals. (see flow sheet as applicable)     Details if applicable:

## 2024-08-15 ENCOUNTER — HOSPITAL ENCOUNTER (OUTPATIENT)
Facility: HOSPITAL | Age: 69
Setting detail: RECURRING SERIES
Discharge: HOME | End: 2024-08-18
Payer: MEDICARE

## 2024-08-15 PROCEDURE — 97110 THERAPEUTIC EXERCISES: CPT

## 2024-08-15 PROCEDURE — 97530 THERAPEUTIC ACTIVITIES: CPT

## 2024-08-15 PROCEDURE — 97112 NEUROMUSCULAR REEDUCATION: CPT

## 2024-08-15 NOTE — PROGRESS NOTES
remaining functional goals. (see flow sheet as applicable)     Details if applicable: core/glute re ed          [x]  Patient Education billed concurrently with other procedures   [x] Review HEP    [] Progressed/Changed HEP, detail:    [] Other detail:       Objective Information/Functional Measures/Assessment  Appropriately challenged with all interventions reporting only appropriate muscle soreness  Cued for upright posture with balance interventions to reduce visual reliance and improve proprioceptive awareness   Added resisted clamshells in hooklying with TrA brace to facilitate proximal stabilization     Patient will continue to benefit from skilled PT / OT services to modify and progress therapeutic interventions, analyze and address functional mobility deficits, analyze and address ROM deficits, analyze and address strength deficits, analyze and address soft tissue restrictions, analyze and cue for proper movement patterns, analyze and modify for postural abnormalities, analyze and address imbalance, and instruct in home and community integration to address functional deficits and attain remaining goals.    Progress toward goals / Updated goals:  []  See Progress Note/Recertification    Short Term Goals: To be accomplished in 4 weeks  - Goal: Pt to be compliant with initial HEP to improve ability to independently address symptoms and functional deficits.  Status at last note/certification: Established and reviewed with Pt  Current: reports compliance , no questions 8/12/24   - Goal: Pt to report < 4/10 pain at worst to increase ease with ADLs.  Status at last note/certification: 9/10 pain at worst  Current: 5/10 at worse in the past 5 days (8/15/2024)   - Goal: Pt will perform at least 12 sit<>stand repetitions in 30 seconds without UE assist to improve ease of transfers and demo improved functional LE strength.  Status at last note/certification: 8 repetitions  - Goal: Pt to demo lumbar flexion to bilateral

## 2024-08-19 ENCOUNTER — HOSPITAL ENCOUNTER (OUTPATIENT)
Facility: HOSPITAL | Age: 69
Setting detail: RECURRING SERIES
Discharge: HOME | End: 2024-08-22
Payer: MEDICARE

## 2024-08-19 PROCEDURE — 97112 NEUROMUSCULAR REEDUCATION: CPT

## 2024-08-19 PROCEDURE — 97530 THERAPEUTIC ACTIVITIES: CPT

## 2024-08-19 PROCEDURE — 97110 THERAPEUTIC EXERCISES: CPT
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
## 2024-08-22 ENCOUNTER — HOSPITAL ENCOUNTER (OUTPATIENT)
Facility: HOSPITAL | Age: 69
Setting detail: RECURRING SERIES
Discharge: HOME | End: 2024-08-25
Payer: MEDICARE

## 2024-08-22 PROCEDURE — 97530 THERAPEUTIC ACTIVITIES: CPT

## 2024-08-22 PROCEDURE — 97110 THERAPEUTIC EXERCISES: CPT

## 2024-08-22 PROCEDURE — 97112 NEUROMUSCULAR REEDUCATION: CPT
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
## 2024-08-22 NOTE — PROGRESS NOTES
[facility] [place]   [address] [place] [address]  Phone: [phone] Fax: [phone]  PROGRESS NOTE  Patient Name: García Forman Jr. : 1955   Treatment/Medical Diagnosis: Other low back pain [M54.59]   Referral Source:  Payor Shae Peters APRN -*  Payor: DIVYA MEDICARE / [facility] / Product Type: *No Product type* /      Date of Initial Visit: 2024 Attended Visits: 7  0     SUMMARY OF TREATMENT  Pt is a pleasant 69 y.o. male who presents with c/o lower back pain. Treatment has consisted of: therapeutic exercise to improve LE/lumbar strength/mobility; therapeutic activities to improve transfer/lift/carry ability; neuromuscular re-education to improve balance, core stability, neuromuscular control with lifting; physical agent/modality for symptom management; manual therapy for symptom relief and muscle flexibility; patient education to improve symptom management; self Care training; home safety training; stair training, and functional mobility training.      CURRENT STATUS  Patient has attended PT for 7 sessions for the treatment of low back pain.  The patient demonstrates steady progress at this time. He notes substantial reduction in lower back pain since start of care leading to improved standing tolerance and improved ease with work activities. He continues to note stiffness in lower back and hesitancy to lift heavier weights. Additional assessment includes:  Subjective Gains: resolution of pain for the most part (less frequent, less severe), able to lift moderate weight furniture at work without pain, able to complete 6 hour work shift before noting pain   Subjective Deficits: difficulty lifting heavier weights at work due to shoulder/back pain  % improvement: 50%  Pain   Average: 1-310                  Best: 0/10                Worst: 4/10  Patient Goal: \"continue feeling better\"  Objective

## 2024-08-27 ENCOUNTER — HOSPITAL ENCOUNTER (OUTPATIENT)
Facility: HOSPITAL | Age: 69
Setting detail: RECURRING SERIES
Discharge: HOME | End: 2024-08-30
Payer: MEDICARE

## 2024-08-27 PROCEDURE — 97112 NEUROMUSCULAR REEDUCATION: CPT

## 2024-08-27 PROCEDURE — 97110 THERAPEUTIC EXERCISES: CPT

## 2024-08-27 PROCEDURE — 97530 THERAPEUTIC ACTIVITIES: CPT

## 2024-08-27 NOTE — PROGRESS NOTES
PHYSICAL / OCCUPATIONAL THERAPY - DAILY TREATMENT NOTE (updated )    Patient Name: García Forman Jr.    Date: 2024    : 1955  Insurance: Payor: DIVYA MEDICARE / Plan: AETMARK [facility] MEDICARE / Product Type: *No Product type* /      Patient  verified yes     Visit #   Current / Total 1 10 Total Time   Time   In / Out 2:20 3:15 55   Pain   In / Out 0 0    Subjective Functional Status/Changes: I was feeling great yesterday, no pain. But then I woke up today and I was sore. It felt better after I did the exercises at home.       TREATMENT AREA =  Other low back pain [M54.59]    OBJECTIVE      Therapeutic Procedures:  55  Total 55  Total [facility] Totals Reminder: bill using total billable min of TIMED therapeutic procedures (example: do not include dry needle or estim unattended, both untimed codes, in totals to left)  8-22 min = 1 unit; 23-37 min = 2 units; 38-52 min = 3 units; 53-67 min = 4 units; 68-82 min = 5 units   Tx Min Billable or 1:1 Min (if diff from Tx Min) Procedure, Rationale, Specifics   25  02093 Therapeutic Exercise (timed):  increase ROM, strength, coordination, balance, and proprioception to improve patient's ability to progress to PLOF and address remaining functional goals. (see flow sheet as applicable)   Details if applicable:       15  98447 Therapeutic Activity (timed):  use of dynamic activities replicating functional movements to increase ROM, strength, coordination, balance, and proprioception in order to improve patient's ability to progress to PLOF and address remaining functional goals.  (see flow sheet as applicable)   Details if applicable:       15  16215 Neuromuscular Re-Education (timed):  improve balance, coordination, kinesthetic sense, posture, core stability and proprioception to improve patient's ability to develop conscious control of individual muscles and awareness of position of extremities in order to progress to PLOF and address remaining

## 2024-08-29 ENCOUNTER — HOSPITAL ENCOUNTER (OUTPATIENT)
Facility: HOSPITAL | Age: 69
Setting detail: RECURRING SERIES
End: 2024-08-29
Payer: MEDICARE

## 2024-08-29 PROCEDURE — 97110 THERAPEUTIC EXERCISES: CPT

## 2024-08-29 PROCEDURE — 97112 NEUROMUSCULAR REEDUCATION: CPT

## 2024-08-29 NOTE — PROGRESS NOTES
PHYSICAL / OCCUPATIONAL THERAPY - DAILY TREATMENT NOTE (updated )    Patient Name: García Forman Jr.    Date: 2024    : 1955  Insurance: Payor: DIVYA MEDICARE / Plan: JENSENTMARK [facility] MEDICARE / Product Type: *No Product type* /      Patient  verified yes     Visit #   Current / Total 2 10 Total Time   Time   In / Out 2:23 3:03 40   Pain   In / Out 0  0    Subjective Functional Status/Changes: I was sore in my outer hip after last session.       TREATMENT AREA =  Other low back pain [M54.59]    OBJECTIVE    Therapeutic Procedures:  40  Total 40  Total [facility] Totals Reminder: bill using total billable min of TIMED therapeutic procedures (example: do not include dry needle or estim unattended, both untimed codes, in totals to left)  8-22 min = 1 unit; 23-37 min = 2 units; 38-52 min = 3 units; 53-67 min = 4 units; 68-82 min = 5 units   Tx Min Billable or 1:1 Min (if diff from Tx Min) Procedure, Rationale, Specifics   15  74352 Therapeutic Exercise (timed):  increase ROM, strength, coordination, balance, and proprioception to improve patient's ability to progress to PLOF and address remaining functional goals. (see flow sheet as applicable)   Details if applicable:       25  20376 Neuromuscular Re-Education (timed):  improve balance, coordination, kinesthetic sense, posture, core stability and proprioception to improve patient's ability to develop conscious control of individual muscles and awareness of position of extremities in order to progress to PLOF and address remaining functional goals. (see flow sheet as applicable)     Details if applicable:             [x]  Patient Education billed concurrently with other procedures   [x] Review HEP    [] Progressed/Changed HEP, detail:    [] Other detail:       Objective Information/Functional Measures/Assessment    -Added planks variations with emphasis on maintained posterior pelvic tilt throughout to encourage core stability without  note/certification: 46%   PN due 9/22/24  RC due 10/22/24  Auth due 24th visit or 10/23/24    PLAN  [x] Continue plan of care  [x]  Upgrade activities as tolerated  []  Discharge due to :  []  Other:    Mario Cortes, PT    8/29/2024    2:33 PM    If an interpreting service was utilized for treatment of this patient, the contents of this document represent the material reviewed with the patient via the .     Future Appointments   Date Time Provider Department Center   9/10/2024  2:20 PM [facility]   9/13/2024  2:20 PM [facility]   9/17/2024  2:20 PM Mario Cortes, PT [facility]   9/19/2024  2:20 PM Mario Cortes, PT [facility]   9/24/2024  1:40 PM Rosibel Polo, PT [facility]   9/26/2024  1:40 PM Rosibel Polo, PT [facility]   10/1/2024  2:20 PM Rosibel Polo, PT [facility]   10/3/2024  2:20 PM Mario Cortes, PT [facility]   10/8/2024  2:20 PM Mario Cortes, PT [facility]   10/10/2024  2:20 PM Mario Cortes, PT [facility]   10/14/2024  1:45 PM Shae Peters APRN - CNP [facility]   10/15/2024  2:20 PM Mario Cortes, PT [facility]   10/17/2024  2:20 PM Rosibel Polo, PT [facility]   1/3/2025 11:40 AM Edu Boo MD [facility]

## 2024-09-10 ENCOUNTER — HOSPITAL ENCOUNTER (OUTPATIENT)
Facility: HOSPITAL | Age: 69
Setting detail: RECURRING SERIES
Discharge: HOME | End: 2024-09-13
Payer: MEDICARE

## 2024-09-10 PROCEDURE — 97112 NEUROMUSCULAR REEDUCATION: CPT

## 2024-09-10 PROCEDURE — 97110 THERAPEUTIC EXERCISES: CPT

## 2024-09-10 PROCEDURE — 97530 THERAPEUTIC ACTIVITIES: CPT

## 2024-09-13 ENCOUNTER — HOSPITAL ENCOUNTER (OUTPATIENT)
Facility: HOSPITAL | Age: 69
Setting detail: RECURRING SERIES
Discharge: HOME | End: 2024-09-16
Payer: MEDICARE

## 2024-09-13 PROCEDURE — 97112 NEUROMUSCULAR REEDUCATION: CPT

## 2024-09-13 PROCEDURE — 97110 THERAPEUTIC EXERCISES: CPT

## 2024-09-13 PROCEDURE — 97530 THERAPEUTIC ACTIVITIES: CPT

## 2024-09-17 ENCOUNTER — APPOINTMENT (OUTPATIENT)
Facility: HOSPITAL | Age: 69
End: 2024-09-17
Payer: MEDICARE

## 2024-09-19 ENCOUNTER — APPOINTMENT (OUTPATIENT)
Facility: HOSPITAL | Age: 69
End: 2024-09-19
Payer: MEDICARE

## 2024-09-24 ENCOUNTER — APPOINTMENT (OUTPATIENT)
Facility: HOSPITAL | Age: 69
End: 2024-09-24
Payer: MEDICARE

## 2024-09-26 ENCOUNTER — APPOINTMENT (OUTPATIENT)
Facility: HOSPITAL | Age: 69
End: 2024-09-26
Payer: MEDICARE

## 2024-10-14 ENCOUNTER — OFFICE VISIT (OUTPATIENT)
Facility: CLINIC | Age: 69
End: 2024-10-14
Payer: MEDICARE

## 2024-10-14 VITALS
DIASTOLIC BLOOD PRESSURE: 60 MMHG | HEART RATE: 54 BPM | RESPIRATION RATE: 15 BRPM | BODY MASS INDEX: 23.95 KG/M2 | SYSTOLIC BLOOD PRESSURE: 118 MMHG | WEIGHT: 152.6 LBS | TEMPERATURE: 97.6 F | OXYGEN SATURATION: 98 % | HEIGHT: 67 IN

## 2024-10-14 DIAGNOSIS — I10 PRIMARY HYPERTENSION: Primary | ICD-10-CM

## 2024-10-14 DIAGNOSIS — R19.7 DIARRHEA, UNSPECIFIED TYPE: ICD-10-CM

## 2024-10-14 DIAGNOSIS — I50.22 CHRONIC SYSTOLIC CONGESTIVE HEART FAILURE (HCC): ICD-10-CM

## 2024-10-14 PROCEDURE — 1123F ACP DISCUSS/DSCN MKR DOCD: CPT | Performed by: NURSE PRACTITIONER

## 2024-10-14 PROCEDURE — 3074F SYST BP LT 130 MM HG: CPT | Performed by: NURSE PRACTITIONER

## 2024-10-14 PROCEDURE — 3078F DIAST BP <80 MM HG: CPT | Performed by: NURSE PRACTITIONER

## 2024-10-14 PROCEDURE — 99214 OFFICE O/P EST MOD 30 MIN: CPT | Performed by: NURSE PRACTITIONER

## 2024-10-14 RX ORDER — FUROSEMIDE 20 MG
20 TABLET ORAL DAILY
Qty: 90 TABLET | Refills: 1 | Status: SHIPPED | OUTPATIENT
Start: 2024-10-14

## 2024-10-14 NOTE — PROGRESS NOTES
García Forman Jr. is a 69 y.o. year old male who presents today for   Chief Complaint   Patient presents with    Follow-up     Back pain        \"Have you been to the ER, urgent care clinic since your last visit?  Hospitalized since your last visit?\"   no Where:     When:    Reason:      “Have you seen or consulted any other health care providers outside our system since your last visit?”   NO           Allison Franklin CMA  [facility]  Ph: [phone]  Fax: [phone]  
7/11/24  Yes Shae Peters APRN - CNP   atorvastatin (LIPITOR) 40 MG tablet Take 1 tablet by mouth daily 7/11/24  Yes Shae Peters APRN - CNP   baclofen (LIORESAL) 10 MG tablet Take 1 tablet by mouth 3 times daily 7/11/24  Yes Shae Peters APRN - CNP   metoprolol tartrate (LOPRESSOR) 50 MG tablet Take 1.5 tablets by mouth 2 times daily 3/29/24  Yes Shae Peters APRN - CNP   NONFORMULARY Take 99 mg by mouth daily Potassium: Over the counter Gregory   Yes ProviderEsvin MD   Multiple Vitamins-Minerals (THERAPEUTIC MULTIVITAMIN-MINERALS) tablet Take 1 tablet by mouth daily   Yes ProviderEsvin MD   albuterol sulfate HFA (VENTOLIN HFA) 108 (90 Base) MCG/ACT inhaler Inhale 2 puffs into the lungs 4 times daily as needed for Wheezing 11/21/23  Yes Shae Peters APRN - CNP   LYCOPENE PO Take 1 tablet by mouth daily 4/8/19  Yes ProviderEsvin MD   aspirin 81 MG chewable tablet Take 1 tablet by mouth daily 10/4/23  Yes Shae Peters APRN - CNP   vitamin D (CHOLECALCIFEROL) 25 MCG (1000 UT) TABS tablet Take by mouth daily   Yes Automatic Reconciliation, Ar   nitroGLYCERIN (NITROSTAT) 0.4 MG SL tablet Place 1 tablet under the tongue as needed 3/1/21  Yes Automatic Reconciliation, Ar        No results found for this visit on 10/14/24.     Physical Exam  Patient appears well, she is pleasant, alert, oriented x 3, in no distress.   ENT normal.  Neck supple. No adenopathy or thyromegaly. HORACE.   Lungs are clear, good air entry, no wheezes  Cardiovascular, S1 and S2 normal, no murmurs, regular rate and rhythm.   Abdomen is soft without tenderness, guarding  /Anorectal, deferred.  Muscleskeletal, no swelling  Extremities show no edema  Neurological is normal without focal findings.   Skin: no concerning lesions.    Psych: normal affect.  Mood good.  Oriented x 3.      Vitals:    10/14/24 1342 10/14/24 1346   BP: (!) 143/80 118/60   Site: Right Upper Arm Right Upper Arm   Position:

## 2024-11-14 ENCOUNTER — OFFICE VISIT (OUTPATIENT)
Facility: CLINIC | Age: 69
End: 2024-11-14

## 2024-11-14 ENCOUNTER — HOSPITAL ENCOUNTER (OUTPATIENT)
Facility: HOSPITAL | Age: 69
Setting detail: SPECIMEN
Discharge: HOME | End: 2024-11-17
Payer: MEDICARE

## 2024-11-14 VITALS
RESPIRATION RATE: 18 BRPM | HEART RATE: 56 BPM | TEMPERATURE: 98 F | SYSTOLIC BLOOD PRESSURE: 126 MMHG | OXYGEN SATURATION: 92 % | WEIGHT: 152 LBS | HEIGHT: 67 IN | DIASTOLIC BLOOD PRESSURE: 61 MMHG | BODY MASS INDEX: 23.86 KG/M2

## 2024-11-14 DIAGNOSIS — Z12.5 SCREENING PSA (PROSTATE SPECIFIC ANTIGEN): ICD-10-CM

## 2024-11-14 DIAGNOSIS — Z13.228 SCREENING FOR METABOLIC DISORDER: ICD-10-CM

## 2024-11-14 DIAGNOSIS — Z13.89 SCREENING FOR BLOOD OR PROTEIN IN URINE: ICD-10-CM

## 2024-11-14 DIAGNOSIS — Z87.891 FORMER CIGARETTE SMOKER: ICD-10-CM

## 2024-11-14 DIAGNOSIS — M25.562 CHRONIC PAIN OF BOTH KNEES: ICD-10-CM

## 2024-11-14 DIAGNOSIS — Z13.29 SCREENING FOR THYROID DISORDER: ICD-10-CM

## 2024-11-14 DIAGNOSIS — I10 PRIMARY HYPERTENSION: Primary | ICD-10-CM

## 2024-11-14 DIAGNOSIS — G89.29 CHRONIC PAIN OF BOTH KNEES: ICD-10-CM

## 2024-11-14 DIAGNOSIS — Z13.220 SCREENING FOR CHOLESTEROL LEVEL: ICD-10-CM

## 2024-11-14 DIAGNOSIS — Z13.0 SCREENING FOR DEFICIENCY ANEMIA: ICD-10-CM

## 2024-11-14 DIAGNOSIS — I50.22 CHRONIC SYSTOLIC CONGESTIVE HEART FAILURE (HCC): ICD-10-CM

## 2024-11-14 DIAGNOSIS — Z23 IMMUNIZATION DUE: ICD-10-CM

## 2024-11-14 DIAGNOSIS — R06.02 SOB (SHORTNESS OF BREATH): ICD-10-CM

## 2024-11-14 DIAGNOSIS — M25.561 CHRONIC PAIN OF BOTH KNEES: ICD-10-CM

## 2024-11-14 DIAGNOSIS — Z87.891 FORMER SMOKER: ICD-10-CM

## 2024-11-14 LAB
ALBUMIN SERPL-MCNC: 4.1 G/DL (ref 3.4–5)
ALBUMIN/GLOB SERPL: 1.5 (ref 0.8–1.7)
ALP SERPL-CCNC: 123 U/L (ref 45–117)
ALT SERPL-CCNC: 49 U/L (ref 16–61)
ANION GAP SERPL CALC-SCNC: 4 MMOL/L (ref 3–18)
APPEARANCE UR: CLEAR
AST SERPL-CCNC: 40 U/L (ref 10–38)
BACTERIA URNS QL MICRO: NEGATIVE /HPF
BASOPHILS # BLD: 0.1 K/UL (ref 0–0.1)
BASOPHILS NFR BLD: 1 % (ref 0–2)
BILIRUB SERPL-MCNC: 0.7 MG/DL (ref 0.2–1)
BILIRUB UR QL: NEGATIVE
BUN SERPL-MCNC: 11 MG/DL (ref 7–18)
BUN/CREAT SERPL: 10 (ref 12–20)
CALCIUM SERPL-MCNC: 9.6 MG/DL (ref 8.5–10.1)
CHLORIDE SERPL-SCNC: 105 MMOL/L (ref 100–111)
CHOLEST SERPL-MCNC: 176 MG/DL
CO2 SERPL-SCNC: 30 MMOL/L (ref 21–32)
COLOR UR: YELLOW
CREAT SERPL-MCNC: 1.06 MG/DL (ref 0.6–1.3)
DIFFERENTIAL METHOD BLD: ABNORMAL
EOSINOPHIL # BLD: 0.1 K/UL (ref 0–0.4)
EOSINOPHIL NFR BLD: 1 % (ref 0–5)
EPITH CASTS URNS QL MICRO: NORMAL /LPF (ref 0–5)
ERYTHROCYTE [DISTWIDTH] IN BLOOD BY AUTOMATED COUNT: 13.2 % (ref 11.6–14.5)
GLOBULIN SER CALC-MCNC: 2.8 G/DL (ref 2–4)
GLUCOSE SERPL-MCNC: 96 MG/DL (ref 74–99)
GLUCOSE UR STRIP.AUTO-MCNC: NEGATIVE MG/DL
HCT VFR BLD AUTO: 46.3 % (ref 36–48)
HDLC SERPL-MCNC: 84 MG/DL (ref 40–60)
HDLC SERPL: 2.1 (ref 0–5)
HGB BLD-MCNC: 15.2 G/DL (ref 13–16)
HGB UR QL STRIP: NEGATIVE
IMM GRANULOCYTES # BLD AUTO: 0 K/UL (ref 0–0.04)
IMM GRANULOCYTES NFR BLD AUTO: 0 % (ref 0–0.5)
KETONES UR QL STRIP.AUTO: NEGATIVE MG/DL
LDLC SERPL CALC-MCNC: 58 MG/DL (ref 0–100)
LEUKOCYTE ESTERASE UR QL STRIP.AUTO: NEGATIVE
LIPID PANEL: ABNORMAL
LYMPHOCYTES # BLD: 1.3 K/UL (ref 0.9–3.6)
LYMPHOCYTES NFR BLD: 12 % (ref 21–52)
MCH RBC QN AUTO: 30.8 PG (ref 24–34)
MCHC RBC AUTO-ENTMCNC: 32.8 G/DL (ref 31–37)
MCV RBC AUTO: 93.9 FL (ref 78–100)
MONOCYTES # BLD: 0.9 K/UL (ref 0.05–1.2)
MONOCYTES NFR BLD: 8 % (ref 3–10)
NEUTS SEG # BLD: 8.3 K/UL (ref 1.8–8)
NEUTS SEG NFR BLD: 78 % (ref 40–73)
NITRITE UR QL STRIP.AUTO: NEGATIVE
NRBC # BLD: 0 K/UL (ref 0–0.01)
NRBC BLD-RTO: 0 PER 100 WBC
PH UR STRIP: 6.5 (ref 5–8)
PLATELET # BLD AUTO: 337 K/UL (ref 135–420)
PMV BLD AUTO: 10.7 FL (ref 9.2–11.8)
POTASSIUM SERPL-SCNC: 4 MMOL/L (ref 3.5–5.5)
PROT SERPL-MCNC: 6.9 G/DL (ref 6.4–8.2)
PROT UR STRIP-MCNC: NEGATIVE MG/DL
PSA SERPL-MCNC: 0.8 NG/ML (ref 0–4)
RBC # BLD AUTO: 4.93 M/UL (ref 4.35–5.65)
RBC #/AREA URNS HPF: NORMAL /HPF (ref 0–5)
SODIUM SERPL-SCNC: 139 MMOL/L (ref 136–145)
SP GR UR REFRACTOMETRY: 1.01 (ref 1–1.03)
T4 FREE SERPL-MCNC: 1.5 NG/DL (ref 0.7–1.5)
TRIGL SERPL-MCNC: 170 MG/DL
TSH SERPL DL<=0.05 MIU/L-ACNC: 0.19 UIU/ML (ref 0.36–3.74)
UROBILINOGEN UR QL STRIP.AUTO: 0.2 EU/DL (ref 0.2–1)
VLDLC SERPL CALC-MCNC: 34 MG/DL
WBC # BLD AUTO: 10.7 K/UL (ref 4.6–13.2)
WBC URNS QL MICRO: NORMAL /HPF (ref 0–4)

## 2024-11-14 PROCEDURE — 84443 ASSAY THYROID STIM HORMONE: CPT

## 2024-11-14 PROCEDURE — 84439 ASSAY OF FREE THYROXINE: CPT

## 2024-11-14 PROCEDURE — 80053 COMPREHEN METABOLIC PANEL: CPT

## 2024-11-14 PROCEDURE — 36415 COLL VENOUS BLD VENIPUNCTURE: CPT

## 2024-11-14 PROCEDURE — 85025 COMPLETE CBC W/AUTO DIFF WBC: CPT

## 2024-11-14 PROCEDURE — 81001 URINALYSIS AUTO W/SCOPE: CPT

## 2024-11-14 PROCEDURE — G0103 PSA SCREENING: HCPCS

## 2024-11-14 PROCEDURE — 80061 LIPID PANEL: CPT

## 2024-11-14 RX ORDER — ALBUTEROL SULFATE 90 UG/1
2 INHALANT RESPIRATORY (INHALATION) 4 TIMES DAILY PRN
Qty: 54 G | Refills: 5 | Status: SHIPPED | OUTPATIENT
Start: 2024-11-14

## 2024-11-14 SDOH — ECONOMIC STABILITY: FOOD INSECURITY: WITHIN THE PAST 12 MONTHS, YOU WORRIED THAT YOUR FOOD WOULD RUN OUT BEFORE YOU GOT MONEY TO BUY MORE.: NEVER TRUE

## 2024-11-14 SDOH — ECONOMIC STABILITY: FOOD INSECURITY: WITHIN THE PAST 12 MONTHS, THE FOOD YOU BOUGHT JUST DIDN'T LAST AND YOU DIDN'T HAVE MONEY TO GET MORE.: NEVER TRUE

## 2024-11-14 SDOH — ECONOMIC STABILITY: INCOME INSECURITY: HOW HARD IS IT FOR YOU TO PAY FOR THE VERY BASICS LIKE FOOD, HOUSING, MEDICAL CARE, AND HEATING?: NOT HARD AT ALL

## 2024-11-14 ASSESSMENT — PATIENT HEALTH QUESTIONNAIRE - PHQ9
SUM OF ALL RESPONSES TO PHQ QUESTIONS 1-9: 0
1. LITTLE INTEREST OR PLEASURE IN DOING THINGS: NOT AT ALL
SUM OF ALL RESPONSES TO PHQ QUESTIONS 1-9: 0
2. FEELING DOWN, DEPRESSED OR HOPELESS: NOT AT ALL
SUM OF ALL RESPONSES TO PHQ QUESTIONS 1-9: 0
SUM OF ALL RESPONSES TO PHQ QUESTIONS 1-9: 0
SUM OF ALL RESPONSES TO PHQ9 QUESTIONS 1 & 2: 0

## 2024-11-14 NOTE — PATIENT INSTRUCTIONS
[facility] [phone]  Please let me know when you complete your imaging

## 2024-11-14 NOTE — PROGRESS NOTES
García Forman Jr. is a 69 y.o. year old male who presents today for   Chief Complaint   Patient presents with    Hypertension    Diarrhea       \"Have you been to the ER, urgent care clinic since your last visit?  Hospitalized since your last visit?\"    no    “Have you seen or consulted any other health care providers outside our system since your last visit?”    no    After obtaining verbal consent from [facility], patient/guardian signed immunization consent form to receive the [id number] vaccine. Most current VIS given to the patient/guardian to review before administration. All questions were answered. Patient tolerated immunization(s) well with no adverse reactions.     Click Here for Release of Records Request    - TIFFANI Ramirez  [facility]  Phone: [phone]  Fax: [phone]

## 2024-11-14 NOTE — PROGRESS NOTES
García Forman Jr. is a 69 y.o. male  established patient, here for evaluation of the following chief complaint(s):  Chief Complaint   Patient presents with    Hypertension    Diarrhea      Assessment and Plan  1. Primary hypertension  2. Chronic systolic congestive heart failure (HCC)  3. Screening for blood or protein in urine  -     Urinalysis with Microscopic; Future  4. Screening for metabolic disorder  -     Comprehensive Metabolic Panel; Future  5. Screening for cholesterol level  -     Lipid Panel; Future  6. Screening for thyroid disorder  -     TSH + Free T4 Panel; Future  7. Screening for deficiency anemia  -     CBC with Auto Differential; Future  8. Screening PSA (prostate specific antigen)  -     PSA Screening; Future  9. Immunization due  -     Pneumococcal, PCV20, PREVNAR 20, (age 6w+), IM, PF  10. Chronic pain of both knees  11. Former smoker  -     albuterol sulfate HFA (VENTOLIN HFA) 108 (90 Base) MCG/ACT inhaler; Inhale 2 puffs into the lungs 4 times daily as needed for Wheezing, Disp-54 g, R-5Normal  12. SOB (shortness of breath)  -     albuterol sulfate HFA (VENTOLIN HFA) 108 (90 Base) MCG/ACT inhaler; Inhale 2 puffs into the lungs 4 times daily as needed for Wheezing, Disp-54 g, R-5Normal  13. Former cigarette smoker  -     CT LUNG SCREENING; Future       Return in about 6 months (around 5/14/2025) for Routine, 15.   HPI:   In office visit. Pt is well. He works at [employer] and stays busy.     Pt reports bilateral knee pain and instability. He is wearing knees braces w/ good results. He will wait on xrays and seeing ortho.    Pt reports his diarrhea has resolved.     Discussed AAA screening, current   Discussed lung cancer screening, ordered     ROS:    General: negative for - chills, fever, weight changes or malaise  HEENT: no sore throat, nasal congestion, vision problems or ear problems  Respiratory: no cough, shortness of breath, or wheezing  Cardiovascular: no chest pain, palpitations,

## 2024-12-06 ENCOUNTER — HOSPITAL ENCOUNTER (OUTPATIENT)
Facility: HOSPITAL | Age: 69
Discharge: HOME | End: 2024-12-09
Payer: MEDICARE

## 2024-12-06 DIAGNOSIS — Z87.891 FORMER CIGARETTE SMOKER: ICD-10-CM

## 2024-12-06 PROCEDURE — 71271 CT THORAX LUNG CANCER SCR C-: CPT

## 2024-12-18 ENCOUNTER — TELEPHONE (OUTPATIENT)
Age: 69
End: 2024-12-18

## 2025-02-04 NOTE — PATIENT INSTRUCTIONS
Rubber Band Ligation for Hemorrhoids: What to Expect at Home  Your Recovery  After rubber band ligation, you may feel pain and have a feeling of fullness in your lower belly, or you may feel as if you need to have a bowel movement. This usually goes away within several days after the surgery. You may need pain medicine during this time. You may have a small amount of bleeding from your anus about 7 to 10 days after surgery, when your hemorrhoid falls off. This is normal.  Some people are able to return to regular activities right away. Others may need 2 to 3 days of bed rest.  You will need to avoid heavy lifting and straining with bowel movements while you recover. This care sheet gives you a general idea about how long it will take for you to recover. But each person recovers at a different pace. Follow the steps below to get better as quickly as possible. How can you care for yourself at home? Activity    · Rest when you feel tired. Getting enough sleep will help you recover.     · Try to walk each day. Start by walking a little more than you did the day before. Bit by bit, increase the amount you walk. Walking boosts blood flow and helps prevent pneumonia and constipation.     · Avoid strenuous activities, such as bicycle riding, jogging, weight lifting, or aerobic exercise, until your doctor says it is okay.     · For 2 to 3 weeks, avoid lifting anything that would make you strain. This may include heavy grocery bags and milk containers, a heavy briefcase or backpack, cat litter or dog food bags, a vacuum , or a child.     · You may take showers and baths as usual. Pat your anal area dry when you are done.     · Ask your doctor when you can drive again.     · You may need to take a few days to a few weeks off work. It depends on the procedure you had, the type of work you do, and how you feel. Diet    · You can eat your normal diet.  If your stomach is upset, try eating bland, low-fat foods Spoke with pt, scheduled for 4/9. Suggested calling periodically to check for cancellations.    like plain rice, broiled chicken, toast, and yogurt.     · Drink plenty of fluids (unless your doctor has told you not to).   · It is important to eat high-fiber foods after your procedure. This will make it easier to have bowel movements and keep your hemorrhoids from coming back.     · You may notice that your bowel movements are not regular right after your procedure. This is common. Try to avoid constipation and straining with bowel movements. You may want to take a fiber supplement every day. If you have not had a bowel movement after a couple of days, ask your doctor about taking a mild laxative. Medicines    · Your doctor will tell you if and when you can restart your medicines. He or she will also give you instructions about taking any new medicines.     · If you take blood thinners, such as warfarin (Coumadin), clopidogrel (Plavix), or aspirin, be sure to talk to your doctor. He or she will tell you if and when to start taking those medicines again. Make sure that you understand exactly what your doctor wants you to do.     · Take pain medicines exactly as directed. ? If the doctor gave you a prescription medicine for pain, take it as prescribed. ? If you are not taking a prescription pain medicine, ask your doctor if you can take an over-the-counter medicine. ? Do not take two or more pain medicines at the same time unless the doctor told you to. Many pain medicines have acetaminophen, which is Tylenol. Too much acetaminophen (Tylenol) can be harmful.     · If your doctor prescribed antibiotics, take them as directed. Do not stop taking them just because you feel better. You need to take the full course of antibiotics.     · If you think your pain medicine is making you sick to your stomach:  ? Take your medicine after meals (unless your doctor has told you not to). ?  Ask your doctor for a different pain medicine.     · You may apply numbing medicines before and after bowel movements to relieve pain.   Other instructions    · Sit in a few inches of warm water (sitz bath) for 15 to 20 minutes 3 times a day and after bowel movements. Then pat the area dry. Do this as long as you have pain in your anal area.     · Put ice or a cold pack on the area for 10 to 20 minutes at a time. Try to do this every 1 to 2 hours for the next 3 days (when you are awake). Put a thin cloth between the ice and your skin.     · Support your feet with a small step stool when you sit on the toilet. This helps flex your hips and places your pelvis in a squatting position. This can make bowel movements easier after your procedure.     · Try lying on your stomach with a pillow under your hips to decrease swelling. Follow-up care is a key part of your treatment and safety. Be sure to make and go to all appointments, and call your doctor if you are having problems. It's also a good idea to know your test results and keep a list of the medicines you take. When should you call for help? Call 911 anytime you think you may need emergency care. For example, call if:    · You passed out (lost consciousness).     · You are short of breath.    Call your doctor now or seek immediate medical care if:    · You cannot pass stools or gas.     · You are sick to your stomach and cannot drink fluids.     · Bright red blood has soaked through the bandage.     · You have signs of a blood clot in your leg (called a deep vein thrombosis), such as:  ? Pain in the calf, back of your knee, thigh, or groin. ? Redness and swelling in your leg or groin.     · You have pain that does not get better after you take your pain medicine.     · You have signs of infection, such as:  ? Increased pain, swelling, warmth, or redness. ? Red streaks leading from the area. ? Pus draining from the area. ? A fever.    Watch closely for changes in your health, and be sure to contact your doctor if you have any problems. Where can you learn more?   Go to http://jg-dave.info/. Enter M271 in the search box to learn more about \"Rubber Band Ligation for Hemorrhoids: What to Expect at Home. \"  Current as of: November 7, 2018  Content Version: 12.2  © 4618-0630 [employer]. Care instructions adapted under license by [employer] (which disclaims liability or warranty for this information). If you have questions about a medical condition or this instruction, always ask your healthcare professional. [place] [address] any warranty or liability for your use of this information.

## 2025-03-19 DIAGNOSIS — I10 PRIMARY HYPERTENSION: ICD-10-CM

## 2025-03-19 NOTE — TELEPHONE ENCOUNTER
Medication(s) requesting:   Requested Prescriptions     Pending Prescriptions Disp Refills    metoprolol tartrate (LOPRESSOR) 50 MG tablet 180 tablet 3     Sig: Take 1.5 tablets by mouth 2 times daily       Last office visit:  11/14/2024  Next office visit DMA: 5/2/2025

## 2025-03-21 RX ORDER — METOPROLOL TARTRATE 50 MG
75 TABLET ORAL 2 TIMES DAILY
Qty: 180 TABLET | Refills: 3 | Status: SHIPPED | OUTPATIENT
Start: 2025-03-21

## 2025-04-29 SDOH — ECONOMIC STABILITY: INCOME INSECURITY: IN THE LAST 12 MONTHS, WAS THERE A TIME WHEN YOU WERE NOT ABLE TO PAY THE MORTGAGE OR RENT ON TIME?: NO

## 2025-04-29 SDOH — ECONOMIC STABILITY: FOOD INSECURITY: WITHIN THE PAST 12 MONTHS, THE FOOD YOU BOUGHT JUST DIDN'T LAST AND YOU DIDN'T HAVE MONEY TO GET MORE.: NEVER TRUE

## 2025-04-29 SDOH — ECONOMIC STABILITY: FOOD INSECURITY: WITHIN THE PAST 12 MONTHS, YOU WORRIED THAT YOUR FOOD WOULD RUN OUT BEFORE YOU GOT MONEY TO BUY MORE.: NEVER TRUE

## 2025-04-29 SDOH — ECONOMIC STABILITY: TRANSPORTATION INSECURITY
IN THE PAST 12 MONTHS, HAS THE LACK OF TRANSPORTATION KEPT YOU FROM MEDICAL APPOINTMENTS OR FROM GETTING MEDICATIONS?: NO

## 2025-05-02 ENCOUNTER — OFFICE VISIT (OUTPATIENT)
Facility: CLINIC | Age: 70
End: 2025-05-02
Payer: MEDICARE

## 2025-05-02 VITALS
TEMPERATURE: 98.8 F | BODY MASS INDEX: 24.33 KG/M2 | HEART RATE: 49 BPM | DIASTOLIC BLOOD PRESSURE: 65 MMHG | OXYGEN SATURATION: 95 % | HEIGHT: 67 IN | WEIGHT: 155 LBS | RESPIRATION RATE: 16 BRPM | SYSTOLIC BLOOD PRESSURE: 121 MMHG

## 2025-05-02 DIAGNOSIS — R37 SEXUAL DYSFUNCTION: ICD-10-CM

## 2025-05-02 DIAGNOSIS — E78.00 ELEVATED CHOLESTEROL: ICD-10-CM

## 2025-05-02 DIAGNOSIS — I42.6 ALCOHOLIC CARDIOMYOPATHY (HCC): ICD-10-CM

## 2025-05-02 DIAGNOSIS — I10 PRIMARY HYPERTENSION: ICD-10-CM

## 2025-05-02 DIAGNOSIS — I48.0 PAROXYSMAL ATRIAL FIBRILLATION (HCC): ICD-10-CM

## 2025-05-02 DIAGNOSIS — Z00.00 MEDICARE ANNUAL WELLNESS VISIT, SUBSEQUENT: Primary | ICD-10-CM

## 2025-05-02 DIAGNOSIS — Z00.00 PREVENTATIVE HEALTH CARE: ICD-10-CM

## 2025-05-02 DIAGNOSIS — I50.22 CHRONIC SYSTOLIC CONGESTIVE HEART FAILURE (HCC): ICD-10-CM

## 2025-05-02 PROCEDURE — G0439 PPPS, SUBSEQ VISIT: HCPCS | Performed by: NURSE PRACTITIONER

## 2025-05-02 PROCEDURE — 1160F RVW MEDS BY RX/DR IN RCRD: CPT | Performed by: NURSE PRACTITIONER

## 2025-05-02 PROCEDURE — 1126F AMNT PAIN NOTED NONE PRSNT: CPT | Performed by: NURSE PRACTITIONER

## 2025-05-02 PROCEDURE — 3078F DIAST BP <80 MM HG: CPT | Performed by: NURSE PRACTITIONER

## 2025-05-02 PROCEDURE — 1123F ACP DISCUSS/DSCN MKR DOCD: CPT | Performed by: NURSE PRACTITIONER

## 2025-05-02 PROCEDURE — 99214 OFFICE O/P EST MOD 30 MIN: CPT | Performed by: NURSE PRACTITIONER

## 2025-05-02 PROCEDURE — 1159F MED LIST DOCD IN RCRD: CPT | Performed by: NURSE PRACTITIONER

## 2025-05-02 PROCEDURE — 3074F SYST BP LT 130 MM HG: CPT | Performed by: NURSE PRACTITIONER

## 2025-05-02 RX ORDER — AMIODARONE HYDROCHLORIDE 200 MG/1
200 TABLET ORAL EVERY MORNING
Qty: 90 TABLET | Refills: 2 | Status: SHIPPED | OUTPATIENT
Start: 2025-05-02

## 2025-05-02 RX ORDER — ATORVASTATIN CALCIUM 40 MG/1
40 TABLET, FILM COATED ORAL DAILY
Qty: 90 TABLET | Refills: 2 | Status: SHIPPED | OUTPATIENT
Start: 2025-05-02

## 2025-05-02 RX ORDER — ASPIRIN 81 MG/1
81 TABLET, CHEWABLE ORAL DAILY
Qty: 90 TABLET | Refills: 3 | Status: SHIPPED | OUTPATIENT
Start: 2025-05-02

## 2025-05-02 RX ORDER — AMLODIPINE BESYLATE 5 MG/1
5 TABLET ORAL DAILY
Qty: 90 TABLET | Refills: 2 | Status: SHIPPED | OUTPATIENT
Start: 2025-05-02

## 2025-05-02 SDOH — ECONOMIC STABILITY: FOOD INSECURITY: WITHIN THE PAST 12 MONTHS, THE FOOD YOU BOUGHT JUST DIDN'T LAST AND YOU DIDN'T HAVE MONEY TO GET MORE.: NEVER TRUE

## 2025-05-02 SDOH — ECONOMIC STABILITY: FOOD INSECURITY: WITHIN THE PAST 12 MONTHS, YOU WORRIED THAT YOUR FOOD WOULD RUN OUT BEFORE YOU GOT MONEY TO BUY MORE.: NEVER TRUE

## 2025-05-02 ASSESSMENT — PATIENT HEALTH QUESTIONNAIRE - PHQ9
9. THOUGHTS THAT YOU WOULD BE BETTER OFF DEAD, OR OF HURTING YOURSELF: NOT AT ALL
SUM OF ALL RESPONSES TO PHQ QUESTIONS 1-9: 0
6. FEELING BAD ABOUT YOURSELF - OR THAT YOU ARE A FAILURE OR HAVE LET YOURSELF OR YOUR FAMILY DOWN: NOT AT ALL
SUM OF ALL RESPONSES TO PHQ QUESTIONS 1-9: 0
4. FEELING TIRED OR HAVING LITTLE ENERGY: NOT AT ALL
8. MOVING OR SPEAKING SO SLOWLY THAT OTHER PEOPLE COULD HAVE NOTICED. OR THE OPPOSITE, BEING SO FIGETY OR RESTLESS THAT YOU HAVE BEEN MOVING AROUND A LOT MORE THAN USUAL: NOT AT ALL
5. POOR APPETITE OR OVEREATING: NOT AT ALL
2. FEELING DOWN, DEPRESSED OR HOPELESS: NOT AT ALL
SUM OF ALL RESPONSES TO PHQ QUESTIONS 1-9: 0
3. TROUBLE FALLING OR STAYING ASLEEP: NOT AT ALL
SUM OF ALL RESPONSES TO PHQ QUESTIONS 1-9: 0
7. TROUBLE CONCENTRATING ON THINGS, SUCH AS READING THE NEWSPAPER OR WATCHING TELEVISION: NOT AT ALL
1. LITTLE INTEREST OR PLEASURE IN DOING THINGS: NOT AT ALL
10. IF YOU CHECKED OFF ANY PROBLEMS, HOW DIFFICULT HAVE THESE PROBLEMS MADE IT FOR YOU TO DO YOUR WORK, TAKE CARE OF THINGS AT HOME, OR GET ALONG WITH OTHER PEOPLE: NOT DIFFICULT AT ALL

## 2025-05-02 ASSESSMENT — LIFESTYLE VARIABLES
HOW OFTEN DURING THE LAST YEAR HAVE YOU HAD A FEELING OF GUILT OR REMORSE AFTER DRINKING: NEVER
HOW OFTEN DO YOU HAVE A DRINK CONTAINING ALCOHOL: 4 OR MORE TIMES A WEEK
HAVE YOU OR SOMEONE ELSE BEEN INJURED AS A RESULT OF YOUR DRINKING: NO
HOW OFTEN DURING THE LAST YEAR HAVE YOU BEEN UNABLE TO REMEMBER WHAT HAPPENED THE NIGHT BEFORE BECAUSE YOU HAD BEEN DRINKING: NEVER
HAS A RELATIVE, FRIEND, DOCTOR, OR ANOTHER HEALTH PROFESSIONAL EXPRESSED CONCERN ABOUT YOUR DRINKING OR SUGGESTED YOU CUT DOWN: NO
HOW OFTEN DURING THE LAST YEAR HAVE YOU NEEDED AN ALCOHOLIC DRINK FIRST THING IN THE MORNING TO GET YOURSELF GOING AFTER A NIGHT OF HEAVY DRINKING: NEVER
HOW OFTEN DURING THE LAST YEAR HAVE YOU FOUND THAT YOU WERE NOT ABLE TO STOP DRINKING ONCE YOU HAD STARTED: NEVER
HOW OFTEN DURING THE LAST YEAR HAVE YOU FAILED TO DO WHAT WAS NORMALLY EXPECTED FROM YOU BECAUSE OF DRINKING: NEVER
HOW MANY STANDARD DRINKS CONTAINING ALCOHOL DO YOU HAVE ON A TYPICAL DAY: 3 OR 4

## 2025-05-02 NOTE — ACP (ADVANCE CARE PLANNING)
Advance Care Planning   General Advance Care Planning (ACP) Conversation    Date of Conversation: 5/2/2025  Conducted with: Patient with Decision Making Capacity  Other persons present: None    Healthcare Decision Maker:    Primary Decision Maker: Lujan,Airline - Rosalee - [phone]    Today we documented Decision Maker(s) consistent with Legal Next of Kin hierarchy.  Content/Action Overview:  Has ACP document(s) NOT on file - requested patient to provide  Reviewed DNR/DNI and patient elects Full Code (Attempt Resuscitation)  treatment goals, artificial nutrition, ventilation preferences, and resuscitation preferences  Yes to resuscitation and family will make decisions about life support.      Length of Voluntary ACP Conversation in minutes:  <16 minutes (Non-Billable)    MARIA DE JESUS VINSON, ALIRIO - CNP

## 2025-05-02 NOTE — PATIENT INSTRUCTIONS
and other nicotine products. This includes smoking and vaping. If you need help quitting, talk to your doctor about stop-smoking programs and medicines. These can increase your chances of quitting for good. Quitting is one of the most important things you can do to protect your heart. It is never too late to quit. Try to avoid secondhand smoke too.     Stay at a weight that's healthy for you. Talk to your doctor if you need help losing weight.     Try to get 7 to 9 hours of sleep each night.     Limit alcohol to 2 drinks a day for men and 1 drink a day for women. Too much alcohol can cause health problems.     Manage other health problems such as diabetes, high blood pressure, and high cholesterol. If you think you may have a problem with alcohol or drug use, talk to your doctor.   Medicines    Take your medicines exactly as prescribed. Call your doctor if you think you are having a problem with your medicine.     If your doctor recommends aspirin, take the amount directed each day. Make sure you take aspirin and not another kind of pain reliever, such as acetaminophen (Tylenol).   When should you call for help?   Call 911 if you have symptoms of a heart attack. These may include:    Chest pain or pressure, or a strange feeling in the chest.     Sweating.     Shortness of breath.     Pain, pressure, or a strange feeling in the back, neck, jaw, or upper belly or in one or both shoulders or arms.     Lightheadedness or sudden weakness.     A fast or irregular heartbeat.   After you call 911, the  may tell you to chew 1 adult-strength or 2 to 4 low-dose aspirin. Wait for an ambulance. Do not try to drive yourself.  Watch closely for changes in your health, and be sure to contact your doctor if you have any problems.  Where can you learn more?  Go to https://www.healthwise.net/patientEd and enter F075 to learn more about \"A Healthy Heart: Care Instructions.\"  Current as of: July 31, 2024  Content Version:

## 2025-05-02 NOTE — PROGRESS NOTES
García Forman Jr. is a 70 y.o. male  established patient, here for evaluation of the following chief complaint(s):  Chief Complaint   Patient presents with    Medicare AWV      Assessment and Plan  1. Medicare annual wellness visit, subsequent  2. Primary hypertension  3. Chronic systolic congestive heart failure (HCC)  4. Sexual dysfunction  -     [facility]       Return in about 6 months (around 11/2/2025) for Routine, 15.   HPI:   In office visit MWV. Pt is well. 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    Pt has pain w/ masturbation in his whole genital area for the last month.  He has not been sexually active for masturbated for 4 years.  He has a history of right inguinal hernia but denies he has any bulging.  He says he is able to ejaculate.    HTN  Controlled. No changes. Sees cardiology.  On metoprolol 50 mg 1.5 tabs daily, Norvasc 5 mg, amiodarone 200 mg daily  Discussed alcohol cessation but he as cut back.     Colorectal cancer screening May 2026  ROS:    General: negative for - chills, fever, weight changes or malaise  HEENT: no sore throat, nasal congestion, vision problems or ear problems  Respiratory: no cough, shortness of breath, or wheezing  Cardiovascular: no chest pain, palpitations, or dyspnea on exertion  Gastrointestinal: no abdominal pain, N/V, change in bowel habits  Musculoskeletal: no back pain or joint pain  Neurological: no headache or dizziness  Endo:  No polyuria or polydipsia  : no urinary  Skin: none   Psychological: negative for - anxiety, depression, sleeps issues    Prior to Admission medications    Medication Sig Start Date End Date Taking? Authorizing Provider   metoprolol tartrate (LOPRESSOR) 50 MG tablet Take 1.5 tablets by mouth 2 times daily 3/21/25  Yes Shae Peters, ALIRIO - CNP   albuterol sulfate HFA (VENTOLIN HFA) 108 (90 Base) MCG/ACT inhaler Inhale 2 puffs into the lungs 4 times

## 2025-05-19 ENCOUNTER — CLINICAL DOCUMENTATION (OUTPATIENT)
Facility: CLINIC | Age: 70
End: 2025-05-19

## 2025-05-19 NOTE — PROGRESS NOTES
Patient reports he has been lifting a lot at work and his left \"chest muscle\" was bothering him.  Patient declined coming into the office May 20, 2025 and said he could not make it into the office until around the first of the month in June.  Patient sounded well by phone.  Patient sees Dr. Harley with cardiology.  Advised patient if this pain is concerning and he thinks it is his heart he should respond to the emergency room if he has the pain again.  Patient agreed.

## 2025-06-26 DIAGNOSIS — I50.22 CHRONIC SYSTOLIC CONGESTIVE HEART FAILURE (HCC): ICD-10-CM

## 2025-06-26 NOTE — TELEPHONE ENCOUNTER
[facility] requesting medication refill on the following:     Requested Prescriptions     Pending Prescriptions Disp Refills    furosemide (LASIX) 20 MG tablet 90 tablet 1     Sig: Take 1 tablet by mouth daily     LOV: 5/2/25   NOV: 11/3/25     Please be advised, thank you.

## 2025-06-30 DIAGNOSIS — I50.22 CHRONIC SYSTOLIC CONGESTIVE HEART FAILURE (HCC): ICD-10-CM

## 2025-06-30 RX ORDER — FUROSEMIDE 20 MG/1
20 TABLET ORAL DAILY
Qty: 90 TABLET | Refills: 1 | Status: SHIPPED | OUTPATIENT
Start: 2025-06-30 | End: 2025-06-30 | Stop reason: SDUPTHER

## 2025-06-30 NOTE — TELEPHONE ENCOUNTER
[facility] requesting medication refill on the following:     Requested Prescriptions     Pending Prescriptions Disp Refills    furosemide (LASIX) 20 MG tablet 90 tablet 1     Sig: Take 1 tablet by mouth daily     LOV: 5/2/25  NOV: 11/3/25      Please be advised, thank you.

## 2025-07-02 RX ORDER — FUROSEMIDE 20 MG/1
20 TABLET ORAL DAILY
Qty: 90 TABLET | Refills: 1 | Status: SHIPPED | OUTPATIENT
Start: 2025-07-02

## 2025-08-13 DIAGNOSIS — I42.6 ALCOHOLIC CARDIOMYOPATHY (HCC): ICD-10-CM

## 2025-08-13 DIAGNOSIS — I48.0 PAROXYSMAL ATRIAL FIBRILLATION (HCC): ICD-10-CM

## 2025-08-15 RX ORDER — AMIODARONE HYDROCHLORIDE 200 MG/1
200 TABLET ORAL EVERY MORNING
Qty: 90 TABLET | Refills: 1 | Status: SHIPPED | OUTPATIENT
Start: 2025-08-15

## (undated) DEVICE — CANNULA THREADED DISP 8.5 X 72MM: Brand: CLEAR-TRAC

## (undated) DEVICE — SHOULDER STABILIZATION KIT,                                    DISPOSABLE 12 PER BOX

## (undated) DEVICE — KIT INSTR W/ 2.4MM GUIDEPIN SUT PASS WIRE NO2 FIBERWIRE

## (undated) DEVICE — DRAPE TWL SURG 16X26IN BLU ORB04] ALLCARE INC]

## (undated) DEVICE — KIT OR TURNOVER

## (undated) DEVICE — T-MAX DISPOSABLE FACE MASK 8 PER BOX

## (undated) DEVICE — 3M™ STERI-DRAPE™ U-DRAPE 1015: Brand: STERI-DRAPE™

## (undated) DEVICE — 90-S CRUISE, SUCTION PROBE, NON-BENDABLE, MAX CUT LEVEL 1: Brand: SERFAS ENERGY

## (undated) DEVICE — CANNULA ARTHSCP L3CM ID8MM DBL DAM 1 PC MOLD LO PROF FLNG

## (undated) DEVICE — 4-PORT MANIFOLD: Brand: NEPTUNE 2

## (undated) DEVICE — 1010 S-DRAPE TOWEL DRAPE 10/BX: Brand: STERI-DRAPE™

## (undated) DEVICE — BLANKET WRM W40.2XL55.9IN IORT LO BODY + MISTRAL AIR

## (undated) DEVICE — INFLOW CASSETTE TUBING, DO NOT USE IF PACKAGE IS DAMAGED: Brand: CROSSFLOW

## (undated) DEVICE — YANKAUER,SMOOTH HANDLE,HIGH CAPACITY: Brand: MEDLINE INDUSTRIES, INC.

## (undated) DEVICE — SOLUTION IRRIG 3000ML 0.9% SOD CHL FLX CONT 0797208] ICU MEDICAL INC]

## (undated) DEVICE — SHOULDER ARTHROSCOPY PACK: Brand: MEDLINE INDUSTRIES, INC.

## (undated) DEVICE — [RESECTOR CUTTER, ARTHROSCOPIC SHAVER BLADE,  DO NOT RESTERILIZE,  DO NOT USE IF PACKAGE IS DAMAGED,  KEEP DRY,  KEEP AWAY FROM SUNLIGHT]: Brand: FORMULA

## (undated) DEVICE — GLOVE ORTHO 7 1/2   MSG9475

## (undated) DEVICE — [AGGRESSIVE 6-FLUTE BARREL BUR, ARTHROSCOPIC SHAVER BLADE,  DO NOT RESTERILIZE,  DO NOT USE IF PACKAGE IS DAMAGED,  KEEP DRY,  KEEP AWAY FROM SUNLIGHT]: Brand: FORMULA

## (undated) DEVICE — BANDAGE,GAUZE,BULKEE II,4.5"X4.1YD,STRL: Brand: MEDLINE

## (undated) DEVICE — SPONGE LAPAROTOMY W18XL18IN WHITE STRUNG RADIOPAQUE STERILE

## (undated) DEVICE — SUTURE ETHLN SZ 2-0 L18IN NONABSORBABLE BLK L19MM PS-2 PRIM 593H

## (undated) DEVICE — DRAPE,REIN 53X77,STERILE: Brand: MEDLINE

## (undated) DEVICE — PASSER SUT SELF CAPTURE ROT CUF REP REUSE COBRA

## (undated) DEVICE — BANDAGE COBAN 4 IN COMPR W4INXL5YD FOAM COHESIVE QUIK STK SELF ADH SFT

## (undated) DEVICE — APPLICATOR MEDICATED 26 CC SOLUTION HI LT ORNG CHLORAPREP